# Patient Record
Sex: MALE | Race: ASIAN | NOT HISPANIC OR LATINO | Employment: OTHER | URBAN - METROPOLITAN AREA
[De-identification: names, ages, dates, MRNs, and addresses within clinical notes are randomized per-mention and may not be internally consistent; named-entity substitution may affect disease eponyms.]

---

## 2017-01-31 ENCOUNTER — OFFICE VISIT - HEALTHEAST (OUTPATIENT)
Dept: AUDIOLOGY | Facility: CLINIC | Age: 67
End: 2017-01-31

## 2017-01-31 DIAGNOSIS — H90.3 SENSORINEURAL HEARING LOSS, BILATERAL: ICD-10-CM

## 2017-01-31 DIAGNOSIS — H93.11 TINNITUS, RIGHT: ICD-10-CM

## 2017-02-07 ENCOUNTER — COMMUNICATION - HEALTHEAST (OUTPATIENT)
Dept: FAMILY MEDICINE | Facility: CLINIC | Age: 67
End: 2017-02-07

## 2017-02-07 ENCOUNTER — AMBULATORY - HEALTHEAST (OUTPATIENT)
Dept: CARE COORDINATION | Facility: CLINIC | Age: 67
End: 2017-02-07

## 2017-02-07 ENCOUNTER — COMMUNICATION - HEALTHEAST (OUTPATIENT)
Dept: CARE COORDINATION | Facility: CLINIC | Age: 67
End: 2017-02-07

## 2017-02-07 DIAGNOSIS — J98.01 BRONCHOSPASM: ICD-10-CM

## 2017-02-07 DIAGNOSIS — R51.9 HEADACHE: ICD-10-CM

## 2017-02-23 ENCOUNTER — COMMUNICATION - HEALTHEAST (OUTPATIENT)
Dept: CARE COORDINATION | Facility: CLINIC | Age: 67
End: 2017-02-23

## 2017-02-23 ENCOUNTER — COMMUNICATION - HEALTHEAST (OUTPATIENT)
Dept: FAMILY MEDICINE | Facility: CLINIC | Age: 67
End: 2017-02-23

## 2017-02-23 ENCOUNTER — AMBULATORY - HEALTHEAST (OUTPATIENT)
Dept: CARE COORDINATION | Facility: CLINIC | Age: 67
End: 2017-02-23

## 2017-02-23 DIAGNOSIS — R51.9 HEADACHE: ICD-10-CM

## 2017-03-06 ENCOUNTER — COMMUNICATION - HEALTHEAST (OUTPATIENT)
Dept: FAMILY MEDICINE | Facility: CLINIC | Age: 67
End: 2017-03-06

## 2017-03-09 ENCOUNTER — AMBULATORY - HEALTHEAST (OUTPATIENT)
Dept: CARE COORDINATION | Facility: CLINIC | Age: 67
End: 2017-03-09

## 2017-03-09 ENCOUNTER — COMMUNICATION - HEALTHEAST (OUTPATIENT)
Dept: CARE COORDINATION | Facility: CLINIC | Age: 67
End: 2017-03-09

## 2017-03-09 ENCOUNTER — RECORDS - HEALTHEAST (OUTPATIENT)
Dept: GENERAL RADIOLOGY | Facility: CLINIC | Age: 67
End: 2017-03-09

## 2017-03-09 ENCOUNTER — OFFICE VISIT - HEALTHEAST (OUTPATIENT)
Dept: FAMILY MEDICINE | Facility: CLINIC | Age: 67
End: 2017-03-09

## 2017-03-09 ENCOUNTER — COMMUNICATION - HEALTHEAST (OUTPATIENT)
Dept: FAMILY MEDICINE | Facility: CLINIC | Age: 67
End: 2017-03-09

## 2017-03-09 ENCOUNTER — COMMUNICATION - HEALTHEAST (OUTPATIENT)
Dept: NURSING | Facility: CLINIC | Age: 67
End: 2017-03-09

## 2017-03-09 DIAGNOSIS — M79.644 PAIN IN RIGHT FINGER(S): ICD-10-CM

## 2017-03-09 DIAGNOSIS — M79.644 FINGER PAIN, RIGHT: ICD-10-CM

## 2017-03-09 DIAGNOSIS — R93.6 ABNORMAL X-RAY OF EXTREMITY: ICD-10-CM

## 2017-03-09 DIAGNOSIS — Z71.89 COUNSELING AND COORDINATION OF CARE: ICD-10-CM

## 2017-03-09 DIAGNOSIS — R51.9 HEADACHE: ICD-10-CM

## 2017-03-15 ENCOUNTER — COMMUNICATION - HEALTHEAST (OUTPATIENT)
Dept: FAMILY MEDICINE | Facility: CLINIC | Age: 67
End: 2017-03-15

## 2017-03-23 ENCOUNTER — AMBULATORY - HEALTHEAST (OUTPATIENT)
Dept: CARE COORDINATION | Facility: CLINIC | Age: 67
End: 2017-03-23

## 2017-03-23 ENCOUNTER — COMMUNICATION - HEALTHEAST (OUTPATIENT)
Dept: CARE COORDINATION | Facility: CLINIC | Age: 67
End: 2017-03-23

## 2017-04-26 ENCOUNTER — COMMUNICATION - HEALTHEAST (OUTPATIENT)
Dept: FAMILY MEDICINE | Facility: CLINIC | Age: 67
End: 2017-04-26

## 2017-04-27 ENCOUNTER — AMBULATORY - HEALTHEAST (OUTPATIENT)
Dept: CARE COORDINATION | Facility: CLINIC | Age: 67
End: 2017-04-27

## 2017-04-27 ENCOUNTER — COMMUNICATION - HEALTHEAST (OUTPATIENT)
Dept: FAMILY MEDICINE | Facility: CLINIC | Age: 67
End: 2017-04-27

## 2017-04-27 DIAGNOSIS — R51.9 HEADACHE: ICD-10-CM

## 2017-05-05 ENCOUNTER — COMMUNICATION - HEALTHEAST (OUTPATIENT)
Dept: FAMILY MEDICINE | Facility: CLINIC | Age: 67
End: 2017-05-05

## 2017-05-09 ENCOUNTER — AMBULATORY - HEALTHEAST (OUTPATIENT)
Dept: CARE COORDINATION | Facility: CLINIC | Age: 67
End: 2017-05-09

## 2017-05-09 ENCOUNTER — COMMUNICATION - HEALTHEAST (OUTPATIENT)
Dept: HOME HEALTH SERVICES | Facility: HOME HEALTH | Age: 67
End: 2017-05-09

## 2017-05-09 ENCOUNTER — HOME CARE/HOSPICE - HEALTHEAST (OUTPATIENT)
Dept: HOME HEALTH SERVICES | Facility: HOME HEALTH | Age: 67
End: 2017-05-09

## 2017-05-09 DIAGNOSIS — Z91.89 COMPLIANCE WITH MEDICATION REGIMEN: ICD-10-CM

## 2017-05-11 ENCOUNTER — OFFICE VISIT - HEALTHEAST (OUTPATIENT)
Dept: FAMILY MEDICINE | Facility: CLINIC | Age: 67
End: 2017-05-11

## 2017-05-11 ENCOUNTER — COMMUNICATION - HEALTHEAST (OUTPATIENT)
Dept: FAMILY MEDICINE | Facility: CLINIC | Age: 67
End: 2017-05-11

## 2017-05-11 DIAGNOSIS — S46.912D SHOULDER STRAIN, LEFT, SUBSEQUENT ENCOUNTER: ICD-10-CM

## 2017-05-19 ENCOUNTER — COMMUNICATION - HEALTHEAST (OUTPATIENT)
Dept: FAMILY MEDICINE | Facility: CLINIC | Age: 67
End: 2017-05-19

## 2017-05-19 DIAGNOSIS — R51.9 HEADACHE: ICD-10-CM

## 2017-05-19 DIAGNOSIS — I10 HYPERTENSION: ICD-10-CM

## 2017-05-24 ENCOUNTER — RECORDS - HEALTHEAST (OUTPATIENT)
Dept: ADMINISTRATIVE | Facility: OTHER | Age: 67
End: 2017-05-24

## 2017-06-22 ENCOUNTER — COMMUNICATION - HEALTHEAST (OUTPATIENT)
Dept: FAMILY MEDICINE | Facility: CLINIC | Age: 67
End: 2017-06-22

## 2017-06-22 DIAGNOSIS — R51.9 HEADACHE: ICD-10-CM

## 2017-06-28 ENCOUNTER — COMMUNICATION - HEALTHEAST (OUTPATIENT)
Dept: FAMILY MEDICINE | Facility: CLINIC | Age: 67
End: 2017-06-28

## 2017-07-03 ENCOUNTER — RECORDS - HEALTHEAST (OUTPATIENT)
Dept: ADMINISTRATIVE | Facility: OTHER | Age: 67
End: 2017-07-03

## 2017-07-18 ENCOUNTER — COMMUNICATION - HEALTHEAST (OUTPATIENT)
Dept: FAMILY MEDICINE | Facility: CLINIC | Age: 67
End: 2017-07-18

## 2017-07-18 DIAGNOSIS — I10 HYPERTENSION: ICD-10-CM

## 2017-07-20 ENCOUNTER — RECORDS - HEALTHEAST (OUTPATIENT)
Dept: GENERAL RADIOLOGY | Facility: CLINIC | Age: 67
End: 2017-07-20

## 2017-07-20 ENCOUNTER — OFFICE VISIT - HEALTHEAST (OUTPATIENT)
Dept: FAMILY MEDICINE | Facility: CLINIC | Age: 67
End: 2017-07-20

## 2017-07-20 DIAGNOSIS — R10.9 LEFT FLANK PAIN: ICD-10-CM

## 2017-07-20 DIAGNOSIS — I10 HYPERTENSION: ICD-10-CM

## 2017-07-20 DIAGNOSIS — R10.9 UNSPECIFIED ABDOMINAL PAIN: ICD-10-CM

## 2017-07-20 DIAGNOSIS — H91.90 HEARING LOSS: ICD-10-CM

## 2017-07-20 DIAGNOSIS — M25.512 SHOULDER PAIN, LEFT: ICD-10-CM

## 2017-07-20 DIAGNOSIS — E78.5 HYPERLIPIDEMIA: ICD-10-CM

## 2017-07-20 DIAGNOSIS — E03.9 HYPOTHYROIDISM: ICD-10-CM

## 2017-07-20 LAB
CHOLEST SERPL-MCNC: 185 MG/DL
FASTING STATUS PATIENT QL REPORTED: YES
HDLC SERPL-MCNC: 34 MG/DL
LDLC SERPL CALC-MCNC: 134 MG/DL
TRIGL SERPL-MCNC: 83 MG/DL

## 2017-07-20 ASSESSMENT — MIFFLIN-ST. JEOR: SCORE: 1065.63

## 2017-07-21 ENCOUNTER — COMMUNICATION - HEALTHEAST (OUTPATIENT)
Dept: FAMILY MEDICINE | Facility: CLINIC | Age: 67
End: 2017-07-21

## 2017-08-01 ENCOUNTER — COMMUNICATION - HEALTHEAST (OUTPATIENT)
Dept: ADMINISTRATIVE | Facility: CLINIC | Age: 67
End: 2017-08-01

## 2017-08-01 ENCOUNTER — OFFICE VISIT - HEALTHEAST (OUTPATIENT)
Dept: AUDIOLOGY | Facility: CLINIC | Age: 67
End: 2017-08-01

## 2017-08-01 DIAGNOSIS — H90.3 SENSORINEURAL HEARING LOSS, BILATERAL: ICD-10-CM

## 2017-08-01 DIAGNOSIS — R42 DIZZINESS: ICD-10-CM

## 2017-08-01 DIAGNOSIS — H93.12 TINNITUS, LEFT: ICD-10-CM

## 2017-08-15 ENCOUNTER — COMMUNICATION - HEALTHEAST (OUTPATIENT)
Dept: FAMILY MEDICINE | Facility: CLINIC | Age: 67
End: 2017-08-15

## 2017-08-15 DIAGNOSIS — R51.9 HEADACHE: ICD-10-CM

## 2017-08-15 DIAGNOSIS — I09.9 RHEUMATIC HEART DISEASE, UNSPECIFIED: ICD-10-CM

## 2017-08-21 ENCOUNTER — COMMUNICATION - HEALTHEAST (OUTPATIENT)
Dept: NURSING | Facility: CLINIC | Age: 67
End: 2017-08-21

## 2017-08-29 ENCOUNTER — OFFICE VISIT - HEALTHEAST (OUTPATIENT)
Dept: CARDIOLOGY | Facility: CLINIC | Age: 67
End: 2017-08-29

## 2017-08-29 ENCOUNTER — COMMUNICATION - HEALTHEAST (OUTPATIENT)
Dept: CARDIOLOGY | Facility: CLINIC | Age: 67
End: 2017-08-29

## 2017-08-29 DIAGNOSIS — R00.0 TACHYCARDIA: ICD-10-CM

## 2017-08-29 DIAGNOSIS — E78.01 FAMILIAL HYPERCHOLESTEROLEMIA: ICD-10-CM

## 2017-08-29 DIAGNOSIS — I09.9 RHEUMATIC HEART DISEASE, UNSPECIFIED: ICD-10-CM

## 2017-08-29 DIAGNOSIS — I25.10 ATHEROSCLEROSIS OF NATIVE CORONARY ARTERY OF NATIVE HEART WITHOUT ANGINA PECTORIS: ICD-10-CM

## 2017-08-29 DIAGNOSIS — E78.5 HYPERLIPIDEMIA: ICD-10-CM

## 2017-08-29 DIAGNOSIS — I10 ESSENTIAL HYPERTENSION: ICD-10-CM

## 2017-08-29 DIAGNOSIS — I66.9 CEREBRAL ARTERY OCCLUSION: ICD-10-CM

## 2017-08-29 LAB
ATRIAL RATE - MUSE: 300 BPM
DIASTOLIC BLOOD PRESSURE - MUSE: NORMAL MMHG
INTERPRETATION ECG - MUSE: NORMAL
P AXIS - MUSE: -62 DEGREES
PR INTERVAL - MUSE: NORMAL MS
QRS DURATION - MUSE: 96 MS
QT - MUSE: 264 MS
QTC - MUSE: 397 MS
R AXIS - MUSE: 46 DEGREES
SYSTOLIC BLOOD PRESSURE - MUSE: NORMAL MMHG
T AXIS - MUSE: 64 DEGREES
VENTRICULAR RATE- MUSE: 136 BPM

## 2017-08-29 ASSESSMENT — MIFFLIN-ST. JEOR: SCORE: 1074.71

## 2017-08-31 ENCOUNTER — AMBULATORY - HEALTHEAST (OUTPATIENT)
Dept: CARDIOLOGY | Facility: CLINIC | Age: 67
End: 2017-08-31

## 2017-08-31 DIAGNOSIS — I48.91 ATRIAL FIBRILLATION (H): ICD-10-CM

## 2017-09-01 ENCOUNTER — COMMUNICATION - HEALTHEAST (OUTPATIENT)
Dept: CARDIOLOGY | Facility: CLINIC | Age: 67
End: 2017-09-01

## 2017-09-01 ENCOUNTER — COMMUNICATION - HEALTHEAST (OUTPATIENT)
Dept: FAMILY MEDICINE | Facility: CLINIC | Age: 67
End: 2017-09-01

## 2017-09-01 DIAGNOSIS — E03.9 HYPOTHYROIDISM: ICD-10-CM

## 2017-09-06 ENCOUNTER — COMMUNICATION - HEALTHEAST (OUTPATIENT)
Dept: FAMILY MEDICINE | Facility: CLINIC | Age: 67
End: 2017-09-06

## 2017-09-06 ENCOUNTER — OFFICE VISIT - HEALTHEAST (OUTPATIENT)
Dept: FAMILY MEDICINE | Facility: CLINIC | Age: 67
End: 2017-09-06

## 2017-09-06 DIAGNOSIS — I95.9 HYPOTENSION: ICD-10-CM

## 2017-09-06 DIAGNOSIS — I48.91 A-FIB (H): ICD-10-CM

## 2017-09-06 DIAGNOSIS — Z09 HOSPITAL DISCHARGE FOLLOW-UP: ICD-10-CM

## 2017-09-06 DIAGNOSIS — I50.9 CHF (CONGESTIVE HEART FAILURE) (H): ICD-10-CM

## 2017-09-06 DIAGNOSIS — I05.0 MITRAL VALVE STENOSIS, UNSPECIFIED ETIOLOGY: ICD-10-CM

## 2017-09-06 ASSESSMENT — MIFFLIN-ST. JEOR: SCORE: 1079.24

## 2017-09-08 ENCOUNTER — RECORDS - HEALTHEAST (OUTPATIENT)
Dept: ADMINISTRATIVE | Facility: OTHER | Age: 67
End: 2017-09-08

## 2017-09-11 ENCOUNTER — COMMUNICATION - HEALTHEAST (OUTPATIENT)
Dept: FAMILY MEDICINE | Facility: CLINIC | Age: 67
End: 2017-09-11

## 2017-09-11 ENCOUNTER — OFFICE VISIT - HEALTHEAST (OUTPATIENT)
Dept: FAMILY MEDICINE | Facility: CLINIC | Age: 67
End: 2017-09-11

## 2017-09-11 DIAGNOSIS — Z00.00 HEALTH CARE MAINTENANCE: ICD-10-CM

## 2017-09-11 DIAGNOSIS — I50.9 CHF (CONGESTIVE HEART FAILURE) (H): ICD-10-CM

## 2017-09-11 DIAGNOSIS — I05.0 MITRAL VALVE STENOSIS, UNSPECIFIED ETIOLOGY: ICD-10-CM

## 2017-09-11 DIAGNOSIS — Z23 NEED FOR PNEUMOCOCCAL VACCINE: ICD-10-CM

## 2017-09-11 DIAGNOSIS — I48.91 ATRIAL FIBRILLATION WITH RVR (H): ICD-10-CM

## 2017-09-12 ENCOUNTER — COMMUNICATION - HEALTHEAST (OUTPATIENT)
Dept: FAMILY MEDICINE | Facility: CLINIC | Age: 67
End: 2017-09-12

## 2017-09-12 DIAGNOSIS — R51.9 HEADACHE: ICD-10-CM

## 2017-09-13 ENCOUNTER — COMMUNICATION - HEALTHEAST (OUTPATIENT)
Dept: FAMILY MEDICINE | Facility: CLINIC | Age: 67
End: 2017-09-13

## 2017-09-14 ENCOUNTER — RECORDS - HEALTHEAST (OUTPATIENT)
Dept: ADMINISTRATIVE | Facility: OTHER | Age: 67
End: 2017-09-14

## 2017-09-14 ENCOUNTER — OFFICE VISIT - HEALTHEAST (OUTPATIENT)
Dept: CARDIOLOGY | Facility: CLINIC | Age: 67
End: 2017-09-14

## 2017-09-14 ENCOUNTER — OFFICE VISIT - HEALTHEAST (OUTPATIENT)
Dept: FAMILY MEDICINE | Facility: CLINIC | Age: 67
End: 2017-09-14

## 2017-09-14 ENCOUNTER — AMBULATORY - HEALTHEAST (OUTPATIENT)
Dept: CARDIOLOGY | Facility: CLINIC | Age: 67
End: 2017-09-14

## 2017-09-14 ENCOUNTER — COMMUNICATION - HEALTHEAST (OUTPATIENT)
Dept: FAMILY MEDICINE | Facility: CLINIC | Age: 67
End: 2017-09-14

## 2017-09-14 DIAGNOSIS — I48.91 A-FIB (H): ICD-10-CM

## 2017-09-14 DIAGNOSIS — I50.31 ACUTE DIASTOLIC CHF (CONGESTIVE HEART FAILURE) (H): ICD-10-CM

## 2017-09-14 DIAGNOSIS — I50.9 CHF (CONGESTIVE HEART FAILURE) (H): ICD-10-CM

## 2017-09-14 DIAGNOSIS — I50.30 HEART FAILURE WITH PRESERVED EJECTION FRACTION (H): ICD-10-CM

## 2017-09-14 DIAGNOSIS — I09.9 RHEUMATIC HEART DISEASE, UNSPECIFIED: ICD-10-CM

## 2017-09-14 LAB
ATRIAL RATE - MUSE: 242 BPM
DIASTOLIC BLOOD PRESSURE - MUSE: NORMAL MMHG
INTERPRETATION ECG - MUSE: NORMAL
P AXIS - MUSE: -20 DEGREES
PR INTERVAL - MUSE: NORMAL MS
QRS DURATION - MUSE: 102 MS
QT - MUSE: 386 MS
QTC - MUSE: 474 MS
R AXIS - MUSE: 38 DEGREES
SYSTOLIC BLOOD PRESSURE - MUSE: NORMAL MMHG
T AXIS - MUSE: 23 DEGREES
VENTRICULAR RATE- MUSE: 91 BPM

## 2017-09-14 ASSESSMENT — MIFFLIN-ST. JEOR
SCORE: 1101.01
SCORE: 1099.65

## 2017-09-15 ENCOUNTER — AMBULATORY - HEALTHEAST (OUTPATIENT)
Dept: LAB | Facility: CLINIC | Age: 67
End: 2017-09-15

## 2017-09-15 ENCOUNTER — COMMUNICATION - HEALTHEAST (OUTPATIENT)
Dept: FAMILY MEDICINE | Facility: CLINIC | Age: 67
End: 2017-09-15

## 2017-09-15 DIAGNOSIS — I48.91 A-FIB (H): ICD-10-CM

## 2017-09-15 DIAGNOSIS — I50.9 CHF (CONGESTIVE HEART FAILURE) (H): ICD-10-CM

## 2017-09-18 ENCOUNTER — AMBULATORY - HEALTHEAST (OUTPATIENT)
Dept: LAB | Facility: CLINIC | Age: 67
End: 2017-09-18

## 2017-09-18 ENCOUNTER — COMMUNICATION - HEALTHEAST (OUTPATIENT)
Dept: NURSING | Facility: CLINIC | Age: 67
End: 2017-09-18

## 2017-09-18 DIAGNOSIS — I50.9 CHF (CONGESTIVE HEART FAILURE) (H): ICD-10-CM

## 2017-09-18 DIAGNOSIS — I48.91 A-FIB (H): ICD-10-CM

## 2017-09-19 ENCOUNTER — COMMUNICATION - HEALTHEAST (OUTPATIENT)
Dept: FAMILY MEDICINE | Facility: CLINIC | Age: 67
End: 2017-09-19

## 2017-09-19 DIAGNOSIS — I10 HYPERTENSION: ICD-10-CM

## 2017-09-21 ENCOUNTER — AMBULATORY - HEALTHEAST (OUTPATIENT)
Dept: LAB | Facility: CLINIC | Age: 67
End: 2017-09-21

## 2017-09-21 ENCOUNTER — COMMUNICATION - HEALTHEAST (OUTPATIENT)
Dept: FAMILY MEDICINE | Facility: CLINIC | Age: 67
End: 2017-09-21

## 2017-09-21 ENCOUNTER — OFFICE VISIT - HEALTHEAST (OUTPATIENT)
Dept: CARDIOLOGY | Facility: CLINIC | Age: 67
End: 2017-09-21

## 2017-09-21 ENCOUNTER — AMBULATORY - HEALTHEAST (OUTPATIENT)
Dept: CARDIOLOGY | Facility: CLINIC | Age: 67
End: 2017-09-21

## 2017-09-21 DIAGNOSIS — I50.9 CHF (CONGESTIVE HEART FAILURE) (H): ICD-10-CM

## 2017-09-21 DIAGNOSIS — I09.9 RHEUMATIC HEART DISEASE: ICD-10-CM

## 2017-09-21 DIAGNOSIS — I48.91 A-FIB (H): ICD-10-CM

## 2017-09-21 DIAGNOSIS — I05.1 RHEUMATIC MITRAL REGURGITATION: ICD-10-CM

## 2017-09-21 ASSESSMENT — MIFFLIN-ST. JEOR
SCORE: 1092.85
SCORE: 1096.48

## 2017-09-25 ENCOUNTER — AMBULATORY - HEALTHEAST (OUTPATIENT)
Dept: LAB | Facility: CLINIC | Age: 67
End: 2017-09-25

## 2017-09-25 ENCOUNTER — COMMUNICATION - HEALTHEAST (OUTPATIENT)
Dept: NURSING | Facility: CLINIC | Age: 67
End: 2017-09-25

## 2017-09-25 DIAGNOSIS — I48.91 A-FIB (H): ICD-10-CM

## 2017-09-25 DIAGNOSIS — I50.9 CHF (CONGESTIVE HEART FAILURE) (H): ICD-10-CM

## 2017-09-26 ENCOUNTER — COMMUNICATION - HEALTHEAST (OUTPATIENT)
Dept: CARDIOLOGY | Facility: CLINIC | Age: 67
End: 2017-09-26

## 2017-09-28 ENCOUNTER — RECORDS - HEALTHEAST (OUTPATIENT)
Dept: ADMINISTRATIVE | Facility: OTHER | Age: 67
End: 2017-09-28

## 2017-09-28 ENCOUNTER — COMMUNICATION - HEALTHEAST (OUTPATIENT)
Dept: FAMILY MEDICINE | Facility: CLINIC | Age: 67
End: 2017-09-28

## 2017-09-28 ENCOUNTER — AMBULATORY - HEALTHEAST (OUTPATIENT)
Dept: LAB | Facility: CLINIC | Age: 67
End: 2017-09-28

## 2017-09-28 DIAGNOSIS — I50.9 CHF (CONGESTIVE HEART FAILURE) (H): ICD-10-CM

## 2017-09-28 DIAGNOSIS — I48.91 A-FIB (H): ICD-10-CM

## 2017-10-02 ENCOUNTER — COMMUNICATION - HEALTHEAST (OUTPATIENT)
Dept: NURSING | Facility: CLINIC | Age: 67
End: 2017-10-02

## 2017-10-02 ENCOUNTER — AMBULATORY - HEALTHEAST (OUTPATIENT)
Dept: LAB | Facility: CLINIC | Age: 67
End: 2017-10-02

## 2017-10-02 DIAGNOSIS — I48.91 A-FIB (H): ICD-10-CM

## 2017-10-02 DIAGNOSIS — I50.9 CHF (CONGESTIVE HEART FAILURE) (H): ICD-10-CM

## 2017-10-03 ENCOUNTER — COMMUNICATION - HEALTHEAST (OUTPATIENT)
Dept: FAMILY MEDICINE | Facility: CLINIC | Age: 67
End: 2017-10-03

## 2017-10-03 DIAGNOSIS — I09.9 RHEUMATIC HEART DISEASE: ICD-10-CM

## 2017-10-03 DIAGNOSIS — I48.91 A-FIB (H): ICD-10-CM

## 2017-10-05 ENCOUNTER — COMMUNICATION - HEALTHEAST (OUTPATIENT)
Dept: FAMILY MEDICINE | Facility: CLINIC | Age: 67
End: 2017-10-05

## 2017-10-05 ENCOUNTER — AMBULATORY - HEALTHEAST (OUTPATIENT)
Dept: LAB | Facility: CLINIC | Age: 67
End: 2017-10-05

## 2017-10-05 DIAGNOSIS — I50.9 CHF (CONGESTIVE HEART FAILURE) (H): ICD-10-CM

## 2017-10-05 DIAGNOSIS — I48.91 A-FIB (H): ICD-10-CM

## 2017-10-10 ENCOUNTER — COMMUNICATION - HEALTHEAST (OUTPATIENT)
Dept: FAMILY MEDICINE | Facility: CLINIC | Age: 67
End: 2017-10-10

## 2017-10-10 DIAGNOSIS — I48.91 A-FIB (H): ICD-10-CM

## 2017-10-13 ENCOUNTER — COMMUNICATION - HEALTHEAST (OUTPATIENT)
Dept: FAMILY MEDICINE | Facility: CLINIC | Age: 67
End: 2017-10-13

## 2017-10-13 DIAGNOSIS — I48.91 A-FIB (H): ICD-10-CM

## 2017-10-16 ENCOUNTER — RECORDS - HEALTHEAST (OUTPATIENT)
Dept: ADMINISTRATIVE | Facility: OTHER | Age: 67
End: 2017-10-16

## 2017-10-18 ENCOUNTER — COMMUNICATION - HEALTHEAST (OUTPATIENT)
Dept: FAMILY MEDICINE | Facility: CLINIC | Age: 67
End: 2017-10-18

## 2017-10-19 ENCOUNTER — COMMUNICATION - HEALTHEAST (OUTPATIENT)
Dept: FAMILY MEDICINE | Facility: CLINIC | Age: 67
End: 2017-10-19

## 2017-10-19 ENCOUNTER — RECORDS - HEALTHEAST (OUTPATIENT)
Dept: ADMINISTRATIVE | Facility: OTHER | Age: 67
End: 2017-10-19

## 2017-10-19 ENCOUNTER — COMMUNICATION - HEALTHEAST (OUTPATIENT)
Dept: CARDIOLOGY | Facility: CLINIC | Age: 67
End: 2017-10-19

## 2017-10-19 DIAGNOSIS — I48.91 A-FIB (H): ICD-10-CM

## 2017-10-20 ENCOUNTER — COMMUNICATION - HEALTHEAST (OUTPATIENT)
Dept: CARDIOLOGY | Facility: CLINIC | Age: 67
End: 2017-10-20

## 2017-10-25 ENCOUNTER — AMBULATORY - HEALTHEAST (OUTPATIENT)
Dept: CARDIOLOGY | Facility: CLINIC | Age: 67
End: 2017-10-25

## 2017-10-25 ENCOUNTER — COMMUNICATION - HEALTHEAST (OUTPATIENT)
Dept: FAMILY MEDICINE | Facility: CLINIC | Age: 67
End: 2017-10-25

## 2017-10-25 DIAGNOSIS — I48.91 ATRIAL FIBRILLATION (H): ICD-10-CM

## 2017-10-25 DIAGNOSIS — I10 HYPERTENSION: ICD-10-CM

## 2017-10-27 ENCOUNTER — RECORDS - HEALTHEAST (OUTPATIENT)
Dept: ADMINISTRATIVE | Facility: OTHER | Age: 67
End: 2017-10-27

## 2017-10-31 ENCOUNTER — AMBULATORY - HEALTHEAST (OUTPATIENT)
Dept: CARDIOLOGY | Facility: CLINIC | Age: 67
End: 2017-10-31

## 2017-10-31 DIAGNOSIS — I48.91 ATRIAL FIBRILLATION (H): ICD-10-CM

## 2017-11-08 ENCOUNTER — AMBULATORY - HEALTHEAST (OUTPATIENT)
Dept: CARDIOLOGY | Facility: CLINIC | Age: 67
End: 2017-11-08

## 2017-11-08 DIAGNOSIS — I48.91 ATRIAL FIBRILLATION (H): ICD-10-CM

## 2017-11-09 ENCOUNTER — RECORDS - HEALTHEAST (OUTPATIENT)
Dept: ADMINISTRATIVE | Facility: OTHER | Age: 67
End: 2017-11-09

## 2017-11-15 ENCOUNTER — AMBULATORY - HEALTHEAST (OUTPATIENT)
Dept: CARDIOLOGY | Facility: CLINIC | Age: 67
End: 2017-11-15

## 2017-11-15 ENCOUNTER — COMMUNICATION - HEALTHEAST (OUTPATIENT)
Dept: FAMILY MEDICINE | Facility: CLINIC | Age: 67
End: 2017-11-15

## 2017-11-15 DIAGNOSIS — I48.91 ATRIAL FIBRILLATION (H): ICD-10-CM

## 2017-11-16 ENCOUNTER — OFFICE VISIT - HEALTHEAST (OUTPATIENT)
Dept: CARDIOLOGY | Facility: CLINIC | Age: 67
End: 2017-11-16

## 2017-11-16 ENCOUNTER — AMBULATORY - HEALTHEAST (OUTPATIENT)
Dept: CARDIOLOGY | Facility: CLINIC | Age: 67
End: 2017-11-16

## 2017-11-16 DIAGNOSIS — I25.10 CORONARY ARTERY DISEASE INVOLVING NATIVE CORONARY ARTERY OF NATIVE HEART WITHOUT ANGINA PECTORIS: ICD-10-CM

## 2017-11-16 DIAGNOSIS — I05.9 MITRAL VALVE DISEASE: ICD-10-CM

## 2017-11-16 DIAGNOSIS — I48.92 ATRIAL FLUTTER, UNSPECIFIED TYPE (H): ICD-10-CM

## 2017-11-16 ASSESSMENT — MIFFLIN-ST. JEOR: SCORE: 1082.65

## 2017-11-17 LAB
ATRIAL RATE - MUSE: 56 BPM
DIASTOLIC BLOOD PRESSURE - MUSE: NORMAL MMHG
INTERPRETATION ECG - MUSE: NORMAL
P AXIS - MUSE: 26 DEGREES
PR INTERVAL - MUSE: 168 MS
QRS DURATION - MUSE: 98 MS
QT - MUSE: 448 MS
QTC - MUSE: 432 MS
R AXIS - MUSE: 30 DEGREES
SYSTOLIC BLOOD PRESSURE - MUSE: NORMAL MMHG
T AXIS - MUSE: 68 DEGREES
VENTRICULAR RATE- MUSE: 56 BPM

## 2017-11-21 ENCOUNTER — AMBULATORY - HEALTHEAST (OUTPATIENT)
Dept: CARDIOLOGY | Facility: CLINIC | Age: 67
End: 2017-11-21

## 2017-11-21 DIAGNOSIS — I48.91 A-FIB (H): ICD-10-CM

## 2017-11-28 ENCOUNTER — COMMUNICATION - HEALTHEAST (OUTPATIENT)
Dept: CARDIOLOGY | Facility: CLINIC | Age: 67
End: 2017-11-28

## 2017-11-28 ENCOUNTER — AMBULATORY - HEALTHEAST (OUTPATIENT)
Dept: CARDIOLOGY | Facility: CLINIC | Age: 67
End: 2017-11-28

## 2017-11-28 DIAGNOSIS — I48.91 ATRIAL FIBRILLATION (H): ICD-10-CM

## 2017-11-28 DIAGNOSIS — E78.5 HYPERLIPIDEMIA: ICD-10-CM

## 2017-12-01 ENCOUNTER — OFFICE VISIT - HEALTHEAST (OUTPATIENT)
Dept: CARDIOLOGY | Facility: CLINIC | Age: 67
End: 2017-12-01

## 2017-12-01 DIAGNOSIS — I09.9 RHEUMATIC HEART DISEASE: ICD-10-CM

## 2017-12-05 ENCOUNTER — COMMUNICATION - HEALTHEAST (OUTPATIENT)
Dept: FAMILY MEDICINE | Facility: CLINIC | Age: 67
End: 2017-12-05

## 2017-12-05 DIAGNOSIS — I48.91 A-FIB (H): ICD-10-CM

## 2017-12-07 ENCOUNTER — COMMUNICATION - HEALTHEAST (OUTPATIENT)
Dept: NURSING | Facility: CLINIC | Age: 67
End: 2017-12-07

## 2017-12-13 ENCOUNTER — COMMUNICATION - HEALTHEAST (OUTPATIENT)
Dept: FAMILY MEDICINE | Facility: CLINIC | Age: 67
End: 2017-12-13

## 2017-12-13 DIAGNOSIS — R13.10 DYSPHAGIA: ICD-10-CM

## 2017-12-13 DIAGNOSIS — I09.9 RHEUMATIC HEART DISEASE: ICD-10-CM

## 2017-12-18 ENCOUNTER — RECORDS - HEALTHEAST (OUTPATIENT)
Dept: ADMINISTRATIVE | Facility: OTHER | Age: 67
End: 2017-12-18

## 2017-12-21 ENCOUNTER — COMMUNICATION - HEALTHEAST (OUTPATIENT)
Dept: FAMILY MEDICINE | Facility: CLINIC | Age: 67
End: 2017-12-21

## 2017-12-21 DIAGNOSIS — I48.91 A-FIB (H): ICD-10-CM

## 2018-01-02 ENCOUNTER — COMMUNICATION - HEALTHEAST (OUTPATIENT)
Dept: FAMILY MEDICINE | Facility: CLINIC | Age: 68
End: 2018-01-02

## 2018-01-02 DIAGNOSIS — I06.9 AORTIC VALVE DISEASE, RHEUMATIC: ICD-10-CM

## 2018-01-02 DIAGNOSIS — I66.9 CEREBRAL ARTERY OCCLUSION: ICD-10-CM

## 2018-01-02 DIAGNOSIS — I48.91 A-FIB (H): ICD-10-CM

## 2018-01-02 DIAGNOSIS — I05.0 MITRAL VALVE STENOSIS, UNSPECIFIED ETIOLOGY: ICD-10-CM

## 2018-01-02 LAB — INR PPP: 2.2 (ref 0.9–1.1)

## 2018-01-05 ENCOUNTER — RECORDS - HEALTHEAST (OUTPATIENT)
Dept: ADMINISTRATIVE | Facility: OTHER | Age: 68
End: 2018-01-05

## 2018-01-11 ENCOUNTER — RECORDS - HEALTHEAST (OUTPATIENT)
Dept: ADMINISTRATIVE | Facility: OTHER | Age: 68
End: 2018-01-11

## 2018-01-16 ENCOUNTER — COMMUNICATION - HEALTHEAST (OUTPATIENT)
Dept: FAMILY MEDICINE | Facility: CLINIC | Age: 68
End: 2018-01-16

## 2018-01-16 DIAGNOSIS — I06.9 AORTIC VALVE DISEASE, RHEUMATIC: ICD-10-CM

## 2018-01-16 DIAGNOSIS — I10 HYPERTENSION: ICD-10-CM

## 2018-01-16 DIAGNOSIS — I48.91 A-FIB (H): ICD-10-CM

## 2018-01-16 DIAGNOSIS — I05.0 MITRAL VALVE STENOSIS, UNSPECIFIED ETIOLOGY: ICD-10-CM

## 2018-01-16 DIAGNOSIS — I66.9 CEREBRAL ARTERY OCCLUSION: ICD-10-CM

## 2018-01-16 LAB — INR PPP: 2.3 (ref 0.9–1.1)

## 2018-01-25 ENCOUNTER — RECORDS - HEALTHEAST (OUTPATIENT)
Dept: ADMINISTRATIVE | Facility: OTHER | Age: 68
End: 2018-01-25

## 2018-01-30 ENCOUNTER — COMMUNICATION - HEALTHEAST (OUTPATIENT)
Dept: NURSING | Facility: CLINIC | Age: 68
End: 2018-01-30

## 2018-01-30 ENCOUNTER — COMMUNICATION - HEALTHEAST (OUTPATIENT)
Dept: FAMILY MEDICINE | Facility: CLINIC | Age: 68
End: 2018-01-30

## 2018-01-30 DIAGNOSIS — I05.0 MITRAL VALVE STENOSIS, UNSPECIFIED ETIOLOGY: ICD-10-CM

## 2018-01-30 DIAGNOSIS — I48.91 ATRIAL FIBRILLATION (H): ICD-10-CM

## 2018-01-30 DIAGNOSIS — I66.9 CEREBRAL ARTERY OCCLUSION: ICD-10-CM

## 2018-01-30 DIAGNOSIS — I06.9 AORTIC VALVE DISEASE, RHEUMATIC: ICD-10-CM

## 2018-01-30 DIAGNOSIS — I48.91 A-FIB (H): ICD-10-CM

## 2018-01-30 LAB — INR PPP: 1.8 (ref 0.9–1.1)

## 2018-02-12 ENCOUNTER — COMMUNICATION - HEALTHEAST (OUTPATIENT)
Dept: FAMILY MEDICINE | Facility: CLINIC | Age: 68
End: 2018-02-12

## 2018-02-12 DIAGNOSIS — I05.0 MITRAL VALVE STENOSIS, UNSPECIFIED ETIOLOGY: ICD-10-CM

## 2018-02-12 DIAGNOSIS — I48.91 A-FIB (H): ICD-10-CM

## 2018-02-12 DIAGNOSIS — I06.9 AORTIC VALVE DISEASE, RHEUMATIC: ICD-10-CM

## 2018-02-12 DIAGNOSIS — I66.9 CEREBRAL ARTERY OCCLUSION: ICD-10-CM

## 2018-02-12 LAB — INR PPP: 1.9 (ref 0.9–1.1)

## 2018-02-13 ENCOUNTER — OFFICE VISIT - HEALTHEAST (OUTPATIENT)
Dept: CARDIOLOGY | Facility: CLINIC | Age: 68
End: 2018-02-13

## 2018-02-13 DIAGNOSIS — I06.9 AORTIC VALVE DISEASE, RHEUMATIC: ICD-10-CM

## 2018-02-13 DIAGNOSIS — I05.0 MITRAL VALVE STENOSIS, UNSPECIFIED ETIOLOGY: ICD-10-CM

## 2018-02-13 DIAGNOSIS — I48.0 PAROXYSMAL ATRIAL FIBRILLATION (H): ICD-10-CM

## 2018-02-13 DIAGNOSIS — I10 ESSENTIAL HYPERTENSION WITH GOAL BLOOD PRESSURE LESS THAN 130/85: ICD-10-CM

## 2018-02-13 DIAGNOSIS — I25.10 ATHEROSCLEROSIS OF NATIVE CORONARY ARTERY OF NATIVE HEART WITHOUT ANGINA PECTORIS: ICD-10-CM

## 2018-02-13 DIAGNOSIS — E78.00 PURE HYPERCHOLESTEROLEMIA: ICD-10-CM

## 2018-02-13 DIAGNOSIS — I50.30 HEART FAILURE WITH PRESERVED EJECTION FRACTION (H): ICD-10-CM

## 2018-02-13 ASSESSMENT — MIFFLIN-ST. JEOR: SCORE: 1097.62

## 2018-02-26 ENCOUNTER — COMMUNICATION - HEALTHEAST (OUTPATIENT)
Dept: FAMILY MEDICINE | Facility: CLINIC | Age: 68
End: 2018-02-26

## 2018-02-26 DIAGNOSIS — I06.9 AORTIC VALVE DISEASE, RHEUMATIC: ICD-10-CM

## 2018-02-26 DIAGNOSIS — I05.0 MITRAL VALVE STENOSIS, UNSPECIFIED ETIOLOGY: ICD-10-CM

## 2018-02-26 DIAGNOSIS — I66.9 CEREBRAL ARTERY OCCLUSION: ICD-10-CM

## 2018-02-26 LAB — INR PPP: 1.7 (ref 0.9–1.1)

## 2018-02-28 ENCOUNTER — HOSPITAL ENCOUNTER (OUTPATIENT)
Dept: CARDIOLOGY | Facility: HOSPITAL | Age: 68
Discharge: HOME | End: 2018-02-28
Attending: INTERNAL MEDICINE

## 2018-02-28 DIAGNOSIS — I05.0 MITRAL VALVE STENOSIS, UNSPECIFIED ETIOLOGY: ICD-10-CM

## 2018-02-28 DIAGNOSIS — I06.9 AORTIC VALVE DISEASE, RHEUMATIC: ICD-10-CM

## 2018-02-28 DIAGNOSIS — I50.30 HEART FAILURE WITH PRESERVED EJECTION FRACTION (H): ICD-10-CM

## 2018-02-28 ASSESSMENT — MIFFLIN-ST. JEOR: SCORE: 1096.25

## 2018-03-01 LAB
AORTIC VALVE MEAN VELOCITY: 169 CM/S
AR DECEL SLOPE: 1770 MM/S2
AR PEAK VELOCITY: 478 CM/S
AV DIMENSIONLESS INDEX VTI: 0.4
AV MEAN GRADIENT: 13 MMHG
AV PEAK GRADIENT: 22.7 MMHG
AV REGURGITANT PEAK GRADIENT: 91.4 MMHG
AV REGURGITATION PRESSURE HALF TIME: 894 MS
AV VALVE AREA: 1.4 CM2
BSA FOR ECHO PROCEDURE: 1.45 M2
CV BLOOD PRESSURE: NORMAL MMHG
CV ECHO HEIGHT: 58.5 IN
CV ECHO WEIGHT: 113 LBS
DOP CALC AO PEAK VEL: 238 CM/S
DOP CALC AO VTI: 51.5 CM
DOP CALC LVOT AREA: 3.8 CM2
DOP CALC LVOT DIAMETER: 2.2 CM
DOP CALC LVOT STROKE VOLUME: 70.7 CM3
DOP CALC MV VTI: 41.1 CM
DOP CALCLVOT PEAK VEL VTI: 18.6 CM
EJECTION FRACTION: 47 % (ref 55–75)
FRACTIONAL SHORTENING: 25.5 % (ref 28–44)
INTERVENTRICULAR SEPTUM IN END DIASTOLE: 1.5 CM (ref 0.6–1)
IVS/PW RATIO: 2.1
LA AREA 1: 34.1 CM2
LA AREA 2: 33.3 CM2
LEFT ATRIUM LENGTH: 7.15 CM
LEFT ATRIUM SIZE: 6.1 CM
LEFT ATRIUM VOLUME INDEX: 93.1 ML/M2
LEFT ATRIUM VOLUME: 135 ML
LEFT VENTRICLE DIASTOLIC VOLUME INDEX: 41.4 CM3/M2 (ref 34–74)
LEFT VENTRICLE DIASTOLIC VOLUME: 60 CM3 (ref 62–150)
LEFT VENTRICLE MASS INDEX: 129.3 G/M2
LEFT VENTRICLE SYSTOLIC VOLUME INDEX: 22.1 CM3/M2 (ref 11–31)
LEFT VENTRICLE SYSTOLIC VOLUME: 32 CM3 (ref 21–61)
LEFT VENTRICULAR INTERNAL DIMENSION IN DIASTOLE: 4.7 CM (ref 4.2–5.8)
LEFT VENTRICULAR INTERNAL DIMENSION IN SYSTOLE: 3.5 CM (ref 2.5–4)
LEFT VENTRICULAR MASS: 187.5 G
LEFT VENTRICULAR OUTFLOW TRACT MEAN GRADIENT: 2 MMHG
LEFT VENTRICULAR OUTFLOW TRACT MEAN VELOCITY: 61.4 CM/S
LEFT VENTRICULAR POSTERIOR WALL IN END DIASTOLE: 0.7 CM (ref 0.6–1)
LV STROKE VOLUME INDEX: 48.7 ML/M2
MITRAL VALVE E/A RATIO: 2
MITRAL VALVE MEAN INFLOW VELOCITY: 71.9 CM/S
MITRAL VALVE PEAK VELOCITY: 129 CM/S
MV AREA VTI: 1.72 CM2
MV AVERAGE E/E' RATIO: 33.8 CM/S
MV DECELERATION TIME: 405 MS
MV E'TISSUE VEL-LAT: 3.77 CM/S
MV E'TISSUE VEL-MED: 3.4 CM/S
MV LATERAL E/E' RATIO: 32.1
MV MEAN GRADIENT: 2 MMHG
MV MEDIAL E/E' RATIO: 35.6
MV PEAK A VELOCITY: 60.7 CM/S
MV PEAK E VELOCITY: 121 CM/S
MV PEAK GRADIENT: 6.7 MMHG
MV VALVE AREA BY CONTINUITY EQUATION: 1.7 CM2
NUC REST DIASTOLIC VOLUME INDEX: 1808 LBS
NUC REST SYSTOLIC VOLUME INDEX: 58.5 IN
TRICUSPID REGURGITATION PEAK PRESSURE GRADIENT: 21.7 MMHG
TRICUSPID VALVE ANULAR PLANE SYSTOLIC EXCURSION: 2 CM
TRICUSPID VALVE PEAK REGURGITANT VELOCITY: 233 CM/S

## 2018-03-05 ENCOUNTER — COMMUNICATION - HEALTHEAST (OUTPATIENT)
Dept: FAMILY MEDICINE | Facility: CLINIC | Age: 68
End: 2018-03-05

## 2018-03-05 DIAGNOSIS — I48.91 A-FIB (H): ICD-10-CM

## 2018-03-05 DIAGNOSIS — I66.9 CEREBRAL ARTERY OCCLUSION: ICD-10-CM

## 2018-03-05 DIAGNOSIS — I05.0 MITRAL VALVE STENOSIS, UNSPECIFIED ETIOLOGY: ICD-10-CM

## 2018-03-05 DIAGNOSIS — I06.9 AORTIC VALVE DISEASE, RHEUMATIC: ICD-10-CM

## 2018-03-05 LAB — INR PPP: 1.8 (ref 0.9–1.1)

## 2018-03-08 ENCOUNTER — RECORDS - HEALTHEAST (OUTPATIENT)
Dept: ADMINISTRATIVE | Facility: OTHER | Age: 68
End: 2018-03-08

## 2018-03-13 ENCOUNTER — COMMUNICATION - HEALTHEAST (OUTPATIENT)
Dept: FAMILY MEDICINE | Facility: CLINIC | Age: 68
End: 2018-03-13

## 2018-03-13 DIAGNOSIS — I66.9 CEREBRAL ARTERY OCCLUSION: ICD-10-CM

## 2018-03-13 DIAGNOSIS — I06.9 AORTIC VALVE DISEASE, RHEUMATIC: ICD-10-CM

## 2018-03-13 DIAGNOSIS — I05.0 MITRAL VALVE STENOSIS, UNSPECIFIED ETIOLOGY: ICD-10-CM

## 2018-03-13 DIAGNOSIS — I48.91 A-FIB (H): ICD-10-CM

## 2018-03-13 LAB — INR PPP: 2.3 (ref 0.9–1.1)

## 2018-03-20 ENCOUNTER — COMMUNICATION - HEALTHEAST (OUTPATIENT)
Dept: NURSING | Facility: CLINIC | Age: 68
End: 2018-03-20

## 2018-03-21 ENCOUNTER — COMMUNICATION - HEALTHEAST (OUTPATIENT)
Dept: FAMILY MEDICINE | Facility: CLINIC | Age: 68
End: 2018-03-21

## 2018-03-21 DIAGNOSIS — I05.0 MITRAL VALVE STENOSIS, UNSPECIFIED ETIOLOGY: ICD-10-CM

## 2018-03-21 DIAGNOSIS — I48.91 A-FIB (H): ICD-10-CM

## 2018-03-21 DIAGNOSIS — I06.9 AORTIC VALVE DISEASE, RHEUMATIC: ICD-10-CM

## 2018-03-21 DIAGNOSIS — I66.9 CEREBRAL ARTERY OCCLUSION: ICD-10-CM

## 2018-03-21 LAB — INR PPP: 2.8 (ref 0.9–1.1)

## 2018-03-22 ENCOUNTER — RECORDS - HEALTHEAST (OUTPATIENT)
Dept: ADMINISTRATIVE | Facility: OTHER | Age: 68
End: 2018-03-22

## 2018-03-28 ENCOUNTER — COMMUNICATION - HEALTHEAST (OUTPATIENT)
Dept: NURSING | Facility: CLINIC | Age: 68
End: 2018-03-28

## 2018-03-30 ENCOUNTER — AMBULATORY - HEALTHEAST (OUTPATIENT)
Dept: CARDIOLOGY | Facility: CLINIC | Age: 68
End: 2018-03-30

## 2018-03-30 ENCOUNTER — OFFICE VISIT - HEALTHEAST (OUTPATIENT)
Dept: CARDIOLOGY | Facility: CLINIC | Age: 68
End: 2018-03-30

## 2018-03-30 DIAGNOSIS — I34.0 MITRAL REGURGITATION: ICD-10-CM

## 2018-03-30 DIAGNOSIS — Z01.818 PRE-OP EVALUATION: ICD-10-CM

## 2018-03-30 DIAGNOSIS — I35.1 NONRHEUMATIC AORTIC VALVE INSUFFICIENCY: ICD-10-CM

## 2018-03-30 ASSESSMENT — MIFFLIN-ST. JEOR: SCORE: 1091.72

## 2018-04-03 ENCOUNTER — COMMUNICATION - HEALTHEAST (OUTPATIENT)
Dept: FAMILY MEDICINE | Facility: CLINIC | Age: 68
End: 2018-04-03

## 2018-04-03 ENCOUNTER — COMMUNICATION - HEALTHEAST (OUTPATIENT)
Dept: CARDIOLOGY | Facility: CLINIC | Age: 68
End: 2018-04-03

## 2018-04-03 DIAGNOSIS — I66.9 CEREBRAL ARTERY OCCLUSION: ICD-10-CM

## 2018-04-03 DIAGNOSIS — I05.0 MITRAL VALVE STENOSIS, UNSPECIFIED ETIOLOGY: ICD-10-CM

## 2018-04-03 DIAGNOSIS — I48.91 ATRIAL FIBRILLATION WITH RVR (H): ICD-10-CM

## 2018-04-03 DIAGNOSIS — I06.9 AORTIC VALVE DISEASE, RHEUMATIC: ICD-10-CM

## 2018-04-03 DIAGNOSIS — I48.91 A-FIB (H): ICD-10-CM

## 2018-04-03 LAB — INR PPP: 3.3 (ref 0.9–1.1)

## 2018-04-05 ENCOUNTER — RECORDS - HEALTHEAST (OUTPATIENT)
Dept: ADMINISTRATIVE | Facility: OTHER | Age: 68
End: 2018-04-05

## 2018-04-10 ENCOUNTER — COMMUNICATION - HEALTHEAST (OUTPATIENT)
Dept: FAMILY MEDICINE | Facility: CLINIC | Age: 68
End: 2018-04-10

## 2018-04-10 DIAGNOSIS — I66.9 CEREBRAL ARTERY OCCLUSION: ICD-10-CM

## 2018-04-10 DIAGNOSIS — I06.9 AORTIC VALVE DISEASE, RHEUMATIC: ICD-10-CM

## 2018-04-10 DIAGNOSIS — I05.0 MITRAL VALVE STENOSIS, UNSPECIFIED ETIOLOGY: ICD-10-CM

## 2018-04-10 DIAGNOSIS — I48.91 A-FIB (H): ICD-10-CM

## 2018-04-10 LAB — INR PPP: 3 (ref 0.9–1.1)

## 2018-04-19 ENCOUNTER — OFFICE VISIT - HEALTHEAST (OUTPATIENT)
Dept: FAMILY MEDICINE | Facility: CLINIC | Age: 68
End: 2018-04-19

## 2018-04-19 DIAGNOSIS — I05.0 MITRAL VALVE STENOSIS, UNSPECIFIED ETIOLOGY: ICD-10-CM

## 2018-04-19 DIAGNOSIS — E78.00 PURE HYPERCHOLESTEROLEMIA: ICD-10-CM

## 2018-04-19 DIAGNOSIS — I06.9 AORTIC VALVE DISEASE, RHEUMATIC: ICD-10-CM

## 2018-04-19 DIAGNOSIS — E03.9 HYPOTHYROIDISM, UNSPECIFIED TYPE: ICD-10-CM

## 2018-04-19 DIAGNOSIS — I48.91 A-FIB (H): ICD-10-CM

## 2018-04-19 ASSESSMENT — MIFFLIN-ST. JEOR: SCORE: 1082.65

## 2018-04-20 ENCOUNTER — COMMUNICATION - HEALTHEAST (OUTPATIENT)
Dept: NURSING | Facility: CLINIC | Age: 68
End: 2018-04-20

## 2018-04-24 ENCOUNTER — COMMUNICATION - HEALTHEAST (OUTPATIENT)
Dept: FAMILY MEDICINE | Facility: CLINIC | Age: 68
End: 2018-04-24

## 2018-04-24 DIAGNOSIS — I05.0 MITRAL VALVE STENOSIS, UNSPECIFIED ETIOLOGY: ICD-10-CM

## 2018-04-24 DIAGNOSIS — I06.9 AORTIC VALVE DISEASE, RHEUMATIC: ICD-10-CM

## 2018-04-24 DIAGNOSIS — I48.91 A-FIB (H): ICD-10-CM

## 2018-04-24 DIAGNOSIS — I66.9 CEREBRAL ARTERY OCCLUSION: ICD-10-CM

## 2018-04-24 LAB — INR PPP: 3.3 (ref 0.9–1.1)

## 2018-04-26 ENCOUNTER — RECORDS - HEALTHEAST (OUTPATIENT)
Dept: ADMINISTRATIVE | Facility: OTHER | Age: 68
End: 2018-04-26

## 2018-05-03 ENCOUNTER — RECORDS - HEALTHEAST (OUTPATIENT)
Dept: ADMINISTRATIVE | Facility: OTHER | Age: 68
End: 2018-05-03

## 2018-05-07 ENCOUNTER — RECORDS - HEALTHEAST (OUTPATIENT)
Dept: ADMINISTRATIVE | Facility: OTHER | Age: 68
End: 2018-05-07

## 2018-05-07 ENCOUNTER — COMMUNICATION - HEALTHEAST (OUTPATIENT)
Dept: FAMILY MEDICINE | Facility: CLINIC | Age: 68
End: 2018-05-07

## 2018-05-07 DIAGNOSIS — I05.0 MITRAL VALVE STENOSIS, UNSPECIFIED ETIOLOGY: ICD-10-CM

## 2018-05-07 DIAGNOSIS — I06.9 AORTIC VALVE DISEASE, RHEUMATIC: ICD-10-CM

## 2018-05-07 DIAGNOSIS — I66.9 CEREBRAL ARTERY OCCLUSION: ICD-10-CM

## 2018-05-07 DIAGNOSIS — I48.91 A-FIB (H): ICD-10-CM

## 2018-05-07 LAB — INR PPP: 2.3 (ref 0.9–1.1)

## 2018-05-09 ENCOUNTER — COMMUNICATION - HEALTHEAST (OUTPATIENT)
Dept: FAMILY MEDICINE | Facility: CLINIC | Age: 68
End: 2018-05-09

## 2018-05-09 ENCOUNTER — OFFICE VISIT - HEALTHEAST (OUTPATIENT)
Dept: FAMILY MEDICINE | Facility: CLINIC | Age: 68
End: 2018-05-09

## 2018-05-09 ENCOUNTER — COMMUNICATION - HEALTHEAST (OUTPATIENT)
Dept: ANTICOAGULATION | Facility: CLINIC | Age: 68
End: 2018-05-09

## 2018-05-09 DIAGNOSIS — I25.10 ATHEROSCLEROSIS OF NATIVE CORONARY ARTERY OF NATIVE HEART WITHOUT ANGINA PECTORIS: ICD-10-CM

## 2018-05-09 DIAGNOSIS — I66.9 CEREBRAL ARTERY OCCLUSION: ICD-10-CM

## 2018-05-09 DIAGNOSIS — I05.0 MITRAL VALVE STENOSIS, UNSPECIFIED ETIOLOGY: ICD-10-CM

## 2018-05-09 DIAGNOSIS — I10 HYPERTENSION: ICD-10-CM

## 2018-05-09 DIAGNOSIS — E03.9 HYPOTHYROIDISM: ICD-10-CM

## 2018-05-09 DIAGNOSIS — I06.9 AORTIC VALVE DISEASE, RHEUMATIC: ICD-10-CM

## 2018-05-09 DIAGNOSIS — I48.91 A-FIB (H): ICD-10-CM

## 2018-05-09 LAB — INR PPP: 2 (ref 0.9–1.1)

## 2018-05-09 ASSESSMENT — MIFFLIN-ST. JEOR: SCORE: 1052.59

## 2018-05-10 ENCOUNTER — RECORDS - HEALTHEAST (OUTPATIENT)
Dept: ADMINISTRATIVE | Facility: OTHER | Age: 68
End: 2018-05-10

## 2018-05-22 ENCOUNTER — COMMUNICATION - HEALTHEAST (OUTPATIENT)
Dept: FAMILY MEDICINE | Facility: CLINIC | Age: 68
End: 2018-05-22

## 2018-05-22 DIAGNOSIS — I66.9 CEREBRAL ARTERY OCCLUSION: ICD-10-CM

## 2018-05-22 DIAGNOSIS — I05.0 MITRAL VALVE STENOSIS, UNSPECIFIED ETIOLOGY: ICD-10-CM

## 2018-05-22 DIAGNOSIS — I48.91 A-FIB (H): ICD-10-CM

## 2018-05-22 DIAGNOSIS — I06.9 AORTIC VALVE DISEASE, RHEUMATIC: ICD-10-CM

## 2018-05-22 DIAGNOSIS — R13.10 DYSPHAGIA: ICD-10-CM

## 2018-05-22 LAB — INR PPP: 1.2 (ref 0.9–1.1)

## 2018-05-23 ENCOUNTER — COMMUNICATION - HEALTHEAST (OUTPATIENT)
Dept: NURSING | Facility: CLINIC | Age: 68
End: 2018-05-23

## 2018-05-23 ENCOUNTER — COMMUNICATION - HEALTHEAST (OUTPATIENT)
Dept: FAMILY MEDICINE | Facility: CLINIC | Age: 68
End: 2018-05-23

## 2018-05-23 DIAGNOSIS — I09.9 RHEUMATIC HEART DISEASE: ICD-10-CM

## 2018-05-24 ENCOUNTER — COMMUNICATION - HEALTHEAST (OUTPATIENT)
Dept: NURSING | Facility: CLINIC | Age: 68
End: 2018-05-24

## 2018-05-29 ENCOUNTER — OFFICE VISIT - HEALTHEAST (OUTPATIENT)
Dept: FAMILY MEDICINE | Facility: CLINIC | Age: 68
End: 2018-05-29

## 2018-05-29 ENCOUNTER — COMMUNICATION - HEALTHEAST (OUTPATIENT)
Dept: FAMILY MEDICINE | Facility: CLINIC | Age: 68
End: 2018-05-29

## 2018-05-29 ENCOUNTER — COMMUNICATION - HEALTHEAST (OUTPATIENT)
Dept: ANTICOAGULATION | Facility: CLINIC | Age: 68
End: 2018-05-29

## 2018-05-29 DIAGNOSIS — I48.91 A-FIB (H): ICD-10-CM

## 2018-05-29 DIAGNOSIS — I66.9 CEREBRAL ARTERY OCCLUSION: ICD-10-CM

## 2018-05-29 DIAGNOSIS — I06.9 AORTIC VALVE DISEASE, RHEUMATIC: ICD-10-CM

## 2018-05-29 DIAGNOSIS — I05.0 MITRAL VALVE STENOSIS, UNSPECIFIED ETIOLOGY: ICD-10-CM

## 2018-05-29 LAB — INR PPP: 1.3 (ref 0.9–1.1)

## 2018-05-30 ENCOUNTER — COMMUNICATION - HEALTHEAST (OUTPATIENT)
Dept: FAMILY MEDICINE | Facility: CLINIC | Age: 68
End: 2018-05-30

## 2018-05-30 ENCOUNTER — OFFICE VISIT - HEALTHEAST (OUTPATIENT)
Dept: CARDIOLOGY | Facility: CLINIC | Age: 68
End: 2018-05-30

## 2018-05-30 DIAGNOSIS — I66.9 CEREBRAL ARTERY OCCLUSION: ICD-10-CM

## 2018-05-30 DIAGNOSIS — I05.0 MITRAL VALVE STENOSIS, UNSPECIFIED ETIOLOGY: ICD-10-CM

## 2018-05-30 DIAGNOSIS — I50.30 HEART FAILURE WITH PRESERVED EJECTION FRACTION (H): ICD-10-CM

## 2018-05-30 DIAGNOSIS — I10 ESSENTIAL HYPERTENSION WITH GOAL BLOOD PRESSURE LESS THAN 130/85: ICD-10-CM

## 2018-05-30 DIAGNOSIS — I25.10 ATHEROSCLEROSIS OF NATIVE CORONARY ARTERY OF NATIVE HEART WITHOUT ANGINA PECTORIS: ICD-10-CM

## 2018-05-30 DIAGNOSIS — I06.9 AORTIC VALVE DISEASE, RHEUMATIC: ICD-10-CM

## 2018-05-30 DIAGNOSIS — E78.00 PURE HYPERCHOLESTEROLEMIA: ICD-10-CM

## 2018-05-30 ASSESSMENT — MIFFLIN-ST. JEOR: SCORE: 1057.13

## 2018-05-31 ENCOUNTER — COMMUNICATION - HEALTHEAST (OUTPATIENT)
Dept: FAMILY MEDICINE | Facility: CLINIC | Age: 68
End: 2018-05-31

## 2018-05-31 DIAGNOSIS — I06.9 AORTIC VALVE DISEASE, RHEUMATIC: ICD-10-CM

## 2018-05-31 DIAGNOSIS — I48.91 A-FIB (H): ICD-10-CM

## 2018-05-31 DIAGNOSIS — I05.0 MITRAL VALVE STENOSIS, UNSPECIFIED ETIOLOGY: ICD-10-CM

## 2018-05-31 DIAGNOSIS — I66.9 CEREBRAL ARTERY OCCLUSION: ICD-10-CM

## 2018-05-31 LAB — INR PPP: 1.5 (ref 0.9–1.1)

## 2018-06-01 ENCOUNTER — COMMUNICATION - HEALTHEAST (OUTPATIENT)
Dept: ANTICOAGULATION | Facility: CLINIC | Age: 68
End: 2018-06-01

## 2018-06-01 ENCOUNTER — COMMUNICATION - HEALTHEAST (OUTPATIENT)
Dept: FAMILY MEDICINE | Facility: CLINIC | Age: 68
End: 2018-06-01

## 2018-06-01 ENCOUNTER — AMBULATORY - HEALTHEAST (OUTPATIENT)
Dept: LAB | Facility: CLINIC | Age: 68
End: 2018-06-01

## 2018-06-01 DIAGNOSIS — I48.91 ATRIAL FIBRILLATION (H): ICD-10-CM

## 2018-06-01 DIAGNOSIS — I05.0 MITRAL VALVE STENOSIS, UNSPECIFIED ETIOLOGY: ICD-10-CM

## 2018-06-01 DIAGNOSIS — I66.9 CEREBRAL ARTERY OCCLUSION: ICD-10-CM

## 2018-06-01 DIAGNOSIS — I06.9 AORTIC VALVE DISEASE, RHEUMATIC: ICD-10-CM

## 2018-06-01 DIAGNOSIS — I48.91 A-FIB (H): ICD-10-CM

## 2018-06-01 LAB — INR PPP: 1.2 (ref 0.9–1.1)

## 2018-06-04 ENCOUNTER — COMMUNICATION - HEALTHEAST (OUTPATIENT)
Dept: FAMILY MEDICINE | Facility: CLINIC | Age: 68
End: 2018-06-04

## 2018-06-04 DIAGNOSIS — I48.91 A-FIB (H): ICD-10-CM

## 2018-06-04 DIAGNOSIS — I66.9 CEREBRAL ARTERY OCCLUSION: ICD-10-CM

## 2018-06-04 DIAGNOSIS — I06.9 AORTIC VALVE DISEASE, RHEUMATIC: ICD-10-CM

## 2018-06-04 DIAGNOSIS — I05.0 MITRAL VALVE STENOSIS, UNSPECIFIED ETIOLOGY: ICD-10-CM

## 2018-06-05 ENCOUNTER — COMMUNICATION - HEALTHEAST (OUTPATIENT)
Dept: FAMILY MEDICINE | Facility: CLINIC | Age: 68
End: 2018-06-05

## 2018-06-05 ENCOUNTER — COMMUNICATION - HEALTHEAST (OUTPATIENT)
Dept: CARDIOLOGY | Facility: CLINIC | Age: 68
End: 2018-06-05

## 2018-06-07 ENCOUNTER — COMMUNICATION - HEALTHEAST (OUTPATIENT)
Dept: FAMILY MEDICINE | Facility: CLINIC | Age: 68
End: 2018-06-07

## 2018-06-07 DIAGNOSIS — I66.9 CEREBRAL ARTERY OCCLUSION: ICD-10-CM

## 2018-06-07 DIAGNOSIS — I05.0 MITRAL VALVE STENOSIS, UNSPECIFIED ETIOLOGY: ICD-10-CM

## 2018-06-07 DIAGNOSIS — I48.91 A-FIB (H): ICD-10-CM

## 2018-06-07 DIAGNOSIS — I06.9 AORTIC VALVE DISEASE, RHEUMATIC: ICD-10-CM

## 2018-06-07 LAB — INR PPP: 1.7 (ref 0.9–1.1)

## 2018-06-11 ENCOUNTER — COMMUNICATION - HEALTHEAST (OUTPATIENT)
Dept: FAMILY MEDICINE | Facility: CLINIC | Age: 68
End: 2018-06-11

## 2018-06-11 DIAGNOSIS — I48.91 A-FIB (H): ICD-10-CM

## 2018-06-11 DIAGNOSIS — I66.9 CEREBRAL ARTERY OCCLUSION: ICD-10-CM

## 2018-06-11 DIAGNOSIS — I05.0 MITRAL VALVE STENOSIS, UNSPECIFIED ETIOLOGY: ICD-10-CM

## 2018-06-11 DIAGNOSIS — I06.9 AORTIC VALVE DISEASE, RHEUMATIC: ICD-10-CM

## 2018-06-11 LAB — INR PPP: 1.8 (ref 0.9–1.1)

## 2018-06-14 ENCOUNTER — COMMUNICATION - HEALTHEAST (OUTPATIENT)
Dept: FAMILY MEDICINE | Facility: CLINIC | Age: 68
End: 2018-06-14

## 2018-06-14 ENCOUNTER — RECORDS - HEALTHEAST (OUTPATIENT)
Dept: ADMINISTRATIVE | Facility: OTHER | Age: 68
End: 2018-06-14

## 2018-06-14 DIAGNOSIS — I48.91 A-FIB (H): ICD-10-CM

## 2018-06-14 DIAGNOSIS — I66.9 CEREBRAL ARTERY OCCLUSION: ICD-10-CM

## 2018-06-14 DIAGNOSIS — I06.9 AORTIC VALVE DISEASE, RHEUMATIC: ICD-10-CM

## 2018-06-14 DIAGNOSIS — I05.0 MITRAL VALVE STENOSIS, UNSPECIFIED ETIOLOGY: ICD-10-CM

## 2018-06-14 LAB — INR PPP: 2.2 (ref 0.9–1.1)

## 2018-06-19 ENCOUNTER — COMMUNICATION - HEALTHEAST (OUTPATIENT)
Dept: ANTICOAGULATION | Facility: CLINIC | Age: 68
End: 2018-06-19

## 2018-06-19 ENCOUNTER — COMMUNICATION - HEALTHEAST (OUTPATIENT)
Dept: FAMILY MEDICINE | Facility: CLINIC | Age: 68
End: 2018-06-19

## 2018-06-19 DIAGNOSIS — I05.0 MITRAL VALVE STENOSIS, UNSPECIFIED ETIOLOGY: ICD-10-CM

## 2018-06-19 DIAGNOSIS — I66.9 CEREBRAL ARTERY OCCLUSION: ICD-10-CM

## 2018-06-19 DIAGNOSIS — I06.9 AORTIC VALVE DISEASE, RHEUMATIC: ICD-10-CM

## 2018-06-19 DIAGNOSIS — I48.91 A-FIB (H): ICD-10-CM

## 2018-06-19 LAB — INR PPP: 2.8 (ref 0.9–1.1)

## 2018-06-22 ENCOUNTER — COMMUNICATION - HEALTHEAST (OUTPATIENT)
Dept: ANTICOAGULATION | Facility: CLINIC | Age: 68
End: 2018-06-22

## 2018-06-22 ENCOUNTER — AMBULATORY - HEALTHEAST (OUTPATIENT)
Dept: LAB | Facility: CLINIC | Age: 68
End: 2018-06-22

## 2018-06-22 DIAGNOSIS — I06.9 AORTIC VALVE DISEASE, RHEUMATIC: ICD-10-CM

## 2018-06-22 DIAGNOSIS — I48.91 ATRIAL FIBRILLATION (H): ICD-10-CM

## 2018-06-22 DIAGNOSIS — I66.9 CEREBRAL ARTERY OCCLUSION: ICD-10-CM

## 2018-06-22 DIAGNOSIS — I05.0 MITRAL VALVE STENOSIS, UNSPECIFIED ETIOLOGY: ICD-10-CM

## 2018-06-22 DIAGNOSIS — I48.91 A-FIB (H): ICD-10-CM

## 2018-06-22 LAB
CREAT SERPL-MCNC: 0.91 MG/DL (ref 0.7–1.3)
GFR SERPL CREATININE-BSD FRML MDRD: >60 ML/MIN/1.73M2
INR PPP: 2.5 (ref 0.9–1.1)

## 2018-06-25 ENCOUNTER — RECORDS - HEALTHEAST (OUTPATIENT)
Dept: ADMINISTRATIVE | Facility: OTHER | Age: 68
End: 2018-06-25

## 2018-06-25 ENCOUNTER — COMMUNICATION - HEALTHEAST (OUTPATIENT)
Dept: FAMILY MEDICINE | Facility: CLINIC | Age: 68
End: 2018-06-25

## 2018-06-28 ENCOUNTER — COMMUNICATION - HEALTHEAST (OUTPATIENT)
Dept: FAMILY MEDICINE | Facility: CLINIC | Age: 68
End: 2018-06-28

## 2018-07-02 ENCOUNTER — COMMUNICATION - HEALTHEAST (OUTPATIENT)
Dept: FAMILY MEDICINE | Facility: CLINIC | Age: 68
End: 2018-07-02

## 2018-07-02 DIAGNOSIS — I06.9 AORTIC VALVE DISEASE, RHEUMATIC: ICD-10-CM

## 2018-07-02 DIAGNOSIS — I48.91 A-FIB (H): ICD-10-CM

## 2018-07-02 DIAGNOSIS — I66.9 CEREBRAL ARTERY OCCLUSION: ICD-10-CM

## 2018-07-02 DIAGNOSIS — I05.0 MITRAL VALVE STENOSIS, UNSPECIFIED ETIOLOGY: ICD-10-CM

## 2018-07-02 LAB — INR PPP: 1.6 (ref 0.9–1.1)

## 2018-07-04 ENCOUNTER — COMMUNICATION - HEALTHEAST (OUTPATIENT)
Dept: FAMILY MEDICINE | Facility: CLINIC | Age: 68
End: 2018-07-04

## 2018-07-04 DIAGNOSIS — E03.9 HYPOTHYROIDISM: ICD-10-CM

## 2018-07-04 DIAGNOSIS — I10 HYPERTENSION: ICD-10-CM

## 2018-07-05 ENCOUNTER — COMMUNICATION - HEALTHEAST (OUTPATIENT)
Dept: FAMILY MEDICINE | Facility: CLINIC | Age: 68
End: 2018-07-05

## 2018-07-05 DIAGNOSIS — I66.9 CEREBRAL ARTERY OCCLUSION: ICD-10-CM

## 2018-07-05 DIAGNOSIS — I05.0 MITRAL VALVE STENOSIS, UNSPECIFIED ETIOLOGY: ICD-10-CM

## 2018-07-05 DIAGNOSIS — I06.9 AORTIC VALVE DISEASE, RHEUMATIC: ICD-10-CM

## 2018-07-05 LAB — INR PPP: 1.5 (ref 0.9–1.1)

## 2018-07-09 ENCOUNTER — RECORDS - HEALTHEAST (OUTPATIENT)
Dept: ADMINISTRATIVE | Facility: OTHER | Age: 68
End: 2018-07-09

## 2018-07-09 ENCOUNTER — COMMUNICATION - HEALTHEAST (OUTPATIENT)
Dept: FAMILY MEDICINE | Facility: CLINIC | Age: 68
End: 2018-07-09

## 2018-07-09 DIAGNOSIS — I05.0 MITRAL VALVE STENOSIS, UNSPECIFIED ETIOLOGY: ICD-10-CM

## 2018-07-09 DIAGNOSIS — I66.9 CEREBRAL ARTERY OCCLUSION: ICD-10-CM

## 2018-07-09 DIAGNOSIS — I06.9 AORTIC VALVE DISEASE, RHEUMATIC: ICD-10-CM

## 2018-07-09 DIAGNOSIS — I48.91 A-FIB (H): ICD-10-CM

## 2018-07-09 LAB — INR PPP: 2.4 (ref 0.9–1.1)

## 2018-07-12 ENCOUNTER — COMMUNICATION - HEALTHEAST (OUTPATIENT)
Dept: FAMILY MEDICINE | Facility: CLINIC | Age: 68
End: 2018-07-12

## 2018-07-12 DIAGNOSIS — I66.9 CEREBRAL ARTERY OCCLUSION: ICD-10-CM

## 2018-07-12 DIAGNOSIS — I05.0 MITRAL VALVE STENOSIS, UNSPECIFIED ETIOLOGY: ICD-10-CM

## 2018-07-12 DIAGNOSIS — I06.9 AORTIC VALVE DISEASE, RHEUMATIC: ICD-10-CM

## 2018-07-12 DIAGNOSIS — I48.91 A-FIB (H): ICD-10-CM

## 2018-07-12 LAB — INR PPP: 2.8 (ref 0.9–1.1)

## 2018-07-14 ENCOUNTER — RECORDS - HEALTHEAST (OUTPATIENT)
Dept: ADMINISTRATIVE | Facility: OTHER | Age: 68
End: 2018-07-14

## 2018-07-16 ENCOUNTER — RECORDS - HEALTHEAST (OUTPATIENT)
Dept: ADMINISTRATIVE | Facility: OTHER | Age: 68
End: 2018-07-16

## 2018-07-17 ENCOUNTER — COMMUNICATION - HEALTHEAST (OUTPATIENT)
Dept: FAMILY MEDICINE | Facility: CLINIC | Age: 68
End: 2018-07-17

## 2018-07-17 ENCOUNTER — COMMUNICATION - HEALTHEAST (OUTPATIENT)
Dept: ANTICOAGULATION | Facility: CLINIC | Age: 68
End: 2018-07-17

## 2018-07-17 DIAGNOSIS — I48.91 A-FIB (H): ICD-10-CM

## 2018-07-17 DIAGNOSIS — I06.9 AORTIC VALVE DISEASE, RHEUMATIC: ICD-10-CM

## 2018-07-17 DIAGNOSIS — I66.9 CEREBRAL ARTERY OCCLUSION: ICD-10-CM

## 2018-07-17 DIAGNOSIS — I05.0 MITRAL VALVE STENOSIS, UNSPECIFIED ETIOLOGY: ICD-10-CM

## 2018-07-17 LAB — INR PPP: 2.9 (ref 0.9–1.1)

## 2018-07-18 ENCOUNTER — COMMUNICATION - HEALTHEAST (OUTPATIENT)
Dept: FAMILY MEDICINE | Facility: CLINIC | Age: 68
End: 2018-07-18

## 2018-07-20 ENCOUNTER — COMMUNICATION - HEALTHEAST (OUTPATIENT)
Dept: FAMILY MEDICINE | Facility: CLINIC | Age: 68
End: 2018-07-20

## 2018-07-26 ENCOUNTER — RECORDS - HEALTHEAST (OUTPATIENT)
Dept: ADMINISTRATIVE | Facility: OTHER | Age: 68
End: 2018-07-26

## 2018-07-26 ENCOUNTER — OFFICE VISIT - HEALTHEAST (OUTPATIENT)
Dept: FAMILY MEDICINE | Facility: CLINIC | Age: 68
End: 2018-07-26

## 2018-07-26 ENCOUNTER — COMMUNICATION - HEALTHEAST (OUTPATIENT)
Dept: FAMILY MEDICINE | Facility: CLINIC | Age: 68
End: 2018-07-26

## 2018-07-26 ENCOUNTER — COMMUNICATION - HEALTHEAST (OUTPATIENT)
Dept: NURSING | Facility: CLINIC | Age: 68
End: 2018-07-26

## 2018-07-26 DIAGNOSIS — Z01.818 PREOPERATIVE EXAMINATION: ICD-10-CM

## 2018-07-26 DIAGNOSIS — I48.91 A-FIB (H): ICD-10-CM

## 2018-07-26 DIAGNOSIS — K08.9 POOR DENTITION: ICD-10-CM

## 2018-07-26 DIAGNOSIS — I48.91 ATRIAL FIBRILLATION WITH RVR (H): ICD-10-CM

## 2018-07-26 DIAGNOSIS — I05.0 MITRAL VALVE STENOSIS, UNSPECIFIED ETIOLOGY: ICD-10-CM

## 2018-07-26 DIAGNOSIS — I66.9 CEREBRAL ARTERY OCCLUSION: ICD-10-CM

## 2018-07-26 DIAGNOSIS — I06.9 AORTIC VALVE DISEASE, RHEUMATIC: ICD-10-CM

## 2018-07-26 DIAGNOSIS — I10 HYPERTENSION: ICD-10-CM

## 2018-07-26 LAB — INR PPP: 1.4 (ref 0.9–1.1)

## 2018-07-31 ENCOUNTER — RECORDS - HEALTHEAST (OUTPATIENT)
Dept: ADMINISTRATIVE | Facility: OTHER | Age: 68
End: 2018-07-31

## 2018-07-31 ENCOUNTER — COMMUNICATION - HEALTHEAST (OUTPATIENT)
Dept: FAMILY MEDICINE | Facility: CLINIC | Age: 68
End: 2018-07-31

## 2018-07-31 DIAGNOSIS — I05.0 MITRAL VALVE STENOSIS, UNSPECIFIED ETIOLOGY: ICD-10-CM

## 2018-07-31 DIAGNOSIS — I06.9 AORTIC VALVE DISEASE, RHEUMATIC: ICD-10-CM

## 2018-07-31 DIAGNOSIS — I66.9 CEREBRAL ARTERY OCCLUSION: ICD-10-CM

## 2018-07-31 LAB — INR PPP: 1.8 (ref 0.9–1.1)

## 2018-08-02 ENCOUNTER — COMMUNICATION - HEALTHEAST (OUTPATIENT)
Dept: FAMILY MEDICINE | Facility: CLINIC | Age: 68
End: 2018-08-02

## 2018-08-02 DIAGNOSIS — I06.9 AORTIC VALVE DISEASE, RHEUMATIC: ICD-10-CM

## 2018-08-02 DIAGNOSIS — I48.91 A-FIB (H): ICD-10-CM

## 2018-08-02 DIAGNOSIS — I66.9 CEREBRAL ARTERY OCCLUSION: ICD-10-CM

## 2018-08-02 DIAGNOSIS — I05.0 MITRAL VALVE STENOSIS, UNSPECIFIED ETIOLOGY: ICD-10-CM

## 2018-08-02 LAB — INR PPP: 2.8 (ref 0.9–1.1)

## 2018-08-07 ENCOUNTER — COMMUNICATION - HEALTHEAST (OUTPATIENT)
Dept: FAMILY MEDICINE | Facility: CLINIC | Age: 68
End: 2018-08-07

## 2018-08-07 DIAGNOSIS — I06.9 AORTIC VALVE DISEASE, RHEUMATIC: ICD-10-CM

## 2018-08-07 DIAGNOSIS — I66.9 CEREBRAL ARTERY OCCLUSION: ICD-10-CM

## 2018-08-07 DIAGNOSIS — I05.0 MITRAL VALVE STENOSIS, UNSPECIFIED ETIOLOGY: ICD-10-CM

## 2018-08-07 DIAGNOSIS — I48.91 A-FIB (H): ICD-10-CM

## 2018-08-07 LAB — INR PPP: 4.9 (ref 0.9–1.1)

## 2018-08-10 ENCOUNTER — COMMUNICATION - HEALTHEAST (OUTPATIENT)
Dept: FAMILY MEDICINE | Facility: CLINIC | Age: 68
End: 2018-08-10

## 2018-08-10 DIAGNOSIS — I48.91 A-FIB (H): ICD-10-CM

## 2018-08-10 DIAGNOSIS — I66.9 CEREBRAL ARTERY OCCLUSION: ICD-10-CM

## 2018-08-10 DIAGNOSIS — I06.9 AORTIC VALVE DISEASE, RHEUMATIC: ICD-10-CM

## 2018-08-10 DIAGNOSIS — I05.0 MITRAL VALVE STENOSIS, UNSPECIFIED ETIOLOGY: ICD-10-CM

## 2018-08-10 LAB — INR PPP: 2.1 (ref 0.9–1.1)

## 2018-08-14 ENCOUNTER — COMMUNICATION - HEALTHEAST (OUTPATIENT)
Dept: FAMILY MEDICINE | Facility: CLINIC | Age: 68
End: 2018-08-14

## 2018-08-14 DIAGNOSIS — I66.9 CEREBRAL ARTERY OCCLUSION: ICD-10-CM

## 2018-08-14 DIAGNOSIS — I06.9 AORTIC VALVE DISEASE, RHEUMATIC: ICD-10-CM

## 2018-08-14 DIAGNOSIS — I05.0 MITRAL VALVE STENOSIS, UNSPECIFIED ETIOLOGY: ICD-10-CM

## 2018-08-14 DIAGNOSIS — I48.91 A-FIB (H): ICD-10-CM

## 2018-08-14 LAB — INR PPP: 3.4 (ref 0.9–1.1)

## 2018-08-20 ENCOUNTER — COMMUNICATION - HEALTHEAST (OUTPATIENT)
Dept: FAMILY MEDICINE | Facility: CLINIC | Age: 68
End: 2018-08-20

## 2018-08-20 DIAGNOSIS — I05.0 MITRAL VALVE STENOSIS, UNSPECIFIED ETIOLOGY: ICD-10-CM

## 2018-08-20 DIAGNOSIS — I66.9 CEREBRAL ARTERY OCCLUSION: ICD-10-CM

## 2018-08-20 DIAGNOSIS — I06.9 AORTIC VALVE DISEASE, RHEUMATIC: ICD-10-CM

## 2018-08-20 DIAGNOSIS — I48.91 A-FIB (H): ICD-10-CM

## 2018-08-20 LAB — INR PPP: 2 (ref 0.9–1.1)

## 2018-08-23 ENCOUNTER — RECORDS - HEALTHEAST (OUTPATIENT)
Dept: ADMINISTRATIVE | Facility: OTHER | Age: 68
End: 2018-08-23

## 2018-08-27 ENCOUNTER — RECORDS - HEALTHEAST (OUTPATIENT)
Dept: ADMINISTRATIVE | Facility: OTHER | Age: 68
End: 2018-08-27

## 2018-08-28 ENCOUNTER — COMMUNICATION - HEALTHEAST (OUTPATIENT)
Dept: FAMILY MEDICINE | Facility: CLINIC | Age: 68
End: 2018-08-28

## 2018-08-28 DIAGNOSIS — I06.9 AORTIC VALVE DISEASE, RHEUMATIC: ICD-10-CM

## 2018-08-28 DIAGNOSIS — I48.91 A-FIB (H): ICD-10-CM

## 2018-08-28 DIAGNOSIS — I05.0 MITRAL VALVE STENOSIS, UNSPECIFIED ETIOLOGY: ICD-10-CM

## 2018-08-28 DIAGNOSIS — I66.9 CEREBRAL ARTERY OCCLUSION: ICD-10-CM

## 2018-08-28 LAB — INR PPP: 1.5 (ref 0.9–1.1)

## 2018-09-04 ENCOUNTER — COMMUNICATION - HEALTHEAST (OUTPATIENT)
Dept: FAMILY MEDICINE | Facility: CLINIC | Age: 68
End: 2018-09-04

## 2018-09-04 DIAGNOSIS — I48.91 A-FIB (H): ICD-10-CM

## 2018-09-04 DIAGNOSIS — I66.9 CEREBRAL ARTERY OCCLUSION: ICD-10-CM

## 2018-09-04 DIAGNOSIS — I05.0 MITRAL VALVE STENOSIS, UNSPECIFIED ETIOLOGY: ICD-10-CM

## 2018-09-04 DIAGNOSIS — I06.9 AORTIC VALVE DISEASE, RHEUMATIC: ICD-10-CM

## 2018-09-04 LAB — INR PPP: 1.7 (ref 0.9–1.1)

## 2018-09-06 ENCOUNTER — RECORDS - HEALTHEAST (OUTPATIENT)
Dept: ADMINISTRATIVE | Facility: OTHER | Age: 68
End: 2018-09-06

## 2018-09-12 ENCOUNTER — COMMUNICATION - HEALTHEAST (OUTPATIENT)
Dept: FAMILY MEDICINE | Facility: CLINIC | Age: 68
End: 2018-09-12

## 2018-09-12 DIAGNOSIS — I66.9 CEREBRAL ARTERY OCCLUSION: ICD-10-CM

## 2018-09-12 DIAGNOSIS — I06.9 AORTIC VALVE DISEASE, RHEUMATIC: ICD-10-CM

## 2018-09-12 DIAGNOSIS — I48.91 A-FIB (H): ICD-10-CM

## 2018-09-12 DIAGNOSIS — I05.0 MITRAL VALVE STENOSIS, UNSPECIFIED ETIOLOGY: ICD-10-CM

## 2018-09-12 LAB — INR PPP: 2.2 (ref 0.9–1.1)

## 2018-09-19 ENCOUNTER — COMMUNICATION - HEALTHEAST (OUTPATIENT)
Dept: FAMILY MEDICINE | Facility: CLINIC | Age: 68
End: 2018-09-19

## 2018-09-19 DIAGNOSIS — I66.9 CEREBRAL ARTERY OCCLUSION: ICD-10-CM

## 2018-09-19 DIAGNOSIS — I06.9 AORTIC VALVE DISEASE, RHEUMATIC: ICD-10-CM

## 2018-09-19 DIAGNOSIS — I48.91 A-FIB (H): ICD-10-CM

## 2018-09-19 DIAGNOSIS — I05.0 MITRAL VALVE STENOSIS, UNSPECIFIED ETIOLOGY: ICD-10-CM

## 2018-09-19 LAB — INR PPP: 2.3 (ref 0.9–1.1)

## 2018-09-24 ENCOUNTER — RECORDS - HEALTHEAST (OUTPATIENT)
Dept: ADMINISTRATIVE | Facility: OTHER | Age: 68
End: 2018-09-24

## 2018-09-25 ENCOUNTER — COMMUNICATION - HEALTHEAST (OUTPATIENT)
Dept: FAMILY MEDICINE | Facility: CLINIC | Age: 68
End: 2018-09-25

## 2018-09-25 DIAGNOSIS — R13.10 DYSPHAGIA: ICD-10-CM

## 2018-09-29 ENCOUNTER — RECORDS - HEALTHEAST (OUTPATIENT)
Dept: ADMINISTRATIVE | Facility: OTHER | Age: 68
End: 2018-09-29

## 2018-10-03 ENCOUNTER — COMMUNICATION - HEALTHEAST (OUTPATIENT)
Dept: FAMILY MEDICINE | Facility: CLINIC | Age: 68
End: 2018-10-03

## 2018-10-03 DIAGNOSIS — I06.9 AORTIC VALVE DISEASE, RHEUMATIC: ICD-10-CM

## 2018-10-03 DIAGNOSIS — I66.9 CEREBRAL ARTERY OCCLUSION: ICD-10-CM

## 2018-10-03 DIAGNOSIS — I05.0 MITRAL VALVE STENOSIS, UNSPECIFIED ETIOLOGY: ICD-10-CM

## 2018-10-03 LAB — INR PPP: 1.4 (ref 0.9–1.1)

## 2018-10-09 ENCOUNTER — COMMUNICATION - HEALTHEAST (OUTPATIENT)
Dept: FAMILY MEDICINE | Facility: CLINIC | Age: 68
End: 2018-10-09

## 2018-10-09 ENCOUNTER — RECORDS - HEALTHEAST (OUTPATIENT)
Dept: ADMINISTRATIVE | Facility: OTHER | Age: 68
End: 2018-10-09

## 2018-10-09 DIAGNOSIS — I05.0 MITRAL VALVE STENOSIS, UNSPECIFIED ETIOLOGY: ICD-10-CM

## 2018-10-09 DIAGNOSIS — I66.9 CEREBRAL ARTERY OCCLUSION: ICD-10-CM

## 2018-10-09 DIAGNOSIS — I06.9 AORTIC VALVE DISEASE, RHEUMATIC: ICD-10-CM

## 2018-10-09 DIAGNOSIS — I48.91 A-FIB (H): ICD-10-CM

## 2018-10-09 LAB — INR PPP: 2.7 (ref 0.9–1.1)

## 2018-10-11 ENCOUNTER — RECORDS - HEALTHEAST (OUTPATIENT)
Dept: ADMINISTRATIVE | Facility: OTHER | Age: 68
End: 2018-10-11

## 2018-10-15 ENCOUNTER — COMMUNICATION - HEALTHEAST (OUTPATIENT)
Dept: FAMILY MEDICINE | Facility: CLINIC | Age: 68
End: 2018-10-15

## 2018-10-15 DIAGNOSIS — I06.9 AORTIC VALVE DISEASE, RHEUMATIC: ICD-10-CM

## 2018-10-15 DIAGNOSIS — I48.91 A-FIB (H): ICD-10-CM

## 2018-10-15 DIAGNOSIS — I05.0 MITRAL VALVE STENOSIS, UNSPECIFIED ETIOLOGY: ICD-10-CM

## 2018-10-15 DIAGNOSIS — I66.9 CEREBRAL ARTERY OCCLUSION: ICD-10-CM

## 2018-10-15 LAB — INR PPP: 2.4 (ref 0.9–1.1)

## 2018-10-23 ENCOUNTER — COMMUNICATION - HEALTHEAST (OUTPATIENT)
Dept: FAMILY MEDICINE | Facility: CLINIC | Age: 68
End: 2018-10-23

## 2018-10-23 DIAGNOSIS — I48.91 ATRIAL FIBRILLATION WITH RVR (H): ICD-10-CM

## 2018-10-27 ENCOUNTER — RECORDS - HEALTHEAST (OUTPATIENT)
Dept: ADMINISTRATIVE | Facility: OTHER | Age: 68
End: 2018-10-27

## 2018-10-30 ENCOUNTER — COMMUNICATION - HEALTHEAST (OUTPATIENT)
Dept: FAMILY MEDICINE | Facility: CLINIC | Age: 68
End: 2018-10-30

## 2018-10-30 DIAGNOSIS — I48.91 A-FIB (H): ICD-10-CM

## 2018-10-30 DIAGNOSIS — I06.9 AORTIC VALVE DISEASE, RHEUMATIC: ICD-10-CM

## 2018-10-30 DIAGNOSIS — I66.9 CEREBRAL ARTERY OCCLUSION: ICD-10-CM

## 2018-10-30 DIAGNOSIS — I05.0 MITRAL VALVE STENOSIS, UNSPECIFIED ETIOLOGY: ICD-10-CM

## 2018-10-30 LAB — INR PPP: 2.6 (ref 0.9–1.1)

## 2018-10-31 ENCOUNTER — RECORDS - HEALTHEAST (OUTPATIENT)
Dept: ADMINISTRATIVE | Facility: OTHER | Age: 68
End: 2018-10-31

## 2018-11-02 ENCOUNTER — COMMUNICATION - HEALTHEAST (OUTPATIENT)
Dept: NURSING | Facility: CLINIC | Age: 68
End: 2018-11-02

## 2018-11-05 ENCOUNTER — OFFICE VISIT - HEALTHEAST (OUTPATIENT)
Dept: FAMILY MEDICINE | Facility: CLINIC | Age: 68
End: 2018-11-05

## 2018-11-05 ENCOUNTER — COMMUNICATION - HEALTHEAST (OUTPATIENT)
Dept: NURSING | Facility: CLINIC | Age: 68
End: 2018-11-05

## 2018-11-05 DIAGNOSIS — I48.91 A-FIB (H): ICD-10-CM

## 2018-11-05 DIAGNOSIS — E78.00 PURE HYPERCHOLESTEROLEMIA: ICD-10-CM

## 2018-11-05 DIAGNOSIS — E03.9 HYPOTHYROIDISM, UNSPECIFIED TYPE: ICD-10-CM

## 2018-11-05 DIAGNOSIS — I06.9 AORTIC VALVE DISEASE, RHEUMATIC: ICD-10-CM

## 2018-11-05 DIAGNOSIS — Z71.89 COUNSELING AND COORDINATION OF CARE: ICD-10-CM

## 2018-11-05 DIAGNOSIS — I25.10 ATHEROSCLEROSIS OF NATIVE CORONARY ARTERY OF NATIVE HEART WITHOUT ANGINA PECTORIS: ICD-10-CM

## 2018-11-05 DIAGNOSIS — I50.30 HEART FAILURE WITH PRESERVED EJECTION FRACTION (H): ICD-10-CM

## 2018-11-05 DIAGNOSIS — Z23 FLU VACCINE NEED: ICD-10-CM

## 2018-11-05 LAB
ALBUMIN SERPL-MCNC: 3.6 G/DL (ref 3.5–5)
ALP SERPL-CCNC: 156 U/L (ref 45–120)
ALT SERPL W P-5'-P-CCNC: 51 U/L (ref 0–45)
ANION GAP SERPL CALCULATED.3IONS-SCNC: 13 MMOL/L (ref 5–18)
AST SERPL W P-5'-P-CCNC: 51 U/L (ref 0–40)
BILIRUB SERPL-MCNC: 1.4 MG/DL (ref 0–1)
BUN SERPL-MCNC: 14 MG/DL (ref 8–22)
CALCIUM SERPL-MCNC: 9 MG/DL (ref 8.5–10.5)
CHLORIDE BLD-SCNC: 108 MMOL/L (ref 98–107)
CHOLEST SERPL-MCNC: 115 MG/DL
CO2 SERPL-SCNC: 21 MMOL/L (ref 22–31)
CREAT SERPL-MCNC: 0.81 MG/DL (ref 0.7–1.3)
FASTING STATUS PATIENT QL REPORTED: NO
GFR SERPL CREATININE-BSD FRML MDRD: >60 ML/MIN/1.73M2
GLUCOSE BLD-MCNC: 75 MG/DL (ref 70–125)
HDLC SERPL-MCNC: 34 MG/DL
LDLC SERPL CALC-MCNC: 69 MG/DL
POTASSIUM BLD-SCNC: 3.4 MMOL/L (ref 3.5–5)
PROT SERPL-MCNC: 7.8 G/DL (ref 6–8)
SODIUM SERPL-SCNC: 142 MMOL/L (ref 136–145)
T4 TOTAL - HISTORICAL: 9 UG/DL (ref 4.5–13)
TRIGL SERPL-MCNC: 62 MG/DL
TSH SERPL DL<=0.005 MIU/L-ACNC: 0.73 UIU/ML (ref 0.3–5)

## 2018-11-13 ENCOUNTER — COMMUNICATION - HEALTHEAST (OUTPATIENT)
Dept: FAMILY MEDICINE | Facility: CLINIC | Age: 68
End: 2018-11-13

## 2018-11-13 DIAGNOSIS — I06.9 AORTIC VALVE DISEASE, RHEUMATIC: ICD-10-CM

## 2018-11-13 DIAGNOSIS — I66.9 CEREBRAL ARTERY OCCLUSION: ICD-10-CM

## 2018-11-13 DIAGNOSIS — I05.0 MITRAL VALVE STENOSIS, UNSPECIFIED ETIOLOGY: ICD-10-CM

## 2018-11-13 DIAGNOSIS — I48.91 A-FIB (H): ICD-10-CM

## 2018-11-13 LAB — INR PPP: 3.1 (ref 0.9–1.1)

## 2018-11-15 ENCOUNTER — OFFICE VISIT - HEALTHEAST (OUTPATIENT)
Dept: CARDIOLOGY | Facility: CLINIC | Age: 68
End: 2018-11-15

## 2018-11-15 DIAGNOSIS — I05.0 MITRAL VALVE STENOSIS, UNSPECIFIED ETIOLOGY: ICD-10-CM

## 2018-11-15 DIAGNOSIS — I48.19 PERSISTENT ATRIAL FIBRILLATION (H): ICD-10-CM

## 2018-11-15 DIAGNOSIS — I66.9 CEREBRAL ARTERY OCCLUSION: ICD-10-CM

## 2018-11-15 DIAGNOSIS — I25.10 ATHEROSCLEROSIS OF NATIVE CORONARY ARTERY OF NATIVE HEART WITHOUT ANGINA PECTORIS: ICD-10-CM

## 2018-11-15 DIAGNOSIS — I06.9 AORTIC VALVE DISEASE, RHEUMATIC: ICD-10-CM

## 2018-11-15 DIAGNOSIS — I10 ESSENTIAL HYPERTENSION WITH GOAL BLOOD PRESSURE LESS THAN 130/85: ICD-10-CM

## 2018-11-15 DIAGNOSIS — E78.2 MIXED HYPERLIPIDEMIA: ICD-10-CM

## 2018-11-15 DIAGNOSIS — I50.30 HEART FAILURE WITH PRESERVED EJECTION FRACTION (H): ICD-10-CM

## 2018-11-15 ASSESSMENT — MIFFLIN-ST. JEOR: SCORE: 1058.14

## 2018-11-16 ENCOUNTER — AMBULATORY - HEALTHEAST (OUTPATIENT)
Dept: CARDIOLOGY | Facility: CLINIC | Age: 68
End: 2018-11-16

## 2018-11-16 DIAGNOSIS — I09.9 RHEUMATIC HEART DISEASE: ICD-10-CM

## 2018-11-21 ENCOUNTER — COMMUNICATION - HEALTHEAST (OUTPATIENT)
Dept: FAMILY MEDICINE | Facility: CLINIC | Age: 68
End: 2018-11-21

## 2018-11-21 ENCOUNTER — COMMUNICATION - HEALTHEAST (OUTPATIENT)
Dept: CARDIOLOGY | Facility: CLINIC | Age: 68
End: 2018-11-21

## 2018-11-25 ENCOUNTER — RECORDS - HEALTHEAST (OUTPATIENT)
Dept: ADMINISTRATIVE | Facility: OTHER | Age: 68
End: 2018-11-25

## 2018-11-27 ENCOUNTER — COMMUNICATION - HEALTHEAST (OUTPATIENT)
Dept: FAMILY MEDICINE | Facility: CLINIC | Age: 68
End: 2018-11-27

## 2018-11-27 DIAGNOSIS — I06.9 AORTIC VALVE DISEASE, RHEUMATIC: ICD-10-CM

## 2018-11-27 DIAGNOSIS — I66.9 CEREBRAL ARTERY OCCLUSION: ICD-10-CM

## 2018-11-27 DIAGNOSIS — I05.0 MITRAL VALVE STENOSIS, UNSPECIFIED ETIOLOGY: ICD-10-CM

## 2018-11-27 LAB — INR PPP: 1.9 (ref 0.9–1.1)

## 2018-11-28 ENCOUNTER — AMBULATORY - HEALTHEAST (OUTPATIENT)
Dept: CARDIOLOGY | Facility: CLINIC | Age: 68
End: 2018-11-28

## 2018-11-28 ENCOUNTER — SURGERY - HEALTHEAST (OUTPATIENT)
Dept: CARDIOLOGY | Facility: CLINIC | Age: 68
End: 2018-11-28

## 2018-11-30 ENCOUNTER — SURGERY - HEALTHEAST (OUTPATIENT)
Dept: CARDIOLOGY | Facility: CLINIC | Age: 68
End: 2018-11-30

## 2018-11-30 ASSESSMENT — MIFFLIN-ST. JEOR: SCORE: 1072.44

## 2018-12-03 ENCOUNTER — COMMUNICATION - HEALTHEAST (OUTPATIENT)
Dept: ANTICOAGULATION | Facility: CLINIC | Age: 68
End: 2018-12-03

## 2018-12-04 ENCOUNTER — COMMUNICATION - HEALTHEAST (OUTPATIENT)
Dept: FAMILY MEDICINE | Facility: CLINIC | Age: 68
End: 2018-12-04

## 2018-12-04 DIAGNOSIS — I05.0 MITRAL VALVE STENOSIS, UNSPECIFIED ETIOLOGY: ICD-10-CM

## 2018-12-04 DIAGNOSIS — I66.9 CEREBRAL ARTERY OCCLUSION: ICD-10-CM

## 2018-12-04 DIAGNOSIS — I06.9 AORTIC VALVE DISEASE, RHEUMATIC: ICD-10-CM

## 2018-12-04 DIAGNOSIS — I48.91 A-FIB (H): ICD-10-CM

## 2018-12-04 LAB — INR PPP: 1.6 (ref 0.9–1.1)

## 2018-12-07 ENCOUNTER — COMMUNICATION - HEALTHEAST (OUTPATIENT)
Dept: FAMILY MEDICINE | Facility: CLINIC | Age: 68
End: 2018-12-07

## 2018-12-07 DIAGNOSIS — I48.91 A-FIB (H): ICD-10-CM

## 2018-12-07 DIAGNOSIS — I05.0 MITRAL VALVE STENOSIS, UNSPECIFIED ETIOLOGY: ICD-10-CM

## 2018-12-07 DIAGNOSIS — I66.9 CEREBRAL ARTERY OCCLUSION: ICD-10-CM

## 2018-12-07 DIAGNOSIS — I06.9 AORTIC VALVE DISEASE, RHEUMATIC: ICD-10-CM

## 2018-12-07 LAB — INR PPP: 2 (ref 0.9–1.1)

## 2018-12-11 ENCOUNTER — COMMUNICATION - HEALTHEAST (OUTPATIENT)
Dept: FAMILY MEDICINE | Facility: CLINIC | Age: 68
End: 2018-12-11

## 2018-12-11 DIAGNOSIS — I05.0 MITRAL VALVE STENOSIS, UNSPECIFIED ETIOLOGY: ICD-10-CM

## 2018-12-11 DIAGNOSIS — I06.9 AORTIC VALVE DISEASE, RHEUMATIC: ICD-10-CM

## 2018-12-11 DIAGNOSIS — I48.91 A-FIB (H): ICD-10-CM

## 2018-12-11 DIAGNOSIS — I66.9 CEREBRAL ARTERY OCCLUSION: ICD-10-CM

## 2018-12-11 LAB — INR PPP: 2.8 (ref 0.9–1.1)

## 2018-12-17 ENCOUNTER — COMMUNICATION - HEALTHEAST (OUTPATIENT)
Dept: FAMILY MEDICINE | Facility: CLINIC | Age: 68
End: 2018-12-17

## 2018-12-17 DIAGNOSIS — I10 HYPERTENSION: ICD-10-CM

## 2018-12-18 ENCOUNTER — COMMUNICATION - HEALTHEAST (OUTPATIENT)
Dept: CARDIOLOGY | Facility: CLINIC | Age: 68
End: 2018-12-18

## 2018-12-18 ENCOUNTER — COMMUNICATION - HEALTHEAST (OUTPATIENT)
Dept: FAMILY MEDICINE | Facility: CLINIC | Age: 68
End: 2018-12-18

## 2018-12-18 DIAGNOSIS — I66.9 CEREBRAL ARTERY OCCLUSION: ICD-10-CM

## 2018-12-18 DIAGNOSIS — I05.0 MITRAL VALVE STENOSIS, UNSPECIFIED ETIOLOGY: ICD-10-CM

## 2018-12-18 DIAGNOSIS — I06.9 AORTIC VALVE DISEASE, RHEUMATIC: ICD-10-CM

## 2018-12-18 LAB — INR PPP: 2.8 (ref 0.9–1.1)

## 2018-12-20 ENCOUNTER — OFFICE VISIT - HEALTHEAST (OUTPATIENT)
Dept: FAMILY MEDICINE | Facility: CLINIC | Age: 68
End: 2018-12-20

## 2018-12-20 DIAGNOSIS — I48.91 ATRIAL FIBRILLATION, UNSPECIFIED TYPE (H): ICD-10-CM

## 2018-12-20 DIAGNOSIS — I06.9 AORTIC VALVE DISEASE, RHEUMATIC: ICD-10-CM

## 2018-12-20 DIAGNOSIS — I05.0 MITRAL VALVE STENOSIS, UNSPECIFIED ETIOLOGY: ICD-10-CM

## 2018-12-26 ENCOUNTER — RECORDS - HEALTHEAST (OUTPATIENT)
Dept: ADMINISTRATIVE | Facility: OTHER | Age: 68
End: 2018-12-26

## 2019-01-02 ENCOUNTER — COMMUNICATION - HEALTHEAST (OUTPATIENT)
Dept: FAMILY MEDICINE | Facility: CLINIC | Age: 69
End: 2019-01-02

## 2019-01-02 DIAGNOSIS — I06.9 AORTIC VALVE DISEASE, RHEUMATIC: ICD-10-CM

## 2019-01-02 DIAGNOSIS — I05.0 MITRAL VALVE STENOSIS, UNSPECIFIED ETIOLOGY: ICD-10-CM

## 2019-01-02 DIAGNOSIS — I66.9 CEREBRAL ARTERY OCCLUSION: ICD-10-CM

## 2019-01-02 DIAGNOSIS — I48.91 A-FIB (H): ICD-10-CM

## 2019-01-02 LAB — INR PPP: 3.5 (ref 0.9–1.1)

## 2019-01-07 ENCOUNTER — COMMUNICATION - HEALTHEAST (OUTPATIENT)
Dept: ANTICOAGULATION | Facility: CLINIC | Age: 69
End: 2019-01-07

## 2019-01-07 DIAGNOSIS — I48.91 ATRIAL FIBRILLATION (H): ICD-10-CM

## 2019-01-14 ENCOUNTER — COMMUNICATION - HEALTHEAST (OUTPATIENT)
Dept: FAMILY MEDICINE | Facility: CLINIC | Age: 69
End: 2019-01-14

## 2019-01-14 DIAGNOSIS — I48.91 ATRIAL FIBRILLATION WITH RVR (H): ICD-10-CM

## 2019-01-15 ENCOUNTER — COMMUNICATION - HEALTHEAST (OUTPATIENT)
Dept: FAMILY MEDICINE | Facility: CLINIC | Age: 69
End: 2019-01-15

## 2019-01-15 ENCOUNTER — COMMUNICATION - HEALTHEAST (OUTPATIENT)
Dept: NURSING | Facility: CLINIC | Age: 69
End: 2019-01-15

## 2019-01-15 DIAGNOSIS — I06.9 AORTIC VALVE DISEASE, RHEUMATIC: ICD-10-CM

## 2019-01-15 DIAGNOSIS — I05.0 MITRAL VALVE STENOSIS, UNSPECIFIED ETIOLOGY: ICD-10-CM

## 2019-01-15 DIAGNOSIS — I66.9 CEREBRAL ARTERY OCCLUSION: ICD-10-CM

## 2019-01-15 DIAGNOSIS — I48.91 A-FIB (H): ICD-10-CM

## 2019-01-15 LAB — INR PPP: 2.8 (ref 0.9–1.1)

## 2019-01-23 ENCOUNTER — RECORDS - HEALTHEAST (OUTPATIENT)
Dept: ADMINISTRATIVE | Facility: OTHER | Age: 69
End: 2019-01-23

## 2019-01-23 ENCOUNTER — COMMUNICATION - HEALTHEAST (OUTPATIENT)
Dept: FAMILY MEDICINE | Facility: CLINIC | Age: 69
End: 2019-01-23

## 2019-01-23 DIAGNOSIS — I48.91 A-FIB (H): ICD-10-CM

## 2019-01-29 ENCOUNTER — COMMUNICATION - HEALTHEAST (OUTPATIENT)
Dept: FAMILY MEDICINE | Facility: CLINIC | Age: 69
End: 2019-01-29

## 2019-01-29 DIAGNOSIS — I06.9 AORTIC VALVE DISEASE, RHEUMATIC: ICD-10-CM

## 2019-01-29 DIAGNOSIS — I05.0 MITRAL VALVE STENOSIS, UNSPECIFIED ETIOLOGY: ICD-10-CM

## 2019-01-29 DIAGNOSIS — I48.91 A-FIB (H): ICD-10-CM

## 2019-01-29 DIAGNOSIS — I66.9 CEREBRAL ARTERY OCCLUSION: ICD-10-CM

## 2019-01-29 LAB — INR PPP: 2.6 (ref 0.9–1.1)

## 2019-02-12 ENCOUNTER — COMMUNICATION - HEALTHEAST (OUTPATIENT)
Dept: CARDIOLOGY | Facility: CLINIC | Age: 69
End: 2019-02-12

## 2019-02-12 ENCOUNTER — COMMUNICATION - HEALTHEAST (OUTPATIENT)
Dept: FAMILY MEDICINE | Facility: CLINIC | Age: 69
End: 2019-02-12

## 2019-02-12 DIAGNOSIS — I25.10 ATHEROSCLEROSIS OF NATIVE CORONARY ARTERY OF NATIVE HEART WITHOUT ANGINA PECTORIS: ICD-10-CM

## 2019-02-12 DIAGNOSIS — I06.9 AORTIC VALVE DISEASE, RHEUMATIC: ICD-10-CM

## 2019-02-12 DIAGNOSIS — I66.9 CEREBRAL ARTERY OCCLUSION: ICD-10-CM

## 2019-02-12 DIAGNOSIS — I48.91 A-FIB (H): ICD-10-CM

## 2019-02-12 DIAGNOSIS — I05.0 MITRAL VALVE STENOSIS, UNSPECIFIED ETIOLOGY: ICD-10-CM

## 2019-02-12 LAB — INR PPP: 2.3 (ref 0.9–1.1)

## 2019-02-14 ENCOUNTER — RECORDS - HEALTHEAST (OUTPATIENT)
Dept: ADMINISTRATIVE | Facility: OTHER | Age: 69
End: 2019-02-14

## 2019-02-26 ENCOUNTER — COMMUNICATION - HEALTHEAST (OUTPATIENT)
Dept: FAMILY MEDICINE | Facility: CLINIC | Age: 69
End: 2019-02-26

## 2019-02-26 DIAGNOSIS — I05.0 MITRAL VALVE STENOSIS, UNSPECIFIED ETIOLOGY: ICD-10-CM

## 2019-02-26 DIAGNOSIS — I06.9 AORTIC VALVE DISEASE, RHEUMATIC: ICD-10-CM

## 2019-02-26 DIAGNOSIS — I66.9 CEREBRAL ARTERY OCCLUSION: ICD-10-CM

## 2019-02-26 LAB — INR PPP: 2.6 (ref 0.9–1.1)

## 2019-03-06 ENCOUNTER — RECORDS - HEALTHEAST (OUTPATIENT)
Dept: ADMINISTRATIVE | Facility: OTHER | Age: 69
End: 2019-03-06

## 2019-03-14 ENCOUNTER — COMMUNICATION - HEALTHEAST (OUTPATIENT)
Dept: NURSING | Facility: CLINIC | Age: 69
End: 2019-03-14

## 2019-03-19 ENCOUNTER — COMMUNICATION - HEALTHEAST (OUTPATIENT)
Dept: FAMILY MEDICINE | Facility: CLINIC | Age: 69
End: 2019-03-19

## 2019-03-20 ENCOUNTER — OFFICE VISIT - HEALTHEAST (OUTPATIENT)
Dept: FAMILY MEDICINE | Facility: CLINIC | Age: 69
End: 2019-03-20

## 2019-03-20 DIAGNOSIS — G89.29 CHRONIC SHOULDER PAIN, UNSPECIFIED LATERALITY: ICD-10-CM

## 2019-03-20 DIAGNOSIS — I05.0 MITRAL VALVE STENOSIS, UNSPECIFIED ETIOLOGY: ICD-10-CM

## 2019-03-20 DIAGNOSIS — I48.91 ATRIAL FIBRILLATION, UNSPECIFIED TYPE (H): ICD-10-CM

## 2019-03-20 DIAGNOSIS — M25.519 CHRONIC SHOULDER PAIN, UNSPECIFIED LATERALITY: ICD-10-CM

## 2019-03-20 DIAGNOSIS — I06.9 AORTIC VALVE DISEASE, RHEUMATIC: ICD-10-CM

## 2019-03-20 DIAGNOSIS — I50.30 HEART FAILURE WITH PRESERVED EJECTION FRACTION (H): ICD-10-CM

## 2019-03-26 ENCOUNTER — COMMUNICATION - HEALTHEAST (OUTPATIENT)
Dept: FAMILY MEDICINE | Facility: CLINIC | Age: 69
End: 2019-03-26

## 2019-03-26 DIAGNOSIS — I06.9 AORTIC VALVE DISEASE, RHEUMATIC: ICD-10-CM

## 2019-03-26 DIAGNOSIS — I66.9 CEREBRAL ARTERY OCCLUSION: ICD-10-CM

## 2019-03-26 DIAGNOSIS — I48.91 A-FIB (H): ICD-10-CM

## 2019-03-26 DIAGNOSIS — I05.0 MITRAL VALVE STENOSIS, UNSPECIFIED ETIOLOGY: ICD-10-CM

## 2019-03-26 LAB — INR PPP: 2.6 (ref 0.9–1.1)

## 2019-04-05 ENCOUNTER — AMBULATORY - HEALTHEAST (OUTPATIENT)
Dept: CARDIOLOGY | Facility: CLINIC | Age: 69
End: 2019-04-05

## 2019-04-08 ENCOUNTER — OFFICE VISIT - HEALTHEAST (OUTPATIENT)
Dept: CARDIOLOGY | Facility: CLINIC | Age: 69
End: 2019-04-08

## 2019-04-08 DIAGNOSIS — I48.20 CHRONIC ATRIAL FIBRILLATION (H): ICD-10-CM

## 2019-04-08 DIAGNOSIS — I05.0 MITRAL VALVE STENOSIS, UNSPECIFIED ETIOLOGY: ICD-10-CM

## 2019-04-08 DIAGNOSIS — I50.30 HEART FAILURE WITH PRESERVED EJECTION FRACTION (H): ICD-10-CM

## 2019-04-08 DIAGNOSIS — I10 ESSENTIAL HYPERTENSION WITH GOAL BLOOD PRESSURE LESS THAN 130/85: ICD-10-CM

## 2019-04-08 DIAGNOSIS — I25.10 ATHEROSCLEROSIS OF NATIVE CORONARY ARTERY OF NATIVE HEART WITHOUT ANGINA PECTORIS: ICD-10-CM

## 2019-04-08 DIAGNOSIS — I06.9 AORTIC VALVE DISEASE, RHEUMATIC: ICD-10-CM

## 2019-04-08 ASSESSMENT — MIFFLIN-ST. JEOR: SCORE: 1090.05

## 2019-04-12 ENCOUNTER — COMMUNICATION - HEALTHEAST (OUTPATIENT)
Dept: GERIATRIC MEDICINE | Facility: CLINIC | Age: 69
End: 2019-04-12

## 2019-04-12 ENCOUNTER — SURGERY - HEALTHEAST (OUTPATIENT)
Dept: CARDIOLOGY | Facility: CLINIC | Age: 69
End: 2019-04-12

## 2019-04-12 DIAGNOSIS — Z01.818 PRE-OP EVALUATION: ICD-10-CM

## 2019-04-15 ENCOUNTER — COMMUNICATION - HEALTHEAST (OUTPATIENT)
Dept: ANTICOAGULATION | Facility: CLINIC | Age: 69
End: 2019-04-15

## 2019-04-15 ENCOUNTER — COMMUNICATION - HEALTHEAST (OUTPATIENT)
Dept: FAMILY MEDICINE | Facility: CLINIC | Age: 69
End: 2019-04-15

## 2019-04-15 ENCOUNTER — OFFICE VISIT - HEALTHEAST (OUTPATIENT)
Dept: FAMILY MEDICINE | Facility: CLINIC | Age: 69
End: 2019-04-15

## 2019-04-15 DIAGNOSIS — I48.91 ATRIAL FIBRILLATION, UNSPECIFIED TYPE (H): ICD-10-CM

## 2019-04-15 DIAGNOSIS — I06.9 AORTIC VALVE DISEASE, RHEUMATIC: ICD-10-CM

## 2019-04-15 DIAGNOSIS — Z01.818 PREOP GENERAL PHYSICAL EXAM: ICD-10-CM

## 2019-04-15 DIAGNOSIS — I66.9 CEREBRAL ARTERY OCCLUSION: ICD-10-CM

## 2019-04-15 DIAGNOSIS — I05.0 MITRAL VALVE STENOSIS, UNSPECIFIED ETIOLOGY: ICD-10-CM

## 2019-04-15 DIAGNOSIS — E78.00 PURE HYPERCHOLESTEROLEMIA: ICD-10-CM

## 2019-04-15 DIAGNOSIS — Z01.818 PRE-OP EVALUATION: ICD-10-CM

## 2019-04-15 DIAGNOSIS — I48.20 CHRONIC ATRIAL FIBRILLATION (H): ICD-10-CM

## 2019-04-15 DIAGNOSIS — I10 ESSENTIAL HYPERTENSION WITH GOAL BLOOD PRESSURE LESS THAN 130/85: ICD-10-CM

## 2019-04-15 LAB
ALBUMIN UR-MCNC: NEGATIVE MG/DL
ANION GAP SERPL CALCULATED.3IONS-SCNC: 10 MMOL/L (ref 5–18)
APPEARANCE UR: CLEAR
BACTERIA #/AREA URNS HPF: ABNORMAL HPF
BILIRUB UR QL STRIP: NEGATIVE
BUN SERPL-MCNC: 14 MG/DL (ref 8–22)
CALCIUM SERPL-MCNC: 9 MG/DL (ref 8.5–10.5)
CHLORIDE BLD-SCNC: 109 MMOL/L (ref 98–107)
CO2 SERPL-SCNC: 23 MMOL/L (ref 22–31)
COLOR UR AUTO: YELLOW
CREAT SERPL-MCNC: 1 MG/DL (ref 0.7–1.3)
ERYTHROCYTE [DISTWIDTH] IN BLOOD BY AUTOMATED COUNT: 13.4 % (ref 11–14.5)
GFR SERPL CREATININE-BSD FRML MDRD: >60 ML/MIN/1.73M2
GLUCOSE BLD-MCNC: 117 MG/DL (ref 70–125)
GLUCOSE UR STRIP-MCNC: NEGATIVE MG/DL
HCT VFR BLD AUTO: 44.3 % (ref 40–54)
HGB BLD-MCNC: 15.3 G/DL (ref 14–18)
HGB UR QL STRIP: ABNORMAL
INR PPP: 2.9 (ref 0.9–1.1)
KETONES UR STRIP-MCNC: NEGATIVE MG/DL
LEUKOCYTE ESTERASE UR QL STRIP: NEGATIVE
MAGNESIUM SERPL-MCNC: 2 MG/DL (ref 1.8–2.6)
MCH RBC QN AUTO: 31 PG (ref 27–34)
MCHC RBC AUTO-ENTMCNC: 34.5 G/DL (ref 32–36)
MCV RBC AUTO: 90 FL (ref 80–100)
NITRATE UR QL: NEGATIVE
PH UR STRIP: 5.5 [PH] (ref 5–8)
PLATELET # BLD AUTO: 136 THOU/UL (ref 140–440)
PMV BLD AUTO: 8.3 FL (ref 7–10)
POTASSIUM BLD-SCNC: 4.1 MMOL/L (ref 3.5–5)
PREALB SERPL-MCNC: 22.9 MG/DL (ref 19–38)
RBC # BLD AUTO: 4.93 MILL/UL (ref 4.4–6.2)
RBC #/AREA URNS AUTO: ABNORMAL HPF
SODIUM SERPL-SCNC: 142 MMOL/L (ref 136–145)
SP GR UR STRIP: 1.01 (ref 1–1.03)
SQUAMOUS #/AREA URNS AUTO: ABNORMAL LPF
UROBILINOGEN UR STRIP-ACNC: ABNORMAL
WBC #/AREA URNS AUTO: ABNORMAL HPF
WBC: 4.4 THOU/UL (ref 4–11)

## 2019-04-15 ASSESSMENT — MIFFLIN-ST. JEOR: SCORE: 1065.63

## 2019-04-16 ENCOUNTER — COMMUNICATION - HEALTHEAST (OUTPATIENT)
Dept: FAMILY MEDICINE | Facility: CLINIC | Age: 69
End: 2019-04-16

## 2019-04-16 DIAGNOSIS — E03.9 HYPOTHYROIDISM: ICD-10-CM

## 2019-04-16 LAB
ABO/RH(D): NORMAL
ABORH REPEAT: NORMAL
ANTIBODY SCREEN: NEGATIVE
ATRIAL RATE - MUSE: 83 BPM
DIASTOLIC BLOOD PRESSURE - MUSE: NORMAL MMHG
INTERPRETATION ECG - MUSE: NORMAL
P AXIS - MUSE: NORMAL DEGREES
PR INTERVAL - MUSE: NORMAL MS
QRS DURATION - MUSE: 98 MS
QT - MUSE: 404 MS
QTC - MUSE: 457 MS
R AXIS - MUSE: 41 DEGREES
SYSTOLIC BLOOD PRESSURE - MUSE: NORMAL MMHG
T AXIS - MUSE: 0 DEGREES
VENTRICULAR RATE- MUSE: 77 BPM

## 2019-04-17 ENCOUNTER — HOSPITAL ENCOUNTER (OUTPATIENT)
Dept: ULTRASOUND IMAGING | Facility: HOSPITAL | Age: 69
Discharge: HOME | End: 2019-04-17
Attending: FAMILY MEDICINE

## 2019-04-17 ENCOUNTER — HOSPITAL ENCOUNTER (OUTPATIENT)
Dept: RADIOLOGY | Facility: HOSPITAL | Age: 69
Discharge: HOME | End: 2019-04-17
Attending: FAMILY MEDICINE

## 2019-04-17 DIAGNOSIS — Z01.818 PRE-OP EVALUATION: ICD-10-CM

## 2019-04-17 LAB — BACTERIA SPEC CULT: NORMAL

## 2019-04-18 ENCOUNTER — ANESTHESIA - HEALTHEAST (OUTPATIENT)
Dept: SURGERY | Facility: CLINIC | Age: 69
End: 2019-04-18

## 2019-04-18 ENCOUNTER — COMMUNICATION - HEALTHEAST (OUTPATIENT)
Dept: GERIATRIC MEDICINE | Facility: CLINIC | Age: 69
End: 2019-04-18

## 2019-04-19 ENCOUNTER — COMMUNICATION - HEALTHEAST (OUTPATIENT)
Dept: NURSING | Facility: CLINIC | Age: 69
End: 2019-04-19

## 2019-04-23 ENCOUNTER — HOSPITAL ENCOUNTER (OUTPATIENT)
Dept: SURGERY | Facility: CLINIC | Age: 69
Discharge: HOME | End: 2019-04-23
Admitting: REGISTERED NURSE

## 2019-04-23 ENCOUNTER — COMMUNICATION - HEALTHEAST (OUTPATIENT)
Dept: ANTICOAGULATION | Facility: CLINIC | Age: 69
End: 2019-04-23

## 2019-04-23 DIAGNOSIS — Z01.818 PRE-OP EVALUATION: ICD-10-CM

## 2019-04-23 DIAGNOSIS — I06.9 AORTIC VALVE DISEASE, RHEUMATIC: ICD-10-CM

## 2019-04-23 LAB
ABO/RH(D): NORMAL
ALBUMIN UR-MCNC: NEGATIVE MG/DL
ANION GAP SERPL CALCULATED.3IONS-SCNC: 6 MMOL/L (ref 5–18)
ANTIBODY SCREEN: NEGATIVE
APPEARANCE UR: CLEAR
ATRIAL RATE - MUSE: 375 BPM
BACTERIA #/AREA URNS HPF: ABNORMAL HPF
BILIRUB UR QL STRIP: NEGATIVE
BUN SERPL-MCNC: 16 MG/DL (ref 8–22)
CALCIUM SERPL-MCNC: 9.3 MG/DL (ref 8.5–10.5)
CHLORIDE BLD-SCNC: 108 MMOL/L (ref 98–107)
CO2 SERPL-SCNC: 24 MMOL/L (ref 22–31)
COLOR UR AUTO: YELLOW
CREAT SERPL-MCNC: 0.96 MG/DL (ref 0.7–1.3)
DIASTOLIC BLOOD PRESSURE - MUSE: NORMAL MMHG
ERYTHROCYTE [DISTWIDTH] IN BLOOD BY AUTOMATED COUNT: 14.2 % (ref 11–14.5)
GFR SERPL CREATININE-BSD FRML MDRD: >60 ML/MIN/1.73M2
GLUCOSE BLD-MCNC: 110 MG/DL (ref 70–125)
GLUCOSE UR STRIP-MCNC: NEGATIVE MG/DL
HBA1C MFR BLD: 6.7 % (ref 4.2–6.1)
HCT VFR BLD AUTO: 44.5 % (ref 40–54)
HGB BLD-MCNC: 14.8 G/DL (ref 14–18)
HGB UR QL STRIP: ABNORMAL
INR PPP: 1.31 (ref 0.9–1.1)
INTERPRETATION ECG - MUSE: NORMAL
KETONES UR STRIP-MCNC: NEGATIVE MG/DL
LEUKOCYTE ESTERASE UR QL STRIP: NEGATIVE
MAGNESIUM SERPL-MCNC: 1.9 MG/DL (ref 1.8–2.6)
MCH RBC QN AUTO: 30.4 PG (ref 27–34)
MCHC RBC AUTO-ENTMCNC: 33.3 G/DL (ref 32–36)
MCV RBC AUTO: 91 FL (ref 80–100)
MUCOUS THREADS #/AREA URNS LPF: ABNORMAL LPF
NITRATE UR QL: NEGATIVE
P AXIS - MUSE: NORMAL DEGREES
PH UR STRIP: 6 [PH] (ref 4.5–8)
PLATELET # BLD AUTO: 130 THOU/UL (ref 140–440)
PMV BLD AUTO: 10.8 FL (ref 8.5–12.5)
POTASSIUM BLD-SCNC: 4 MMOL/L (ref 3.5–5)
PR INTERVAL - MUSE: NORMAL MS
QRS DURATION - MUSE: 84 MS
QT - MUSE: 378 MS
QTC - MUSE: 457 MS
R AXIS - MUSE: 41 DEGREES
RBC # BLD AUTO: 4.87 MILL/UL (ref 4.4–6.2)
RBC #/AREA URNS AUTO: ABNORMAL HPF
SODIUM SERPL-SCNC: 138 MMOL/L (ref 136–145)
SP GR UR STRIP: 1.02 (ref 1–1.03)
SQUAMOUS #/AREA URNS AUTO: ABNORMAL LPF
SYSTOLIC BLOOD PRESSURE - MUSE: NORMAL MMHG
T AXIS - MUSE: 26 DEGREES
UROBILINOGEN UR STRIP-ACNC: ABNORMAL
VENTRICULAR RATE- MUSE: 88 BPM
WBC #/AREA URNS AUTO: ABNORMAL HPF
WBC: 5.2 THOU/UL (ref 4–11)

## 2019-04-23 ASSESSMENT — MIFFLIN-ST. JEOR: SCORE: 1059.74

## 2019-04-24 ENCOUNTER — HOSPITAL ENCOUNTER (INPATIENT)
Dept: INTENSIVE CARE | Facility: CLINIC | Age: 69
Discharge: NURSING, REHAB OR LONG-TERM CARE | End: 2019-05-10
Attending: THORACIC SURGERY (CARDIOTHORACIC VASCULAR SURGERY) | Admitting: THORACIC SURGERY (CARDIOTHORACIC VASCULAR SURGERY)

## 2019-04-24 ENCOUNTER — SURGERY - HEALTHEAST (OUTPATIENT)
Dept: SURGERY | Facility: CLINIC | Age: 69
End: 2019-04-24

## 2019-04-24 DIAGNOSIS — Z95.2 S/P MVR (MITRAL VALVE REPLACEMENT): ICD-10-CM

## 2019-04-24 DIAGNOSIS — J96.90 RESPIRATORY FAILURE (H): ICD-10-CM

## 2019-04-24 LAB
ANION GAP SERPL CALCULATED.3IONS-SCNC: 5 MMOL/L (ref 5–18)
APTT PPP: 147 SECONDS (ref 24–37)
APTT PPP: 81 SECONDS (ref 24–37)
ATRIAL RATE - MUSE: 79 BPM
BASE EXCESS BLDA CALC-SCNC: -1.2 MMOL/L
BASE EXCESS BLDA CALC-SCNC: -2.3 MMOL/L
BASE EXCESS BLDA CALC-SCNC: -4.9 MMOL/L
BASE EXCESS BLDV CALC-SCNC: -1 MMOL/L
BASE EXCESS BLDV CALC-SCNC: -2 MMOL/L
BASE EXCESS BLDV CALC-SCNC: -2 MMOL/L
BASE EXCESS BLDV CALC-SCNC: -6 MMOL/L
BASE EXCESS BLDV CALC-SCNC: 5 MMOL/L
BASOPHILS # BLD AUTO: 0 THOU/UL (ref 0–0.2)
BASOPHILS NFR BLD AUTO: 0 % (ref 0–2)
BLD PROD TYP BPU: NORMAL
BLOOD TYPE: 6200
BUN SERPL-MCNC: 15 MG/DL (ref 8–22)
CA-I BLD-MCNC: 0.96 MMOL/L (ref 1.11–1.3)
CA-I BLD-MCNC: 0.96 MMOL/L (ref 1.11–1.3)
CA-I BLD-MCNC: 0.97 MMOL/L (ref 1.11–1.3)
CA-I BLD-MCNC: 1 MMOL/L (ref 1.11–1.3)
CA-I BLD-MCNC: 1.17 MMOL/L (ref 1.11–1.3)
CA-I BLD-MCNC: 1.19 MMOL/L (ref 1.11–1.3)
CA-I BLD-MCNC: 1.21 MMOL/L (ref 1.11–1.3)
CA-I BLD-MCNC: 1.52 MMOL/L (ref 1.11–1.3)
CALCIUM SERPL-MCNC: 8 MG/DL (ref 8.5–10.5)
CALCIUM, IONIZED MEASURED: 1.05 MMOL/L (ref 1.11–1.3)
CHLORIDE BLD-SCNC: 118 MMOL/L (ref 98–107)
CO2 BLD-SCNC: 23 MMOL/L
CO2 BLD-SCNC: 25 MMOL/L
CO2 BLD-SCNC: 25 MMOL/L
CO2 BLD-SCNC: 26 MMOL/L
CO2 BLD-SCNC: 26 MMOL/L
CO2 BLD-SCNC: 27 MMOL/L
CO2 BLD-SCNC: 27 MMOL/L
CO2 BLD-SCNC: 31 MMOL/L
CO2 SERPL-SCNC: 24 MMOL/L (ref 22–31)
CODING SYSTEM: NORMAL
COHGB MFR BLD: 100 % (ref 95–96)
COHGB MFR BLD: 100 % (ref 95–96)
COHGB MFR BLD: 99.6 % (ref 95–96)
COMPONENT (HISTORICAL CONVERSION): NORMAL
COMPONENT (HISTORICAL CONVERSION): NORMAL
CREAT SERPL-MCNC: 0.8 MG/DL (ref 0.7–1.3)
DIASTOLIC BLOOD PRESSURE - MUSE: NORMAL MMHG
EOSINOPHIL # BLD AUTO: 0 THOU/UL (ref 0–0.4)
EOSINOPHIL NFR BLD AUTO: 0 % (ref 0–6)
ERYTHROCYTE [DISTWIDTH] IN BLOOD BY AUTOMATED COUNT: 14.3 % (ref 11–14.5)
ERYTHROCYTE [DISTWIDTH] IN BLOOD BY AUTOMATED COUNT: 14.3 % (ref 11–14.5)
FIBRINOGEN PPP-MCNC: 157 MG/DL (ref 170–410)
GFR SERPL CREATININE-BSD FRML MDRD: >60 ML/MIN/1.73M2
GLUCOSE BLD-MCNC: 105 MG/DL (ref 70–125)
GLUCOSE BLD-MCNC: 119 MG/DL (ref 70–125)
GLUCOSE BLD-MCNC: 135 MG/DL (ref 70–125)
GLUCOSE BLD-MCNC: 136 MG/DL (ref 70–125)
GLUCOSE BLD-MCNC: 137 MG/DL (ref 70–125)
GLUCOSE BLD-MCNC: 142 MG/DL (ref 70–125)
GLUCOSE BLD-MCNC: 147 MG/DL (ref 70–125)
GLUCOSE BLD-MCNC: 154 MG/DL (ref 70–125)
GLUCOSE BLD-MCNC: 157 MG/DL (ref 70–125)
GLUCOSE BLDC GLUCOMTR-MCNC: 100 MG/DL (ref 70–139)
GLUCOSE BLDC GLUCOMTR-MCNC: 103 MG/DL (ref 70–139)
GLUCOSE BLDC GLUCOMTR-MCNC: 114 MG/DL (ref 70–139)
GLUCOSE BLDC GLUCOMTR-MCNC: 115 MG/DL (ref 70–139)
GLUCOSE BLDC GLUCOMTR-MCNC: 126 MG/DL (ref 70–139)
GLUCOSE BLDC GLUCOMTR-MCNC: 135 MG/DL (ref 70–139)
GLUCOSE BLDC GLUCOMTR-MCNC: 139 MG/DL (ref 70–139)
GLUCOSE BLDC GLUCOMTR-MCNC: 140 MG/DL (ref 70–139)
GLUCOSE BLDC GLUCOMTR-MCNC: 145 MG/DL (ref 70–139)
GLUCOSE BLDC GLUCOMTR-MCNC: 160 MG/DL (ref 70–139)
GLUCOSE BLDC GLUCOMTR-MCNC: 89 MG/DL (ref 70–139)
HCO3 BLDA-SCNC: 21.5 MMOL/L (ref 23–29)
HCO3 BLDA-SCNC: 23.8 MMOL/L (ref 23–29)
HCO3 BLDA-SCNC: 24.1 MMOL/L (ref 23–29)
HCO3 BLDA-SCNC: 24.4 MMOL/L (ref 23–29)
HCO3 BLDA-SCNC: 25.3 MMOL/L (ref 23–29)
HCO3 BLDA-SCNC: 29.7 MMOL/L (ref 23–29)
HCO3 BLDV-SCNC: 24.8 MMOL/L (ref 24–30)
HCO3 BLDV-SCNC: 25.4 MMOL/L (ref 24–30)
HCO3, ARTERIAL CALC - HISTORICAL: 21.1 MMOL/L (ref 23–29)
HCO3, ARTERIAL CALC - HISTORICAL: 23.1 MMOL/L (ref 23–29)
HCO3, ARTERIAL CALC - HISTORICAL: 24 MMOL/L (ref 23–29)
HCT VFR BLD AUTO: 28.2 % (ref 40–54)
HCT VFR BLD AUTO: 33.7 % (ref 40–54)
HCT VFR BLD CALC: 25 % (ref 40–54)
HCT VFR BLD CALC: 26 % (ref 40–54)
HCT VFR BLD CALC: 27 % (ref 40–54)
HCT VFR BLD CALC: 29 % (ref 40–54)
HCT VFR BLD CALC: 32 % (ref 40–54)
HCT VFR BLD CALC: 32 % (ref 40–54)
HCT VFR BLD CALC: 33 % (ref 40–54)
HCT VFR BLD CALC: 40 % (ref 40–54)
HGB BLD-MCNC: 10.7 G/DL (ref 14–18)
HGB BLD-MCNC: 10.9 G/DL (ref 14–18)
HGB BLD-MCNC: 10.9 G/DL (ref 14–18)
HGB BLD-MCNC: 11.2 G/DL (ref 14–18)
HGB BLD-MCNC: 11.3 G/DL (ref 14–18)
HGB BLD-MCNC: 13.6 G/DL (ref 14–18)
HGB BLD-MCNC: 8.5 G/DL (ref 14–18)
HGB BLD-MCNC: 8.8 G/DL (ref 14–18)
HGB BLD-MCNC: 9.2 G/DL (ref 14–18)
HGB BLD-MCNC: 9.3 G/DL (ref 14–18)
HGB BLD-MCNC: 9.9 G/DL (ref 14–18)
INR PPP: 1.88 (ref 0.9–1.1)
INR PPP: 1.99 (ref 0.9–1.1)
INTERPRETATION ECG - MUSE: NORMAL
ION CA PH 7.4: 1.02 MMOL/L (ref 1.11–1.3)
LYMPHOCYTES # BLD AUTO: 0.8 THOU/UL (ref 0.8–4.4)
LYMPHOCYTES NFR BLD AUTO: 7 % (ref 20–40)
MAGNESIUM SERPL-MCNC: 2.5 MG/DL (ref 1.8–2.6)
MCH RBC QN AUTO: 30.5 PG (ref 27–34)
MCH RBC QN AUTO: 30.9 PG (ref 27–34)
MCHC RBC AUTO-ENTMCNC: 33 G/DL (ref 32–36)
MCHC RBC AUTO-ENTMCNC: 33.5 G/DL (ref 32–36)
MCV RBC AUTO: 91 FL (ref 80–100)
MCV RBC AUTO: 94 FL (ref 80–100)
MONOCYTES # BLD AUTO: 0.6 THOU/UL (ref 0–0.9)
MONOCYTES NFR BLD AUTO: 5 % (ref 2–10)
NEUTROPHILS # BLD AUTO: 10.4 THOU/UL (ref 2–7.7)
NEUTROPHILS NFR BLD AUTO: 88 % (ref 50–70)
O2/TOTAL GAS SETTING VFR VENT: 0.4 %
O2/TOTAL GAS SETTING VFR VENT: 100 %
O2/TOTAL GAS SETTING VFR VENT: 60 %
OXYHEMOGLOBIN - HISTORICAL: 96.2 % (ref 95–96)
OXYHEMOGLOBIN - HISTORICAL: 96.7 % (ref 95–96)
OXYHEMOGLOBIN - HISTORICAL: 97.1 % (ref 95–96)
P AXIS - MUSE: NORMAL DEGREES
PCO2 BLD: 33 MM HG (ref 35–45)
PCO2 BLD: 39 MM HG (ref 35–45)
PCO2 BLD: 45 MM HG (ref 35–45)
PCO2 BLDA: 38.3 MMHG (ref 35–45)
PCO2 BLDA: 42.4 MMHG (ref 35–45)
PCO2 BLDA: 46 MMHG (ref 35–45)
PCO2 BLDA: 48.6 MMHG (ref 35–45)
PCO2 BLDA: 48.8 MMHG (ref 35–45)
PCO2 BLDA: 54.8 MMHG (ref 35–45)
PCO2 BLDV: 49 MMHG (ref 35–50)
PCO2 BLDV: 55.3 MMHG (ref 35–50)
PEEP: 5 CM H2O
PH BLD: 7.33 [PH] (ref 7.37–7.44)
PH BLD: 7.38 [PH] (ref 7.37–7.44)
PH BLD: 7.39 [PH] (ref 7.37–7.44)
PH BLDA: 7.2 [PH] (ref 7.37–7.44)
PH BLDA: 7.32 [PH] (ref 7.37–7.44)
PH BLDA: 7.33 [PH] (ref 7.37–7.44)
PH BLDA: 7.36 [PH] (ref 7.37–7.44)
PH BLDA: 7.39 [PH] (ref 7.37–7.44)
PH BLDA: 7.4 [PH] (ref 7.37–7.44)
PH BLDV: 7.27 [PH] (ref 7.35–7.45)
PH BLDV: 7.31 [PH] (ref 7.35–7.45)
PH: 7.33 (ref 7.35–7.45)
PLATELET # BLD AUTO: 52 THOU/UL (ref 140–440)
PLATELET # BLD AUTO: 82 THOU/UL (ref 140–440)
PLATELET # BLD AUTO: 88 THOU/UL (ref 140–440)
PMV BLD AUTO: 10.6 FL (ref 8.5–12.5)
PMV BLD AUTO: 11.1 FL (ref 8.5–12.5)
PO2 BLD: 130 MM HG (ref 75–85)
PO2 BLD: 163 MM HG (ref 75–85)
PO2 BLD: 89 MM HG (ref 75–85)
PO2 BLDA: 226 MMHG (ref 75–85)
PO2 BLDA: 302 MMHG (ref 75–85)
PO2 BLDA: 396 MMHG (ref 75–85)
PO2 BLDA: 429 MMHG (ref 75–85)
PO2 BLDA: 429 MMHG (ref 75–85)
PO2 BLDA: 493 MMHG (ref 75–85)
PO2 BLDV: 47 MMHG (ref 25–47)
PO2 BLDV: 49 MMHG (ref 25–47)
POTASSIUM BLD-SCNC: 3.6 MMOL/L (ref 3.5–5)
POTASSIUM BLD-SCNC: 3.6 MMOL/L (ref 3.5–5)
POTASSIUM BLD-SCNC: 3.8 MMOL/L (ref 3.5–5)
POTASSIUM BLD-SCNC: 3.8 MMOL/L (ref 3.5–5)
POTASSIUM BLD-SCNC: 4.4 MMOL/L (ref 3.5–5)
POTASSIUM BLD-SCNC: 4.7 MMOL/L (ref 3.5–5)
POTASSIUM BLD-SCNC: 5.3 MMOL/L (ref 3.5–5)
POTASSIUM BLD-SCNC: 5.9 MMOL/L (ref 3.5–5)
POTASSIUM BLD-SCNC: 5.9 MMOL/L (ref 3.5–5)
PR INTERVAL - MUSE: 208 MS
QRS DURATION - MUSE: 108 MS
QT - MUSE: 456 MS
QTC - MUSE: 522 MS
R AXIS - MUSE: 16 DEGREES
RATE: 20 RR/MIN
RATE: 24 RR/MIN
RATE: 24 RR/MIN
RBC # BLD AUTO: 3.01 MILL/UL (ref 4.4–6.2)
RBC # BLD AUTO: 3.7 MILL/UL (ref 4.4–6.2)
SAO2 % BLDV: 76 % (ref 70–75)
SAO2 % BLDV: 80 % (ref 70–75)
SAT POCT - HISTORICAL: 100 % (ref 95–96)
SODIUM BLD-SCNC: 139 MMOL/L (ref 136–145)
SODIUM BLD-SCNC: 140 MMOL/L (ref 136–145)
SODIUM BLD-SCNC: 141 MMOL/L (ref 136–145)
SODIUM BLD-SCNC: 144 MMOL/L (ref 136–145)
SODIUM BLD-SCNC: 148 MMOL/L (ref 136–145)
SODIUM BLD-SCNC: 149 MMOL/L (ref 136–145)
SODIUM SERPL-SCNC: 147 MMOL/L (ref 136–145)
STATUS (HISTORICAL CONVERSION): NORMAL
STATUS (HISTORICAL CONVERSION): NORMAL
SYSTOLIC BLOOD PRESSURE - MUSE: NORMAL MMHG
T AXIS - MUSE: -37 DEGREES
TEMPERATURE: 37 DEGREES C
UNIT ABO/RH (HISTORICAL CONVERSION): NORMAL
UNIT NUMBER: NORMAL
VENTILATION MODE: ABNORMAL
VENTILATOR TIDAL VOLUME: 350 ML
VENTRICULAR RATE- MUSE: 79 BPM
WBC: 11.8 THOU/UL (ref 4–11)
WBC: 20.1 THOU/UL (ref 4–11)

## 2019-04-24 ASSESSMENT — MIFFLIN-ST. JEOR
SCORE: 1055.66
SCORE: 1055.75

## 2019-04-25 ENCOUNTER — COMMUNICATION - HEALTHEAST (OUTPATIENT)
Dept: GERIATRIC MEDICINE | Facility: CLINIC | Age: 69
End: 2019-04-25

## 2019-04-25 LAB
ANION GAP SERPL CALCULATED.3IONS-SCNC: 7 MMOL/L (ref 5–18)
ATRIAL RATE - MUSE: 89 BPM
BASE EXCESS BLDA CALC-SCNC: -1.9 MMOL/L
BASE EXCESS BLDA CALC-SCNC: -2.3 MMOL/L
BASE EXCESS BLDA CALC-SCNC: -3.6 MMOL/L
BASE EXCESS BLDV CALC-SCNC: -1.1 MMOL/L
BASOPHILS # BLD AUTO: 0 THOU/UL (ref 0–0.2)
BASOPHILS NFR BLD AUTO: 0 % (ref 0–2)
BLD PROD TYP BPU: NORMAL
BLOOD EXPIRATION DATE: NORMAL
BLOOD TYPE: 2800
BLOOD TYPE: 6200
BLOOD TYPE: 6200
BUN SERPL-MCNC: 17 MG/DL (ref 8–22)
CALCIUM SERPL-MCNC: 8.5 MG/DL (ref 8.5–10.5)
CALCIUM, IONIZED MEASURED: 1.05 MMOL/L (ref 1.11–1.3)
CHLORIDE BLD-SCNC: 118 MMOL/L (ref 98–107)
CO2 SERPL-SCNC: 20 MMOL/L (ref 22–31)
CODING SYSTEM: NORMAL
COHGB MFR BLD: 98.1 % (ref 95–96)
COHGB MFR BLD: 99.6 % (ref 95–96)
COHGB MFR BLD: 99.7 % (ref 95–96)
COMPONENT (HISTORICAL CONVERSION): NORMAL
CREAT SERPL-MCNC: 0.92 MG/DL (ref 0.7–1.3)
DIASTOLIC BLOOD PRESSURE - MUSE: NORMAL MMHG
EOSINOPHIL # BLD AUTO: 0 THOU/UL (ref 0–0.4)
EOSINOPHIL NFR BLD AUTO: 0 % (ref 0–6)
ERYTHROCYTE [DISTWIDTH] IN BLOOD BY AUTOMATED COUNT: 14.9 % (ref 11–14.5)
GFR SERPL CREATININE-BSD FRML MDRD: >60 ML/MIN/1.73M2
GLUCOSE BLD-MCNC: 151 MG/DL (ref 70–125)
GLUCOSE BLDC GLUCOMTR-MCNC: 107 MG/DL (ref 70–139)
GLUCOSE BLDC GLUCOMTR-MCNC: 115 MG/DL (ref 70–139)
GLUCOSE BLDC GLUCOMTR-MCNC: 116 MG/DL (ref 70–139)
GLUCOSE BLDC GLUCOMTR-MCNC: 119 MG/DL (ref 70–139)
GLUCOSE BLDC GLUCOMTR-MCNC: 126 MG/DL (ref 70–139)
GLUCOSE BLDC GLUCOMTR-MCNC: 126 MG/DL (ref 70–139)
GLUCOSE BLDC GLUCOMTR-MCNC: 127 MG/DL (ref 70–139)
GLUCOSE BLDC GLUCOMTR-MCNC: 128 MG/DL (ref 70–139)
GLUCOSE BLDC GLUCOMTR-MCNC: 131 MG/DL (ref 70–139)
GLUCOSE BLDC GLUCOMTR-MCNC: 132 MG/DL (ref 70–139)
GLUCOSE BLDC GLUCOMTR-MCNC: 137 MG/DL (ref 70–139)
GLUCOSE BLDC GLUCOMTR-MCNC: 138 MG/DL (ref 70–139)
GLUCOSE BLDC GLUCOMTR-MCNC: 142 MG/DL (ref 70–139)
GLUCOSE BLDC GLUCOMTR-MCNC: 143 MG/DL (ref 70–139)
GLUCOSE BLDC GLUCOMTR-MCNC: 144 MG/DL (ref 70–139)
GLUCOSE BLDC GLUCOMTR-MCNC: 146 MG/DL (ref 70–139)
GLUCOSE BLDC GLUCOMTR-MCNC: 147 MG/DL (ref 70–139)
GLUCOSE BLDC GLUCOMTR-MCNC: 149 MG/DL (ref 70–139)
GLUCOSE BLDC GLUCOMTR-MCNC: 149 MG/DL (ref 70–139)
GLUCOSE BLDC GLUCOMTR-MCNC: 157 MG/DL (ref 70–139)
GLUCOSE BLDC GLUCOMTR-MCNC: 74 MG/DL (ref 70–139)
GLUCOSE BLDC GLUCOMTR-MCNC: 91 MG/DL (ref 70–139)
GLUCOSE BLDC GLUCOMTR-MCNC: 92 MG/DL (ref 70–139)
GLUCOSE BLDC GLUCOMTR-MCNC: 97 MG/DL (ref 70–139)
HCO3, ARTERIAL CALC - HISTORICAL: 22.1 MMOL/L (ref 23–29)
HCO3, ARTERIAL CALC - HISTORICAL: 23.1 MMOL/L (ref 23–29)
HCO3, ARTERIAL CALC - HISTORICAL: 23.4 MMOL/L (ref 23–29)
HCO3, VENOUS, CALC - HISTORICAL: 23.4 MMOL/L (ref 24–30)
HCT VFR BLD AUTO: 32.3 % (ref 40–54)
HGB BLD-MCNC: 10.8 G/DL (ref 14–18)
INR PPP: 1.57 (ref 0.9–1.1)
INTERPRETATION ECG - MUSE: NORMAL
ION CA PH 7.4: 1.07 MMOL/L (ref 1.11–1.3)
ISSUE DATE AND TIME: NORMAL
LAB AP CHARGES (HE HISTORICAL CONVERSION): NORMAL
LYMPHOCYTES # BLD AUTO: 0.7 THOU/UL (ref 0.8–4.4)
LYMPHOCYTES NFR BLD AUTO: 5 % (ref 20–40)
MAGNESIUM SERPL-MCNC: 2.1 MG/DL (ref 1.8–2.6)
MCH RBC QN AUTO: 30.7 PG (ref 27–34)
MCHC RBC AUTO-ENTMCNC: 33.4 G/DL (ref 32–36)
MCV RBC AUTO: 92 FL (ref 80–100)
MONOCYTES # BLD AUTO: 1.6 THOU/UL (ref 0–0.9)
MONOCYTES NFR BLD AUTO: 11 % (ref 2–10)
NEUTROPHILS # BLD AUTO: 12.2 THOU/UL (ref 2–7.7)
NEUTROPHILS NFR BLD AUTO: 84 % (ref 50–70)
O2/TOTAL GAS SETTING VFR VENT: 0.4 %
O2/TOTAL GAS SETTING VFR VENT: 40 %
O2/TOTAL GAS SETTING VFR VENT: 40 %
OXYHEMOGLOBIN (VBG) - HISTORICAL: 68.4 % (ref 70–75)
OXYHEMOGLOBIN - HISTORICAL: 94.9 % (ref 95–96)
OXYHEMOGLOBIN - HISTORICAL: 96.1 % (ref 95–96)
OXYHEMOGLOBIN - HISTORICAL: 96.5 % (ref 95–96)
OXYHGB MFR BLDV: 70.7 % (ref 70–75)
P AXIS - MUSE: 218 DEGREES
PATH REPORT.COMMENTS IMP SPEC: NORMAL
PATH REPORT.FINAL DX SPEC: NORMAL
PATH REPORT.GROSS SPEC: NORMAL
PATH REPORT.MICROSCOPIC SPEC OTHER STN: NORMAL
PATH REPORT.RELEVANT HX SPEC: NORMAL
PCO2 BLD: 31 MM HG (ref 35–45)
PCO2 BLD: 31 MM HG (ref 35–45)
PCO2 BLD: 33 MM HG (ref 35–45)
PCO2 BLDV: 38 MM HG (ref 35–50)
PEEP: 5 CM H2O
PH BLD: 7.42 [PH] (ref 7.37–7.44)
PH BLD: 7.43 [PH] (ref 7.37–7.44)
PH BLD: 7.44 [PH] (ref 7.37–7.44)
PH BLDV: 7.4 [PH] (ref 7.35–7.45)
PH: 7.44 (ref 7.35–7.45)
PLATELET # BLD AUTO: 72 THOU/UL (ref 140–440)
PMV BLD AUTO: 10.7 FL (ref 8.5–12.5)
PO2 BLD: 69 MM HG (ref 75–85)
PO2 BLD: 87 MM HG (ref 75–85)
PO2 BLD: 89 MM HG (ref 75–85)
PO2 BLDV: 34 MM HG (ref 25–47)
POTASSIUM BLD-SCNC: 4.2 MMOL/L (ref 3.5–5)
PR INTERVAL - MUSE: 144 MS
QRS DURATION - MUSE: 82 MS
QT - MUSE: 392 MS
QTC - MUSE: 476 MS
R AXIS - MUSE: 72 DEGREES
RATE: 20 RR/MIN
RATE: 20 RR/MIN
RATE: 24 RR/MIN
RBC # BLD AUTO: 3.52 MILL/UL (ref 4.4–6.2)
RESULT FLAG (HE HISTORICAL CONVERSION): NORMAL
SODIUM SERPL-SCNC: 145 MMOL/L (ref 136–145)
STATUS (HISTORICAL CONVERSION): NORMAL
SYSTOLIC BLOOD PRESSURE - MUSE: NORMAL MMHG
T AXIS - MUSE: 33 DEGREES
TEMPERATURE: 37 DEGREES C
UNIT ABO/RH (HISTORICAL CONVERSION): NORMAL
UNIT NUMBER: NORMAL
VENTILATION MODE: ABNORMAL
VENTILATOR TIDAL VOLUME: 350 ML
VENTRICULAR RATE- MUSE: 89 BPM
WBC: 14.6 THOU/UL (ref 4–11)

## 2019-04-25 ASSESSMENT — MIFFLIN-ST. JEOR
SCORE: 1090.75

## 2019-04-26 ENCOUNTER — COMMUNICATION - HEALTHEAST (OUTPATIENT)
Dept: GERIATRIC MEDICINE | Facility: CLINIC | Age: 69
End: 2019-04-26

## 2019-04-26 LAB
BASE EXCESS BLDA CALC-SCNC: -1.2 MMOL/L
BASE EXCESS BLDA CALC-SCNC: -1.3 MMOL/L
BASE EXCESS BLDA CALC-SCNC: -1.3 MMOL/L
BASE EXCESS BLDA CALC-SCNC: -1.9 MMOL/L
BASE EXCESS BLDV CALC-SCNC: -1.1 MMOL/L
CALCIUM, IONIZED MEASURED: 1.06 MMOL/L (ref 1.11–1.3)
COHGB MFR BLD: 94.1 % (ref 95–96)
COHGB MFR BLD: 96.1 % (ref 95–96)
COHGB MFR BLD: 99.4 % (ref 95–96)
COHGB MFR BLD: 99.6 % (ref 95–96)
GLUCOSE BLDC GLUCOMTR-MCNC: 133 MG/DL (ref 70–139)
GLUCOSE BLDC GLUCOMTR-MCNC: 138 MG/DL (ref 70–139)
GLUCOSE BLDC GLUCOMTR-MCNC: 150 MG/DL (ref 70–139)
GLUCOSE BLDC GLUCOMTR-MCNC: 60 MG/DL (ref 70–139)
GLUCOSE BLDC GLUCOMTR-MCNC: 63 MG/DL (ref 70–139)
GLUCOSE BLDC GLUCOMTR-MCNC: 63 MG/DL (ref 70–139)
GLUCOSE BLDC GLUCOMTR-MCNC: 64 MG/DL (ref 70–139)
GLUCOSE BLDC GLUCOMTR-MCNC: 65 MG/DL (ref 70–139)
GLUCOSE BLDC GLUCOMTR-MCNC: 66 MG/DL (ref 70–139)
GLUCOSE BLDC GLUCOMTR-MCNC: 67 MG/DL (ref 70–139)
GLUCOSE BLDC GLUCOMTR-MCNC: 69 MG/DL (ref 70–139)
GLUCOSE BLDC GLUCOMTR-MCNC: 74 MG/DL (ref 70–139)
GLUCOSE BLDC GLUCOMTR-MCNC: 76 MG/DL (ref 70–139)
GLUCOSE BLDC GLUCOMTR-MCNC: 77 MG/DL (ref 70–139)
GLUCOSE BLDC GLUCOMTR-MCNC: 79 MG/DL (ref 70–139)
GLUCOSE BLDC GLUCOMTR-MCNC: 79 MG/DL (ref 70–139)
GLUCOSE BLDC GLUCOMTR-MCNC: 80 MG/DL (ref 70–139)
GLUCOSE BLDC GLUCOMTR-MCNC: 81 MG/DL (ref 70–139)
GLUCOSE BLDC GLUCOMTR-MCNC: 82 MG/DL (ref 70–139)
GLUCOSE BLDC GLUCOMTR-MCNC: 83 MG/DL (ref 70–139)
GLUCOSE BLDC GLUCOMTR-MCNC: 85 MG/DL (ref 70–139)
GLUCOSE BLDC GLUCOMTR-MCNC: 86 MG/DL (ref 70–139)
GLUCOSE BLDC GLUCOMTR-MCNC: 86 MG/DL (ref 70–139)
GLUCOSE BLDC GLUCOMTR-MCNC: 87 MG/DL (ref 70–139)
GLUCOSE BLDC GLUCOMTR-MCNC: 98 MG/DL (ref 70–139)
GLUCOSE BLDC GLUCOMTR-MCNC: 98 MG/DL (ref 70–139)
HCO3, ARTERIAL CALC - HISTORICAL: 23.5 MMOL/L (ref 23–29)
HCO3, ARTERIAL CALC - HISTORICAL: 23.8 MMOL/L (ref 23–29)
HCO3, ARTERIAL CALC - HISTORICAL: 23.9 MMOL/L (ref 23–29)
HCO3, ARTERIAL CALC - HISTORICAL: 23.9 MMOL/L (ref 23–29)
HCO3, VENOUS, CALC - HISTORICAL: 23.4 MMOL/L (ref 24–30)
HCT VFR BLD AUTO: 31 % (ref 40–54)
HGB BLD-MCNC: 9.9 G/DL (ref 14–18)
ION CA PH 7.4: 1.07 MMOL/L (ref 1.11–1.3)
MAGNESIUM SERPL-MCNC: 1.9 MG/DL (ref 1.8–2.6)
O2/TOTAL GAS SETTING VFR VENT: 0.5 %
O2/TOTAL GAS SETTING VFR VENT: 0.6 %
O2/TOTAL GAS SETTING VFR VENT: 40 %
O2/TOTAL GAS SETTING VFR VENT: 50 %
OXYHEMOGLOBIN (VBG) - HISTORICAL: 62.4 % (ref 70–75)
OXYHEMOGLOBIN - HISTORICAL: 91.3 % (ref 95–96)
OXYHEMOGLOBIN - HISTORICAL: 93.3 % (ref 95–96)
OXYHEMOGLOBIN - HISTORICAL: 96.6 % (ref 95–96)
OXYHEMOGLOBIN - HISTORICAL: 96.7 % (ref 95–96)
OXYHGB MFR BLDV: 63.6 % (ref 70–75)
PCO2 BLD: 29 MM HG (ref 35–45)
PCO2 BLD: 29 MM HG (ref 35–45)
PCO2 BLD: 31 MM HG (ref 35–45)
PCO2 BLD: 31 MM HG (ref 35–45)
PCO2 BLDV: 38 MM HG (ref 35–50)
PEEP: 5 CM H2O
PH BLD: 7.46 [PH] (ref 7.37–7.44)
PH BLD: 7.46 [PH] (ref 7.37–7.44)
PH BLD: 7.47 [PH] (ref 7.37–7.44)
PH BLD: 7.48 [PH] (ref 7.37–7.44)
PH BLDV: 7.4 [PH] (ref 7.35–7.45)
PH: 7.43 (ref 7.35–7.45)
PLATELET # BLD AUTO: 39 THOU/UL (ref 140–440)
PO2 BLD: 54 MM HG (ref 75–85)
PO2 BLD: 59 MM HG (ref 75–85)
PO2 BLD: 77 MM HG (ref 75–85)
PO2 BLD: 80 MM HG (ref 75–85)
PO2 BLDV: 29 MM HG (ref 25–47)
POTASSIUM BLD-SCNC: 3.4 MMOL/L (ref 3.5–5)
POTASSIUM BLD-SCNC: 4.1 MMOL/L (ref 3.5–5)
RATE: 16 RR/MIN
RATE: 16 RR/MIN
RATE: 20 RR/MIN
RATE: 20 RR/MIN
TEMPERATURE: 37 DEGREES C
VENTILATION MODE: ABNORMAL
VENTILATOR TIDAL VOLUME: 350 ML

## 2019-04-26 ASSESSMENT — MIFFLIN-ST. JEOR
SCORE: 1087.75

## 2019-04-27 LAB
ALBUMIN UR-MCNC: ABNORMAL MG/DL
ANION GAP SERPL CALCULATED.3IONS-SCNC: 7 MMOL/L (ref 5–18)
APPEARANCE UR: ABNORMAL
BACTERIA #/AREA URNS HPF: ABNORMAL HPF
BASE EXCESS BLDA CALC-SCNC: -1.8 MMOL/L
BASE EXCESS BLDA CALC-SCNC: -2.4 MMOL/L
BASE EXCESS BLDV CALC-SCNC: -0.3 MMOL/L
BILIRUB UR QL STRIP: NEGATIVE
BLD PROD TYP BPU: NORMAL
BLOOD EXPIRATION DATE: NORMAL
BLOOD TYPE: 6200
BUN SERPL-MCNC: 43 MG/DL (ref 8–22)
CALCIUM SERPL-MCNC: 8.3 MG/DL (ref 8.5–10.5)
CHLORIDE BLD-SCNC: 122 MMOL/L (ref 98–107)
CO2 SERPL-SCNC: 19 MMOL/L (ref 22–31)
CODING SYSTEM: NORMAL
COHGB MFR BLD: 94.4 % (ref 95–96)
COHGB MFR BLD: 99 % (ref 95–96)
COLOR UR AUTO: YELLOW
COMPONENT (HISTORICAL CONVERSION): NORMAL
CREAT SERPL-MCNC: 1.06 MG/DL (ref 0.7–1.3)
CROSSMATCH: NORMAL
CROSSMATCH: NORMAL
ERYTHROCYTE [DISTWIDTH] IN BLOOD BY AUTOMATED COUNT: 15.8 % (ref 11–14.5)
GFR SERPL CREATININE-BSD FRML MDRD: >60 ML/MIN/1.73M2
GLUCOSE BLD-MCNC: 104 MG/DL (ref 70–125)
GLUCOSE BLDC GLUCOMTR-MCNC: 103 MG/DL (ref 70–139)
GLUCOSE BLDC GLUCOMTR-MCNC: 105 MG/DL (ref 70–139)
GLUCOSE BLDC GLUCOMTR-MCNC: 111 MG/DL (ref 70–139)
GLUCOSE BLDC GLUCOMTR-MCNC: 111 MG/DL (ref 70–139)
GLUCOSE BLDC GLUCOMTR-MCNC: 121 MG/DL (ref 70–139)
GLUCOSE BLDC GLUCOMTR-MCNC: 122 MG/DL (ref 70–139)
GLUCOSE BLDC GLUCOMTR-MCNC: 132 MG/DL (ref 70–139)
GLUCOSE BLDC GLUCOMTR-MCNC: 138 MG/DL (ref 70–139)
GLUCOSE BLDC GLUCOMTR-MCNC: 92 MG/DL (ref 70–139)
GLUCOSE BLDC GLUCOMTR-MCNC: 97 MG/DL (ref 70–139)
GLUCOSE BLDC GLUCOMTR-MCNC: 97 MG/DL (ref 70–139)
GLUCOSE UR STRIP-MCNC: NEGATIVE MG/DL
HCO3, ARTERIAL CALC - HISTORICAL: 23.1 MMOL/L (ref 23–29)
HCO3, ARTERIAL CALC - HISTORICAL: 23.4 MMOL/L (ref 23–29)
HCO3, VENOUS, CALC - HISTORICAL: 23.9 MMOL/L (ref 24–30)
HCT VFR BLD AUTO: 35.3 % (ref 40–54)
HGB BLD-MCNC: 11.8 G/DL (ref 14–18)
HGB UR QL STRIP: ABNORMAL
ISSUE DATE AND TIME: NORMAL
ISSUE DATE AND TIME: NORMAL
KETONES UR STRIP-MCNC: NEGATIVE MG/DL
LEUKOCYTE ESTERASE UR QL STRIP: NEGATIVE
MAGNESIUM SERPL-MCNC: 2.7 MG/DL (ref 1.8–2.6)
MCH RBC QN AUTO: 30.8 PG (ref 27–34)
MCHC RBC AUTO-ENTMCNC: 33.4 G/DL (ref 32–36)
MCV RBC AUTO: 92 FL (ref 80–100)
MUCOUS THREADS #/AREA URNS LPF: ABNORMAL LPF
NITRATE UR QL: NEGATIVE
O2/TOTAL GAS SETTING VFR VENT: 50 %
O2/TOTAL GAS SETTING VFR VENT: 60 %
OXYHEMOGLOBIN (VBG) - HISTORICAL: 55.9 % (ref 70–75)
OXYHEMOGLOBIN - HISTORICAL: 91.5 % (ref 95–96)
OXYHEMOGLOBIN - HISTORICAL: 96.4 % (ref 95–96)
OXYHGB MFR BLDV: 57.8 % (ref 70–75)
PCO2 BLD: 30 MM HG (ref 35–45)
PCO2 BLD: 31 MM HG (ref 35–45)
PCO2 BLDV: 37 MM HG (ref 35–50)
PEEP: 5 CM H2O
PEEP: 5 CM H2O
PH BLD: 7.45 [PH] (ref 7.37–7.44)
PH BLD: 7.45 [PH] (ref 7.37–7.44)
PH BLDV: 7.42 [PH] (ref 7.35–7.45)
PH UR STRIP: 5.5 [PH] (ref 4.5–8)
PLATELET # BLD AUTO: 36 THOU/UL (ref 140–440)
PMV BLD AUTO: 12.3 FL (ref 8.5–12.5)
PO2 BLD: 57 MM HG (ref 75–85)
PO2 BLD: 91 MM HG (ref 75–85)
PO2 BLDV: 28 MM HG (ref 25–47)
POTASSIUM BLD-SCNC: 4 MMOL/L (ref 3.5–5)
POTASSIUM BLD-SCNC: 4.2 MMOL/L (ref 3.5–5)
PROCALCITONIN SERPL-MCNC: 30.08 NG/ML (ref 0–0.49)
RATE: 16 RR/MIN
RATE: 16 RR/MIN
RBC # BLD AUTO: 3.83 MILL/UL (ref 4.4–6.2)
RBC #/AREA URNS AUTO: ABNORMAL HPF
SODIUM SERPL-SCNC: 148 MMOL/L (ref 136–145)
SP GR UR STRIP: 1.02 (ref 1–1.03)
SQUAMOUS #/AREA URNS AUTO: ABNORMAL LPF
STATUS (HISTORICAL CONVERSION): NORMAL
TEMPERATURE: 37 DEGREES C
TEMPERATURE: 37 DEGREES C
UNIT ABO/RH (HISTORICAL CONVERSION): NORMAL
UNIT NUMBER: NORMAL
UROBILINOGEN UR STRIP-ACNC: ABNORMAL
VENTILATION MODE: ABNORMAL
VENTILATION MODE: ABNORMAL
VENTILATOR TIDAL VOLUME: 350 ML
VENTILATOR TIDAL VOLUME: 350 ML
WBC #/AREA URNS AUTO: ABNORMAL HPF
WBC: 4 THOU/UL (ref 4–11)

## 2019-04-27 ASSESSMENT — MIFFLIN-ST. JEOR
SCORE: 1124.63

## 2019-04-28 LAB
ANION GAP SERPL CALCULATED.3IONS-SCNC: 9 MMOL/L (ref 5–18)
BASE EXCESS BLDA CALC-SCNC: 2.1 MMOL/L
BASE EXCESS BLDV CALC-SCNC: 3.8 MMOL/L
BLD PROD TYP BPU: NORMAL
BLD PROD TYP BPU: NORMAL
BLOOD EXPIRATION DATE: NORMAL
BLOOD EXPIRATION DATE: NORMAL
BLOOD TYPE: 6200
BLOOD TYPE: 6200
BUN SERPL-MCNC: 48 MG/DL (ref 8–22)
CALCIUM SERPL-MCNC: 8.1 MG/DL (ref 8.5–10.5)
CHLORIDE BLD-SCNC: 122 MMOL/L (ref 98–107)
CO2 SERPL-SCNC: 23 MMOL/L (ref 22–31)
CODING SYSTEM: NORMAL
CODING SYSTEM: NORMAL
COHGB MFR BLD: 96.7 % (ref 95–96)
COMPONENT (HISTORICAL CONVERSION): NORMAL
COMPONENT (HISTORICAL CONVERSION): NORMAL
CREAT SERPL-MCNC: 1.31 MG/DL (ref 0.7–1.3)
ERYTHROCYTE [DISTWIDTH] IN BLOOD BY AUTOMATED COUNT: 15.9 % (ref 11–14.5)
GFR SERPL CREATININE-BSD FRML MDRD: 54 ML/MIN/1.73M2
GLUCOSE BLD-MCNC: 152 MG/DL (ref 70–125)
GLUCOSE BLDC GLUCOMTR-MCNC: 116 MG/DL (ref 70–139)
GLUCOSE BLDC GLUCOMTR-MCNC: 125 MG/DL (ref 70–139)
GLUCOSE BLDC GLUCOMTR-MCNC: 131 MG/DL (ref 70–139)
GLUCOSE BLDC GLUCOMTR-MCNC: 147 MG/DL (ref 70–139)
GLUCOSE BLDC GLUCOMTR-MCNC: 152 MG/DL (ref 70–139)
GLUCOSE BLDC GLUCOMTR-MCNC: 175 MG/DL (ref 70–139)
GLUCOSE BLDC GLUCOMTR-MCNC: 198 MG/DL (ref 70–139)
HCO3, ARTERIAL CALC - HISTORICAL: 26.5 MMOL/L (ref 23–29)
HCO3, VENOUS, CALC - HISTORICAL: 27.2 MMOL/L (ref 24–30)
HCT VFR BLD AUTO: 28.7 % (ref 40–54)
HGB BLD-MCNC: 9 G/DL (ref 14–18)
HGB BLD-MCNC: 9.1 G/DL (ref 14–18)
ISSUE DATE AND TIME: NORMAL
ISSUE DATE AND TIME: NORMAL
MAGNESIUM SERPL-MCNC: 2.4 MG/DL (ref 1.8–2.6)
MCH RBC QN AUTO: 30.1 PG (ref 27–34)
MCHC RBC AUTO-ENTMCNC: 31.4 G/DL (ref 32–36)
MCV RBC AUTO: 96 FL (ref 80–100)
O2/TOTAL GAS SETTING VFR VENT: 50 %
OXYHEMOGLOBIN (VBG) - HISTORICAL: 59.4 % (ref 70–75)
OXYHEMOGLOBIN - HISTORICAL: 94 % (ref 95–96)
OXYHGB MFR BLDV: 61.2 % (ref 70–75)
PCO2 BLD: 31 MM HG (ref 35–45)
PCO2 BLDV: 38 MM HG (ref 35–50)
PEEP: 8 CM H2O
PF4 HEPARIN CMPLX AB SER QL: NEGATIVE
PH BLD: 7.51 [PH] (ref 7.37–7.44)
PH BLDV: 7.47 [PH] (ref 7.35–7.45)
PLATELET # BLD AUTO: 62 THOU/UL (ref 140–440)
PLATELET # BLD AUTO: 63 THOU/UL (ref 140–440)
PMV BLD AUTO: 12.5 FL (ref 8.5–12.5)
PO2 BLD: 64 MM HG (ref 75–85)
PO2 BLDV: 29 MM HG (ref 25–47)
POTASSIUM BLD-SCNC: 3.7 MMOL/L (ref 3.5–5)
POTASSIUM BLD-SCNC: 3.9 MMOL/L (ref 3.5–5)
POTASSIUM BLD-SCNC: 4 MMOL/L (ref 3.5–5)
RATE: 16 RR/MIN
RBC # BLD AUTO: 2.99 MILL/UL (ref 4.4–6.2)
SODIUM SERPL-SCNC: 151 MMOL/L (ref 136–145)
SODIUM SERPL-SCNC: 152 MMOL/L (ref 136–145)
SODIUM SERPL-SCNC: 154 MMOL/L (ref 136–145)
STATUS (HISTORICAL CONVERSION): NORMAL
STATUS (HISTORICAL CONVERSION): NORMAL
TEMPERATURE: 37 DEGREES C
UNIT ABO/RH (HISTORICAL CONVERSION): NORMAL
UNIT ABO/RH (HISTORICAL CONVERSION): NORMAL
UNIT NUMBER: NORMAL
UNIT NUMBER: NORMAL
VENTILATION MODE: ABNORMAL
VENTILATOR TIDAL VOLUME: 300 ML
WBC: 4.8 THOU/UL (ref 4–11)

## 2019-04-28 ASSESSMENT — MIFFLIN-ST. JEOR
SCORE: 1119.63

## 2019-04-29 LAB
ANION GAP SERPL CALCULATED.3IONS-SCNC: 7 MMOL/L (ref 5–18)
APTT PPP: 50 SECONDS (ref 24–37)
BASE EXCESS BLDA CALC-SCNC: 3.5 MMOL/L
BASE EXCESS BLDA CALC-SCNC: 3.9 MMOL/L
BASE EXCESS BLDA CALC-SCNC: 4 MMOL/L
BASOPHILS # BLD AUTO: 0 THOU/UL (ref 0–0.2)
BASOPHILS NFR BLD AUTO: 0 % (ref 0–2)
BUN SERPL-MCNC: 44 MG/DL (ref 8–22)
CALCIUM SERPL-MCNC: 7.4 MG/DL (ref 8.5–10.5)
CHLORIDE BLD-SCNC: 117 MMOL/L (ref 98–107)
CO2 SERPL-SCNC: 26 MMOL/L (ref 22–31)
COHGB MFR BLD: 97.8 % (ref 95–96)
COHGB MFR BLD: 98.1 % (ref 95–96)
COHGB MFR BLD: 99 % (ref 95–96)
CREAT SERPL-MCNC: 1.32 MG/DL (ref 0.7–1.3)
DOHLE BODIES: ABNORMAL
EOSINOPHIL COUNT (ABSOLUTE): 0 THOU/UL (ref 0–0.4)
EOSINOPHIL NFR BLD AUTO: 0 % (ref 0–6)
ERYTHROCYTE [DISTWIDTH] IN BLOOD BY AUTOMATED COUNT: 15.8 % (ref 11–14.5)
ERYTHROCYTE [DISTWIDTH] IN BLOOD BY AUTOMATED COUNT: 15.8 % (ref 11–14.5)
FIBRINOGEN PPP-MCNC: 571 MG/DL (ref 170–410)
GFR SERPL CREATININE-BSD FRML MDRD: 54 ML/MIN/1.73M2
GLUCOSE BLD-MCNC: 195 MG/DL (ref 70–125)
GLUCOSE BLDC GLUCOMTR-MCNC: 159 MG/DL (ref 70–139)
GLUCOSE BLDC GLUCOMTR-MCNC: 176 MG/DL (ref 70–139)
GLUCOSE BLDC GLUCOMTR-MCNC: 191 MG/DL (ref 70–139)
GLUCOSE BLDC GLUCOMTR-MCNC: 194 MG/DL (ref 70–139)
GLUCOSE BLDC GLUCOMTR-MCNC: 204 MG/DL (ref 70–139)
GLUCOSE BLDC GLUCOMTR-MCNC: 232 MG/DL (ref 70–139)
HCO3, ARTERIAL CALC - HISTORICAL: 27.6 MMOL/L (ref 23–29)
HCO3, ARTERIAL CALC - HISTORICAL: 28 MMOL/L (ref 23–29)
HCO3, ARTERIAL CALC - HISTORICAL: 28 MMOL/L (ref 23–29)
HCT VFR BLD AUTO: 26.3 % (ref 40–54)
HCT VFR BLD AUTO: 26.9 % (ref 40–54)
HGB BLD-MCNC: 8.4 G/DL (ref 14–18)
HGB BLD-MCNC: 8.5 G/DL (ref 14–18)
INR PPP: 1.35 (ref 0.9–1.1)
LYMPHOCYTES # BLD AUTO: 0.9 THOU/UL (ref 0.8–4.4)
LYMPHOCYTES NFR BLD AUTO: 22 % (ref 20–40)
MAGNESIUM SERPL-MCNC: 2.2 MG/DL (ref 1.8–2.6)
MANUAL NRBC PER 100 CELLS: 1
MCH RBC QN AUTO: 30.1 PG (ref 27–34)
MCH RBC QN AUTO: 30.5 PG (ref 27–34)
MCHC RBC AUTO-ENTMCNC: 31.6 G/DL (ref 32–36)
MCHC RBC AUTO-ENTMCNC: 31.9 G/DL (ref 32–36)
MCV RBC AUTO: 95 FL (ref 80–100)
MCV RBC AUTO: 96 FL (ref 80–100)
MONOCYTES # BLD AUTO: 0.3 THOU/UL (ref 0–0.9)
MONOCYTES NFR BLD AUTO: 8 % (ref 2–10)
O2/TOTAL GAS SETTING VFR VENT: 0.5 %
O2/TOTAL GAS SETTING VFR VENT: 50 %
O2/TOTAL GAS SETTING VFR VENT: 50 %
OVALOCYTES: ABNORMAL
OXYHEMOGLOBIN - HISTORICAL: 94.7 % (ref 95–96)
OXYHEMOGLOBIN - HISTORICAL: 95.3 % (ref 95–96)
OXYHEMOGLOBIN - HISTORICAL: 95.6 % (ref 95–96)
PATH REPORT.MICROSCOPIC SPEC OTHER STN: ABNORMAL
PCO2 BLD: 30 MM HG (ref 35–45)
PCO2 BLD: 31 MM HG (ref 35–45)
PCO2 BLD: 34 MM HG (ref 35–45)
PEEP: 5 CM H2O
PEEP: 7 CM H2O
PEEP: 8 CM H2O
PH BLD: 7.51 [PH] (ref 7.37–7.44)
PH BLD: 7.53 [PH] (ref 7.37–7.44)
PH BLD: 7.55 [PH] (ref 7.37–7.44)
PLAT MORPH BLD: ABNORMAL
PLATELET # BLD AUTO: 27 THOU/UL (ref 140–440)
PLATELET # BLD AUTO: 28 THOU/UL (ref 140–440)
PLATELET # BLD AUTO: 47 THOU/UL (ref 140–440)
PMV BLD AUTO: 13.3 FL (ref 8.5–12.5)
PMV BLD AUTO: 13.4 FL (ref 8.5–12.5)
PO2 BLD: 60 MM HG (ref 75–85)
PO2 BLD: 67 MM HG (ref 75–85)
PO2 BLD: 73 MM HG (ref 75–85)
POTASSIUM BLD-SCNC: 3.3 MMOL/L (ref 3.5–5)
POTASSIUM BLD-SCNC: 3.4 MMOL/L (ref 3.5–5)
POTASSIUM BLD-SCNC: 3.4 MMOL/L (ref 3.5–5)
RATE: 16 RR/MIN
RATE: 20 RR/MIN
RATE: 20 RR/MIN
RBC # BLD AUTO: 2.75 MILL/UL (ref 4.4–6.2)
RBC # BLD AUTO: 2.82 MILL/UL (ref 4.4–6.2)
SODIUM SERPL-SCNC: 150 MMOL/L (ref 136–145)
SODIUM SERPL-SCNC: 151 MMOL/L (ref 136–145)
TEMPERATURE: 37 DEGREES C
TOTAL NEUTROPHILS-ABS(DIFF): 2.9 THOU/UL (ref 2–7.7)
TOTAL NEUTROPHILS-REL(DIFF): 70 % (ref 50–70)
VENTILATION MODE: ABNORMAL
VENTILATOR TIDAL VOLUME: 300 ML
VENTILATOR TIDAL VOLUME: 350 ML
VENTILATOR TIDAL VOLUME: 350 ML
WBC: 4.1 THOU/UL (ref 4–11)
WBC: 4.9 THOU/UL (ref 4–11)

## 2019-04-29 ASSESSMENT — MIFFLIN-ST. JEOR
SCORE: 1118.63

## 2019-04-30 LAB
ABO/RH(D): NORMAL
ALBUMIN SERPL-MCNC: 2.3 G/DL (ref 3.5–5)
ALP SERPL-CCNC: 68 U/L (ref 45–120)
ALT SERPL W P-5'-P-CCNC: 52 U/L (ref 0–45)
ANION GAP SERPL CALCULATED.3IONS-SCNC: 6 MMOL/L (ref 5–18)
ANTIBODY SCREEN: NEGATIVE
AST SERPL W P-5'-P-CCNC: 121 U/L (ref 0–40)
BACTERIA SPEC CULT: ABNORMAL
BASE EXCESS BLDA CALC-SCNC: 3.2 MMOL/L
BILIRUB DIRECT SERPL-MCNC: 1.1 MG/DL
BILIRUB SERPL-MCNC: 1.6 MG/DL (ref 0–1)
BLD PROD TYP BPU: NORMAL
BLOOD EXPIRATION DATE: NORMAL
BLOOD TYPE: 600
BUN SERPL-MCNC: 50 MG/DL (ref 8–22)
CALCIUM SERPL-MCNC: 7.4 MG/DL (ref 8.5–10.5)
CHLORIDE BLD-SCNC: 115 MMOL/L (ref 98–107)
CO2 SERPL-SCNC: 26 MMOL/L (ref 22–31)
CODING SYSTEM: NORMAL
COHGB MFR BLD: 100 % (ref 95–96)
COMPONENT (HISTORICAL CONVERSION): NORMAL
CREAT SERPL-MCNC: 1.28 MG/DL (ref 0.7–1.3)
ERYTHROCYTE [DISTWIDTH] IN BLOOD BY AUTOMATED COUNT: 16 % (ref 11–14.5)
GFR SERPL CREATININE-BSD FRML MDRD: 56 ML/MIN/1.73M2
GLUCOSE BLD-MCNC: 229 MG/DL (ref 70–125)
GLUCOSE BLDC GLUCOMTR-MCNC: 138 MG/DL (ref 70–139)
GLUCOSE BLDC GLUCOMTR-MCNC: 141 MG/DL (ref 70–139)
GLUCOSE BLDC GLUCOMTR-MCNC: 158 MG/DL (ref 70–139)
GLUCOSE BLDC GLUCOMTR-MCNC: 168 MG/DL (ref 70–139)
GLUCOSE BLDC GLUCOMTR-MCNC: 189 MG/DL (ref 70–139)
GLUCOSE BLDC GLUCOMTR-MCNC: 205 MG/DL (ref 70–139)
GRAM STAIN RESULT: ABNORMAL
HCO3, ARTERIAL CALC - HISTORICAL: 27.4 MMOL/L (ref 23–29)
HCT VFR BLD AUTO: 25.5 % (ref 40–54)
HGB BLD-MCNC: 8 G/DL (ref 14–18)
ISSUE DATE AND TIME: NORMAL
LAB AP CHARGES (HE HISTORICAL CONVERSION): NORMAL
MAGNESIUM SERPL-MCNC: 2.9 MG/DL (ref 1.8–2.6)
MCH RBC QN AUTO: 29.7 PG (ref 27–34)
MCHC RBC AUTO-ENTMCNC: 31.4 G/DL (ref 32–36)
MCV RBC AUTO: 95 FL (ref 80–100)
O2/TOTAL GAS SETTING VFR VENT: 50 %
OXYHEMOGLOBIN - HISTORICAL: 96.7 % (ref 95–96)
PATH REPORT.COMMENTS IMP SPEC: NORMAL
PATH REPORT.COMMENTS IMP SPEC: NORMAL
PATH REPORT.FINAL DX SPEC: NORMAL
PATH REPORT.MICROSCOPIC SPEC OTHER STN: NORMAL
PATH REPORT.RELEVANT HX SPEC: NORMAL
PCO2 BLD: 36 MM HG (ref 35–45)
PEEP: 7 CM H2O
PH BLD: 7.48 [PH] (ref 7.37–7.44)
PLATELET # BLD AUTO: 46 THOU/UL (ref 140–440)
PMV BLD AUTO: 12.2 FL (ref 8.5–12.5)
PO2 BLD: 86 MM HG (ref 75–85)
POTASSIUM BLD-SCNC: 3.4 MMOL/L (ref 3.5–5)
POTASSIUM BLD-SCNC: 3.9 MMOL/L (ref 3.5–5)
PROT SERPL-MCNC: 5.4 G/DL (ref 6–8)
RATE: 20 RR/MIN
RBC # BLD AUTO: 2.69 MILL/UL (ref 4.4–6.2)
SODIUM SERPL-SCNC: 147 MMOL/L (ref 136–145)
SODIUM SERPL-SCNC: 148 MMOL/L (ref 136–145)
STATUS (HISTORICAL CONVERSION): NORMAL
TEMPERATURE: 37 DEGREES C
UNIT ABO/RH (HISTORICAL CONVERSION): NORMAL
UNIT NUMBER: NORMAL
VENTILATION MODE: ABNORMAL
VENTILATOR TIDAL VOLUME: 300 ML
WBC: 6.3 THOU/UL (ref 4–11)

## 2019-04-30 ASSESSMENT — MIFFLIN-ST. JEOR
SCORE: 1147.29

## 2019-05-01 LAB
ALBUMIN UR-MCNC: ABNORMAL MG/DL
ANION GAP SERPL CALCULATED.3IONS-SCNC: 9 MMOL/L (ref 5–18)
APPEARANCE UR: CLEAR
BACTERIA #/AREA URNS HPF: ABNORMAL HPF
BASE EXCESS BLDA CALC-SCNC: 3.4 MMOL/L
BILIRUB UR QL STRIP: NEGATIVE
BLD PROD TYP BPU: NORMAL
BLOOD EXPIRATION DATE: NORMAL
BLOOD TYPE: 6200
BUN SERPL-MCNC: 48 MG/DL (ref 8–22)
CALCIUM SERPL-MCNC: 7.7 MG/DL (ref 8.5–10.5)
CHLORIDE BLD-SCNC: 114 MMOL/L (ref 98–107)
CHOLEST SERPL-MCNC: 45 MG/DL
CO2 SERPL-SCNC: 25 MMOL/L (ref 22–31)
CODING SYSTEM: NORMAL
COHGB MFR BLD: 99.3 % (ref 95–96)
COLOR UR AUTO: YELLOW
COMPONENT (HISTORICAL CONVERSION): NORMAL
CREAT SERPL-MCNC: 1.22 MG/DL (ref 0.7–1.3)
CROSSMATCH: NORMAL
ERYTHROCYTE [DISTWIDTH] IN BLOOD BY AUTOMATED COUNT: 16.2 % (ref 11–14.5)
FASTING STATUS PATIENT QL REPORTED: ABNORMAL
GFR SERPL CREATININE-BSD FRML MDRD: 59 ML/MIN/1.73M2
GLUCOSE BLD-MCNC: 212 MG/DL (ref 70–125)
GLUCOSE BLDC GLUCOMTR-MCNC: 138 MG/DL (ref 70–139)
GLUCOSE BLDC GLUCOMTR-MCNC: 142 MG/DL (ref 70–139)
GLUCOSE BLDC GLUCOMTR-MCNC: 146 MG/DL (ref 70–139)
GLUCOSE BLDC GLUCOMTR-MCNC: 149 MG/DL (ref 70–139)
GLUCOSE BLDC GLUCOMTR-MCNC: 164 MG/DL (ref 70–139)
GLUCOSE BLDC GLUCOMTR-MCNC: 166 MG/DL (ref 70–139)
GLUCOSE UR STRIP-MCNC: NEGATIVE MG/DL
HCO3, ARTERIAL CALC - HISTORICAL: 27.6 MMOL/L (ref 23–29)
HCT VFR BLD AUTO: 29.6 % (ref 40–54)
HDLC SERPL-MCNC: 7 MG/DL
HGB BLD-MCNC: 9.6 G/DL (ref 14–18)
HGB UR QL STRIP: ABNORMAL
ISSUE DATE AND TIME: NORMAL
KETONES UR STRIP-MCNC: NEGATIVE MG/DL
LDLC SERPL CALC-MCNC: ABNORMAL MG/DL
LEUKOCYTE ESTERASE UR QL STRIP: NEGATIVE
MAGNESIUM SERPL-MCNC: 2.6 MG/DL (ref 1.8–2.6)
MCH RBC QN AUTO: 30 PG (ref 27–34)
MCHC RBC AUTO-ENTMCNC: 32.4 G/DL (ref 32–36)
MCV RBC AUTO: 93 FL (ref 80–100)
NITRATE UR QL: NEGATIVE
O2/TOTAL GAS SETTING VFR VENT: 0.3 %
OXYHEMOGLOBIN - HISTORICAL: 96 % (ref 95–96)
PCO2 BLD: 29 MM HG (ref 35–45)
PEEP: 7 CM H2O
PH BLD: 7.55 [PH] (ref 7.37–7.44)
PH UR STRIP: 8.5 [PH] (ref 4.5–8)
PLATELET # BLD AUTO: 42 THOU/UL (ref 140–440)
PMV BLD AUTO: 13.5 FL (ref 8.5–12.5)
PO2 BLD: 81 MM HG (ref 75–85)
POTASSIUM BLD-SCNC: 3.6 MMOL/L (ref 3.5–5)
RATE: 20 RR/MIN
RBC # BLD AUTO: 3.2 MILL/UL (ref 4.4–6.2)
RBC #/AREA URNS AUTO: ABNORMAL HPF
SODIUM SERPL-SCNC: 146 MMOL/L (ref 136–145)
SODIUM SERPL-SCNC: 147 MMOL/L (ref 136–145)
SODIUM SERPL-SCNC: 148 MMOL/L (ref 136–145)
SP GR UR STRIP: 1.01 (ref 1–1.03)
SQUAMOUS #/AREA URNS AUTO: ABNORMAL LPF
SRA 0.1IU/ML UFH SER-ACNC: 0 %
SRA 100IU/ML UFH SER-ACNC: 0 %
SRA PORCINE INTERP SER-IMP: NEGATIVE
SRA UFH SER-IMP: NORMAL
STATUS (HISTORICAL CONVERSION): NORMAL
TEMPERATURE: 37 DEGREES C
TRIGL SERPL-MCNC: 143 MG/DL
UNIT ABO/RH (HISTORICAL CONVERSION): NORMAL
UNIT NUMBER: NORMAL
UROBILINOGEN UR STRIP-ACNC: ABNORMAL
VENTILATION MODE: ABNORMAL
VENTILATOR TIDAL VOLUME: 300 ML
WBC #/AREA URNS AUTO: ABNORMAL HPF
WBC: 7.3 THOU/UL (ref 4–11)

## 2019-05-01 ASSESSMENT — MIFFLIN-ST. JEOR
SCORE: 1139.58

## 2019-05-02 LAB
AEROBIC BLOOD CULTURE BOTTLE: NO GROWTH
AEROBIC BLOOD CULTURE BOTTLE: NO GROWTH
ANAEROBIC BLOOD CULTURE BOTTLE: NO GROWTH
ANAEROBIC BLOOD CULTURE BOTTLE: NO GROWTH
ANION GAP SERPL CALCULATED.3IONS-SCNC: 8 MMOL/L (ref 5–18)
ATRIAL RATE - MUSE: 147 BPM
BUN SERPL-MCNC: 51 MG/DL (ref 8–22)
CALCIUM SERPL-MCNC: 8 MG/DL (ref 8.5–10.5)
CHLORIDE BLD-SCNC: 115 MMOL/L (ref 98–107)
CO2 SERPL-SCNC: 25 MMOL/L (ref 22–31)
CREAT SERPL-MCNC: 1.33 MG/DL (ref 0.7–1.3)
DIASTOLIC BLOOD PRESSURE - MUSE: NORMAL MMHG
ERYTHROCYTE [DISTWIDTH] IN BLOOD BY AUTOMATED COUNT: 15.9 % (ref 11–14.5)
GFR SERPL CREATININE-BSD FRML MDRD: 53 ML/MIN/1.73M2
GLUCOSE BLD-MCNC: 179 MG/DL (ref 70–125)
GLUCOSE BLDC GLUCOMTR-MCNC: 127 MG/DL (ref 70–139)
GLUCOSE BLDC GLUCOMTR-MCNC: 131 MG/DL (ref 70–139)
GLUCOSE BLDC GLUCOMTR-MCNC: 150 MG/DL (ref 70–139)
GLUCOSE BLDC GLUCOMTR-MCNC: 187 MG/DL (ref 70–139)
GLUCOSE BLDC GLUCOMTR-MCNC: 76 MG/DL (ref 70–139)
GLUCOSE BLDC GLUCOMTR-MCNC: 90 MG/DL (ref 70–139)
GLUCOSE BLDC GLUCOMTR-MCNC: 94 MG/DL (ref 70–139)
HCT VFR BLD AUTO: 31.8 % (ref 40–54)
HGB BLD-MCNC: 10.4 G/DL (ref 14–18)
INTERPRETATION ECG - MUSE: NORMAL
LACTATE SERPL-SCNC: 1.9 MMOL/L (ref 0.5–2.2)
MAGNESIUM SERPL-MCNC: 2.6 MG/DL (ref 1.8–2.6)
MCH RBC QN AUTO: 30.3 PG (ref 27–34)
MCHC RBC AUTO-ENTMCNC: 32.7 G/DL (ref 32–36)
MCV RBC AUTO: 93 FL (ref 80–100)
P AXIS - MUSE: NORMAL DEGREES
PLATELET # BLD AUTO: 53 THOU/UL (ref 140–440)
PMV BLD AUTO: 14.2 FL (ref 8.5–12.5)
POTASSIUM BLD-SCNC: 4.5 MMOL/L (ref 3.5–5)
PR INTERVAL - MUSE: NORMAL MS
PROCALCITONIN SERPL-MCNC: 9.21 NG/ML (ref 0–0.49)
QRS DURATION - MUSE: 82 MS
QT - MUSE: 422 MS
QTC - MUSE: 581 MS
R AXIS - MUSE: 25 DEGREES
RBC # BLD AUTO: 3.43 MILL/UL (ref 4.4–6.2)
SODIUM SERPL-SCNC: 147 MMOL/L (ref 136–145)
SODIUM SERPL-SCNC: 148 MMOL/L (ref 136–145)
SODIUM SERPL-SCNC: 149 MMOL/L (ref 136–145)
SYSTOLIC BLOOD PRESSURE - MUSE: NORMAL MMHG
T AXIS - MUSE: 137 DEGREES
VENTRICULAR RATE- MUSE: 114 BPM
WBC: 11.6 THOU/UL (ref 4–11)

## 2019-05-02 ASSESSMENT — MIFFLIN-ST. JEOR
SCORE: 1141.84

## 2019-05-03 ENCOUNTER — ANESTHESIA - HEALTHEAST (OUTPATIENT)
Dept: CARDIOLOGY | Facility: CLINIC | Age: 69
End: 2019-05-03

## 2019-05-03 LAB
AEROBIC BLOOD CULTURE BOTTLE: NO GROWTH
AEROBIC BLOOD CULTURE BOTTLE: NO GROWTH
ALBUMIN UR-MCNC: ABNORMAL MG/DL
AMYLASE SERPL-CCNC: 156 U/L (ref 5–120)
ANAEROBIC BLOOD CULTURE BOTTLE: NO GROWTH
ANAEROBIC BLOOD CULTURE BOTTLE: NO GROWTH
ANION GAP SERPL CALCULATED.3IONS-SCNC: 11 MMOL/L (ref 5–18)
APPEARANCE UR: ABNORMAL
BILIRUB UR QL STRIP: NEGATIVE
BUN SERPL-MCNC: 56 MG/DL (ref 8–22)
CALCIUM SERPL-MCNC: 7.7 MG/DL (ref 8.5–10.5)
CHLORIDE BLD-SCNC: 116 MMOL/L (ref 98–107)
CO2 SERPL-SCNC: 22 MMOL/L (ref 22–31)
COLOR UR AUTO: YELLOW
CREAT SERPL-MCNC: 1.31 MG/DL (ref 0.7–1.3)
ERYTHROCYTE [DISTWIDTH] IN BLOOD BY AUTOMATED COUNT: 15.8 % (ref 11–14.5)
GFR SERPL CREATININE-BSD FRML MDRD: 54 ML/MIN/1.73M2
GLUCOSE BLD-MCNC: 117 MG/DL (ref 70–125)
GLUCOSE BLDC GLUCOMTR-MCNC: 106 MG/DL (ref 70–139)
GLUCOSE BLDC GLUCOMTR-MCNC: 113 MG/DL (ref 70–139)
GLUCOSE BLDC GLUCOMTR-MCNC: 119 MG/DL (ref 70–139)
GLUCOSE BLDC GLUCOMTR-MCNC: 123 MG/DL (ref 70–139)
GLUCOSE BLDC GLUCOMTR-MCNC: 144 MG/DL (ref 70–139)
GLUCOSE BLDC GLUCOMTR-MCNC: 146 MG/DL (ref 70–139)
GLUCOSE BLDC GLUCOMTR-MCNC: 149 MG/DL (ref 70–139)
GLUCOSE UR STRIP-MCNC: NEGATIVE MG/DL
HCT VFR BLD AUTO: 29.7 % (ref 40–54)
HGB BLD-MCNC: 9.4 G/DL (ref 14–18)
HGB UR QL STRIP: ABNORMAL
INR PPP: 1.19 (ref 0.9–1.1)
KETONES UR STRIP-MCNC: NEGATIVE MG/DL
LEUKOCYTE ESTERASE UR QL STRIP: NEGATIVE
MAGNESIUM SERPL-MCNC: 2.6 MG/DL (ref 1.8–2.6)
MCH RBC QN AUTO: 30 PG (ref 27–34)
MCHC RBC AUTO-ENTMCNC: 31.6 G/DL (ref 32–36)
MCV RBC AUTO: 95 FL (ref 80–100)
NITRATE UR QL: NEGATIVE
PH UR STRIP: 5.5 [PH] (ref 4.5–8)
PLATELET # BLD AUTO: 59 THOU/UL (ref 140–440)
PLATELET # BLD AUTO: 59 THOU/UL (ref 140–440)
PMV BLD AUTO: 15.2 FL (ref 8.5–12.5)
POTASSIUM BLD-SCNC: 4.3 MMOL/L (ref 3.5–5)
RBC # BLD AUTO: 3.13 MILL/UL (ref 4.4–6.2)
SODIUM SERPL-SCNC: 148 MMOL/L (ref 136–145)
SODIUM SERPL-SCNC: 149 MMOL/L (ref 136–145)
SODIUM SERPL-SCNC: 149 MMOL/L (ref 136–145)
SODIUM SERPL-SCNC: 151 MMOL/L (ref 136–145)
SP GR UR STRIP: 1.01 (ref 1–1.03)
UROBILINOGEN UR STRIP-ACNC: ABNORMAL
WBC: 11.6 THOU/UL (ref 4–11)

## 2019-05-04 LAB
BACTERIA SPEC CULT: ABNORMAL
BASE EXCESS BLDA CALC-SCNC: 2.1 MMOL/L
BUN SERPL-MCNC: 56 MG/DL (ref 8–22)
CK SERPL-CCNC: 1475 U/L (ref 30–190)
COHGB MFR BLD: 99.5 % (ref 95–96)
CREAT SERPL-MCNC: 1.37 MG/DL (ref 0.7–1.3)
ERYTHROCYTE [DISTWIDTH] IN BLOOD BY AUTOMATED COUNT: 15.4 % (ref 11–14.5)
GFR SERPL CREATININE-BSD FRML MDRD: 52 ML/MIN/1.73M2
GLUCOSE BLDC GLUCOMTR-MCNC: 138 MG/DL (ref 70–139)
GLUCOSE BLDC GLUCOMTR-MCNC: 145 MG/DL (ref 70–139)
GLUCOSE BLDC GLUCOMTR-MCNC: 147 MG/DL (ref 70–139)
GLUCOSE BLDC GLUCOMTR-MCNC: 155 MG/DL (ref 70–139)
GLUCOSE BLDC GLUCOMTR-MCNC: 181 MG/DL (ref 70–139)
GRAM STAIN RESULT: ABNORMAL
GRAM STAIN RESULT: ABNORMAL
HCO3, ARTERIAL CALC - HISTORICAL: 26.6 MMOL/L (ref 23–29)
HCT VFR BLD AUTO: 34.7 % (ref 40–54)
HGB BLD-MCNC: 11.1 G/DL (ref 14–18)
MAGNESIUM SERPL-MCNC: 2.6 MG/DL (ref 1.8–2.6)
MCH RBC QN AUTO: 30 PG (ref 27–34)
MCHC RBC AUTO-ENTMCNC: 32 G/DL (ref 32–36)
MCV RBC AUTO: 94 FL (ref 80–100)
O2/TOTAL GAS SETTING VFR VENT: 0.4 %
OXYHEMOGLOBIN - HISTORICAL: 96.1 % (ref 95–96)
PCO2 BLD: 33 MM HG (ref 35–45)
PEEP: 5 CM H2O
PH BLD: 7.49 [PH] (ref 7.37–7.44)
PLATELET # BLD AUTO: 93 THOU/UL (ref 140–440)
PMV BLD AUTO: 14.2 FL (ref 8.5–12.5)
PO2 BLD: 92 MM HG (ref 75–85)
POTASSIUM BLD-SCNC: 3.6 MMOL/L (ref 3.5–5)
POTASSIUM BLD-SCNC: 3.8 MMOL/L (ref 3.5–5)
RATE: 20 RR/MIN
RBC # BLD AUTO: 3.7 MILL/UL (ref 4.4–6.2)
SODIUM SERPL-SCNC: 148 MMOL/L (ref 136–145)
SODIUM SERPL-SCNC: 150 MMOL/L (ref 136–145)
SODIUM SERPL-SCNC: 150 MMOL/L (ref 136–145)
SODIUM SERPL-SCNC: 152 MMOL/L (ref 136–145)
TEMPERATURE: 37 DEGREES C
VENTILATION MODE: ABNORMAL
VENTILATOR TIDAL VOLUME: 325 ML
WBC: 20.4 THOU/UL (ref 4–11)

## 2019-05-04 ASSESSMENT — MIFFLIN-ST. JEOR
SCORE: 1145.64

## 2019-05-05 LAB
ALBUMIN SERPL-MCNC: 2 G/DL (ref 3.5–5)
ALP SERPL-CCNC: 114 U/L (ref 45–120)
ALT SERPL W P-5'-P-CCNC: 129 U/L (ref 0–45)
AMYLASE SERPL-CCNC: 92 U/L (ref 5–120)
ANION GAP SERPL CALCULATED.3IONS-SCNC: 9 MMOL/L (ref 5–18)
APTT PPP: 33 SECONDS (ref 24–37)
AST SERPL W P-5'-P-CCNC: 136 U/L (ref 0–40)
BASE EXCESS BLDA CALC-SCNC: 4.1 MMOL/L
BILIRUB DIRECT SERPL-MCNC: 1.7 MG/DL
BILIRUB SERPL-MCNC: 2.7 MG/DL (ref 0–1)
BUN SERPL-MCNC: 46 MG/DL (ref 8–22)
CALCIUM SERPL-MCNC: 7.9 MG/DL (ref 8.5–10.5)
CALCIUM, IONIZED MEASURED: 1.02 MMOL/L (ref 1.11–1.3)
CALCIUM, IONIZED MEASURED: 1.08 MMOL/L (ref 1.11–1.3)
CHLORIDE BLD-SCNC: 115 MMOL/L (ref 98–107)
CO2 SERPL-SCNC: 25 MMOL/L (ref 22–31)
COHGB MFR BLD: 100 % (ref 95–96)
CREAT SERPL-MCNC: 1.07 MG/DL (ref 0.7–1.3)
ERYTHROCYTE [DISTWIDTH] IN BLOOD BY AUTOMATED COUNT: 14.9 % (ref 11–14.5)
ERYTHROCYTE [DISTWIDTH] IN BLOOD BY AUTOMATED COUNT: 15.2 % (ref 11–14.5)
GFR SERPL CREATININE-BSD FRML MDRD: >60 ML/MIN/1.73M2
GLUCOSE BLD-MCNC: 148 MG/DL (ref 70–125)
GLUCOSE BLDC GLUCOMTR-MCNC: 139 MG/DL (ref 70–139)
GLUCOSE BLDC GLUCOMTR-MCNC: 143 MG/DL (ref 70–139)
GLUCOSE BLDC GLUCOMTR-MCNC: 144 MG/DL (ref 70–139)
GLUCOSE BLDC GLUCOMTR-MCNC: 145 MG/DL (ref 70–139)
GLUCOSE BLDC GLUCOMTR-MCNC: 149 MG/DL (ref 70–139)
GLUCOSE BLDC GLUCOMTR-MCNC: 167 MG/DL (ref 70–139)
HCO3, ARTERIAL CALC - HISTORICAL: 28.1 MMOL/L (ref 23–29)
HCT VFR BLD AUTO: 32.9 % (ref 40–54)
HCT VFR BLD AUTO: 33.1 % (ref 40–54)
HGB BLD-MCNC: 10.6 G/DL (ref 14–18)
HGB BLD-MCNC: 10.8 G/DL (ref 14–18)
INR PPP: 1.38 (ref 0.9–1.1)
ION CA PH 7.4: 1.05 MMOL/L (ref 1.11–1.3)
ION CA PH 7.4: 1.11 MMOL/L (ref 1.11–1.3)
LACTATE SERPL-SCNC: 1.5 MMOL/L (ref 0.5–2.2)
MAGNESIUM SERPL-MCNC: 2.5 MG/DL (ref 1.8–2.6)
MCH RBC QN AUTO: 30.1 PG (ref 27–34)
MCH RBC QN AUTO: 30.4 PG (ref 27–34)
MCHC RBC AUTO-ENTMCNC: 32.2 G/DL (ref 32–36)
MCHC RBC AUTO-ENTMCNC: 32.6 G/DL (ref 32–36)
MCV RBC AUTO: 92 FL (ref 80–100)
MCV RBC AUTO: 94 FL (ref 80–100)
O2/TOTAL GAS SETTING VFR VENT: 0.35 %
OXYHEMOGLOBIN - HISTORICAL: 96.8 % (ref 95–96)
PCO2 BLD: 30 MM HG (ref 35–45)
PEEP: 5 CM H2O
PH BLD: 7.55 [PH] (ref 7.37–7.44)
PH: 7.47 (ref 7.35–7.45)
PH: 7.47 (ref 7.35–7.45)
PLATELET # BLD AUTO: 96 THOU/UL (ref 140–440)
PLATELET # BLD AUTO: 99 THOU/UL (ref 140–440)
PMV BLD AUTO: 12.8 FL (ref 8.5–12.5)
PMV BLD AUTO: 13.2 FL (ref 8.5–12.5)
PO2 BLD: 104 MM HG (ref 75–85)
POTASSIUM BLD-SCNC: 3.7 MMOL/L (ref 3.5–5)
PROT SERPL-MCNC: 5.8 G/DL (ref 6–8)
RATE: 20 RR/MIN
RBC # BLD AUTO: 3.49 MILL/UL (ref 4.4–6.2)
RBC # BLD AUTO: 3.59 MILL/UL (ref 4.4–6.2)
SODIUM SERPL-SCNC: 147 MMOL/L (ref 136–145)
SODIUM SERPL-SCNC: 148 MMOL/L (ref 136–145)
SODIUM SERPL-SCNC: 149 MMOL/L (ref 136–145)
TEMPERATURE: 37 DEGREES C
UFH PPP CHRO-ACNC: <0.1 IU/ML (ref 0.3–0.7)
VENTILATION MODE: ABNORMAL
WBC: 14.8 THOU/UL (ref 4–11)
WBC: 17 THOU/UL (ref 4–11)

## 2019-05-05 ASSESSMENT — MIFFLIN-ST. JEOR
SCORE: 1086.5

## 2019-05-06 LAB
AEROBIC BLOOD CULTURE BOTTLE: ABNORMAL
AEROBIC BLOOD CULTURE BOTTLE: NO GROWTH
AEROBIC BLOOD CULTURE BOTTLE: NO GROWTH
ALBUMIN SERPL-MCNC: 2 G/DL (ref 3.5–5)
ALP SERPL-CCNC: 162 U/L (ref 45–120)
ALT SERPL W P-5'-P-CCNC: 160 U/L (ref 0–45)
AMYLASE SERPL-CCNC: 103 U/L (ref 5–120)
ANAEROBIC BLOOD CULTURE BOTTLE: ABNORMAL
ANAEROBIC BLOOD CULTURE BOTTLE: NO GROWTH
ANAEROBIC BLOOD CULTURE BOTTLE: NORMAL
ANION GAP SERPL CALCULATED.3IONS-SCNC: 9 MMOL/L (ref 5–18)
AST SERPL W P-5'-P-CCNC: 189 U/L (ref 0–40)
BILIRUB DIRECT SERPL-MCNC: 1.6 MG/DL
BILIRUB SERPL-MCNC: 2.5 MG/DL (ref 0–1)
BUN SERPL-MCNC: 40 MG/DL (ref 8–22)
CALCIUM SERPL-MCNC: 8 MG/DL (ref 8.5–10.5)
CALCIUM, IONIZED MEASURED: 1.06 MMOL/L (ref 1.11–1.3)
CALCIUM, IONIZED MEASURED: 1.07 MMOL/L (ref 1.11–1.3)
CHLORIDE BLD-SCNC: 116 MMOL/L (ref 98–107)
CK SERPL-CCNC: 1202 U/L (ref 30–190)
CO2 SERPL-SCNC: 22 MMOL/L (ref 22–31)
CREAT SERPL-MCNC: 0.97 MG/DL (ref 0.7–1.3)
ERYTHROCYTE [DISTWIDTH] IN BLOOD BY AUTOMATED COUNT: 14.9 % (ref 11–14.5)
GFR SERPL CREATININE-BSD FRML MDRD: >60 ML/MIN/1.73M2
GLUCOSE BLD-MCNC: 129 MG/DL (ref 70–125)
GLUCOSE BLDC GLUCOMTR-MCNC: 126 MG/DL (ref 70–139)
GLUCOSE BLDC GLUCOMTR-MCNC: 143 MG/DL (ref 70–139)
GLUCOSE BLDC GLUCOMTR-MCNC: 147 MG/DL (ref 70–139)
GLUCOSE BLDC GLUCOMTR-MCNC: 156 MG/DL (ref 70–139)
GLUCOSE BLDC GLUCOMTR-MCNC: 160 MG/DL (ref 70–139)
HCT VFR BLD AUTO: 29.3 % (ref 40–54)
HGB BLD-MCNC: 9.6 G/DL (ref 14–18)
ION CA PH 7.4: 1.08 MMOL/L (ref 1.11–1.3)
ION CA PH 7.4: 1.09 MMOL/L (ref 1.11–1.3)
MCH RBC QN AUTO: 30.5 PG (ref 27–34)
MCHC RBC AUTO-ENTMCNC: 32.8 G/DL (ref 32–36)
MCV RBC AUTO: 93 FL (ref 80–100)
PH: 7.44 (ref 7.35–7.45)
PH: 7.45 (ref 7.35–7.45)
PLATELET # BLD AUTO: 97 THOU/UL (ref 140–440)
PMV BLD AUTO: 12.9 FL (ref 8.5–12.5)
POTASSIUM BLD-SCNC: 3.7 MMOL/L (ref 3.5–5)
PROT SERPL-MCNC: 5.7 G/DL (ref 6–8)
RBC # BLD AUTO: 3.15 MILL/UL (ref 4.4–6.2)
SODIUM SERPL-SCNC: 147 MMOL/L (ref 136–145)
SODIUM SERPL-SCNC: 147 MMOL/L (ref 136–145)
UFH PPP CHRO-ACNC: 0.12 IU/ML (ref 0.3–0.7)
UFH PPP CHRO-ACNC: 0.16 IU/ML (ref 0.3–0.7)
UFH PPP CHRO-ACNC: 0.19 IU/ML (ref 0.3–0.7)
UFH PPP CHRO-ACNC: 0.19 IU/ML (ref 0.3–0.7)
WBC: 11.4 THOU/UL (ref 4–11)

## 2019-05-06 ASSESSMENT — MIFFLIN-ST. JEOR
SCORE: 1090.13

## 2019-05-07 ENCOUNTER — COMMUNICATION - HEALTHEAST (OUTPATIENT)
Dept: NURSING | Facility: CLINIC | Age: 69
End: 2019-05-07

## 2019-05-07 ENCOUNTER — ANESTHESIA - HEALTHEAST (OUTPATIENT)
Dept: SURGERY | Facility: CLINIC | Age: 69
End: 2019-05-07

## 2019-05-07 LAB
ALBUMIN SERPL-MCNC: 2.1 G/DL (ref 3.5–5)
ALP SERPL-CCNC: 164 U/L (ref 45–120)
ALT SERPL W P-5'-P-CCNC: 132 U/L (ref 0–45)
ANION GAP SERPL CALCULATED.3IONS-SCNC: 8 MMOL/L (ref 5–18)
APTT PPP: 66 SECONDS (ref 24–37)
APTT PPP: 81 SECONDS (ref 24–37)
AST SERPL W P-5'-P-CCNC: 125 U/L (ref 0–40)
BACTERIA SPEC CULT: ABNORMAL
BILIRUB SERPL-MCNC: 2.1 MG/DL (ref 0–1)
BUN SERPL-MCNC: 31 MG/DL (ref 8–22)
CALCIUM SERPL-MCNC: 7.8 MG/DL (ref 8.5–10.5)
CHLORIDE BLD-SCNC: 114 MMOL/L (ref 98–107)
CK SERPL-CCNC: 1228 U/L (ref 30–190)
CO2 SERPL-SCNC: 23 MMOL/L (ref 22–31)
CREAT SERPL-MCNC: 0.84 MG/DL (ref 0.7–1.3)
GFR SERPL CREATININE-BSD FRML MDRD: >60 ML/MIN/1.73M2
GLUCOSE BLD-MCNC: 177 MG/DL (ref 70–125)
GLUCOSE BLDC GLUCOMTR-MCNC: 125 MG/DL (ref 70–139)
GLUCOSE BLDC GLUCOMTR-MCNC: 133 MG/DL (ref 70–139)
GLUCOSE BLDC GLUCOMTR-MCNC: 162 MG/DL (ref 70–139)
GLUCOSE BLDC GLUCOMTR-MCNC: 165 MG/DL (ref 70–139)
GLUCOSE BLDC GLUCOMTR-MCNC: 181 MG/DL (ref 70–139)
GLUCOSE BLDC GLUCOMTR-MCNC: 184 MG/DL (ref 70–139)
GLUCOSE BLDC GLUCOMTR-MCNC: 185 MG/DL (ref 70–139)
GRAM STAIN RESULT: ABNORMAL
HGB BLD-MCNC: 8.9 G/DL (ref 14–18)
PLATELET # BLD AUTO: 103 THOU/UL (ref 140–440)
POTASSIUM BLD-SCNC: 3.3 MMOL/L (ref 3.5–5)
POTASSIUM BLD-SCNC: 3.3 MMOL/L (ref 3.5–5)
POTASSIUM BLD-SCNC: 3.6 MMOL/L (ref 3.5–5)
PROT SERPL-MCNC: 5.6 G/DL (ref 6–8)
SODIUM SERPL-SCNC: 145 MMOL/L (ref 136–145)
UFH PPP CHRO-ACNC: 0.16 IU/ML (ref 0.3–0.7)
UFH PPP CHRO-ACNC: 0.18 IU/ML (ref 0.3–0.7)

## 2019-05-07 ASSESSMENT — MIFFLIN-ST. JEOR
SCORE: 1084.69

## 2019-05-08 ENCOUNTER — SURGERY - HEALTHEAST (OUTPATIENT)
Dept: SURGERY | Facility: CLINIC | Age: 69
End: 2019-05-08

## 2019-05-08 LAB
ALT SERPL W P-5'-P-CCNC: 115 U/L (ref 0–45)
ANION GAP SERPL CALCULATED.3IONS-SCNC: 5 MMOL/L (ref 5–18)
APTT PPP: 60 SECONDS (ref 24–37)
APTT PPP: 68 SECONDS (ref 24–37)
AST SERPL W P-5'-P-CCNC: 107 U/L (ref 0–40)
BUN SERPL-MCNC: 22 MG/DL (ref 8–22)
CALCIUM SERPL-MCNC: 7.8 MG/DL (ref 8.5–10.5)
CHLORIDE BLD-SCNC: 116 MMOL/L (ref 98–107)
CK SERPL-CCNC: 1524 U/L (ref 30–190)
CO2 SERPL-SCNC: 24 MMOL/L (ref 22–31)
CREAT SERPL-MCNC: 0.74 MG/DL (ref 0.7–1.3)
ERYTHROCYTE [DISTWIDTH] IN BLOOD BY AUTOMATED COUNT: 15.3 % (ref 11–14.5)
GFR SERPL CREATININE-BSD FRML MDRD: >60 ML/MIN/1.73M2
GLUCOSE BLD-MCNC: 127 MG/DL (ref 70–125)
GLUCOSE BLDC GLUCOMTR-MCNC: 117 MG/DL (ref 70–139)
GLUCOSE BLDC GLUCOMTR-MCNC: 118 MG/DL (ref 70–139)
GLUCOSE BLDC GLUCOMTR-MCNC: 122 MG/DL (ref 70–139)
GLUCOSE BLDC GLUCOMTR-MCNC: 126 MG/DL (ref 70–139)
GLUCOSE BLDC GLUCOMTR-MCNC: 191 MG/DL (ref 70–139)
HCT VFR BLD AUTO: 26.2 % (ref 40–54)
HGB BLD-MCNC: 8.6 G/DL (ref 14–18)
MCH RBC QN AUTO: 30.8 PG (ref 27–34)
MCHC RBC AUTO-ENTMCNC: 32.8 G/DL (ref 32–36)
MCV RBC AUTO: 94 FL (ref 80–100)
PLATELET # BLD AUTO: 122 THOU/UL (ref 140–440)
PLATELET # BLD AUTO: 158 THOU/UL (ref 140–440)
PMV BLD AUTO: 11.9 FL (ref 8.5–12.5)
POTASSIUM BLD-SCNC: 3.9 MMOL/L (ref 3.5–5)
RBC # BLD AUTO: 2.79 MILL/UL (ref 4.4–6.2)
SODIUM SERPL-SCNC: 145 MMOL/L (ref 136–145)
VANCOMYCIN SERPL-MCNC: 4.3 UG/ML
WBC: 9.8 THOU/UL (ref 4–11)

## 2019-05-08 ASSESSMENT — MIFFLIN-ST. JEOR
SCORE: 1084.24

## 2019-05-09 LAB
ALBUMIN SERPL-MCNC: 2 G/DL (ref 3.5–5)
ALBUMIN UR-MCNC: NEGATIVE MG/DL
ALP SERPL-CCNC: 137 U/L (ref 45–120)
ALT SERPL W P-5'-P-CCNC: 103 U/L (ref 0–45)
ANION GAP SERPL CALCULATED.3IONS-SCNC: 6 MMOL/L (ref 5–18)
APPEARANCE UR: CLEAR
APTT PPP: 64 SECONDS (ref 24–37)
AST SERPL W P-5'-P-CCNC: 85 U/L (ref 0–40)
ATRIAL RATE - MUSE: 127 BPM
BACTERIA #/AREA URNS HPF: ABNORMAL HPF
BASOPHILS # BLD AUTO: 0 THOU/UL (ref 0–0.2)
BASOPHILS NFR BLD AUTO: 0 % (ref 0–2)
BILIRUB SERPL-MCNC: 1.4 MG/DL (ref 0–1)
BILIRUB UR QL STRIP: NEGATIVE
BUN SERPL-MCNC: 24 MG/DL (ref 8–22)
CALCIUM SERPL-MCNC: 7.4 MG/DL (ref 8.5–10.5)
CHLORIDE BLD-SCNC: 113 MMOL/L (ref 98–107)
CO2 SERPL-SCNC: 23 MMOL/L (ref 22–31)
COLOR UR AUTO: ABNORMAL
CREAT SERPL-MCNC: 0.74 MG/DL (ref 0.7–1.3)
DIASTOLIC BLOOD PRESSURE - MUSE: NORMAL MMHG
EOSINOPHIL # BLD AUTO: 0.2 THOU/UL (ref 0–0.4)
EOSINOPHIL NFR BLD AUTO: 1 % (ref 0–6)
ERYTHROCYTE [DISTWIDTH] IN BLOOD BY AUTOMATED COUNT: 16.5 % (ref 11–14.5)
GFR SERPL CREATININE-BSD FRML MDRD: >60 ML/MIN/1.73M2
GLUCOSE BLD-MCNC: 179 MG/DL (ref 70–125)
GLUCOSE BLDC GLUCOMTR-MCNC: 117 MG/DL (ref 70–139)
GLUCOSE BLDC GLUCOMTR-MCNC: 134 MG/DL (ref 70–139)
GLUCOSE BLDC GLUCOMTR-MCNC: 152 MG/DL (ref 70–139)
GLUCOSE BLDC GLUCOMTR-MCNC: 172 MG/DL (ref 70–139)
GLUCOSE BLDC GLUCOMTR-MCNC: 176 MG/DL (ref 70–139)
GLUCOSE BLDC GLUCOMTR-MCNC: 178 MG/DL (ref 70–139)
GLUCOSE BLDC GLUCOMTR-MCNC: 183 MG/DL (ref 70–139)
GLUCOSE BLDC GLUCOMTR-MCNC: 190 MG/DL (ref 70–139)
GLUCOSE UR STRIP-MCNC: NEGATIVE MG/DL
HCT VFR BLD AUTO: 25.9 % (ref 40–54)
HGB BLD-MCNC: 8.5 G/DL (ref 14–18)
HGB UR QL STRIP: ABNORMAL
INR PPP: 1.25 (ref 0.9–1.1)
INR PPP: 1.26 (ref 0.9–1.1)
INTERPRETATION ECG - MUSE: NORMAL
KETONES UR STRIP-MCNC: NEGATIVE MG/DL
LEUKOCYTE ESTERASE UR QL STRIP: NEGATIVE
LYMPHOCYTES # BLD AUTO: 0.9 THOU/UL (ref 0.8–4.4)
LYMPHOCYTES NFR BLD AUTO: 8 % (ref 20–40)
MAGNESIUM SERPL-MCNC: 2 MG/DL (ref 1.8–2.6)
MCH RBC QN AUTO: 31.3 PG (ref 27–34)
MCHC RBC AUTO-ENTMCNC: 32.8 G/DL (ref 32–36)
MCV RBC AUTO: 95 FL (ref 80–100)
MONOCYTES # BLD AUTO: 0.9 THOU/UL (ref 0–0.9)
MONOCYTES NFR BLD AUTO: 8 % (ref 2–10)
NEUTROPHILS # BLD AUTO: 9.4 THOU/UL (ref 2–7.7)
NEUTROPHILS NFR BLD AUTO: 83 % (ref 50–70)
NITRATE UR QL: NEGATIVE
P AXIS - MUSE: NORMAL DEGREES
PH UR STRIP: 7 [PH] (ref 4.5–8)
PLATELET # BLD AUTO: 136 THOU/UL (ref 140–440)
PMV BLD AUTO: 11.3 FL (ref 8.5–12.5)
POTASSIUM BLD-SCNC: 3.4 MMOL/L (ref 3.5–5)
POTASSIUM BLD-SCNC: 3.8 MMOL/L (ref 3.5–5)
PR INTERVAL - MUSE: NORMAL MS
PROCALCITONIN SERPL-MCNC: 0.69 NG/ML (ref 0–0.49)
PROT SERPL-MCNC: 5.6 G/DL (ref 6–8)
QRS DURATION - MUSE: 90 MS
QT - MUSE: 284 MS
QTC - MUSE: 380 MS
R AXIS - MUSE: 41 DEGREES
RBC # BLD AUTO: 2.72 MILL/UL (ref 4.4–6.2)
RBC #/AREA URNS AUTO: ABNORMAL HPF
SODIUM SERPL-SCNC: 142 MMOL/L (ref 136–145)
SP GR UR STRIP: 1 (ref 1–1.03)
SQUAMOUS #/AREA URNS AUTO: ABNORMAL LPF
SYSTOLIC BLOOD PRESSURE - MUSE: NORMAL MMHG
T AXIS - MUSE: 169 DEGREES
UROBILINOGEN UR STRIP-ACNC: ABNORMAL
VENTRICULAR RATE- MUSE: 108 BPM
WBC #/AREA URNS AUTO: ABNORMAL HPF
WBC: 11.6 THOU/UL (ref 4–11)

## 2019-05-09 ASSESSMENT — MIFFLIN-ST. JEOR
SCORE: 1071.54

## 2019-05-10 ENCOUNTER — HOSPITAL ENCOUNTER (OUTPATIENT)
Dept: CARDIOLOGY | Facility: CLINIC | Age: 69
Discharge: HOME | End: 2019-05-10

## 2019-05-10 LAB
ALBUMIN SERPL-MCNC: 2.1 G/DL (ref 3.5–5)
ALP SERPL-CCNC: 147 U/L (ref 45–120)
ALT SERPL W P-5'-P-CCNC: 104 U/L (ref 0–45)
ANION GAP SERPL CALCULATED.3IONS-SCNC: 6 MMOL/L (ref 5–18)
APTT PPP: 47 SECONDS (ref 24–37)
APTT PPP: >300 SECONDS (ref 24–37)
AST SERPL W P-5'-P-CCNC: 77 U/L (ref 0–40)
BILIRUB SERPL-MCNC: 1.4 MG/DL (ref 0–1)
BUN SERPL-MCNC: 19 MG/DL (ref 8–22)
CALCIUM SERPL-MCNC: 7.7 MG/DL (ref 8.5–10.5)
CHLORIDE BLD-SCNC: 110 MMOL/L (ref 98–107)
CK SERPL-CCNC: 769 U/L (ref 30–190)
CO2 SERPL-SCNC: 24 MMOL/L (ref 22–31)
CREAT SERPL-MCNC: 0.69 MG/DL (ref 0.7–1.3)
GFR SERPL CREATININE-BSD FRML MDRD: >60 ML/MIN/1.73M2
GLUCOSE BLD-MCNC: 217 MG/DL (ref 70–125)
GLUCOSE BLDC GLUCOMTR-MCNC: 148 MG/DL (ref 70–139)
GLUCOSE BLDC GLUCOMTR-MCNC: 156 MG/DL (ref 70–139)
GLUCOSE BLDC GLUCOMTR-MCNC: 186 MG/DL (ref 70–139)
GLUCOSE BLDC GLUCOMTR-MCNC: 194 MG/DL (ref 70–139)
INR PPP: 1.39 (ref 0.9–1.1)
PLATELET # BLD AUTO: 167 THOU/UL (ref 140–440)
POTASSIUM BLD-SCNC: 3.4 MMOL/L (ref 3.5–5)
PROT SERPL-MCNC: 6 G/DL (ref 6–8)
SODIUM SERPL-SCNC: 140 MMOL/L (ref 136–145)

## 2019-05-11 LAB
AEROBIC BLOOD CULTURE BOTTLE: NO GROWTH
AEROBIC BLOOD CULTURE BOTTLE: NO GROWTH
ANAEROBIC BLOOD CULTURE BOTTLE: NO GROWTH
ANAEROBIC BLOOD CULTURE BOTTLE: NO GROWTH

## 2019-05-13 ENCOUNTER — COMMUNICATION - HEALTHEAST (OUTPATIENT)
Dept: ANTICOAGULATION | Facility: CLINIC | Age: 69
End: 2019-05-13

## 2019-05-13 DIAGNOSIS — I66.9 CEREBRAL ARTERY OCCLUSION: ICD-10-CM

## 2019-05-13 DIAGNOSIS — I06.9 AORTIC VALVE DISEASE, RHEUMATIC: ICD-10-CM

## 2019-05-13 DIAGNOSIS — I48.91 A-FIB (H): ICD-10-CM

## 2019-05-13 DIAGNOSIS — I05.0 MITRAL VALVE STENOSIS, UNSPECIFIED ETIOLOGY: ICD-10-CM

## 2019-05-17 ENCOUNTER — COMMUNICATION - HEALTHEAST (OUTPATIENT)
Dept: ANTICOAGULATION | Facility: CLINIC | Age: 69
End: 2019-05-17

## 2019-05-17 DIAGNOSIS — I48.19 PERSISTENT ATRIAL FIBRILLATION (H): ICD-10-CM

## 2019-05-17 DIAGNOSIS — I05.0 MITRAL VALVE STENOSIS, UNSPECIFIED ETIOLOGY: ICD-10-CM

## 2019-05-17 DIAGNOSIS — I66.9 CEREBRAL ARTERY OCCLUSION: ICD-10-CM

## 2019-05-17 DIAGNOSIS — I06.9 AORTIC VALVE DISEASE, RHEUMATIC: ICD-10-CM

## 2019-05-21 ENCOUNTER — COMMUNICATION - HEALTHEAST (OUTPATIENT)
Dept: GERIATRIC MEDICINE | Facility: CLINIC | Age: 69
End: 2019-05-21

## 2019-06-07 ENCOUNTER — RECORDS - HEALTHEAST (OUTPATIENT)
Dept: ADMINISTRATIVE | Facility: OTHER | Age: 69
End: 2019-06-07

## 2019-06-07 ENCOUNTER — COMMUNICATION - HEALTHEAST (OUTPATIENT)
Dept: ANTICOAGULATION | Facility: CLINIC | Age: 69
End: 2019-06-07

## 2019-06-10 ENCOUNTER — COMMUNICATION - HEALTHEAST (OUTPATIENT)
Dept: NURSING | Facility: CLINIC | Age: 69
End: 2019-06-10

## 2019-06-15 ENCOUNTER — COMMUNICATION - HEALTHEAST (OUTPATIENT)
Dept: FAMILY MEDICINE | Facility: CLINIC | Age: 69
End: 2019-06-15

## 2019-06-15 DIAGNOSIS — R13.10 DYSPHAGIA: ICD-10-CM

## 2019-06-25 ENCOUNTER — RECORDS - HEALTHEAST (OUTPATIENT)
Dept: ADMINISTRATIVE | Facility: OTHER | Age: 69
End: 2019-06-25

## 2019-06-27 ENCOUNTER — COMMUNICATION - HEALTHEAST (OUTPATIENT)
Dept: NURSING | Facility: CLINIC | Age: 69
End: 2019-06-27

## 2019-07-01 ENCOUNTER — COMMUNICATION - HEALTHEAST (OUTPATIENT)
Dept: TELEHEALTH | Facility: CLINIC | Age: 69
End: 2019-07-01

## 2019-07-05 ENCOUNTER — COMMUNICATION - HEALTHEAST (OUTPATIENT)
Dept: ANTICOAGULATION | Facility: CLINIC | Age: 69
End: 2019-07-05

## 2019-07-09 ENCOUNTER — COMMUNICATION - HEALTHEAST (OUTPATIENT)
Dept: ANTICOAGULATION | Facility: CLINIC | Age: 69
End: 2019-07-09

## 2019-07-09 ENCOUNTER — OFFICE VISIT - HEALTHEAST (OUTPATIENT)
Dept: FAMILY MEDICINE | Facility: CLINIC | Age: 69
End: 2019-07-09

## 2019-07-09 DIAGNOSIS — E03.9 HYPOTHYROIDISM, UNSPECIFIED TYPE: ICD-10-CM

## 2019-07-09 DIAGNOSIS — Z95.2 S/P AVR: ICD-10-CM

## 2019-07-09 DIAGNOSIS — K59.00 CONSTIPATION, UNSPECIFIED CONSTIPATION TYPE: ICD-10-CM

## 2019-07-09 DIAGNOSIS — I06.9 AORTIC VALVE DISEASE, RHEUMATIC: ICD-10-CM

## 2019-07-09 DIAGNOSIS — I66.9 CEREBRAL ARTERY OCCLUSION: ICD-10-CM

## 2019-07-09 DIAGNOSIS — I48.91 A-FIB (H): ICD-10-CM

## 2019-07-09 DIAGNOSIS — I61.9 HEMORRHAGIC STROKE (H): ICD-10-CM

## 2019-07-09 DIAGNOSIS — I48.91 ATRIAL FIBRILLATION, UNSPECIFIED TYPE (H): ICD-10-CM

## 2019-07-09 DIAGNOSIS — E87.1 HYPONATREMIA: ICD-10-CM

## 2019-07-09 DIAGNOSIS — N39.0 URINARY TRACT INFECTION WITHOUT HEMATURIA, SITE UNSPECIFIED: ICD-10-CM

## 2019-07-09 DIAGNOSIS — Z98.890 S/P MVR (MITRAL VALVE REPAIR): ICD-10-CM

## 2019-07-09 DIAGNOSIS — I05.0 MITRAL VALVE STENOSIS, UNSPECIFIED ETIOLOGY: ICD-10-CM

## 2019-07-09 DIAGNOSIS — E44.0 MODERATE PROTEIN-CALORIE MALNUTRITION (H): ICD-10-CM

## 2019-07-09 DIAGNOSIS — Z09 HOSPITAL DISCHARGE FOLLOW-UP: ICD-10-CM

## 2019-07-09 LAB
INR PPP: 2.6 (ref 0.9–1.1)
T4 TOTAL - HISTORICAL: 6.3 UG/DL (ref 4.5–13)
TSH SERPL DL<=0.005 MIU/L-ACNC: 5.69 UIU/ML (ref 0.3–5)

## 2019-07-10 ENCOUNTER — COMMUNICATION - HEALTHEAST (OUTPATIENT)
Dept: GERIATRIC MEDICINE | Facility: CLINIC | Age: 69
End: 2019-07-10

## 2019-07-12 ENCOUNTER — AMBULATORY - HEALTHEAST (OUTPATIENT)
Dept: LAB | Facility: CLINIC | Age: 69
End: 2019-07-12

## 2019-07-12 ENCOUNTER — COMMUNICATION - HEALTHEAST (OUTPATIENT)
Dept: ANTICOAGULATION | Facility: CLINIC | Age: 69
End: 2019-07-12

## 2019-07-12 DIAGNOSIS — I05.0 MITRAL VALVE STENOSIS, UNSPECIFIED ETIOLOGY: ICD-10-CM

## 2019-07-12 DIAGNOSIS — I06.9 AORTIC VALVE DISEASE, RHEUMATIC: ICD-10-CM

## 2019-07-12 DIAGNOSIS — I66.9 CEREBRAL ARTERY OCCLUSION: ICD-10-CM

## 2019-07-12 DIAGNOSIS — I48.91 ATRIAL FIBRILLATION (H): ICD-10-CM

## 2019-07-12 DIAGNOSIS — I48.91 A-FIB (H): ICD-10-CM

## 2019-07-12 LAB — INR PPP: 2.2 (ref 0.9–1.1)

## 2019-07-15 ENCOUNTER — COMMUNICATION - HEALTHEAST (OUTPATIENT)
Dept: FAMILY MEDICINE | Facility: CLINIC | Age: 69
End: 2019-07-15

## 2019-07-15 DIAGNOSIS — I61.9 HEMORRHAGIC STROKE (H): ICD-10-CM

## 2019-07-16 ENCOUNTER — AMBULATORY - HEALTHEAST (OUTPATIENT)
Dept: FAMILY MEDICINE | Facility: CLINIC | Age: 69
End: 2019-07-16

## 2019-07-17 ENCOUNTER — COMMUNICATION - HEALTHEAST (OUTPATIENT)
Dept: NURSING | Facility: CLINIC | Age: 69
End: 2019-07-17

## 2019-07-19 ENCOUNTER — COMMUNICATION - HEALTHEAST (OUTPATIENT)
Dept: GERIATRIC MEDICINE | Facility: CLINIC | Age: 69
End: 2019-07-19

## 2019-07-22 ENCOUNTER — HOME CARE/HOSPICE - HEALTHEAST (OUTPATIENT)
Dept: HOME HEALTH SERVICES | Facility: HOME HEALTH | Age: 69
End: 2019-07-22

## 2019-07-23 ENCOUNTER — COMMUNICATION - HEALTHEAST (OUTPATIENT)
Dept: NURSING | Facility: CLINIC | Age: 69
End: 2019-07-23

## 2019-07-23 ENCOUNTER — OFFICE VISIT - HEALTHEAST (OUTPATIENT)
Dept: FAMILY MEDICINE | Facility: CLINIC | Age: 69
End: 2019-07-23

## 2019-07-23 ENCOUNTER — COMMUNICATION - HEALTHEAST (OUTPATIENT)
Dept: ANTICOAGULATION | Facility: CLINIC | Age: 69
End: 2019-07-23

## 2019-07-23 DIAGNOSIS — E87.1 HYPONATREMIA: ICD-10-CM

## 2019-07-23 DIAGNOSIS — R79.0 LOW MAGNESIUM LEVEL: ICD-10-CM

## 2019-07-23 DIAGNOSIS — I05.0 MITRAL VALVE STENOSIS, UNSPECIFIED ETIOLOGY: ICD-10-CM

## 2019-07-23 DIAGNOSIS — E44.0 MODERATE PROTEIN-CALORIE MALNUTRITION (H): ICD-10-CM

## 2019-07-23 DIAGNOSIS — E03.9 HYPOTHYROIDISM, UNSPECIFIED TYPE: ICD-10-CM

## 2019-07-23 DIAGNOSIS — I06.9 AORTIC VALVE DISEASE, RHEUMATIC: ICD-10-CM

## 2019-07-23 DIAGNOSIS — Z95.2 S/P AVR: ICD-10-CM

## 2019-07-23 DIAGNOSIS — N40.1 BENIGN PROSTATIC HYPERPLASIA WITH URINARY OBSTRUCTION: ICD-10-CM

## 2019-07-23 DIAGNOSIS — I61.9 HEMORRHAGIC STROKE (H): ICD-10-CM

## 2019-07-23 DIAGNOSIS — Z98.890 S/P MVR (MITRAL VALVE REPAIR): ICD-10-CM

## 2019-07-23 DIAGNOSIS — D50.9 IRON DEFICIENCY ANEMIA, UNSPECIFIED IRON DEFICIENCY ANEMIA TYPE: ICD-10-CM

## 2019-07-23 DIAGNOSIS — I25.10 ATHEROSCLEROSIS OF NATIVE CORONARY ARTERY OF NATIVE HEART WITHOUT ANGINA PECTORIS: ICD-10-CM

## 2019-07-23 DIAGNOSIS — I66.9 CEREBRAL ARTERY OCCLUSION: ICD-10-CM

## 2019-07-23 DIAGNOSIS — E78.00 PURE HYPERCHOLESTEROLEMIA: ICD-10-CM

## 2019-07-23 DIAGNOSIS — I48.91 ATRIAL FIBRILLATION, UNSPECIFIED TYPE (H): ICD-10-CM

## 2019-07-23 DIAGNOSIS — N13.8 BENIGN PROSTATIC HYPERPLASIA WITH URINARY OBSTRUCTION: ICD-10-CM

## 2019-07-23 DIAGNOSIS — I10 ESSENTIAL HYPERTENSION WITH GOAL BLOOD PRESSURE LESS THAN 130/85: ICD-10-CM

## 2019-07-23 LAB
ANION GAP SERPL CALCULATED.3IONS-SCNC: 7 MMOL/L (ref 5–18)
BUN SERPL-MCNC: 19 MG/DL (ref 8–22)
CALCIUM SERPL-MCNC: 8.4 MG/DL (ref 8.5–10.5)
CHLORIDE BLD-SCNC: 109 MMOL/L (ref 98–107)
CO2 SERPL-SCNC: 24 MMOL/L (ref 22–31)
CREAT SERPL-MCNC: 1.05 MG/DL (ref 0.7–1.3)
ERYTHROCYTE [DISTWIDTH] IN BLOOD BY AUTOMATED COUNT: 13.6 % (ref 11–14.5)
FERRITIN SERPL-MCNC: 58 NG/ML (ref 27–300)
GFR SERPL CREATININE-BSD FRML MDRD: >60 ML/MIN/1.73M2
GLUCOSE BLD-MCNC: 149 MG/DL (ref 70–125)
HCT VFR BLD AUTO: 40.2 % (ref 40–54)
HGB BLD-MCNC: 13.4 G/DL (ref 14–18)
INR PPP: 2.9 (ref 0.9–1.1)
IRON SERPL-MCNC: 26 UG/DL (ref 42–175)
MAGNESIUM SERPL-MCNC: 1.8 MG/DL (ref 1.8–2.6)
MCH RBC QN AUTO: 31.9 PG (ref 27–34)
MCHC RBC AUTO-ENTMCNC: 33.3 G/DL (ref 32–36)
MCV RBC AUTO: 96 FL (ref 80–100)
PLATELET # BLD AUTO: 94 THOU/UL (ref 140–440)
PMV BLD AUTO: 8.2 FL (ref 7–10)
POTASSIUM BLD-SCNC: 3.7 MMOL/L (ref 3.5–5)
RBC # BLD AUTO: 4.2 MILL/UL (ref 4.4–6.2)
SODIUM SERPL-SCNC: 140 MMOL/L (ref 136–145)
WBC: 3.7 THOU/UL (ref 4–11)

## 2019-07-24 ENCOUNTER — COMMUNICATION - HEALTHEAST (OUTPATIENT)
Dept: FAMILY MEDICINE | Facility: CLINIC | Age: 69
End: 2019-07-24

## 2019-07-24 ENCOUNTER — COMMUNICATION - HEALTHEAST (OUTPATIENT)
Dept: HOME HEALTH SERVICES | Facility: HOME HEALTH | Age: 69
End: 2019-07-24

## 2019-07-29 ENCOUNTER — RECORDS - HEALTHEAST (OUTPATIENT)
Dept: ADMINISTRATIVE | Facility: OTHER | Age: 69
End: 2019-07-29

## 2019-07-29 ENCOUNTER — AMBULATORY - HEALTHEAST (OUTPATIENT)
Dept: NEUROLOGY | Facility: CLINIC | Age: 69
End: 2019-07-29

## 2019-07-29 DIAGNOSIS — I63.431 CEREBRAL INFARCTION DUE TO EMBOLISM OF RIGHT POSTERIOR CEREBRAL ARTERY (H): ICD-10-CM

## 2019-08-01 ENCOUNTER — AMBULATORY - HEALTHEAST (OUTPATIENT)
Dept: CARDIOLOGY | Facility: CLINIC | Age: 69
End: 2019-08-01

## 2019-08-06 ENCOUNTER — COMMUNICATION - HEALTHEAST (OUTPATIENT)
Dept: FAMILY MEDICINE | Facility: CLINIC | Age: 69
End: 2019-08-06

## 2019-08-06 DIAGNOSIS — I66.9 CEREBRAL ARTERY OCCLUSION: ICD-10-CM

## 2019-08-06 DIAGNOSIS — I48.91 A-FIB (H): ICD-10-CM

## 2019-08-06 DIAGNOSIS — I06.9 AORTIC VALVE DISEASE, RHEUMATIC: ICD-10-CM

## 2019-08-06 DIAGNOSIS — I05.0 MITRAL VALVE STENOSIS, UNSPECIFIED ETIOLOGY: ICD-10-CM

## 2019-08-06 LAB — INR PPP: 1.9 (ref 0.9–1.1)

## 2019-08-07 ENCOUNTER — OFFICE VISIT - HEALTHEAST (OUTPATIENT)
Dept: FAMILY MEDICINE | Facility: CLINIC | Age: 69
End: 2019-08-07

## 2019-08-07 DIAGNOSIS — I48.91 ATRIAL FIBRILLATION, UNSPECIFIED TYPE (H): ICD-10-CM

## 2019-08-07 DIAGNOSIS — R42 VERTIGO: ICD-10-CM

## 2019-08-07 DIAGNOSIS — Z95.2 S/P AVR: ICD-10-CM

## 2019-08-07 DIAGNOSIS — Z98.890 S/P MVR (MITRAL VALVE REPAIR): ICD-10-CM

## 2019-08-07 DIAGNOSIS — R14.1 ABDOMINAL GAS PAIN: ICD-10-CM

## 2019-08-07 DIAGNOSIS — E44.0 MODERATE PROTEIN-CALORIE MALNUTRITION (H): ICD-10-CM

## 2019-08-07 DIAGNOSIS — I61.9 HEMORRHAGIC STROKE (H): ICD-10-CM

## 2019-08-07 DIAGNOSIS — D50.9 IRON DEFICIENCY ANEMIA, UNSPECIFIED IRON DEFICIENCY ANEMIA TYPE: ICD-10-CM

## 2019-08-10 ENCOUNTER — HOSPITAL ENCOUNTER (OUTPATIENT)
Dept: CT IMAGING | Facility: HOSPITAL | Age: 69
Discharge: HOME | End: 2019-08-10
Attending: PSYCHIATRY & NEUROLOGY

## 2019-08-10 DIAGNOSIS — I63.439: ICD-10-CM

## 2019-08-12 ENCOUNTER — COMMUNICATION - HEALTHEAST (OUTPATIENT)
Dept: CARE COORDINATION | Facility: CLINIC | Age: 69
End: 2019-08-12

## 2019-08-13 ENCOUNTER — AMBULATORY - HEALTHEAST (OUTPATIENT)
Dept: FAMILY MEDICINE | Facility: CLINIC | Age: 69
End: 2019-08-13

## 2019-08-13 DIAGNOSIS — L08.9 LOCAL INFECTION OF SKIN AND SUBCUTANEOUS TISSUE: ICD-10-CM

## 2019-08-20 ENCOUNTER — COMMUNICATION - HEALTHEAST (OUTPATIENT)
Dept: FAMILY MEDICINE | Facility: CLINIC | Age: 69
End: 2019-08-20

## 2019-08-20 DIAGNOSIS — I06.9 AORTIC VALVE DISEASE, RHEUMATIC: ICD-10-CM

## 2019-08-20 DIAGNOSIS — I48.91 A-FIB (H): ICD-10-CM

## 2019-08-20 DIAGNOSIS — I05.0 MITRAL VALVE STENOSIS, UNSPECIFIED ETIOLOGY: ICD-10-CM

## 2019-08-20 DIAGNOSIS — I66.9 CEREBRAL ARTERY OCCLUSION: ICD-10-CM

## 2019-08-20 LAB — INR PPP: 2 (ref 0.9–1.1)

## 2019-08-22 ENCOUNTER — COMMUNICATION - HEALTHEAST (OUTPATIENT)
Dept: SCHEDULING | Facility: CLINIC | Age: 69
End: 2019-08-22

## 2019-08-23 ENCOUNTER — COMMUNICATION - HEALTHEAST (OUTPATIENT)
Dept: ANTICOAGULATION | Facility: CLINIC | Age: 69
End: 2019-08-23

## 2019-08-23 ENCOUNTER — COMMUNICATION - HEALTHEAST (OUTPATIENT)
Dept: NURSING | Facility: CLINIC | Age: 69
End: 2019-08-23

## 2019-08-27 ENCOUNTER — COMMUNICATION - HEALTHEAST (OUTPATIENT)
Dept: FAMILY MEDICINE | Facility: CLINIC | Age: 69
End: 2019-08-27

## 2019-08-27 DIAGNOSIS — I66.9 CEREBRAL ARTERY OCCLUSION: ICD-10-CM

## 2019-08-27 DIAGNOSIS — I06.9 AORTIC VALVE DISEASE, RHEUMATIC: ICD-10-CM

## 2019-08-27 DIAGNOSIS — I48.91 A-FIB (H): ICD-10-CM

## 2019-08-27 DIAGNOSIS — I05.0 MITRAL VALVE STENOSIS, UNSPECIFIED ETIOLOGY: ICD-10-CM

## 2019-08-28 ENCOUNTER — COMMUNICATION - HEALTHEAST (OUTPATIENT)
Dept: GERIATRIC MEDICINE | Facility: CLINIC | Age: 69
End: 2019-08-28

## 2019-08-28 ENCOUNTER — OFFICE VISIT - HEALTHEAST (OUTPATIENT)
Dept: CARDIOLOGY | Facility: CLINIC | Age: 69
End: 2019-08-28

## 2019-08-28 DIAGNOSIS — I48.91 ATRIAL FIBRILLATION WITH RVR (H): ICD-10-CM

## 2019-08-28 DIAGNOSIS — I08.0 RHEUMATIC DISEASE OF MITRAL AND AORTIC VALVES: ICD-10-CM

## 2019-08-28 DIAGNOSIS — I50.32 CHRONIC HEART FAILURE WITH PRESERVED EJECTION FRACTION (H): ICD-10-CM

## 2019-08-28 DIAGNOSIS — I48.92 ATRIAL FLUTTER WITH RAPID VENTRICULAR RESPONSE (H): ICD-10-CM

## 2019-08-28 DIAGNOSIS — I10 ESSENTIAL HYPERTENSION WITH GOAL BLOOD PRESSURE LESS THAN 130/85: ICD-10-CM

## 2019-08-28 ASSESSMENT — MIFFLIN-ST. JEOR: SCORE: 1072.44

## 2019-09-03 ENCOUNTER — COMMUNICATION - HEALTHEAST (OUTPATIENT)
Dept: FAMILY MEDICINE | Facility: CLINIC | Age: 69
End: 2019-09-03

## 2019-09-03 DIAGNOSIS — I48.91 A-FIB (H): ICD-10-CM

## 2019-09-03 DIAGNOSIS — I06.9 AORTIC VALVE DISEASE, RHEUMATIC: ICD-10-CM

## 2019-09-03 DIAGNOSIS — I05.0 MITRAL VALVE STENOSIS, UNSPECIFIED ETIOLOGY: ICD-10-CM

## 2019-09-03 DIAGNOSIS — I66.9 CEREBRAL ARTERY OCCLUSION: ICD-10-CM

## 2019-09-03 LAB — INR PPP: 2.1 (ref 0.9–1.1)

## 2019-09-05 ENCOUNTER — RECORDS - HEALTHEAST (OUTPATIENT)
Dept: ADMINISTRATIVE | Facility: OTHER | Age: 69
End: 2019-09-05

## 2019-09-17 ENCOUNTER — OFFICE VISIT - HEALTHEAST (OUTPATIENT)
Dept: CARDIOLOGY | Facility: CLINIC | Age: 69
End: 2019-09-17

## 2019-09-17 ENCOUNTER — COMMUNICATION - HEALTHEAST (OUTPATIENT)
Dept: FAMILY MEDICINE | Facility: CLINIC | Age: 69
End: 2019-09-17

## 2019-09-17 DIAGNOSIS — I48.91 A-FIB (H): ICD-10-CM

## 2019-09-17 DIAGNOSIS — Z95.2 S/P MVR (MITRAL VALVE REPLACEMENT): ICD-10-CM

## 2019-09-17 DIAGNOSIS — I06.9 AORTIC VALVE DISEASE, RHEUMATIC: ICD-10-CM

## 2019-09-17 DIAGNOSIS — F32.89 POSTOPERATIVE DEPRESSION, INITIAL ENCOUNTER: ICD-10-CM

## 2019-09-17 DIAGNOSIS — Z95.2 S/P AVR (AORTIC VALVE REPLACEMENT): ICD-10-CM

## 2019-09-17 DIAGNOSIS — I05.0 MITRAL VALVE STENOSIS, UNSPECIFIED ETIOLOGY: ICD-10-CM

## 2019-09-17 DIAGNOSIS — I66.9 CEREBRAL ARTERY OCCLUSION: ICD-10-CM

## 2019-09-17 DIAGNOSIS — T81.89XA POSTOPERATIVE DEPRESSION, INITIAL ENCOUNTER: ICD-10-CM

## 2019-09-17 LAB — INR PPP: 2.9 (ref 0.9–1.1)

## 2019-09-17 ASSESSMENT — MIFFLIN-ST. JEOR: SCORE: 1073.35

## 2019-09-18 ENCOUNTER — RECORDS - HEALTHEAST (OUTPATIENT)
Dept: ADMINISTRATIVE | Facility: OTHER | Age: 69
End: 2019-09-18

## 2019-09-24 ENCOUNTER — COMMUNICATION - HEALTHEAST (OUTPATIENT)
Dept: FAMILY MEDICINE | Facility: CLINIC | Age: 69
End: 2019-09-24

## 2019-09-24 DIAGNOSIS — D50.9 IRON DEFICIENCY ANEMIA, UNSPECIFIED IRON DEFICIENCY ANEMIA TYPE: ICD-10-CM

## 2019-09-25 ENCOUNTER — RECORDS - HEALTHEAST (OUTPATIENT)
Dept: ADMINISTRATIVE | Facility: OTHER | Age: 69
End: 2019-09-25

## 2019-09-26 ENCOUNTER — COMMUNICATION - HEALTHEAST (OUTPATIENT)
Dept: NURSING | Facility: CLINIC | Age: 69
End: 2019-09-26

## 2019-10-01 ENCOUNTER — COMMUNICATION - HEALTHEAST (OUTPATIENT)
Dept: FAMILY MEDICINE | Facility: CLINIC | Age: 69
End: 2019-10-01

## 2019-10-01 DIAGNOSIS — I05.0 MITRAL VALVE STENOSIS, UNSPECIFIED ETIOLOGY: ICD-10-CM

## 2019-10-01 DIAGNOSIS — I66.9 CEREBRAL ARTERY OCCLUSION: ICD-10-CM

## 2019-10-01 DIAGNOSIS — I06.9 AORTIC VALVE DISEASE, RHEUMATIC: ICD-10-CM

## 2019-10-01 DIAGNOSIS — I48.91 A-FIB (H): ICD-10-CM

## 2019-10-01 LAB — INR PPP: 3.9 (ref 0.9–1.1)

## 2019-10-04 ENCOUNTER — OFFICE VISIT - HEALTHEAST (OUTPATIENT)
Dept: FAMILY MEDICINE | Facility: CLINIC | Age: 69
End: 2019-10-04

## 2019-10-04 DIAGNOSIS — I48.91 ATRIAL FIBRILLATION, UNSPECIFIED TYPE (H): ICD-10-CM

## 2019-10-04 DIAGNOSIS — F32.9 MAJOR DEPRESSIVE DISORDER, REMISSION STATUS UNSPECIFIED, UNSPECIFIED WHETHER RECURRENT: ICD-10-CM

## 2019-10-04 DIAGNOSIS — Z95.2 H/O MITRAL VALVE REPLACEMENT: ICD-10-CM

## 2019-10-04 DIAGNOSIS — Z95.2 S/P AVR: ICD-10-CM

## 2019-10-08 ENCOUNTER — COMMUNICATION - HEALTHEAST (OUTPATIENT)
Dept: FAMILY MEDICINE | Facility: CLINIC | Age: 69
End: 2019-10-08

## 2019-10-08 DIAGNOSIS — I48.91 ATRIAL FIBRILLATION, UNSPECIFIED TYPE (H): ICD-10-CM

## 2019-10-08 DIAGNOSIS — I05.0 MITRAL VALVE STENOSIS, UNSPECIFIED ETIOLOGY: ICD-10-CM

## 2019-10-08 DIAGNOSIS — I06.9 AORTIC VALVE DISEASE, RHEUMATIC: ICD-10-CM

## 2019-10-08 DIAGNOSIS — I66.9 CEREBRAL ARTERY OCCLUSION: ICD-10-CM

## 2019-10-08 LAB — INR PPP: 3.9 (ref 0.9–1.1)

## 2019-10-10 ENCOUNTER — RECORDS - HEALTHEAST (OUTPATIENT)
Dept: ADMINISTRATIVE | Facility: OTHER | Age: 69
End: 2019-10-10

## 2019-10-14 ENCOUNTER — AMBULATORY - HEALTHEAST (OUTPATIENT)
Dept: NURSING | Facility: CLINIC | Age: 69
End: 2019-10-14

## 2019-10-14 ENCOUNTER — RECORDS - HEALTHEAST (OUTPATIENT)
Dept: ADMINISTRATIVE | Facility: OTHER | Age: 69
End: 2019-10-14

## 2019-10-14 ASSESSMENT — ACTIVITIES OF DAILY LIVING (ADL)
DEPENDENT_IADLS:: CLEANING;COOKING;LAUNDRY;SHOPPING;MEAL PREPARATION;MEDICATION MANAGEMENT;MONEY MANAGEMENT;TRANSPORTATION;INCONTINENCE

## 2019-10-15 ENCOUNTER — COMMUNICATION - HEALTHEAST (OUTPATIENT)
Dept: FAMILY MEDICINE | Facility: CLINIC | Age: 69
End: 2019-10-15

## 2019-10-15 DIAGNOSIS — I05.0 MITRAL VALVE STENOSIS, UNSPECIFIED ETIOLOGY: ICD-10-CM

## 2019-10-15 DIAGNOSIS — I66.9 CEREBRAL ARTERY OCCLUSION: ICD-10-CM

## 2019-10-15 DIAGNOSIS — I06.9 AORTIC VALVE DISEASE, RHEUMATIC: ICD-10-CM

## 2019-10-15 LAB — INR PPP: 3.2 (ref 0.9–1.1)

## 2019-10-17 ENCOUNTER — RECORDS - HEALTHEAST (OUTPATIENT)
Dept: ADMINISTRATIVE | Facility: OTHER | Age: 69
End: 2019-10-17

## 2019-10-22 ENCOUNTER — COMMUNICATION - HEALTHEAST (OUTPATIENT)
Dept: FAMILY MEDICINE | Facility: CLINIC | Age: 69
End: 2019-10-22

## 2019-10-22 DIAGNOSIS — I66.9 CEREBRAL ARTERY OCCLUSION: ICD-10-CM

## 2019-10-22 DIAGNOSIS — I48.91 A-FIB (H): ICD-10-CM

## 2019-10-22 DIAGNOSIS — I06.9 AORTIC VALVE DISEASE, RHEUMATIC: ICD-10-CM

## 2019-10-22 DIAGNOSIS — I05.0 MITRAL VALVE STENOSIS, UNSPECIFIED ETIOLOGY: ICD-10-CM

## 2019-10-22 LAB — INR PPP: 3.3 (ref 0.9–1.1)

## 2019-10-23 ENCOUNTER — OFFICE VISIT - HEALTHEAST (OUTPATIENT)
Dept: FAMILY MEDICINE | Facility: CLINIC | Age: 69
End: 2019-10-23

## 2019-10-23 DIAGNOSIS — M62.81 GENERALIZED MUSCLE WEAKNESS: ICD-10-CM

## 2019-10-23 DIAGNOSIS — I06.9 AORTIC VALVE DISEASE, RHEUMATIC: ICD-10-CM

## 2019-10-23 DIAGNOSIS — F32.9 MAJOR DEPRESSIVE DISORDER, REMISSION STATUS UNSPECIFIED, UNSPECIFIED WHETHER RECURRENT: ICD-10-CM

## 2019-10-23 DIAGNOSIS — E44.0 MODERATE PROTEIN-CALORIE MALNUTRITION (H): ICD-10-CM

## 2019-10-23 DIAGNOSIS — I48.91 ATRIAL FIBRILLATION, UNSPECIFIED TYPE (H): ICD-10-CM

## 2019-10-23 DIAGNOSIS — I61.9 HEMORRHAGIC STROKE (H): ICD-10-CM

## 2019-10-23 DIAGNOSIS — I05.0 MITRAL VALVE STENOSIS, UNSPECIFIED ETIOLOGY: ICD-10-CM

## 2019-10-28 ENCOUNTER — COMMUNICATION - HEALTHEAST (OUTPATIENT)
Dept: GERIATRIC MEDICINE | Facility: CLINIC | Age: 69
End: 2019-10-28

## 2019-10-29 ENCOUNTER — COMMUNICATION - HEALTHEAST (OUTPATIENT)
Dept: FAMILY MEDICINE | Facility: CLINIC | Age: 69
End: 2019-10-29

## 2019-10-30 ENCOUNTER — COMMUNICATION - HEALTHEAST (OUTPATIENT)
Dept: FAMILY MEDICINE | Facility: CLINIC | Age: 69
End: 2019-10-30

## 2019-10-30 ENCOUNTER — COMMUNICATION - HEALTHEAST (OUTPATIENT)
Dept: NURSING | Facility: CLINIC | Age: 69
End: 2019-10-30

## 2019-10-30 DIAGNOSIS — I48.91 ATRIAL FIBRILLATION, UNSPECIFIED TYPE (H): ICD-10-CM

## 2019-10-30 DIAGNOSIS — I66.9 CEREBRAL ARTERY OCCLUSION: ICD-10-CM

## 2019-10-30 DIAGNOSIS — I06.9 AORTIC VALVE DISEASE, RHEUMATIC: ICD-10-CM

## 2019-10-30 DIAGNOSIS — I05.0 MITRAL VALVE STENOSIS, UNSPECIFIED ETIOLOGY: ICD-10-CM

## 2019-10-30 LAB — INR PPP: 4.4 (ref 0.9–1.1)

## 2019-11-04 ENCOUNTER — OFFICE VISIT - HEALTHEAST (OUTPATIENT)
Dept: CARDIOLOGY | Facility: CLINIC | Age: 69
End: 2019-11-04

## 2019-11-04 DIAGNOSIS — I48.20 CHRONIC ATRIAL FIBRILLATION (H): ICD-10-CM

## 2019-11-04 DIAGNOSIS — I25.10 MILD CAD: ICD-10-CM

## 2019-11-04 LAB
ALBUMIN SERPL-MCNC: 3 G/DL (ref 3.5–5)
ALP SERPL-CCNC: 96 U/L (ref 45–120)
ALT SERPL W P-5'-P-CCNC: 236 U/L (ref 0–45)
AST SERPL W P-5'-P-CCNC: 149 U/L (ref 0–40)
BILIRUB DIRECT SERPL-MCNC: 0.3 MG/DL
BILIRUB SERPL-MCNC: 0.8 MG/DL (ref 0–1)
BNP SERPL-MCNC: 501 PG/ML (ref 0–64)
CHOLEST SERPL-MCNC: 129 MG/DL
FASTING STATUS PATIENT QL REPORTED: NO
HDLC SERPL-MCNC: 40 MG/DL
LDLC SERPL CALC-MCNC: 70 MG/DL
PROT SERPL-MCNC: 7.2 G/DL (ref 6–8)
TRIGL SERPL-MCNC: 94 MG/DL
TSH SERPL DL<=0.005 MIU/L-ACNC: 13.86 UIU/ML (ref 0.3–5)

## 2019-11-04 ASSESSMENT — MIFFLIN-ST. JEOR: SCORE: 1076.98

## 2019-11-05 ENCOUNTER — COMMUNICATION - HEALTHEAST (OUTPATIENT)
Dept: FAMILY MEDICINE | Facility: CLINIC | Age: 69
End: 2019-11-05

## 2019-11-05 DIAGNOSIS — I05.0 MITRAL VALVE STENOSIS, UNSPECIFIED ETIOLOGY: ICD-10-CM

## 2019-11-05 DIAGNOSIS — I48.91 A-FIB (H): ICD-10-CM

## 2019-11-05 DIAGNOSIS — I66.9 CEREBRAL ARTERY OCCLUSION: ICD-10-CM

## 2019-11-05 DIAGNOSIS — I06.9 AORTIC VALVE DISEASE, RHEUMATIC: ICD-10-CM

## 2019-11-05 LAB
ATRIAL RATE - MUSE: 94 BPM
DIASTOLIC BLOOD PRESSURE - MUSE: NORMAL
INR PPP: 2.7 (ref 0.9–1.1)
INTERPRETATION ECG - MUSE: NORMAL
P AXIS - MUSE: NORMAL
PR INTERVAL - MUSE: NORMAL
QRS DURATION - MUSE: 106 MS
QT - MUSE: 454 MS
QTC - MUSE: 567 MS
R AXIS - MUSE: 19 DEGREES
SYSTOLIC BLOOD PRESSURE - MUSE: NORMAL
T AXIS - MUSE: 67 DEGREES
VENTRICULAR RATE- MUSE: 94 BPM

## 2019-11-08 ENCOUNTER — COMMUNICATION - HEALTHEAST (OUTPATIENT)
Dept: CARDIOLOGY | Facility: CLINIC | Age: 69
End: 2019-11-08

## 2019-11-08 DIAGNOSIS — Z51.81 ENCOUNTER FOR THERAPEUTIC DRUG MONITORING: ICD-10-CM

## 2019-11-08 DIAGNOSIS — R94.31 PROLONGED QT INTERVAL: ICD-10-CM

## 2019-11-10 ENCOUNTER — COMMUNICATION - HEALTHEAST (OUTPATIENT)
Dept: FAMILY MEDICINE | Facility: CLINIC | Age: 69
End: 2019-11-10

## 2019-11-10 DIAGNOSIS — I10 ESSENTIAL HYPERTENSION WITH GOAL BLOOD PRESSURE LESS THAN 130/85: ICD-10-CM

## 2019-11-10 DIAGNOSIS — E03.9 HYPOTHYROIDISM: ICD-10-CM

## 2019-11-10 DIAGNOSIS — R79.0 LOW MAGNESIUM LEVEL: ICD-10-CM

## 2019-11-12 ENCOUNTER — HOSPITAL ENCOUNTER (OUTPATIENT)
Dept: CARDIOLOGY | Facility: HOSPITAL | Age: 69
Discharge: HOME | End: 2019-11-12
Attending: INTERNAL MEDICINE

## 2019-11-12 ENCOUNTER — COMMUNICATION - HEALTHEAST (OUTPATIENT)
Dept: FAMILY MEDICINE | Facility: CLINIC | Age: 69
End: 2019-11-12

## 2019-11-12 ENCOUNTER — COMMUNICATION - HEALTHEAST (OUTPATIENT)
Dept: NURSING | Facility: CLINIC | Age: 69
End: 2019-11-12

## 2019-11-12 DIAGNOSIS — I48.91 A-FIB (H): ICD-10-CM

## 2019-11-12 DIAGNOSIS — I48.20 CHRONIC ATRIAL FIBRILLATION (H): ICD-10-CM

## 2019-11-12 DIAGNOSIS — I05.0 MITRAL VALVE STENOSIS, UNSPECIFIED ETIOLOGY: ICD-10-CM

## 2019-11-12 DIAGNOSIS — I06.9 AORTIC VALVE DISEASE, RHEUMATIC: ICD-10-CM

## 2019-11-12 DIAGNOSIS — I66.9 CEREBRAL ARTERY OCCLUSION: ICD-10-CM

## 2019-11-12 LAB — INR PPP: 2.6 (ref 0.9–1.1)

## 2019-11-13 ENCOUNTER — RECORDS - HEALTHEAST (OUTPATIENT)
Dept: ADMINISTRATIVE | Facility: OTHER | Age: 69
End: 2019-11-13

## 2019-11-14 ENCOUNTER — AMBULATORY - HEALTHEAST (OUTPATIENT)
Dept: FAMILY MEDICINE | Facility: CLINIC | Age: 69
End: 2019-11-14

## 2019-11-14 ENCOUNTER — HOME CARE/HOSPICE - HEALTHEAST (OUTPATIENT)
Dept: HOME HEALTH SERVICES | Facility: HOME HEALTH | Age: 69
End: 2019-11-14

## 2019-11-14 DIAGNOSIS — I61.9 HEMORRHAGIC STROKE (H): ICD-10-CM

## 2019-11-15 ENCOUNTER — COMMUNICATION - HEALTHEAST (OUTPATIENT)
Dept: HOME HEALTH SERVICES | Facility: HOME HEALTH | Age: 69
End: 2019-11-15

## 2019-11-15 ENCOUNTER — RECORDS - HEALTHEAST (OUTPATIENT)
Dept: ADMINISTRATIVE | Facility: OTHER | Age: 69
End: 2019-11-15

## 2019-11-17 ENCOUNTER — HOME CARE/HOSPICE - HEALTHEAST (OUTPATIENT)
Dept: HOME HEALTH SERVICES | Facility: HOME HEALTH | Age: 69
End: 2019-11-17

## 2019-11-17 ENCOUNTER — COMMUNICATION - HEALTHEAST (OUTPATIENT)
Dept: PHYSICAL THERAPY | Age: 69
End: 2019-11-17

## 2019-11-18 ENCOUNTER — HOME CARE/HOSPICE - HEALTHEAST (OUTPATIENT)
Dept: HOME HEALTH SERVICES | Facility: HOME HEALTH | Age: 69
End: 2019-11-18

## 2019-11-18 ENCOUNTER — COMMUNICATION - HEALTHEAST (OUTPATIENT)
Dept: CARDIOLOGY | Facility: CLINIC | Age: 69
End: 2019-11-18

## 2019-11-19 ENCOUNTER — COMMUNICATION - HEALTHEAST (OUTPATIENT)
Dept: FAMILY MEDICINE | Facility: CLINIC | Age: 69
End: 2019-11-19

## 2019-11-19 DIAGNOSIS — I66.9 CEREBRAL ARTERY OCCLUSION: ICD-10-CM

## 2019-11-19 DIAGNOSIS — I06.9 AORTIC VALVE DISEASE, RHEUMATIC: ICD-10-CM

## 2019-11-19 DIAGNOSIS — I05.0 MITRAL VALVE STENOSIS, UNSPECIFIED ETIOLOGY: ICD-10-CM

## 2019-11-19 DIAGNOSIS — I48.91 A-FIB (H): ICD-10-CM

## 2019-11-19 LAB — INR PPP: 2.2 (ref 0.9–1.1)

## 2019-11-20 ENCOUNTER — COMMUNICATION - HEALTHEAST (OUTPATIENT)
Dept: NURSING | Facility: CLINIC | Age: 69
End: 2019-11-20

## 2019-11-21 ENCOUNTER — HOME CARE/HOSPICE - HEALTHEAST (OUTPATIENT)
Dept: HOME HEALTH SERVICES | Facility: HOME HEALTH | Age: 69
End: 2019-11-21

## 2019-11-21 ENCOUNTER — COMMUNICATION - HEALTHEAST (OUTPATIENT)
Dept: FAMILY MEDICINE | Facility: CLINIC | Age: 69
End: 2019-11-21

## 2019-11-21 DIAGNOSIS — R52 PAIN: ICD-10-CM

## 2019-11-21 DIAGNOSIS — K59.00 CONSTIPATION: ICD-10-CM

## 2019-11-25 ENCOUNTER — COMMUNICATION - HEALTHEAST (OUTPATIENT)
Dept: FAMILY MEDICINE | Facility: CLINIC | Age: 69
End: 2019-11-25

## 2019-11-25 ENCOUNTER — HOME CARE/HOSPICE - HEALTHEAST (OUTPATIENT)
Dept: HOME HEALTH SERVICES | Facility: HOME HEALTH | Age: 69
End: 2019-11-25

## 2019-11-25 DIAGNOSIS — I06.9 AORTIC VALVE DISEASE, RHEUMATIC: ICD-10-CM

## 2019-11-25 DIAGNOSIS — I48.91 A-FIB (H): ICD-10-CM

## 2019-11-25 DIAGNOSIS — I66.9 CEREBRAL ARTERY OCCLUSION: ICD-10-CM

## 2019-11-25 DIAGNOSIS — I05.0 MITRAL VALVE STENOSIS, UNSPECIFIED ETIOLOGY: ICD-10-CM

## 2019-11-25 LAB — INR PPP: 2.1 (ref 0.9–1.1)

## 2019-11-26 ENCOUNTER — COMMUNICATION - HEALTHEAST (OUTPATIENT)
Dept: HOME HEALTH SERVICES | Facility: HOME HEALTH | Age: 69
End: 2019-11-26

## 2019-11-29 ENCOUNTER — HOME CARE/HOSPICE - HEALTHEAST (OUTPATIENT)
Dept: HOME HEALTH SERVICES | Facility: HOME HEALTH | Age: 69
End: 2019-11-29

## 2019-12-02 ENCOUNTER — HOME CARE/HOSPICE - HEALTHEAST (OUTPATIENT)
Dept: HOME HEALTH SERVICES | Facility: HOME HEALTH | Age: 69
End: 2019-12-02

## 2019-12-03 ENCOUNTER — COMMUNICATION - HEALTHEAST (OUTPATIENT)
Dept: FAMILY MEDICINE | Facility: CLINIC | Age: 69
End: 2019-12-03

## 2019-12-03 ENCOUNTER — COMMUNICATION - HEALTHEAST (OUTPATIENT)
Dept: PHYSICAL THERAPY | Age: 69
End: 2019-12-03

## 2019-12-03 ENCOUNTER — HOME CARE/HOSPICE - HEALTHEAST (OUTPATIENT)
Dept: HOME HEALTH SERVICES | Facility: HOME HEALTH | Age: 69
End: 2019-12-03

## 2019-12-03 DIAGNOSIS — I06.9 AORTIC VALVE DISEASE, RHEUMATIC: ICD-10-CM

## 2019-12-03 DIAGNOSIS — I48.91 A-FIB (H): ICD-10-CM

## 2019-12-03 DIAGNOSIS — I05.0 MITRAL VALVE STENOSIS, UNSPECIFIED ETIOLOGY: ICD-10-CM

## 2019-12-03 DIAGNOSIS — I66.9 CEREBRAL ARTERY OCCLUSION: ICD-10-CM

## 2019-12-03 LAB — INR PPP: 2.2 (ref 0.9–1.1)

## 2019-12-05 ENCOUNTER — RECORDS - HEALTHEAST (OUTPATIENT)
Dept: ADMINISTRATIVE | Facility: OTHER | Age: 69
End: 2019-12-05

## 2019-12-05 ENCOUNTER — COMMUNICATION - HEALTHEAST (OUTPATIENT)
Dept: CARDIOLOGY | Facility: CLINIC | Age: 69
End: 2019-12-05

## 2019-12-05 DIAGNOSIS — I48.0 PAROXYSMAL ATRIAL FIBRILLATION (H): ICD-10-CM

## 2019-12-05 DIAGNOSIS — I48.20 CHRONIC ATRIAL FIBRILLATION (H): ICD-10-CM

## 2019-12-05 DIAGNOSIS — Z92.29 HISTORY OF AMIODARONE THERAPY: ICD-10-CM

## 2019-12-06 ENCOUNTER — HOME CARE/HOSPICE - HEALTHEAST (OUTPATIENT)
Dept: HOME HEALTH SERVICES | Facility: HOME HEALTH | Age: 69
End: 2019-12-06

## 2019-12-09 ENCOUNTER — HOME CARE/HOSPICE - HEALTHEAST (OUTPATIENT)
Dept: HOME HEALTH SERVICES | Facility: HOME HEALTH | Age: 69
End: 2019-12-09

## 2019-12-11 ENCOUNTER — COMMUNICATION - HEALTHEAST (OUTPATIENT)
Dept: FAMILY MEDICINE | Facility: CLINIC | Age: 69
End: 2019-12-11

## 2019-12-11 ENCOUNTER — HOME CARE/HOSPICE - HEALTHEAST (OUTPATIENT)
Dept: HOME HEALTH SERVICES | Facility: HOME HEALTH | Age: 69
End: 2019-12-11

## 2019-12-11 DIAGNOSIS — I10 HYPERTENSION: ICD-10-CM

## 2019-12-12 ENCOUNTER — COMMUNICATION - HEALTHEAST (OUTPATIENT)
Dept: ANTICOAGULATION | Facility: CLINIC | Age: 69
End: 2019-12-12

## 2019-12-12 ENCOUNTER — HOME CARE/HOSPICE - HEALTHEAST (OUTPATIENT)
Dept: HOME HEALTH SERVICES | Facility: HOME HEALTH | Age: 69
End: 2019-12-12

## 2019-12-12 ENCOUNTER — COMMUNICATION - HEALTHEAST (OUTPATIENT)
Dept: HOME HEALTH SERVICES | Facility: HOME HEALTH | Age: 69
End: 2019-12-12

## 2019-12-12 DIAGNOSIS — I06.9 AORTIC VALVE DISEASE, RHEUMATIC: ICD-10-CM

## 2019-12-12 DIAGNOSIS — Z98.890 S/P MVR (MITRAL VALVE REPAIR): ICD-10-CM

## 2019-12-12 DIAGNOSIS — I05.0 MITRAL VALVE STENOSIS, UNSPECIFIED ETIOLOGY: ICD-10-CM

## 2019-12-12 DIAGNOSIS — I48.91 ATRIAL FIBRILLATION WITH RVR (H): ICD-10-CM

## 2019-12-12 DIAGNOSIS — Z95.2 S/P AVR: ICD-10-CM

## 2019-12-12 DIAGNOSIS — D68.9 COAGULOPATHY (H): ICD-10-CM

## 2019-12-12 DIAGNOSIS — I48.91 A-FIB (H): ICD-10-CM

## 2019-12-12 DIAGNOSIS — I48.92 ATRIAL FLUTTER WITH RAPID VENTRICULAR RESPONSE (H): ICD-10-CM

## 2019-12-13 ENCOUNTER — AMBULATORY - HEALTHEAST (OUTPATIENT)
Dept: CARDIOLOGY | Facility: CLINIC | Age: 69
End: 2019-12-13

## 2019-12-13 DIAGNOSIS — I48.0 PAROXYSMAL ATRIAL FIBRILLATION (H): ICD-10-CM

## 2019-12-13 DIAGNOSIS — I48.91 A-FIB (H): ICD-10-CM

## 2019-12-13 DIAGNOSIS — R42 LIGHTHEADEDNESS: ICD-10-CM

## 2019-12-13 DIAGNOSIS — Z92.29 HISTORY OF AMIODARONE THERAPY: ICD-10-CM

## 2019-12-13 LAB
ATRIAL RATE - MUSE: 46 BPM
DIASTOLIC BLOOD PRESSURE - MUSE: NORMAL
INTERPRETATION ECG - MUSE: NORMAL
P AXIS - MUSE: 52 DEGREES
PR INTERVAL - MUSE: 194 MS
QRS DURATION - MUSE: 92 MS
QT - MUSE: 564 MS
QTC - MUSE: 493 MS
R AXIS - MUSE: 40 DEGREES
SYSTOLIC BLOOD PRESSURE - MUSE: NORMAL
T AXIS - MUSE: 58 DEGREES
VENTRICULAR RATE- MUSE: 46 BPM

## 2019-12-13 ASSESSMENT — MIFFLIN-ST. JEOR: SCORE: 1076.98

## 2019-12-16 ENCOUNTER — HOME CARE/HOSPICE - HEALTHEAST (OUTPATIENT)
Dept: HOME HEALTH SERVICES | Facility: HOME HEALTH | Age: 69
End: 2019-12-16

## 2019-12-17 ENCOUNTER — COMMUNICATION - HEALTHEAST (OUTPATIENT)
Dept: FAMILY MEDICINE | Facility: CLINIC | Age: 69
End: 2019-12-17

## 2019-12-17 DIAGNOSIS — I48.91 A-FIB (H): ICD-10-CM

## 2019-12-17 DIAGNOSIS — I05.0 MITRAL VALVE STENOSIS, UNSPECIFIED ETIOLOGY: ICD-10-CM

## 2019-12-17 DIAGNOSIS — I66.9 CEREBRAL ARTERY OCCLUSION: ICD-10-CM

## 2019-12-17 DIAGNOSIS — I06.9 AORTIC VALVE DISEASE, RHEUMATIC: ICD-10-CM

## 2019-12-17 LAB — INR PPP: 1.7 (ref 0.9–1.1)

## 2019-12-18 ENCOUNTER — HOME CARE/HOSPICE - HEALTHEAST (OUTPATIENT)
Dept: HOME HEALTH SERVICES | Facility: HOME HEALTH | Age: 69
End: 2019-12-18

## 2019-12-19 ENCOUNTER — HOME CARE/HOSPICE - HEALTHEAST (OUTPATIENT)
Dept: HOME HEALTH SERVICES | Facility: HOME HEALTH | Age: 69
End: 2019-12-19

## 2019-12-20 ENCOUNTER — COMMUNICATION - HEALTHEAST (OUTPATIENT)
Dept: CARDIOLOGY | Facility: CLINIC | Age: 69
End: 2019-12-20

## 2019-12-20 ENCOUNTER — HOSPITAL ENCOUNTER (OUTPATIENT)
Dept: CARDIOLOGY | Facility: HOSPITAL | Age: 69
Discharge: HOME | End: 2019-12-20
Attending: INTERNAL MEDICINE

## 2019-12-20 DIAGNOSIS — I48.0 PAROXYSMAL ATRIAL FIBRILLATION (H): ICD-10-CM

## 2019-12-20 DIAGNOSIS — Z92.29 HISTORY OF AMIODARONE THERAPY: ICD-10-CM

## 2019-12-23 ENCOUNTER — AMBULATORY - HEALTHEAST (OUTPATIENT)
Dept: LAB | Facility: HOSPITAL | Age: 69
End: 2019-12-23

## 2019-12-23 ENCOUNTER — COMMUNICATION - HEALTHEAST (OUTPATIENT)
Dept: FAMILY MEDICINE | Facility: CLINIC | Age: 69
End: 2019-12-23

## 2019-12-23 ENCOUNTER — COMMUNICATION - HEALTHEAST (OUTPATIENT)
Dept: NURSING | Facility: CLINIC | Age: 69
End: 2019-12-23

## 2019-12-23 DIAGNOSIS — I48.91 A-FIB (H): ICD-10-CM

## 2019-12-23 DIAGNOSIS — I06.9 AORTIC VALVE DISEASE, RHEUMATIC: ICD-10-CM

## 2019-12-23 DIAGNOSIS — I05.0 MITRAL VALVE STENOSIS, UNSPECIFIED ETIOLOGY: ICD-10-CM

## 2019-12-23 DIAGNOSIS — I66.9 CEREBRAL ARTERY OCCLUSION: ICD-10-CM

## 2019-12-23 DIAGNOSIS — Z51.81 ENCOUNTER FOR THERAPEUTIC DRUG MONITORING: ICD-10-CM

## 2019-12-23 LAB
ALBUMIN SERPL-MCNC: 3.1 G/DL (ref 3.5–5)
ALP SERPL-CCNC: 87 U/L (ref 45–120)
ALT SERPL W P-5'-P-CCNC: 123 U/L (ref 0–45)
AST SERPL W P-5'-P-CCNC: 76 U/L (ref 0–40)
BILIRUB DIRECT SERPL-MCNC: 0.2 MG/DL
BILIRUB SERPL-MCNC: 0.5 MG/DL (ref 0–1)
INR PPP: 1.7 (ref 0.9–1.1)
PROT SERPL-MCNC: 7.2 G/DL (ref 6–8)
TSH SERPL DL<=0.005 MIU/L-ACNC: 5.5 UIU/ML (ref 0.3–5)

## 2019-12-27 ENCOUNTER — AMBULATORY - HEALTHEAST (OUTPATIENT)
Dept: CARDIOLOGY | Facility: CLINIC | Age: 69
End: 2019-12-27

## 2019-12-27 ENCOUNTER — COMMUNICATION - HEALTHEAST (OUTPATIENT)
Dept: CARDIOLOGY | Facility: CLINIC | Age: 69
End: 2019-12-27

## 2019-12-27 DIAGNOSIS — I48.92 ATRIAL FLUTTER WITH RAPID VENTRICULAR RESPONSE (H): ICD-10-CM

## 2019-12-30 ENCOUNTER — COMMUNICATION - HEALTHEAST (OUTPATIENT)
Dept: FAMILY MEDICINE | Facility: CLINIC | Age: 69
End: 2019-12-30

## 2019-12-30 DIAGNOSIS — I06.9 AORTIC VALVE DISEASE, RHEUMATIC: ICD-10-CM

## 2019-12-30 DIAGNOSIS — I10 HYPERTENSION: ICD-10-CM

## 2019-12-30 DIAGNOSIS — I05.0 MITRAL VALVE STENOSIS, UNSPECIFIED ETIOLOGY: ICD-10-CM

## 2019-12-30 DIAGNOSIS — I48.91 A-FIB (H): ICD-10-CM

## 2019-12-30 DIAGNOSIS — I66.9 CEREBRAL ARTERY OCCLUSION: ICD-10-CM

## 2019-12-30 DIAGNOSIS — F32.9 MAJOR DEPRESSIVE DISORDER, REMISSION STATUS UNSPECIFIED, UNSPECIFIED WHETHER RECURRENT: ICD-10-CM

## 2019-12-30 LAB — INR PPP: 2 (ref 0.9–1.1)

## 2020-01-01 ENCOUNTER — RECORDS - HEALTHEAST (OUTPATIENT)
Dept: ADMINISTRATIVE | Facility: OTHER | Age: 70
End: 2020-01-01

## 2020-01-02 ENCOUNTER — RECORDS - HEALTHEAST (OUTPATIENT)
Dept: ADMINISTRATIVE | Facility: OTHER | Age: 70
End: 2020-01-02

## 2020-01-03 ENCOUNTER — COMMUNICATION - HEALTHEAST (OUTPATIENT)
Dept: NURSING | Facility: CLINIC | Age: 70
End: 2020-01-03

## 2020-01-07 ENCOUNTER — COMMUNICATION - HEALTHEAST (OUTPATIENT)
Dept: FAMILY MEDICINE | Facility: CLINIC | Age: 70
End: 2020-01-07

## 2020-01-07 DIAGNOSIS — I05.0 MITRAL VALVE STENOSIS, UNSPECIFIED ETIOLOGY: ICD-10-CM

## 2020-01-07 DIAGNOSIS — N13.8 BENIGN PROSTATIC HYPERPLASIA WITH URINARY OBSTRUCTION: ICD-10-CM

## 2020-01-07 DIAGNOSIS — N40.1 BENIGN PROSTATIC HYPERPLASIA WITH URINARY OBSTRUCTION: ICD-10-CM

## 2020-01-07 DIAGNOSIS — I48.91 A-FIB (H): ICD-10-CM

## 2020-01-07 DIAGNOSIS — I06.9 AORTIC VALVE DISEASE, RHEUMATIC: ICD-10-CM

## 2020-01-07 DIAGNOSIS — I66.9 CEREBRAL ARTERY OCCLUSION: ICD-10-CM

## 2020-01-07 LAB — INR PPP: 2.6 (ref 0.9–1.1)

## 2020-01-09 ENCOUNTER — COMMUNICATION - HEALTHEAST (OUTPATIENT)
Dept: FAMILY MEDICINE | Facility: CLINIC | Age: 70
End: 2020-01-09

## 2020-01-09 DIAGNOSIS — D50.9 IRON DEFICIENCY ANEMIA, UNSPECIFIED IRON DEFICIENCY ANEMIA TYPE: ICD-10-CM

## 2020-01-16 ENCOUNTER — RECORDS - HEALTHEAST (OUTPATIENT)
Dept: ADMINISTRATIVE | Facility: OTHER | Age: 70
End: 2020-01-16

## 2020-01-21 ENCOUNTER — COMMUNICATION - HEALTHEAST (OUTPATIENT)
Dept: FAMILY MEDICINE | Facility: CLINIC | Age: 70
End: 2020-01-21

## 2020-01-21 DIAGNOSIS — I06.9 AORTIC VALVE DISEASE, RHEUMATIC: ICD-10-CM

## 2020-01-21 DIAGNOSIS — I48.91 A-FIB (H): ICD-10-CM

## 2020-01-21 DIAGNOSIS — I05.0 MITRAL VALVE STENOSIS, UNSPECIFIED ETIOLOGY: ICD-10-CM

## 2020-01-21 DIAGNOSIS — I66.9 CEREBRAL ARTERY OCCLUSION: ICD-10-CM

## 2020-01-21 LAB — INR PPP: 1.7 (ref 0.9–1.1)

## 2020-01-28 ENCOUNTER — COMMUNICATION - HEALTHEAST (OUTPATIENT)
Dept: FAMILY MEDICINE | Facility: CLINIC | Age: 70
End: 2020-01-28

## 2020-01-28 DIAGNOSIS — I05.0 MITRAL VALVE STENOSIS, UNSPECIFIED ETIOLOGY: ICD-10-CM

## 2020-01-28 DIAGNOSIS — I48.91 A-FIB (H): ICD-10-CM

## 2020-01-28 DIAGNOSIS — I06.9 AORTIC VALVE DISEASE, RHEUMATIC: ICD-10-CM

## 2020-01-28 DIAGNOSIS — I66.9 CEREBRAL ARTERY OCCLUSION: ICD-10-CM

## 2020-01-28 LAB — INR PPP: 1.7 (ref 0.9–1.1)

## 2020-01-29 ENCOUNTER — RECORDS - HEALTHEAST (OUTPATIENT)
Dept: ADMINISTRATIVE | Facility: OTHER | Age: 70
End: 2020-01-29

## 2020-01-29 ENCOUNTER — OFFICE VISIT - HEALTHEAST (OUTPATIENT)
Dept: CARDIOLOGY | Facility: CLINIC | Age: 70
End: 2020-01-29

## 2020-01-29 DIAGNOSIS — Z51.81 ENCOUNTER FOR THERAPEUTIC DRUG MONITORING: ICD-10-CM

## 2020-01-29 LAB — TSH SERPL DL<=0.005 MIU/L-ACNC: 5.82 UIU/ML (ref 0.3–5)

## 2020-01-29 ASSESSMENT — MIFFLIN-ST. JEOR: SCORE: 1189.02

## 2020-02-04 ENCOUNTER — COMMUNICATION - HEALTHEAST (OUTPATIENT)
Dept: FAMILY MEDICINE | Facility: CLINIC | Age: 70
End: 2020-02-04

## 2020-02-04 ENCOUNTER — AMBULATORY - HEALTHEAST (OUTPATIENT)
Dept: CARDIOLOGY | Facility: CLINIC | Age: 70
End: 2020-02-04

## 2020-02-04 DIAGNOSIS — I66.9 CEREBRAL ARTERY OCCLUSION: ICD-10-CM

## 2020-02-04 DIAGNOSIS — I06.9 AORTIC VALVE DISEASE, RHEUMATIC: ICD-10-CM

## 2020-02-04 DIAGNOSIS — I05.0 MITRAL VALVE STENOSIS, UNSPECIFIED ETIOLOGY: ICD-10-CM

## 2020-02-04 DIAGNOSIS — R79.89 ELEVATED LFTS: ICD-10-CM

## 2020-02-04 DIAGNOSIS — I48.91 A-FIB (H): ICD-10-CM

## 2020-02-04 LAB — INR PPP: 1.7 (ref 0.9–1.1)

## 2020-02-06 ENCOUNTER — HOME CARE/HOSPICE - HEALTHEAST (OUTPATIENT)
Dept: HOME HEALTH SERVICES | Facility: HOME HEALTH | Age: 70
End: 2020-02-06

## 2020-02-06 ENCOUNTER — OFFICE VISIT - HEALTHEAST (OUTPATIENT)
Dept: FAMILY MEDICINE | Facility: CLINIC | Age: 70
End: 2020-02-06

## 2020-02-06 DIAGNOSIS — R53.81 PHYSICAL DECONDITIONING: ICD-10-CM

## 2020-02-06 DIAGNOSIS — E83.42 HYPOMAGNESEMIA: ICD-10-CM

## 2020-02-06 DIAGNOSIS — I48.0 PAROXYSMAL ATRIAL FIBRILLATION (H): ICD-10-CM

## 2020-02-06 DIAGNOSIS — Z95.2 S/P AVR: ICD-10-CM

## 2020-02-06 DIAGNOSIS — R79.89 ELEVATED LFTS: ICD-10-CM

## 2020-02-06 DIAGNOSIS — E44.0 MODERATE PROTEIN-CALORIE MALNUTRITION (H): ICD-10-CM

## 2020-02-06 DIAGNOSIS — Z98.890 S/P MVR (MITRAL VALVE REPAIR): ICD-10-CM

## 2020-02-06 DIAGNOSIS — I50.32 CHRONIC HEART FAILURE WITH PRESERVED EJECTION FRACTION (H): ICD-10-CM

## 2020-02-06 DIAGNOSIS — I63.9 CEREBROVASCULAR ACCIDENT (CVA), UNSPECIFIED MECHANISM (H): ICD-10-CM

## 2020-02-06 DIAGNOSIS — Z43.1 ENCOUNTER FOR ATTENTION TO GASTROSTOMY (H): ICD-10-CM

## 2020-02-06 DIAGNOSIS — G81.94 LEFT HEMIPARESIS (H): ICD-10-CM

## 2020-02-06 DIAGNOSIS — E03.9 HYPOTHYROIDISM, UNSPECIFIED TYPE: ICD-10-CM

## 2020-02-06 LAB
ALBUMIN SERPL-MCNC: 3.2 G/DL (ref 3.5–5)
ALP SERPL-CCNC: 84 U/L (ref 45–120)
ALT SERPL W P-5'-P-CCNC: 58 U/L (ref 0–45)
AST SERPL W P-5'-P-CCNC: 42 U/L (ref 0–40)
BILIRUB DIRECT SERPL-MCNC: 0.2 MG/DL
BILIRUB SERPL-MCNC: 0.5 MG/DL (ref 0–1)
MAGNESIUM SERPL-MCNC: 1.8 MG/DL (ref 1.8–2.6)
PROT SERPL-MCNC: 7.2 G/DL (ref 6–8)

## 2020-02-06 ASSESSMENT — PATIENT HEALTH QUESTIONNAIRE - PHQ9: SUM OF ALL RESPONSES TO PHQ QUESTIONS 1-9: 11

## 2020-02-09 ENCOUNTER — COMMUNICATION - HEALTHEAST (OUTPATIENT)
Dept: FAMILY MEDICINE | Facility: CLINIC | Age: 70
End: 2020-02-09

## 2020-02-09 DIAGNOSIS — E78.00 PURE HYPERCHOLESTEROLEMIA: ICD-10-CM

## 2020-02-09 DIAGNOSIS — I25.10 ATHEROSCLEROSIS OF NATIVE CORONARY ARTERY OF NATIVE HEART WITHOUT ANGINA PECTORIS: ICD-10-CM

## 2020-02-10 ENCOUNTER — COMMUNICATION - HEALTHEAST (OUTPATIENT)
Dept: FAMILY MEDICINE | Facility: CLINIC | Age: 70
End: 2020-02-10

## 2020-02-10 ENCOUNTER — OFFICE VISIT - HEALTHEAST (OUTPATIENT)
Dept: SURGERY | Facility: CLINIC | Age: 70
End: 2020-02-10

## 2020-02-10 ENCOUNTER — COMMUNICATION - HEALTHEAST (OUTPATIENT)
Dept: HOME HEALTH SERVICES | Facility: HOME HEALTH | Age: 70
End: 2020-02-10

## 2020-02-10 DIAGNOSIS — I63.9 CEREBROVASCULAR ACCIDENT (CVA), UNSPECIFIED MECHANISM (H): ICD-10-CM

## 2020-02-10 DIAGNOSIS — Z43.1 ENCOUNTER FOR ATTENTION TO GASTROSTOMY (H): ICD-10-CM

## 2020-02-10 DIAGNOSIS — R53.81 PHYSICAL DECONDITIONING: ICD-10-CM

## 2020-02-11 ENCOUNTER — COMMUNICATION - HEALTHEAST (OUTPATIENT)
Dept: FAMILY MEDICINE | Facility: CLINIC | Age: 70
End: 2020-02-11

## 2020-02-11 DIAGNOSIS — I06.9 AORTIC VALVE DISEASE, RHEUMATIC: ICD-10-CM

## 2020-02-11 DIAGNOSIS — I48.91 A-FIB (H): ICD-10-CM

## 2020-02-11 DIAGNOSIS — I66.9 CEREBRAL ARTERY OCCLUSION: ICD-10-CM

## 2020-02-11 DIAGNOSIS — I05.0 MITRAL VALVE STENOSIS, UNSPECIFIED ETIOLOGY: ICD-10-CM

## 2020-02-11 LAB — INR PPP: 1.9 (ref 0.9–1.1)

## 2020-02-12 ENCOUNTER — HOME CARE/HOSPICE - HEALTHEAST (OUTPATIENT)
Dept: HOME HEALTH SERVICES | Facility: HOME HEALTH | Age: 70
End: 2020-02-12

## 2020-02-12 ENCOUNTER — COMMUNICATION - HEALTHEAST (OUTPATIENT)
Dept: PHYSICAL THERAPY | Age: 70
End: 2020-02-12

## 2020-02-13 ENCOUNTER — COMMUNICATION - HEALTHEAST (OUTPATIENT)
Dept: NURSING | Facility: CLINIC | Age: 70
End: 2020-02-13

## 2020-02-18 ENCOUNTER — COMMUNICATION - HEALTHEAST (OUTPATIENT)
Dept: FAMILY MEDICINE | Facility: CLINIC | Age: 70
End: 2020-02-18

## 2020-02-18 DIAGNOSIS — I06.9 AORTIC VALVE DISEASE, RHEUMATIC: ICD-10-CM

## 2020-02-18 DIAGNOSIS — I05.0 MITRAL VALVE STENOSIS, UNSPECIFIED ETIOLOGY: ICD-10-CM

## 2020-02-18 DIAGNOSIS — I66.9 CEREBRAL ARTERY OCCLUSION: ICD-10-CM

## 2020-02-18 DIAGNOSIS — I48.91 A-FIB (H): ICD-10-CM

## 2020-02-18 LAB — INR PPP: 2 (ref 0.9–1.1)

## 2020-02-19 ENCOUNTER — HOME CARE/HOSPICE - HEALTHEAST (OUTPATIENT)
Dept: HOME HEALTH SERVICES | Facility: HOME HEALTH | Age: 70
End: 2020-02-19

## 2020-02-21 ENCOUNTER — HOME CARE/HOSPICE - HEALTHEAST (OUTPATIENT)
Dept: HOME HEALTH SERVICES | Facility: HOME HEALTH | Age: 70
End: 2020-02-21

## 2020-02-24 ENCOUNTER — HOME CARE/HOSPICE - HEALTHEAST (OUTPATIENT)
Dept: HOME HEALTH SERVICES | Facility: HOME HEALTH | Age: 70
End: 2020-02-24

## 2020-02-25 ENCOUNTER — COMMUNICATION - HEALTHEAST (OUTPATIENT)
Dept: FAMILY MEDICINE | Facility: CLINIC | Age: 70
End: 2020-02-25

## 2020-02-25 DIAGNOSIS — I05.0 MITRAL VALVE STENOSIS, UNSPECIFIED ETIOLOGY: ICD-10-CM

## 2020-02-25 DIAGNOSIS — I06.9 AORTIC VALVE DISEASE, RHEUMATIC: ICD-10-CM

## 2020-02-25 DIAGNOSIS — I48.91 A-FIB (H): ICD-10-CM

## 2020-02-25 DIAGNOSIS — I66.9 CEREBRAL ARTERY OCCLUSION: ICD-10-CM

## 2020-02-25 LAB — INR PPP: 1.8 (ref 0.9–1.1)

## 2020-02-26 ENCOUNTER — HOME CARE/HOSPICE - HEALTHEAST (OUTPATIENT)
Dept: HOME HEALTH SERVICES | Facility: HOME HEALTH | Age: 70
End: 2020-02-26

## 2020-02-27 ENCOUNTER — COMMUNICATION - HEALTHEAST (OUTPATIENT)
Dept: NURSING | Facility: CLINIC | Age: 70
End: 2020-02-27

## 2020-02-28 ENCOUNTER — RECORDS - HEALTHEAST (OUTPATIENT)
Dept: ADMINISTRATIVE | Facility: OTHER | Age: 70
End: 2020-02-28

## 2020-03-02 ENCOUNTER — HOME CARE/HOSPICE - HEALTHEAST (OUTPATIENT)
Dept: HOME HEALTH SERVICES | Facility: HOME HEALTH | Age: 70
End: 2020-03-02

## 2020-03-03 ENCOUNTER — HOME CARE/HOSPICE - HEALTHEAST (OUTPATIENT)
Dept: HOME HEALTH SERVICES | Facility: HOME HEALTH | Age: 70
End: 2020-03-03

## 2020-03-03 ENCOUNTER — COMMUNICATION - HEALTHEAST (OUTPATIENT)
Dept: FAMILY MEDICINE | Facility: CLINIC | Age: 70
End: 2020-03-03

## 2020-03-03 DIAGNOSIS — I48.91 A-FIB (H): ICD-10-CM

## 2020-03-03 DIAGNOSIS — I05.0 MITRAL VALVE STENOSIS, UNSPECIFIED ETIOLOGY: ICD-10-CM

## 2020-03-03 DIAGNOSIS — I06.9 AORTIC VALVE DISEASE, RHEUMATIC: ICD-10-CM

## 2020-03-03 DIAGNOSIS — I66.9 CEREBRAL ARTERY OCCLUSION: ICD-10-CM

## 2020-03-03 LAB — INR PPP: 2.5 (ref 0.9–1.1)

## 2020-03-08 ENCOUNTER — COMMUNICATION - HEALTHEAST (OUTPATIENT)
Dept: FAMILY MEDICINE | Facility: CLINIC | Age: 70
End: 2020-03-08

## 2020-03-08 DIAGNOSIS — I48.91 ATRIAL FIBRILLATION, UNSPECIFIED TYPE (H): ICD-10-CM

## 2020-03-08 DIAGNOSIS — R79.0 LOW MAGNESIUM LEVEL: ICD-10-CM

## 2020-03-09 ENCOUNTER — AMBULATORY - HEALTHEAST (OUTPATIENT)
Dept: SURGERY | Facility: CLINIC | Age: 70
End: 2020-03-09

## 2020-03-10 ENCOUNTER — COMMUNICATION - HEALTHEAST (OUTPATIENT)
Dept: FAMILY MEDICINE | Facility: CLINIC | Age: 70
End: 2020-03-10

## 2020-03-10 DIAGNOSIS — I06.9 AORTIC VALVE DISEASE, RHEUMATIC: ICD-10-CM

## 2020-03-10 DIAGNOSIS — I61.9 HEMORRHAGIC STROKE (H): ICD-10-CM

## 2020-03-10 DIAGNOSIS — I48.91 A-FIB (H): ICD-10-CM

## 2020-03-10 DIAGNOSIS — I05.0 MITRAL VALVE STENOSIS, UNSPECIFIED ETIOLOGY: ICD-10-CM

## 2020-03-10 DIAGNOSIS — I66.9 CEREBRAL ARTERY OCCLUSION: ICD-10-CM

## 2020-03-10 LAB — INR PPP: 2.5 (ref 0.9–1.1)

## 2020-03-11 ENCOUNTER — RECORDS - HEALTHEAST (OUTPATIENT)
Dept: ADMINISTRATIVE | Facility: OTHER | Age: 70
End: 2020-03-11

## 2020-03-18 ENCOUNTER — RECORDS - HEALTHEAST (OUTPATIENT)
Dept: ADMINISTRATIVE | Facility: OTHER | Age: 70
End: 2020-03-18

## 2020-03-24 ENCOUNTER — COMMUNICATION - HEALTHEAST (OUTPATIENT)
Dept: FAMILY MEDICINE | Facility: CLINIC | Age: 70
End: 2020-03-24

## 2020-03-24 DIAGNOSIS — I05.0 MITRAL VALVE STENOSIS, UNSPECIFIED ETIOLOGY: ICD-10-CM

## 2020-03-24 DIAGNOSIS — I48.91 A-FIB (H): ICD-10-CM

## 2020-03-24 DIAGNOSIS — I66.9 CEREBRAL ARTERY OCCLUSION: ICD-10-CM

## 2020-03-24 DIAGNOSIS — I06.9 AORTIC VALVE DISEASE, RHEUMATIC: ICD-10-CM

## 2020-03-24 LAB — INR PPP: 2.2 (ref 0.9–1.1)

## 2020-03-26 ENCOUNTER — COMMUNICATION - HEALTHEAST (OUTPATIENT)
Dept: GERIATRIC MEDICINE | Facility: CLINIC | Age: 70
End: 2020-03-26

## 2020-04-07 ENCOUNTER — COMMUNICATION - HEALTHEAST (OUTPATIENT)
Dept: FAMILY MEDICINE | Facility: CLINIC | Age: 70
End: 2020-04-07

## 2020-04-07 DIAGNOSIS — I05.0 MITRAL VALVE STENOSIS, UNSPECIFIED ETIOLOGY: ICD-10-CM

## 2020-04-07 DIAGNOSIS — I06.9 AORTIC VALVE DISEASE, RHEUMATIC: ICD-10-CM

## 2020-04-07 DIAGNOSIS — I66.9 CEREBRAL ARTERY OCCLUSION: ICD-10-CM

## 2020-04-07 DIAGNOSIS — I48.91 A-FIB (H): ICD-10-CM

## 2020-04-07 LAB — INR PPP: 2.9 (ref 0.9–1.1)

## 2020-04-21 ENCOUNTER — COMMUNICATION - HEALTHEAST (OUTPATIENT)
Dept: FAMILY MEDICINE | Facility: CLINIC | Age: 70
End: 2020-04-21

## 2020-04-21 DIAGNOSIS — I05.0 MITRAL VALVE STENOSIS, UNSPECIFIED ETIOLOGY: ICD-10-CM

## 2020-04-21 DIAGNOSIS — I48.91 A-FIB (H): ICD-10-CM

## 2020-04-21 DIAGNOSIS — F32.9 MAJOR DEPRESSIVE DISORDER, REMISSION STATUS UNSPECIFIED, UNSPECIFIED WHETHER RECURRENT: ICD-10-CM

## 2020-04-21 DIAGNOSIS — R13.10 DYSPHAGIA: ICD-10-CM

## 2020-04-21 DIAGNOSIS — I66.9 CEREBRAL ARTERY OCCLUSION: ICD-10-CM

## 2020-04-21 DIAGNOSIS — I06.9 AORTIC VALVE DISEASE, RHEUMATIC: ICD-10-CM

## 2020-04-21 LAB — INR PPP: 2.5 (ref 0.9–1.1)

## 2020-05-03 ENCOUNTER — COMMUNICATION - HEALTHEAST (OUTPATIENT)
Dept: FAMILY MEDICINE | Facility: CLINIC | Age: 70
End: 2020-05-03

## 2020-05-03 DIAGNOSIS — I61.9 HEMORRHAGIC STROKE (H): ICD-10-CM

## 2020-05-05 ENCOUNTER — COMMUNICATION - HEALTHEAST (OUTPATIENT)
Dept: FAMILY MEDICINE | Facility: CLINIC | Age: 70
End: 2020-05-05

## 2020-05-05 DIAGNOSIS — I05.0 MITRAL VALVE STENOSIS, UNSPECIFIED ETIOLOGY: ICD-10-CM

## 2020-05-05 DIAGNOSIS — I48.91 A-FIB (H): ICD-10-CM

## 2020-05-05 DIAGNOSIS — I06.9 AORTIC VALVE DISEASE, RHEUMATIC: ICD-10-CM

## 2020-05-05 DIAGNOSIS — I66.9 CEREBRAL ARTERY OCCLUSION: ICD-10-CM

## 2020-05-05 LAB — INR PPP: 2.8 (ref 0.9–1.1)

## 2020-05-11 ENCOUNTER — OFFICE VISIT - HEALTHEAST (OUTPATIENT)
Dept: FAMILY MEDICINE | Facility: CLINIC | Age: 70
End: 2020-05-11

## 2020-05-11 DIAGNOSIS — I48.91 ATRIAL FIBRILLATION WITH RVR (H): ICD-10-CM

## 2020-05-11 DIAGNOSIS — E03.9 HYPOTHYROIDISM, UNSPECIFIED TYPE: ICD-10-CM

## 2020-05-11 DIAGNOSIS — E83.42 HYPOMAGNESEMIA: ICD-10-CM

## 2020-05-11 DIAGNOSIS — G81.94 LEFT HEMIPARESIS (H): ICD-10-CM

## 2020-05-11 DIAGNOSIS — R79.89 ELEVATED LFTS: ICD-10-CM

## 2020-05-11 DIAGNOSIS — Z95.2 S/P AVR: ICD-10-CM

## 2020-05-11 DIAGNOSIS — Z98.890 S/P MVR (MITRAL VALVE REPAIR): ICD-10-CM

## 2020-05-11 DIAGNOSIS — I50.32 CHRONIC HEART FAILURE WITH PRESERVED EJECTION FRACTION (H): ICD-10-CM

## 2020-05-19 ENCOUNTER — COMMUNICATION - HEALTHEAST (OUTPATIENT)
Dept: FAMILY MEDICINE | Facility: CLINIC | Age: 70
End: 2020-05-19

## 2020-05-19 DIAGNOSIS — I66.9 CEREBRAL ARTERY OCCLUSION: ICD-10-CM

## 2020-05-19 DIAGNOSIS — I05.0 MITRAL VALVE STENOSIS, UNSPECIFIED ETIOLOGY: ICD-10-CM

## 2020-05-19 DIAGNOSIS — F32.9 MAJOR DEPRESSIVE DISORDER, REMISSION STATUS UNSPECIFIED, UNSPECIFIED WHETHER RECURRENT: ICD-10-CM

## 2020-05-19 DIAGNOSIS — I06.9 AORTIC VALVE DISEASE, RHEUMATIC: ICD-10-CM

## 2020-05-19 LAB — INR PPP: 2.5 (ref 0.9–1.1)

## 2020-05-27 ENCOUNTER — RECORDS - HEALTHEAST (OUTPATIENT)
Dept: ADMINISTRATIVE | Facility: OTHER | Age: 70
End: 2020-05-27

## 2020-06-02 ENCOUNTER — COMMUNICATION - HEALTHEAST (OUTPATIENT)
Dept: FAMILY MEDICINE | Facility: CLINIC | Age: 70
End: 2020-06-02

## 2020-06-02 DIAGNOSIS — I06.9 AORTIC VALVE DISEASE, RHEUMATIC: ICD-10-CM

## 2020-06-02 DIAGNOSIS — I48.91 A-FIB (H): ICD-10-CM

## 2020-06-02 DIAGNOSIS — I66.9 CEREBRAL ARTERY OCCLUSION: ICD-10-CM

## 2020-06-02 DIAGNOSIS — I05.0 MITRAL VALVE STENOSIS, UNSPECIFIED ETIOLOGY: ICD-10-CM

## 2020-06-02 LAB — INR PPP: 2.5 (ref 0.9–1.1)

## 2020-06-10 ENCOUNTER — RECORDS - HEALTHEAST (OUTPATIENT)
Dept: ADMINISTRATIVE | Facility: OTHER | Age: 70
End: 2020-06-10

## 2020-06-10 ENCOUNTER — COMMUNICATION - HEALTHEAST (OUTPATIENT)
Dept: GERIATRIC MEDICINE | Facility: CLINIC | Age: 70
End: 2020-06-10

## 2020-06-12 ENCOUNTER — COMMUNICATION - HEALTHEAST (OUTPATIENT)
Dept: GERIATRIC MEDICINE | Facility: CLINIC | Age: 70
End: 2020-06-12

## 2020-06-12 DIAGNOSIS — Z86.73 HISTORY OF CVA (CEREBROVASCULAR ACCIDENT): ICD-10-CM

## 2020-06-16 ENCOUNTER — COMMUNICATION - HEALTHEAST (OUTPATIENT)
Dept: FAMILY MEDICINE | Facility: CLINIC | Age: 70
End: 2020-06-16

## 2020-06-16 DIAGNOSIS — I05.0 MITRAL VALVE STENOSIS, UNSPECIFIED ETIOLOGY: ICD-10-CM

## 2020-06-16 DIAGNOSIS — I66.9 CEREBRAL ARTERY OCCLUSION: ICD-10-CM

## 2020-06-16 DIAGNOSIS — I06.9 AORTIC VALVE DISEASE, RHEUMATIC: ICD-10-CM

## 2020-06-16 DIAGNOSIS — I48.91 A-FIB (H): ICD-10-CM

## 2020-06-16 LAB — INR PPP: 2.1 (ref 0.9–1.1)

## 2020-06-17 ENCOUNTER — COMMUNICATION - HEALTHEAST (OUTPATIENT)
Dept: GERIATRIC MEDICINE | Facility: CLINIC | Age: 70
End: 2020-06-17

## 2020-06-25 ENCOUNTER — RECORDS - HEALTHEAST (OUTPATIENT)
Dept: ADMINISTRATIVE | Facility: OTHER | Age: 70
End: 2020-06-25

## 2020-06-30 ENCOUNTER — COMMUNICATION - HEALTHEAST (OUTPATIENT)
Dept: FAMILY MEDICINE | Facility: CLINIC | Age: 70
End: 2020-06-30

## 2020-06-30 DIAGNOSIS — D50.9 IRON DEFICIENCY ANEMIA, UNSPECIFIED IRON DEFICIENCY ANEMIA TYPE: ICD-10-CM

## 2020-06-30 DIAGNOSIS — I06.9 AORTIC VALVE DISEASE, RHEUMATIC: ICD-10-CM

## 2020-06-30 DIAGNOSIS — I48.91 A-FIB (H): ICD-10-CM

## 2020-06-30 DIAGNOSIS — R79.0 LOW MAGNESIUM LEVEL: ICD-10-CM

## 2020-06-30 DIAGNOSIS — I66.9 CEREBRAL ARTERY OCCLUSION: ICD-10-CM

## 2020-06-30 DIAGNOSIS — I05.0 MITRAL VALVE STENOSIS, UNSPECIFIED ETIOLOGY: ICD-10-CM

## 2020-06-30 LAB — INR PPP: 2.5 (ref 0.9–1.1)

## 2020-07-14 ENCOUNTER — COMMUNICATION - HEALTHEAST (OUTPATIENT)
Dept: FAMILY MEDICINE | Facility: CLINIC | Age: 70
End: 2020-07-14

## 2020-07-14 DIAGNOSIS — I48.91 A-FIB (H): ICD-10-CM

## 2020-07-14 DIAGNOSIS — F32.9 MAJOR DEPRESSIVE DISORDER, REMISSION STATUS UNSPECIFIED, UNSPECIFIED WHETHER RECURRENT: ICD-10-CM

## 2020-07-14 DIAGNOSIS — I66.9 CEREBRAL ARTERY OCCLUSION: ICD-10-CM

## 2020-07-14 DIAGNOSIS — I06.9 AORTIC VALVE DISEASE, RHEUMATIC: ICD-10-CM

## 2020-07-14 DIAGNOSIS — R13.10 DYSPHAGIA: ICD-10-CM

## 2020-07-14 DIAGNOSIS — I05.0 MITRAL VALVE STENOSIS, UNSPECIFIED ETIOLOGY: ICD-10-CM

## 2020-07-14 LAB — INR PPP: 2.1 (ref 0.9–1.1)

## 2020-07-22 ENCOUNTER — RECORDS - HEALTHEAST (OUTPATIENT)
Dept: ADMINISTRATIVE | Facility: OTHER | Age: 70
End: 2020-07-22

## 2020-07-28 ENCOUNTER — COMMUNICATION - HEALTHEAST (OUTPATIENT)
Dept: FAMILY MEDICINE | Facility: CLINIC | Age: 70
End: 2020-07-28

## 2020-07-28 DIAGNOSIS — I05.0 MITRAL VALVE STENOSIS, UNSPECIFIED ETIOLOGY: ICD-10-CM

## 2020-07-28 DIAGNOSIS — I66.9 CEREBRAL ARTERY OCCLUSION: ICD-10-CM

## 2020-07-28 DIAGNOSIS — D50.9 IRON DEFICIENCY ANEMIA, UNSPECIFIED IRON DEFICIENCY ANEMIA TYPE: ICD-10-CM

## 2020-07-28 DIAGNOSIS — I48.91 A-FIB (H): ICD-10-CM

## 2020-07-28 DIAGNOSIS — I06.9 AORTIC VALVE DISEASE, RHEUMATIC: ICD-10-CM

## 2020-07-28 LAB — INR PPP: 2.2 (ref 0.9–1.1)

## 2020-08-04 ENCOUNTER — OFFICE VISIT - HEALTHEAST (OUTPATIENT)
Dept: FAMILY MEDICINE | Facility: CLINIC | Age: 70
End: 2020-08-04

## 2020-08-04 DIAGNOSIS — Z98.890 S/P MVR (MITRAL VALVE REPAIR): ICD-10-CM

## 2020-08-04 DIAGNOSIS — R73.9 HYPERGLYCEMIA: ICD-10-CM

## 2020-08-04 DIAGNOSIS — G81.94 LEFT HEMIPARESIS (H): ICD-10-CM

## 2020-08-04 DIAGNOSIS — R79.89 ELEVATED LFTS: ICD-10-CM

## 2020-08-04 DIAGNOSIS — Z95.2 S/P AVR: ICD-10-CM

## 2020-08-04 DIAGNOSIS — I50.32 CHRONIC HEART FAILURE WITH PRESERVED EJECTION FRACTION (H): ICD-10-CM

## 2020-08-04 DIAGNOSIS — E03.9 HYPOTHYROIDISM, UNSPECIFIED TYPE: ICD-10-CM

## 2020-08-04 DIAGNOSIS — I48.91 ATRIAL FIBRILLATION WITH RVR (H): ICD-10-CM

## 2020-08-04 DIAGNOSIS — E78.2 MIXED HYPERLIPIDEMIA: ICD-10-CM

## 2020-08-04 ASSESSMENT — PATIENT HEALTH QUESTIONNAIRE - PHQ9: SUM OF ALL RESPONSES TO PHQ QUESTIONS 1-9: 0

## 2020-08-10 ENCOUNTER — RECORDS - HEALTHEAST (OUTPATIENT)
Dept: ADMINISTRATIVE | Facility: OTHER | Age: 70
End: 2020-08-10

## 2020-08-11 ENCOUNTER — COMMUNICATION - HEALTHEAST (OUTPATIENT)
Dept: FAMILY MEDICINE | Facility: CLINIC | Age: 70
End: 2020-08-11

## 2020-08-11 DIAGNOSIS — I66.9 CEREBRAL ARTERY OCCLUSION: ICD-10-CM

## 2020-08-11 DIAGNOSIS — I06.9 AORTIC VALVE DISEASE, RHEUMATIC: ICD-10-CM

## 2020-08-11 DIAGNOSIS — I05.0 MITRAL VALVE STENOSIS, UNSPECIFIED ETIOLOGY: ICD-10-CM

## 2020-08-11 DIAGNOSIS — I48.91 ATRIAL FIBRILLATION, UNSPECIFIED TYPE (H): ICD-10-CM

## 2020-08-11 LAB — INR PPP: 2.3 (ref 0.9–1.1)

## 2020-08-12 ENCOUNTER — RECORDS - HEALTHEAST (OUTPATIENT)
Dept: ADMINISTRATIVE | Facility: OTHER | Age: 70
End: 2020-08-12

## 2020-08-25 ENCOUNTER — AMBULATORY - HEALTHEAST (OUTPATIENT)
Dept: LAB | Facility: CLINIC | Age: 70
End: 2020-08-25

## 2020-08-25 ENCOUNTER — COMMUNICATION - HEALTHEAST (OUTPATIENT)
Dept: FAMILY MEDICINE | Facility: CLINIC | Age: 70
End: 2020-08-25

## 2020-08-25 DIAGNOSIS — I05.0 MITRAL VALVE STENOSIS, UNSPECIFIED ETIOLOGY: ICD-10-CM

## 2020-08-25 DIAGNOSIS — I48.91 ATRIAL FIBRILLATION, UNSPECIFIED TYPE (H): ICD-10-CM

## 2020-08-25 DIAGNOSIS — R79.89 ELEVATED LFTS: ICD-10-CM

## 2020-08-25 DIAGNOSIS — I48.91 ATRIAL FIBRILLATION WITH RVR (H): ICD-10-CM

## 2020-08-25 DIAGNOSIS — I66.9 CEREBRAL ARTERY OCCLUSION: ICD-10-CM

## 2020-08-25 DIAGNOSIS — R73.9 HYPERGLYCEMIA: ICD-10-CM

## 2020-08-25 DIAGNOSIS — E03.9 HYPOTHYROIDISM, UNSPECIFIED TYPE: ICD-10-CM

## 2020-08-25 DIAGNOSIS — I06.9 AORTIC VALVE DISEASE, RHEUMATIC: ICD-10-CM

## 2020-08-25 DIAGNOSIS — R13.10 DYSPHAGIA: ICD-10-CM

## 2020-08-25 DIAGNOSIS — F32.9 MAJOR DEPRESSIVE DISORDER, REMISSION STATUS UNSPECIFIED, UNSPECIFIED WHETHER RECURRENT: ICD-10-CM

## 2020-08-25 DIAGNOSIS — D50.9 IRON DEFICIENCY ANEMIA, UNSPECIFIED IRON DEFICIENCY ANEMIA TYPE: ICD-10-CM

## 2020-08-25 DIAGNOSIS — E78.2 MIXED HYPERLIPIDEMIA: ICD-10-CM

## 2020-08-25 LAB
ALBUMIN SERPL-MCNC: 3.4 G/DL (ref 3.5–5)
ALP SERPL-CCNC: 78 U/L (ref 45–120)
ALT SERPL W P-5'-P-CCNC: 18 U/L (ref 0–45)
AST SERPL W P-5'-P-CCNC: 20 U/L (ref 0–40)
BILIRUB DIRECT SERPL-MCNC: 0.3 MG/DL
BILIRUB SERPL-MCNC: 0.8 MG/DL (ref 0–1)
CHOLEST SERPL-MCNC: 138 MG/DL
FASTING STATUS PATIENT QL REPORTED: NO
HBA1C MFR BLD: 5.6 %
HDLC SERPL-MCNC: 38 MG/DL
INR PPP: 3.7 (ref 0.9–1.1)
LDLC SERPL CALC-MCNC: 81 MG/DL
PROT SERPL-MCNC: 7.4 G/DL (ref 6–8)
TRIGL SERPL-MCNC: 95 MG/DL
TSH SERPL DL<=0.005 MIU/L-ACNC: 7.88 UIU/ML (ref 0.3–5)

## 2020-08-26 ENCOUNTER — RECORDS - HEALTHEAST (OUTPATIENT)
Dept: ADMINISTRATIVE | Facility: OTHER | Age: 70
End: 2020-08-26

## 2020-08-26 ENCOUNTER — COMMUNICATION - HEALTHEAST (OUTPATIENT)
Dept: FAMILY MEDICINE | Facility: CLINIC | Age: 70
End: 2020-08-26

## 2020-08-26 DIAGNOSIS — I63.431: Primary | ICD-10-CM

## 2020-08-26 DIAGNOSIS — I61.9 HEMORRHAGIC STROKE (H): ICD-10-CM

## 2020-08-26 PROBLEM — E78.5 HYPERLIPIDEMIA: Status: ACTIVE | Noted: 2020-08-26

## 2020-08-26 PROBLEM — E03.9 HYPOTHYROIDISM: Status: ACTIVE | Noted: 2020-08-26

## 2020-08-26 PROBLEM — I25.10 ATHEROSCLEROSIS OF CORONARY ARTERY: Status: ACTIVE | Noted: 2020-08-26

## 2020-08-26 PROBLEM — I48.91 ATRIAL FIBRILLATION (H): Status: ACTIVE | Noted: 2020-08-26

## 2020-08-26 RX ORDER — GABAPENTIN 100 MG/1
1 CAPSULE ORAL 2 TIMES DAILY
COMMUNITY
Start: 2020-07-03 | End: 2020-08-26

## 2020-08-26 RX ORDER — GABAPENTIN 100 MG/1
100 CAPSULE ORAL 2 TIMES DAILY
Qty: 60 CAPSULE | Refills: 0 | Status: SHIPPED | OUTPATIENT
Start: 2020-08-26

## 2020-08-26 RX ORDER — ATORVASTATIN CALCIUM 10 MG/1
10 TABLET, FILM COATED ORAL DAILY
COMMUNITY
Start: 2019-07-29

## 2020-08-26 RX ORDER — FERROUS SULFATE 325(65) MG
325 TABLET ORAL 2 TIMES DAILY
COMMUNITY
Start: 2019-07-29

## 2020-08-28 ENCOUNTER — AMBULATORY - HEALTHEAST (OUTPATIENT)
Dept: FAMILY MEDICINE | Facility: CLINIC | Age: 70
End: 2020-08-28

## 2020-08-28 ENCOUNTER — COMMUNICATION - HEALTHEAST (OUTPATIENT)
Dept: FAMILY MEDICINE | Facility: CLINIC | Age: 70
End: 2020-08-28

## 2020-08-28 DIAGNOSIS — E03.9 HYPOTHYROIDISM, UNSPECIFIED TYPE: ICD-10-CM

## 2020-09-01 ENCOUNTER — COMMUNICATION - HEALTHEAST (OUTPATIENT)
Dept: FAMILY MEDICINE | Facility: CLINIC | Age: 70
End: 2020-09-01

## 2020-09-01 DIAGNOSIS — I06.9 AORTIC VALVE DISEASE, RHEUMATIC: ICD-10-CM

## 2020-09-01 DIAGNOSIS — I48.91 ATRIAL FIBRILLATION, UNSPECIFIED TYPE (H): ICD-10-CM

## 2020-09-01 DIAGNOSIS — I05.0 MITRAL VALVE STENOSIS, UNSPECIFIED ETIOLOGY: ICD-10-CM

## 2020-09-01 DIAGNOSIS — I66.9 CEREBRAL ARTERY OCCLUSION: ICD-10-CM

## 2020-09-01 LAB — INR PPP: 2.5 (ref 0.9–1.1)

## 2020-09-04 ENCOUNTER — RECORDS - HEALTHEAST (OUTPATIENT)
Dept: ADMINISTRATIVE | Facility: OTHER | Age: 70
End: 2020-09-04

## 2020-09-09 ENCOUNTER — COMMUNICATION - HEALTHEAST (OUTPATIENT)
Dept: FAMILY MEDICINE | Facility: CLINIC | Age: 70
End: 2020-09-09

## 2020-09-09 DIAGNOSIS — I61.9 HEMORRHAGIC STROKE (H): ICD-10-CM

## 2020-09-09 DIAGNOSIS — F32.9 MAJOR DEPRESSIVE DISORDER, REMISSION STATUS UNSPECIFIED, UNSPECIFIED WHETHER RECURRENT: ICD-10-CM

## 2020-09-09 DIAGNOSIS — R13.10 DYSPHAGIA: ICD-10-CM

## 2020-09-09 DIAGNOSIS — D50.9 IRON DEFICIENCY ANEMIA, UNSPECIFIED IRON DEFICIENCY ANEMIA TYPE: ICD-10-CM

## 2020-09-15 ENCOUNTER — COMMUNICATION - HEALTHEAST (OUTPATIENT)
Dept: FAMILY MEDICINE | Facility: CLINIC | Age: 70
End: 2020-09-15

## 2020-09-15 DIAGNOSIS — I66.9 CEREBRAL ARTERY OCCLUSION: ICD-10-CM

## 2020-09-15 DIAGNOSIS — I05.0 MITRAL VALVE STENOSIS, UNSPECIFIED ETIOLOGY: ICD-10-CM

## 2020-09-15 DIAGNOSIS — I06.9 AORTIC VALVE DISEASE, RHEUMATIC: ICD-10-CM

## 2020-09-15 DIAGNOSIS — I48.91 ATRIAL FIBRILLATION, UNSPECIFIED TYPE (H): ICD-10-CM

## 2020-09-15 LAB — INR PPP: 3.2 (ref 0.9–1.1)

## 2020-09-18 ENCOUNTER — RECORDS - HEALTHEAST (OUTPATIENT)
Dept: ADMINISTRATIVE | Facility: OTHER | Age: 70
End: 2020-09-18

## 2020-09-22 ENCOUNTER — COMMUNICATION - HEALTHEAST (OUTPATIENT)
Dept: FAMILY MEDICINE | Facility: CLINIC | Age: 70
End: 2020-09-22

## 2020-09-22 DIAGNOSIS — I48.91 ATRIAL FIBRILLATION, UNSPECIFIED TYPE (H): ICD-10-CM

## 2020-09-23 ENCOUNTER — RECORDS - HEALTHEAST (OUTPATIENT)
Dept: ADMINISTRATIVE | Facility: OTHER | Age: 70
End: 2020-09-23

## 2020-09-28 ENCOUNTER — COMMUNICATION - HEALTHEAST (OUTPATIENT)
Dept: FAMILY MEDICINE | Facility: CLINIC | Age: 70
End: 2020-09-28

## 2020-09-28 DIAGNOSIS — I06.9 AORTIC VALVE DISEASE, RHEUMATIC: ICD-10-CM

## 2020-09-28 DIAGNOSIS — I66.9 CEREBRAL ARTERY OCCLUSION: ICD-10-CM

## 2020-09-28 DIAGNOSIS — I05.0 MITRAL VALVE STENOSIS, UNSPECIFIED ETIOLOGY: ICD-10-CM

## 2020-09-28 DIAGNOSIS — I48.91 ATRIAL FIBRILLATION, UNSPECIFIED TYPE (H): ICD-10-CM

## 2020-09-28 LAB — INR PPP: 3.6 (ref 0.9–1.1)

## 2020-10-13 ENCOUNTER — COMMUNICATION - HEALTHEAST (OUTPATIENT)
Dept: FAMILY MEDICINE | Facility: CLINIC | Age: 70
End: 2020-10-13

## 2020-10-13 DIAGNOSIS — I48.91 ATRIAL FIBRILLATION, UNSPECIFIED TYPE (H): ICD-10-CM

## 2020-10-13 DIAGNOSIS — I05.0 MITRAL VALVE STENOSIS, UNSPECIFIED ETIOLOGY: ICD-10-CM

## 2020-10-13 DIAGNOSIS — I66.9 CEREBRAL ARTERY OCCLUSION: ICD-10-CM

## 2020-10-13 DIAGNOSIS — I06.9 AORTIC VALVE DISEASE, RHEUMATIC: ICD-10-CM

## 2020-10-13 LAB — INR PPP: 2.2 (ref 0.9–1.1)

## 2020-10-18 ENCOUNTER — RECORDS - HEALTHEAST (OUTPATIENT)
Dept: ADMINISTRATIVE | Facility: OTHER | Age: 70
End: 2020-10-18

## 2020-10-21 ENCOUNTER — COMMUNICATION - HEALTHEAST (OUTPATIENT)
Dept: FAMILY MEDICINE | Facility: CLINIC | Age: 70
End: 2020-10-21

## 2020-10-21 DIAGNOSIS — R79.0 LOW MAGNESIUM LEVEL: ICD-10-CM

## 2020-10-22 ENCOUNTER — RECORDS - HEALTHEAST (OUTPATIENT)
Dept: ADMINISTRATIVE | Facility: OTHER | Age: 70
End: 2020-10-22

## 2020-10-27 ENCOUNTER — COMMUNICATION - HEALTHEAST (OUTPATIENT)
Dept: FAMILY MEDICINE | Facility: CLINIC | Age: 70
End: 2020-10-27

## 2020-10-27 DIAGNOSIS — I66.9 CEREBRAL ARTERY OCCLUSION: ICD-10-CM

## 2020-10-27 DIAGNOSIS — I05.0 MITRAL VALVE STENOSIS, UNSPECIFIED ETIOLOGY: ICD-10-CM

## 2020-10-27 DIAGNOSIS — I06.9 AORTIC VALVE DISEASE, RHEUMATIC: ICD-10-CM

## 2020-10-27 DIAGNOSIS — I48.91 ATRIAL FIBRILLATION, UNSPECIFIED TYPE (H): ICD-10-CM

## 2020-10-27 LAB — INR PPP: 2.5 (ref 0.9–1.1)

## 2020-11-05 ENCOUNTER — RECORDS - HEALTHEAST (OUTPATIENT)
Dept: ADMINISTRATIVE | Facility: OTHER | Age: 70
End: 2020-11-05

## 2020-11-17 ENCOUNTER — COMMUNICATION - HEALTHEAST (OUTPATIENT)
Dept: FAMILY MEDICINE | Facility: CLINIC | Age: 70
End: 2020-11-17

## 2020-11-17 DIAGNOSIS — I61.9 HEMORRHAGIC STROKE (H): ICD-10-CM

## 2020-11-17 DIAGNOSIS — F32.9 MAJOR DEPRESSIVE DISORDER, REMISSION STATUS UNSPECIFIED, UNSPECIFIED WHETHER RECURRENT: ICD-10-CM

## 2020-11-17 DIAGNOSIS — R13.10 DYSPHAGIA: ICD-10-CM

## 2020-11-18 ENCOUNTER — OFFICE VISIT - HEALTHEAST (OUTPATIENT)
Dept: FAMILY MEDICINE | Facility: CLINIC | Age: 70
End: 2020-11-18

## 2020-11-18 ENCOUNTER — COMMUNICATION - HEALTHEAST (OUTPATIENT)
Dept: FAMILY MEDICINE | Facility: CLINIC | Age: 70
End: 2020-11-18

## 2020-11-18 DIAGNOSIS — Z95.2 S/P AVR: ICD-10-CM

## 2020-11-18 DIAGNOSIS — I48.91 ATRIAL FIBRILLATION WITH RVR (H): ICD-10-CM

## 2020-11-18 DIAGNOSIS — R73.9 HYPERGLYCEMIA: ICD-10-CM

## 2020-11-18 DIAGNOSIS — I48.91 ATRIAL FIBRILLATION, UNSPECIFIED TYPE (H): ICD-10-CM

## 2020-11-18 DIAGNOSIS — D50.9 IRON DEFICIENCY ANEMIA, UNSPECIFIED IRON DEFICIENCY ANEMIA TYPE: ICD-10-CM

## 2020-11-18 DIAGNOSIS — G81.94 LEFT HEMIPARESIS (H): ICD-10-CM

## 2020-11-18 DIAGNOSIS — Z98.890 S/P MVR (MITRAL VALVE REPAIR): ICD-10-CM

## 2020-11-18 DIAGNOSIS — F32.9 MAJOR DEPRESSIVE DISORDER, REMISSION STATUS UNSPECIFIED, UNSPECIFIED WHETHER RECURRENT: ICD-10-CM

## 2020-11-18 DIAGNOSIS — E03.9 HYPOTHYROIDISM, UNSPECIFIED TYPE: ICD-10-CM

## 2020-11-18 DIAGNOSIS — I61.9 HEMORRHAGIC STROKE (H): ICD-10-CM

## 2020-11-24 ENCOUNTER — COMMUNICATION - HEALTHEAST (OUTPATIENT)
Dept: FAMILY MEDICINE | Facility: CLINIC | Age: 70
End: 2020-11-24

## 2020-11-24 DIAGNOSIS — I66.9 CEREBRAL ARTERY OCCLUSION: ICD-10-CM

## 2020-11-24 DIAGNOSIS — I06.9 AORTIC VALVE DISEASE, RHEUMATIC: ICD-10-CM

## 2020-11-24 DIAGNOSIS — I05.0 MITRAL VALVE STENOSIS, UNSPECIFIED ETIOLOGY: ICD-10-CM

## 2020-11-24 LAB — INR PPP: 1.8 (ref 0.9–1.1)

## 2020-11-25 ENCOUNTER — AMBULATORY - HEALTHEAST (OUTPATIENT)
Dept: ANTICOAGULATION | Facility: CLINIC | Age: 70
End: 2020-11-25

## 2020-11-25 DIAGNOSIS — I48.91 A-FIB (H): ICD-10-CM

## 2020-11-25 DIAGNOSIS — I06.9 AORTIC VALVE DISEASE, RHEUMATIC: ICD-10-CM

## 2020-11-25 DIAGNOSIS — I05.0 MITRAL VALVE STENOSIS, UNSPECIFIED ETIOLOGY: ICD-10-CM

## 2020-12-01 ENCOUNTER — COMMUNICATION - HEALTHEAST (OUTPATIENT)
Dept: FAMILY MEDICINE | Facility: CLINIC | Age: 70
End: 2020-12-01

## 2020-12-04 ENCOUNTER — RECORDS - HEALTHEAST (OUTPATIENT)
Dept: ADMINISTRATIVE | Facility: OTHER | Age: 70
End: 2020-12-04

## 2020-12-08 ENCOUNTER — COMMUNICATION - HEALTHEAST (OUTPATIENT)
Dept: FAMILY MEDICINE | Facility: CLINIC | Age: 70
End: 2020-12-08

## 2020-12-08 ENCOUNTER — OFFICE VISIT - HEALTHEAST (OUTPATIENT)
Dept: FAMILY MEDICINE | Facility: CLINIC | Age: 70
End: 2020-12-08

## 2020-12-08 DIAGNOSIS — I48.91 A-FIB (H): ICD-10-CM

## 2020-12-08 DIAGNOSIS — D50.9 IRON DEFICIENCY ANEMIA, UNSPECIFIED IRON DEFICIENCY ANEMIA TYPE: ICD-10-CM

## 2020-12-08 DIAGNOSIS — I66.9 CEREBRAL ARTERY OCCLUSION: ICD-10-CM

## 2020-12-08 DIAGNOSIS — R73.9 HYPERGLYCEMIA: ICD-10-CM

## 2020-12-08 DIAGNOSIS — I05.0 MITRAL VALVE STENOSIS, UNSPECIFIED ETIOLOGY: ICD-10-CM

## 2020-12-08 DIAGNOSIS — I50.32 CHRONIC HEART FAILURE WITH PRESERVED EJECTION FRACTION (H): ICD-10-CM

## 2020-12-08 DIAGNOSIS — I48.91 ATRIAL FIBRILLATION, UNSPECIFIED TYPE (H): ICD-10-CM

## 2020-12-08 DIAGNOSIS — I25.10 ATHEROSCLEROSIS OF NATIVE CORONARY ARTERY OF NATIVE HEART WITHOUT ANGINA PECTORIS: ICD-10-CM

## 2020-12-08 DIAGNOSIS — D64.9 ANEMIA, UNSPECIFIED TYPE: ICD-10-CM

## 2020-12-08 DIAGNOSIS — Z51.89 ENCOUNTER FOR MEDICATION ADJUSTMENT: ICD-10-CM

## 2020-12-08 DIAGNOSIS — I06.9 AORTIC VALVE DISEASE, RHEUMATIC: ICD-10-CM

## 2020-12-08 DIAGNOSIS — I61.9 HEMORRHAGIC STROKE (H): ICD-10-CM

## 2020-12-08 DIAGNOSIS — E03.9 HYPOTHYROIDISM, UNSPECIFIED TYPE: ICD-10-CM

## 2020-12-08 DIAGNOSIS — E78.00 PURE HYPERCHOLESTEROLEMIA: ICD-10-CM

## 2020-12-08 LAB
ALBUMIN SERPL-MCNC: 3.3 G/DL (ref 3.5–5)
ALP SERPL-CCNC: 102 U/L (ref 45–120)
ALT SERPL W P-5'-P-CCNC: 22 U/L (ref 0–45)
ANION GAP SERPL CALCULATED.3IONS-SCNC: 10 MMOL/L (ref 5–18)
AST SERPL W P-5'-P-CCNC: 24 U/L (ref 0–40)
BILIRUB SERPL-MCNC: 1.2 MG/DL (ref 0–1)
BUN SERPL-MCNC: 21 MG/DL (ref 8–22)
CALCIUM SERPL-MCNC: 8.4 MG/DL (ref 8.5–10.5)
CHLORIDE BLD-SCNC: 109 MMOL/L (ref 98–107)
CO2 SERPL-SCNC: 23 MMOL/L (ref 22–31)
CREAT SERPL-MCNC: 0.92 MG/DL (ref 0.7–1.3)
ERYTHROCYTE [DISTWIDTH] IN BLOOD BY AUTOMATED COUNT: 11.8 % (ref 11–14.5)
FERRITIN SERPL-MCNC: 172 NG/ML (ref 27–300)
GFR SERPL CREATININE-BSD FRML MDRD: >60 ML/MIN/1.73M2
GLUCOSE BLD-MCNC: 117 MG/DL (ref 70–125)
HBA1C MFR BLD: 5.8 %
HCT VFR BLD AUTO: 42.9 % (ref 40–54)
HGB BLD-MCNC: 14.3 G/DL (ref 14–18)
INR PPP: 1.8 (ref 0.9–1.1)
IRON SATN MFR SERPL: 32 % (ref 20–50)
IRON SERPL-MCNC: 81 UG/DL (ref 42–175)
MCH RBC QN AUTO: 31.8 PG (ref 27–34)
MCHC RBC AUTO-ENTMCNC: 33.4 G/DL (ref 32–36)
MCV RBC AUTO: 95 FL (ref 80–100)
PLATELET # BLD AUTO: 61 THOU/UL (ref 140–440)
PMV BLD AUTO: 8.6 FL (ref 7–10)
POTASSIUM BLD-SCNC: 3.7 MMOL/L (ref 3.5–5)
PROT SERPL-MCNC: 7.3 G/DL (ref 6–8)
RBC # BLD AUTO: 4.51 MILL/UL (ref 4.4–6.2)
SODIUM SERPL-SCNC: 142 MMOL/L (ref 136–145)
T4 TOTAL - HISTORICAL: 7.7 UG/DL (ref 4.5–13)
TIBC SERPL-MCNC: 256 UG/DL (ref 313–563)
TRANSFERRIN SERPL-MCNC: 205 MG/DL (ref 212–360)
TSH SERPL DL<=0.005 MIU/L-ACNC: 2.11 UIU/ML (ref 0.3–5)
WBC: 4.5 THOU/UL (ref 4–11)

## 2020-12-08 ASSESSMENT — MIFFLIN-ST. JEOR: SCORE: 1038.42

## 2020-12-09 LAB — NT-PROBNP SERPL-MCNC: 7348 PG/ML (ref 0–125)

## 2020-12-10 ENCOUNTER — RECORDS - HEALTHEAST (OUTPATIENT)
Dept: ADMINISTRATIVE | Facility: OTHER | Age: 70
End: 2020-12-10

## 2020-12-11 ENCOUNTER — COMMUNICATION - HEALTHEAST (OUTPATIENT)
Dept: FAMILY MEDICINE | Facility: CLINIC | Age: 70
End: 2020-12-11

## 2020-12-16 ENCOUNTER — COMMUNICATION - HEALTHEAST (OUTPATIENT)
Dept: FAMILY MEDICINE | Facility: CLINIC | Age: 70
End: 2020-12-16

## 2020-12-16 DIAGNOSIS — N40.1 BENIGN PROSTATIC HYPERPLASIA WITH URINARY OBSTRUCTION: ICD-10-CM

## 2020-12-16 DIAGNOSIS — N13.8 BENIGN PROSTATIC HYPERPLASIA WITH URINARY OBSTRUCTION: ICD-10-CM

## 2020-12-16 DIAGNOSIS — I48.91 ATRIAL FIBRILLATION, UNSPECIFIED TYPE (H): ICD-10-CM

## 2020-12-17 ENCOUNTER — RECORDS - HEALTHEAST (OUTPATIENT)
Dept: ADMINISTRATIVE | Facility: OTHER | Age: 70
End: 2020-12-17

## 2020-12-22 ENCOUNTER — COMMUNICATION - HEALTHEAST (OUTPATIENT)
Dept: FAMILY MEDICINE | Facility: CLINIC | Age: 70
End: 2020-12-22

## 2020-12-22 DIAGNOSIS — I66.9 CEREBRAL ARTERY OCCLUSION: ICD-10-CM

## 2020-12-22 DIAGNOSIS — I06.9 AORTIC VALVE DISEASE, RHEUMATIC: ICD-10-CM

## 2020-12-22 DIAGNOSIS — I05.0 MITRAL VALVE STENOSIS, UNSPECIFIED ETIOLOGY: ICD-10-CM

## 2020-12-22 LAB — INR PPP: 2.1 (ref 0.9–1.1)

## 2021-01-01 ENCOUNTER — COMMUNICATION - HEALTHEAST (OUTPATIENT)
Dept: FAMILY MEDICINE | Facility: CLINIC | Age: 71
End: 2021-01-01

## 2021-01-01 ENCOUNTER — COMMUNICATION - HEALTHEAST (OUTPATIENT)
Dept: CARDIOLOGY | Facility: CLINIC | Age: 71
End: 2021-01-01

## 2021-01-01 ENCOUNTER — ANTICOAGULATION THERAPY VISIT (OUTPATIENT)
Dept: FAMILY MEDICINE | Facility: CLINIC | Age: 71
End: 2021-01-01
Payer: COMMERCIAL

## 2021-01-01 ENCOUNTER — OFFICE VISIT - HEALTHEAST (OUTPATIENT)
Dept: FAMILY MEDICINE | Facility: CLINIC | Age: 71
End: 2021-01-01

## 2021-01-01 ENCOUNTER — MEDICAL CORRESPONDENCE (OUTPATIENT)
Dept: HEALTH INFORMATION MANAGEMENT | Facility: CLINIC | Age: 71
End: 2021-01-01
Payer: COMMERCIAL

## 2021-01-01 ENCOUNTER — COMMUNICATION - HEALTHEAST (OUTPATIENT)
Dept: SCHEDULING | Facility: CLINIC | Age: 71
End: 2021-01-01

## 2021-01-01 ENCOUNTER — ANTICOAGULATION THERAPY VISIT (OUTPATIENT)
Dept: FAMILY MEDICINE | Facility: CLINIC | Age: 71
End: 2021-01-01

## 2021-01-01 ENCOUNTER — RECORDS - HEALTHEAST (OUTPATIENT)
Dept: ADMINISTRATIVE | Facility: OTHER | Age: 71
End: 2021-01-01

## 2021-01-01 ENCOUNTER — TELEPHONE (OUTPATIENT)
Dept: FAMILY MEDICINE | Facility: CLINIC | Age: 71
End: 2021-01-01

## 2021-01-01 ENCOUNTER — AMBULATORY - HEALTHEAST (OUTPATIENT)
Dept: NURSING | Facility: CLINIC | Age: 71
End: 2021-01-01

## 2021-01-01 ENCOUNTER — OFFICE VISIT - HEALTHEAST (OUTPATIENT)
Dept: CARDIOLOGY | Facility: CLINIC | Age: 71
End: 2021-01-01

## 2021-01-01 ENCOUNTER — OFFICE VISIT (OUTPATIENT)
Dept: FAMILY MEDICINE | Facility: CLINIC | Age: 71
End: 2021-01-01
Payer: COMMERCIAL

## 2021-01-01 ENCOUNTER — AMBULATORY - HEALTHEAST (OUTPATIENT)
Dept: LAB | Facility: CLINIC | Age: 71
End: 2021-01-01

## 2021-01-01 ENCOUNTER — AMBULATORY - HEALTHEAST (OUTPATIENT)
Dept: FAMILY MEDICINE | Facility: CLINIC | Age: 71
End: 2021-01-01

## 2021-01-01 ENCOUNTER — TRANSFERRED RECORDS (OUTPATIENT)
Dept: HEALTH INFORMATION MANAGEMENT | Facility: CLINIC | Age: 71
End: 2021-01-01
Payer: COMMERCIAL

## 2021-01-01 ENCOUNTER — MEDICAL CORRESPONDENCE (OUTPATIENT)
Dept: HEALTH INFORMATION MANAGEMENT | Facility: CLINIC | Age: 71
End: 2021-01-01

## 2021-01-01 ENCOUNTER — HOSPITAL ENCOUNTER (OUTPATIENT)
Dept: PHARMACY | Facility: OTHER | Age: 71
Discharge: HOME | End: 2021-02-11
Attending: FAMILY MEDICINE

## 2021-01-01 ENCOUNTER — TRANSFERRED RECORDS (OUTPATIENT)
Dept: HEALTH INFORMATION MANAGEMENT | Facility: CLINIC | Age: 71
End: 2021-01-01

## 2021-01-01 ENCOUNTER — APPOINTMENT (OUTPATIENT)
Dept: CARDIOLOGY | Facility: HOSPITAL | Age: 71
End: 2021-01-01
Attending: INTERNAL MEDICINE
Payer: COMMERCIAL

## 2021-01-01 ENCOUNTER — COMMUNICATION - HEALTHEAST (OUTPATIENT)
Dept: GERIATRIC MEDICINE | Facility: CLINIC | Age: 71
End: 2021-01-01

## 2021-01-01 ENCOUNTER — ANTICOAGULATION THERAPY VISIT (OUTPATIENT)
Dept: ANTICOAGULATION | Facility: CLINIC | Age: 71
End: 2021-01-01

## 2021-01-01 ENCOUNTER — ANTICOAGULATION THERAPY VISIT (OUTPATIENT)
Dept: ANTICOAGULATION | Facility: CLINIC | Age: 71
End: 2021-01-01
Payer: COMMERCIAL

## 2021-01-01 ENCOUNTER — APPOINTMENT (OUTPATIENT)
Dept: RADIOLOGY | Facility: HOSPITAL | Age: 71
End: 2021-01-01
Attending: INTERNAL MEDICINE
Payer: COMMERCIAL

## 2021-01-01 ENCOUNTER — PATIENT OUTREACH (OUTPATIENT)
Dept: GERIATRIC MEDICINE | Facility: CLINIC | Age: 71
End: 2021-01-01
Payer: COMMERCIAL

## 2021-01-01 ENCOUNTER — APPOINTMENT (OUTPATIENT)
Dept: ULTRASOUND IMAGING | Facility: HOSPITAL | Age: 71
End: 2021-01-01
Attending: INTERNAL MEDICINE
Payer: COMMERCIAL

## 2021-01-01 ENCOUNTER — NURSE TRIAGE (OUTPATIENT)
Dept: NURSING | Facility: CLINIC | Age: 71
End: 2021-01-01

## 2021-01-01 ENCOUNTER — DOCUMENTATION ONLY (OUTPATIENT)
Dept: FAMILY MEDICINE | Facility: CLINIC | Age: 71
End: 2021-01-01
Payer: COMMERCIAL

## 2021-01-01 ENCOUNTER — APPOINTMENT (OUTPATIENT)
Dept: RADIOLOGY | Facility: HOSPITAL | Age: 71
End: 2021-01-01
Attending: NURSE PRACTITIONER
Payer: COMMERCIAL

## 2021-01-01 ENCOUNTER — HEALTH MAINTENANCE LETTER (OUTPATIENT)
Age: 71
End: 2021-01-01

## 2021-01-01 ENCOUNTER — APPOINTMENT (OUTPATIENT)
Dept: RADIOLOGY | Facility: HOSPITAL | Age: 71
End: 2021-01-01
Attending: EMERGENCY MEDICINE
Payer: COMMERCIAL

## 2021-01-01 ENCOUNTER — HOSPITAL ENCOUNTER (INPATIENT)
Facility: HOSPITAL | Age: 71
LOS: 12 days | End: 2021-12-27
Attending: EMERGENCY MEDICINE | Admitting: INTERNAL MEDICINE
Payer: COMMERCIAL

## 2021-01-01 ENCOUNTER — RECORDS - HEALTHEAST (OUTPATIENT)
Dept: FAMILY MEDICINE | Facility: CLINIC | Age: 71
End: 2021-01-01

## 2021-01-01 ENCOUNTER — PATIENT OUTREACH (OUTPATIENT)
Dept: GERIATRIC MEDICINE | Facility: CLINIC | Age: 71
End: 2021-01-01

## 2021-01-01 ENCOUNTER — AMBULATORY - HEALTHEAST (OUTPATIENT)
Dept: ANTICOAGULATION | Facility: CLINIC | Age: 71
End: 2021-01-01

## 2021-01-01 ENCOUNTER — TELEPHONE (OUTPATIENT)
Dept: FAMILY MEDICINE | Facility: CLINIC | Age: 71
End: 2021-01-01
Payer: COMMERCIAL

## 2021-01-01 VITALS — BODY MASS INDEX: 21.2 KG/M2 | WEIGHT: 108 LBS | HEIGHT: 60 IN

## 2021-01-01 VITALS — BODY MASS INDEX: 21.05 KG/M2 | HEIGHT: 60 IN | WEIGHT: 107.2 LBS

## 2021-01-01 VITALS — WEIGHT: 106.7 LBS | HEIGHT: 60 IN | BODY MASS INDEX: 20.95 KG/M2

## 2021-01-01 VITALS — HEIGHT: 60 IN | BODY MASS INDEX: 21.99 KG/M2 | WEIGHT: 112 LBS

## 2021-01-01 VITALS
WEIGHT: 100 LBS | SYSTOLIC BLOOD PRESSURE: 131 MMHG | DIASTOLIC BLOOD PRESSURE: 88 MMHG | HEART RATE: 68 BPM | OXYGEN SATURATION: 99 % | RESPIRATION RATE: 12 BRPM | TEMPERATURE: 99.3 F | BODY MASS INDEX: 19.53 KG/M2

## 2021-01-01 VITALS
SYSTOLIC BLOOD PRESSURE: 71 MMHG | WEIGHT: 115.08 LBS | RESPIRATION RATE: 24 BRPM | TEMPERATURE: 100.5 F | OXYGEN SATURATION: 65 % | BODY MASS INDEX: 22.59 KG/M2 | HEART RATE: 106 BPM | DIASTOLIC BLOOD PRESSURE: 47 MMHG | HEIGHT: 60 IN

## 2021-01-01 VITALS
HEART RATE: 56 BPM | DIASTOLIC BLOOD PRESSURE: 80 MMHG | RESPIRATION RATE: 16 BRPM | WEIGHT: 102 LBS | SYSTOLIC BLOOD PRESSURE: 142 MMHG | HEIGHT: 60 IN | BODY MASS INDEX: 20.03 KG/M2

## 2021-01-01 VITALS — HEIGHT: 60 IN | BODY MASS INDEX: 21.6 KG/M2 | WEIGHT: 110 LBS

## 2021-01-01 VITALS
BODY MASS INDEX: 20.77 KG/M2 | BODY MASS INDEX: 20.77 KG/M2 | WEIGHT: 105.8 LBS | WEIGHT: 105.8 LBS | HEIGHT: 60 IN | BODY MASS INDEX: 20.77 KG/M2 | HEIGHT: 60 IN | WEIGHT: 105.8 LBS | HEIGHT: 60 IN

## 2021-01-01 VITALS
DIASTOLIC BLOOD PRESSURE: 98 MMHG | WEIGHT: 104 LBS | RESPIRATION RATE: 12 BRPM | TEMPERATURE: 98 F | SYSTOLIC BLOOD PRESSURE: 162 MMHG | BODY MASS INDEX: 21.74 KG/M2 | HEART RATE: 116 BPM

## 2021-01-01 VITALS — WEIGHT: 106 LBS | BODY MASS INDEX: 21.41 KG/M2

## 2021-01-01 VITALS
DIASTOLIC BLOOD PRESSURE: 76 MMHG | BODY MASS INDEX: 18.88 KG/M2 | HEIGHT: 61 IN | RESPIRATION RATE: 16 BRPM | WEIGHT: 100 LBS | SYSTOLIC BLOOD PRESSURE: 112 MMHG | HEART RATE: 64 BPM

## 2021-01-01 VITALS
WEIGHT: 104.3 LBS | SYSTOLIC BLOOD PRESSURE: 118 MMHG | OXYGEN SATURATION: 97 % | DIASTOLIC BLOOD PRESSURE: 70 MMHG | BODY MASS INDEX: 19.71 KG/M2 | HEART RATE: 65 BPM

## 2021-01-01 VITALS — BODY MASS INDEX: 21.3 KG/M2 | HEIGHT: 60 IN | WEIGHT: 108.5 LBS

## 2021-01-01 VITALS — WEIGHT: 110 LBS | BODY MASS INDEX: 22.99 KG/M2

## 2021-01-01 VITALS — HEART RATE: 55 BPM | SYSTOLIC BLOOD PRESSURE: 136 MMHG | DIASTOLIC BLOOD PRESSURE: 76 MMHG

## 2021-01-01 VITALS — HEIGHT: 60 IN | WEIGHT: 106 LBS | BODY MASS INDEX: 20.81 KG/M2

## 2021-01-01 VITALS
RESPIRATION RATE: 14 BRPM | WEIGHT: 100 LBS | SYSTOLIC BLOOD PRESSURE: 112 MMHG | DIASTOLIC BLOOD PRESSURE: 68 MMHG | HEART RATE: 53 BPM | HEIGHT: 61 IN | BODY MASS INDEX: 18.88 KG/M2

## 2021-01-01 VITALS
HEART RATE: 90 BPM | DIASTOLIC BLOOD PRESSURE: 78 MMHG | WEIGHT: 98 LBS | BODY MASS INDEX: 18.52 KG/M2 | SYSTOLIC BLOOD PRESSURE: 122 MMHG | RESPIRATION RATE: 16 BRPM | TEMPERATURE: 97.3 F

## 2021-01-01 VITALS
BODY MASS INDEX: 21.79 KG/M2 | WEIGHT: 111 LBS | HEART RATE: 87 BPM | HEIGHT: 60 IN | TEMPERATURE: 98.9 F | DIASTOLIC BLOOD PRESSURE: 77 MMHG | RESPIRATION RATE: 14 BRPM | SYSTOLIC BLOOD PRESSURE: 110 MMHG | OXYGEN SATURATION: 97 %

## 2021-01-01 VITALS
SYSTOLIC BLOOD PRESSURE: 112 MMHG | DIASTOLIC BLOOD PRESSURE: 72 MMHG | WEIGHT: 96 LBS | OXYGEN SATURATION: 96 % | HEART RATE: 70 BPM | RESPIRATION RATE: 16 BRPM | BODY MASS INDEX: 18.14 KG/M2

## 2021-01-01 VITALS — HEIGHT: 60 IN | WEIGHT: 113.3 LBS | BODY MASS INDEX: 22.24 KG/M2

## 2021-01-01 VITALS — BODY MASS INDEX: 23.2 KG/M2 | WEIGHT: 111 LBS

## 2021-01-01 VITALS
WEIGHT: 99.2 LBS | TEMPERATURE: 97.7 F | RESPIRATION RATE: 16 BRPM | HEART RATE: 60 BPM | DIASTOLIC BLOOD PRESSURE: 68 MMHG | BODY MASS INDEX: 18.73 KG/M2 | SYSTOLIC BLOOD PRESSURE: 120 MMHG | HEIGHT: 61 IN

## 2021-01-01 VITALS — WEIGHT: 107.8 LBS | HEIGHT: 60 IN | BODY MASS INDEX: 21.17 KG/M2

## 2021-01-01 VITALS — WEIGHT: 107 LBS | BODY MASS INDEX: 22.56 KG/M2

## 2021-01-01 VITALS
DIASTOLIC BLOOD PRESSURE: 80 MMHG | WEIGHT: 105 LBS | SYSTOLIC BLOOD PRESSURE: 128 MMHG | BODY MASS INDEX: 19.84 KG/M2 | HEART RATE: 54 BPM | TEMPERATURE: 97.8 F | OXYGEN SATURATION: 97 %

## 2021-01-01 VITALS
SYSTOLIC BLOOD PRESSURE: 137 MMHG | WEIGHT: 102 LBS | TEMPERATURE: 98.3 F | HEART RATE: 112 BPM | BODY MASS INDEX: 20.03 KG/M2 | RESPIRATION RATE: 18 BRPM | HEIGHT: 60 IN | DIASTOLIC BLOOD PRESSURE: 98 MMHG

## 2021-01-01 VITALS — WEIGHT: 107 LBS | HEIGHT: 60 IN | BODY MASS INDEX: 21.01 KG/M2

## 2021-01-01 VITALS — HEART RATE: 60 BPM | DIASTOLIC BLOOD PRESSURE: 68 MMHG | SYSTOLIC BLOOD PRESSURE: 120 MMHG

## 2021-01-01 VITALS
BODY MASS INDEX: 23.54 KG/M2 | RESPIRATION RATE: 16 BRPM | HEIGHT: 61 IN | SYSTOLIC BLOOD PRESSURE: 104 MMHG | WEIGHT: 124.7 LBS | DIASTOLIC BLOOD PRESSURE: 60 MMHG | HEART RATE: 68 BPM

## 2021-01-01 VITALS — BODY MASS INDEX: 22.22 KG/M2 | WEIGHT: 110 LBS

## 2021-01-01 VITALS — HEIGHT: 60 IN | WEIGHT: 107 LBS | BODY MASS INDEX: 21.01 KG/M2

## 2021-01-01 VITALS — BODY MASS INDEX: 21.6 KG/M2 | HEIGHT: 60 IN | WEIGHT: 110 LBS

## 2021-01-01 VITALS — HEIGHT: 60 IN | WEIGHT: 108.8 LBS | BODY MASS INDEX: 21.36 KG/M2

## 2021-01-01 VITALS — BODY MASS INDEX: 18.69 KG/M2 | HEIGHT: 61 IN | WEIGHT: 99 LBS

## 2021-01-01 VITALS — BODY MASS INDEX: 21.4 KG/M2 | HEIGHT: 60 IN | WEIGHT: 109 LBS

## 2021-01-01 VITALS — WEIGHT: 106 LBS | BODY MASS INDEX: 22.35 KG/M2

## 2021-01-01 VITALS — HEIGHT: 60 IN | WEIGHT: 113 LBS | BODY MASS INDEX: 22.19 KG/M2

## 2021-01-01 VITALS
DIASTOLIC BLOOD PRESSURE: 70 MMHG | OXYGEN SATURATION: 98 % | SYSTOLIC BLOOD PRESSURE: 138 MMHG | HEART RATE: 58 BPM | TEMPERATURE: 98.4 F

## 2021-01-01 VITALS — WEIGHT: 104 LBS | BODY MASS INDEX: 20.42 KG/M2 | HEIGHT: 60 IN

## 2021-01-01 VITALS — WEIGHT: 108 LBS | BODY MASS INDEX: 21.09 KG/M2

## 2021-01-01 VITALS — WEIGHT: 108 LBS | BODY MASS INDEX: 21.2 KG/M2 | HEIGHT: 60 IN

## 2021-01-01 VITALS — WEIGHT: 96 LBS | BODY MASS INDEX: 18.14 KG/M2

## 2021-01-01 VITALS — BODY MASS INDEX: 17.95 KG/M2 | WEIGHT: 95 LBS

## 2021-01-01 VITALS — WEIGHT: 109 LBS | BODY MASS INDEX: 22.98 KG/M2

## 2021-01-01 VITALS — OXYGEN SATURATION: 98 %

## 2021-01-01 VITALS — WEIGHT: 93 LBS | BODY MASS INDEX: 17.57 KG/M2

## 2021-01-01 DIAGNOSIS — G81.94 LEFT HEMIPARESIS (H): Primary | ICD-10-CM

## 2021-01-01 DIAGNOSIS — J96.01 ACUTE RESPIRATORY FAILURE WITH HYPOXIA (H): ICD-10-CM

## 2021-01-01 DIAGNOSIS — F32.1 MODERATE MAJOR DEPRESSION (H): ICD-10-CM

## 2021-01-01 DIAGNOSIS — I06.9 AORTIC VALVE DISEASE, RHEUMATIC: ICD-10-CM

## 2021-01-01 DIAGNOSIS — I05.0 MITRAL VALVE STENOSIS, UNSPECIFIED ETIOLOGY: ICD-10-CM

## 2021-01-01 DIAGNOSIS — E87.20 LACTIC ACIDOSIS: ICD-10-CM

## 2021-01-01 DIAGNOSIS — U07.1 PNEUMONIA DUE TO 2019 NOVEL CORONAVIRUS: ICD-10-CM

## 2021-01-01 DIAGNOSIS — R79.0 LOW MAGNESIUM LEVEL: ICD-10-CM

## 2021-01-01 DIAGNOSIS — I50.22 CHRONIC SYSTOLIC CONGESTIVE HEART FAILURE (H): ICD-10-CM

## 2021-01-01 DIAGNOSIS — I48.91 ATRIAL FIBRILLATION, UNSPECIFIED TYPE (H): Primary | ICD-10-CM

## 2021-01-01 DIAGNOSIS — E03.9 HYPOTHYROIDISM, UNSPECIFIED TYPE: Primary | ICD-10-CM

## 2021-01-01 DIAGNOSIS — Z95.2 S/P AVR (AORTIC VALVE REPLACEMENT): ICD-10-CM

## 2021-01-01 DIAGNOSIS — R00.0 TACHYCARDIA: ICD-10-CM

## 2021-01-01 DIAGNOSIS — I66.9 CEREBRAL ARTERY OCCLUSION: ICD-10-CM

## 2021-01-01 DIAGNOSIS — D69.6 THROMBOCYTOPENIA (H): ICD-10-CM

## 2021-01-01 DIAGNOSIS — G81.94 LEFT HEMIPARESIS (H): ICD-10-CM

## 2021-01-01 DIAGNOSIS — I48.3 TYPICAL ATRIAL FLUTTER (H): ICD-10-CM

## 2021-01-01 DIAGNOSIS — I48.92 ATRIAL FLUTTER WITH RAPID VENTRICULAR RESPONSE (H): ICD-10-CM

## 2021-01-01 DIAGNOSIS — D50.9 IRON DEFICIENCY ANEMIA, UNSPECIFIED IRON DEFICIENCY ANEMIA TYPE: ICD-10-CM

## 2021-01-01 DIAGNOSIS — I25.10 ATHEROSCLEROSIS OF NATIVE CORONARY ARTERY OF NATIVE HEART WITHOUT ANGINA PECTORIS: ICD-10-CM

## 2021-01-01 DIAGNOSIS — I48.91 A-FIB (H): ICD-10-CM

## 2021-01-01 DIAGNOSIS — F32.9 MAJOR DEPRESSIVE DISORDER, REMISSION STATUS UNSPECIFIED, UNSPECIFIED WHETHER RECURRENT: ICD-10-CM

## 2021-01-01 DIAGNOSIS — Z98.890 S/P MVR (MITRAL VALVE REPAIR): ICD-10-CM

## 2021-01-01 DIAGNOSIS — R13.10 DYSPHAGIA: ICD-10-CM

## 2021-01-01 DIAGNOSIS — I48.91 ATRIAL FIBRILLATION WITH RVR (H): ICD-10-CM

## 2021-01-01 DIAGNOSIS — N40.1 BENIGN PROSTATIC HYPERPLASIA WITH URINARY OBSTRUCTION: ICD-10-CM

## 2021-01-01 DIAGNOSIS — E03.9 HYPOTHYROIDISM, UNSPECIFIED TYPE: ICD-10-CM

## 2021-01-01 DIAGNOSIS — I06.9 AORTIC VALVE DISEASE, RHEUMATIC: Primary | ICD-10-CM

## 2021-01-01 DIAGNOSIS — I48.91 ATRIAL FIBRILLATION, UNSPECIFIED TYPE (H): ICD-10-CM

## 2021-01-01 DIAGNOSIS — Z95.2 S/P MVR (MITRAL VALVE REPLACEMENT): ICD-10-CM

## 2021-01-01 DIAGNOSIS — I10 BENIGN ESSENTIAL HYPERTENSION: ICD-10-CM

## 2021-01-01 DIAGNOSIS — I50.32 CHRONIC HEART FAILURE WITH PRESERVED EJECTION FRACTION (H): ICD-10-CM

## 2021-01-01 DIAGNOSIS — R57.9 SHOCK (H): ICD-10-CM

## 2021-01-01 DIAGNOSIS — N13.8 BENIGN PROSTATIC HYPERPLASIA WITH URINARY OBSTRUCTION: ICD-10-CM

## 2021-01-01 DIAGNOSIS — Z53.9 DIAGNOSIS NOT YET DEFINED: Primary | ICD-10-CM

## 2021-01-01 DIAGNOSIS — H54.7 DECREASED VISUAL ACUITY: ICD-10-CM

## 2021-01-01 DIAGNOSIS — I61.9 HEMORRHAGIC STROKE (H): ICD-10-CM

## 2021-01-01 DIAGNOSIS — R60.0 LOWER EXTREMITY EDEMA: Primary | ICD-10-CM

## 2021-01-01 DIAGNOSIS — I50.9 ACUTE ON CHRONIC CONGESTIVE HEART FAILURE, UNSPECIFIED HEART FAILURE TYPE (H): ICD-10-CM

## 2021-01-01 DIAGNOSIS — Z09 HOSPITAL DISCHARGE FOLLOW-UP: ICD-10-CM

## 2021-01-01 DIAGNOSIS — I48.11 LONGSTANDING PERSISTENT ATRIAL FIBRILLATION (H): Primary | ICD-10-CM

## 2021-01-01 DIAGNOSIS — I48.91 ATRIAL FIBRILLATION (H): Primary | ICD-10-CM

## 2021-01-01 DIAGNOSIS — I50.33 ACUTE ON CHRONIC HEART FAILURE WITH PRESERVED EJECTION FRACTION (HFPEF) (H): ICD-10-CM

## 2021-01-01 DIAGNOSIS — J12.82 PNEUMONIA DUE TO 2019 NOVEL CORONAVIRUS: ICD-10-CM

## 2021-01-01 DIAGNOSIS — R60.0 LOWER EXTREMITY EDEMA: ICD-10-CM

## 2021-01-01 DIAGNOSIS — R06.02 SHORTNESS OF BREATH: ICD-10-CM

## 2021-01-01 DIAGNOSIS — I48.11 LONGSTANDING PERSISTENT ATRIAL FIBRILLATION (H): ICD-10-CM

## 2021-01-01 DIAGNOSIS — R79.0 LOW MAGNESIUM LEVEL: Primary | ICD-10-CM

## 2021-01-01 DIAGNOSIS — I09.9 RHEUMATIC HEART DISEASE: ICD-10-CM

## 2021-01-01 DIAGNOSIS — R94.31 PROLONGED QT INTERVAL: ICD-10-CM

## 2021-01-01 LAB
ABO/RH(D): NORMAL
ALBUMIN SERPL-MCNC: 1.7 G/DL (ref 3.5–5)
ALBUMIN SERPL-MCNC: 3.1 G/DL (ref 3.5–5)
ALBUMIN UR-MCNC: 10 MG/DL
ALBUMIN UR-MCNC: 30 MG/DL
ALP SERPL-CCNC: 83 U/L (ref 45–120)
ALP SERPL-CCNC: 93 U/L (ref 45–120)
ALT SERPL W P-5'-P-CCNC: 15 U/L (ref 0–45)
ALT SERPL W P-5'-P-CCNC: 27 U/L (ref 0–45)
AMIODARONE SERPL-MCNC: <0.3 UG/ML (ref 0.5–2)
ANION GAP SERPL CALCULATED.3IONS-SCNC: 10 MMOL/L (ref 5–18)
ANION GAP SERPL CALCULATED.3IONS-SCNC: 11 MMOL/L (ref 5–18)
ANION GAP SERPL CALCULATED.3IONS-SCNC: 12 MMOL/L (ref 5–18)
ANION GAP SERPL CALCULATED.3IONS-SCNC: 13 MMOL/L (ref 5–18)
ANION GAP SERPL CALCULATED.3IONS-SCNC: 5 MMOL/L (ref 5–18)
ANION GAP SERPL CALCULATED.3IONS-SCNC: 6 MMOL/L (ref 5–18)
ANION GAP SERPL CALCULATED.3IONS-SCNC: 6 MMOL/L (ref 5–18)
ANION GAP SERPL CALCULATED.3IONS-SCNC: 7 MMOL/L (ref 5–18)
ANION GAP SERPL CALCULATED.3IONS-SCNC: 8 MMOL/L (ref 5–18)
ANION GAP SERPL CALCULATED.3IONS-SCNC: 9 MMOL/L (ref 5–18)
APPEARANCE UR: ABNORMAL
APPEARANCE UR: CLEAR
AST SERPL W P-5'-P-CCNC: 23 U/L (ref 0–40)
AST SERPL W P-5'-P-CCNC: 74 U/L (ref 0–40)
ATRIAL RATE - MUSE: 197 BPM
ATRIAL RATE - MUSE: 220 BPM
ATRIAL RATE - MUSE: 242 BPM
BACTERIA #/AREA URNS HPF: ABNORMAL /HPF
BACTERIA BLD CULT: NO GROWTH
BACTERIA SPT CULT: ABNORMAL
BACTERIA UR CULT: NO GROWTH
BASE EXCESS BLDA CALC-SCNC: -0.5 MMOL/L
BASE EXCESS BLDA CALC-SCNC: -1.2 MMOL/L
BASE EXCESS BLDA CALC-SCNC: -1.6 MMOL/L
BASE EXCESS BLDA CALC-SCNC: -2 MMOL/L
BASE EXCESS BLDA CALC-SCNC: -2.7 MMOL/L
BASE EXCESS BLDA CALC-SCNC: -2.8 MMOL/L
BASE EXCESS BLDA CALC-SCNC: -2.9 MMOL/L
BASE EXCESS BLDA CALC-SCNC: -3 MMOL/L
BASE EXCESS BLDA CALC-SCNC: -3.5 MMOL/L
BASE EXCESS BLDA CALC-SCNC: -5.1 MMOL/L
BASE EXCESS BLDA CALC-SCNC: -5.1 MMOL/L
BASE EXCESS BLDA CALC-SCNC: -7.4 MMOL/L
BASE EXCESS BLDA CALC-SCNC: 0.7 MMOL/L
BASE EXCESS BLDA CALC-SCNC: 0.7 MMOL/L
BASE EXCESS BLDA CALC-SCNC: 1.7 MMOL/L
BASE EXCESS BLDA CALC-SCNC: 4 MMOL/L
BASE EXCESS BLDA CALC-SCNC: 4.7 MMOL/L
BASE EXCESS BLDA CALC-SCNC: 5.1 MMOL/L
BASE EXCESS BLDA CALC-SCNC: 5.3 MMOL/L
BASE EXCESS BLDA CALC-SCNC: 6.4 MMOL/L
BASE EXCESS BLDA CALC-SCNC: 6.7 MMOL/L
BASE EXCESS BLDA CALC-SCNC: 6.9 MMOL/L
BASE EXCESS BLDA CALC-SCNC: 6.9 MMOL/L
BASE EXCESS BLDA CALC-SCNC: 7 MMOL/L
BASE EXCESS BLDA CALC-SCNC: 7.3 MMOL/L
BASE EXCESS BLDA CALC-SCNC: 8 MMOL/L
BASE EXCESS BLDA CALC-SCNC: 8.4 MMOL/L
BASE EXCESS BLDA CALC-SCNC: 9.1 MMOL/L
BASE EXCESS BLDA CALC-SCNC: 9.1 MMOL/L
BASE EXCESS BLDA CALC-SCNC: 9.9 MMOL/L
BASE EXCESS BLDV CALC-SCNC: 2 MMOL/L
BASOPHILS # BLD AUTO: 0 THOU/UL (ref 0–0.2)
BASOPHILS # BLD AUTO: 0.2 10E3/UL (ref 0–0.2)
BASOPHILS # BLD AUTO: 0.2 10E3/UL (ref 0–0.2)
BASOPHILS # BLD MANUAL: 0 10E3/UL (ref 0–0.2)
BASOPHILS NFR BLD AUTO: 1 %
BASOPHILS NFR BLD AUTO: 1 %
BASOPHILS NFR BLD AUTO: 1 % (ref 0–2)
BASOPHILS NFR BLD MANUAL: 0 %
BILIRUB DIRECT SERPL-MCNC: 0.5 MG/DL
BILIRUB SERPL-MCNC: 1.2 MG/DL (ref 0–1)
BILIRUB SERPL-MCNC: 1.3 MG/DL (ref 0–1)
BILIRUB UR QL STRIP: NEGATIVE
BILIRUB UR QL STRIP: NEGATIVE
BNP SERPL-MCNC: 327 PG/ML (ref 0–67)
BNP SERPL-MCNC: 459 PG/ML (ref 0–67)
BNP SERPL-MCNC: 577 PG/ML (ref 0–67)
BNP SERPL-MCNC: 645 PG/ML (ref 0–67)
BNP SERPL-MCNC: 753 PG/ML (ref 0–67)
BUN SERPL-MCNC: 16 MG/DL (ref 8–28)
BUN SERPL-MCNC: 18 MG/DL (ref 8–28)
BUN SERPL-MCNC: 18 MG/DL (ref 8–28)
BUN SERPL-MCNC: 19 MG/DL (ref 8–28)
BUN SERPL-MCNC: 22 MG/DL (ref 8–28)
BUN SERPL-MCNC: 26 MG/DL (ref 8–28)
BUN SERPL-MCNC: 26 MG/DL (ref 8–28)
BUN SERPL-MCNC: 27 MG/DL (ref 8–28)
BUN SERPL-MCNC: 29 MG/DL (ref 8–28)
BUN SERPL-MCNC: 30 MG/DL (ref 8–28)
BUN SERPL-MCNC: 32 MG/DL (ref 8–28)
BUN SERPL-MCNC: 42 MG/DL (ref 8–28)
BUN SERPL-MCNC: 43 MG/DL (ref 8–28)
BUN SERPL-MCNC: 45 MG/DL (ref 8–28)
BUN SERPL-MCNC: 45 MG/DL (ref 8–28)
C REACTIVE PROTEIN LHE: 10 MG/DL (ref 0–0.8)
C REACTIVE PROTEIN LHE: 13.9 MG/DL (ref 0–0.8)
C REACTIVE PROTEIN LHE: 4.5 MG/DL (ref 0–0.8)
C REACTIVE PROTEIN LHE: 8.4 MG/DL (ref 0–0.8)
C REACTIVE PROTEIN LHE: 9.8 MG/DL (ref 0–0.8)
CALCIUM SERPL-MCNC: 6.8 MG/DL (ref 8.5–10.5)
CALCIUM SERPL-MCNC: 7.2 MG/DL (ref 8.5–10.5)
CALCIUM SERPL-MCNC: 7.3 MG/DL (ref 8.5–10.5)
CALCIUM SERPL-MCNC: 7.4 MG/DL (ref 8.5–10.5)
CALCIUM SERPL-MCNC: 7.5 MG/DL (ref 8.5–10.5)
CALCIUM SERPL-MCNC: 7.6 MG/DL (ref 8.5–10.5)
CALCIUM SERPL-MCNC: 7.7 MG/DL (ref 8.5–10.5)
CALCIUM SERPL-MCNC: 7.8 MG/DL (ref 8.5–10.5)
CALCIUM SERPL-MCNC: 7.8 MG/DL (ref 8.5–10.5)
CALCIUM SERPL-MCNC: 8 MG/DL (ref 8.5–10.5)
CALCIUM SERPL-MCNC: 8.1 MG/DL (ref 8.5–10.5)
CALCIUM SERPL-MCNC: 8.3 MG/DL (ref 8.5–10.5)
CALCIUM SERPL-MCNC: 8.3 MG/DL (ref 8.5–10.5)
CALCIUM, IONIZED MEASURED: 1.06 MMOL/L (ref 1.11–1.3)
CHLORIDE BLD-SCNC: 101 MMOL/L (ref 98–107)
CHLORIDE BLD-SCNC: 104 MMOL/L (ref 98–107)
CHLORIDE BLD-SCNC: 104 MMOL/L (ref 98–107)
CHLORIDE BLD-SCNC: 105 MMOL/L (ref 98–107)
CHLORIDE BLD-SCNC: 105 MMOL/L (ref 98–107)
CHLORIDE BLD-SCNC: 107 MMOL/L (ref 98–107)
CHLORIDE BLD-SCNC: 107 MMOL/L (ref 98–107)
CHLORIDE BLD-SCNC: 108 MMOL/L (ref 98–107)
CHLORIDE BLD-SCNC: 108 MMOL/L (ref 98–107)
CHLORIDE BLD-SCNC: 110 MMOL/L (ref 98–107)
CHLORIDE BLD-SCNC: 111 MMOL/L (ref 98–107)
CHLORIDE BLD-SCNC: 112 MMOL/L (ref 98–107)
CHLORIDE BLD-SCNC: 112 MMOL/L (ref 98–107)
CHLORIDE BLD-SCNC: 114 MMOL/L (ref 98–107)
CK SERPL-CCNC: 12 U/L (ref 30–190)
CO2 SERPL-SCNC: 19 MMOL/L (ref 22–31)
CO2 SERPL-SCNC: 19 MMOL/L (ref 22–31)
CO2 SERPL-SCNC: 20 MMOL/L (ref 22–31)
CO2 SERPL-SCNC: 21 MMOL/L (ref 22–31)
CO2 SERPL-SCNC: 22 MMOL/L (ref 22–31)
CO2 SERPL-SCNC: 23 MMOL/L (ref 22–31)
CO2 SERPL-SCNC: 23 MMOL/L (ref 22–31)
CO2 SERPL-SCNC: 24 MMOL/L (ref 22–31)
CO2 SERPL-SCNC: 25 MMOL/L (ref 22–31)
CO2 SERPL-SCNC: 27 MMOL/L (ref 22–31)
CO2 SERPL-SCNC: 28 MMOL/L (ref 22–31)
CO2 SERPL-SCNC: 29 MMOL/L (ref 22–31)
CO2 SERPL-SCNC: 29 MMOL/L (ref 22–31)
CO2 SERPL-SCNC: 31 MMOL/L (ref 22–31)
CO2 SERPL-SCNC: 31 MMOL/L (ref 22–31)
COHGB MFR BLD: 79.7 % (ref 95–96)
COHGB MFR BLD: 83.1 % (ref 95–96)
COHGB MFR BLD: 88.9 % (ref 95–96)
COHGB MFR BLD: 89.2 % (ref 95–96)
COHGB MFR BLD: 89.6 % (ref 95–96)
COHGB MFR BLD: 90 % (ref 95–96)
COHGB MFR BLD: 90.5 % (ref 95–96)
COHGB MFR BLD: 90.7 % (ref 95–96)
COHGB MFR BLD: 90.7 % (ref 95–96)
COHGB MFR BLD: 91.2 % (ref 95–96)
COHGB MFR BLD: 91.8 % (ref 95–96)
COHGB MFR BLD: 91.9 % (ref 95–96)
COHGB MFR BLD: 92.4 % (ref 95–96)
COHGB MFR BLD: 92.7 % (ref 95–96)
COHGB MFR BLD: 92.7 % (ref 95–96)
COHGB MFR BLD: 92.9 % (ref 95–96)
COHGB MFR BLD: 93.6 % (ref 95–96)
COHGB MFR BLD: 93.9 % (ref 95–96)
COHGB MFR BLD: 94 % (ref 95–96)
COHGB MFR BLD: 94 % (ref 95–96)
COHGB MFR BLD: 94.1 % (ref 95–96)
COHGB MFR BLD: 94.4 % (ref 95–96)
COHGB MFR BLD: 94.5 % (ref 95–96)
COHGB MFR BLD: 95 % (ref 95–96)
COHGB MFR BLD: 96.7 % (ref 95–96)
COHGB MFR BLD: 96.8 % (ref 95–96)
COHGB MFR BLD: 97 % (ref 95–96)
COHGB MFR BLD: 97.9 % (ref 95–96)
COHGB MFR BLD: 99.1 % (ref 95–96)
COHGB MFR BLD: 99.9 % (ref 95–96)
COLOR UR AUTO: YELLOW
COLOR UR AUTO: YELLOW
CREAT SERPL-MCNC: 0.72 MG/DL (ref 0.7–1.3)
CREAT SERPL-MCNC: 0.76 MG/DL (ref 0.7–1.3)
CREAT SERPL-MCNC: 0.77 MG/DL (ref 0.7–1.3)
CREAT SERPL-MCNC: 0.79 MG/DL (ref 0.7–1.3)
CREAT SERPL-MCNC: 0.8 MG/DL (ref 0.7–1.3)
CREAT SERPL-MCNC: 0.83 MG/DL (ref 0.7–1.3)
CREAT SERPL-MCNC: 0.85 MG/DL (ref 0.7–1.3)
CREAT SERPL-MCNC: 0.85 MG/DL (ref 0.7–1.3)
CREAT SERPL-MCNC: 0.9 MG/DL (ref 0.7–1.3)
CREAT SERPL-MCNC: 0.92 MG/DL (ref 0.7–1.3)
CREAT SERPL-MCNC: 0.92 MG/DL (ref 0.7–1.3)
CREAT SERPL-MCNC: 1.04 MG/DL (ref 0.7–1.3)
CREAT SERPL-MCNC: 1.36 MG/DL (ref 0.7–1.3)
D DIMER PPP FEU-MCNC: 0.39 UG/ML FEU (ref 0–0.5)
D DIMER PPP FEU-MCNC: 0.39 UG/ML FEU (ref 0–0.5)
D DIMER PPP FEU-MCNC: 0.4 UG/ML FEU (ref 0–0.5)
D DIMER PPP FEU-MCNC: 0.55 UG/ML FEU (ref 0–0.5)
D DIMER PPP FEU-MCNC: 1.03 UG/ML FEU (ref 0–0.5)
DESETHYLAMIODARONE SERPL-MCNC: <0.3 UG/ML
DIASTOLIC BLOOD PRESSURE - MUSE: 61 MMHG
DIASTOLIC BLOOD PRESSURE - MUSE: 61 MMHG
DIASTOLIC BLOOD PRESSURE - MUSE: NORMAL
EOSINOPHIL # BLD AUTO: 0.2 10E3/UL (ref 0–0.7)
EOSINOPHIL # BLD AUTO: 0.3 10E3/UL (ref 0–0.7)
EOSINOPHIL # BLD MANUAL: 0 10E3/UL (ref 0–0.7)
EOSINOPHIL COUNT (ABSOLUTE): 0.1 THOU/UL (ref 0–0.4)
EOSINOPHIL NFR BLD AUTO: 1 %
EOSINOPHIL NFR BLD AUTO: 1 %
EOSINOPHIL NFR BLD AUTO: 2 % (ref 0–6)
EOSINOPHIL NFR BLD MANUAL: 0 %
ERYTHROCYTE [DISTWIDTH] IN BLOOD BY AUTOMATED COUNT: 13 % (ref 11–14.5)
ERYTHROCYTE [DISTWIDTH] IN BLOOD BY AUTOMATED COUNT: 13.5 % (ref 10–15)
ERYTHROCYTE [DISTWIDTH] IN BLOOD BY AUTOMATED COUNT: 13.6 % (ref 10–15)
ERYTHROCYTE [DISTWIDTH] IN BLOOD BY AUTOMATED COUNT: 13.8 % (ref 10–15)
ERYTHROCYTE [DISTWIDTH] IN BLOOD BY AUTOMATED COUNT: 13.8 % (ref 10–15)
ERYTHROCYTE [DISTWIDTH] IN BLOOD BY AUTOMATED COUNT: 14.2 % (ref 10–15)
ERYTHROCYTE [DISTWIDTH] IN BLOOD BY AUTOMATED COUNT: 14.5 % (ref 10–15)
ERYTHROCYTE [DISTWIDTH] IN BLOOD BY AUTOMATED COUNT: 15 % (ref 10–15)
ERYTHROCYTE [DISTWIDTH] IN BLOOD BY AUTOMATED COUNT: 15.5 % (ref 11–14.5)
ERYTHROCYTE [DISTWIDTH] IN BLOOD BY AUTOMATED COUNT: 16.1 % (ref 10–15)
ERYTHROCYTE [DISTWIDTH] IN BLOOD BY AUTOMATED COUNT: 16.1 % (ref 11–14.5)
ERYTHROCYTE [DISTWIDTH] IN BLOOD BY AUTOMATED COUNT: 16.1 % (ref 11–14.5)
ERYTHROCYTE [DISTWIDTH] IN BLOOD BY AUTOMATED COUNT: 16.3 % (ref 10–15)
FIBRINOGEN PPP-MCNC: 374 MG/DL (ref 170–490)
FIBRINOGEN PPP-MCNC: 431 MG/DL (ref 170–490)
FIBRINOGEN PPP-MCNC: 472 MG/DL (ref 170–490)
FIBRINOGEN PPP-MCNC: 480 MG/DL (ref 170–490)
FLUAV RNA SPEC QL NAA+PROBE: NEGATIVE
FLUBV RNA RESP QL NAA+PROBE: NEGATIVE
GFR SERPL CREATININE-BSD FRML MDRD: 52 ML/MIN/1.73M2
GFR SERPL CREATININE-BSD FRML MDRD: 77 ML/MIN/1.73M2
GFR SERPL CREATININE-BSD FRML MDRD: 86 ML/MIN/1.73M2
GFR SERPL CREATININE-BSD FRML MDRD: 88 ML/MIN/1.73M2
GFR SERPL CREATININE-BSD FRML MDRD: 89 ML/MIN/1.73M2
GFR SERPL CREATININE-BSD FRML MDRD: >60 ML/MIN/1.73M2
GFR SERPL CREATININE-BSD FRML MDRD: >90 ML/MIN/1.73M2
GLUCOSE BLD-MCNC: 101 MG/DL (ref 70–125)
GLUCOSE BLD-MCNC: 113 MG/DL (ref 70–125)
GLUCOSE BLD-MCNC: 125 MG/DL (ref 70–125)
GLUCOSE BLD-MCNC: 133 MG/DL (ref 70–125)
GLUCOSE BLD-MCNC: 136 MG/DL (ref 70–125)
GLUCOSE BLD-MCNC: 141 MG/DL (ref 70–125)
GLUCOSE BLD-MCNC: 153 MG/DL (ref 70–125)
GLUCOSE BLD-MCNC: 167 MG/DL (ref 70–125)
GLUCOSE BLD-MCNC: 170 MG/DL (ref 70–125)
GLUCOSE BLD-MCNC: 173 MG/DL (ref 70–125)
GLUCOSE BLD-MCNC: 173 MG/DL (ref 70–125)
GLUCOSE BLD-MCNC: 202 MG/DL (ref 70–125)
GLUCOSE BLD-MCNC: 205 MG/DL (ref 70–125)
GLUCOSE BLD-MCNC: 259 MG/DL (ref 70–125)
GLUCOSE BLD-MCNC: 307 MG/DL (ref 70–125)
GLUCOSE BLD-MCNC: 77 MG/DL (ref 70–125)
GLUCOSE BLD-MCNC: 93 MG/DL (ref 70–125)
GLUCOSE BLDC GLUCOMTR-MCNC: 100 MG/DL (ref 70–99)
GLUCOSE BLDC GLUCOMTR-MCNC: 101 MG/DL (ref 70–99)
GLUCOSE BLDC GLUCOMTR-MCNC: 106 MG/DL (ref 70–99)
GLUCOSE BLDC GLUCOMTR-MCNC: 107 MG/DL (ref 70–99)
GLUCOSE BLDC GLUCOMTR-MCNC: 110 MG/DL (ref 70–99)
GLUCOSE BLDC GLUCOMTR-MCNC: 112 MG/DL (ref 70–99)
GLUCOSE BLDC GLUCOMTR-MCNC: 112 MG/DL (ref 70–99)
GLUCOSE BLDC GLUCOMTR-MCNC: 122 MG/DL (ref 70–99)
GLUCOSE BLDC GLUCOMTR-MCNC: 124 MG/DL (ref 70–99)
GLUCOSE BLDC GLUCOMTR-MCNC: 127 MG/DL (ref 70–99)
GLUCOSE BLDC GLUCOMTR-MCNC: 136 MG/DL (ref 70–99)
GLUCOSE BLDC GLUCOMTR-MCNC: 137 MG/DL (ref 70–99)
GLUCOSE BLDC GLUCOMTR-MCNC: 137 MG/DL (ref 70–99)
GLUCOSE BLDC GLUCOMTR-MCNC: 143 MG/DL (ref 70–99)
GLUCOSE BLDC GLUCOMTR-MCNC: 147 MG/DL (ref 70–99)
GLUCOSE BLDC GLUCOMTR-MCNC: 154 MG/DL (ref 70–99)
GLUCOSE BLDC GLUCOMTR-MCNC: 156 MG/DL (ref 70–99)
GLUCOSE BLDC GLUCOMTR-MCNC: 156 MG/DL (ref 70–99)
GLUCOSE BLDC GLUCOMTR-MCNC: 157 MG/DL (ref 70–99)
GLUCOSE BLDC GLUCOMTR-MCNC: 168 MG/DL (ref 70–99)
GLUCOSE BLDC GLUCOMTR-MCNC: 176 MG/DL (ref 70–99)
GLUCOSE BLDC GLUCOMTR-MCNC: 177 MG/DL (ref 70–99)
GLUCOSE BLDC GLUCOMTR-MCNC: 178 MG/DL (ref 70–99)
GLUCOSE BLDC GLUCOMTR-MCNC: 182 MG/DL (ref 70–99)
GLUCOSE BLDC GLUCOMTR-MCNC: 183 MG/DL (ref 70–99)
GLUCOSE BLDC GLUCOMTR-MCNC: 185 MG/DL (ref 70–99)
GLUCOSE BLDC GLUCOMTR-MCNC: 188 MG/DL (ref 70–99)
GLUCOSE BLDC GLUCOMTR-MCNC: 189 MG/DL (ref 70–99)
GLUCOSE BLDC GLUCOMTR-MCNC: 204 MG/DL (ref 70–99)
GLUCOSE BLDC GLUCOMTR-MCNC: 204 MG/DL (ref 70–99)
GLUCOSE BLDC GLUCOMTR-MCNC: 206 MG/DL (ref 70–99)
GLUCOSE BLDC GLUCOMTR-MCNC: 207 MG/DL (ref 70–99)
GLUCOSE BLDC GLUCOMTR-MCNC: 210 MG/DL (ref 70–99)
GLUCOSE BLDC GLUCOMTR-MCNC: 213 MG/DL (ref 70–99)
GLUCOSE BLDC GLUCOMTR-MCNC: 215 MG/DL (ref 70–99)
GLUCOSE BLDC GLUCOMTR-MCNC: 216 MG/DL (ref 70–99)
GLUCOSE BLDC GLUCOMTR-MCNC: 225 MG/DL (ref 70–99)
GLUCOSE BLDC GLUCOMTR-MCNC: 226 MG/DL (ref 70–99)
GLUCOSE BLDC GLUCOMTR-MCNC: 231 MG/DL (ref 70–99)
GLUCOSE BLDC GLUCOMTR-MCNC: 232 MG/DL (ref 70–99)
GLUCOSE BLDC GLUCOMTR-MCNC: 233 MG/DL (ref 70–99)
GLUCOSE BLDC GLUCOMTR-MCNC: 235 MG/DL (ref 70–99)
GLUCOSE BLDC GLUCOMTR-MCNC: 239 MG/DL (ref 70–99)
GLUCOSE BLDC GLUCOMTR-MCNC: 242 MG/DL (ref 70–99)
GLUCOSE BLDC GLUCOMTR-MCNC: 242 MG/DL (ref 70–99)
GLUCOSE BLDC GLUCOMTR-MCNC: 244 MG/DL (ref 70–99)
GLUCOSE BLDC GLUCOMTR-MCNC: 244 MG/DL (ref 70–99)
GLUCOSE BLDC GLUCOMTR-MCNC: 249 MG/DL (ref 70–99)
GLUCOSE BLDC GLUCOMTR-MCNC: 250 MG/DL (ref 70–99)
GLUCOSE BLDC GLUCOMTR-MCNC: 250 MG/DL (ref 70–99)
GLUCOSE BLDC GLUCOMTR-MCNC: 253 MG/DL (ref 70–99)
GLUCOSE BLDC GLUCOMTR-MCNC: 262 MG/DL (ref 70–99)
GLUCOSE BLDC GLUCOMTR-MCNC: 264 MG/DL (ref 70–99)
GLUCOSE BLDC GLUCOMTR-MCNC: 275 MG/DL (ref 70–99)
GLUCOSE BLDC GLUCOMTR-MCNC: 289 MG/DL (ref 70–99)
GLUCOSE BLDC GLUCOMTR-MCNC: 300 MG/DL (ref 70–99)
GLUCOSE BLDC GLUCOMTR-MCNC: 342 MG/DL (ref 70–99)
GLUCOSE BLDC GLUCOMTR-MCNC: 54 MG/DL (ref 70–99)
GLUCOSE BLDC GLUCOMTR-MCNC: 56 MG/DL (ref 70–99)
GLUCOSE BLDC GLUCOMTR-MCNC: 65 MG/DL (ref 70–99)
GLUCOSE BLDC GLUCOMTR-MCNC: 74 MG/DL (ref 70–99)
GLUCOSE BLDC GLUCOMTR-MCNC: 80 MG/DL (ref 70–99)
GLUCOSE BLDC GLUCOMTR-MCNC: 80 MG/DL (ref 70–99)
GLUCOSE BLDC GLUCOMTR-MCNC: 83 MG/DL (ref 70–99)
GLUCOSE BLDC GLUCOMTR-MCNC: 86 MG/DL (ref 70–99)
GLUCOSE BLDC GLUCOMTR-MCNC: 89 MG/DL (ref 70–99)
GLUCOSE BLDC GLUCOMTR-MCNC: 91 MG/DL (ref 70–99)
GLUCOSE BLDC GLUCOMTR-MCNC: 92 MG/DL (ref 70–99)
GLUCOSE UR STRIP-MCNC: NEGATIVE MG/DL
GLUCOSE UR STRIP-MCNC: NEGATIVE MG/DL
GRAM STAIN RESULT: ABNORMAL
HBA1C MFR BLD: 6 %
HCO3 BLD-SCNC: 19 MMOL/L (ref 23–29)
HCO3 BLD-SCNC: 20 MMOL/L (ref 23–29)
HCO3 BLD-SCNC: 20 MMOL/L (ref 23–29)
HCO3 BLD-SCNC: 21 MMOL/L (ref 23–29)
HCO3 BLD-SCNC: 22 MMOL/L (ref 23–29)
HCO3 BLD-SCNC: 22 MMOL/L (ref 23–29)
HCO3 BLD-SCNC: 23 MMOL/L (ref 23–29)
HCO3 BLD-SCNC: 24 MMOL/L (ref 23–29)
HCO3 BLD-SCNC: 24 MMOL/L (ref 23–29)
HCO3 BLD-SCNC: 26 MMOL/L (ref 23–29)
HCO3 BLD-SCNC: 26 MMOL/L (ref 23–29)
HCO3 BLD-SCNC: 28 MMOL/L (ref 23–29)
HCO3 BLD-SCNC: 29 MMOL/L (ref 23–29)
HCO3 BLD-SCNC: 29 MMOL/L (ref 23–29)
HCO3 BLD-SCNC: 30 MMOL/L (ref 23–29)
HCO3 BLD-SCNC: 31 MMOL/L (ref 23–29)
HCO3 BLD-SCNC: 32 MMOL/L (ref 23–29)
HCO3 BLDV-SCNC: 24 MMOL/L (ref 24–30)
HCT VFR BLD AUTO: 39.2 % (ref 40–53)
HCT VFR BLD AUTO: 40.4 % (ref 40–53)
HCT VFR BLD AUTO: 41.3 % (ref 40–53)
HCT VFR BLD AUTO: 41.4 % (ref 40–53)
HCT VFR BLD AUTO: 41.4 % (ref 40–54)
HCT VFR BLD AUTO: 41.6 % (ref 40–53)
HCT VFR BLD AUTO: 42.3 % (ref 40–54)
HCT VFR BLD AUTO: 42.3 % (ref 40–54)
HCT VFR BLD AUTO: 42.5 % (ref 40–53)
HCT VFR BLD AUTO: 43.5 % (ref 40–54)
HCT VFR BLD AUTO: 45 % (ref 40–53)
HCT VFR BLD AUTO: 46.8 % (ref 40–53)
HCT VFR BLD AUTO: 47.1 % (ref 40–53)
HCT VFR BLD AUTO: 47.8 % (ref 40–53)
HCT VFR BLD AUTO: 49.5 % (ref 40–53)
HGB BLD-MCNC: 12.5 G/DL (ref 13.3–17.7)
HGB BLD-MCNC: 13.6 G/DL (ref 13.3–17.7)
HGB BLD-MCNC: 13.9 G/DL (ref 13.3–17.7)
HGB BLD-MCNC: 13.9 G/DL (ref 14–18)
HGB BLD-MCNC: 13.9 G/DL (ref 14–18)
HGB BLD-MCNC: 14 G/DL (ref 13.3–17.7)
HGB BLD-MCNC: 14 G/DL (ref 14–18)
HGB BLD-MCNC: 14.2 G/DL (ref 13.3–17.7)
HGB BLD-MCNC: 14.2 G/DL (ref 13.3–17.7)
HGB BLD-MCNC: 14.3 G/DL (ref 14–18)
HGB BLD-MCNC: 14.9 G/DL (ref 13.3–17.7)
HGB BLD-MCNC: 15.1 G/DL (ref 13.3–17.7)
HGB BLD-MCNC: 15.3 G/DL (ref 13.3–17.7)
HGB BLD-MCNC: 15.7 G/DL (ref 13.3–17.7)
HGB BLD-MCNC: 15.8 G/DL (ref 13.3–17.7)
HGB BLD-MCNC: 16 G/DL (ref 13.3–17.7)
HGB UR QL STRIP: ABNORMAL
HGB UR QL STRIP: ABNORMAL
HOLD SPECIMEN: NORMAL
HYALINE CASTS: 6 /LPF
IMM GRANULOCYTES # BLD: 0.4 10E3/UL
IMM GRANULOCYTES # BLD: 0.8 10E3/UL
IMM GRANULOCYTES NFR BLD: 1 %
IMM GRANULOCYTES NFR BLD: 2 %
IMMATURE GRANULOCYTE % - MAN (DIFF): 0 %
IMMATURE GRANULOCYTE ABSOLUTE - MAN (DIFF): 0 THOU/UL
INR (EXTERNAL): 1.5 (ref 0.9–1.1)
INR (EXTERNAL): 1.7 (ref 0.9–1.1)
INR (EXTERNAL): 1.7 (ref 2–3)
INR (EXTERNAL): 1.9 (ref 0.8–1.1)
INR (EXTERNAL): 1.9 (ref 0.9–1.1)
INR (EXTERNAL): 1.9 (ref 2–3)
INR (EXTERNAL): 1.9 (ref 2–3)
INR (EXTERNAL): 2 (ref 0.9–1.1)
INR (EXTERNAL): 2.3 (ref 0.9–1.1)
INR (EXTERNAL): 2.5 (ref 2–3)
INR (EXTERNAL): 2.6 (ref 0.9–1.1)
INR (EXTERNAL): 2.7 (ref 0.9–1.1)
INR (EXTERNAL): 2.7 (ref 0.9–1.1)
INR (EXTERNAL): 2.9 (ref 0.9–1.1)
INR (EXTERNAL): 3.2 (ref 0.9–1.1)
INR (EXTERNAL): 3.2 (ref 0.9–1.1)
INR (EXTERNAL): 3.3 (ref 0.9–1.1)
INR (EXTERNAL): 3.3 (ref 2–3)
INR (EXTERNAL): 3.5 (ref 0.9–1.1)
INR (EXTERNAL): 3.7 (ref 0.9–1.1)
INR (EXTERNAL): 3.8 (ref 2–3)
INR PPP: 1.1 (ref 0.9–1.1)
INR PPP: 1.2 (ref 0.9–1.1)
INR PPP: 1.4 (ref 0.9–1.1)
INR PPP: 1.4 (ref 0.9–1.1)
INR PPP: 1.5
INR PPP: 1.6 (ref 0.9–1.1)
INR PPP: 1.6 (ref 0.9–1.1)
INR PPP: 1.7 (ref 0.9–1.1)
INR PPP: 1.8 (ref 0.9–1.1)
INR PPP: 1.9 (ref 0.9–1.1)
INR PPP: 1.9 (ref 0.9–1.1)
INR PPP: 2 (ref 0.9–1.1)
INR PPP: 2.1 (ref 0.9–1.1)
INR PPP: 2.1 (ref 0.9–1.1)
INR PPP: 2.2 (ref 0.9–1.1)
INR PPP: 2.3 (ref 0.9–1.1)
INR PPP: 2.5 (ref 0.9–1.1)
INR PPP: 2.58 (ref 0.9–1.15)
INR PPP: 2.6 (ref 0.85–1.15)
INR PPP: 2.6 (ref 0.9–1.1)
INR PPP: 2.64 (ref 0.85–1.15)
INR PPP: 2.74 (ref 0.85–1.15)
INR PPP: 3 (ref 0.9–1.15)
INR PPP: 3.08 (ref 0.85–1.15)
INR PPP: 3.22 (ref 0.85–1.15)
INR PPP: 3.38 (ref 0.85–1.15)
INR PPP: 3.6 (ref 0.9–1.1)
INR PPP: 3.9
INR PPP: 4 (ref 0.9–1.1)
INR PPP: 4.18 (ref 0.85–1.15)
INR PPP: 4.29 (ref 0.85–1.15)
INR PPP: 5.07 (ref 0.9–1.15)
INR PPP: >13.5 (ref 0.85–1.15)
INR PPP: >13.5 (ref 0.85–1.15)
INTERPRETATION ECG - MUSE: NORMAL
ION CA PH 7.4: 1.03 MMOL/L (ref 1.11–1.3)
KETONES UR STRIP-MCNC: 10 MG/DL
KETONES UR STRIP-MCNC: NEGATIVE MG/DL
LAB AP CHARGES (HE HISTORICAL CONVERSION): NORMAL
LACTATE SERPL-SCNC: 1.3 MMOL/L (ref 0.7–2)
LACTATE SERPL-SCNC: 1.9 MMOL/L (ref 0.7–2)
LACTATE SERPL-SCNC: 2.1 MMOL/L (ref 0.7–2)
LACTATE SERPL-SCNC: 2.2 MMOL/L (ref 0.7–2)
LACTATE SERPL-SCNC: 2.2 MMOL/L (ref 0.7–2)
LACTATE SERPL-SCNC: 2.3 MMOL/L (ref 0.7–2)
LACTATE SERPL-SCNC: 3.7 MMOL/L (ref 0.7–2)
LDH SERPL L TO P-CCNC: 790 U/L (ref 125–220)
LEUKOCYTE ESTERASE UR QL STRIP: ABNORMAL
LEUKOCYTE ESTERASE UR QL STRIP: NEGATIVE
LVEF ECHO: NORMAL
LYMPHOCYTES # BLD AUTO: 0.4 10E3/UL (ref 0.8–5.3)
LYMPHOCYTES # BLD AUTO: 0.6 10E3/UL (ref 0.8–5.3)
LYMPHOCYTES # BLD AUTO: 0.7 THOU/UL (ref 0.8–4.4)
LYMPHOCYTES # BLD MANUAL: 0.4 10E3/UL (ref 0.8–5.3)
LYMPHOCYTES NFR BLD AUTO: 1 %
LYMPHOCYTES NFR BLD AUTO: 16 % (ref 20–40)
LYMPHOCYTES NFR BLD AUTO: 2 %
LYMPHOCYTES NFR BLD MANUAL: 9 %
MAGNESIUM SERPL-MCNC: 1.9 MG/DL (ref 1.8–2.6)
MAGNESIUM SERPL-MCNC: 2 MG/DL (ref 1.8–2.6)
MAGNESIUM SERPL-MCNC: 2.1 MG/DL (ref 1.8–2.6)
MAGNESIUM SERPL-MCNC: 2.2 MG/DL (ref 1.8–2.6)
MAGNESIUM SERPL-MCNC: 2.3 MG/DL (ref 1.8–2.6)
MCH RBC QN AUTO: 31.2 PG (ref 26.5–33)
MCH RBC QN AUTO: 31.2 PG (ref 27–34)
MCH RBC QN AUTO: 31.2 PG (ref 27–34)
MCH RBC QN AUTO: 31.3 PG (ref 26.5–33)
MCH RBC QN AUTO: 31.3 PG (ref 26.5–33)
MCH RBC QN AUTO: 31.4 PG (ref 27–34)
MCH RBC QN AUTO: 31.5 PG (ref 26.5–33)
MCH RBC QN AUTO: 31.5 PG (ref 26.5–33)
MCH RBC QN AUTO: 31.6 PG (ref 27–34)
MCH RBC QN AUTO: 31.7 PG (ref 26.5–33)
MCH RBC QN AUTO: 31.7 PG (ref 26.5–33)
MCH RBC QN AUTO: 31.8 PG (ref 26.5–33)
MCH RBC QN AUTO: 31.8 PG (ref 26.5–33)
MCH RBC QN AUTO: 31.9 PG (ref 26.5–33)
MCH RBC QN AUTO: 32.4 PG (ref 26.5–33)
MCHC RBC AUTO-ENTMCNC: 31.9 G/DL (ref 31.5–36.5)
MCHC RBC AUTO-ENTMCNC: 31.9 G/DL (ref 31.5–36.5)
MCHC RBC AUTO-ENTMCNC: 32 G/DL (ref 31.5–36.5)
MCHC RBC AUTO-ENTMCNC: 32.3 G/DL (ref 31.5–36.5)
MCHC RBC AUTO-ENTMCNC: 32.9 G/DL (ref 31.5–36.5)
MCHC RBC AUTO-ENTMCNC: 32.9 G/DL (ref 31.5–36.5)
MCHC RBC AUTO-ENTMCNC: 32.9 G/DL (ref 32–36)
MCHC RBC AUTO-ENTMCNC: 33.1 G/DL (ref 31.5–36.5)
MCHC RBC AUTO-ENTMCNC: 33.3 G/DL (ref 31.5–36.5)
MCHC RBC AUTO-ENTMCNC: 33.8 G/DL (ref 32–36)
MCHC RBC AUTO-ENTMCNC: 34.1 G/DL (ref 31.5–36.5)
MCHC RBC AUTO-ENTMCNC: 34.3 G/DL (ref 31.5–36.5)
MCHC RBC AUTO-ENTMCNC: 34.4 G/DL (ref 31.5–36.5)
MCV RBC AUTO: 100 FL (ref 78–100)
MCV RBC AUTO: 91 FL (ref 78–100)
MCV RBC AUTO: 92 FL (ref 78–100)
MCV RBC AUTO: 92 FL (ref 78–100)
MCV RBC AUTO: 93 FL (ref 80–100)
MCV RBC AUTO: 95 FL (ref 78–100)
MCV RBC AUTO: 95 FL (ref 80–100)
MCV RBC AUTO: 95 FL (ref 80–100)
MCV RBC AUTO: 96 FL (ref 80–100)
MCV RBC AUTO: 98 FL (ref 78–100)
MCV RBC AUTO: 98 FL (ref 78–100)
MONOCYTES # BLD AUTO: 0.5 10E3/UL (ref 0–1.3)
MONOCYTES # BLD AUTO: 0.5 THOU/UL (ref 0–0.9)
MONOCYTES # BLD AUTO: 0.7 10E3/UL (ref 0–1.3)
MONOCYTES # BLD MANUAL: 0.1 10E3/UL (ref 0–1.3)
MONOCYTES NFR BLD AUTO: 1 %
MONOCYTES NFR BLD AUTO: 11 % (ref 2–10)
MONOCYTES NFR BLD AUTO: 2 %
MONOCYTES NFR BLD MANUAL: 3 %
MUCOUS THREADS #/AREA URNS LPF: PRESENT /LPF
MUCOUS THREADS #/AREA URNS LPF: PRESENT /LPF
NEUTROPHILS # BLD AUTO: 34.5 10E3/UL (ref 1.6–8.3)
NEUTROPHILS # BLD AUTO: 36.7 10E3/UL (ref 1.6–8.3)
NEUTROPHILS # BLD MANUAL: 3.4 10E3/UL (ref 1.6–8.3)
NEUTROPHILS NFR BLD AUTO: 93 %
NEUTROPHILS NFR BLD AUTO: 94 %
NEUTROPHILS NFR BLD MANUAL: 88 %
NITRATE UR QL: NEGATIVE
NITRATE UR QL: NEGATIVE
NRBC # BLD AUTO: 0.1 10E3/UL
NRBC BLD AUTO-RTO: 0 /100
O2/TOTAL GAS SETTING VFR VENT: 0.4 %
O2/TOTAL GAS SETTING VFR VENT: 0.5 %
O2/TOTAL GAS SETTING VFR VENT: 0.6 %
O2/TOTAL GAS SETTING VFR VENT: 0.7 %
O2/TOTAL GAS SETTING VFR VENT: 1 %
O2/TOTAL GAS SETTING VFR VENT: 100 %
O2/TOTAL GAS SETTING VFR VENT: 40 %
O2/TOTAL GAS SETTING VFR VENT: 50 %
O2/TOTAL GAS SETTING VFR VENT: 50 %
O2/TOTAL GAS SETTING VFR VENT: 60 %
O2/TOTAL GAS SETTING VFR VENT: 65 %
O2/TOTAL GAS SETTING VFR VENT: 70 %
O2/TOTAL GAS SETTING VFR VENT: 80 %
OXYHGB MFR BLD: 78.3 % (ref 95–96)
OXYHGB MFR BLD: 81.2 % (ref 95–96)
OXYHGB MFR BLD: 87.9 % (ref 95–96)
OXYHGB MFR BLD: 88.1 % (ref 95–96)
OXYHGB MFR BLD: 88.8 % (ref 95–96)
OXYHGB MFR BLD: 89.3 % (ref 95–96)
OXYHGB MFR BLD: 89.3 % (ref 95–96)
OXYHGB MFR BLD: 89.7 % (ref 95–96)
OXYHGB MFR BLD: 89.7 % (ref 95–96)
OXYHGB MFR BLD: 89.9 % (ref 95–96)
OXYHGB MFR BLD: 90.5 % (ref 95–96)
OXYHGB MFR BLD: 91 % (ref 95–96)
OXYHGB MFR BLD: 91.1 % (ref 95–96)
OXYHGB MFR BLD: 91.4 % (ref 95–96)
OXYHGB MFR BLD: 91.5 % (ref 95–96)
OXYHGB MFR BLD: 92.2 % (ref 95–96)
OXYHGB MFR BLD: 92.5 % (ref 95–96)
OXYHGB MFR BLD: 92.6 % (ref 95–96)
OXYHGB MFR BLD: 92.8 % (ref 95–96)
OXYHGB MFR BLD: 92.9 % (ref 95–96)
OXYHGB MFR BLD: 93.2 % (ref 95–96)
OXYHGB MFR BLD: 93.3 % (ref 95–96)
OXYHGB MFR BLD: 93.6 % (ref 95–96)
OXYHGB MFR BLD: 93.8 % (ref 95–96)
OXYHGB MFR BLD: 95.3 % (ref 95–96)
OXYHGB MFR BLD: 95.9 % (ref 95–96)
OXYHGB MFR BLD: 96.2 % (ref 95–96)
OXYHGB MFR BLD: 96.5 % (ref 95–96)
OXYHGB MFR BLD: >98.5 % (ref 95–96)
OXYHGB MFR BLD: >98.5 % (ref 95–96)
OXYHGB MFR BLDV: 18.1 % (ref 70–75)
P AXIS - MUSE: -86 DEGREES
P AXIS - MUSE: 245 DEGREES
P AXIS - MUSE: NORMAL
PATH REPORT.COMMENTS IMP SPEC: NORMAL
PATH REPORT.FINAL DX SPEC: NORMAL
PATH REPORT.FINAL DX SPEC: NORMAL
PATH REPORT.MICROSCOPIC SPEC OTHER STN: ABNORMAL
PATH REPORT.MICROSCOPIC SPEC OTHER STN: NORMAL
PATH REPORT.MICROSCOPIC SPEC OTHER STN: NORMAL
PATH REPORT.RELEVANT HX SPEC: NORMAL
PCO2 BLD: 31 MM HG (ref 35–45)
PCO2 BLD: 32 MM HG (ref 35–45)
PCO2 BLD: 34 MM HG (ref 35–45)
PCO2 BLD: 35 MM HG (ref 35–45)
PCO2 BLD: 35 MM HG (ref 35–45)
PCO2 BLD: 36 MM HG (ref 35–45)
PCO2 BLD: 37 MM HG (ref 35–45)
PCO2 BLD: 38 MM HG (ref 35–45)
PCO2 BLD: 42 MM HG (ref 35–45)
PCO2 BLD: 42 MM HG (ref 35–45)
PCO2 BLD: 43 MM HG (ref 35–45)
PCO2 BLD: 44 MM HG (ref 35–45)
PCO2 BLD: 45 MM HG (ref 35–45)
PCO2 BLD: 48 MM HG (ref 35–45)
PCO2 BLD: 49 MM HG (ref 35–45)
PCO2 BLD: 55 MM HG (ref 35–45)
PCO2 BLD: 62 MM HG (ref 35–45)
PCO2 BLDV: 45 MM HG (ref 35–50)
PEEP: 10 CM H2O
PEEP: 14 CM H2O
PEEP: 16 CM H2O
PEEP: 5 CM H2O
PEEP: 8 CM H2O
PH BLD: 7.26 [PH] (ref 7.37–7.44)
PH BLD: 7.27 [PH] (ref 7.37–7.44)
PH BLD: 7.28 [PH] (ref 7.37–7.44)
PH BLD: 7.29 [PH] (ref 7.37–7.44)
PH BLD: 7.29 [PH] (ref 7.37–7.44)
PH BLD: 7.33 [PH] (ref 7.37–7.44)
PH BLD: 7.34 [PH] (ref 7.37–7.44)
PH BLD: 7.39 [PH] (ref 7.37–7.44)
PH BLD: 7.41 [PH] (ref 7.37–7.44)
PH BLD: 7.41 [PH] (ref 7.37–7.44)
PH BLD: 7.43 [PH] (ref 7.37–7.44)
PH BLD: 7.44 [PH] (ref 7.37–7.44)
PH BLD: 7.44 [PH] (ref 7.37–7.44)
PH BLD: 7.45 [PH] (ref 7.37–7.44)
PH BLD: 7.46 [PH] (ref 7.37–7.44)
PH BLD: 7.46 [PH] (ref 7.37–7.44)
PH BLD: 7.47 [PH] (ref 7.37–7.44)
PH BLD: 7.47 [PH] (ref 7.37–7.44)
PH BLD: 7.48 [PH] (ref 7.37–7.44)
PH BLD: 7.49 [PH] (ref 7.37–7.44)
PH BLD: 7.5 [PH] (ref 7.37–7.44)
PH BLD: 7.51 [PH] (ref 7.37–7.44)
PH BLD: 7.51 [PH] (ref 7.37–7.44)
PH BLDV: 7.39 [PH] (ref 7.35–7.45)
PH UR STRIP: 5 [PH] (ref 5–7)
PH UR STRIP: 5 [PH] (ref 5–7)
PH: 7.35 (ref 7.35–7.45)
PLAT MORPH BLD: ABNORMAL
PLAT MORPH BLD: ABNORMAL
PLATELET # BLD AUTO: 103 10E3/UL (ref 150–450)
PLATELET # BLD AUTO: 104 10E3/UL (ref 150–450)
PLATELET # BLD AUTO: 105 10E3/UL (ref 150–450)
PLATELET # BLD AUTO: 40 10E3/UL (ref 150–450)
PLATELET # BLD AUTO: 45 THOU/UL (ref 140–440)
PLATELET # BLD AUTO: 45 THOU/UL (ref 140–440)
PLATELET # BLD AUTO: 52 10E3/UL (ref 150–450)
PLATELET # BLD AUTO: 52 10E3/UL (ref 150–450)
PLATELET # BLD AUTO: 56 10E3/UL (ref 150–450)
PLATELET # BLD AUTO: 56 10E3/UL (ref 150–450)
PLATELET # BLD AUTO: 63 10E3/UL (ref 150–450)
PLATELET # BLD AUTO: 73 10E3/UL (ref 150–450)
PLATELET # BLD AUTO: 79 THOU/UL (ref 140–440)
PLATELET # BLD AUTO: 80 THOU/UL (ref 140–440)
PLATELET # BLD AUTO: 92 10E3/UL (ref 150–450)
PLATELET # BLD AUTO: 95 10E3/UL (ref 150–450)
PMV BLD AUTO: 11.1 FL (ref 8.5–12.5)
PMV BLD AUTO: 12 FL (ref 8.5–12.5)
PMV BLD AUTO: 12 FL (ref 8.5–12.5)
PMV BLD AUTO: 9.4 FL (ref 7–10)
PO2 BLD: 132 MM HG (ref 75–85)
PO2 BLD: 214 MM HG (ref 75–85)
PO2 BLD: 47 MM HG (ref 75–85)
PO2 BLD: 47 MM HG (ref 75–85)
PO2 BLD: 52 MM HG (ref 75–85)
PO2 BLD: 52 MM HG (ref 75–85)
PO2 BLD: 55 MM HG (ref 75–85)
PO2 BLD: 58 MM HG (ref 75–85)
PO2 BLD: 58 MM HG (ref 75–85)
PO2 BLD: 59 MM HG (ref 75–85)
PO2 BLD: 59 MM HG (ref 75–85)
PO2 BLD: 60 MM HG (ref 75–85)
PO2 BLD: 63 MM HG (ref 75–85)
PO2 BLD: 64 MM HG (ref 75–85)
PO2 BLD: 66 MM HG (ref 75–85)
PO2 BLD: 67 MM HG (ref 75–85)
PO2 BLD: 68 MM HG (ref 75–85)
PO2 BLD: 69 MM HG (ref 75–85)
PO2 BLD: 74 MM HG (ref 75–85)
PO2 BLD: 76 MM HG (ref 75–85)
PO2 BLD: 82 MM HG (ref 75–85)
PO2 BLD: 82 MM HG (ref 75–85)
PO2 BLD: 93 MM HG (ref 75–85)
PO2 BLD: 98 MM HG (ref 75–85)
PO2 BLDV: 15 MM HG (ref 25–47)
POTASSIUM BLD-SCNC: 2.9 MMOL/L (ref 3.5–5)
POTASSIUM BLD-SCNC: 3.3 MMOL/L (ref 3.5–5)
POTASSIUM BLD-SCNC: 3.4 MMOL/L (ref 3.5–5)
POTASSIUM BLD-SCNC: 3.5 MMOL/L (ref 3.5–5)
POTASSIUM BLD-SCNC: 3.5 MMOL/L (ref 3.5–5)
POTASSIUM BLD-SCNC: 3.6 MMOL/L (ref 3.5–5)
POTASSIUM BLD-SCNC: 3.7 MMOL/L (ref 3.5–5)
POTASSIUM BLD-SCNC: 3.8 MMOL/L (ref 3.5–5)
POTASSIUM BLD-SCNC: 3.9 MMOL/L (ref 3.5–5)
POTASSIUM BLD-SCNC: 4 MMOL/L (ref 3.5–5)
POTASSIUM BLD-SCNC: 4 MMOL/L (ref 3.5–5)
POTASSIUM BLD-SCNC: 4.2 MMOL/L (ref 3.5–5)
POTASSIUM BLD-SCNC: 4.4 MMOL/L (ref 3.5–5)
POTASSIUM BLD-SCNC: 4.8 MMOL/L (ref 3.5–5)
POTASSIUM BLD-SCNC: 4.9 MMOL/L (ref 3.5–5)
POTASSIUM BLD-SCNC: 5.3 MMOL/L (ref 3.5–5)
PR INTERVAL - MUSE: NORMAL
PR INTERVAL - MUSE: NORMAL MS
PR INTERVAL - MUSE: NORMAL MS
PROCALCITONIN SERPL-MCNC: 0.08 NG/ML (ref 0–0.49)
PROCALCITONIN SERPL-MCNC: 0.23 NG/ML (ref 0–0.49)
PROCALCITONIN SERPL-MCNC: 0.6 NG/ML (ref 0–0.49)
PROCALCITONIN SERPL-MCNC: 23.89 NG/ML (ref 0–0.49)
PROT SERPL-MCNC: 4.7 G/DL (ref 6–8)
PROT SERPL-MCNC: 7.7 G/DL (ref 6–8)
QRS DURATION - MUSE: 90 MS
QRS DURATION - MUSE: 96 MS
QRS DURATION - MUSE: 96 MS
QT - MUSE: 392 MS
QT - MUSE: 396 MS
QT - MUSE: 430 MS
QTC - MUSE: 500 MS
QTC - MUSE: 505 MS
QTC - MUSE: 526 MS
R AXIS - MUSE: 35 DEGREES
R AXIS - MUSE: 40 DEGREES
R AXIS - MUSE: 49 DEGREES
RATE: 14 RR/MIN
RATE: 16 RR/MIN
RATE: 18 RR/MIN
RATE: 20 RR/MIN
RATE: 24 RR/MIN
RATE: 28 RR/MIN
RBC # BLD AUTO: 3.94 10E6/UL (ref 4.4–5.9)
RBC # BLD AUTO: 4.2 10E6/UL (ref 4.4–5.9)
RBC # BLD AUTO: 4.37 10E6/UL (ref 4.4–5.9)
RBC # BLD AUTO: 4.45 MILL/UL (ref 4.4–6.2)
RBC # BLD AUTO: 4.45 MILL/UL (ref 4.4–6.2)
RBC # BLD AUTO: 4.46 MILL/UL (ref 4.4–6.2)
RBC # BLD AUTO: 4.48 10E6/UL (ref 4.4–5.9)
RBC # BLD AUTO: 4.51 10E6/UL (ref 4.4–5.9)
RBC # BLD AUTO: 4.53 MILL/UL (ref 4.4–6.2)
RBC # BLD AUTO: 4.55 10E6/UL (ref 4.4–5.9)
RBC # BLD AUTO: 4.76 10E6/UL (ref 4.4–5.9)
RBC # BLD AUTO: 4.79 10E6/UL (ref 4.4–5.9)
RBC # BLD AUTO: 4.8 10E6/UL (ref 4.4–5.9)
RBC # BLD AUTO: 4.96 10E6/UL (ref 4.4–5.9)
RBC # BLD AUTO: 4.97 10E6/UL (ref 4.4–5.9)
RBC MORPH BLD: ABNORMAL
RBC URINE: 3 /HPF
RBC URINE: >182 /HPF
RETICS # AUTO: 0.2 10E6/UL (ref 0.01–0.11)
RETICS/RBC NFR AUTO: 4.1 % (ref 0.8–2.7)
SAO2 % BLDV: 18.5 % (ref 70–75)
SARS-COV-2 RNA RESP QL NAA+PROBE: POSITIVE
SODIUM SERPL-SCNC: 137 MMOL/L (ref 136–145)
SODIUM SERPL-SCNC: 138 MMOL/L (ref 136–145)
SODIUM SERPL-SCNC: 139 MMOL/L (ref 136–145)
SODIUM SERPL-SCNC: 140 MMOL/L (ref 136–145)
SODIUM SERPL-SCNC: 141 MMOL/L (ref 136–145)
SODIUM SERPL-SCNC: 143 MMOL/L (ref 136–145)
SODIUM SERPL-SCNC: 143 MMOL/L (ref 136–145)
SODIUM SERPL-SCNC: 144 MMOL/L (ref 136–145)
SODIUM SERPL-SCNC: 145 MMOL/L (ref 136–145)
SODIUM SERPL-SCNC: 148 MMOL/L (ref 136–145)
SODIUM SERPL-SCNC: 149 MMOL/L (ref 136–145)
SODIUM SERPL-SCNC: 152 MMOL/L (ref 136–145)
SP GR UR STRIP: 1.02 (ref 1–1.03)
SP GR UR STRIP: 1.02 (ref 1–1.03)
SPECIMEN EXPIRATION DATE: NORMAL
SYSTOLIC BLOOD PRESSURE - MUSE: 80 MMHG
SYSTOLIC BLOOD PRESSURE - MUSE: 94 MMHG
SYSTOLIC BLOOD PRESSURE - MUSE: NORMAL
T AXIS - MUSE: 146 DEGREES
T AXIS - MUSE: 156 DEGREES
T AXIS - MUSE: 236 DEGREES
T4 TOTAL - HISTORICAL: 6.5 UG/DL (ref 4.5–13)
TEMPERATURE: 37 DEGREES C
TOTAL NEUTROPHILS-ABS(DIFF): 2.9 THOU/UL (ref 2–7.7)
TOTAL NEUTROPHILS-REL(DIFF): 70 % (ref 50–70)
TRIGL SERPL-MCNC: 179 MG/DL
TRIGL SERPL-MCNC: 230 MG/DL
TROPONIN I SERPL-MCNC: 0.05 NG/ML (ref 0–0.29)
TROPONIN I SERPL-MCNC: 0.05 NG/ML (ref 0–0.29)
TROPONIN I SERPL-MCNC: 0.07 NG/ML (ref 0–0.29)
TSH SERPL DL<=0.005 MIU/L-ACNC: 1.02 UIU/ML (ref 0.3–5)
TSH SERPL DL<=0.005 MIU/L-ACNC: 1.3 UIU/ML (ref 0.3–5)
UROBILINOGEN UR STRIP-MCNC: <2 MG/DL
UROBILINOGEN UR STRIP-MCNC: <2 MG/DL
VANCOMYCIN SERPL-MCNC: 14.2 MG/L
VENTILATION MODE: ABNORMAL
VENTILATION MODE: AC
VENTILATOR TIDAL VOLUME: 400 ML
VENTRICULAR RATE- MUSE: 90 BPM
VENTRICULAR RATE- MUSE: 98 BPM
VENTRICULAR RATE- MUSE: 98 BPM
WBC # BLD AUTO: 11.6 10E3/UL (ref 4–11)
WBC # BLD AUTO: 18.5 10E3/UL (ref 4–11)
WBC # BLD AUTO: 22.3 10E3/UL (ref 4–11)
WBC # BLD AUTO: 22.8 10E3/UL (ref 4–11)
WBC # BLD AUTO: 24.3 10E3/UL (ref 4–11)
WBC # BLD AUTO: 3.9 10E3/UL (ref 4–11)
WBC # BLD AUTO: 34.4 10E3/UL (ref 4–11)
WBC # BLD AUTO: 38.2 10E3/UL (ref 4–11)
WBC # BLD AUTO: 5 10E3/UL (ref 4–11)
WBC # BLD AUTO: 6.7 10E3/UL (ref 4–11)
WBC # BLD AUTO: 9.1 10E3/UL (ref 4–11)
WBC # BLD AUTO: ABNORMAL 10*3/UL
WBC URINE: 2 /HPF
WBC URINE: 51 /HPF
WBC: 4.1 THOU/UL (ref 4–11)
WBC: 4.1 THOU/UL (ref 4–11)
WBC: 5.3 THOU/UL (ref 4–11)
WBC: 5.4 THOU/UL (ref 4–11)

## 2021-01-01 PROCEDURE — 80048 BASIC METABOLIC PNL TOTAL CA: CPT | Performed by: INTERNAL MEDICINE

## 2021-01-01 PROCEDURE — 99207 PR CONSULT E&M CHANGED TO INITIAL LEVEL: CPT | Performed by: NURSE PRACTITIONER

## 2021-01-01 PROCEDURE — 250N000013 HC RX MED GY IP 250 OP 250 PS 637: Performed by: INTERNAL MEDICINE

## 2021-01-01 PROCEDURE — 74018 RADEX ABDOMEN 1 VIEW: CPT

## 2021-01-01 PROCEDURE — 250N000011 HC RX IP 250 OP 636: Performed by: NURSE PRACTITIONER

## 2021-01-01 PROCEDURE — 87305 ASPERGILLUS AG IA: CPT | Performed by: INTERNAL MEDICINE

## 2021-01-01 PROCEDURE — 85014 HEMATOCRIT: CPT | Performed by: INTERNAL MEDICINE

## 2021-01-01 PROCEDURE — 82805 BLOOD GASES W/O2 SATURATION: CPT | Performed by: INTERNAL MEDICINE

## 2021-01-01 PROCEDURE — 85610 PROTHROMBIN TIME: CPT | Performed by: INTERNAL MEDICINE

## 2021-01-01 PROCEDURE — 99291 CRITICAL CARE FIRST HOUR: CPT | Performed by: INTERNAL MEDICINE

## 2021-01-01 PROCEDURE — 250N000009 HC RX 250: Performed by: INTERNAL MEDICINE

## 2021-01-01 PROCEDURE — 94644 CONT INHLJ TX 1ST HOUR: CPT

## 2021-01-01 PROCEDURE — 250N000011 HC RX IP 250 OP 636: Performed by: INTERNAL MEDICINE

## 2021-01-01 PROCEDURE — 83615 LACTATE (LD) (LDH) ENZYME: CPT | Performed by: INTERNAL MEDICINE

## 2021-01-01 PROCEDURE — 83735 ASSAY OF MAGNESIUM: CPT | Performed by: INTERNAL MEDICINE

## 2021-01-01 PROCEDURE — C9113 INJ PANTOPRAZOLE SODIUM, VIA: HCPCS | Performed by: INTERNAL MEDICINE

## 2021-01-01 PROCEDURE — 82565 ASSAY OF CREATININE: CPT | Performed by: INTERNAL MEDICINE

## 2021-01-01 PROCEDURE — 94003 VENT MGMT INPAT SUBQ DAY: CPT

## 2021-01-01 PROCEDURE — 85027 COMPLETE CBC AUTOMATED: CPT | Performed by: INTERNAL MEDICINE

## 2021-01-01 PROCEDURE — 84484 ASSAY OF TROPONIN QUANT: CPT | Performed by: EMERGENCY MEDICINE

## 2021-01-01 PROCEDURE — 258N000003 HC RX IP 258 OP 636: Performed by: INTERNAL MEDICINE

## 2021-01-01 PROCEDURE — 250N000011 HC RX IP 250 OP 636: Performed by: EMERGENCY MEDICINE

## 2021-01-01 PROCEDURE — 99233 SBSQ HOSP IP/OBS HIGH 50: CPT | Performed by: INTERNAL MEDICINE

## 2021-01-01 PROCEDURE — 93005 ELECTROCARDIOGRAM TRACING: CPT | Performed by: EMERGENCY MEDICINE

## 2021-01-01 PROCEDURE — 71045 X-RAY EXAM CHEST 1 VIEW: CPT | Mod: 77

## 2021-01-01 PROCEDURE — 99233 SBSQ HOSP IP/OBS HIGH 50: CPT | Performed by: NURSE PRACTITIONER

## 2021-01-01 PROCEDURE — 96375 TX/PRO/DX INJ NEW DRUG ADDON: CPT

## 2021-01-01 PROCEDURE — P9047 ALBUMIN (HUMAN), 25%, 50ML: HCPCS | Performed by: NURSE PRACTITIONER

## 2021-01-01 PROCEDURE — 82805 BLOOD GASES W/O2 SATURATION: CPT | Performed by: NURSE PRACTITIONER

## 2021-01-01 PROCEDURE — 85379 FIBRIN DEGRADATION QUANT: CPT | Performed by: INTERNAL MEDICINE

## 2021-01-01 PROCEDURE — 85384 FIBRINOGEN ACTIVITY: CPT | Performed by: INTERNAL MEDICINE

## 2021-01-01 PROCEDURE — 999N000157 HC STATISTIC RCP TIME EA 10 MIN

## 2021-01-01 PROCEDURE — 200N000001 HC R&B ICU

## 2021-01-01 PROCEDURE — 85049 AUTOMATED PLATELET COUNT: CPT | Performed by: INTERNAL MEDICINE

## 2021-01-01 PROCEDURE — 84145 PROCALCITONIN (PCT): CPT | Performed by: INTERNAL MEDICINE

## 2021-01-01 PROCEDURE — 93306 TTE W/DOPPLER COMPLETE: CPT | Mod: 26 | Performed by: INTERNAL MEDICINE

## 2021-01-01 PROCEDURE — 999N000065 XR CHEST PORT 1 VIEW: Mod: 77

## 2021-01-01 PROCEDURE — 272N000452 HC KIT SHRLOCK 5FR POWER PICC TRIPLE LUMEN

## 2021-01-01 PROCEDURE — 99291 CRITICAL CARE FIRST HOUR: CPT

## 2021-01-01 PROCEDURE — 82805 BLOOD GASES W/O2 SATURATION: CPT | Performed by: EMERGENCY MEDICINE

## 2021-01-01 PROCEDURE — 99291 CRITICAL CARE FIRST HOUR: CPT | Performed by: NURSE PRACTITIONER

## 2021-01-01 PROCEDURE — 82550 ASSAY OF CK (CPK): CPT | Performed by: INTERNAL MEDICINE

## 2021-01-01 PROCEDURE — 36592 COLLECT BLOOD FROM PICC: CPT | Performed by: INTERNAL MEDICINE

## 2021-01-01 PROCEDURE — 99207 PR NO CHARGE LOS: CPT | Performed by: INTERNAL MEDICINE

## 2021-01-01 PROCEDURE — 36415 COLL VENOUS BLD VENIPUNCTURE: CPT | Performed by: INTERNAL MEDICINE

## 2021-01-01 PROCEDURE — 255N000002 HC RX 255 OP 636: Performed by: INTERNAL MEDICINE

## 2021-01-01 PROCEDURE — 87077 CULTURE AEROBIC IDENTIFY: CPT | Performed by: INTERNAL MEDICINE

## 2021-01-01 PROCEDURE — 36415 COLL VENOUS BLD VENIPUNCTURE: CPT | Performed by: EMERGENCY MEDICINE

## 2021-01-01 PROCEDURE — 83880 ASSAY OF NATRIURETIC PEPTIDE: CPT | Performed by: EMERGENCY MEDICINE

## 2021-01-01 PROCEDURE — 84132 ASSAY OF SERUM POTASSIUM: CPT | Performed by: INTERNAL MEDICINE

## 2021-01-01 PROCEDURE — 999N000065 XR CHEST PORT 1 VIEW

## 2021-01-01 PROCEDURE — 87636 SARSCOV2 & INF A&B AMP PRB: CPT | Performed by: EMERGENCY MEDICINE

## 2021-01-01 PROCEDURE — 85027 COMPLETE CBC AUTOMATED: CPT | Performed by: EMERGENCY MEDICINE

## 2021-01-01 PROCEDURE — 83605 ASSAY OF LACTIC ACID: CPT | Performed by: INTERNAL MEDICINE

## 2021-01-01 PROCEDURE — 94002 VENT MGMT INPAT INIT DAY: CPT

## 2021-01-01 PROCEDURE — 84145 PROCALCITONIN (PCT): CPT | Performed by: NURSE PRACTITIONER

## 2021-01-01 PROCEDURE — 87040 BLOOD CULTURE FOR BACTERIA: CPT | Performed by: INTERNAL MEDICINE

## 2021-01-01 PROCEDURE — 250N000009 HC RX 250: Performed by: EMERGENCY MEDICINE

## 2021-01-01 PROCEDURE — P9047 ALBUMIN (HUMAN), 25%, 50ML: HCPCS | Performed by: INTERNAL MEDICINE

## 2021-01-01 PROCEDURE — 96366 THER/PROPH/DIAG IV INF ADDON: CPT

## 2021-01-01 PROCEDURE — 36584 COMPL RPLCMT PICC RS&I: CPT

## 2021-01-01 PROCEDURE — 258N000001 HC RX 258: Performed by: INTERNAL MEDICINE

## 2021-01-01 PROCEDURE — 250N000009 HC RX 250: Performed by: NURSE PRACTITIONER

## 2021-01-01 PROCEDURE — XW043H5 INTRODUCTION OF TOCILIZUMAB INTO CENTRAL VEIN, PERCUTANEOUS APPROACH, NEW TECHNOLOGY GROUP 5: ICD-10-PCS | Performed by: INTERNAL MEDICINE

## 2021-01-01 PROCEDURE — 80202 ASSAY OF VANCOMYCIN: CPT | Performed by: NURSE PRACTITIONER

## 2021-01-01 PROCEDURE — 82310 ASSAY OF CALCIUM: CPT | Performed by: INTERNAL MEDICINE

## 2021-01-01 PROCEDURE — C9113 INJ PANTOPRAZOLE SODIUM, VIA: HCPCS | Performed by: NURSE PRACTITIONER

## 2021-01-01 PROCEDURE — 31500 INSERT EMERGENCY AIRWAY: CPT

## 2021-01-01 PROCEDURE — 85060 BLOOD SMEAR INTERPRETATION: CPT | Performed by: PATHOLOGY

## 2021-01-01 PROCEDURE — 36569 INSJ PICC 5 YR+ W/O IMAGING: CPT

## 2021-01-01 PROCEDURE — 96376 TX/PRO/DX INJ SAME DRUG ADON: CPT

## 2021-01-01 PROCEDURE — 94645 CONT INHLJ TX EACH ADDL HOUR: CPT

## 2021-01-01 PROCEDURE — 86141 C-REACTIVE PROTEIN HS: CPT | Performed by: INTERNAL MEDICINE

## 2021-01-01 PROCEDURE — 81001 URINALYSIS AUTO W/SCOPE: CPT | Performed by: INTERNAL MEDICINE

## 2021-01-01 PROCEDURE — 36620 INSERTION CATHETER ARTERY: CPT

## 2021-01-01 PROCEDURE — 99292 CRITICAL CARE ADDL 30 MIN: CPT | Performed by: INTERNAL MEDICINE

## 2021-01-01 PROCEDURE — 87205 SMEAR GRAM STAIN: CPT | Performed by: NURSE PRACTITIONER

## 2021-01-01 PROCEDURE — 258N000003 HC RX IP 258 OP 636: Performed by: EMERGENCY MEDICINE

## 2021-01-01 PROCEDURE — 71045 X-RAY EXAM CHEST 1 VIEW: CPT

## 2021-01-01 PROCEDURE — 80051 ELECTROLYTE PANEL: CPT | Performed by: INTERNAL MEDICINE

## 2021-01-01 PROCEDURE — 250N000013 HC RX MED GY IP 250 OP 250 PS 637: Performed by: NURSE PRACTITIONER

## 2021-01-01 PROCEDURE — 99213 OFFICE O/P EST LOW 20 MIN: CPT | Mod: 25 | Performed by: FAMILY MEDICINE

## 2021-01-01 PROCEDURE — 84478 ASSAY OF TRIGLYCERIDES: CPT | Performed by: INTERNAL MEDICINE

## 2021-01-01 PROCEDURE — 258N000003 HC RX IP 258 OP 636: Performed by: NURSE PRACTITIONER

## 2021-01-01 PROCEDURE — 999N000065 XR ABDOMEN PORT 1 VIEWS

## 2021-01-01 PROCEDURE — 120N000001 HC R&B MED SURG/OB

## 2021-01-01 PROCEDURE — 36600 WITHDRAWAL OF ARTERIAL BLOOD: CPT

## 2021-01-01 PROCEDURE — 999N000065 XR CHEST PORT 1 VIEW: Mod: 76

## 2021-01-01 PROCEDURE — C9803 HOPD COVID-19 SPEC COLLECT: HCPCS

## 2021-01-01 PROCEDURE — 86901 BLOOD TYPING SEROLOGIC RH(D): CPT | Performed by: INTERNAL MEDICINE

## 2021-01-01 PROCEDURE — 82330 ASSAY OF CALCIUM: CPT | Performed by: INTERNAL MEDICINE

## 2021-01-01 PROCEDURE — 96365 THER/PROPH/DIAG IV INF INIT: CPT

## 2021-01-01 PROCEDURE — 5A1955Z RESPIRATORY VENTILATION, GREATER THAN 96 CONSECUTIVE HOURS: ICD-10-PCS | Performed by: EMERGENCY MEDICINE

## 2021-01-01 PROCEDURE — 84484 ASSAY OF TROPONIN QUANT: CPT | Performed by: INTERNAL MEDICINE

## 2021-01-01 PROCEDURE — 83605 ASSAY OF LACTIC ACID: CPT | Performed by: EMERGENCY MEDICINE

## 2021-01-01 PROCEDURE — 83880 ASSAY OF NATRIURETIC PEPTIDE: CPT | Performed by: INTERNAL MEDICINE

## 2021-01-01 PROCEDURE — 99231 SBSQ HOSP IP/OBS SF/LOW 25: CPT | Performed by: NURSE PRACTITIONER

## 2021-01-01 PROCEDURE — 90662 IIV NO PRSV INCREASED AG IM: CPT | Performed by: FAMILY MEDICINE

## 2021-01-01 PROCEDURE — 272N000007 HC KIT ART LINE INSERTION

## 2021-01-01 PROCEDURE — 80053 COMPREHEN METABOLIC PANEL: CPT | Performed by: EMERGENCY MEDICINE

## 2021-01-01 PROCEDURE — 250N000012 HC RX MED GY IP 250 OP 636 PS 637: Performed by: INTERNAL MEDICINE

## 2021-01-01 PROCEDURE — 99255 IP/OBS CONSLTJ NEW/EST HI 80: CPT | Performed by: INTERNAL MEDICINE

## 2021-01-01 PROCEDURE — 272N000272 HC CONTINUOUS NEBULIZER MICRO PUMP

## 2021-01-01 PROCEDURE — 80053 COMPREHEN METABOLIC PANEL: CPT | Performed by: INTERNAL MEDICINE

## 2021-01-01 PROCEDURE — 85045 AUTOMATED RETICULOCYTE COUNT: CPT | Performed by: INTERNAL MEDICINE

## 2021-01-01 PROCEDURE — 83036 HEMOGLOBIN GLYCOSYLATED A1C: CPT | Performed by: INTERNAL MEDICINE

## 2021-01-01 PROCEDURE — 99356 PR PROLONGED SERV,INPATIENT,1ST HR: CPT | Performed by: NURSE PRACTITIONER

## 2021-01-01 PROCEDURE — 87040 BLOOD CULTURE FOR BACTERIA: CPT | Performed by: NURSE PRACTITIONER

## 2021-01-01 PROCEDURE — 87040 BLOOD CULTURE FOR BACTERIA: CPT | Performed by: EMERGENCY MEDICINE

## 2021-01-01 PROCEDURE — 99223 1ST HOSP IP/OBS HIGH 75: CPT | Performed by: NURSE PRACTITIONER

## 2021-01-01 PROCEDURE — 84443 ASSAY THYROID STIM HORMONE: CPT | Performed by: EMERGENCY MEDICINE

## 2021-01-01 PROCEDURE — 99291 CRITICAL CARE FIRST HOUR: CPT | Mod: 25 | Performed by: NURSE PRACTITIONER

## 2021-01-01 PROCEDURE — 90471 IMMUNIZATION ADMIN: CPT | Performed by: FAMILY MEDICINE

## 2021-01-01 PROCEDURE — 87205 SMEAR GRAM STAIN: CPT | Performed by: INTERNAL MEDICINE

## 2021-01-01 PROCEDURE — 99232 SBSQ HOSP IP/OBS MODERATE 35: CPT | Performed by: NURSE PRACTITIONER

## 2021-01-01 PROCEDURE — 85610 PROTHROMBIN TIME: CPT | Performed by: EMERGENCY MEDICINE

## 2021-01-01 PROCEDURE — 96368 THER/DIAG CONCURRENT INF: CPT

## 2021-01-01 PROCEDURE — G0179 MD RECERTIFICATION HHA PT: HCPCS | Performed by: FAMILY MEDICINE

## 2021-01-01 PROCEDURE — 84478 ASSAY OF TRIGLYCERIDES: CPT | Performed by: NURSE PRACTITIONER

## 2021-01-01 PROCEDURE — 87449 NOS EACH ORGANISM AG IA: CPT | Performed by: INTERNAL MEDICINE

## 2021-01-01 PROCEDURE — 999N000208 ECHOCARDIOGRAM COMPLETE

## 2021-01-01 PROCEDURE — 82248 BILIRUBIN DIRECT: CPT | Performed by: INTERNAL MEDICINE

## 2021-01-01 PROCEDURE — XW043E5 INTRODUCTION OF REMDESIVIR ANTI-INFECTIVE INTO CENTRAL VEIN, PERCUTANEOUS APPROACH, NEW TECHNOLOGY GROUP 5: ICD-10-PCS | Performed by: EMERGENCY MEDICINE

## 2021-01-01 PROCEDURE — 85018 HEMOGLOBIN: CPT | Performed by: NURSE PRACTITIONER

## 2021-01-01 PROCEDURE — 84295 ASSAY OF SERUM SODIUM: CPT | Performed by: INTERNAL MEDICINE

## 2021-01-01 PROCEDURE — 87086 URINE CULTURE/COLONY COUNT: CPT | Performed by: INTERNAL MEDICINE

## 2021-01-01 PROCEDURE — 3E033XZ INTRODUCTION OF VASOPRESSOR INTO PERIPHERAL VEIN, PERCUTANEOUS APPROACH: ICD-10-PCS | Performed by: EMERGENCY MEDICINE

## 2021-01-01 PROCEDURE — 99239 HOSP IP/OBS DSCHRG MGMT >30: CPT | Performed by: NURSE PRACTITIONER

## 2021-01-01 PROCEDURE — 76705 ECHO EXAM OF ABDOMEN: CPT

## 2021-01-01 PROCEDURE — 93005 ELECTROCARDIOGRAM TRACING: CPT | Performed by: INTERNAL MEDICINE

## 2021-01-01 PROCEDURE — 81001 URINALYSIS AUTO W/SCOPE: CPT | Performed by: NURSE PRACTITIONER

## 2021-01-01 PROCEDURE — 999N000185 HC STATISTIC TRANSPORT TIME EA 15 MIN

## 2021-01-01 PROCEDURE — 999N000009 HC STATISTIC AIRWAY CARE

## 2021-01-01 PROCEDURE — 999N000104 HC STATISTIC NO CHARGE

## 2021-01-01 PROCEDURE — 85379 FIBRIN DEGRADATION QUANT: CPT | Performed by: EMERGENCY MEDICINE

## 2021-01-01 RX ORDER — ONDANSETRON 2 MG/ML
4 INJECTION INTRAMUSCULAR; INTRAVENOUS EVERY 6 HOURS PRN
Status: DISCONTINUED | OUTPATIENT
Start: 2021-01-01 | End: 2021-12-28 | Stop reason: HOSPADM

## 2021-01-01 RX ORDER — METRONIDAZOLE 500 MG/1
500 TABLET ORAL 2 TIMES DAILY
Status: DISCONTINUED | OUTPATIENT
Start: 2021-01-01 | End: 2021-12-28 | Stop reason: HOSPADM

## 2021-01-01 RX ORDER — FUROSEMIDE 10 MG/ML
20 INJECTION INTRAMUSCULAR; INTRAVENOUS DAILY
Status: DISCONTINUED | OUTPATIENT
Start: 2021-01-01 | End: 2021-12-28 | Stop reason: HOSPADM

## 2021-01-01 RX ORDER — CALCIUM CHLORIDE 100 MG/ML
1 INJECTION INTRAVENOUS; INTRAVENTRICULAR ONCE
Status: COMPLETED | OUTPATIENT
Start: 2021-01-01 | End: 2021-01-01

## 2021-01-01 RX ORDER — NICOTINE POLACRILEX 4 MG
15-30 LOZENGE BUCCAL
Status: DISCONTINUED | OUTPATIENT
Start: 2021-01-01 | End: 2021-12-28 | Stop reason: HOSPADM

## 2021-01-01 RX ORDER — METOPROLOL TARTRATE 100 MG
100 TABLET ORAL 2 TIMES DAILY
Qty: 60 TABLET | Refills: 0 | Status: SHIPPED | OUTPATIENT
Start: 2021-01-01 | End: 2021-01-01

## 2021-01-01 RX ORDER — BACLOFEN 10 MG/1
TABLET ORAL
COMMUNITY
Start: 2019-07-29 | End: 2021-01-01

## 2021-01-01 RX ORDER — LEVOFLOXACIN 5 MG/ML
500 INJECTION, SOLUTION INTRAVENOUS EVERY 24 HOURS
Status: DISCONTINUED | OUTPATIENT
Start: 2021-01-01 | End: 2021-12-28 | Stop reason: HOSPADM

## 2021-01-01 RX ORDER — AMOXICILLIN 250 MG
1 CAPSULE ORAL DAILY
Status: DISCONTINUED | OUTPATIENT
Start: 2021-01-01 | End: 2021-01-01 | Stop reason: CLARIF

## 2021-01-01 RX ORDER — FUROSEMIDE 20 MG
20 TABLET ORAL DAILY
Qty: 30 TABLET | Refills: 1 | Status: SHIPPED | OUTPATIENT
Start: 2021-01-01 | End: 2021-01-01

## 2021-01-01 RX ORDER — NOREPINEPHRINE BITARTRATE 0.02 MG/ML
.01-.6 INJECTION, SOLUTION INTRAVENOUS CONTINUOUS
Status: DISCONTINUED | OUTPATIENT
Start: 2021-01-01 | End: 2021-01-01

## 2021-01-01 RX ORDER — BACLOFEN 10 MG/1
TABLET ORAL
Qty: 180 TABLET | Refills: 1 | Status: SHIPPED | OUTPATIENT
Start: 2021-01-01

## 2021-01-01 RX ORDER — PIPERACILLIN SODIUM, TAZOBACTAM SODIUM 3; .375 G/15ML; G/15ML
3.38 INJECTION, POWDER, LYOPHILIZED, FOR SOLUTION INTRAVENOUS EVERY 8 HOURS
Status: DISCONTINUED | OUTPATIENT
Start: 2021-01-01 | End: 2021-01-01

## 2021-01-01 RX ORDER — NALOXONE HYDROCHLORIDE 0.4 MG/ML
0.2 INJECTION, SOLUTION INTRAMUSCULAR; INTRAVENOUS; SUBCUTANEOUS
Status: DISCONTINUED | OUTPATIENT
Start: 2021-01-01 | End: 2021-12-28 | Stop reason: HOSPADM

## 2021-01-01 RX ORDER — PIPERACILLIN SODIUM, TAZOBACTAM SODIUM 3; .375 G/15ML; G/15ML
3.38 INJECTION, POWDER, LYOPHILIZED, FOR SOLUTION INTRAVENOUS EVERY 8 HOURS
Status: DISCONTINUED | OUTPATIENT
Start: 2021-01-01 | End: 2021-01-01 | Stop reason: ALTCHOICE

## 2021-01-01 RX ORDER — POTASSIUM CHLORIDE 20MEQ/15ML
10 LIQUID (ML) ORAL ONCE
Status: COMPLETED | OUTPATIENT
Start: 2021-01-01 | End: 2021-01-01

## 2021-01-01 RX ORDER — PIPERACILLIN SODIUM, TAZOBACTAM SODIUM 3; .375 G/15ML; G/15ML
3.38 INJECTION, POWDER, LYOPHILIZED, FOR SOLUTION INTRAVENOUS ONCE
Status: COMPLETED | OUTPATIENT
Start: 2021-01-01 | End: 2021-01-01

## 2021-01-01 RX ORDER — ONDANSETRON 4 MG/1
4 TABLET, ORALLY DISINTEGRATING ORAL EVERY 6 HOURS PRN
Status: DISCONTINUED | OUTPATIENT
Start: 2021-01-01 | End: 2021-12-28 | Stop reason: HOSPADM

## 2021-01-01 RX ORDER — BACLOFEN 10 MG/1
10 TABLET ORAL 2 TIMES DAILY
Status: DISCONTINUED | OUTPATIENT
Start: 2021-01-01 | End: 2021-12-28 | Stop reason: HOSPADM

## 2021-01-01 RX ORDER — LEVOTHYROXINE SODIUM 100 UG/1
TABLET ORAL
Qty: 30 TABLET | Refills: 5 | Status: SHIPPED | OUTPATIENT
Start: 2021-01-01

## 2021-01-01 RX ORDER — POLYETHYLENE GLYCOL 3350 17 G/17G
17 POWDER, FOR SOLUTION ORAL DAILY
Status: DISCONTINUED | OUTPATIENT
Start: 2021-01-01 | End: 2021-12-28 | Stop reason: HOSPADM

## 2021-01-01 RX ORDER — METOPROLOL TARTRATE 1 MG/ML
5 INJECTION, SOLUTION INTRAVENOUS ONCE
Status: COMPLETED | OUTPATIENT
Start: 2021-01-01 | End: 2021-01-01

## 2021-01-01 RX ORDER — PROPOFOL 10 MG/ML
5-75 INJECTION, EMULSION INTRAVENOUS CONTINUOUS
Status: DISCONTINUED | OUTPATIENT
Start: 2021-01-01 | End: 2021-01-01

## 2021-01-01 RX ORDER — PROPOFOL 10 MG/ML
100 INJECTION, EMULSION INTRAVENOUS ONCE
Status: COMPLETED | OUTPATIENT
Start: 2021-01-01 | End: 2021-01-01

## 2021-01-01 RX ORDER — LIDOCAINE 40 MG/G
CREAM TOPICAL
Status: ACTIVE | OUTPATIENT
Start: 2021-01-01 | End: 2021-01-01

## 2021-01-01 RX ORDER — VECURONIUM BROMIDE 1 MG/ML
5 INJECTION, POWDER, LYOPHILIZED, FOR SOLUTION INTRAVENOUS EVERY 6 HOURS PRN
Status: DISCONTINUED | OUTPATIENT
Start: 2021-01-01 | End: 2021-01-01

## 2021-01-01 RX ORDER — DEXTROSE MONOHYDRATE 100 MG/ML
INJECTION, SOLUTION INTRAVENOUS CONTINUOUS PRN
Status: DISCONTINUED | OUTPATIENT
Start: 2021-01-01 | End: 2021-12-28 | Stop reason: HOSPADM

## 2021-01-01 RX ORDER — POTASSIUM CHLORIDE 7.45 MG/ML
10 INJECTION INTRAVENOUS ONCE
Status: COMPLETED | OUTPATIENT
Start: 2021-01-01 | End: 2021-01-01

## 2021-01-01 RX ORDER — MEROPENEM 1 G/1
1 INJECTION, POWDER, FOR SOLUTION INTRAVENOUS EVERY 8 HOURS
Status: DISCONTINUED | OUTPATIENT
Start: 2021-01-01 | End: 2021-01-01

## 2021-01-01 RX ORDER — VECURONIUM BROMIDE 1 MG/ML
5 INJECTION, POWDER, LYOPHILIZED, FOR SOLUTION INTRAVENOUS ONCE
Status: COMPLETED | OUTPATIENT
Start: 2021-01-01 | End: 2021-01-01

## 2021-01-01 RX ORDER — CHLORHEXIDINE GLUCONATE ORAL RINSE 1.2 MG/ML
15 SOLUTION DENTAL 2 TIMES DAILY
Status: DISCONTINUED | OUTPATIENT
Start: 2021-01-01 | End: 2021-12-28 | Stop reason: HOSPADM

## 2021-01-01 RX ORDER — DEXTROSE MONOHYDRATE 25 G/50ML
25-50 INJECTION, SOLUTION INTRAVENOUS
Status: DISCONTINUED | OUTPATIENT
Start: 2021-01-01 | End: 2021-12-28 | Stop reason: HOSPADM

## 2021-01-01 RX ORDER — DEXMEDETOMIDINE HYDROCHLORIDE 4 UG/ML
.1-1.5 INJECTION, SOLUTION INTRAVENOUS CONTINUOUS
Status: DISCONTINUED | OUTPATIENT
Start: 2021-01-01 | End: 2021-12-28 | Stop reason: HOSPADM

## 2021-01-01 RX ORDER — NALOXONE HYDROCHLORIDE 0.4 MG/ML
0.4 INJECTION, SOLUTION INTRAMUSCULAR; INTRAVENOUS; SUBCUTANEOUS
Status: DISCONTINUED | OUTPATIENT
Start: 2021-01-01 | End: 2021-12-28 | Stop reason: HOSPADM

## 2021-01-01 RX ORDER — VANCOMYCIN HYDROCHLORIDE 1 G/200ML
1000 INJECTION, SOLUTION INTRAVENOUS ONCE
Status: COMPLETED | OUTPATIENT
Start: 2021-01-01 | End: 2021-01-01

## 2021-01-01 RX ORDER — ALBUMIN (HUMAN) 12.5 G/50ML
12.5 SOLUTION INTRAVENOUS ONCE
Status: COMPLETED | OUTPATIENT
Start: 2021-01-01 | End: 2021-01-01

## 2021-01-01 RX ORDER — FENTANYL CITRATE 50 UG/ML
50-100 INJECTION, SOLUTION INTRAMUSCULAR; INTRAVENOUS
Status: DISCONTINUED | OUTPATIENT
Start: 2021-01-01 | End: 2021-12-28 | Stop reason: HOSPADM

## 2021-01-01 RX ORDER — VANCOMYCIN HYDROCHLORIDE 1 G/200ML
1000 INJECTION, SOLUTION INTRAVENOUS
Status: DISCONTINUED | OUTPATIENT
Start: 2021-01-01 | End: 2021-01-01

## 2021-01-01 RX ORDER — ETOMIDATE 2 MG/ML
20 INJECTION INTRAVENOUS ONCE
Status: COMPLETED | OUTPATIENT
Start: 2021-01-01 | End: 2021-01-01

## 2021-01-01 RX ORDER — PROPOFOL 10 MG/ML
5-10 INJECTION, EMULSION INTRAVENOUS CONTINUOUS
Status: DISCONTINUED | OUTPATIENT
Start: 2021-01-01 | End: 2021-01-01

## 2021-01-01 RX ORDER — FUROSEMIDE 10 MG/ML
20 INJECTION INTRAMUSCULAR; INTRAVENOUS EVERY 12 HOURS
Status: DISCONTINUED | OUTPATIENT
Start: 2021-01-01 | End: 2021-01-01

## 2021-01-01 RX ORDER — WARFARIN SODIUM 1 MG/1
TABLET ORAL
Qty: 50 TABLET | Refills: 1 | Status: SHIPPED | OUTPATIENT
Start: 2021-01-01

## 2021-01-01 RX ORDER — CALCIUM CARBONATE/VITAMIN D3 500-10/5ML
LIQUID (ML) ORAL
Qty: 90 CAPSULE | Refills: 1 | Status: SHIPPED | OUTPATIENT
Start: 2021-01-01

## 2021-01-01 RX ORDER — FUROSEMIDE 10 MG/ML
20 INJECTION INTRAMUSCULAR; INTRAVENOUS ONCE
Status: COMPLETED | OUTPATIENT
Start: 2021-01-01 | End: 2021-01-01

## 2021-01-01 RX ORDER — MIDAZOLAM HCL IN 0.9 % NACL/PF 1 MG/ML
1-8 PLASTIC BAG, INJECTION (ML) INTRAVENOUS CONTINUOUS
Status: DISCONTINUED | OUTPATIENT
Start: 2021-01-01 | End: 2021-12-28 | Stop reason: HOSPADM

## 2021-01-01 RX ORDER — FUROSEMIDE 10 MG/ML
20 INJECTION INTRAMUSCULAR; INTRAVENOUS EVERY 8 HOURS
Status: DISCONTINUED | OUTPATIENT
Start: 2021-01-01 | End: 2021-01-01

## 2021-01-01 RX ORDER — DEXAMETHASONE SODIUM PHOSPHATE 4 MG/ML
6 INJECTION, SOLUTION INTRA-ARTICULAR; INTRALESIONAL; INTRAMUSCULAR; INTRAVENOUS; SOFT TISSUE DAILY
Status: COMPLETED | OUTPATIENT
Start: 2021-01-01 | End: 2021-01-01

## 2021-01-01 RX ORDER — GABAPENTIN 250 MG/5ML
100 SOLUTION ORAL EVERY 12 HOURS SCHEDULED
Status: DISCONTINUED | OUTPATIENT
Start: 2021-01-01 | End: 2021-12-28 | Stop reason: HOSPADM

## 2021-01-01 RX ORDER — METOPROLOL TARTRATE 100 MG
100 TABLET ORAL 2 TIMES DAILY
Qty: 60 TABLET | Refills: 0 | Status: SHIPPED | OUTPATIENT
Start: 2021-01-01

## 2021-01-01 RX ORDER — ACETAMINOPHEN 100MG/10ML
1000 SYRINGE (ML) INTRAVENOUS ONCE
Status: DISCONTINUED | OUTPATIENT
Start: 2021-01-01 | End: 2021-01-01

## 2021-01-01 RX ORDER — WARFARIN SODIUM 1 MG/1
1 TABLET ORAL
Status: COMPLETED | OUTPATIENT
Start: 2021-01-01 | End: 2021-01-01

## 2021-01-01 RX ORDER — POTASSIUM CHLORIDE 20MEQ/15ML
20 LIQUID (ML) ORAL ONCE
Status: COMPLETED | OUTPATIENT
Start: 2021-01-01 | End: 2021-01-01

## 2021-01-01 RX ORDER — BISACODYL 10 MG
10 SUPPOSITORY, RECTAL RECTAL DAILY PRN
Status: DISCONTINUED | OUTPATIENT
Start: 2021-01-01 | End: 2021-12-28 | Stop reason: HOSPADM

## 2021-01-01 RX ORDER — LEVOTHYROXINE SODIUM 100 UG/1
100 TABLET ORAL
Status: DISCONTINUED | OUTPATIENT
Start: 2021-01-01 | End: 2021-12-28 | Stop reason: HOSPADM

## 2021-01-01 RX ORDER — CALCIUM GLUCONATE 94 MG/ML
2 INJECTION, SOLUTION INTRAVENOUS ONCE
Status: COMPLETED | OUTPATIENT
Start: 2021-01-01 | End: 2021-01-01

## 2021-01-01 RX ORDER — POTASSIUM CHLORIDE 29.8 MG/ML
20 INJECTION INTRAVENOUS ONCE
Status: DISCONTINUED | OUTPATIENT
Start: 2021-01-01 | End: 2021-01-01

## 2021-01-01 RX ORDER — FUROSEMIDE 20 MG
20 TABLET ORAL DAILY
COMMUNITY
Start: 2021-01-01 | End: 2021-01-01

## 2021-01-01 RX ORDER — POTASSIUM CHLORIDE 29.8 MG/ML
20 INJECTION INTRAVENOUS ONCE
Status: COMPLETED | OUTPATIENT
Start: 2021-01-01 | End: 2021-01-01

## 2021-01-01 RX ORDER — BACLOFEN 10 MG/1
10 TABLET ORAL 2 TIMES DAILY
Status: DISCONTINUED | OUTPATIENT
Start: 2021-01-01 | End: 2021-01-01

## 2021-01-01 RX ORDER — ALBUMIN (HUMAN) 12.5 G/50ML
25 SOLUTION INTRAVENOUS ONCE
Status: COMPLETED | OUTPATIENT
Start: 2021-01-01 | End: 2021-01-01

## 2021-01-01 RX ORDER — FUROSEMIDE 20 MG
TABLET ORAL
Qty: 30 TABLET | Refills: 1 | Status: SHIPPED | OUTPATIENT
Start: 2021-01-01

## 2021-01-01 RX ORDER — PHENYLEPHRINE HCL IN 0.9% NACL 50MG/250ML
.1-6 PLASTIC BAG, INJECTION (ML) INTRAVENOUS CONTINUOUS
Status: DISCONTINUED | OUTPATIENT
Start: 2021-01-01 | End: 2021-01-01

## 2021-01-01 RX ORDER — FUROSEMIDE 10 MG/ML
40 INJECTION INTRAMUSCULAR; INTRAVENOUS ONCE
Status: COMPLETED | OUTPATIENT
Start: 2021-01-01 | End: 2021-01-01

## 2021-01-01 RX ORDER — WARFARIN SODIUM 2 MG/1
2 TABLET ORAL
Status: COMPLETED | OUTPATIENT
Start: 2021-01-01 | End: 2021-01-01

## 2021-01-01 RX ORDER — CEFTRIAXONE 1 G/1
1 INJECTION, POWDER, FOR SOLUTION INTRAMUSCULAR; INTRAVENOUS EVERY 24 HOURS
Status: DISCONTINUED | OUTPATIENT
Start: 2021-01-01 | End: 2021-01-01

## 2021-01-01 RX ORDER — ACETAMINOPHEN 650 MG/1
650 SUPPOSITORY RECTAL EVERY 4 HOURS PRN
Status: DISCONTINUED | OUTPATIENT
Start: 2021-01-01 | End: 2021-12-28 | Stop reason: HOSPADM

## 2021-01-01 RX ORDER — ACETAMINOPHEN 325 MG/1
650 TABLET ORAL EVERY 4 HOURS PRN
Status: DISCONTINUED | OUTPATIENT
Start: 2021-01-01 | End: 2021-01-01 | Stop reason: CLARIF

## 2021-01-01 RX ORDER — ACETAMINOPHEN 10 MG/ML
1000 INJECTION, SOLUTION INTRAVENOUS ONCE
Status: COMPLETED | OUTPATIENT
Start: 2021-01-01 | End: 2021-01-01

## 2021-01-01 RX ADMIN — BACLOFEN 10 MG: 10 TABLET ORAL at 19:34

## 2021-01-01 RX ADMIN — SENNOSIDES 5 ML: 8.8 LIQUID ORAL at 08:05

## 2021-01-01 RX ADMIN — POLYETHYLENE GLYCOL 3350 17 G: 17 POWDER, FOR SOLUTION ORAL at 08:32

## 2021-01-01 RX ADMIN — DEXAMETHASONE SODIUM PHOSPHATE 6 MG: 4 INJECTION, SOLUTION INTRA-ARTICULAR; INTRALESIONAL; INTRAMUSCULAR; INTRAVENOUS; SOFT TISSUE at 05:18

## 2021-01-01 RX ADMIN — PIPERACILLIN SODIUM AND TAZOBACTAM SODIUM 3.38 G: 3; .375 INJECTION, POWDER, LYOPHILIZED, FOR SOLUTION INTRAVENOUS at 08:43

## 2021-01-01 RX ADMIN — Medication 10 MEQ: at 13:07

## 2021-01-01 RX ADMIN — AMIODARONE HYDROCHLORIDE 0.5 MG/MIN: 50 INJECTION, SOLUTION INTRAVENOUS at 00:34

## 2021-01-01 RX ADMIN — Medication 3 MCG/KG/MIN: at 13:14

## 2021-01-01 RX ADMIN — SENNOSIDES 5 ML: 8.8 LIQUID ORAL at 12:40

## 2021-01-01 RX ADMIN — CHLORHEXIDINE GLUCONATE 0.12% ORAL RINSE 15 ML: 1.2 LIQUID ORAL at 21:05

## 2021-01-01 RX ADMIN — CHLORHEXIDINE GLUCONATE 0.12% ORAL RINSE 15 ML: 1.2 LIQUID ORAL at 07:59

## 2021-01-01 RX ADMIN — CEFTRIAXONE SODIUM 1 G: 1 INJECTION, POWDER, FOR SOLUTION INTRAMUSCULAR; INTRAVENOUS at 17:05

## 2021-01-01 RX ADMIN — VANCOMYCIN HYDROCHLORIDE 750 MG: 1 INJECTION, POWDER, LYOPHILIZED, FOR SOLUTION INTRAVENOUS at 11:17

## 2021-01-01 RX ADMIN — Medication 200 MCG/HR: at 11:42

## 2021-01-01 RX ADMIN — ACETAMINOPHEN 1000 MG: 10 INJECTION, SOLUTION INTRAVENOUS at 02:45

## 2021-01-01 RX ADMIN — PANTOPRAZOLE SODIUM 40 MG: 40 INJECTION, POWDER, FOR SOLUTION INTRAVENOUS at 08:04

## 2021-01-01 RX ADMIN — PHENYLEPHRINE HYDROCHLORIDE 3.5 MCG/KG/MIN: 10 INJECTION INTRAVENOUS at 16:51

## 2021-01-01 RX ADMIN — FENTANYL CITRATE 50 MCG: 50 INJECTION, SOLUTION INTRAMUSCULAR; INTRAVENOUS at 01:01

## 2021-01-01 RX ADMIN — Medication 100 MG: at 09:03

## 2021-01-01 RX ADMIN — VECURONIUM BROMIDE 5 MG: 1 INJECTION, POWDER, LYOPHILIZED, FOR SOLUTION INTRAVENOUS at 09:13

## 2021-01-01 RX ADMIN — BACLOFEN 10 MG: 10 TABLET ORAL at 08:42

## 2021-01-01 RX ADMIN — FENTANYL CITRATE 50 MCG: 50 INJECTION, SOLUTION INTRAMUSCULAR; INTRAVENOUS at 10:35

## 2021-01-01 RX ADMIN — SODIUM CHLORIDE 50 ML: 900 INJECTION INTRAVENOUS at 00:02

## 2021-01-01 RX ADMIN — DEXAMETHASONE SODIUM PHOSPHATE 6 MG: 4 INJECTION, SOLUTION INTRA-ARTICULAR; INTRALESIONAL; INTRAMUSCULAR; INTRAVENOUS; SOFT TISSUE at 05:33

## 2021-01-01 RX ADMIN — CHLORHEXIDINE GLUCONATE 0.12% ORAL RINSE 15 ML: 1.2 LIQUID ORAL at 20:00

## 2021-01-01 RX ADMIN — SERTRALINE HYDROCHLORIDE 50 MG: 50 TABLET ORAL at 11:08

## 2021-01-01 RX ADMIN — Medication 100 MG: at 20:54

## 2021-01-01 RX ADMIN — DEXTROSE MONOHYDRATE 25 ML: 25 INJECTION, SOLUTION INTRAVENOUS at 20:04

## 2021-01-01 RX ADMIN — Medication 100 MG: at 08:32

## 2021-01-01 RX ADMIN — BACLOFEN 10 MG: 10 TABLET ORAL at 08:28

## 2021-01-01 RX ADMIN — PANTOPRAZOLE SODIUM 40 MG: 40 INJECTION, POWDER, FOR SOLUTION INTRAVENOUS at 08:55

## 2021-01-01 RX ADMIN — CHLORHEXIDINE GLUCONATE 0.12% ORAL RINSE 15 ML: 1.2 LIQUID ORAL at 19:56

## 2021-01-01 RX ADMIN — WARFARIN SODIUM 1 MG: 1 TABLET ORAL at 17:06

## 2021-01-01 RX ADMIN — CHLORHEXIDINE GLUCONATE 0.12% ORAL RINSE 15 ML: 1.2 LIQUID ORAL at 20:26

## 2021-01-01 RX ADMIN — ALBUMIN HUMAN 12.5 G: 0.25 SOLUTION INTRAVENOUS at 18:50

## 2021-01-01 RX ADMIN — PROPOFOL 100 MG: 10 INJECTION, EMULSION INTRAVENOUS at 20:44

## 2021-01-01 RX ADMIN — WARFARIN SODIUM 1.5 MG: 3 TABLET ORAL at 17:53

## 2021-01-01 RX ADMIN — AZITHROMYCIN MONOHYDRATE 500 MG: 500 INJECTION, POWDER, LYOPHILIZED, FOR SOLUTION INTRAVENOUS at 17:31

## 2021-01-01 RX ADMIN — PANTOPRAZOLE SODIUM 40 MG: 40 INJECTION, POWDER, FOR SOLUTION INTRAVENOUS at 07:59

## 2021-01-01 RX ADMIN — FENTANYL CITRATE 100 MCG: 50 INJECTION, SOLUTION INTRAMUSCULAR; INTRAVENOUS at 09:00

## 2021-01-01 RX ADMIN — POTASSIUM CHLORIDE 20 MEQ: 1.5 SOLUTION ORAL at 16:22

## 2021-01-01 RX ADMIN — DEXMEDETOMIDINE HYDROCHLORIDE 1.5 MCG/KG/HR: 400 INJECTION INTRAVENOUS at 18:11

## 2021-01-01 RX ADMIN — CHLORHEXIDINE GLUCONATE 0.12% ORAL RINSE 15 ML: 1.2 LIQUID ORAL at 19:57

## 2021-01-01 RX ADMIN — MIDAZOLAM HYDROCHLORIDE 2 MG: 1 INJECTION, SOLUTION INTRAMUSCULAR; INTRAVENOUS at 02:57

## 2021-01-01 RX ADMIN — SERTRALINE HYDROCHLORIDE 50 MG: 50 TABLET ORAL at 08:04

## 2021-01-01 RX ADMIN — BACLOFEN 10 MG: 10 TABLET ORAL at 20:16

## 2021-01-01 RX ADMIN — CALCIUM CHLORIDE 1 G: 100 INJECTION INTRAVENOUS; INTRAVENTRICULAR at 08:31

## 2021-01-01 RX ADMIN — DEXTROSE MONOHYDRATE: 100 INJECTION, SOLUTION INTRAVENOUS at 22:50

## 2021-01-01 RX ADMIN — LEVOTHYROXINE SODIUM 100 MCG: 100 TABLET ORAL at 06:31

## 2021-01-01 RX ADMIN — FENTANYL CITRATE 50 MCG: 50 INJECTION, SOLUTION INTRAMUSCULAR; INTRAVENOUS at 08:49

## 2021-01-01 RX ADMIN — PANTOPRAZOLE SODIUM 40 MG: 40 INJECTION, POWDER, FOR SOLUTION INTRAVENOUS at 20:34

## 2021-01-01 RX ADMIN — BACLOFEN 10 MG: 10 TABLET ORAL at 20:46

## 2021-01-01 RX ADMIN — CHLORHEXIDINE GLUCONATE 0.12% ORAL RINSE 15 ML: 1.2 LIQUID ORAL at 20:46

## 2021-01-01 RX ADMIN — METRONIDAZOLE 500 MG: 500 TABLET ORAL at 08:43

## 2021-01-01 RX ADMIN — DEXMEDETOMIDINE HYDROCHLORIDE 1.5 MCG/KG/HR: 400 INJECTION INTRAVENOUS at 13:18

## 2021-01-01 RX ADMIN — Medication 20 NG/KG/MIN: at 19:46

## 2021-01-01 RX ADMIN — PIPERACILLIN SODIUM AND TAZOBACTAM SODIUM 3.38 G: 3; .375 INJECTION, POWDER, LYOPHILIZED, FOR SOLUTION INTRAVENOUS at 06:02

## 2021-01-01 RX ADMIN — FUROSEMIDE 20 MG: 10 INJECTION, SOLUTION INTRAMUSCULAR; INTRAVENOUS at 16:12

## 2021-01-01 RX ADMIN — POLYETHYLENE GLYCOL 3350 17 G: 17 POWDER, FOR SOLUTION ORAL at 10:52

## 2021-01-01 RX ADMIN — BACLOFEN 10 MG: 10 TABLET ORAL at 21:05

## 2021-01-01 RX ADMIN — SODIUM CHLORIDE 50 ML: 900 INJECTION INTRAVENOUS at 19:54

## 2021-01-01 RX ADMIN — PANTOPRAZOLE SODIUM 40 MG: 40 INJECTION, POWDER, FOR SOLUTION INTRAVENOUS at 08:48

## 2021-01-01 RX ADMIN — FUROSEMIDE 20 MG: 10 INJECTION, SOLUTION INTRAMUSCULAR; INTRAVENOUS at 17:31

## 2021-01-01 RX ADMIN — CEFTRIAXONE SODIUM 1 G: 1 INJECTION, POWDER, FOR SOLUTION INTRAMUSCULAR; INTRAVENOUS at 17:28

## 2021-01-01 RX ADMIN — DEXTROSE MONOHYDRATE 25 ML: 25 INJECTION, SOLUTION INTRAVENOUS at 15:52

## 2021-01-01 RX ADMIN — INSULIN GLARGINE 10 UNITS: 100 INJECTION, SOLUTION SUBCUTANEOUS at 08:57

## 2021-01-01 RX ADMIN — PIPERACILLIN SODIUM AND TAZOBACTAM SODIUM 3.38 G: 3; .375 INJECTION, POWDER, LYOPHILIZED, FOR SOLUTION INTRAVENOUS at 14:29

## 2021-01-01 RX ADMIN — Medication 100 MG: at 08:42

## 2021-01-01 RX ADMIN — CHLORHEXIDINE GLUCONATE 0.12% ORAL RINSE 15 ML: 1.2 LIQUID ORAL at 08:05

## 2021-01-01 RX ADMIN — SENNOSIDES 5 ML: 8.8 LIQUID ORAL at 08:26

## 2021-01-01 RX ADMIN — CEFTRIAXONE SODIUM 1 G: 1 INJECTION, POWDER, FOR SOLUTION INTRAMUSCULAR; INTRAVENOUS at 16:51

## 2021-01-01 RX ADMIN — FUROSEMIDE 20 MG: 10 INJECTION, SOLUTION INTRAMUSCULAR; INTRAVENOUS at 00:12

## 2021-01-01 RX ADMIN — Medication 100 MG: at 08:54

## 2021-01-01 RX ADMIN — BACLOFEN 10 MG: 10 TABLET ORAL at 08:41

## 2021-01-01 RX ADMIN — METRONIDAZOLE 500 MG: 500 TABLET ORAL at 20:17

## 2021-01-01 RX ADMIN — BACLOFEN 10 MG: 10 TABLET ORAL at 11:08

## 2021-01-01 RX ADMIN — PROPOFOL 30 MCG/KG/MIN: 10 INJECTION, EMULSION INTRAVENOUS at 09:31

## 2021-01-01 RX ADMIN — SERTRALINE HYDROCHLORIDE 50 MG: 50 TABLET ORAL at 08:42

## 2021-01-01 RX ADMIN — PROPOFOL 15 MCG/KG/MIN: 10 INJECTION, EMULSION INTRAVENOUS at 20:03

## 2021-01-01 RX ADMIN — LEVOTHYROXINE SODIUM 100 MCG: 100 TABLET ORAL at 08:31

## 2021-01-01 RX ADMIN — PROPOFOL 55 MCG/KG/MIN: 10 INJECTION, EMULSION INTRAVENOUS at 20:19

## 2021-01-01 RX ADMIN — BISACODYL 10 MG: 10 SUPPOSITORY RECTAL at 16:11

## 2021-01-01 RX ADMIN — DEXMEDETOMIDINE HYDROCHLORIDE 1.2 MCG/KG/HR: 400 INJECTION INTRAVENOUS at 15:28

## 2021-01-01 RX ADMIN — BACLOFEN 10 MG: 10 TABLET ORAL at 08:48

## 2021-01-01 RX ADMIN — Medication 50 MCG/HR: at 18:32

## 2021-01-01 RX ADMIN — CHLORHEXIDINE GLUCONATE 0.12% ORAL RINSE 15 ML: 1.2 LIQUID ORAL at 08:32

## 2021-01-01 RX ADMIN — POTASSIUM CHLORIDE 10 MEQ: 20 SOLUTION ORAL at 00:39

## 2021-01-01 RX ADMIN — VANCOMYCIN HYDROCHLORIDE 1000 MG: 1 INJECTION, SOLUTION INTRAVENOUS at 10:42

## 2021-01-01 RX ADMIN — POTASSIUM CHLORIDE 20 MEQ: 1.5 SOLUTION ORAL at 05:54

## 2021-01-01 RX ADMIN — PIPERACILLIN AND TAZOBACTAM 3.38 G: 3; .375 INJECTION, POWDER, LYOPHILIZED, FOR SOLUTION INTRAVENOUS at 08:54

## 2021-01-01 RX ADMIN — POLYETHYLENE GLYCOL 3350 17 G: 17 POWDER, FOR SOLUTION ORAL at 08:43

## 2021-01-01 RX ADMIN — CHLORHEXIDINE GLUCONATE 0.12% ORAL RINSE 15 ML: 1.2 LIQUID ORAL at 08:42

## 2021-01-01 RX ADMIN — Medication 100 MG: at 21:05

## 2021-01-01 RX ADMIN — SODIUM CHLORIDE 50 ML: 900 INJECTION INTRAVENOUS at 18:04

## 2021-01-01 RX ADMIN — TOCILIZUMAB 400 MG: 20 INJECTION, SOLUTION, CONCENTRATE INTRAVENOUS at 16:15

## 2021-01-01 RX ADMIN — CHLORHEXIDINE GLUCONATE 0.12% ORAL RINSE 15 ML: 1.2 LIQUID ORAL at 20:16

## 2021-01-01 RX ADMIN — DEXTROSE MONOHYDRATE 1000 ML: 100 INJECTION, SOLUTION INTRAVENOUS at 20:29

## 2021-01-01 RX ADMIN — DOCUSATE SODIUM 50 MG: 50 LIQUID ORAL at 08:48

## 2021-01-01 RX ADMIN — SODIUM BICARBONATE 50 MEQ: 84 INJECTION, SOLUTION INTRAVENOUS at 22:42

## 2021-01-01 RX ADMIN — Medication 8 MG/HR: at 17:54

## 2021-01-01 RX ADMIN — PIPERACILLIN SODIUM AND TAZOBACTAM SODIUM 3.38 G: 3; .375 INJECTION, POWDER, LYOPHILIZED, FOR SOLUTION INTRAVENOUS at 22:40

## 2021-01-01 RX ADMIN — Medication 100 MG: at 19:57

## 2021-01-01 RX ADMIN — FUROSEMIDE 20 MG: 10 INJECTION, SOLUTION INTRAMUSCULAR; INTRAVENOUS at 08:05

## 2021-01-01 RX ADMIN — AMIODARONE HYDROCHLORIDE 150 MG: 1.5 INJECTION, SOLUTION INTRAVENOUS at 09:45

## 2021-01-01 RX ADMIN — Medication 0.08 MCG/KG/MIN: at 16:11

## 2021-01-01 RX ADMIN — POTASSIUM CHLORIDE 10 MEQ: 7.46 INJECTION, SOLUTION INTRAVENOUS at 07:59

## 2021-01-01 RX ADMIN — DEXAMETHASONE SODIUM PHOSPHATE 6 MG: 4 INJECTION, SOLUTION INTRA-ARTICULAR; INTRALESIONAL; INTRAMUSCULAR; INTRAVENOUS; SOFT TISSUE at 08:13

## 2021-01-01 RX ADMIN — Medication 20 NG/KG/MIN: at 18:15

## 2021-01-01 RX ADMIN — SERTRALINE HYDROCHLORIDE 50 MG: 50 TABLET ORAL at 08:31

## 2021-01-01 RX ADMIN — LEVOFLOXACIN 500 MG: 5 INJECTION, SOLUTION INTRAVENOUS at 11:42

## 2021-01-01 RX ADMIN — INSULIN ASPART 1 UNITS: 100 INJECTION, SOLUTION INTRAVENOUS; SUBCUTANEOUS at 01:32

## 2021-01-01 RX ADMIN — CHLORHEXIDINE GLUCONATE 0.12% ORAL RINSE 15 ML: 1.2 LIQUID ORAL at 08:48

## 2021-01-01 RX ADMIN — Medication 20 NG/KG/MIN: at 11:15

## 2021-01-01 RX ADMIN — SENNOSIDES 5 ML: 8.8 LIQUID ORAL at 08:28

## 2021-01-01 RX ADMIN — PROPOFOL 10 MCG/KG/MIN: 10 INJECTION, EMULSION INTRAVENOUS at 08:03

## 2021-01-01 RX ADMIN — POTASSIUM CHLORIDE 20 MEQ: 29.8 INJECTION INTRAVENOUS at 10:15

## 2021-01-01 RX ADMIN — PROPOFOL 40 MCG/KG/MIN: 10 INJECTION, EMULSION INTRAVENOUS at 00:10

## 2021-01-01 RX ADMIN — POLYETHYLENE GLYCOL 3350 17 G: 17 POWDER, FOR SOLUTION ORAL at 08:26

## 2021-01-01 RX ADMIN — Medication 200 MCG/HR: at 17:54

## 2021-01-01 RX ADMIN — DOCUSATE SODIUM 50 MG: 50 LIQUID ORAL at 08:04

## 2021-01-01 RX ADMIN — DOCUSATE SODIUM 50 MG: 50 LIQUID ORAL at 08:31

## 2021-01-01 RX ADMIN — INSULIN ASPART 3 UNITS: 100 INJECTION, SOLUTION INTRAVENOUS; SUBCUTANEOUS at 22:10

## 2021-01-01 RX ADMIN — Medication 0.06 MCG/KG/MIN: at 20:41

## 2021-01-01 RX ADMIN — CHLORHEXIDINE GLUCONATE 0.12% ORAL RINSE 15 ML: 1.2 LIQUID ORAL at 08:33

## 2021-01-01 RX ADMIN — CHLORHEXIDINE GLUCONATE 0.12% ORAL RINSE 15 ML: 1.2 LIQUID ORAL at 08:29

## 2021-01-01 RX ADMIN — CHLORHEXIDINE GLUCONATE 0.12% ORAL RINSE 15 ML: 1.2 LIQUID ORAL at 21:20

## 2021-01-01 RX ADMIN — DOCUSATE SODIUM 50 MG: 50 LIQUID ORAL at 08:35

## 2021-01-01 RX ADMIN — LEVOTHYROXINE SODIUM 100 MCG: 100 TABLET ORAL at 09:51

## 2021-01-01 RX ADMIN — DEXAMETHASONE SODIUM PHOSPHATE 6 MG: 4 INJECTION, SOLUTION INTRA-ARTICULAR; INTRALESIONAL; INTRAMUSCULAR; INTRAVENOUS; SOFT TISSUE at 05:48

## 2021-01-01 RX ADMIN — PHENYLEPHRINE HYDROCHLORIDE 6 MCG/KG/MIN: 10 INJECTION INTRAVENOUS at 06:35

## 2021-01-01 RX ADMIN — FENTANYL CITRATE 100 MCG: 50 INJECTION, SOLUTION INTRAMUSCULAR; INTRAVENOUS at 03:18

## 2021-01-01 RX ADMIN — PROPOFOL 45 MCG/KG/MIN: 10 INJECTION, EMULSION INTRAVENOUS at 02:15

## 2021-01-01 RX ADMIN — CHLORHEXIDINE GLUCONATE 0.12% ORAL RINSE 15 ML: 1.2 LIQUID ORAL at 08:41

## 2021-01-01 RX ADMIN — FUROSEMIDE 20 MG: 10 INJECTION, SOLUTION INTRAMUSCULAR; INTRAVENOUS at 08:29

## 2021-01-01 RX ADMIN — INSULIN ASPART 1 UNITS: 100 INJECTION, SOLUTION INTRAVENOUS; SUBCUTANEOUS at 22:06

## 2021-01-01 RX ADMIN — VANCOMYCIN HYDROCHLORIDE 750 MG: 1 INJECTION, POWDER, LYOPHILIZED, FOR SOLUTION INTRAVENOUS at 18:11

## 2021-01-01 RX ADMIN — PANTOPRAZOLE SODIUM 40 MG: 40 INJECTION, POWDER, FOR SOLUTION INTRAVENOUS at 08:32

## 2021-01-01 RX ADMIN — ETOMIDATE 20 MG: 2 INJECTION INTRAVENOUS at 17:12

## 2021-01-01 RX ADMIN — SODIUM CHLORIDE 50 ML: 900 INJECTION INTRAVENOUS at 17:52

## 2021-01-01 RX ADMIN — AZITHROMYCIN MONOHYDRATE 500 MG: 500 INJECTION, POWDER, LYOPHILIZED, FOR SOLUTION INTRAVENOUS at 17:28

## 2021-01-01 RX ADMIN — Medication 100 MG: at 19:34

## 2021-01-01 RX ADMIN — Medication 75 MCG/HR: at 20:18

## 2021-01-01 RX ADMIN — DEXMEDETOMIDINE HYDROCHLORIDE 1.2 MCG/KG/HR: 400 INJECTION INTRAVENOUS at 09:14

## 2021-01-01 RX ADMIN — INSULIN ASPART 2 UNITS: 100 INJECTION, SOLUTION INTRAVENOUS; SUBCUTANEOUS at 10:33

## 2021-01-01 RX ADMIN — CHLORHEXIDINE GLUCONATE 0.12% ORAL RINSE 15 ML: 1.2 LIQUID ORAL at 19:34

## 2021-01-01 RX ADMIN — FENTANYL CITRATE 100 MCG: 50 INJECTION, SOLUTION INTRAMUSCULAR; INTRAVENOUS at 04:30

## 2021-01-01 RX ADMIN — ACETAMINOPHEN 650 MG: 650 SOLUTION ORAL at 04:25

## 2021-01-01 RX ADMIN — Medication 200 MCG/HR: at 11:09

## 2021-01-01 RX ADMIN — PANTOPRAZOLE SODIUM 40 MG: 40 INJECTION, POWDER, FOR SOLUTION INTRAVENOUS at 08:41

## 2021-01-01 RX ADMIN — LEVOTHYROXINE SODIUM 100 MCG: 100 TABLET ORAL at 05:58

## 2021-01-01 RX ADMIN — DEXMEDETOMIDINE HYDROCHLORIDE 1.5 MCG/KG/HR: 400 INJECTION INTRAVENOUS at 23:30

## 2021-01-01 RX ADMIN — BACLOFEN 10 MG: 10 TABLET ORAL at 08:04

## 2021-01-01 RX ADMIN — MIDAZOLAM HYDROCHLORIDE 1 MG: 1 INJECTION, SOLUTION INTRAMUSCULAR; INTRAVENOUS at 12:32

## 2021-01-01 RX ADMIN — BACLOFEN 10 MG: 10 TABLET ORAL at 19:07

## 2021-01-01 RX ADMIN — AMIODARONE HYDROCHLORIDE 0.5 MG/MIN: 50 INJECTION, SOLUTION INTRAVENOUS at 17:53

## 2021-01-01 RX ADMIN — ALBUMIN HUMAN 25 G: 0.25 SOLUTION INTRAVENOUS at 07:02

## 2021-01-01 RX ADMIN — CALCIUM GLUCONATE 2 G: 98 INJECTION, SOLUTION INTRAVENOUS at 10:21

## 2021-01-01 RX ADMIN — BACLOFEN 10 MG: 10 TABLET ORAL at 08:32

## 2021-01-01 RX ADMIN — AMIODARONE HYDROCHLORIDE 0.5 MG/MIN: 50 INJECTION, SOLUTION INTRAVENOUS at 11:24

## 2021-01-01 RX ADMIN — SODIUM CHLORIDE 500 ML: 9 INJECTION, SOLUTION INTRAVENOUS at 16:58

## 2021-01-01 RX ADMIN — SODIUM CHLORIDE 50 ML: 900 INJECTION INTRAVENOUS at 19:03

## 2021-01-01 RX ADMIN — INSULIN ASPART 1 UNITS: 100 INJECTION, SOLUTION INTRAVENOUS; SUBCUTANEOUS at 14:19

## 2021-01-01 RX ADMIN — Medication 100 MG: at 08:00

## 2021-01-01 RX ADMIN — CALCIUM CHLORIDE 1 G: 100 INJECTION INTRAVENOUS; INTRAVENTRICULAR at 02:58

## 2021-01-01 RX ADMIN — Medication 200 MCG/HR: at 01:04

## 2021-01-01 RX ADMIN — SENNOSIDES 5 ML: 8.8 LIQUID ORAL at 08:43

## 2021-01-01 RX ADMIN — PIPERACILLIN SODIUM AND TAZOBACTAM SODIUM 3.38 G: 3; .375 INJECTION, POWDER, LYOPHILIZED, FOR SOLUTION INTRAVENOUS at 06:04

## 2021-01-01 RX ADMIN — SENNOSIDES 5 ML: 8.8 LIQUID ORAL at 08:04

## 2021-01-01 RX ADMIN — DEXMEDETOMIDINE HYDROCHLORIDE 1.2 MCG/KG/HR: 400 INJECTION INTRAVENOUS at 03:22

## 2021-01-01 RX ADMIN — CHLORHEXIDINE GLUCONATE 0.12% ORAL RINSE 15 ML: 1.2 LIQUID ORAL at 20:18

## 2021-01-01 RX ADMIN — BACLOFEN 10 MG: 10 TABLET ORAL at 08:05

## 2021-01-01 RX ADMIN — DEXAMETHASONE SODIUM PHOSPHATE 6 MG: 4 INJECTION, SOLUTION INTRA-ARTICULAR; INTRALESIONAL; INTRAMUSCULAR; INTRAVENOUS; SOFT TISSUE at 06:45

## 2021-01-01 RX ADMIN — SERTRALINE HYDROCHLORIDE 50 MG: 50 TABLET ORAL at 08:54

## 2021-01-01 RX ADMIN — FUROSEMIDE 20 MG: 10 INJECTION, SOLUTION INTRAMUSCULAR; INTRAVENOUS at 23:56

## 2021-01-01 RX ADMIN — BACLOFEN 10 MG: 10 TABLET ORAL at 20:17

## 2021-01-01 RX ADMIN — Medication 100 MG: at 10:10

## 2021-01-01 RX ADMIN — Medication 100 MG: at 08:51

## 2021-01-01 RX ADMIN — METOPROLOL TARTRATE 5 MG: 5 INJECTION INTRAVENOUS at 02:58

## 2021-01-01 RX ADMIN — BACLOFEN 10 MG: 10 TABLET ORAL at 08:26

## 2021-01-01 RX ADMIN — LEVOTHYROXINE SODIUM 100 MCG: 100 TABLET ORAL at 06:37

## 2021-01-01 RX ADMIN — LEVOTHYROXINE SODIUM 100 MCG: 100 TABLET ORAL at 08:55

## 2021-01-01 RX ADMIN — PHENYLEPHRINE HYDROCHLORIDE 2.5 MCG/KG/MIN: 10 INJECTION INTRAVENOUS at 13:28

## 2021-01-01 RX ADMIN — Medication 4 MG/HR: at 10:01

## 2021-01-01 RX ADMIN — AZITHROMYCIN MONOHYDRATE 500 MG: 500 INJECTION, POWDER, LYOPHILIZED, FOR SOLUTION INTRAVENOUS at 16:32

## 2021-01-01 RX ADMIN — PROPOFOL 55 MCG/KG/MIN: 10 INJECTION, EMULSION INTRAVENOUS at 14:31

## 2021-01-01 RX ADMIN — INSULIN ASPART 3 UNITS: 100 INJECTION, SOLUTION INTRAVENOUS; SUBCUTANEOUS at 05:53

## 2021-01-01 RX ADMIN — FUROSEMIDE 20 MG: 10 INJECTION, SOLUTION INTRAMUSCULAR; INTRAVENOUS at 08:07

## 2021-01-01 RX ADMIN — CHLORHEXIDINE GLUCONATE 0.12% ORAL RINSE 15 ML: 1.2 LIQUID ORAL at 08:28

## 2021-01-01 RX ADMIN — Medication 100 MCG/HR: at 08:34

## 2021-01-01 RX ADMIN — Medication 0.09 MCG/KG/MIN: at 17:26

## 2021-01-01 RX ADMIN — LIDOCAINE HYDROCHLORIDE 3 ML: 10 INJECTION, SOLUTION EPIDURAL; INFILTRATION; INTRACAUDAL; PERINEURAL at 12:20

## 2021-01-01 RX ADMIN — CHLORHEXIDINE GLUCONATE 0.12% ORAL RINSE 15 ML: 1.2 LIQUID ORAL at 08:04

## 2021-01-01 RX ADMIN — Medication 50 MCG/HR: at 08:34

## 2021-01-01 RX ADMIN — CEFTRIAXONE SODIUM 1 G: 1 INJECTION, POWDER, FOR SOLUTION INTRAMUSCULAR; INTRAVENOUS at 16:32

## 2021-01-01 RX ADMIN — POTASSIUM CHLORIDE 10 MEQ: 20 SOLUTION ORAL at 08:56

## 2021-01-01 RX ADMIN — REMDESIVIR 100 MG: 100 INJECTION, POWDER, LYOPHILIZED, FOR SOLUTION INTRAVENOUS at 17:43

## 2021-01-01 RX ADMIN — BACLOFEN 10 MG: 10 TABLET ORAL at 08:45

## 2021-01-01 RX ADMIN — WARFARIN SODIUM 2 MG: 2 TABLET ORAL at 17:42

## 2021-01-01 RX ADMIN — PIPERACILLIN SODIUM AND TAZOBACTAM SODIUM 3.38 G: 3; .375 INJECTION, POWDER, LYOPHILIZED, FOR SOLUTION INTRAVENOUS at 16:05

## 2021-01-01 RX ADMIN — DOCUSATE SODIUM 50 MG: 50 LIQUID ORAL at 08:26

## 2021-01-01 RX ADMIN — FUROSEMIDE 20 MG: 10 INJECTION, SOLUTION INTRAMUSCULAR; INTRAVENOUS at 07:59

## 2021-01-01 RX ADMIN — Medication 1 MG/HR: at 03:33

## 2021-01-01 RX ADMIN — DEXTROSE MONOHYDRATE 500 ML: 50 INJECTION, SOLUTION INTRAVENOUS at 15:40

## 2021-01-01 RX ADMIN — DEXMEDETOMIDINE HYDROCHLORIDE 1.3 MCG/KG/HR: 400 INJECTION INTRAVENOUS at 04:38

## 2021-01-01 RX ADMIN — Medication 20 NG/KG/MIN: at 20:51

## 2021-01-01 RX ADMIN — DEXTROSE MONOHYDRATE 50 ML: 25 INJECTION, SOLUTION INTRAVENOUS at 08:11

## 2021-01-01 RX ADMIN — LEVOTHYROXINE SODIUM 100 MCG: 100 TABLET ORAL at 08:05

## 2021-01-01 RX ADMIN — Medication 6 MG/HR: at 01:50

## 2021-01-01 RX ADMIN — MIDAZOLAM HYDROCHLORIDE 1 MG: 1 INJECTION, SOLUTION INTRAMUSCULAR; INTRAVENOUS at 00:48

## 2021-01-01 RX ADMIN — FUROSEMIDE 20 MG: 10 INJECTION, SOLUTION INTRAMUSCULAR; INTRAVENOUS at 19:56

## 2021-01-01 RX ADMIN — ACETAMINOPHEN 650 MG: 650 SUPPOSITORY RECTAL at 13:39

## 2021-01-01 RX ADMIN — REMDESIVIR 100 MG: 100 INJECTION, POWDER, LYOPHILIZED, FOR SOLUTION INTRAVENOUS at 18:03

## 2021-01-01 RX ADMIN — DEXMEDETOMIDINE HYDROCHLORIDE 1.2 MCG/KG/HR: 400 INJECTION INTRAVENOUS at 17:52

## 2021-01-01 RX ADMIN — PANTOPRAZOLE SODIUM 40 MG: 40 INJECTION, POWDER, FOR SOLUTION INTRAVENOUS at 08:29

## 2021-01-01 RX ADMIN — POLYETHYLENE GLYCOL 3350 17 G: 17 POWDER, FOR SOLUTION ORAL at 08:33

## 2021-01-01 RX ADMIN — DOCUSATE SODIUM 50 MG: 50 LIQUID ORAL at 08:05

## 2021-01-01 RX ADMIN — Medication 0.03 MCG/KG/MIN: at 17:01

## 2021-01-01 RX ADMIN — Medication 20 NG/KG/MIN: at 02:31

## 2021-01-01 RX ADMIN — DEXTROSE MONOHYDRATE: 100 INJECTION, SOLUTION INTRAVENOUS at 21:40

## 2021-01-01 RX ADMIN — Medication 150 MCG/HR: at 22:41

## 2021-01-01 RX ADMIN — DEXAMETHASONE SODIUM PHOSPHATE 6 MG: 4 INJECTION, SOLUTION INTRA-ARTICULAR; INTRALESIONAL; INTRAMUSCULAR; INTRAVENOUS; SOFT TISSUE at 22:57

## 2021-01-01 RX ADMIN — REMDESIVIR 100 MG: 100 INJECTION, POWDER, LYOPHILIZED, FOR SOLUTION INTRAVENOUS at 18:57

## 2021-01-01 RX ADMIN — FENTANYL CITRATE 100 MCG: 50 INJECTION, SOLUTION INTRAMUSCULAR; INTRAVENOUS at 02:19

## 2021-01-01 RX ADMIN — CHLORHEXIDINE GLUCONATE 0.12% ORAL RINSE 15 ML: 1.2 LIQUID ORAL at 08:54

## 2021-01-01 RX ADMIN — BACLOFEN 10 MG: 10 TABLET ORAL at 20:00

## 2021-01-01 RX ADMIN — AMIODARONE HYDROCHLORIDE 150 MG: 1.5 INJECTION, SOLUTION INTRAVENOUS at 12:13

## 2021-01-01 RX ADMIN — SODIUM BICARBONATE 50 MEQ: 84 INJECTION, SOLUTION INTRAVENOUS at 04:27

## 2021-01-01 RX ADMIN — PANTOPRAZOLE SODIUM 40 MG: 40 INJECTION, POWDER, FOR SOLUTION INTRAVENOUS at 20:16

## 2021-01-01 RX ADMIN — PANTOPRAZOLE SODIUM 40 MG: 40 INJECTION, POWDER, FOR SOLUTION INTRAVENOUS at 20:17

## 2021-01-01 RX ADMIN — POLYETHYLENE GLYCOL 3350 17 G: 17 POWDER, FOR SOLUTION ORAL at 08:29

## 2021-01-01 RX ADMIN — REMDESIVIR 200 MG: 100 INJECTION, POWDER, LYOPHILIZED, FOR SOLUTION INTRAVENOUS at 23:17

## 2021-01-01 RX ADMIN — DEXMEDETOMIDINE HYDROCHLORIDE 1.5 MCG/KG/HR: 400 INJECTION INTRAVENOUS at 08:06

## 2021-01-01 RX ADMIN — DEXMEDETOMIDINE HYDROCHLORIDE 0.2 MCG/KG/HR: 400 INJECTION INTRAVENOUS at 15:13

## 2021-01-01 RX ADMIN — INSULIN GLARGINE 10 UNITS: 100 INJECTION, SOLUTION SUBCUTANEOUS at 08:32

## 2021-01-01 RX ADMIN — SERTRALINE HYDROCHLORIDE 50 MG: 50 TABLET ORAL at 08:49

## 2021-01-01 RX ADMIN — FUROSEMIDE 20 MG: 10 INJECTION, SOLUTION INTRAMUSCULAR; INTRAVENOUS at 08:54

## 2021-01-01 RX ADMIN — BACLOFEN 10 MG: 10 TABLET ORAL at 21:21

## 2021-01-01 RX ADMIN — PANTOPRAZOLE SODIUM 40 MG: 40 INJECTION, POWDER, FOR SOLUTION INTRAVENOUS at 08:26

## 2021-01-01 RX ADMIN — DEXMEDETOMIDINE HYDROCHLORIDE 1.2 MCG/KG/HR: 400 INJECTION INTRAVENOUS at 21:22

## 2021-01-01 RX ADMIN — Medication 20 NG/KG/MIN: at 12:01

## 2021-01-01 RX ADMIN — Medication 100 MG: at 08:04

## 2021-01-01 RX ADMIN — SERTRALINE HYDROCHLORIDE 50 MG: 50 TABLET ORAL at 08:41

## 2021-01-01 RX ADMIN — FUROSEMIDE 40 MG: 10 INJECTION, SOLUTION INTRAVENOUS at 12:26

## 2021-01-01 RX ADMIN — DEXMEDETOMIDINE HYDROCHLORIDE 1.2 MCG/KG/HR: 400 INJECTION INTRAVENOUS at 11:17

## 2021-01-01 RX ADMIN — BACLOFEN 10 MG: 10 TABLET ORAL at 20:26

## 2021-01-01 RX ADMIN — DEXMEDETOMIDINE HYDROCHLORIDE 1.5 MCG/KG/HR: 400 INJECTION INTRAVENOUS at 04:41

## 2021-01-01 RX ADMIN — FUROSEMIDE 20 MG: 10 INJECTION, SOLUTION INTRAMUSCULAR; INTRAVENOUS at 17:54

## 2021-01-01 RX ADMIN — INSULIN GLARGINE 10 UNITS: 100 INJECTION, SOLUTION SUBCUTANEOUS at 08:55

## 2021-01-01 RX ADMIN — Medication 100 MG: at 19:07

## 2021-01-01 RX ADMIN — PROPOFOL 10 MCG/KG/MIN: 10 INJECTION, EMULSION INTRAVENOUS at 14:04

## 2021-01-01 RX ADMIN — METRONIDAZOLE 500 MG: 500 TABLET ORAL at 13:30

## 2021-01-01 RX ADMIN — SENNOSIDES 5 ML: 8.8 LIQUID ORAL at 08:32

## 2021-01-01 RX ADMIN — DOCUSATE SODIUM 50 MG: 50 LIQUID ORAL at 08:43

## 2021-01-01 RX ADMIN — Medication 0.5 MCG/KG/MIN: at 03:26

## 2021-01-01 RX ADMIN — DOCUSATE SODIUM 50 MG: 50 LIQUID ORAL at 16:50

## 2021-01-01 RX ADMIN — WARFARIN SODIUM 1.5 MG: 3 TABLET ORAL at 18:11

## 2021-01-01 RX ADMIN — SODIUM CHLORIDE: 900 IRRIGANT IRRIGATION at 04:25

## 2021-01-01 RX ADMIN — AZITHROMYCIN MONOHYDRATE 500 MG: 500 INJECTION, POWDER, LYOPHILIZED, FOR SOLUTION INTRAVENOUS at 17:06

## 2021-01-01 RX ADMIN — PANTOPRAZOLE SODIUM 40 MG: 40 INJECTION, POWDER, FOR SOLUTION INTRAVENOUS at 08:07

## 2021-01-01 RX ADMIN — POLYETHYLENE GLYCOL 3350 17 G: 17 POWDER, FOR SOLUTION ORAL at 08:48

## 2021-01-01 RX ADMIN — Medication 6 MG/HR: at 09:02

## 2021-01-01 RX ADMIN — BACLOFEN 10 MG: 10 TABLET ORAL at 08:55

## 2021-01-01 RX ADMIN — FUROSEMIDE 20 MG: 10 INJECTION, SOLUTION INTRAMUSCULAR; INTRAVENOUS at 15:56

## 2021-01-01 RX ADMIN — BACLOFEN 10 MG: 10 TABLET ORAL at 19:57

## 2021-01-01 RX ADMIN — PANTOPRAZOLE SODIUM 40 MG: 40 INJECTION, POWDER, FOR SOLUTION INTRAVENOUS at 08:05

## 2021-01-01 RX ADMIN — INSULIN ASPART 1 UNITS: 100 INJECTION, SOLUTION INTRAVENOUS; SUBCUTANEOUS at 18:45

## 2021-01-01 RX ADMIN — LEVOTHYROXINE SODIUM 100 MCG: 100 TABLET ORAL at 06:45

## 2021-01-01 RX ADMIN — Medication 100 MG: at 20:16

## 2021-01-01 RX ADMIN — CEFTRIAXONE SODIUM 1 G: 1 INJECTION, POWDER, FOR SOLUTION INTRAMUSCULAR; INTRAVENOUS at 17:53

## 2021-01-01 RX ADMIN — Medication 100 MG: at 20:00

## 2021-01-01 RX ADMIN — SENNOSIDES 5 ML: 8.8 LIQUID ORAL at 08:33

## 2021-01-01 RX ADMIN — Medication 175 MCG/HR: at 00:20

## 2021-01-01 RX ADMIN — SERTRALINE HYDROCHLORIDE 50 MG: 50 TABLET ORAL at 08:29

## 2021-01-01 RX ADMIN — DEXMEDETOMIDINE HYDROCHLORIDE 1.2 MCG/KG/HR: 400 INJECTION INTRAVENOUS at 00:31

## 2021-01-01 RX ADMIN — ACETAMINOPHEN 650 MG: 650 SUPPOSITORY RECTAL at 00:46

## 2021-01-01 RX ADMIN — DEXMEDETOMIDINE HYDROCHLORIDE 1.2 MCG/KG/HR: 400 INJECTION INTRAVENOUS at 20:56

## 2021-01-01 RX ADMIN — PROPOFOL 35 MCG/KG/MIN: 10 INJECTION, EMULSION INTRAVENOUS at 09:44

## 2021-01-01 RX ADMIN — Medication 100 MCG/HR: at 09:23

## 2021-01-01 RX ADMIN — PIPERACILLIN SODIUM AND TAZOBACTAM SODIUM 3.38 G: 3; .375 INJECTION, POWDER, LYOPHILIZED, FOR SOLUTION INTRAVENOUS at 21:52

## 2021-01-01 RX ADMIN — Medication 150 MCG/HR: at 06:44

## 2021-01-01 RX ADMIN — INSULIN GLARGINE 10 UNITS: 100 INJECTION, SOLUTION SUBCUTANEOUS at 09:51

## 2021-01-01 RX ADMIN — REMDESIVIR 100 MG: 100 INJECTION, POWDER, LYOPHILIZED, FOR SOLUTION INTRAVENOUS at 19:02

## 2021-01-01 RX ADMIN — SODIUM BICARBONATE 50 MEQ: 84 INJECTION, SOLUTION INTRAVENOUS at 08:55

## 2021-01-01 RX ADMIN — Medication 1.5 MCG/KG/MIN: at 02:46

## 2021-01-01 RX ADMIN — INSULIN ASPART 2 UNITS: 100 INJECTION, SOLUTION INTRAVENOUS; SUBCUTANEOUS at 18:10

## 2021-01-01 RX ADMIN — PANTOPRAZOLE SODIUM 40 MG: 40 INJECTION, POWDER, FOR SOLUTION INTRAVENOUS at 15:53

## 2021-01-01 RX ADMIN — VANCOMYCIN HYDROCHLORIDE 1000 MG: 1 INJECTION, SOLUTION INTRAVENOUS at 10:51

## 2021-01-01 RX ADMIN — PANTOPRAZOLE SODIUM 40 MG: 40 INJECTION, POWDER, FOR SOLUTION INTRAVENOUS at 08:33

## 2021-01-01 RX ADMIN — Medication 100 MG: at 08:41

## 2021-01-01 RX ADMIN — INSULIN GLARGINE 10 UNITS: 100 INJECTION, SOLUTION SUBCUTANEOUS at 10:43

## 2021-01-01 RX ADMIN — DEXTROSE AND SODIUM CHLORIDE: 5; 900 INJECTION, SOLUTION INTRAVENOUS at 23:00

## 2021-01-01 RX ADMIN — DEXMEDETOMIDINE HYDROCHLORIDE 1.5 MCG/KG/HR: 400 INJECTION INTRAVENOUS at 02:55

## 2021-01-01 RX ADMIN — PROPOFOL 10 MCG/KG/MIN: 10 INJECTION, EMULSION INTRAVENOUS at 21:06

## 2021-01-01 RX ADMIN — DEXAMETHASONE SODIUM PHOSPHATE 6 MG: 4 INJECTION, SOLUTION INTRA-ARTICULAR; INTRALESIONAL; INTRAMUSCULAR; INTRAVENOUS; SOFT TISSUE at 05:39

## 2021-01-01 RX ADMIN — Medication 100 MG: at 10:42

## 2021-01-01 RX ADMIN — FENTANYL CITRATE 50 MCG: 50 INJECTION, SOLUTION INTRAMUSCULAR; INTRAVENOUS at 01:05

## 2021-01-01 RX ADMIN — WARFARIN SODIUM 1.5 MG: 3 TABLET ORAL at 18:16

## 2021-01-01 RX ADMIN — SERTRALINE HYDROCHLORIDE 50 MG: 50 TABLET ORAL at 08:26

## 2021-01-01 RX ADMIN — AMIODARONE HYDROCHLORIDE 0.5 MG/MIN: 50 INJECTION, SOLUTION INTRAVENOUS at 18:13

## 2021-01-01 RX ADMIN — INSULIN ASPART 3 UNITS: 100 INJECTION, SOLUTION INTRAVENOUS; SUBCUTANEOUS at 02:11

## 2021-01-01 RX ADMIN — SENNOSIDES 5 ML: 8.8 LIQUID ORAL at 08:48

## 2021-01-01 RX ADMIN — AMIODARONE HYDROCHLORIDE 1 MG/MIN: 50 INJECTION, SOLUTION INTRAVENOUS at 11:29

## 2021-01-01 RX ADMIN — DEXAMETHASONE SODIUM PHOSPHATE 6 MG: 4 INJECTION, SOLUTION INTRA-ARTICULAR; INTRALESIONAL; INTRAMUSCULAR; INTRAVENOUS; SOFT TISSUE at 06:25

## 2021-01-01 RX ADMIN — MEROPENEM 2 G: 1 INJECTION, POWDER, FOR SOLUTION INTRAVENOUS at 10:09

## 2021-01-01 RX ADMIN — MEROPENEM 1 G: 1 INJECTION, POWDER, FOR SOLUTION INTRAVENOUS at 16:01

## 2021-01-01 RX ADMIN — SERTRALINE HYDROCHLORIDE 50 MG: 50 TABLET ORAL at 19:58

## 2021-01-01 RX ADMIN — AZITHROMYCIN MONOHYDRATE 500 MG: 500 INJECTION, POWDER, LYOPHILIZED, FOR SOLUTION INTRAVENOUS at 16:52

## 2021-01-01 RX ADMIN — ROCURONIUM BROMIDE 75 MG: 50 INJECTION, SOLUTION INTRAVENOUS at 17:13

## 2021-01-01 RX ADMIN — PROPOFOL 20 MCG/KG/MIN: 10 INJECTION, EMULSION INTRAVENOUS at 04:18

## 2021-01-01 RX ADMIN — Medication 100 MG: at 20:29

## 2021-01-01 RX ADMIN — POLYETHYLENE GLYCOL 3350 17 G: 17 POWDER, FOR SOLUTION ORAL at 08:04

## 2021-01-01 RX ADMIN — INSULIN ASPART 2 UNITS: 100 INJECTION, SOLUTION INTRAVENOUS; SUBCUTANEOUS at 05:33

## 2021-01-01 RX ADMIN — SERTRALINE HYDROCHLORIDE 50 MG: 50 TABLET ORAL at 08:05

## 2021-01-01 RX ADMIN — VANCOMYCIN HYDROCHLORIDE 750 MG: 1 INJECTION, POWDER, LYOPHILIZED, FOR SOLUTION INTRAVENOUS at 21:32

## 2021-01-01 RX ADMIN — DEXMEDETOMIDINE HYDROCHLORIDE 1.2 MCG/KG/HR: 400 INJECTION INTRAVENOUS at 10:53

## 2021-01-01 RX ADMIN — Medication 0.06 MCG/KG/MIN: at 04:41

## 2021-01-01 RX ADMIN — PANTOPRAZOLE SODIUM 40 MG: 40 INJECTION, POWDER, FOR SOLUTION INTRAVENOUS at 08:20

## 2021-01-01 RX ADMIN — Medication 100 MCG/HR: at 13:58

## 2021-01-01 RX ADMIN — Medication 0.03 MCG/KG/MIN: at 22:50

## 2021-01-01 RX ADMIN — Medication 0.02 MCG/KG/MIN: at 05:16

## 2021-01-01 RX ADMIN — Medication 8 MG/HR: at 05:07

## 2021-01-01 RX ADMIN — SERTRALINE HYDROCHLORIDE 50 MG: 50 TABLET ORAL at 08:45

## 2021-01-01 RX ADMIN — DOCUSATE SODIUM 50 MG: 50 LIQUID ORAL at 08:28

## 2021-01-01 RX ADMIN — Medication 100 MG: at 09:18

## 2021-01-01 RX ADMIN — PROPOFOL 50 MCG/KG/MIN: 10 INJECTION, EMULSION INTRAVENOUS at 02:08

## 2021-01-01 RX ADMIN — INSULIN GLARGINE 10 UNITS: 100 INJECTION, SOLUTION SUBCUTANEOUS at 08:42

## 2021-01-01 RX ADMIN — Medication 100 MG: at 20:17

## 2021-01-01 RX ADMIN — DEXAMETHASONE SODIUM PHOSPHATE 6 MG: 4 INJECTION, SOLUTION INTRA-ARTICULAR; INTRALESIONAL; INTRAMUSCULAR; INTRAVENOUS; SOFT TISSUE at 05:16

## 2021-01-01 RX ADMIN — LEVOTHYROXINE SODIUM 100 MCG: 100 TABLET ORAL at 06:04

## 2021-01-01 RX ADMIN — PROPOFOL 20 MCG/KG/MIN: 10 INJECTION, EMULSION INTRAVENOUS at 17:24

## 2021-01-01 RX ADMIN — FUROSEMIDE 20 MG: 10 INJECTION, SOLUTION INTRAMUSCULAR; INTRAVENOUS at 08:45

## 2021-01-01 RX ADMIN — CEFTRIAXONE SODIUM 1 G: 1 INJECTION, POWDER, FOR SOLUTION INTRAMUSCULAR; INTRAVENOUS at 17:32

## 2021-01-01 RX ADMIN — CHLORHEXIDINE GLUCONATE 0.12% ORAL RINSE 15 ML: 1.2 LIQUID ORAL at 19:07

## 2021-01-01 RX ADMIN — Medication 20 NG/KG/MIN: at 04:32

## 2021-01-01 RX ADMIN — PROPOFOL 10 MCG/KG/MIN: 10 INJECTION, EMULSION INTRAVENOUS at 21:46

## 2021-01-01 RX ADMIN — FUROSEMIDE 20 MG: 10 INJECTION, SOLUTION INTRAMUSCULAR; INTRAVENOUS at 08:41

## 2021-01-01 RX ADMIN — CEFTRIAXONE SODIUM 1 G: 1 INJECTION, POWDER, FOR SOLUTION INTRAMUSCULAR; INTRAVENOUS at 17:06

## 2021-01-01 RX ADMIN — PERFLUTREN 2 ML: 6.52 INJECTION, SUSPENSION INTRAVENOUS at 09:20

## 2021-01-01 RX ADMIN — VECURONIUM BROMIDE 5 MG: 1 INJECTION, POWDER, LYOPHILIZED, FOR SOLUTION INTRAVENOUS at 06:03

## 2021-01-01 RX ADMIN — SODIUM BICARBONATE 50 MEQ: 84 INJECTION, SOLUTION INTRAVENOUS at 07:54

## 2021-01-01 RX ADMIN — DEXMEDETOMIDINE HYDROCHLORIDE 1.2 MCG/KG/HR: 400 INJECTION INTRAVENOUS at 14:24

## 2021-01-01 RX ADMIN — Medication 20 NG/KG/MIN: at 11:09

## 2021-01-01 RX ADMIN — LEVOTHYROXINE SODIUM 100 MCG: 100 TABLET ORAL at 08:50

## 2021-01-01 RX ADMIN — Medication 100 MG: at 21:21

## 2021-01-01 RX ADMIN — ACETAMINOPHEN 650 MG: 650 SOLUTION ORAL at 18:41

## 2021-01-01 RX ADMIN — Medication 1.5 MCG/KG/MIN: at 15:35

## 2021-01-01 RX ADMIN — DEXMEDETOMIDINE HYDROCHLORIDE 1.2 MCG/KG/HR: 400 INJECTION INTRAVENOUS at 06:06

## 2021-01-01 RX ADMIN — CHLORHEXIDINE GLUCONATE 0.12% ORAL RINSE 15 ML: 1.2 LIQUID ORAL at 16:39

## 2021-01-01 ASSESSMENT — ACTIVITIES OF DAILY LIVING (ADL)
ADLS_ACUITY_SCORE: 19
ADLS_ACUITY_SCORE: 21
ADLS_ACUITY_SCORE: 19
ADLS_ACUITY_SCORE: 21
ADLS_ACUITY_SCORE: 19
ADLS_ACUITY_SCORE: 21
ADLS_ACUITY_SCORE: 13
ADLS_ACUITY_SCORE: 21
ADLS_ACUITY_SCORE: 17
ADLS_ACUITY_SCORE: 19
ADLS_ACUITY_SCORE: 21
ADLS_ACUITY_SCORE: 19
ADLS_ACUITY_SCORE: 21
ADLS_ACUITY_SCORE: 23
ADLS_ACUITY_SCORE: 13
ADLS_ACUITY_SCORE: 21
ADLS_ACUITY_SCORE: 17
ADLS_ACUITY_SCORE: 19
ADLS_ACUITY_SCORE: 19
ADLS_ACUITY_SCORE: 21
ADLS_ACUITY_SCORE: 21
ADLS_ACUITY_SCORE: 19
ADLS_ACUITY_SCORE: 19
ADLS_ACUITY_SCORE: 23
ADLS_ACUITY_SCORE: 21
ADLS_ACUITY_SCORE: 19
ADLS_ACUITY_SCORE: 21
ADLS_ACUITY_SCORE: 19
ADLS_ACUITY_SCORE: 9
ADLS_ACUITY_SCORE: 21
ADLS_ACUITY_SCORE: 19
ADLS_ACUITY_SCORE: 21
ADLS_ACUITY_SCORE: 19
ADLS_ACUITY_SCORE: 21
ADLS_ACUITY_SCORE: 23
ADLS_ACUITY_SCORE: 21
ADLS_ACUITY_SCORE: 21
ADLS_ACUITY_SCORE: 19
ADLS_ACUITY_SCORE: 21
ADLS_ACUITY_SCORE: 19
ADLS_ACUITY_SCORE: 23
ADLS_ACUITY_SCORE: 21
ADLS_ACUITY_SCORE: 17
ADLS_ACUITY_SCORE: 19
ADLS_ACUITY_SCORE: 13
ADLS_ACUITY_SCORE: 21
ADLS_ACUITY_SCORE: 19
DOING_ERRANDS_INDEPENDENTLY_DIFFICULTY: OTHER (SEE COMMENTS)
ADLS_ACUITY_SCORE: 21
ADLS_ACUITY_SCORE: 19
ADLS_ACUITY_SCORE: 21
ADLS_ACUITY_SCORE: 23
ADLS_ACUITY_SCORE: 21
ADLS_ACUITY_SCORE: 21
ADLS_ACUITY_SCORE: 19
ADLS_ACUITY_SCORE: 19
ADLS_ACUITY_SCORE: 21
ADLS_ACUITY_SCORE: 19
ADLS_ACUITY_SCORE: 9
ADLS_ACUITY_SCORE: 21
ADLS_ACUITY_SCORE: 19
ADLS_ACUITY_SCORE: 21
ADLS_ACUITY_SCORE: 19
ADLS_ACUITY_SCORE: 19
ADLS_ACUITY_SCORE: 23
ADLS_ACUITY_SCORE: 13
ADLS_ACUITY_SCORE: 19
ADLS_ACUITY_SCORE: 23
ADLS_ACUITY_SCORE: 19
ADLS_ACUITY_SCORE: 19
ADLS_ACUITY_SCORE: 21
ADLS_ACUITY_SCORE: 19
ADLS_ACUITY_SCORE: 19
ADLS_ACUITY_SCORE: 21
ADLS_ACUITY_SCORE: 19
ADLS_ACUITY_SCORE: 21
ADLS_ACUITY_SCORE: 19
ADLS_ACUITY_SCORE: 23
ADLS_ACUITY_SCORE: 21
ADLS_ACUITY_SCORE: 13
ADLS_ACUITY_SCORE: 19
ADLS_ACUITY_SCORE: 13
ADLS_ACUITY_SCORE: 19
ADLS_ACUITY_SCORE: 19
ADLS_ACUITY_SCORE: 21
ADLS_ACUITY_SCORE: 19
ADLS_ACUITY_SCORE: 21
ADLS_ACUITY_SCORE: 13
ADLS_ACUITY_SCORE: 9
ADLS_ACUITY_SCORE: 21
ADLS_ACUITY_SCORE: 13
ADLS_ACUITY_SCORE: 19
ADLS_ACUITY_SCORE: 19
ADLS_ACUITY_SCORE: 21
ADLS_ACUITY_SCORE: 19
ADLS_ACUITY_SCORE: 21
ADLS_ACUITY_SCORE: 13
ADLS_ACUITY_SCORE: 19
ADLS_ACUITY_SCORE: 21
ADLS_ACUITY_SCORE: 19
ADLS_ACUITY_SCORE: 19
ADLS_ACUITY_SCORE: 17
ADLS_ACUITY_SCORE: 19
ADLS_ACUITY_SCORE: 21
ADLS_ACUITY_SCORE: 19
ADLS_ACUITY_SCORE: 19
ADLS_ACUITY_SCORE: 21
DIFFICULTY_COMMUNICATING: OTHER (SEE COMMENTS)
ADLS_ACUITY_SCORE: 13
ADLS_ACUITY_SCORE: 13
ADLS_ACUITY_SCORE: 19
ADLS_ACUITY_SCORE: 21
ADLS_ACUITY_SCORE: 17
ADLS_ACUITY_SCORE: 19
ADLS_ACUITY_SCORE: 21
ADLS_ACUITY_SCORE: 19
ADLS_ACUITY_SCORE: 21
ADLS_ACUITY_SCORE: 21
DEPENDENT_IADLS:: CLEANING;COOKING;LAUNDRY;SHOPPING;MEAL PREPARATION;MEDICATION MANAGEMENT;MONEY MANAGEMENT;TRANSPORTATION;INCONTINENCE
ADLS_ACUITY_SCORE: 19
ADLS_ACUITY_SCORE: 21
ADLS_ACUITY_SCORE: 13
ADLS_ACUITY_SCORE: 23
ADLS_ACUITY_SCORE: 23
ADLS_ACUITY_SCORE: 21
ADLS_ACUITY_SCORE: 19
ADLS_ACUITY_SCORE: 19
ADLS_ACUITY_SCORE: 23
ADLS_ACUITY_SCORE: 21
ADLS_ACUITY_SCORE: 19
DRESSING/BATHING_DIFFICULTY: OTHER (SEE COMMENTS)
ADLS_ACUITY_SCORE: 19
WALKING_OR_CLIMBING_STAIRS_DIFFICULTY: OTHER (SEE COMMENTS)
DEPENDENT_IADLS:: CLEANING;COOKING;LAUNDRY;SHOPPING;MEAL PREPARATION;MEDICATION MANAGEMENT;MONEY MANAGEMENT;TRANSPORTATION;INCONTINENCE
ADLS_ACUITY_SCORE: 21
ADLS_ACUITY_SCORE: 19
ADLS_ACUITY_SCORE: 19
ADLS_ACUITY_SCORE: 21
ADLS_ACUITY_SCORE: 13
TOILETING_ISSUES: OTHER (SEE COMMENTS)
ADLS_ACUITY_SCORE: 21
ADLS_ACUITY_SCORE: 21
ADLS_ACUITY_SCORE: 19
ADLS_ACUITY_SCORE: 21
ADLS_ACUITY_SCORE: 19
ADLS_ACUITY_SCORE: 21
ADLS_ACUITY_SCORE: 19
ADLS_ACUITY_SCORE: 13
ADLS_ACUITY_SCORE: 23
ADLS_ACUITY_SCORE: 21
ADLS_ACUITY_SCORE: 19
ADLS_ACUITY_SCORE: 21
ADLS_ACUITY_SCORE: 19
ADLS_ACUITY_SCORE: 21
ADLS_ACUITY_SCORE: 19
ADLS_ACUITY_SCORE: 21
ADLS_ACUITY_SCORE: 9
ADLS_ACUITY_SCORE: 21
ADLS_ACUITY_SCORE: 19
CONCENTRATING,_REMEMBERING_OR_MAKING_DECISIONS_DIFFICULTY: OTHER (SEE COMMENTS)
ADLS_ACUITY_SCORE: 19
ADLS_ACUITY_SCORE: 19
ADLS_ACUITY_SCORE: 21
ADLS_ACUITY_SCORE: 19
ADLS_ACUITY_SCORE: 19
DIFFICULTY_EATING/SWALLOWING: OTHER (SEE COMMENTS)
ADLS_ACUITY_SCORE: 19
ADLS_ACUITY_SCORE: 21
ADLS_ACUITY_SCORE: 19
FALL_HISTORY_WITHIN_LAST_SIX_MONTHS: NO
ADLS_ACUITY_SCORE: 21
ADLS_ACUITY_SCORE: 19
ADLS_ACUITY_SCORE: 21
ADLS_ACUITY_SCORE: 19
ADLS_ACUITY_SCORE: 23
ADLS_ACUITY_SCORE: 13
ADLS_ACUITY_SCORE: 19
ADLS_ACUITY_SCORE: 21
ADLS_ACUITY_SCORE: 13
ADLS_ACUITY_SCORE: 19
ADLS_ACUITY_SCORE: 21
ADLS_ACUITY_SCORE: 23
ADLS_ACUITY_SCORE: 21
ADLS_ACUITY_SCORE: 21
ADLS_ACUITY_SCORE: 9
ADLS_ACUITY_SCORE: 19
ADLS_ACUITY_SCORE: 13
ADLS_ACUITY_SCORE: 21
ADLS_ACUITY_SCORE: 13
ADLS_ACUITY_SCORE: 21
ADLS_ACUITY_SCORE: 19
ADLS_ACUITY_SCORE: 21
WEAR_GLASSES_OR_BLIND: OTHER (SEE COMMENTS)
ADLS_ACUITY_SCORE: 21
ADLS_ACUITY_SCORE: 19
ADLS_ACUITY_SCORE: 19
ADLS_ACUITY_SCORE: 21
ADLS_ACUITY_SCORE: 21
ADLS_ACUITY_SCORE: 19
ADLS_ACUITY_SCORE: 21
ADLS_ACUITY_SCORE: 21
ADLS_ACUITY_SCORE: 19
ADLS_ACUITY_SCORE: 21
ADLS_ACUITY_SCORE: 19
ADLS_ACUITY_SCORE: 13
ADLS_ACUITY_SCORE: 21
ADLS_ACUITY_SCORE: 21
ADLS_ACUITY_SCORE: 19
ADLS_ACUITY_SCORE: 19
ADLS_ACUITY_SCORE: 21
ADLS_ACUITY_SCORE: 9
ADLS_ACUITY_SCORE: 13
ADLS_ACUITY_SCORE: 21
ADLS_ACUITY_SCORE: 21
ADLS_ACUITY_SCORE: 17
ADLS_ACUITY_SCORE: 19
ADLS_ACUITY_SCORE: 19
ADLS_ACUITY_SCORE: 21
ADLS_ACUITY_SCORE: 23
ADLS_ACUITY_SCORE: 19
ADLS_ACUITY_SCORE: 19

## 2021-01-01 ASSESSMENT — ENCOUNTER SYMPTOMS
SHORTNESS OF BREATH: 1
CHILLS: 1
FEVER: 1
WEAKNESS: 1
COUGH: 1
CHEST TIGHTNESS: 1

## 2021-01-01 ASSESSMENT — PATIENT HEALTH QUESTIONNAIRE - PHQ9
SUM OF ALL RESPONSES TO PHQ QUESTIONS 1-9: 11
SUM OF ALL RESPONSES TO PHQ QUESTIONS 1-9: 0
SUM OF ALL RESPONSES TO PHQ QUESTIONS 1-9: 20
SUM OF ALL RESPONSES TO PHQ QUESTIONS 1-9: 6
SUM OF ALL RESPONSES TO PHQ QUESTIONS 1-9: 20

## 2021-01-01 ASSESSMENT — MIFFLIN-ST. JEOR
SCORE: 1083.49
SCORE: 1097.75
SCORE: 1046.75
SCORE: 1063.75
SCORE: 1070.17

## 2021-05-27 NOTE — TELEPHONE ENCOUNTER
Who is calling:  Calvin Alegria  Reason for Call:  Dr. Alegria is requesting bridging with Lovenox for patient surgery on 4/24 to start today 4/15. Please advise the patient and confirm with Calvin at Dr. Alegria's office at the number provided.  Okay to leave a detailed message: Yes

## 2021-05-27 NOTE — PROGRESS NOTES
[facility] Care Coordination Contact  [facility] Home Visit Assessment     Home visit for Health Risk Assessment with Kody Johns completed on 4/18/2019    Type of residence:: Private home - no stairs  Current living arrangement:: I live in a private home with family(Lives with wife, son and daughter)     Assessment completed with:: Patient, Children    Current Care Plan  Member currently receiving the following home care services: Skilled Nursing   Member currently receiving the following community resources: PCA      Medication Review  Medication reconciliation completed in Epic: YES  Medication set-up & administration: RN set up weekly  Family caregiver administers medications  Medication Risk Assessment Medication (1 or more, place referral to MTM):  N/A: No risk factors identified  MTM Referral Placed: No: No risk factors idenified    Mental/Behavioral Health   Depression Screening: See PHQ assessment flowsheet.   Mental health DX:: No      Mental Health Diagnosis: No  Mental Health Services: None: No further intervention needed at this time.    Falls Assessment:   Fallen 2 or more times in the past year?: No   Any fall with injury in the past year?: No    ADL/IADL Dependencies:   Dependent ADLs:: Ambulation-walker, Bathing, Dressing, Eating, Toileting, Wheelchair-with assist, Transfers, Positioning, Incontinence, Grooming  Dependent IADLs:: Cleaning, Money Management, Transportation, Cooking, Laundry, Incontinence, Shopping, Meal Preparation, Medication Management    [facility] sponsored benefits: Shared information re: Silver Sneakers/gym memberships, ASA, Calcium +D.    PCA Assessment completed at visit: Yes    Elderly Waiver Eligibility: Yes, but member declines EW service; will not open to EW    Care Plan & Recommendations: Care Coordinator met face to face with Agtes for his annual health risk assessment and annual PCA reassessment.  Kody's PCA assessment was completed and his hours will  increase to 7.25 hours per day as of 6/1/19.  Kody reports being much more confused and vulnerable in the community.  He cannot be left alone and is monitored by PCA or family 24/7.  He also reports becoming irritated and verbally aggressive towards PCA more than four times per week.  Kody is having heart surgery in a few weeks and hopes after this surgery he will have more energy and feel better.  He currently is in bed most of the time and needs assistance from PCA for all ADL's and IADL's.  Kody has stated he may want a wheelchair but will wait to see how he is doing after his surgery.  CC stated if he does want his wheelchair he will need to discuss this need with his PCP before CC can order.  Kody has a strong family support.  No further questions, concerns or needs from Kody at this time.    Leilani Ignacio, EFE  [facility]  [phone]    See [facility] for detailed assessment information.    Follow-Up Plan: Member informed of future contact, plan to f/u with member with a 6 month telephone assessment.  Contact information shared with member and family, encouraged member to call with any questions or concerns at any time.    [place] care continuum providers: Please refer to Health Care Home on the Epic Problem List to view this patient's [facility] Care Plan Summary.

## 2021-05-27 NOTE — TELEPHONE ENCOUNTER
AVA-PROCEDURAL ANTICOAGULATION  MANAGEMENT    Assessment     Warfarin interruption plan for aortic and mitral valve replacement on 4/24/19.    Atrial Fibrillation/ Rheumatic Heart Disease      Risk stratification for thromboembolism: high. (2012 Chest guidelines)    Atrial fibrillation, rheumatic heart disease, hx of silent stroke (2016)    Recommendation:      Agree with Dr. Alegria's assessment of holding warfarin for 5 days prior to procedure and bridging with Lovenox.  Plan       Pre-Procedure:    Hold warfarin until after procedure starting: Friday 4/19/19     Lovenox 60 mg subq qam; Lovenox 40 mg subq qpm (1 mg/kg Q 12 hr for CrCl > 30 ml/min)       Dosing as previously done July 2018 to optimize patient compliance. Safety concerns instructing patient to push out part of dose for 50 mg q12 per home RN.    Start Lovenox: Sunday 4/21/19 am    Last dose of Lovenox prior to procedure: Tuesday 4/23/19 am       Post-Procedure:    Inpatient Management     Recheck INR 2-3 days after discharge.    Spoke with home RN, Esme, and relayed hold and bridge plan. Esme repeated back instructions correctly. Esme noted patient had old supply of Lovenox from previous procedure, however patient kept in refrigerator. Called Lovenox manufacture, [employer], to verify medication safety and was advised to discard old medication, no data available on refrigerated drug concentrations. Relayed this to Esme as well. New Rx to be sent to [facility].  ?   Danita Salazar, Pharmacy Student    Patient reviewed with pharmacy student and I agree with assessment and plan. Present for call with home RN Esme.    Mindy Cheney, PharmD, preceptor  [facility]        Subjective/Objective:      kyleigh Metzger 69 y.o. male    Upcoming Procedure: aortic and mitral valve replacement     Procedure Date: 4/24/19    Reason for Anticoagulation: Atrial Fibrillation/ Rheumatic Heart Disease    Goal INR Range: 2-3    Patient  bridged in past: Yes    Pertinent History: atrial fibrillation, rheumatic heart disease, silent stroke (4/5/16)    Wt Readings from Last 3 Encounters:   04/15/19 49 kg (108 lb)   04/08/19 49.4 kg (109 lb)   03/20/19 49.9 kg (110 lb)      BMI: 21.83 kg/m2    Lab Results   Component Value Date    INR 2.90 (H) 04/15/2019    INR 2.60 (!) 03/26/2019    INR 2.60 (!) 02/26/2019     Lab Results   Component Value Date    HGB 15.3 04/15/2019    HCT 44.3 04/15/2019     (L) 04/15/2019     Lab Results   Component Value Date    CREATININE 1.00 04/15/2019    CREATININE 0.97 11/30/2018    CREATININE 0.81 11/05/2018     Estimated CrCl: 59.76 (using actual body weight 49 kg and Scr 0.82)

## 2021-05-27 NOTE — PATIENT INSTRUCTIONS - HE
- Take the furosemide 20 mg twice a day for three days to help with your breathing, then decrease it back to once daily    - Call if any further questions

## 2021-05-27 NOTE — TELEPHONE ENCOUNTER
Home care RN Esme updated with INR results today and that [facility] pharmacist or ACN will contact her to discuss hold/bridging orders.     Esme will plan next home care visit on Wed 4/17. Note that she is leaving town on Thur 4/18, so if orders can be set up by Tue or Wed am that would be ideal.

## 2021-05-27 NOTE — TELEPHONE ENCOUNTER
Refill Approved    Rx renewed per Medication Renewal Policy. Medication was last renewed on 5/9/18.    Vicky Benton, Care Connection Triage/Med Refill 4/18/2019     Requested Prescriptions   Pending Prescriptions Disp Refills     levothyroxine (SYNTHROID, LEVOTHROID) 88 MCG tablet [Pharmacy Med Name: LEVOTHYROXINE 88 MCG TABLET 88 TAB] 30 tablet 8     Sig: TAKE 1 PILL BY MOUTH EVERYDAY FOR THYROID       Thyroid Hormones Protocol Passed - 4/16/2019  2:39 PM        Passed - Provider visit in past 12 months or next 3 months     Last office visit with prescriber/PCP: 3/20/2019 Aristides Alegria MD OR same dept: 3/20/2019 Aristides Alegria MD OR same specialty: 3/20/2019 Aristides Alegria MD  Last physical: 4/15/2019 Last MTM visit: Visit date not found   Next visit within 3 mo: Visit date not found  Next physical within 3 mo: Visit date not found  Prescriber OR PCP: Aristides Alegria MD  Last diagnosis associated with med order: 1. Hypothyroidism  - levothyroxine (SYNTHROID, LEVOTHROID) 88 MCG tablet [Pharmacy Med Name: LEVOTHYROXINE 88 MCG TABLET 88 TAB]; TAKE 1 PILL BY MOUTH EVERYDAY FOR THYROID  Dispense: 30 tablet; Refill: 8    If protocol passes may refill for 12 months if within 3 months of last provider visit (or a total of 15 months).             Passed - TSH on file in past 12 months for patient age 12 & older     TSH   Date Value Ref Range Status   11/05/2018 0.73 0.30 - 5.00 uIU/mL Final

## 2021-05-27 NOTE — TELEPHONE ENCOUNTER
ANTICOAGULATION  MANAGEMENT- Home Care/Care Facility Result    Assessment     Today's INR result of 2.6 is Therapeutic (goal INR of 2.0-3.0)        Warfarin taken as previously instructed    No new diet changes affecting INR    No new medication/supplements affecting INR    Continues to tolerate warfarin with no reported s/s of bleeding or thromboembolism (symptoms listed below are not new and recently addressed by cardiologist)    Surgery on 4/24, pre op on 4/15    Previous INR was Therapeutic    Plan:     Spoke with Esme discussed INR result and instructed:     Warfarin Dosing Instructions: Continue current warfarin dose 1 mg daily on Mon/Wed/Fri; and 2 mg daily rest of week  (0 % change)    Next INR to be drawn: 4/15 after pre-op    Education provided: importance of therapeutic range and target INR goal and significance of current INR result    Esme verbalizes understanding and agrees to warfarin dosing plan.   ?   Swapna Phan RN    Subjective/Objective:      Kody Johns, a 69 y.o. male is established on warfarin.     Home care/care facility RN's report of [place] INR, recent warfarin dosing, diet changes, medication changes, and symptoms is documented below.    Additional findings: none    Anticoagulation Episode Summary     Current INR goal:   2.0-3.0   TTR:   53.3 % (1.5 y)   Next INR check:   4/15/2019   INR from last check:   2.60 (3/26/2019)   Weekly max warfarin dose:      Target end date:      INR check location:      Preferred lab:      Send INR reminders to:   ANTICOAGULATION POOL A (WBY,WBE,MID,RSC)    Indications    A-fib (H) [I48.91]  Aortic valve disease  rheumatic [I06.9]  Mitral valve stenosis  unspecified etiology [I05.0]  Silent Stroke [I66.9]           Comments:            Anticoagulation Care Providers     Provider Role Specialty Phone number    Aristides Alegria MD Referring Family Medicine [phone]    Haja Ascencio MD  Cardiology [phone]

## 2021-05-27 NOTE — TELEPHONE ENCOUNTER
Spoke briefly with  while patient was at the lab.   Patient is being called back in to the exam room with PCP.    Advised that this encounter will be sent to [facility] pharmacist Mindy Cheney if assistance is requesting with hold/bridge orders for procedure on 4/24.     Per the  call patient's brother Mark Roth to review instructions.

## 2021-05-27 NOTE — PROGRESS NOTES
Preoperative Exam    Scheduled Procedure: Mitral valve replacement and aortic valve replacement.  Atrial appendage ligation transesophageal echocardiogram.  Surgery Date:  4/24/19  Surgery Location: [facility], fax [phone]    Surgeon:  Dr. Haynes    Assessment/Plan:     1. Preop general physical exam  XR Chest 2 Views   2. Aortic valve disease, rheumatic  XR Chest 2 Views   3. Mitral valve stenosis, unspecified etiology  XR Chest 2 Views   4. Essential hypertension with goal blood pressure less than 130/85     5. Pure hypercholesterolemia     6. Atrial fibrillation, unspecified type (H)  INR    Electrocardiogram Perform - Clinic   7. Pre-op evaluation  HM2(CBC W/O DIFF)    Basic Metabolic Panel    Prealbumin    Magnesium    Type and Screen (PAT visit)    Urinalysis-UC if Indicated         Surgical Procedure Risk: Vascular/High (reported cardiac risk often > 5%)  Have you had prior anesthesia?: No  Have you or any family members had a previous anesthesia reaction:  No  Do you or any family members have a history of a clotting or bleeding disorder?: No      Patient approved for surgery with general or local anesthesia.    He has seen the cardiovascular surgeon, he has seen his primary cardiologist on 4/8/19.    He will be bridged with Lovenox, regarding his warfarin.    Functional Status: Partially Dependent: children  Patient plans to recover at home with family.     Subjective:      Kody Johns is a 69 y.o. male who presents for a preoperative consultation.  This patient has severe mitral and aortic valve disease.  He is been followed by cardiology, Dr. Lang.  He has had recent right and left heart catheterization showing severe mitral regurgitation moderate aortic regurgitation mild aortic stenosis.    Patient has dyspnea on exertion and orthopnea.    His echocardiogram from November 2018 showed a 45% ejection fraction.  This is down from previous 55% from year prior to that.    He is not a  candidate for percutaneous valve replacement    He has been seen by Dr. Vaughn.    Patient has had all his teeth pulled and has dentures now.    He has a 4-8% risk of perioperative mortality per previous note from Dr. Vaughn.    Patient saw his cardiologist on April 8 please refer to details regarding this    Preoperative labs were drawn today including the MRSA swab.    Patient has atrial fibrillation is on warfarin and will be bridged, anticoagulation nurses will coordinate that.  INR today was 2.9    10 point review of systems reviewed and are negative, other than those listed in the HPI.    Pertinent History  Do you have difficulty breathing or chest pain after walking up a flight of stairs: Yes: yes chest pain and Shortness of breather  History of obstructive sleep apnea: No  Steroid use in the last 6 months: No  Frequent Aspirin/NSAID use: No  Prior Blood Transfusion: No  Prior Blood Transfusion Reaction: No  If for some reason prior to, during or after the procedure, if it is medically indicated, would you be willing to have a blood transfusion?:  There is no transfusion refusal.    Current Outpatient Medications   Medication Sig Dispense Refill     acetaminophen (TYLENOL) 500 MG tablet Take 500-1,000 mg by mouth every 6 (six) hours as needed for pain. Every 6-8 hours as needed. No more than 4,000MG of acetaminophen daily.       albuterol (PROAIR HFA;PROVENTIL HFA;VENTOLIN HFA) 90 mcg/actuation inhaler Inhale 1-2 puffs every 6 (six) hours as needed. 1 Inhaler 5     atorvastatin (LIPITOR) 40 MG tablet TAKE 1 PILL BY MOUTH AT BEDTIME. FOR CHOLESTEROL 90 tablet 2     baclofen (LIORESAL) 10 MG tablet TAKE 1 TABLET BY MOUTH TWICE DAILY 60 tablet 5     bromfenac (PROLENSA) 0.07 % Drop 1 drop daily.       fluticasone (FLONASE) 50 mcg/actuation nasal spray 2 sprays into each nostril daily. 10 g 3     furosemide (LASIX) 20 MG tablet TAKE 1 TABLET (20 MG TOTAL) BY MOUTH DAILY FOR EDEMA 90 tablet 3     ibuprofen  (ADVIL,MOTRIN) 600 MG tablet Take 600 mg by mouth every 6 (six) hours as needed for pain.       levothyroxine (SYNTHROID, LEVOTHROID) 88 MCG tablet Take 1 tablet (88 mcg total) by mouth daily. 30 tablet 9     metoprolol succinate (TOPROL-XL) 25 MG TAKE 1 PILL BY MOUTH EVERYDAY FOR BLOOD PRESSURE 90 tablet 3     omeprazole (PRILOSEC) 20 MG capsule TAKE 1 PILL BY MOUTH EVERYDAY FOR STOMACH 30 capsule 8     spironolactone (ALDACTONE) 25 MG tablet TAKE 1 PILL BY MOUTH EVERYDAY 30 tablet 8     traMADol (ULTRAM) 50 mg tablet Take 50 mg by mouth 2 (two) times a day as needed for pain.       warfarin (COUMADIN/JANTOVEN) 1 MG tablet TAKE 1 TO 2 TABLETS (1 TO 2 MG) BY MOUTH DAILY. ADJUST DOSE BASED ON INR RESULTS AS DIRECTED. 130 tablet 1     amoxicillin (AMOXIL) 500 MG capsule TAKE 4 TABLETS (2,000 mg) BY MOUTH 1 HOUR PRIOR TO PROCEDURE. 4 capsule 3     nasal dilator (BREATHE RIGHT) Strp strip Apply qhs 30 strip 3     polyethylene glycol (MIRALAX) 17 gram packet Take 17 g by mouth daily as needed.        No current facility-administered medications for this visit.         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     Dysphagia     Hyperlipidemia     Silent Stroke     Shoulder strain     Elevated LFTs     Dysuria     Hypothyroidism, unspecified type     Mitral valve stenosis, unspecified etiology     Aortic valve disease, rheumatic     Atherosclerosis of native coronary artery of native heart without angina pectoris     Essential hypertension with goal blood pressure less than 130/85     Pure hypercholesterolemia     Lightheadedness     Moderate malnutrition (H)     ALEENA (acute kidney injury) (H)     A-fib (H)     Heart failure with preserved ejection fraction (H)       Past Medical History:   Diagnosis Date     Atrial fibrillation (H)      CHF (congestive heart  failure) (H)      Coronary artery disease      Heart murmur      Hyperlipidemia      Palpitations      Stroke (H)      Valvular disease        Past Surgical History:   Procedure Laterality Date     CARDIAC CATHETERIZATION       CATARACT EXTRACTION Left 2016     CV CORONARY ANGIOGRAM N/A 2018    Procedure: Coronary Angiogram;  Surgeon: Jeff Deleon MD;  Location: [facility];  Service: Cardiology       No anesthesia or bleeding problems for patient or family.    Social History     Socioeconomic History     Marital status:      Spouse name: Jesus Johns     Number of children: 4     Years of education: Not on file     Highest education level: Not on file   Occupational History     Not on file   Social Needs     Financial resource strain: Not on file     Food insecurity:     Worry: Not on file     Inability: Not on file     Transportation needs:     Medical: Not on file     Non-medical: Not on file   Tobacco Use     Smoking status: Former Smoker     Packs/day: 1.00     Years: 50.00     Pack years: 50.00     Types: Cigarettes     Last attempt to quit: 2014     Years since quittin.6     Smokeless tobacco: Former User     Tobacco comment: No Betel nut   Substance and Sexual Activity     Alcohol use: No     Comment: Quit     Drug use: No     Sexual activity: Not on file   Lifestyle     Physical activity:     Days per week: Not on file     Minutes per session: Not on file     Stress: Not on file   Relationships     Social connections:     Talks on phone: Not on file     Gets together: Not on file     Attends Yazidi service: Not on file     Active member of club or organization: Not on file     Attends meetings of clubs or organizations: Not on file     Relationship status: Not on file     Intimate partner violence:     Fear of current or ex partner: Not on file     Emotionally abused: Not on file     Physically abused: Not on file     Forced sexual activity: Not on file  "  Other Topics Concern     Not on file   Social History Narrative    Refugee, born in [facility].             Objective:     Vitals:    04/15/19 1518   BP: 112/76   Pulse: 74   Resp: 18   Temp: 97  F (36.1  C)   TempSrc: Oral   SpO2: 97%   Weight: 108 lb (49 kg)   Height: 4' 10\" (1.473 m)         Physical Exam:  General appearance no acute distress at rest    Weight is stable at 108 vital signs are stable O2 sat 97%.    HEENT neck nontender no adenopathy oropharynx is clear he does have bilateral intraocular lenses    Oropharynx with upper and lower dentures    Lungs sounded clear slightly diminished breath sounds O2 sat 97%    Heart was irregularly irregular with soft diastolic and systolic murmur as before.    Abdomen nontender no guarding rebound    No axillary inguinal adenopathy    Extremities without edema.    No joint redness warmth or swelling.        Labs pending include magnesium type and screen prealbumin BMP, MRSA culture.    Labs today included the CBC and urine.    Chest x-ray ordered, we are unable to do chest x-rays today this will be done in the next day or 2 at the [facility].      EKG:   EKG reviewed personally agree with reading below.    INR 2.9    CBC within normal limits    Urine as outlined no sign of infection.    Labs:  Recent Results (from the past 24 hour(s))   INR    Collection Time: 04/15/19  3:49 PM   Result Value Ref Range    INR 2.90 (H) 0.90 - 1.10   HM2(CBC W/O DIFF)    Collection Time: 04/15/19  3:49 PM   Result Value Ref Range    WBC 4.4 4.0 - 11.0 thou/uL    RBC 4.93 4.40 - 6.20 mill/uL    Hemoglobin 15.3 14.0 - 18.0 g/dL    Hematocrit 44.3 40.0 - 54.0 %    MCV 90 80 - 100 fL    MCH 31.0 27.0 - 34.0 pg    MCHC 34.5 32.0 - 36.0 g/dL    RDW 13.4 11.0 - 14.5 %    Platelets 136 (L) 140 - 440 thou/uL    MPV 8.3 7.0 - 10.0 fL   Urinalysis-UC if Indicated    Collection Time: 04/15/19  4:01 PM   Result Value Ref Range    Color, UA Yellow Colorless, Yellow, Straw, Light Yellow    " Clarity, UA Clear Clear    Glucose, UA Negative Negative    Bilirubin, UA Negative Negative    Ketones, UA Negative Negative    Specific Gravity, UA 1.010 1.005 - 1.030    Blood, UA Small (!) Negative    pH, UA 5.5 5.0 - 8.0    Protein, UA Negative Negative mg/dL    Urobilinogen, UA 0.2 E.U./dL 0.2 E.U./dL, 1.0 E.U./dL    Nitrite, UA Negative Negative    Leukocytes, UA Negative Negative    Bacteria, UA Few (!) None Seen hpf    RBC, UA 3-5 (!) None Seen, 0-2 hpf    WBC, UA 0-5 None Seen, 0-5 hpf    Squam Epithel, UA 0-5 None Seen, 0-5 lpf   Electrocardiogram Perform - Clinic    Collection Time: 04/15/19  4:10 PM   Result Value Ref Range    SYSTOLIC BLOOD PRESSURE  mmHg    DIASTOLIC BLOOD PRESSURE  mmHg    VENTRICULAR RATE 77 BPM    ATRIAL RATE 83 BPM    P-R INTERVAL  ms    QRS DURATION 98 ms    Q-T INTERVAL 404 ms    QTC CALCULATION (BEZET) 457 ms    P Axis  degrees    R AXIS 41 degrees    T AXIS 0 degrees    MUSE DIAGNOSIS       Atrial fibrillation  Voltage criteria for left ventricular hypertrophy  Nonspecific ST abnormality  Abnormal ECG  When compared with ECG of 30-NOV-2018 07:31,  No significant change was found         Immunization History   Administered Date(s) Administered     Hep B, Adult 01/20/2014     Hep B, Peds or Adolescent 04/14/2015     Hep B, historic 10/21/2013, 12/31/2013     Influenza high dose, seasonal 08/31/2016, 09/11/2017, 11/05/2018     Influenza, inj, historic,unspecified 12/31/2013, 01/20/2014, 09/22/2014     Influenza, seasonal,quad inj 36+ mos 11/04/2015     Influenza,seasonal quad, PF 12/31/2013, 09/22/2014     Pneumo Conj 13-V (2010&after) 09/11/2017     Td, Adult, Absorbed 09/12/2012     Td, adult adsorbed, PF 04/14/2015     Tdap 01/20/2014     Varicella 04/14/2015           Electronically signed by Aristides Alegria MD 04/15/19 3:21 PM

## 2021-05-27 NOTE — PROGRESS NOTES
[facility] Care Coordination Contact    Called family Mark - relative to schedule annual HRA home visit. HRA has been scheduled for Thursday, April 18 at 1:00pm.     MISSAEL Lord  [facility]  [phone]

## 2021-05-27 NOTE — PROGRESS NOTES
Thank you for asking the [facility] team to see Kody Johns .      Assessment/Plan:   The son will discuss with the family tonight and decide for/against surgery in the next few days.     In the mean time I am having Kody increase his furosemide to 20mg two times a day x 3 days given his complaints of pnd/orthopnea.    50 minutes spent discussing his disease, treatment options and potential outcomes.      Current History:   Kody Johns is a 69 y.o. [place] and has had heart failure and valve disease as well as mild coronary disease by angiogram in 2015.   Right and left heart catheterization showed a mitral valve gradient of 7-8 mmHg with simultaneous pulmonary capillary wedge pressure and LVEDP. He has also had atrial fibrillation with RVR and acute heart failure secondary to mitral regurgitation. Echo on August 29, 2017 showed ejection fraction of 55%, moderate to severe mitral regurgitation (rheumatic heart disease), moderate aortic regurgitation, and mild aortic stenosis. He was seen by Dr. Manriquez who thought he was not a candidate for percutaneous valve replacement. He had been in normal sinus rhythm since November 2017. Echo 11/30/18 showed EF decline to 45% and the persistent valve disease.      Mr. Johns returns for f/u today with his brother and son to discuss his upcoming surgery April 24th. Kody noted pnd at night but that he is doing well otherwise. In their culture the family decides for/against surgery and the son comes to meet me today for the first time. We reviewed the patient's valve disease, depressed ejection fraction, arrhythmia. I quoted the 4-8% risk of perioperative mortality that Dr. Vaughn put in her note from April 2018. We reviewed the heart failure hospitalizations and that Kody has had his teeth removed for this surgery. The son reports being very scared of the surgery and that they are all worried that he might have a bad outcome.     Past  Medical History:     Past Medical History:   Diagnosis Date     Atrial fibrillation (H)      CHF (congestive heart failure) (H)      Coronary artery disease      Heart murmur      Hyperlipidemia      Palpitations      Stroke (H)      Valvular disease        Past Surgical History:     Past Surgical History:   Procedure Laterality Date     CARDIAC CATHETERIZATION       CATARACT EXTRACTION Left 06/13/2016     CV CORONARY ANGIOGRAM N/A 11/30/2018    Procedure: Coronary Angiogram;  Surgeon: Jeff Deleon MD;  Location: [facility];  Service: Cardiology       Family History:     Family History   Problem Relation Age of Onset     No Medical Problems Mother      No Medical Problems Father      Heart disease Neg Hx        Social History:    reports that he quit smoking about 4 years ago. His smoking use included cigarettes. He has a 50.00 pack-year smoking history. He has quit using smokeless tobacco. He reports that he does not drink alcohol or use drugs.    Meds:     Current Outpatient Medications   Medication Sig Note     acetaminophen (TYLENOL) 500 MG tablet Take 500-1,000 mg by mouth every 6 (six) hours as needed for pain. Every 6-8 hours as needed. No more than 4,000MG of acetaminophen daily.      albuterol (PROAIR HFA;PROVENTIL HFA;VENTOLIN HFA) 90 mcg/actuation inhaler Inhale 1-2 puffs every 6 (six) hours as needed.      amoxicillin (AMOXIL) 500 MG capsule TAKE 4 TABLETS (2,000 mg) BY MOUTH 1 HOUR PRIOR TO PROCEDURE.      atorvastatin (LIPITOR) 40 MG tablet TAKE 1 PILL BY MOUTH AT BEDTIME. FOR CHOLESTEROL      baclofen (LIORESAL) 10 MG tablet TAKE 1 TABLET BY MOUTH TWICE DAILY      bromfenac (PROLENSA) 0.07 % Drop 1 drop daily.      fluticasone (FLONASE) 50 mcg/actuation nasal spray 2 sprays into each nostril daily. 8/29/2017: 8/29/17: pt's brother states that he ran out of this medication a few days ago but is still supposed to be taking it.      furosemide (LASIX) 20 MG tablet TAKE 1 TABLET (20 MG  "TOTAL) BY MOUTH DAILY FOR EDEMA      ibuprofen (ADVIL,MOTRIN) 600 MG tablet Take 600 mg by mouth every 6 (six) hours as needed for pain.      levothyroxine (SYNTHROID, LEVOTHROID) 88 MCG tablet Take 1 tablet (88 mcg total) by mouth daily.      metoprolol succinate (TOPROL-XL) 25 MG TAKE 1 PILL BY MOUTH EVERYDAY FOR BLOOD PRESSURE      nasal dilator (BREATHE RIGHT) Strp strip Apply qhs      omeprazole (PRILOSEC) 20 MG capsule TAKE 1 PILL BY MOUTH EVERYDAY FOR STOMACH      polyethylene glycol (MIRALAX) 17 gram packet Take 17 g by mouth daily as needed.       spironolactone (ALDACTONE) 25 MG tablet TAKE 1 PILL BY MOUTH EVERYDAY      traMADol (ULTRAM) 50 mg tablet Take 50 mg by mouth 2 (two) times a day as needed for pain.      warfarin (COUMADIN/JANTOVEN) 1 MG tablet TAKE 1 TO 2 TABLETS (1 TO 2 MG) BY MOUTH DAILY. ADJUST DOSE BASED ON INR RESULTS AS DIRECTED.        Allergies:   Patient has no known allergies.    Review of Systems:   Review of Systems:   General: WNL  Eyes: WNL  Ears/Nose/Throat: WNL  Lungs: WNL  Heart: Chest Pain, Arm Pain, Shortness of Breath with activity, Irregular Heartbeat(Pt states that he has chest pain that is 2/10 at this time. )  Stomach: Heartburn  Bladder: Frequent Urination at Night  Muscle/Joints: Joint Pain, Muscle Weakness  Skin: WNL  Nervous System: Dizziness, Loss of Balance  Mental Health: Confusion     Blood: WNL       Objective:      Physical Exam  @LASTENCWT:3@  4' 11.25\" (1.505 m)  @BMI:3@  BP 94/52 (Patient Site: Left Arm, Patient Position: Sitting, Cuff Size: Adult Small)   Pulse 72   Resp 16   Ht 4' 11.25\" (1.505 m)   Wt 109 lb (49.4 kg)   BMI 21.83 kg/m      General Appearance:   Alert, cooperative and in no acute distress.   HEENT:     Neck:    Chest:    Lungs:      Cardiovascular:      Abdomen:     Extremities:    Skin:    Neurologic:          Lab Review   Lab Results   Component Value Date     11/30/2018     11/05/2018     09/14/2017    K 4.4 " 11/30/2018    K 3.4 (L) 11/05/2018    K 4.5 09/14/2017     11/30/2018     (H) 11/05/2018     (H) 09/14/2017    CO2 27 11/30/2018    CO2 21 (L) 11/05/2018    CO2 23 09/14/2017    BUN 21 11/30/2018    BUN 14 11/05/2018    BUN 18 09/14/2017    CREATININE 0.97 11/30/2018    CREATININE 0.81 11/05/2018    CREATININE 0.91 06/22/2018    CALCIUM 9.2 11/30/2018    CALCIUM 9.0 11/05/2018    CALCIUM 9.3 09/14/2017     Lab Results   Component Value Date    WBC 8.3 11/30/2018    WBC 7.3 09/06/2017    WBC 10.6 08/29/2017    HGB 14.9 11/30/2018    HGB 17.0 09/06/2017    HGB 16.8 08/29/2017    HCT 46.1 11/30/2018    HCT 52.8 09/06/2017    HCT 47.9 08/29/2017    MCV 92 11/30/2018    MCV 94 09/06/2017    MCV 90 08/29/2017     11/30/2018     09/06/2017     08/29/2017     Lab Results   Component Value Date    CHOL 97 11/30/2018    CHOL 115 11/05/2018    CHOL 185 07/20/2017    TRIG 86 11/30/2018    TRIG 62 11/05/2018    TRIG 83 07/20/2017    HDL 27 (L) 11/30/2018    HDL 34 (L) 11/05/2018    HDL 34 (L) 07/20/2017    LDLDIRECT 111 10/13/2016    LDLDIRECT 94 11/24/2014    LDLDIRECT 101 05/08/2014     Lab Results   Component Value Date     (H) 08/29/2017     (H) 04/02/2014         Ashleigh Lang M.D.

## 2021-05-27 NOTE — TELEPHONE ENCOUNTER
I would like the [facility] pharmacist to coordinate this.  I have told the patient that we should bridge starting 5 days prior to the aortic and mitral valve replacement surgery.  (Not to start today)    Please direct this to whoever needs to coordinate that.  Thanks

## 2021-05-27 NOTE — TELEPHONE ENCOUNTER
INR result is  2.6  INR   Date Value Ref Range Status   02/26/2019 2.60 (!) 0.9 - 1.1 Final       Will the patient be seen, or did they already see, MD or CNP today? No    Most Recent Warfarin dose day/week  Sunday Monday Tuesday Wednesday Thursday Friday Saturday     2 1 2 1 2     Sunday Monday Tuesday Wednesday Thursday Friday Saturday   2 1            Has the patient missed any doses of Coumadin, Warfarin, Jantoven in the past 7 days? No    Has the patients medications changed since the last visit? No    Has the patient experienced any bleeding recently? No    Has the patient experienced any injuries or illness recently? No    Has the patient experienced any 'new' shortness of breath, severe headaches, or changes in vision recently? Yes, mild headache, shortness of breath- not new, using inhaler.    Has the patient had any changes in their diet, or alcohol consumption? No    Is the patient here today to prepare for any type of upcoming surgery, procedure, or for a cardioversion procedure?  Yes, for surgery 04/24/19-    What phone number can we reach the patient at today?  Esme, Home care nurse [phone]

## 2021-05-27 NOTE — TELEPHONE ENCOUNTER
Noted. Mindy, please review and advise. Surgery is 4/24/19    Note that patient does have home care ([facility], contact info on tracking calendar notes) and per patient also okay to call his brother Mark Ira if needed.

## 2021-05-27 NOTE — TELEPHONE ENCOUNTER
ANTICOAGULATION  MANAGEMENT    Assessment     Today's INR result of 2.9 is Therapeutic (goal INR of 2.0-3.0)        Warfarin taken as previously instructed    No new diet changes affecting INR    No new medication/supplements affecting INR    Continues to tolerate warfarin with no reported s/s of bleeding or thromboembolism     Previous INR was Therapeutic     AVR and MVR, left atrial appendage ligation and BATSHEVA scheduled on 4/24/19. Preop today. See other encounter regarding hold/bridging orders.    Plan:     Spoke with  regarding INR result. Also contacted home care RN Esme to update (she does med set up for patient)    Warfarin Dosing Instructions:  Continue current warfarin dose    1 mg every Mon, Wed, Fri; 2 mg all other days         Instructed patient to follow up no later than: TBD based on upcoming warfarin hold for surgery    Education provided: target INR goal and significance of current INR result     verbalizes understanding and agrees to warfarin dosing plan.    Instructed to call the [facility] for any changes, questions or concerns. (#[phone])   ?   Jessica Garza RN    Subjective/Objective:      Kody Johns, a 69 y.o. male is on warfarin.     Kody reports:     Home warfarin dose: as updated on anticoagulation calendar per template     Missed doses: No     Medication changes:  No     S/S of bleeding or thromboembolism:  No     New Injury or illness:  No     Changes in diet or alcohol consumption:  No     Upcoming surgery, procedure or cardioversion:  Yes: valve replacement and left atrial appendage ligation on 4/24. See other encounter for hold/bridge orders    Anticoagulation Episode Summary     Current INR goal:   2.0-3.0   TTR:   55.0 % (1.6 y)   Next INR check:   4/29/2019   INR from last check:   2.90 (4/15/2019)   Weekly max warfarin dose:      Target end date:      INR check location:      Preferred lab:      Send INR reminders to:   ANTICOAGULATION POOL A  ([facility])    Indications    A-fib (H) [I48.91]  Aortic valve disease  rheumatic [I06.9]  Mitral valve stenosis  unspecified etiology [I05.0]  Silent Stroke [I66.9]           Comments:            Anticoagulation Care Providers     Provider Role Specialty Phone number    Aristides Alegria MD [facility] [phone]    Haja Ascencio MD  Cardiology [phone]

## 2021-05-28 NOTE — PROGRESS NOTES
NEUROLOGY PROGRESS NOTE     Kody Johns,  1950, MRN [id number] Date: 2019     [facility]   Code status:  Full Code   PCP: Aristides Alegria MD, [phone]      ASSESSMENT & PLAN   Hospital Day#: 10  Diagnosis code: Ischemic stroke    Ischemic stroke  Aortic/Mitral valve repair      Head CT shows right P2 stroke which fits with the CTA scan.    MRI shows PCA stroke.    Patient has bilateral arm and leg weakness. MRI does not explain this. MRI cervical spine negative.     Check CK for ? Critical care myopathy or neuropathy (though no steroid use).    Can slow bring the BP down.    Most likely cause of stroke is cardioembolic secondary to surgery.     Hold ASA (stopped today) due to low platelets. Needs anticoagulation long term.    Family meeting (TBD) to decide on Trach and PEG.    Will continue to follow.    Thank you again for this referral, please feel free to contact me if you have any questions.     CHIEF COMPLAINT S/P MVR (mitral valve repair)     HISTORY OF PRESENT ILLNESS     We have been requested by Dr. Omer to evaluate Kody Johns who is a 69 y.o.  male for acute stroke.    This is a 69-year-old male on whom neurology is been consulted to evaluate for stroke.  Patient had a mitral valve surgery yesterday and was under sedation after that.  Sedation was lightened today when it was noticed that he was having some ophthalmoplegia.  Was mainly involving the left eye.  Stroke code was called.  He was not a TPA candidate because of the recent surgery and unclear time of onset.  Left arm and leg weakness was also noted.  Head CT was done which showed a right P2 occlusion.  The patient's exam is been stable since the stroke code.  He was not a candidate for thrombectomy because the blood vessel was too small for mechanical thrombectomy.      Patient is unable to provide any history because of intubation.  History is obtained through chart review and through talking to the  son.    4/26  Patient remains intubated. Unable to extubate. He has spiked a fever.  Per family he has been opening his eyes. Nodding to questions. Moving right arm but not the right leg.  He did move the left arm once for the family. Though the healthcare staff have not observed this.  No other new symptoms per the family.   MRI brain could not be done due to Pacemaker wires.    4/29  Patient remains in the ICU. Due to low platelets the pacemaker wires cannot be removed and patient cannot get the MRI. Family still wanting aggressive cares. Patients fever has been thought to be secondary to neurological disease.    4/30  Patient has been agitated when off the Precedex. Plan for MRI today and possibly family conference tomorrow. Family is hopeful that he will make good recovery. Discussed with family that there is no cure for stroke.     5/1  Family meeting later today. Patient still has not improved. MRI brain done last night.     5/2  Patients MRI cervical spine does not explain why he cannot move left side. Updated family on results.   Plan to proceed with PEG and Trach most likely. Family meeting tomorrow afternoon to decide.    5/3  Family meeting was cancelled for today. Family most likely planning on Trach and PEG today. Discussed with youngest son.     Cardiology to do BATSHEVA with cardioversion.  Abd CT shows ileus.    5/4  No new events. Family meeting has not been set for so far. No significant arm or leg movements seen.      PAST MEDICAL & SURGICAL HISTORY     Medical History  Patient Active Problem List    Diagnosis Date Noted     Paralytic ileus (H) 05/02/2019     Small bowel obstruction (H)      Atrial fibrillation with RVR (H)      Hypernatremia      Thrombocytopenia (H)      Sepsis, due to unspecified organism (H)      S/P MVR (mitral valve repair) 04/24/2019     ARF (acute respiratory failure) (H) 04/24/2019     Anemia due to blood loss, acute 04/24/2019     Coagulopathy (H) 04/24/2019     Non-English  speaking patient 04/24/2019     A-fib (H) 09/14/2017     Heart failure with preserved ejection fraction (H) 09/14/2017     Moderate malnutrition (H) 08/31/2017     ALEENA (acute kidney injury) (H) 08/31/2017     Aortic valve disease, rheumatic 08/30/2017     Lightheadedness 08/30/2017     Atherosclerosis of native coronary artery of native heart without angina pectoris      Essential hypertension with goal blood pressure less than 130/85      Pure hypercholesterolemia      Dysuria 08/29/2017     Hypothyroidism, unspecified type      Mitral valve stenosis, unspecified etiology      Elevated LFTs 08/26/2016     Silent Stroke      Shoulder strain      Dysphagia      Hyperlipidemia      Rheumatic heart disease      Blurry vision      Hearing loss      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  Surgical History  He  has a past surgical history that includes Cataract extraction (Left, 06/13/2016); Cardiac catheterization; Coronary Angiogram (N/A, 11/30/2018); Eye surgery; pr replacement of mitral valve (N/A, 4/24/2019); Aortic valve replacement (N/A, 4/24/2019); and PICC Team Line Insertion (4/29/2019).     SOCIAL HISTORY     Reviewed, and he  reports that he quit smoking about 4 years ago. His smoking use included cigarettes. He has a 50.00 pack-year smoking history. He has quit using smokeless tobacco. He reports that he does not drink alcohol or use drugs.     FAMILY HISTORY      Reviewed, and family history includes No Medical Problems in his father and mother.     ALLERGIES     No Known Allergies     REVIEW OF SYSTEMS     Unable to do ROS due to intubation and AMS.     HOME & HOSPITAL MEDICATIONS     Prior to Admission Medications  Medications Prior to Admission   Medication Sig Dispense Refill Last Dose     albuterol (PROAIR HFA;PROVENTIL HFA;VENTOLIN HFA) 90 mcg/actuation inhaler Inhale 1-2 puffs every 6 (six) hours as needed. 1 Inhaler 5 4/22/2019     amoxicillin (AMOXIL) 500 MG capsule TAKE 4 TABLETS (2,000 mg) BY MOUTH 1 HOUR PRIOR TO PROCEDURE. 4 capsule 3 Unknown     atorvastatin (LIPITOR) 40 MG tablet TAKE 1 PILL BY MOUTH AT BEDTIME. FOR CHOLESTEROL 90 tablet 2 4/22/2019     baclofen (LIORESAL) 10 MG tablet TAKE 1 TABLET BY MOUTH TWICE DAILY 60 tablet 5 4/23/2019     enoxaparin (LOVENOX) 40 mg/0.4 mL syringe Inject 0.4 mL (40 mg total) under the skin every evening. 2 Syringe 0 4/22/2019     enoxaparin (LOVENOX) 60 mg/0.6 mL syringe Inject 0.6 mL (60 mg total) under the skin every morning. 3 Syringe 0 4/23/2019 at am     fluticasone (FLONASE) 50 mcg/actuation nasal spray 2 sprays into each nostril daily. (Patient taking differently: 2 sprays into each nostril daily as needed.       ) 10 g 3 4/22/2019     furosemide (LASIX) 20 MG tablet TAKE 1 TABLET (20 MG TOTAL) BY MOUTH DAILY FOR EDEMA 90 tablet 3 4/23/2019     levothyroxine (SYNTHROID, LEVOTHROID) 88 MCG tablet TAKE 1 PILL BY MOUTH EVERYDAY FOR THYROID 30 tablet 6 4/23/2019     metoprolol succinate (TOPROL-XL) 25 MG TAKE 1 PILL BY MOUTH EVERYDAY FOR BLOOD PRESSURE 90 tablet 3 4/23/2019     omeprazole (PRILOSEC) 20 MG capsule TAKE 1 PILL BY MOUTH EVERYDAY FOR STOMACH 30 capsule 8 4/23/2019     spironolactone (ALDACTONE) 25 MG tablet TAKE 1 PILL BY MOUTH EVERYDAY 30 tablet 8 4/23/2019     warfarin (COUMADIN/JANTOVEN) 1 MG tablet Take 1-2 mg by mouth See Admin Instructions. 1 mg daily on Mon/Wed/Fri; and 2 mg daily rest of week    4/19/2019     acetaminophen (TYLENOL) 500 MG tablet Take 500-1,000 mg by mouth every 6 (six) hours as needed for pain. Every 6-8 hours as needed. No more than 4,000MG of acetaminophen daily.   Unknown     ibuprofen (ADVIL,MOTRIN) 600 MG tablet Take 600 mg by mouth every 6 (six) hours as needed for pain.   Unknown       Hospital Medications    acetaminophen  650 mg Enteral Tube Q6H    Or     acetaminophen  650 mg Oral Q6H    Or     acetaminophen  650 mg Rectal Q6H     atorvastatin  40 mg Enteral Tube DAILY     baclofen  10 mg Enteral Tube BID     cefTRIAXone (ROCEPHIN)  IV  1 g Intravenous DAILY     chlorhexidine  15 mL Topical Q12H     furosemide  40 mg Intravenous Q8H     insulin aspart (NovoLOG) injection   Subcutaneous Q4H FIXED TIMES     levothyroxine  75 mcg Intravenous Daily     metoprolol tartrate  2.5-5 mg Intravenous Q6H     pantoprazole  40 mg Intravenous Q24H    Or     omeprazole  20 mg Oral Q24H    Or     omeprazole  20 mg Enteral Tube Q24H     potassium chloride  10 mEq Enteral Tube Daily with supper     potassium chloride  20 mEq Intravenous Once     sodium chloride  10-30 mL Intravenous Q8H FIXED TIMES     sodium chloride  3 mL Intravenous Line Care        PHYSICAL EXAM     Vital signs  Temp:  [97  F (36.1  C)-99.9  F (37.7  C)] 98.4  F (36.9  C)  Heart Rate:  [] 78  Resp:  [17-48] 23  BP: ()/() 126/80  FiO2 (%):  [40 %-60 %] 40 %    Weight:   122 lb 2.2 oz (55.4 kg)    GENERAL: Healthy appearing, alert, no acute distress, normal habitus.  CARDIOVASCULAR: Ext warm and well perfused.  NEUROLOGICAL:  Patient is lying in bed intubated. Opens eyes to voice Minimaly. Right pupil larger than left. EOM absent. VFI on right. Corneals present. NLF symmetric. Does not move any ext to pain or spontaneously except did move the right arm for me today. Tone symmetric.      DIAGNOSTIC STUDIES     Pertinent Radiology   Head CT images reviewed. No acute stroke seen  IMPRESSION:   CONCLUSION:   HEAD  CT:  1.  No intracranial hemorrhage, mass lesions, hydrocephalus or CT evidence for an acute infarct. MRI is more sensitive for the evaluation of acute infarct.  2.  Chronic lacunae as detailed above.  3.  Mild diffuse cerebral parenchymal volume loss. Presumed chronic hypertensive/microvascular ischemic white matter changes.        HEAD CTA:   1.  There is cut off on the P2 segment of the right posterior cerebral artery.  2.  No high-grade stenosis or occlusion of the rest of the major intracranial arteries.  3.  There is 1.5 to 2 mm focal outpouching off the expected location of the origin of the right posterior communicating artery. This is likely an infundibulum versus small aneurysm.        NECK CTA:  1.  No significant stenosis in the neck vessels based on NASCET criteria.  2.  No evidence for dissection.    Repeat Head CT 4/26 Images reviewed. Show right PCA stroke with question of pontine stroke.    Repeat Head CT images reviewed. 4/29  IMPRESSION:   CONCLUSION:  1.  Motion degraded examination demonstrates expected temporal evolution of right posterior cerebral artery territory infarct including interval development of small volume petechial hemorrhage as above. No hemorrhagic transformation. Mild local mass   effect with partial effacement of the posterior right lateral ventricle.  2.  Some loss of gray-white matter differentiation involving the parasagittal left parieto-occipital junction. It is uncertain if this is artifactual or reflect a new area of subacute ischemia/infarct. Consider MRI or follow-up head CT if the patient is   not an MRI candidate.  3.  Background of mild age-related changes elsewhere similar to the prior exam.    MRI brain images reviewed. Right PCA stroke seen.    MRI cervical spine  IMPRESSION:   CONCLUSION:  1.  Markedly limited by patient motion. No definite canal narrowing or gross cord signal abnormality     2.  Evaluation of the foramina is limited though there is likely  significant foraminal narrowing at a few levels.     3.  No marrow edema or acute injury.    Recent Results (from the past 24 hour(s))   Sodium    Collection Time: 05/03/19 10:59 AM   Result Value Ref Range    Sodium 149 (H) 136 - 145 mmol/L   Protime-INR    Collection Time: 05/03/19 10:59 AM   Result Value Ref Range    INR 1.19 (H) 0.90 - 1.10   Amylase    Collection Time: 05/03/19 10:59 AM   Result Value Ref Range    Amylase 156 (H) 5 - 120 U/L   POCT Glucose - every 4 hours    Collection Time: 05/03/19 11:36 AM   Result Value Ref Range    Glucose 144 (H) 70 - 139 mg/dL   POCT Glucose for diabetic patients who arrive NPO - implement hypoglycemic or hyperglycemic protocol as necessary    Collection Time: 05/03/19 12:31 PM   Result Value Ref Range    Glucose 146 (H) 70 - 139 mg/dL   Urinalysis-UC if Indicated    Collection Time: 05/03/19  4:23 PM   Result Value Ref Range    Color, UA Yellow Colorless, Yellow, Straw, Light Yellow    Clarity, UA Cloudy (!) Clear    Glucose, UA Negative Negative    Bilirubin, UA Negative Negative    Ketones, UA Negative Negative    Specific Gravity, UA 1.015 1.001 - 1.030    Blood, UA Moderate (!) Negative    pH, UA 5.5 4.5 - 8.0    Protein, UA Trace (!) Negative mg/dL    Urobilinogen, UA <2.0 E.U./dL <2.0 E.U./dL, 2.0 E.U./dL    Nitrite, UA Negative Negative    Leukocytes, UA Negative Negative   POCT Glucose - every 4 hours    Collection Time: 05/03/19  4:38 PM   Result Value Ref Range    Glucose 123 70 - 139 mg/dL   Sodium    Collection Time: 05/03/19  6:40 PM   Result Value Ref Range    Sodium 151 (HH) 136 - 145 mmol/L   POCT Glucose - every 4 hours    Collection Time: 05/03/19  8:25 PM   Result Value Ref Range    Glucose 149 (H) 70 - 139 mg/dL   POCT Glucose - every 4 hours    Collection Time: 05/03/19 11:46 PM   Result Value Ref Range    Glucose 113 70 - 139 mg/dL   Sodium    Collection Time: 05/04/19 12:01 AM   Result Value Ref Range    Sodium 152 (HH) 136 - 145 mmol/L   POCT  Glucose - every 4 hours    Collection Time: 05/04/19  4:14 AM   Result Value Ref Range    Glucose 155 (H) 70 - 139 mg/dL   Sodium    Collection Time: 05/04/19  5:44 AM   Result Value Ref Range    Sodium 150 (H) 136 - 145 mmol/L   Magnesium    Collection Time: 05/04/19  5:44 AM   Result Value Ref Range    Magnesium 2.6 1.8 - 2.6 mg/dL   Potassium - Next AM    Collection Time: 05/04/19  5:44 AM   Result Value Ref Range    Potassium 3.6 3.5 - 5.0 mmol/L   Creatinine    Collection Time: 05/04/19  5:44 AM   Result Value Ref Range    Creatinine 1.37 (H) 0.70 - 1.30 mg/dL    GFR MDRD Af Amer >60 >60 mL/min/1.73m2    GFR MDRD Non Af Amer 52 (L) >60 mL/min/1.73m2   POCT Glucose - every 4 hours    Collection Time: 05/04/19  7:20 AM   Result Value Ref Range    Glucose 145 (H) 70 - 139 mg/dL       Total time spent for face to face visit, reviewing labs/imaging studies, counseling and coordination of care was: 25 min More than 50% of this time was spent on counseling and coordination of care. Discussed case with ICU team.       Dustin Moya MD  Direct Secure Messaging: [email or web address]  Tel: [phone]  This note was dictated using voice recognition software.  Any grammatical or context distortions are unintentional and inherent to the software.

## 2021-05-28 NOTE — PLAN OF CARE
Problem: Mechanical Ventilation  Goal: Patient will maintain patent airway  Outcome: Progressing  Goal: Oral health is maintained or improved  Outcome: Progressing  Goal: Respiratory status - ventilation  Description  Movement of air in and out of the lungs.    Liberate from ventilator  Outcome: Progressing  Goal: ET tube will be managed safely  Outcome: Progressing      Issa Man, ZEHRAT

## 2021-05-28 NOTE — PLAN OF CARE
Problem: Potential for Compromised Skin Integrity  Goal: Skin integrity is maintained or improved  Outcome: Progressing     Problem: Potential for Compromised Skin Integrity  Goal: Nutritional status is improving  Outcome: Progressing   Receiving tube feeds per orders. Residuals of 70 ml at midnight, no residuals at 0400. Tube feeding increased at 0630 to 45ml/hr which is goal rate. 150ml water flushes q 4 hour.

## 2021-05-28 NOTE — PLAN OF CARE
Problem: Mechanical Ventilation  Goal: Patient will maintain patent airway  Outcome: Progressing  Goal: Respiratory status - ventilation  Description  Movement of air in and out of the lungs.    Liberate from ventilator  Outcome: Progressing    HARINDER Reza

## 2021-05-28 NOTE — PROGRESS NOTES
Infectious Disease Follow up Note:    Service: Infectious Disease   Note: Follow Up Note  Date: 5/10/2019    Chief Complaint: Follow up for fever.    ASSESSMENT:  Kody Johns is a 69 y.o. old male with fever.  1.  History of rheumatic heart disease status post AVR and MVR on 4/25/2019 by Dr. Vaughn.  2.  Postop complication of large evolving subacute right PCA distribution infarcts diagnosed on 4/29/2019.  3.  Diagnosed with Klebsiella pneumonia pneumonia.  Received 10 days of IV ceftriaxone on 5/9/2019.  IV ceftriaxone stopped on 5/9/2019  4.  Recent positive blood culture with coagulase-negative staph in the setting of new leukocytosis.  WBC went from 20 at the time of initiation of therapy with vancomycin to normal 5/8/2019.  I had recommended 7 days of IV vancomycin for possible line sepsis.  Unfortunately only one set of blood cultures were collected at the time.  Follow-up blood cultures on 5/6/2019 no growth.  5.  New fever at 101.6.  No new focus of infection.  On minimal oxygen.  Procalcitonin now down to 0.6 from 30 on 4/27/2019.  UA on 5/9/2019 not consistent with UTI.  No diarrhea to suggest C. difficile.  Noninfectious causes of fever in the ICU such as DVT and lines are on the differential diagnosis list.   The most likely cause for fever in this patient however is the CVA.  Fever seems better today.  6.  Status post tracheostomy.     Recommendations:   Complete 7 days of IV vancomycin from 5/5/2019, last dose 5/11/2019.  Monitor fever.  Okay to transfer to [facility].  If fever recurs at [facility], consider removing right upper extremity line and repeat to set of blood cultures.    Discussed with patient and family with the help of professional , and nursing staff.    ID will sign off.    JOSE Lambert MD  [facility]   Office Telephone [phone].  Fax [phone]  Direct messaging: [employer]  Paging  ______________________________________________________________________  Notes / labs / vitals reviewed.    SUBJECTIVE / INTERVAL HISTORY: Fever better today plan is to discharge to [facility] today.    ROS: A complete 12 point ROS is negative except as listed above.    PMHx/FHx/SHx: Reviewed. Interval changes noted.    OBJECTIVE:  Vitals:    05/10/19 0805   BP:    Pulse: (!) 103   Resp:    Temp:    SpO2: 100%       Temp (24hrs), Av.9  F (37.2  C), Min:97.6  F (36.4  C), Max:99.9  F (37.7  C)    GEN: In no acute distress.  EYES:  Anicteric, FABIOLA, EOM intact.  HEAD, EARS, NOSE, MOUTH, AND THROAT: Poor dentition.  NECK: Trach in place.  RESPIRATORY:  Normal breathing pattern. Clear to auscultation  Chest: Incision looks good with no evidence of infection.  CARDIOVASCULAR:  S1 S2 No murmur, click, gallop or rub. No dependent edema. No excess JVD.  ABDOMEN:  Soft, normal bowel sounds, non-tender, no masses, no organomegaly.  MUSCULOSKELETAL:  Joints without effusion or acute inflammation. No muscle atrophy.  LYMPHATIC:  No cervical or supraclavicular adenopathy  SKIN/HAIR/NAILS:  No rashes. No stigmata of infective endocarditis.  NEUROLOGIC: Awake.    Antibiotics:  IV vancomycin    Pertinent labs:    Results from last 7 days   Lab Units 05/10/19  0459 19  0601 19  2255 19  0549 19  0626 19  0456   LN-WHITE BLOOD CELL COUNT thou/uL  --  11.6*  --  9.8  --  11.4*   LN-HEMOGLOBIN g/dL  --  8.5*  --  8.6* 8.9* 9.6*   LN-HEMATOCRIT %  --  25.9*  --  26.2*  --  29.3*   LN-PLATELET COUNT thou/uL 167 136* 158 122* 103* 97*   LN-NEUTROPHILS RELATIVE PERCENT %  --  83*  --   --   --   --    LN-MONOCYTES RELATIVE PERCENT %  --  8  --   --   --   --        Results from last 7 days   Lab Units 05/10/19  0500 19  1816 19  0601 19  0549  19  0626   LN-SODIUM mmol/L 140  --  142 145  --  145   LN-POTASSIUM mmol/L 3.4* 3.8 3.4* 3.9   < > 3.3*  3.3*   LN-CHLORIDE  mmol/L 110*  --  113* 116*  --  114*   LN-CO2 mmol/L 24  --  23 24  --  23   LN-BLOOD UREA NITROGEN mg/dL 19  --  24* 22  --  31*   LN-CREATININE mg/dL 0.69*  --  0.74 0.74  --  0.84   LN-CALCIUM mg/dL 7.7*  --  7.4* 7.8*  --  7.8*   LN-ALBUMIN g/dL 2.1*  --  2.0*  --   --  2.1*   LN-PROTEIN TOTAL g/dL 6.0  --  5.6*  --   --  5.6*   LN-BILIRUBIN TOTAL mg/dL 1.4*  --  1.4*  --   --  2.1*   LN-ALKALINE PHOSPHATASE U/L 147*  --  137*  --   --  164*   LN-ALT (SGPT) U/L 104*  --  103* 115*  --  132*   LN-AST (SGOT) U/L 77*  --  85* 107*  --  125*    < > = values in this interval not displayed.       MICROBIOLOGY DATA:    Cultures reviewed      IMAGING/RADIOLOGY:  Reviewed

## 2021-05-28 NOTE — PROGRESS NOTES
"  Clinical Nutrition Therapy Nutrition Support Note      Reason:  RD managing TF.    Subjective:  Pt intubated.  TF infusing at goal rate.  Met one of pt's brothers.  Chart reviewed.    Nutrition History:  Information from family/caregiver and chart.  Diet prior to admission:  General.  Pt eats mae with meat and/or veggies and rice.  Recent food/fluid intake: good.   Cultural/Jainism food needs or preferences: [facility]  Food allergies or intolerances: nkfa  TF started 4/26.    Nutrition Prescription:   Diet: Fibersource HN 50 ml/hr x 21 hrs.  Water flush 250 ml q 4 hrs    IV dextrose or Fluids:    amiodarone 900 mg/500 ml standard 24 hour infusion Last Rate: 0.5 mg/min (04/30/19 1224)   dexmedetomidine 400 mcg/100 mL in NS (PRECEDEX) (4mcg/mL) Last Rate: 1.5 mcg/kg/hr (04/30/19 1222)   DOPamine    epinephrine Last Rate: Stopped (04/27/19 0655)   insulin infusion (1 unit/mL) Last Rate: Stopped (04/24/19 1906)   niCARdipine Last Rate: Stopped (04/26/19 0701)   norepinephrine IV infusion in D5W    phenylephrine IV infusion in NS Last Rate: 100 mcg/min (04/30/19 1223)   vasopressin        Current Nutrition Intake:  OG placed 4/26.  X-ray indicates gastric.  TF provides:  1260 calories, 57 g protein, 172 g carb, 16 g fiber, 2348 ml free water.  Additional fluids given with meds.  IV fluids    Above meets estimated nutrition needs    Anthropometrics:  Height: 4' 11\" (149.9 cm)  Admission weight: 105#  Weight: 122 lb 8 oz (55.6 kg)  I/Os indicate 4 L gain.    Physical Findings:  Edema noted.    GI Status/Output:   GI symptoms per nursing:   Last BM 4/29  Minimal gastric residuals.    Skin/Wound:   Gerardo Scale Score: 14    Medications:  Levothyroxine  Medications reviewed.    Labs:  Labs reviewed    Estimated Nutrition Needs:  Using ideal weight of 45.5 kg.    Energy Needs: 5197-7942 kcals daily per 25-30 kcal/kg   Protein Needs: 55-68 g daily, 1.2-1.5 g/kg.    Malnutrition: Not noted    Nutrition Risk Level: moderate " risk    Nutrition DX: Nutrition knowledge deficit r/t new DX evidenced by surgery  New-Swallow difficulty r/t intubation evidenced by NPO, need for nutrition support    Goals:  Tolerate TF at goal  Participate in diet ed-on hold    Education needs:  Cardiac, diabetic diet-on hold    Plan:  TF infusing for pt's nutrition needs.      Monitor:   TF tolerance, labs, wt, hydration    See Care Plan for Problems, Goals, and Interventions.

## 2021-05-28 NOTE — PROGRESS NOTES
"  Clinical Nutrition Therapy Assessment Note      Reason for Assessment:   Kody Johns is a 69 y.o. male assessed by the RD for consult and protocol/pathway order.    Subjective:  Pt s/p MVR, AVR.  Pt still intubated.  Met pt's son.  He reports pt eating well before admission.  Chart reviewed.    Nutrition History:  Information from family/caregiver and chart.  Diet prior to admission:  General.  Pt eats mae with meat and/or veggies and rice.  Recent food/fluid intake: good.   Cultural/Christian food needs or preferences: [facility]  Food allergies or intolerances: nkfa    Nutrition Prescription:   Diet: NPO.    IV dextrose or Fluids:  dexmedetomidine 400 mcg/100 mL in NS (PRECEDEX) (4mcg/mL) Last Rate: Stopped (04/25/19 1336)   DOPamine    epinephrine Last Rate: 0.07 mcg/kg/min (04/25/19 1230)   insulin infusion (1 unit/mL) Last Rate: Stopped (04/24/19 1906)   niCARdipine Last Rate: 1 mg/hr (04/25/19 1231)   norepinephrine IV infusion in D5W    sodium chloride 0.9% Last Rate: 50 mL/hr (04/24/19 1636)   sodium chloride 0.9% Last Rate: 25 mL/hr (04/24/19 1636)   vasopressin        Current Nutrition Intake:  NPO day 2    Anthropometrics:  Height: 4' 10\" (147.3 cm)  Admission weight: 105#  Weight: 113 lb 8.6 oz (51.5 kg)  BMI (Calculated): 22.1  BMI indication: 18.5-24.9 normal weight  Ideal body weight 100#+-10%  Weight History:  108# 4/15/19    Physical Findings:  Not found     GI Status/Output:   GI symptoms per nursing:   Last BM recorded: n/a    Skin/Wound:   Gerardo Scale Score: 14    Medications:  levothyroxine  Medications reviewed.    Labs:  Lab Results   Component Value Date    HGBA1C 6.7 (H) 04/23/2019   fsbg 126, 149  Labs reviewed    Estimated Nutrition Needs:  Using ideal weight of 45.5 kg.    Energy Needs: 0940-9004 kcals daily per 25-30 kcal/kg   Protein Needs: 55-68 g daily, 1.2-1.5 g/kg.    Malnutrition: Not noted    Nutrition Risk Level: low risk    Nutrition DX: Nutrition knowledge deficit r/t new DX " evidenced by surgery    Goals:  Participate in diet ed    Education needs:  Cardiac, diabetic diet    Plan:  Pt may be new to DM as no hx found.  Will need diabetic teaching.  Consider diabetes educator consult when appropriate.      Monitor:   Diet advance, education readiness    See Care Plan for Problems, Goals, and Interventions.

## 2021-05-28 NOTE — PROGRESS NOTES
CVTS Daily Progress Note   5/1/2019   POD # 7 s/p AVR/MVR  DOS 4/25/19- Dr. Vaughn    LOS: 7 days   EF 45%   A1C 6.7 %      Overnight events:  Agitation with increased HR and RR noted,   He is getting dilaudid and oxycodone for respiratory over breathing on vent and generalize agitation.      Maintaining oxygen saturations on Vent.   Normotensive with permissive HTN using thomas gtt   Pain controlled: dilaudid/oxycodone  + BM/+BS- .   Tolerating diet: tube feedings   .   Hgb 9.6 s/p- one unit PRBC 4/30       PLAN  Continue diuresis  Family conference attempting to be rescheduled - today at 3 pm   asked for 3-5 pm  Neurology aware     BMP in am    OBJECTIVE:    Temp:  [99.2  F (37.3  C)-102.1  F (38.9  C)] 101.1  F (38.4  C)  Heart Rate:  [] 91  Resp:  [12-30] 14  BP: ()/() 111/84  FiO2 (%):  [35 %-50 %] 35 %  Wt Readings from Last 2 Encounters:   05/01/19 0530 120 lb 12.8 oz (54.8 kg)   04/30/19 0600 122 lb 8 oz (55.6 kg)   04/29/19 0400 116 lb 2.9 oz (52.7 kg)   04/28/19 0200 116 lb 6.5 oz (52.8 kg)   04/27/19 0500 117 lb 8.1 oz (53.3 kg)   04/26/19 0400 112 lb 14 oz (51.2 kg)   04/25/19 0500 113 lb 8.6 oz (51.5 kg)   04/24/19 2000 105 lb 13.1 oz (48 kg)   04/24/19 0628 105 lb 12.8 oz (48 kg)   04/23/19 0937 106 lb 11.2 oz (48.4 kg)       Current Medications:    Scheduled Meds:    acetaminophen  650 mg Oral Q6H    Or     acetaminophen  650 mg Oral Q6H    Or     acetaminophen  650 mg Rectal Q6H     atorvastatin  40 mg Enteral Tube DAILY     baclofen  10 mg Enteral Tube BID     cefTRIAXone (ROCEPHIN)  IV  1 g Intravenous DAILY     chlorhexidine  15 mL Topical Q12H     docusate sodium  100 mg Oral BID    Or     docusate sodium (COLACE) 50 mg/5 mL oral liquid  100 mg Enteral Tube BID     furosemide  40 mg Intravenous Q8H     insulin aspart (NovoLOG) injection   Subcutaneous Q4H FIXED TIMES     levothyroxine  88 mcg Enteral Tube Daily 0600     metoprolol tartrate  12.5 mg Enteral Tube BID      metoprolol tartrate  12.5 mg Enteral Tube 0330, 1300, 1700 - CV Metoprolol     pantoprazole  40 mg Intravenous Q24H    Or     omeprazole  20 mg Oral Q24H    Or     omeprazole  20 mg Enteral Tube Q24H     polyethylene glycol  17 g Enteral Tube DAILY     potassium chloride  20 mEq Enteral Tube BID     sodium chloride  10-30 mL Intravenous Q8H FIXED TIMES     sodium chloride  3 mL Intravenous Line Care     Continuous Infusions:    amiodarone 900 mg/500 ml standard 24 hour infusion 0.5 mg/min (04/30/19 1224)     dexmedetomidine 400 mcg/100 mL in NS (PRECEDEX) (4mcg/mL) 1.5 mcg/kg/hr (05/01/19 1202)     DOPamine       epinephrine Stopped (04/27/19 0655)     insulin infusion (1 unit/mL) Stopped (04/24/19 1906)     niCARdipine Stopped (04/26/19 0701)     norepinephrine IV infusion in D5W       phenylephrine IV infusion in NS 80 mcg/min (05/01/19 1136)     vasopressin       PRN Meds:.acetaminophen, acetaminophen, albumin human, aluminum-magnesium hydroxide-simethicone, bisacodyl, bisacodyl, dexmedetomidine 400 mcg/100 mL in NS (PRECEDEX) (4mcg/mL), dextrose 50 % (D50W), DOPamine, epinephrine, glucagon (human recombinant), HYDROmorphone, lipase-protease-amylase **AND** sodium bicarbonate, magnesium hydroxide, naloxone **OR** naloxone, niCARdipine, norepinephrine IV infusion in D5W, ondansetron, oxyCODONE intensol (ROXICODONE) conc solution 20 mg/mL **OR** oxyCODONE, sodium chloride, sodium chloride bacteriostatic, sodium chloride, sodium chloride, sodium chloride, sodium phosphates 133 mL, traZODone, vasopressin    Cardiographics:    Telemetry monitoring demonstrates Atrial fib with rates in the   per my personal review.    Imaging:    Mr Brain Without Contrast    Result Date: 4/30/2019  EXAM: MR BRAIN WO CONTRAST LOCATION: [facility] DATE/TIME: 4/30/2019 9:33 PM INDICATION: Stroke. COMPARISON: CT head 04/29/2019, brain MRI from 08/30/2017. TECHNIQUE: Routine multiplanar multisequence head MRI without  intravenous contrast. FINDINGS: Mildly limited by motion. INTRACRANIAL CONTENTS: Large zone of evolving subacute ischemia in the right PCA distribution including the right medial occipital lobe lateral thalamus and majority of the right hippocampus. Minimal presumed petechial hemorrhage along the lateral portion of the right occipital infarct. Moderate associated vasogenic edema with sulcal effacement and localized mass effect. There is moderate effacement of the right atrium, temporal and occipital horns lateral ventricle without definite hydrocephalus.  No space-occupying hemorrhage. Minimal diffusion hyperintensity in the cortex of the left occipital lobe axial diffusion image 9 is favored to be artifactual. There are scattered patchy acute to early subacute infarcts in both frontal lobes more prominently in the left posterior frontal cortex. Small acute to early subacute infarct in the right caudate body. Mild age-related change. Normal position of the cerebellar tonsils. SELLA: No significant abnormality accounting for technique. OSSEOUS STRUCTURES/SOFT TISSUES: No aggressive osseous lesion involving the calvarium, skull base, or visualized upper cervical spine. The major intracranial vascular flow voids are maintained. ORBITS: No significant abnormality accounting for technique. SINUSES/MASTOIDS: Mild mucosal thickening ethmoid air cells and small left sphenoid sinus fluid level. No significant middle ear or mastoid effusion.     CONCLUSION: 1.  Limited by motion. Large evolving subacute right PCA distribution infarcts with moderate cytotoxic edema, sulcal effacement and mass effect on the right lateral ventricle. No hydrocephalus. 2.  Minimal, punctate petechial hemorrhage in the right occipital infarct bed. 3.  Patchy areas of acute to early subacute ischemia in the posterior left frontal cortex and minimally within the right anterior frontal cortex and right caudate body. NOTE: ABNORMAL REPORT THE DICTATION  ABOVE DESCRIBES AN ABNORMALITY FOR WHICH FOLLOW-UP IS NEEDED.       Lab Results (most recent, reviewed):    Hemoglobin (g/dL)   Date Value   05/01/2019 9.6 (L)     WBC (thou/uL)   Date Value   05/01/2019 7.3     Potassium (mmol/L)   Date Value   05/01/2019 3.6     Creatinine (mg/dL)   Date Value   05/01/2019 1.22     Hemoglobin A1c (%)   Date Value   04/23/2019 6.7 (H)     Magnesium (mg/dL)   Date Value   05/01/2019 2.6     Calcium (mg/dL)   Date Value   05/01/2019 7.7 (L)       120 lb 12.8 oz (54.8 kg)  Admit weight  47.9 kg    UOP: 3.9L/ 24 hours    Physical Exam:    General: Patient seen in bed.  Neuro: unresponsive, just medicated   -Precedex 1.5 mcg   Intubated, on vent 35%  CV: Atrial fib rates 80 - 90's   Pulm: Non-labored effort on vent. With periods of agitation.   sternal  Incision  C/D/I.  Abd: slightly less firm, BS+   : Guzman with donta urine   Ext: Mild pedal edema, SCDs in place, warm  Neuro: Unresponsive to firm touch and voice.       ASSESSMENT/PLAN:    Kody Johns is a 69y.o. [place]  male with rheumatic heart valve dz(AV regurgitation, , MV stenosis aand TV regurgitation) afib, and pericardial adhesions who was admitted 4/24/19 for AVR, MVR tissue valves, and takedown of pericardial adhesions by Dr Vaughn      Principal Problem:    S/P MVR (mitral valve repair)  Active Problems:    Rheumatic heart disease    Mitral valve stenosis, unspecified etiology    Aortic valve disease, rheumatic    A-fib (H)    ARF (acute respiratory failure) (H)    Anemia due to blood loss, acute    Coagulopathy (H)    Non-English speaking patient    Thrombocytopenia (H)    Sepsis, due to unspecified organism (H)    Atrial fibrillation with RVR (H)    Hypernatremia      NEURO:   - Scheduled Tylenol and PRN oxycodone/dilaudid for pain  - Melatonin QHS    CV:   -Amiiodarone gtt  - Will hold on transition to oral   - Currently on Chivo for B/P - permissive HTN SBP goal of 120   - ASA 81mg  - Atorvastatin 40mg daily  - Hold  BB while on Chivo for B/P    PULM:   - Vent weaning per pul critical care  - Maintaining oxygen saturations on Vent 35%    FEN/GI:  - +BM 4/29   - Tube feedings at goal 50 cc/hr  - free water 250   - Abd softer and less distended, BS+   - Continue electrolyte replacement protocol  - Bowel regimen    RENAL:  - Adequate UOP/hr. Continue to monitor closely via monzon.   - Cr 1.28 ( baseline 0.92)  - Monzon to remain in for close monitoring of I/O and during period of diuresis/relative immobility.  - Diuresis with 40mg IV Lasix three times a day.     - Klor 20 two times a day      HEME:  - Acute blood loss anemia post-op.   - hgb 9.6  - one pRBC 4/30 for hgb 8.0   - Thrombocytopenia- platelets remain very low   - Negative HIT  - NELLIE- negative     ID:  - Mee op ppx complete  - Rocephin - for 5 days, ID now signed off.  - Continues to have periods of fevers.    ENDO:   -  SSI q 4 hours     PPx:   - DVT: SCDs,   - heparin being held d/t low platelets   - GI: Omeprazole   - Ambulating with therapy : on hold.    DISPO:   - Continue critical care in ICU  Patient seen and discussed with Dr. Delaney/Johana

## 2021-05-28 NOTE — PROGRESS NOTES
"Pharmacy Consult: Vancomycin Dosing    Pharmacist consulted to dose vancomycin for Kody Johns, a 69 y.o. male.    Ordering provider: DEMETRIUS HARRIS, Cardio-thoracic surgery    Indication for vancomycin therapy: post-valvular replacement fever    Goal Trough Range:  15-20 mcg/mL based on indication    Other current antimicrobials             piperacillin-tazobactam 3.375 g in NaCl 0.9 % 50 mL (MINI-BAG Plus) (ZOSYN)  Every 8 hours          piperacillin-tazobactam 3.375 g in NaCl 0.9 % 50 mL (MINI-BAG Plus) (ZOSYN)  Once             Subjective/Objective:    Patient was admitted for S/P MVR (mitral valve repair) on 4/24/2019    Height: 4' 11\" (1.499 m)    Actual Body Weight (ABW): 53.3 kg (117 lb 8.1 oz)    Patient must be at least 60 in tall to calculate ideal body weight    BMI: Body mass index is 23.73 kg/m .    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     Dysphagia     Hyperlipidemia     Silent Stroke     Shoulder strain     Elevated LFTs     Dysuria     Hypothyroidism, unspecified type     Mitral valve stenosis, unspecified etiology     Aortic valve disease, rheumatic     Atherosclerosis of native coronary artery of native heart without angina pectoris     Essential hypertension with goal blood pressure less than 130/85     Pure hypercholesterolemia     Lightheadedness     Moderate malnutrition (H)     ALEENA (acute kidney injury) (H)     A-fib (H)     Heart failure with preserved ejection fraction (H)     S/P MVR (mitral valve repair)     ARF (acute respiratory failure) (H)     Anemia due to blood loss, acute     Coagulopathy (H)     Non-English speaking patient    Past Medical History:   Diagnosis Date     ALEENA (acute kidney injury) (H)      Anemia due to blood loss, acute 4/24/2019     Asthma      Atrial fibrillation (H)      Blurry vision      CHF " (congestive heart failure) (H)      Chronic kidney disease      Coronary artery disease      Dysphagia     resolved     Dysuria      Hearing loss      Heart murmur      Heart murmur      Hyperlipidemia      Hypertension      Hypothyroidism      Mitral valve stenosis      Moderate malnutrition (H)      Palpitations      Rheumatic heart disease      Shortness of breath     exertion     Stroke (H)     Silent     Valvular disease         Temp Readings from Current Encounter:     04/27/19 0500 04/27/19 0600 04/27/19 0730   Temp: (!) 101.5  F (38.6  C) (!) 101.7  F (38.7  C) (!) 101.5  F (38.6  C)     Net Intake/Output (last 24 hours):  I/O last 3 completed shifts:  In: 2225 [I.V.:2046; NG/GT:179]  Out: 1127 [Urine:677; Chest Tube:450]    Recent Labs     04/24/19  1157 04/24/19  1328 04/25/19  0458 04/27/19  0815   WBC 20.1* 11.8* 14.6* 4.0   NEUTROABS  --  10.4* 12.2*  --    PROCAL  --   --   --  30.08*   BUN  --  15 17 43*   CREATININE  --  0.80 0.92 1.06     Estimated Creatinine Clearance: 50.3 mL/min (by C-G formula based on SCr of 1.06 mg/dL).    No results for input(s): CULTURE in the last 72 hours.    No results found for any visits on 04/24/19.    No results for input(s): VANCOMYCIN in the last 168 hours.    Vancomycin administrations: (last 120 hours)     Date/Time Action Medication Dose Rate    04/24/19 1841 New Bag    vancomycin 1,000 mg in sodium chloride 0.9% 250 mL (VANCOCIN) 1,000 mg 135 mL/hr    04/24/19 0704 New Bag    vancomycin 1,000 mg in NaCl 0.9 % 100 mL (MINI-BAG Plus) (VANCOCIN) 1 g           Assessment/Plan:    Pharmacist consulted to dose vancomycin for post-valvular replacement fever, goal trough range 15-20 mcg/mL.  1. Initiate vancomycin 1000 mg IV q24h (20 mg/kg actual body weight).  2. No vancomycin level available for assessment.  3. Pharmacist will plan to check a vancomycin trough level when appropriate.  4. Pharmacist will continue to follow.    Thank you for the consult.  Penny ELLSWORTH  Heena Collier 4/27/2019 10:26 AM

## 2021-05-28 NOTE — PLAN OF CARE
Care Plan  Care Management    Care Management Goals of the Day: Follow progression of care    Care Progression Reviewed With: Charge RN, MD, HUC, RNCM, CMSW    Barriers to Discharge: intubated, sedated    Discharge Disposition: TBD    Expected Discharge Date: TBD    Transportation: TBD    Care Coordination Narrative: Pt from home with family, Pt speaks Citizen of [place], son speaks English. Here for planned heart procedure, stroke occurred and pt has been critically ill since. Family CC is set for tomorrow, 5/3/19 at 130pm, see CC note for specifics. C to follow and assist.

## 2021-05-28 NOTE — PROGRESS NOTES
Critical Care Update:    Blood is now growing Gram positive cocci in clusters. Will add back Vancomycin until speciation and repeat blood cultures tomorrow.    Zoey Sequeira MD  Electronically signed on 5/5/2019 9:12 PM

## 2021-05-28 NOTE — PLAN OF CARE
Problem: Pain  Goal: Patient's pain/discomfort is manageable  Outcome: Progressing   PRN oxycodone  Problem: Safety  Goal: Patient will be injury free during hospitalization  Outcome: Progressing     Problem: Daily Care  Goal: Daily care needs are met  Outcome: Progressing     Problem: Psychosocial Needs  Goal: Collaborate with patient/family/caregiver to identify patient specific goals for this hospitalization  Outcome: Progressing   Family care conference this after noon to discuss goals of care and update family on MRI results , patient status  Problem: Knowledge Deficit  Goal: Patient/family/caregiver demonstrates understanding of disease process, treatment plan, medications, and discharge instructions  Outcome: Progressing     Problem: Potential for Compromised Skin Integrity  Goal: Nutritional status is improving  Outcome: Progressing     Problem: Risk of Injury Due to Unsafe Behavior  Goal: Patient will remain safe while in restraint; physical/psychological needs will be met  Outcome: Progressing

## 2021-05-28 NOTE — PROGRESS NOTES
"  Clinical Nutrition Therapy Nutrition Support Note      Reason:  RD managing TF.    Subjective:  Pt intubated.  Discussed pt in team rounds TF currently at 45 ml/hr advancing to goal.  Temp today of 104.3 degrees.  Diuresing.  Chart reviewed.    Nutrition History:  Information from family/caregiver and chart.  Diet prior to admission:  General.  Pt eats mae with meat and/or veggies and rice.  Recent food/fluid intake: good.   Cultural/Adventist food needs or preferences: Spanish  Food allergies or intolerances: nkfa  TF started 4/26.    Nutrition Prescription:   Diet: Fibersource HN 45 ml/hr advancing 10 ml q 8 hrs to goal of 50 ml/hr x 21 hrs.  Water flush 140 ml q 6 hrs    IV dextrose or Fluids:    dexmedetomidine 400 mcg/100 mL in NS (PRECEDEX) (4mcg/mL) Last Rate: 1.2 mcg/kg/hr (04/28/19 0858)   DOPamine    epinephrine Last Rate: Stopped (04/27/19 0655)   insulin infusion (1 unit/mL) Last Rate: Stopped (04/24/19 1906)   niCARdipine Last Rate: Stopped (04/26/19 0701)   norepinephrine IV infusion in D5W    sodium chloride 0.9% Last Rate: 50 mL/hr (04/28/19 0400)   sodium chloride 0.9% Last Rate: 25 mL/hr (04/28/19 0400)   vasopressin        Current Nutrition Intake:  OG placed 4/26.  X-ray indicates gastric.  TF at goal to provide:  1260 calories, 57 g protein, 172 g carb, 16 g fiber, 1408 ml free water.  Additional fluids given with meds.  IV fluids    Above meets estimated nutrition needs    Anthropometrics:  Height: 4' 11\" (149.9 cm)  Admission weight: 105#  Weight: 116 lb 6.5 oz (52.8 kg)  I/Os indicate 5 L gain.    Physical Findings:  Edema noted.    GI Status/Output:   GI symptoms per nursing:   Last BM n/a  Urine output increased yesterday with diuresis    Skin/Wound:   Gerardo Scale Score: 14    Medications:  Levothyroxine  Medications reviewed.    Labs:  Labs reviewed    Estimated Nutrition Needs:  Using ideal weight of 45.5 kg.    Energy Needs: 5087-8941 kcals daily per 25-30 kcal/kg   Protein Needs: " 55-68 g daily, 1.2-1.5 g/kg.    Malnutrition: Not noted    Nutrition Risk Level: moderate risk    Nutrition DX: Nutrition knowledge deficit r/t new DX evidenced by surgery  New-Swallow difficulty r/t intubation evidenced by NPO, need for nutrition support    Goals:  Participate in diet ed-on hold  New-Tolerate TF at goal    Education needs:  Cardiac, diabetic diet-on hold    Plan:  TF advancing to goal.  Pt with hypernatremia, fever, diuresing.  Per rounds discussion will increase water flush to 250 ml q 6 hrs.  This increases total free water to 1848 mls/day from TF and water flushes.    Monitor:   TF tolerance, labs, wt, hydration    See Care Plan for Problems, Goals, and Interventions.

## 2021-05-28 NOTE — PLAN OF CARE
Care Plan  Care Management      Care Management Goals of the Day: Care Progression    Care Progression Reviewed With: Charge RN, MD, HUC, CMSW    Barriers to Discharge: Full Vent Support, Agitation, Cultures Pending, Fevers    Discharge Disposition: TBD    Expected Discharge Date: 5/3/19    Transportation: D      Care Coordination Narrative: Patient continues to have no change in his neurological status. CV Surgery and Neurology to meet with the family again today at 1545.

## 2021-05-28 NOTE — PLAN OF CARE
Problem: Mechanical Ventilation  Goal: Patient will maintain patent airway  Outcome: Progressing     Problem: Mechanical Ventilation  Goal: Respiratory status - ventilation  Description  Movement of air in and out of the lungs.    Liberate from ventilator  5/2/2019 0546 by Xavier Ortega LRT  Outcome: Progressing  5/2/2019 0544 by Xavier Ortega LRT  Outcome: Progressing     Problem: Mechanical Ventilation  Goal: ET tube will be managed safely  Outcome: Progressing   HARINDER Nair

## 2021-05-28 NOTE — PROGRESS NOTES
Vent Mode: PCV/VG  FiO2 (%):  [24 %] 24 %  S RR:  [16] 16  S VT:  [300 mL] 300 mL  PEEP/CPAP (cm H2O):  [5 cm H2O] 5 cm H2O  Minute Ventilation (L/min):  [8.5 L/min-11.3 L/min] 10.6 L/min  PIP:  [15 cm H2O-23 cm H2O] 18 cm H2O  DC SUP:  [5 cm H20-10 cm H20] 10 cm H20  MAP (cm H2O):  [7-10] 7       Pt was on full vent support with the above setting throughout the night. Pt's BS were coarse/diminished bilaterally, pt has a #8 Shiley trach and was not weaned. Trach site has a small amount of drainage and oozing.Sxn small thin white secretion and pt did have gag reflux upon suction. Pt tolerated well. RT will continue to monitor.      ZEHRA RezaT.

## 2021-05-28 NOTE — PROGRESS NOTES
Family meeting note    This is a 69-year-old male with mitral and aortic valve replacement with a ischemic stroke.  Family meeting was held today to discuss prognosis and to decide on tracheostomy and PEG tube placement.    Initially the meeting was led by CV surgery.  The events that led up to the state were summarized.  Family was given chance to ask all their questions.    Prognosis from the stroke was then discussed.  MRI images were reviewed with the family.  Family was given a chance to answer questions regarding the stroke and prognosis.  Overall at this point it is hard to say how much of the deficits are related to the stroke and how much of them are related to other medical issues going on.  The left arm and leg weakness and some weakness on the right side cannot be explained by the stroke.  Further measures need to be looked into.  Discussed about disability from the stroke.  The stroke would not cause patient to die.    Discussed about the needs for tracheostomy and PEG tube placement.    Family at this point wants everything done.  No final decision was made regarding the tracheostomy and PEG tube placement.  The family would like some time to think about it.  Another family meeting will be scheduled later in the week.    Critical care team was also present during the meeting to discuss critical care issues.    There was a lot of waiting for the  and all the family members to arrive. The meeting kept on getting delayed again and again requiring the extra time.     Over 1 hours 45 min of non face to face time was spent coordinating & attending the family meeting to make further care management decisions.

## 2021-05-28 NOTE — PROGRESS NOTES
"Per previous RN, sedatives were turned down on day shift at the request of cardiology. Pt was quite anxious and restless at beginning of my shift. Shaking head back and forth, moving RUE, fighting ETT and vent. Pt's brother states he was able to read the patient's lips and that he felt \"short of breath.\"  Put back on previous fentanyl dose and increased precedex. Administered scheduled lasix dose. Pt appears much more comfortable now. Will continue to monitor.   "

## 2021-05-28 NOTE — PROGRESS NOTES
Respiratory Care Note    Vent Mode: VCV  FiO2 (%):  [50 %] 50 %  S RR:  [16] 16  S VT:  [350 mL] 350 mL  PEEP/CPAP (cm H2O):  [8 cm H2O] 8 cm H2O  Minute Ventilation (L/min):  [9 L/min-13.6 L/min] 13.2 L/min  PIP:  [17 cm H2O-39 cm H2O] 17 cm H2O  MAP (cm H2O):  [9-12] 9    Pt remains on full vent support on the above settings and tolerating well. PLAT 19, sats 93%. No ABGs drawn this shift. Suctioned scant amount of tan/yellow, thick inline. No PS due to pts condition. No vent changes made this shift. Pt continues to breathe 30-35. Will continue with mechanical ventilation and wean PEEP and fiO2 as tolerated. Will follow up with ABG tomorrow morning. RT will continue to follow.     HARINDER Santos

## 2021-05-28 NOTE — PROGRESS NOTES
PROVIDER RESTRAINT FOR NON-VIOLENT BEHAVIOR FACE TO FACE EVALUATION    Patient's Immediate Situation:  Patient demonstrated the following behaviors: Pulling/tugging at invasive lines or tubes and does not respond to verbal/non-verbal redirection    Patient's Reaction to the intervention:  Does patient understand the reason for restraint/seclusion? unable    Medical Condition:  Is there any evidence of compromise of Skin integrity, Respiratory, Cardiovascular, Musculoskeletal, Hydration? No    Behavioral Condition:  In consultation with the RN, is there a need to continue this restraint or seclusion? Yes    See Restraint Flowsheet for complete restraint documentation and assessment.    Sandy Manzo, CNP

## 2021-05-28 NOTE — PROGRESS NOTES
RSBI 105-Changed ventilator settings back to PCV VG RR 16, Vt 300, PEEP 5, FiO2 24 per RN request. Patient sitting up in chair and appears to be comfortable. Patient on weaning trial 94 minutes.       ZEHRA OconnorT     RESPIRATORY CARE NOTE      Vent Mode: CPAP/PSV  FiO2 (%):  [24 %] 24 %  S RR:  [16] 16  S VT:  [300 mL] 300 mL  PEEP/CPAP (cm H2O):  [5 cm H2O] 5 cm H2O  Minute Ventilation (L/min):  [8.5 L/min-11.3 L/min] 10.3 L/min  PIP:  [15 cm H2O-23 cm H2O] 23 cm H2O  IA SUP:  [15 cm H20] 15 cm H20  MAP (cm H2O):  [7-10] 9    Weaned for 94 min, changed back to full support at 1000, due to RN request      Plan is to monitor vent, wean as tolerated. .     Haja Yuen, ZEHRAT

## 2021-05-28 NOTE — TELEPHONE ENCOUNTER
ANTICOAGULATION  MANAGEMENT: Discharge Continuity of Care Review    Hospital admission on  5/13, transferred from [facility] back to ICU for acute CVA with hemorrhagic conversion.    Discharge disposition: back to [facility]    INR Results:       Recent labs: (last 7 days)     05/11/19  0621 05/12/19  0644 05/13/19  0623 05/13/19  1200 05/14/19  0444 05/15/19  0433 05/16/19  0300 05/17/19  0320   INR 1.25* 1.38* 1.85* 2.05* 1.64* 1.63* 1.83* 1.99*       Warfarin inpatient management: Warfarin held and reversed with FFP and protamine    Warfarin discharge instructions: will be held per neuro for at least 3 weeks after initial hemorrhagic stroke if imaging is stable     Medication Changes Affecting Anticoagulation: N/A as patient is transferring to [facility]    Additional Factors Affecting Anticoagulation: No    Plan     ACM will resume monitoring upon discharge from [facility] if patient resumes warfarin anticoagulation    Anticoagulation calendar updated    Jessica Garza RN

## 2021-05-28 NOTE — PROGRESS NOTES
RESPIRATORY CARE NOTE   Gates WARD Beltranrandy  Pt continue to be on full ventlator support all this evening. BS coarse crackles bilaterally. RT will overview mechanical ventilation goals, care of the artificial airway, need of suctioning, plan for progressing towards liberation from ventilator, readiness for extubation, and the post ventilator supportive interventions to for adequate  oxygenation, ventilation and airway clearance.  Vent Mode: PCV/VG  FiO2 (%):  [40 %] 40 %  S RR:  [20] 20  S VT:  [300 mL-325 mL] 325 mL  PEEP/CPAP (cm H2O):  [5 cm H2O-7 cm H2O] 5 cm H2O  Minute Ventilation (L/min):  [8.4 L/min-11.3 L/min] 9.5 L/min  PIP:  [14 cm H2O-50 cm H2O] 14 cm H2O  NV SUP:  [5 cm H20] 5 cm H20  MAP (cm H2O):  [6-14] 6    Said I Duale

## 2021-05-28 NOTE — PROGRESS NOTES
CVTS Daily Progress Note   5/2/2019   POD # 8 s/p AVR/MVR  DOS 4/25/19- Dr. Vaughn    LOS: 8 days   EF 45%   A1C 6.7 %      Overnight events:  Agitation with increased HR and RR noted.  Increased HR pain related?  More sustained HR >100 with over breathing on vent.  Continues to have low grade fevers which are thought to be neurogenic s/p CVA.   Cooling blanket on intermittently.    NELLIE  Negative  HIT neg  Continues to have low platelets    Abd distention increased this am     Normotensive with permissive HTN using thomas gtt   Pain controlled: dilaudid/oxycodone  + BM/+BS- .   Tolerating diet: tube feedings   .   Hgb 9.6 s/p- one unit PRBC 4/30       PLAN  ABD xray  Stop tube feedings  Cardiology consult for rhythm management  Continue diuresis  Continue to hold heparin with low platelets  Titrate thomas for permissive HTN -130.      Family conference tentatively planned for Friday afternoon. Review plan for Trach and peg next Monday if no progress with wean.     BMP, Plts  in am    OBJECTIVE:    Temp:  [99  F (37.2  C)-101  F (38.3  C)] 99  F (37.2  C)  Heart Rate:  [] 144  Resp:  [0-49] 25  BP: ()/() 115/63  FiO2 (%):  [35 %-40 %] 40 %  Wt Readings from Last 2 Encounters:   05/02/19 0600 121 lb 4.8 oz (55 kg)   05/01/19 0530 120 lb 12.8 oz (54.8 kg)   04/30/19 0600 122 lb 8 oz (55.6 kg)   04/29/19 0400 116 lb 2.9 oz (52.7 kg)   04/28/19 0200 116 lb 6.5 oz (52.8 kg)   04/27/19 0500 117 lb 8.1 oz (53.3 kg)   04/26/19 0400 112 lb 14 oz (51.2 kg)   04/25/19 0500 113 lb 8.6 oz (51.5 kg)   04/24/19 2000 105 lb 13.1 oz (48 kg)   04/24/19 0628 105 lb 12.8 oz (48 kg)   04/23/19 0937 106 lb 11.2 oz (48.4 kg)       Current Medications:    Scheduled Meds:    acetaminophen  650 mg Oral Q6H    Or     acetaminophen  650 mg Oral Q6H    Or     acetaminophen  650 mg Rectal Q6H     atorvastatin  40 mg Enteral Tube DAILY     baclofen  10 mg Enteral Tube BID     cefTRIAXone (ROCEPHIN)  IV  1 g Intravenous DAILY      chlorhexidine  15 mL Topical Q12H     docusate sodium  100 mg Oral BID    Or     docusate sodium (COLACE) 50 mg/5 mL oral liquid  100 mg Enteral Tube BID     furosemide  40 mg Intravenous Q8H     insulin aspart (NovoLOG) injection   Subcutaneous Q4H FIXED TIMES     levothyroxine  88 mcg Enteral Tube Daily 0600     metoprolol tartrate  12.5 mg Enteral Tube BID     pantoprazole  40 mg Intravenous Q24H    Or     omeprazole  20 mg Oral Q24H    Or     omeprazole  20 mg Enteral Tube Q24H     polyethylene glycol  17 g Enteral Tube DAILY     potassium chloride  10 mEq Enteral Tube Daily with supper     sodium chloride  10-30 mL Intravenous Q8H FIXED TIMES     sodium chloride  3 mL Intravenous Line Care     Continuous Infusions:    amiodarone 900 mg/500 ml standard 24 hour infusion 0.5 mg/min (05/01/19 1818)     dexmedetomidine 400 mcg/100 mL in NS (PRECEDEX) (4mcg/mL) 1.5 mcg/kg/hr (05/02/19 0651)     DOPamine       epinephrine Stopped (04/27/19 0655)     fentaNYL 2500 mcg/250 mL in NS (10 mcg/mL)       insulin infusion (1 unit/mL) Stopped (04/24/19 1906)     niCARdipine Stopped (04/26/19 0701)     norepinephrine IV infusion in D5W       phenylephrine IV infusion in NS 20 mcg/min (05/02/19 0915)     vasopressin       PRN Meds:.acetaminophen, acetaminophen, albumin human, aluminum-magnesium hydroxide-simethicone, bisacodyl, bisacodyl, dexmedetomidine 400 mcg/100 mL in NS (PRECEDEX) (4mcg/mL), dextrose 50 % (D50W), DOPamine, epinephrine, glucagon (human recombinant), lipase-protease-amylase **AND** sodium bicarbonate, magnesium hydroxide, metoprolol tartrate, naloxone **OR** naloxone, niCARdipine, norepinephrine IV infusion in D5W, ondansetron, oxyCODONE intensol (ROXICODONE) conc solution 20 mg/mL **OR** oxyCODONE, sodium chloride, sodium chloride bacteriostatic, sodium chloride, sodium chloride, sodium chloride, sodium phosphates 133 mL, vasopressin    Cardiographics:    Telemetry monitoring demonstrates Atrial fib with  rates in the   per my personal review.    Imaging:    Xr Chest 1 View Portable    Result Date: 5/2/2019  EXAM: XR CHEST 1 VIEW PORTABLE LOCATION: [facility] DATE/TIME: 5/2/2019 9:33 AM INDICATION: on vent COMPARISON: 4/29/2019 FINDINGS: The Dubberly-Nick catheter and right chest drain removed. No change in remaining tubes and lines. Sternal wires. Heart valve prostheses. Enlarged cardiac silhouette which could be due to multichamber enlargement, or pericardial effusion. No pulmonary vascular congestion or significant pleural effusion. Increasing left retrocardiac opacity which could represent atelectasis or pneumonia. No significant right lung infiltrate seen.    Xr Abdomen Ap Portable    Result Date: 5/2/2019  EXAM: XR ABDOMEN AP PORTABLE LOCATION: [facility] DATE/TIME: 5/2/2019 9:33 AM INDICATION: Abdominal distention. COMPARISON: Abdominal radiograph, 04/26/2019. Chest radiograph, 05/02/2019 at 0923 hours. FINDINGS: NG tube tip and side-port are present within the region of the stomach. There is diffuse air-filled distention of the small bowel, increased from prior study. Findings reflect either obstruction or ileus. Upright view of the chest performed at same time does not demonstrate any evidence of free air under the diaphragm.     Mr Cervical Spine Without Contrast    Result Date: 5/1/2019  EXAM: MR CERVICAL SPINE WO CONTRAST LOCATION: [facility] DATE/TIME: 5/1/2019 8:42 PM INDICATION: Evaluate for arm weakness, quadriplegia. COMPARISON: None. TECHNIQUE: Without IV contrast. FINDINGS: Examination is markedly limited by patient motion. There is no definite significant canal narrowing. Evaluation of the foramina is severely limited though there is likely significant foraminal narrowing bilaterally at C3-C4, C4-C5, and C6-C7. Consider repeat evaluation or CT. No obvious cord signal abnormality. Mild chronic height loss. No marrow edema. Right PCA infarcts partially seen.      CONCLUSION: 1.  Markedly limited by patient motion. No definite canal narrowing or gross cord signal abnormality 2.  Evaluation of the foramina is limited though there is likely significant foraminal narrowing at a few levels. 3.  No marrow edema or acute injury.       Lab Results (most recent, reviewed):    Hemoglobin (g/dL)   Date Value   05/01/2019 9.6 (L)     WBC (thou/uL)   Date Value   05/01/2019 7.3     Potassium (mmol/L)   Date Value   05/02/2019 4.5     Creatinine (mg/dL)   Date Value   05/02/2019 1.33 (H)     Hemoglobin A1c (%)   Date Value   04/23/2019 6.7 (H)     Magnesium (mg/dL)   Date Value   05/02/2019 2.6     Calcium (mg/dL)   Date Value   05/02/2019 8.0 (L)       121 lb 4.8 oz (55 kg)  Admit weight  47.9 kg  UOP: 3.9L/ 24 hours    Physical Exam:    General: Patient seen in bed.  Neuro: unresponsive, but   Intubated, on vent 40%  CV: Atrial fib RVR rates 100- 130's  Pulm: non labored effort on vent.   With periods of agitation and increased RR   Sternal  Incision  C/D/I.  Abd:increased distention and firmness today  : Guzman with donta urine   Ext: Mild pedal edema, SCDs in place, warm  Neuro: Unresponsive to firm touch and voice.       ASSESSMENT/PLAN:    Kody Johns is a 69y.o. [place]  male with rheumatic heart valve dz(AV regurgitation, , MV stenosis aand TV regurgitation) afib, and pericardial adhesions who was admitted 4/24/19 for AVR, MVR tissue valves, and takedown of pericardial adhesions by Dr Vaughn      Principal Problem:    S/P MVR (mitral valve repair)  Active Problems:    Rheumatic heart disease    Mitral valve stenosis, unspecified etiology    Aortic valve disease, rheumatic    A-fib (H)    ARF (acute respiratory failure) (H)    Anemia due to blood loss, acute    Coagulopathy (H)    Non-English speaking patient    Thrombocytopenia (H)    Sepsis, due to unspecified organism (H)    Atrial fibrillation with RVR (H)    Hypernatremia    Paralytic ileus (H)    Small bowel  obstruction (H)      NEURO:   - Scheduled Tylenol and PRN oxycodone for pain  - Adding Fentanyl gtt for steady pain management   - possible discomfort increase with ileus and abd distention.    - Melatonin QHS    CV:   -Amiiodarone gtt  - Will hold on transition to oral with ileus   - Currently on Chivo for B/P - permissive HTN SBP goal of 120   - ASA 81mg  - Atorvastatin 40mg daily  - cardiology consulted for rhythm management.   - IV diltiazem for HR this am - minimal response,     - Metoprolol 12.5 mg two times a day     PULM:   - Vent weaning per pul critical care  - Maintaining oxygen saturations on Vent 35%    FEN/GI:  - +BM 4/30, smear BM yesterday   - Tube feedings now on hold  - free water 250   - Abd more firm and distended today  - ABD xray - ileus   -  + BS noted in all four quadrants  - Continue electrolyte replacement protocol  - Bowel regimen    RENAL:  - Adequate UOP/hr. Continue to monitor closely via monzon.   - Cr 1.33 ( baseline 0.92)  - Monzon to remain in for close monitoring of I/O and during period of diuresis/relative immobility.  - Diuresis with 40mg IV Lasix three times a day.     - Klor 10 mEQ daily       HEME:  - Acute blood loss anemia post-op.   - hgb 9.6  - one pRBC 4/30 for hgb 8.0   - Thrombocytopenia- platelets remain very low - 42   - holding sub q heparin   - Negative HIT  - NELLIE- negative     ID:  - Mee op ppx complete  - Rocephin - for 5 days, ID now signed off.  - Continues to have periods of fevers.    ENDO:   -  SSI q 4 hours     PPx:   - DVT: SCDs,   - heparin being held d/t low platelets   - GI: Omeprazole   - Ambulating with therapy : on hold.    DISPO:   - Continue critical care in ICU  Patient seen and discussed with Dr. Meléndez

## 2021-05-28 NOTE — INTERVAL H&P NOTE
I have performed an assessment and examined the patient, as necessary, to update the patient's current status that may have changed since the prior History and Physical.    There has been no interval change in history or physical examination.  The patient is ready to undergo aortic valve replacement, mitral valve replacement and placement of an  AtriClip on the left atrial appendage.  Informed consent has been obtained.

## 2021-05-28 NOTE — TELEPHONE ENCOUNTER
ANTICOAGULATION  MANAGEMENT: Discharge Continuity of Care Review    patient transferred from [place] back to ICU on  5/13 for acute CVA with hemorrhagic conversion.    Discharge disposition: from [place] back to ICU    INR Results:       Recent labs: (last 7 days)     05/09/19  0601 05/09/19  1500 05/10/19  0500 05/11/19  0621 05/12/19  0644 05/13/19  0623 05/13/19  1200   INR 1.26* 1.25* 1.39* 1.25* 1.38* 1.85* 2.05*       Warfarin inpatient management: Held and reversed with Vit K/FFP     Warfarin discharge instructions: N/A       Plan     No adjustment to anticoagulation plan needed    ACM will resume monitoring upon discharge from TCU/hospital          Jessica Garza RN

## 2021-05-28 NOTE — PROGRESS NOTES
Critical Care Progress Note     Admit Date: 4/24/2019  ICU Day: 12     Code Status: full code    CC: rheumatic valvular disease    HPI: 69 y.o. [place]  male with rheumatic heart valve dz(AV regurgitation, , MV stenosis aand TV regurgitation) afib, and pericardial adhesions who was admitted 4/24/19 for AVR, MVR tissue valves, and takedown of pericardial adhesions by Dr Vaughn     Case summary: Pt was an easy intubation, received 15 mg methadone preop, Echo showed boggy RV.  PAPs in the 30s preop, pt had hard time coming off extracorporeal circulation, needing many bumps of epi, flolan was started.  Also they took down pericardial adhesions.  To ICU on full vent support, flolan full strength, not paced,  On epi, insulin and precedex drips.  Protamine was given on arrival to ICU.    Interval events:  4/24/19: to OR for AVR/MVR    To ICU on Flolan  4/25/19: Flolan weaned off   When off sedation for SBT, pt noted to not move left side and    had left eye with upward deviation   Stroke code called    CTA shows right P2 occlusion   Neurology consulted   SBP parameters changed to     Failed SBT  4/26/19: fever    Failed SBT   repeat head CT  revealed evolving acute/subacute infarction  4/27/19: fever T max 105   ID consulted   HIT panel sent due to persistent thrombocytopenia   4/29/19: some labored breathing with vent, vent changes made at bed side    Does better with flow rate at 50   Seems neurogenic in origin     Heme consult for ? of HIT   Dr Vaughn met with son with    4/30/19: unable to do SBT   MRI completed-Large evolving subacute right PCA distribution infarcts with moderate cytotoxic edema, sulcal  effacement and mass effect on the right lateral ventricle  5/01/19: ID signed off-rec ceftriaxone for 5 days for klebsiella in sputum    Afib with RVR   Family conference-Family/Dr Vaughn/Neurology/myself  Up[dates given discussed possbile need for trach/peg   5/02/19: cards consulted, afib RVR  "continues   New ileus   OG to LIS   Dr Kang consulted for possible trach/peg-for next week  5/03/19: BATSHEVA and cardioversion defered per CV surgery, HR better on BB   abd more distended   OG to continuous sx for now  5/05/19: ileus improving, trophic feedings started       Major events over the last 24 hours:     Subjective: in bed, lightly sedated, not responding to me, over breathing the vent  ROS: unable to do    Drips: precedex, fentnayl      Ventilator: Mechanical Ventilation Day: # 13        PCV Settings: 16/insp pressure 12/5/30%    /79   Pulse (!) 123   Temp 98.8  F (37.1  C) (Axillary)   Resp 27   Ht 4' 11\" (1.499 m)   Wt 109 lb 14.4 oz (49.9 kg)   SpO2 100%   BMI 22.20 kg/m       I/O last 3 completed shifts:  In: 3040.3 [I.V.:2172.3; NG/GT:508; IV Piggyback:360]  Out: 3000 [Urine:2775; Emesis/NG output:125; Stool:100]  Weight change: 12.8 oz (0.363 kg)  Vent Mode: CPAP/PSV  FiO2 (%):  [30 %-35 %] 30 %  S RR:  [16-20] 16  S VT:  [300 mL] 300 mL  PEEP/CPAP (cm H2O):  [5 cm H2O] 5 cm H2O  Minute Ventilation (L/min):  [6.9 L/min-10.4 L/min] 6.9 L/min  PIP:  [20 cm H2O-24 cm H2O] 22 cm H2O  TN SUP:  [5 cm H20] 5 cm H20  MAP (cm H2O):  [7-12] 7      EXAM:   Mental status: in bed lightly sedated on vent    HEENT: NC + gag cough  Resp: cta   Cardiovascular: IRRR  Abdominal: less distended + bs   Extremeties: no e/c/c  Neurology: moving  right hand arm more    Labs Personally reviewed: yes  Results from last 7 days   Lab Units 05/06/19  0456 05/05/19  1415 05/05/19  0755   LN-WHITE BLOOD CELL COUNT thou/uL 11.4* 14.8* 17.0*   LN-HEMOGLOBIN g/dL 9.6* 10.6* 10.8*   LN-HEMATOCRIT % 29.3* 32.9* 33.1*   LN-PLATELET COUNT thou/uL 97* 99* 96*     Results from last 7 days   Lab Units 05/06/19  0456 05/05/19  1905 05/05/19  0755  05/04/19  1139 05/04/19  0544  05/03/19  0558  04/30/19  1338   LN-SODIUM mmol/L 147* 147* 149*   < > 150* 150*   < > 149*   < > 147*   LN-POTASSIUM mmol/L 3.7  --  3.7  --  3.8 3.6  " --  4.3   < >  --    LN-CHLORIDE mmol/L 116*  --  115*  --   --   --   --  116*   < >  --    LN-CO2 mmol/L 22  --  25  --   --   --   --  22   < >  --    LN-BLOOD UREA NITROGEN mg/dL 40*  --  46*  --  56*  --   --  56*   < >  --    LN-CREATININE mg/dL 0.97  --  1.07  --   --  1.37*  --  1.31*   < >  --    LN-CALCIUM mg/dL 8.0*  --  7.9*  --   --   --   --  7.7*   < >  --    LN-PROTEIN TOTAL g/dL 5.7*  --  5.8*  --   --   --   --   --   --  5.4*   LN-BILIRUBIN TOTAL mg/dL 2.5*  --  2.7*  --   --   --   --   --   --  1.6*   LN-ALKALINE PHOSPHATASE U/L 162*  --  114  --   --   --   --   --   --  68   LN-ALT (SGPT) U/L 160*  --  129*  --   --   --   --   --   --  52*   LN-AST (SGOT) U/L 189*  --  136*  --   --   --   --   --   --  121*    < > = values in this interval not displayed.     Results from last 7 days   Lab Units 05/05/19  1415 05/03/19  1059   LN-INR  1.38* 1.19*   LN-PARTIAL THROMBOPLASTIN TIME seconds 33  --        Last ABG 5/5/19:  PH 7.55  PCO2 30 PAO2 104 Bicarb 28     Microbiology: 1+ Klebsiella pneumoniae in sputum  Imaging (all imaging is personally reviewed):       Ct chest abd pelvis-1.  Small left pleural effusion. There is complete atelectasis of the left lower lobe with peripheral fluid-filled bronchi. Dependent atelectasis is also present involving the posterior right lower lobe to a lesser extent. Infection within these areas of   atelectasis is not excluded.  2.  Bilateral upper lobe nodular infiltrate. There is also a focal patchy area of ground-glass opacity within the right upper lobe. These findings may reflect additional areas of infectious process. Follow-up CT scan to ensure resolution of the upper   lobe ground-glass nodular opacity is recommended.  3.  No evidence of bowel obstruction. There is mild fluid and air-filled distention of the colon with some lesser degree of air and fluid filled distention of the small bowel. These findings suggest an ileus.  4.  Circumferential  wall thickening of the ascending colon, compatible with colitis.  abd xray 5/3/19-There are persistent small and large bowel loops not significantly changed from the prior study. Nasogastric tube with tip in the stomach. Guzman catheter. No free intraperitoneal air is seen. Findings are consistent with ileus or distal bowel obstruction.     abd xray 5/2/19-NG tube tip and side-port are present within the region of the stomach. There is diffuse air-filled distention of the small bowel, increased from prior study. Findings reflect either obstruction or ileus. Upright view of the chest performed at   same time does not demonstrate any evidence of free air under the diaphragm    MRI head 4/30/19-1.  Limited by motion. Large evolving subacute right PCA distribution infarcts with moderate cytotoxic edema, sulcal effacement and mass effect on the right lateral ventricle. No hydrocephalus..  Minimal, punctate petechial hemorrhage in the right occipital infarct bed.Patchy areas of acute to early subacute ischemia in the posterior left frontal cortex and minimally within the right anterior frontal cortex and right caudate body.    PCXR 4/27/19-ET tube 3 cm above the georgina. NG tube tip overlies the stomach. Right internal jugular [place] catheter tip overlies the pulmonary artery outflow tract. Mediastinal chest tube.left pleural effusion. Left lower lobe atelectasis. Mild vascular congestion and pulmonary edema. Postop median sternotomy. Heart size upper limits of normal    Head CT 4/26/19-Moderately sized hypodensity and loss of gray-white matter differentiation right temporal occipital region, consistent with evolving acute/subacute infarction.  2.  No significant global mass effect or hemorrhage.    CTA head neck 4/25/19-HEAD CT:  1.  No intracranial hemorrhage, mass lesions, hydrocephalus or CT evidence for an acute infarct. MRI is more sensitive for the evaluation of acute infarct.  2.  Chronic lacunae as detailed  "above.  3.  Mild diffuse cerebral parenchymal volume loss. Presumed chronic hypertensive/microvascular ischemic white matter changes.        HEAD CTA:   1.  There is cut off on the P2 segment of the right posterior cerebral artery.  2.  No high-grade stenosis or occlusion of the rest of the major intracranial arteries.  3.  There is 1.5 to 2 mm focal outpouching off the expected location of the origin of the right posterior communicating artery. This is likely an infundibulum versus small aneurysm.     PCXR 4/25/19- Postoperative changes from cardiac valvular surgery. Moderate cardiomegaly persists. There is mild central hilar congestion and slight interstitial prominence suggesting minimal edema. Left hemidiaphragm obscured which may relate to cardiomegaly or left lower lobe atelectasis. No pneumothorax or pleural effusion. Tubes and catheters appear in good position.    Impression:  1. Acute hypoxic respiratory failure  2. Aortic regurgitation.    S/p tissue AVR  4/24/19  3. Mitral stenosis.    S/p Tissue MVR 4/24/19  4. Tricuspid regurgitation   5. Heart failure  Dilated RV  Flolan started in OR        Now off  6.   acute stroke PCA    Left sided weakness  7.   afib with RVR  8.  Thrombocytopenia.   9.   fever   10.   Pericardial adhesions; s/p takedown of adhesions 4/24/19  11. Ileus   12. Non English speaking.  [place]     PLAN:   1. Continue full vent support  2. Finished CTX 5/4 for Klebsiella pneumoniae  3. Cards helping with rhythm isseus  4. Trickle TF  5. low dose fentnayl drip for pain  6. Limit sedation as able  7. Trach peg later this week?  8. Per heme \"These include platelet transfusion for counts less than 10 or less than 20 of bleeding.  Check peripheral smear.  Would expect platelet count recovery as all of his acute medical issues improve.\"  9. Other issues per CVS and neurology     ICU DAILY CHECKLIST                           Can patient transfer out of ICU? no    FAST HUG:    Feeding:  " Feeding: No.  Patient is receiving NPO  Tube feedings isael Guzman:Yes  Analgesia/Sedation:Yes precedex  Thromboembolic prophylaxis: yes; Mode:  SCD  HOB>30:  Yes  Stress Ulcer Protocol Active: yes; Mode: PPI  Glycemic Control: Any glucose > 180 no; Mode of Insulin Therapy: Sliding Scale Insulin    INTUBATED:  Can patient have daily waking:  yes  Can patient have spontaneous breathing trial:  no    Restraints? yes    PHYSICAL THERAPY AND MOBILITY:  Can patient have PT and mobility trial: no  Activity: Bed Rest    transfer/discharge plans: stays ICU    The patient is critically ill with impairment in organ system and high risk of life threatening deterioration.     Total CCT spent 50 minutes thus far today.       Sandy Manzo APRN, [phone]  [facility]

## 2021-05-28 NOTE — PROGRESS NOTES
[place] Note: Patient has been approved by [facility] for LTACH transfer. We will continue to follow and transfer when MD deems medically stable for transfer.     Adi Casillas RT  Referral Specialist  [phone]

## 2021-05-28 NOTE — CONSULTS
PULMONARY / CRITICAL CARE CONSULTATION NOTE      Consultation - Pulmonary/Critical Care Medicine  Kody Johns,  1950, MRN [id number]  Date / Time of Admission:  2019  5:39 AM    Admitting Dx: Aortic regurgitation [I35.1]  Mitral stenosis [I05.0]  Tricuspid regurgitation [I07.1]  Atrial fibrillation (H) [I48.91]    PCP: Aristides Alegria MD, [phone]  Consulting physician:  Jojo Vaughn MD   Code status:  Full Code       Extended Emergency Contact Information  Primary Emergency Contact: Mark Roth   [employer]  Home Phone: [phone]  Mobile Phone: [phone]  Relation: Sibling  Secondary Emergency Contact: Crow Johns   [place]  Mobile Phone: [phone]  Relation: Sibling  Preferred language: [facility]   needed? Yes       ID:  Kody Johns is a 69 y.o. [place]ian male with afib, Rheumatic heart disease( Aortic valve regurgitation, Mitral stenosis and  tricuspid regurgitation) who was admitted today for valvular surgery by Dr Vaughn     ASSESSMENT   1. Acute respiratory failure due to GA for today's case.  To ICU from OR on full vent support  2. Aortic regurgitation.  S/p tissue AVR today   3. Mitral stenosis.  S/p Tissue MVR today  4. Tricuspid regurgitation   5. Heart failure  Dilated RV  Flolan started in OR  6. Coagulopathy .    7. A fib.    8. Pericardial adhesions; s/p takedown of adhesions today  9. Acute blood loss anemia .  EBL 1025  10. hyperglycemia.  To ICU on insulin drip  11. Non English speaking.  [place]ian      PLAN   Systems to Assess:     Pulmonary: Wean supplemental O2 as tolerated; goal O2 sat > 92%.  HOB > 30 degrees to limit aspiration risk.      Check ABG and adjust support as indicated; avoid acidotic state.     Check CXR    Keep intubated for no wdue to need for Flolan drip    Can't use EtCO2 due to Flolan      Cardiovascular: Cardiac monitoring.     SBP goal > 90 mmHg, MAP goal > 65 mmHg.      Goal CI 2-3     Goal PAD 20    On  flolan for dilated RV    Vasoactive gtts per CV-surgery.     Temporary pacing wires present; will use in setting of symptomatic arrhythmia.     Neurological: Neuro checks per ICU protocol.     Sedate with precedex  until ready to wean and extubate.     Pain control: PRN    GI/:     NPO until extubated and fully awake.     Guzman catheter in place for accurate measurement of I/O.     GI prophylaxis:  Omeprazole    Renal: Monitor I/O's.  Electrolyte repletion PRN.  Avoid/limit nephrotoxic agents.     IVFs: per CV-surgery    Heme/Coag: Monitor H/H.     Transfuse per CV-surgery.    Getting Protamine now     Monitor chest tube output hourly; notify CV-surgery for excessive output or abrupt stop in output.     DVT prophylaxis:  SubQ heparin    Infectious disease: General precautions.     Remove lines and drains once no longer required.     Endocrine:     RHI gtt per ICU protocol.     Musculoskeletal:     Bedrest; initiate mobility protocol.           We will need Malaysian  at vent weaning time     Lines: A-line, Day# 1, Central line, Day# 1 or [place] Nick, Day# 1      Code Status:  full    Thank you for this interesting consultation.  Please call if any questions or concerns.      This patient is considered critically ill and requires ICU level of care due to immediate post CV-surgical procedure. High risk for acute hemodynamic collapse and death.     Total Critical Care time, not including separate billable procedure time:  50 minutes.    Sandy Manzo [facility] Pulmonary/Critical Care      Chief Complaint Rheumatic valvular disease, A fib       HPI    Kody Johns is a 69 y.o. Malaysian  male with rheumatic heart valve dz(AV regurgitation, , MV stenosis aand TV regurgitation) afib, and pericardial adhesions who was admitted today for AVR, MVR tissue valves, and takedown of pericardial adhesions by Dr Vaughn    History is provided by: chart review and bedside handoff by Dr Meléndez(CVTS fellow) and Dr Kimball  MDA.  The case was complicated.  Pt was an easy intubation, received 15 mg methadone preop, Echo showed boggy RV.  PAPs in the 30s preop, pt had hard time coming off extracorporeal circulation, needing many bumps of epi, flolan was started.  Also they took down pericardial adhesions.  To ICU on full vent support, flolan full strength, not paced,  On epi, insulin and precedex drips.  Protamine was given on arrival to ICU.     Review of Systems   Review of systems not obtained due to inability to communicate with the patient.      Active Problem List   Patient Active Problem List    Diagnosis Date Noted     S/P MVR (mitral valve repair) 04/24/2019     ARF (acute respiratory failure) (H) 04/24/2019     Anemia due to blood loss, acute 04/24/2019     Coagulopathy (H) 04/24/2019     A-fib (H) 09/14/2017     Heart failure with preserved ejection fraction (H) 09/14/2017     Moderate malnutrition (H) 08/31/2017     ALEENA (acute kidney injury) (H) 08/31/2017     Aortic valve disease, rheumatic 08/30/2017     Lightheadedness 08/30/2017     Atherosclerosis of native coronary artery of native heart without angina pectoris      Essential hypertension with goal blood pressure less than 130/85      Pure hypercholesterolemia      Dysuria 08/29/2017     Hypothyroidism, unspecified type      Mitral valve stenosis, unspecified etiology      Elevated LFTs 08/26/2016     Silent Stroke      Shoulder strain      Dysphagia      Hyperlipidemia      Rheumatic heart disease      Blurry vision      Hearing loss      Nonspecific reaction to tuberculin skin test without active tuberculosis          Medical/Surgical History   Past Medical History:   Diagnosis Date     ALEENA (acute kidney injury) (H)      Anemia due to blood loss, acute 4/24/2019     Asthma      Atrial fibrillation (H)      Blurry vision      CHF (congestive heart failure) (H)      Chronic kidney disease      Coronary artery disease      Dysphagia     resolved     Dysuria      Hearing  loss      Heart murmur      Heart murmur      Hyperlipidemia      Hypertension      Hypothyroidism      Mitral valve stenosis      Moderate malnutrition (H)      Palpitations      Rheumatic heart disease      Shortness of breath     exertion     Stroke (H)     Silent     Valvular disease      Past Surgical History:   Procedure Laterality Date     CARDIAC CATHETERIZATION       CATARACT EXTRACTION Left 06/13/2016     CV CORONARY ANGIOGRAM N/A 11/30/2018    Procedure: Coronary Angiogram;  Surgeon: Jeff Deleon MD;  Location: [facility];  Service: Cardiology     EYE SURGERY      cataract         Allergies   No Known Allergies      Medications:  OUTpatient medications   Prior to Admission medications    Medication Sig Start Date End Date Taking? Authorizing Provider   albuterol (PROAIR HFA;PROVENTIL HFA;VENTOLIN HFA) 90 mcg/actuation inhaler Inhale 1-2 puffs every 6 (six) hours as needed. 11/5/18  Yes Aristides Alegria MD   amoxicillin (AMOXIL) 500 MG capsule TAKE 4 TABLETS (2,000 mg) BY MOUTH 1 HOUR PRIOR TO PROCEDURE. 6/5/18  Yes Aristides Alegria MD   atorvastatin (LIPITOR) 40 MG tablet TAKE 1 PILL BY MOUTH AT BEDTIME. FOR CHOLESTEROL 2/12/19  Yes Ashleigh Lang MD   baclofen (LIORESAL) 10 MG tablet TAKE 1 TABLET BY MOUTH TWICE DAILY 7/19/18  Yes Aristides Alegria MD   enoxaparin (LOVENOX) 40 mg/0.4 mL syringe Inject 0.4 mL (40 mg total) under the skin every evening. 4/16/19  Yes Aristides Alegria MD   enoxaparin (LOVENOX) 60 mg/0.6 mL syringe Inject 0.6 mL (60 mg total) under the skin every morning. 4/16/19  Yes Aristides Alegria MD   fluticasone (FLONASE) 50 mcg/actuation nasal spray 2 sprays into each nostril daily.  Patient taking differently: 2 sprays into each nostril daily as needed.        12/21/16  Yes Aristides Alegria MD   furosemide (LASIX) 20 MG tablet TAKE 1 TABLET (20 MG TOTAL) BY MOUTH DAILY FOR EDEMA 1/15/19  Yes Aristides Alegria MD   levothyroxine (SYNTHROID, LEVOTHROID) 88 MCG  tablet TAKE 1 PILL BY MOUTH EVERYDAY FOR THYROID 4/18/19  Yes Aristides Alegria MD   metoprolol succinate (TOPROL-XL) 25 MG TAKE 1 PILL BY MOUTH EVERYDAY FOR BLOOD PRESSURE 12/18/18  Yes Aristides Alegria MD   omeprazole (PRILOSEC) 20 MG capsule TAKE 1 PILL BY MOUTH EVERYDAY FOR STOMACH 9/25/18  Yes Aristides Alegria MD   spironolactone (ALDACTONE) 25 MG tablet TAKE 1 PILL BY MOUTH EVERYDAY 9/25/18  Yes Aristides Alegria MD   warfarin (COUMADIN/JANTOVEN) 1 MG tablet Take 1-2 mg by mouth See Admin Instructions. 1 mg daily on Mon/Wed/Fri; and 2 mg daily rest of week   Yes Aristides Alegria MD   acetaminophen (TYLENOL) 500 MG tablet Take 500-1,000 mg by mouth every 6 (six) hours as needed for pain. Every 6-8 hours as needed. No more than 4,000MG of acetaminophen daily.    PROVIDER, HISTORICAL   ibuprofen (ADVIL,MOTRIN) 600 MG tablet Take 600 mg by mouth every 6 (six) hours as needed for pain.    PROVIDER, HISTORICAL           Medications:  INpatient medications     acetaminophen  650 mg Oral Q6H    Or     acetaminophen  650 mg Rectal Q6H     albumin human         [START ON 4/25/2019] aspirin  81 mg Oral DAILY     atorvastatin  40 mg Oral DAILY     baclofen  10 mg Oral BID     [START ON 4/25/2019] bisacodyl  10 mg Oral Once     [START ON 4/27/2019] bisacodyl  10 mg Rectal Once     chlorhexidine  15 mL Topical Q12H     [START ON 4/25/2019] docusate sodium  100 mg Oral BID     [START ON 4/25/2019] heparin (PF)  5,000 Units Subcutaneous Q8H FIXED TIMES     levothyroxine  88 mcg Oral Daily 0600     [START ON 4/26/2019] magnesium hydroxide  30 mL Oral DAILY     melatonin  3 mg Oral QHS     mupirocin  1 application Each Nare BID     [START ON 4/25/2019] omeprazole  20 mg Oral QAM AC     [START ON 4/25/2019] polyethylene glycol  17 g Oral DAILY     protamine  50 mg Intravenous Once     vancomycin  1,000 mg Intravenous Q12H           Family History  Social History     Reviewed  Family History   Problem Relation Age of Onset      "No Medical Problems Mother      No Medical Problems Father      Heart disease Neg Hx        Reviewed  Social History     Socioeconomic History     Marital status:      Spouse name: Jesus Johns     Number of children: 4     Years of education: Not on file     Highest education level: Not on file   Occupational History     Not on file   Social Needs     Financial resource strain: Not on file     Food insecurity:     Worry: Not on file     Inability: Not on file     Transportation needs:     Medical: Not on file     Non-medical: Not on file   Tobacco Use     Smoking status: Former Smoker     Packs/day: 1.00     Years: 50.00     Pack years: 50.00     Types: Cigarettes     Last attempt to quit: 2014     Years since quittin.6     Smokeless tobacco: Former User     Tobacco comment: No Betel nut   Substance and Sexual Activity     Alcohol use: No     Comment: Quit     Drug use: No     Sexual activity: Not on file   Lifestyle     Physical activity:     Days per week: Not on file     Minutes per session: Not on file     Stress: Not on file   Relationships     Social connections:     Talks on phone: Not on file     Gets together: Not on file     Attends Alevism service: Not on file     Active member of club or organization: Not on file     Attends meetings of clubs or organizations: Not on file     Relationship status: Not on file     Intimate partner violence:     Fear of current or ex partner: Not on file     Emotionally abused: Not on file     Physically abused: Not on file     Forced sexual activity: Not on file   Other Topics Concern     Not on file   Social History Narrative    Refugee, born in [facility].      Psychosocial Needs  Social History     Social History Narrative    Refugee, born in [facility].     Additional psychosocial needs reviewed per nursing assessment.      Physical Exam   VITALS:  /67 (Patient Position: Sitting)   Pulse 65   Temp 96.9  F (36.1  C) (Oral)   Resp 16   Ht 4' 10\" " (1.473 m)   Wt 105 lb 12.8 oz (48 kg)   BMI 22.11 kg/m    Temp:  [96.9  F (36.1  C)] 96.9  F (36.1  C)  Heart Rate:  [65] 65  Resp:  [16] 16  BP: (117)/(67-76) 117/67    PHYSICAL EXAM:   GEN: in bed sedated  HEENT:  NC PERRL OETT  NECK: Supple.  Trachea midline  PULM:  cta CTs w/o air leak  CVS:   RRR  ABDOMEN:   soft  EXTREMITIES/SKIN:    No e/c/c  NEURO:  .  Unknown, pt sedated and on full vent support    I&O:      Intake/Output Summary (Last 24 hours) at 4/24/2019 1316  Last data filed at 4/24/2019 1219  Gross per 24 hour   Intake 1650 ml   Output 1575 ml   Net 75 ml        Pertinent Labs   Lab Results: personally reviewed.     Serum Glucose range:No results for input(s): POCGLU in the last 72 hours.    No results for input(s): PHART, KVK8ROK, PO2ART, OXYHB, BEARTCALC, CARBOXYHGB, METHGB, POCPEEP, TEMP, 25095, POCRATE, POCFLOW, PSV in the last 72 hours.    Invalid input(s): [id number], 02SAT, VENTTIVOL  CBC:  Results from last 7 days   Lab Units 04/24/19  1157 04/23/19  0913   LN-WHITE BLOOD CELL COUNT thou/uL 20.1* 5.2   LN-HEMOGLOBIN g/dL 9.3* 14.8   LN-HEMATOCRIT % 28.2* 44.5   LN-PLATELET COUNT thou/uL 82* 130*     Chemistry:  Results from last 7 days   Lab Units 04/23/19  0913   LN-SODIUM mmol/L 138   LN-POTASSIUM mmol/L 4.0   LN-CHLORIDE mmol/L 108*   LN-CO2 mmol/L 24   LN-BLOOD UREA NITROGEN mg/dL 16   LN-CREATININE mg/dL 0.96   LN-CALCIUM mg/dL 9.3   LN-MAGNESIUM mg/dL 1.9     Coags:  Lab Results   Component Value Date    INR 1.88 (H) 04/24/2019    INR 1.31 (H) 04/23/2019    INR 2.90 (H) 04/15/2019     Cardiac Markers:        Microbiology:    NA     Cardiac/Radiology Studies   Cardiac:  EKG:   Postop pending  Radiology:  Chest X-Ray: postop pending    PROVIDER RESTRAINT FOR NON-VIOLENT BEHAVIOR FACE TO FACE EVALUATION    Patient's Immediate Situation:  Patient demonstrated the following behaviors: Pulling/tugging at invasive lines or tubes and does not respond to verbal/non-verbal  redirection    Patient's Reaction to the intervention:  Does patient understand the reason for restraint/seclusion? Unable to Express    Medical Condition:  Is there any evidence of compromise of Skin integrity, Respiratory, Cardiovascular, Musculoskeletal, Hydration? No    Behavioral Condition:  In consultation with the RN, is there a need to continue this restraint or seclusion? Yes    See Restraint Flowsheet for complete restraint documentation and assessment.    Sandy Manzo, CNP

## 2021-05-28 NOTE — PROGRESS NOTES
Patient remains on ventilator and is resting comfortably. ETT moved to Rt. Side of mouth. Will continue to monitor and wean as tolerated. EtCO2 29, calibrated, alarms verified. Patient was suctioned minimal secretions, tan colored. No cough or gag reflex noted however patient was sedated and unresponsive.       Haja Yuen, LRT

## 2021-05-28 NOTE — PLAN OF CARE
Problem: Safety  Goal: Patient will be injury free during hospitalization  Outcome: Progressing     Problem: Daily Care  Goal: Daily care needs are met  Outcome: Progressing     Problem: Potential for Compromised Skin Integrity  Goal: Skin integrity is maintained or improved  Outcome: Progressing  Goal: Nutritional status is improving  Outcome: Progressing     Problem: Urinary Incontinence  Goal: Perineal skin integrity is maintained or improved  Outcome: Progressing     Problem: Chronic Tracheostomy  Goal: Patient is able to maintain stable respiratory status with long term  tracheostomy  Outcome: Progressing     Staff anticipating Pt's need, performing ADL's and monitoring skin. Sacral heart mepilex on to protect skin. Guzman & dignicare remain in place to protect skin as well. Trach sight healing. Small old bloody drainage noted on left side of neck on ties. GT site also healing. No open area's noted. RD managing tube feeding & flushes. T will need [place] . Pt son also at bedside to assist w/interpretation. Pt is alert, following commands and on the vent. RT managing vent settings.

## 2021-05-28 NOTE — PROCEDURES
"PICC Line Insertion Procedure Note  Pt. Name: Kody Johns  MRN:        [id number]    Procedure: Insertion of a  dual Lumen  5 fr  Bard SOLO (valved) Power PICC, Lot number [id number]    Indications: Vascular access    Contraindications : none    Procedure Details   Patient identified with 2 identifiers and \"Time Out\" conducted.  .     Central line insertion bundle followed: hand hygeine performed prior to procedure, site cleansed with cholraprep, hat, mask, sterile gloves,sterile gown worn, patient draped with maximum barrier head to toe drape, sterile field maintained.    The vein was assessed and found to be compressible and of adequate size. 1 ml 1% Lidocaine administered sq to the insertion site. A 5 Fr PICC was inserted into the basilic vein of the right arm with ultrasound guidance. 1 attempt(s) required to access vein.   Catheter threaded without difficulty. Good blood return noted.    Modified Seldinger Technique used for insertion.    The 8 sharps that are included in the PICC insertion kit were accounted for and disposed of in the sharps container prior to breakdown of the sterile field.    Catheter secured with Statlock, biopatch and Tegaderm dressing applied.    Findings:  Total catheter length  38 cm, with 3 cm exposed. Mid upper arm circumference is 27 cm. Catheter was flushed with 20 cc NS. Patient  tolerated procedure well.    Tip placement verified by xray. Xray read by Dr. Magana. Tip placement in the SVC after adjustment.    CLABSI prevention brochure left at bedside.    Patient's primary RN notified PICC is ready for use.    Comments:  Pulled back 3 cm. After xray        Gloria Cowan RN,BSN,[facility] Vascular Access        "

## 2021-05-28 NOTE — PROGRESS NOTES
Respiratory Care Note    Vent Mode: PCV/VG  FiO2 (%):  [40 %] 40 %  S RR:  [20] 20  S VT:  [300 mL-325 mL] 325 mL  PEEP/CPAP (cm H2O):  [5 cm H2O] 5 cm H2O  Minute Ventilation (L/min):  [8.6 L/min-10.1 L/min] 10.1 L/min  PIP:  [14 cm H2O-26 cm H2O] 26 cm H2O  MAP (cm H2O):  [6-11] 10    Pt currently on full vent support on the above settings and tolerating fairly well. PLAT 18, sats 95%. Suctioned scant amount of clear thin inline. Pt did have a gag reflex. RR varied this shift 20-32. Pt is triggering breaths. No PS this shift. Will attempt SBT when able. RT will continue to follow.     ZEHRA SantosT

## 2021-05-28 NOTE — PROGRESS NOTES
Progress Note    Assessment/Plan  S/P AVR/MVR POD # 4  Persistent febrile, temp 103.7  F, just may be neurologic in nature  Cooling blanket on  Acute on chronic A. fib rate 109-127  Postop respiratory failure, sedated, intubated and on mechanical ventilation  Vent: Vt 350, R 20, PEEP 8, FiO2 50%, SPO2 96%  Postop CVA with left side weakness  Patient is sedated at this point with left-sided neglect and does not follow commands  MRI of the head today after removal of pacer wires   Postop cardiogenic shock resolving  Sedation: Precedex 1.2 mcg/kg/hr  Hemodynamics: CI  2.9, CO   3.9, CVP  16, PAP  29/18  Severe postop thrombocytopenia, Plt 28 down from 63 yesterday  Transfused  another 1 packs of platelets   and then removed temporal pacer  A. fib with RVR rate in the 130s  Continue metoprolol 12.5 mg twice a day  Diabetes mellitus, preop A1c 6.7, currently off insulin drip  Incisions are clean dry intact, lung sounds diminished bilaterally  Chest tube drainage    20/40 sereous  Will remove chest tube today, 1 to 2 hours after removal of pacer wire  Abdomen is soft and nontender, bowel sounds present all 4 quarters  Urine output adequate with Lasix overnight now marginal, adequate BM and positive bowel sounds as noted above  Weight  52.7 kg from  52.8 kg yesterday, preop weight is 48 kg  Continue Lasix 40 mg IV twice daily x24 hours  keep CI > 2 or MAP > 70  Given the febrile of 103.7  F post double valve, will continue broad-based antibiotics while awaiting for sputum culture.  Zosyn and Vanco therapy  Awaiting blood culture and sputum culture  ID consult to evaluate for persistent fever  Will start anticoagulation for A. fib once platelets have recovered above 90  Consult hematology to evaluate thrombocytopenia  PICC line placement for blood draws and meds  Once PICC line is in, will discontinue Cordis      Subjective  Sedated, intubated and on mechanical ventilation  Objective    Vital signs in last 24 hours  Temp:   "[102.3  F (39.1  C)-105.3  F (40.7  C)] 103.7  F (39.8  C)  Heart Rate:  [] 109  Resp:  [24-36] 25  BP: ()/(59-90) 100/60  Arterial Line BP: (106-152)/() 134/83  FiO2 (%):  [50 %] 50 %  Weight:   116 lb 2.9 oz (52.7 kg)  Preop weight 48 kg  Intake/Output last 3 shifts  I/O last 3 completed shifts:  In: 4334.2 [I.V.:2329.2; NG/GT:1950; IV Piggyback:55]  Out: 5005 [Urine:4965; Chest Tube:40]  Intake/Output this shift:  I/O this shift:  In: -   Out: 250 [Urine:250]    Review of Systems   Review of systems not obtained due to inability to communicate with the patient.     Physical Exam  /60   Pulse (!) 109   Temp (!) 103.7  F (39.8  C) (Oral)   Resp 25   Ht 4' 11\" (1.499 m)   Wt 116 lb 2.9 oz (52.7 kg)   SpO2 94%   BMI 23.47 kg/m    A. fib with RVR, incisions are clean dry intact, lungs sound diminished and congested bilaterally  Abdomen is soft and nontender  Bilateral lower extremity edema    Pertinent Labs   Lab Results: personally reviewed.   Lab Results   Component Value Date     (H) 04/29/2019     (H) 04/29/2019    K 3.4 (L) 04/29/2019     (H) 04/29/2019    CO2 26 04/29/2019    BUN 44 (H) 04/29/2019    CREATININE 1.32 (H) 04/29/2019    CALCIUM 7.4 (L) 04/29/2019     Lab Results   Component Value Date    WBC 4.9 04/29/2019    HGB 8.5 (L) 04/29/2019    HCT 26.9 (L) 04/29/2019    MCV 95 04/29/2019    PLT 28 (LL) 04/29/2019       Pertinent Radiology   Radiology Results: Not yet available for review and Awaiting post PICC line chest x-ray  EKG Results: personally reviewed.  and A. fib with RVR, rate 109 to 127    Juan Wray"

## 2021-05-28 NOTE — PLAN OF CARE
Problem: Pain  Goal: Patient's pain/discomfort is manageable  Outcome: Progressing     Problem: Safety  Goal: Patient will be injury free during hospitalization  Outcome: Progressing     Problem: Daily Care  Goal: Daily care needs are met  Outcome: Progressing     Problem: Urinary Incontinence  Goal: Perineal skin integrity is maintained or improved  Outcome: Progressing

## 2021-05-28 NOTE — PROGRESS NOTES
Spiritual Care Note    Spiritual Assessment:   participated in family care conference today in order to provide support for family. Patient seemed to listen attentively as care team provided updates. Family able to shares their concerns, thanked everyone for the care they have given to patient and ready to move on to the next phase of patient's recovery. No concerns noted by this .     Care Provided: Listening and a supportive presence provided.     Plan of Care: Spiritual care will continue to follow as part of patient's care team.     KAYLA Mccartney, BCC

## 2021-05-28 NOTE — PLAN OF CARE
Problem: Mechanical Ventilation  Goal: Patient will maintain patent airway  Outcome: Progressing  Goal: Respiratory status - ventilation  Description  Movement of air in and out of the lungs.    Liberate from ventilator  Outcome: Progressing     Problem: Chronic Tracheostomy  Goal: Patient is able to maintain stable respiratory status with long term  tracheostomy  Outcome: Progressing     Problem: Mechanical Ventilation  Goal: ET tube will be managed safely  Outcome: Completed     Jenn Marino, LRT

## 2021-05-28 NOTE — PROGRESS NOTES
CVTS Daily Progress Note   5/6/2019   POD#12 s/p AVR/MVR (bioprosthetic)  Attending: Johana   LOS: 12 days     SUBJECTIVE/INTERVAL EVENTS:    No acute events overnight. Patient remains intubated and sedated on minimal vent settings. NAD. Spontaneously moving head and right arm. Off thomas as of yesterday, normotensive. Remains in a-fib. On heparin gtt. Plt recovering (97). Sputum cultures with H. Flu and Klebsiella; blood cultures with GPCs. On Vanc and Rocephin. WBC 11.4. Tolerating tube feeds, +BM. Abdomen less non-distended today. UOP adequate. Hgb 9.6.LFTs slightly elevated today.     OBJECTIVE:    Temp:  [97.9  F (36.6  C)-98.9  F (37.2  C)] 98.8  F (37.1  C)  Heart Rate:  [] 126  Resp:  [16-33] 32  BP: ()/(53-87) 124/82  FiO2 (%):  [30 %-35 %] 30 %  Wt Readings from Last 2 Encounters:   05/06/19 0515 109 lb 14.4 oz (49.9 kg)   05/05/19 0430 109 lb 1.6 oz (49.5 kg)   05/04/19 0600 122 lb 2.2 oz (55.4 kg)   05/02/19 0600 121 lb 4.8 oz (55 kg)   05/01/19 0530 120 lb 12.8 oz (54.8 kg)   04/30/19 0600 122 lb 8 oz (55.6 kg)   04/29/19 0400 116 lb 2.9 oz (52.7 kg)   04/28/19 0200 116 lb 6.5 oz (52.8 kg)   04/27/19 0500 117 lb 8.1 oz (53.3 kg)   04/26/19 0400 112 lb 14 oz (51.2 kg)   04/25/19 0500 113 lb 8.6 oz (51.5 kg)   04/24/19 2000 105 lb 13.1 oz (48 kg)   04/24/19 0628 105 lb 12.8 oz (48 kg)   04/23/19 0937 106 lb 11.2 oz (48.4 kg)       Current Medications:    Scheduled Meds:    amiodarone  200 mg Per NG tube BID     aspirin  81 mg Enteral Tube DAILY     baclofen  10 mg Enteral Tube BID     cefTRIAXone (ROCEPHIN)  IV  1 g Intravenous Q24H     chlorhexidine  15 mL Topical Q12H     docusate sodium (COLACE) 50 mg/5 mL oral liquid  100 mg Oral BID     furosemide  20 mg Intravenous BID - diuretic     insulin aspart (NovoLOG) injection   Subcutaneous Q4H     levothyroxine  75 mcg Intravenous Daily     metoprolol tartrate  12.5 mg Oral BID     pantoprazole  40 mg Intravenous Q24H    Or     omeprazole  20 mg  Oral Q24H    Or     omeprazole  20 mg Enteral Tube Q24H     potassium chloride  10 mEq Enteral Tube Daily with supper     sodium chloride  10-30 mL Intravenous Q8H FIXED TIMES     sodium chloride  3 mL Intravenous Line Care     vancomycin  750 mg Intravenous Q24H     Continuous Infusions:    dexmedetomidine 400 mcg/100 mL in NS (PRECEDEX) (4mcg/mL) 1 mcg/kg/hr (19 0842)     dextrose 5% 25 mL/hr (19 2300)     fentaNYL 2500 mcg/250 mL in NS (10 mcg/mL) 50 mcg/hr (19 2300)     heparin infusion (100 units/ml) 15 Units/kg/hr (19 0526)     niCARdipine Stopped (19 0701)     norepinephrine IV infusion in D5W       phenylephrine IV infusion in NS Stopped (19 1150)     sodium chloride 0.9% Stopped (19 1300)     PRN Meds:.acetaminophen, acetaminophen, albumin human, aluminum-magnesium hydroxide-simethicone, bisacodyl, bisacodyl, dexmedetomidine 400 mcg/100 mL in NS (PRECEDEX) (4mcg/mL), dextrose 50 % (D50W), glucagon (human recombinant), ipratropium-albuterol **AND** [] Nebulizer treatment intermittent, lipase-protease-amylase **AND** sodium bicarbonate, magnesium hydroxide, midazolam, naloxone **OR** naloxone, niCARdipine, norepinephrine IV infusion in D5W, ondansetron, oxyCODONE intensol (ROXICODONE) conc solution 20 mg/mL **OR** oxyCODONE, polyethylene glycol, sodium chloride, sodium chloride bacteriostatic, sodium chloride, sodium chloride, sodium chloride    Cardiographics:    Telemetry monitoring demonstrates a-fib with rates in the 110s per my personal review.    Imaging:    No results found.    Lab Results (most recent, reviewed):    Hemoglobin (g/dL)   Date Value   2019 9.6 (L)     WBC (thou/uL)   Date Value   2019 11.4 (H)     Potassium (mmol/L)   Date Value   2019 3.7     Creatinine (mg/dL)   Date Value   2019 0.97     Hemoglobin A1c (%)   Date Value   2019 6.7 (H)     Magnesium (mg/dL)   Date Value   2019 2.5     Calcium (mg/dL)    Date Value   05/06/2019 8.0 (L)       I/O:    Intake/Output Summary (Last 24 hours) at 5/6/2019 0916  Last data filed at 5/6/2019 0600  Gross per 24 hour   Intake 3040.33 ml   Output 2625 ml   Net 415.33 ml        UOP: 2.4L    Physical Exam:    General: Patient seen in bed. Intubated/sedated, NAD. Moving head and right arm spontaneously.   HEENT: KIM, no sclera icterus, moist mucosa, ET tube secure, no tracheal deviation  CV: A-fib on monitor. 2+ peripheral pulses in all extremities. Mild edema.   Pulm: Non-labored effort on CMV. Incision and previous chest tube sites C/D/I.  Abd: Soft, NT, ND  : Guzman with donta urine  Ext: Mild pedal edema, SCDs in place, warm, distal pulses intact  Neuro: Sedated, unable to fully eval      ASSESSMENT/PLAN:    Kody Johns is a 69 y.o. male who is POD#12 s/p AVR/MVR.    Principal Problem:    S/P MVR (mitral valve repair)  Active Problems:    Rheumatic heart disease    Mitral valve stenosis, unspecified etiology    Aortic valve disease, rheumatic    A-fib (H)    Acute respiratory failure with hypoxemia (H)    Anemia due to blood loss, acute    Coagulopathy (H)    Non-English speaking patient    Thrombocytopenia (H)    Sepsis, due to unspecified organism (H)    Atrial fibrillation with RVR (H)    Hypernatremia    Paralytic ileus (H)    Small bowel obstruction (H)    Acute cardioembolic stroke (H)        NEURO:   - Fentanyl gtt; PRN Tylenol oxycodone  - Precedex gtt  - Melatonin at bedtime  - PTA Baclofen    CV:   - Normotensive  - Amiodarone 200mg two times a day   - Metoprolol 12.5mg two times a day increased to 25mg two times a day   - ASA 81mg  - Heparin gtt for a-fib; eventually will need Coumadin    PULM:   - Vent weaning as able  - Maintaining oxygen saturations on minimal settings  - Consider trach mid-late this week    FEN/GI:  - NPO while intubated  - Continue electrolyte replacement protocol  - Tube feeds  - Bowel regimen    RENAL:  - Adequate UOP/hr. Continue to  monitor closely via monzon.   - Cr 0.97  - Monzon to remain in for close monitoring of I/O and during period of diuresis/relative immobility.  - Diuresis with 20mg IV Lasix twice a day    HEME:  - Acute blood loss anemia post-op.   - Hgb stable (9.6), no bleeding concerns. Hep gtt, [id number]  - Heparin gtt for a-fib; eventually will need Coumadin    ID:  - Sputum cultures with H. Flu and Klebsiella; blood cultures with GPCs. On Vanc and Rocephin. WBC 11.4.    ENDO:   - SSI  - PTA Synthroid    PPx:   - DVT: SCDs, heparin gt  - GI: Omeprazole     DISPO:   - Continue critical care in ICU        _______  Theresa Moore PA-C  Cardiothoracic Surgery  [phone]

## 2021-05-28 NOTE — PLAN OF CARE
Problem: Mechanical Ventilation  Goal: Patient will maintain patent airway  Outcome: Progressing  Goal: Respiratory status - ventilation  Description  Movement of air in and out of the lungs.    Liberate from ventilator  Outcome: Progressing  Goal: ET tube will be managed safely  Outcome: Progressing

## 2021-05-28 NOTE — PROGRESS NOTES
Progress Note    Assessment/Plan  S/P AVR/MVR POD # 4  Persistent febrile, temp 104.3  F, just may be neurologic in nature  Continue to be as to reduce her temp with little effect  Postop respiratory failure, sedated, intubated and on mechanical ventilation  Vent: Vt 350, R 16, PEEP 8, FiO2 60%, SPO2 99%  Postop CVA with left side weakness  Patient is sedated at this point with left-sided neglect and does not follow commands  MRI of the head when pacer wires out however due to low platelets, would not remove pacer wires at this time  Postop cardiogenic shock resolving  Sedation: Precedex 1.2 mcg/kg/hr  Hemodynamics: CI  2.8, CO   3.9, CVP  17, PAP  34/21  Severe postop thrombocytopenia, Plt 63 up from 36 yesterday  Transfused  another 2 packs of platelets  yesterday  A. fib with RVR rate in the 130s  Metoprolol 5 mg IV x1 dose and then metoprolol 12.5 mg twice a day  Diabetes mellitus, preop A1c 6.7, currently off insulin drip  Incisions are clean dry intact, lung sounds diminished bilaterally  Chest tube drainage   25/295 sereous  Leave chest tube in today due to low back recovering platelets  Abdomen is soft and nontender, bowel sounds present all 4 quarters  Urine output adequate with Lasix overnight now marginal, no BM yet but positive bowel sounds as noted above  Weight  52.8 kg from  53.3 kg yesterday, preop weight is 48 kg  Continue Lasix 40 mg IV twice daily x24 hours  keep CI > 2 or MAP > 70  Given the febrile of 104.3  F post double valve, will continue broad-based antibiotics while awaiting for sputum culture.  Zosyn and Vanco therapy  We will also draw blood culture  ID consult to evaluate for persistent fever  Will start anticoagulation for A. fib once platelets have recovered above 90      Subjective  Sedated, intubated and on mechanical ventilation  Objective    Vital signs in last 24 hours  Temp:  [102.3  F (39.1  C)-104.5  F (40.3  C)] 104.3  F (40.2  C)  Heart Rate:  [] 109  Resp:  [23-33]  "33  BP: ()/(55-91) 124/76  Arterial Line BP: (119-157)/(61-93) 133/82  FiO2 (%):  [50 %-60 %] 50 %  Weight:   116 lb 6.5 oz (52.8 kg)  Preop weight 48 kg  Intake/Output last 3 shifts  I/O last 3 completed shifts:  In: 3432.9 [I.V.:2144.1; Blood:120.8; NG/GT:1118; IV Piggyback:50]  Out: 4052 [Urine:3757; Chest Tube:295]  Intake/Output this shift:  I/O this shift:  In: 100 [NG/GT:100]  Out: 87 [Urine:87]    Review of Systems   Review of systems not obtained due to inability to communicate with the patient.     Physical Exam  /76 (Patient Position: Lying)   Pulse (!) 109   Temp (!) 104.3  F (40.2  C) (Bladder) Comment: MD aware  Resp (!) 33 Comment: /vent  Ht 4' 11\" (1.499 m)   Wt 116 lb 6.5 oz (52.8 kg)   SpO2 96%   BMI 23.51 kg/m    A. fib with RVR, incisions are clean dry intact, lung sounds congested bilaterally  Abdomen is soft and nontender, bowel sounds present  Mild generalized edema    Pertinent Labs   Lab Results: personally reviewed.   Lab Results   Component Value Date     (H) 04/27/2019    K 3.7 04/28/2019     (H) 04/27/2019    CO2 19 (L) 04/27/2019    BUN 43 (H) 04/27/2019    CREATININE 1.06 04/27/2019    CALCIUM 8.3 (L) 04/27/2019     Lab Results   Component Value Date    WBC 4.0 04/27/2019    HGB 9.1 (L) 04/28/2019    HCT 35.3 (L) 04/27/2019    MCV 92 04/27/2019    PLT 63 (L) 04/28/2019       Pertinent Radiology   Radiology Results: Personally reviewed image/s, Personally reviewed impression/s and Bilateral pulmonary congestion on last chest x-ray  EKG Results: personally reviewed.  and Acute on chronic A. fib with RVR    Juan Wray            "

## 2021-05-28 NOTE — PROGRESS NOTES
Addendum    Called by Jessica Spain is not moving left side, has left upward gaze   I notified Dr Vaughn-she is now in room  Stroke code called    I updated pt's son at the bedside, he speaks English, and has been interpreting for him

## 2021-05-28 NOTE — PLAN OF CARE
Problem: Mechanical Ventilation  Goal: Patient will maintain patent airway  Outcome: Progressing     Problem: Mechanical Ventilation  Goal: ET tube will be managed safely  Outcome: Progressing

## 2021-05-28 NOTE — SIGNIFICANT EVENT
No movement to LLE noted at 0200, minimal movement to LUE. Dr. Meléndez notified at 0205. No new orders received. At 0530 MD notified of decreasing UO, increasing left gaze deviation to left eye, and continued hypotension. Additional bag of albumin ordered by MD. Neurology notified to update with neuro changes per CV surgery request at 0545 with no response. Platelets 39 from AM labs. STAT head CT ordered to rule out hemorrhagic stroke. 2 bags platelets ordered. CT resulted with moderate right temporal occipital infarct. Dr. Meléndez updated with results, neurology again notified per CV surgery. Per Dr. Mckeon, Dr. Moya will see pt today. Neurology ecommending -140. Dr. Meléndez aware of neurology recommendations and agreeable with plan, cardene stopped. No need to increase pressers to maintain SBP goal. Will continue to monitor.

## 2021-05-28 NOTE — PROGRESS NOTES
Addendum    Discussed pt with Dr Vaughn  family conference cx for today   No BATSHEVA or cardioversion for now   STAT ABD CTshows ileus persists-will make sx continuous     Would like to wait 10-14 days before Trach    Dr Vaughn discussed above with pt's brother at bedside

## 2021-05-28 NOTE — PROGRESS NOTES
"  Clinical Nutrition Therapy Nutrition Support Note      Reason:  RD managing TF.    Subjective:  RN spoke with CV surgery, ok to advance TF today.  Pt intubated.  TF running at trickle rate.   in room.  Pt with eyes open today.  Chart reviewed.    Nutrition History:  Information from family/caregiver and chart.  Diet prior to admission:  General.  Pt eats mae with meat and/or veggies and rice.  Recent food/fluid intake: good.   Cultural/Gnosticist food needs or preferences: Spanish  Food allergies or intolerances: nkfa  TF started 4/26.      Nutrition Prescription:   Diet: fibersource HN at 15 ml/hr   Water flush 30 ml q 4 hrs  IV dextrose or Fluids:    dexmedetomidine 400 mcg/100 mL in NS (PRECEDEX) (4mcg/mL) Last Rate: 1.2 mcg/kg/hr (05/06/19 1136)   dextrose 5% Last Rate: 25 mL/hr (05/06/19 0800)   fentaNYL 2500 mcg/250 mL in NS (10 mcg/mL) Last Rate: 25 mcg/hr (05/06/19 1051)   heparin infusion (100 units/ml) Last Rate: 16 Units/kg/hr (05/06/19 1207)   niCARdipine Last Rate: Stopped (04/26/19 0701)   norepinephrine IV infusion in D5W    phenylephrine IV infusion in NS Last Rate: Stopped (05/05/19 1150)   sodium chloride 0.9% Last Rate: Stopped (05/03/19 1300)       Current Nutrition Intake:  OG placed 4/26.  .  TF provides:  432 calories, 19 g protein, 471 ml free water.    Anthropometrics:  Height: 4' 11\" (149.9 cm)  Admission weight: 105#  Weight: 109 lb 14.4 oz (49.9 kg)    Physical Findings: 5/3  Moderate fat loss in orbital area.  Mild to moderate muscle loss in temporal area.  Mild muscle loss in clavicle and acromion area.  Edema to +2.    GI Status/Output:   GI symptoms per nursing:   Last  ml liquid per rectal tube    Skin/Wound:   Gerardo Scale Score: 12    Medications:  Levothyroxine-IV  Medications reviewed.    Labs:  Na 147, BUN 40, creat 0.97  ,   Labs reviewed    Estimated Nutrition Needs:  Using ideal weight of 45.5 kg.    Energy Needs: 6774-7161 kcals daily per " 25-30 kcal/kg   Protein Needs: 55-68 g daily, 1.2-1.5 g/kg.    Malnutrition: Not noted.  Not enough criteria met.    Nutrition Risk Level: moderate risk    Nutrition DX:  Swallow difficulty r/t intubation evidenced by NPO, need for nutrition support   Nutrition knowledge deficit r/t new DX evidenced by surgery-on hold    Goals:  Tolerate TF  Participate in diet ed-on hold    Plan: gradual TF advance to goal.  Fibersource HN increase 10 ml q 8 hrs to goal of 45 ml/hr continuous.  (If levothyroxine adjusted to be given per FT then goal TF rate to adjust to 50ml/hr  x 21 hrs.)  Water flush 150 ml q 6 hrs.  At goal to provide:  1296 calories, 58 g protein, 177 g carb, 16 g fiber, 1472 ml free water.     Monitor:   TF tolerance, labs, wt, hydration    See Care Plan for Problems, Goals, and Interventions.

## 2021-05-28 NOTE — ANESTHESIA CARE TRANSFER NOTE
Last vitals:   Vitals:    04/24/19 1318   BP: 103/68   Pulse: 94   Resp: 16   Temp: 35.9  C (96.6  F)   SpO2: 99%     Patient's level of consciousness is drowsy  Spontaneous respirations: no: sedated on  vent  Maintains airway independently: yes  Dentition unchanged: yes  Oropharynx: endotracheal tube in place    QCDR Measures:  ASA# 20 - Surgical Safety Checklist: WHO surgical safety checklist completed prior to induction    PQRS# 430 - Adult PONV Prevention: NA - Not adult patient, not GA or 3 or more risk factors NOT present  ASA# 8 - Peds PONV Prevention: NA - Not pediatric patient, not GA or 2 or more risk factors NOT present  PQRS# 424 - Mee-op Temp Management: 4559F - At least one body temp DOCUMENTED => 35.5C or 95.9F within required timeframe  PQRS# 426 - PACU Transfer Protocol:NA - Patient did not go to PACU  ASA# 14 - Acute Post-op Pain: ASA14B - Patient did NOT experience pain >= 7 out of 10

## 2021-05-28 NOTE — PATIENT CARE CONFERENCE
"Family care conference held this afternoon at 130pm. Present were providers Morales, this CM, Chaplain Parsons as well as son, brother, and two nephews. An  was used via [employer]. Pt current status as well as plan for trach and PEG were discussed, family asked questions to their satisfaction. Family aware that surgery is planned for tomorrow at 230pm, they acknowledge understanding but would also like to go over \"risks and benefits\" with Dr Kang. They are aware that risks and benefits will be discussed prior to surgery and signing the consent.   "

## 2021-05-28 NOTE — PLAN OF CARE
Problem: Mechanical Ventilation  Goal: Patient will maintain patent airway  Outcome: Progressing  Goal: Oral health is maintained or improved  Outcome: Progressing  Goal: ET tube will be managed safely  Outcome: Progressing     HARINDER Boogie, 5/5/2019

## 2021-05-28 NOTE — PROGRESS NOTES
NEUROLOGY PROGRESS NOTE     Kody Johns,  1950, MRN [id number] Date: 2019     [facility]   Code status:  Full Code   PCP: Aristides Alegria MD, [phone]      ASSESSMENT & PLAN   Hospital Day#: 6  Diagnosis code: Ischemic stroke    Ischemic stroke  Aortic/Mitral valve repair      Head CT shows right P2 stroke which fits with the CTA scan.    Exam suggests more deficits than just a PCA stroke.    Suspect brainstem (upper titus/medulla) stroke in addition to PCA stroke.    Repeat head CT did not show hemorrhage.    MRI today to help with prognostication.     Continue permissive HTN.    Most likely cause of stroke is cardioembolic secondary to surgery.     Hold ASA (stopped today) due to low platelets. Needs anticoagulation long term.    Discussed prognosis with the family. Prognosticate after MRI. Most likely will need Trach and PEG. Will consider palliative care consultation.     Thank you again for this referral, please feel free to contact me if you have any questions.     CHIEF COMPLAINT S/P MVR (mitral valve repair)     HISTORY OF PRESENT ILLNESS     We have been requested by Dr. Omer to evaluate Kody Johns who is a 69 y.o.  male for acute stroke.    This is a 69-year-old male on whom neurology is been consulted to evaluate for stroke.  Patient had a mitral valve surgery yesterday and was under sedation after that.  Sedation was lightened today when it was noticed that he was having some ophthalmoplegia.  Was mainly involving the left eye.  Stroke code was called.  He was not a TPA candidate because of the recent surgery and unclear time of onset.  Left arm and leg weakness was also noted.  Head CT was done which showed a right P2 occlusion.  The patient's exam is been stable since the stroke code.  He was not a candidate for thrombectomy because the blood vessel was too small for mechanical thrombectomy.      Patient is unable to provide any history because of intubation.  History is  obtained through chart review and through talking to the son.    4/26  Patient remains intubated. Unable to extubate. He has spiked a fever.  Per family he has been opening his eyes. Nodding to questions. Moving right arm but not the right leg.  He did move the left arm once for the family. Though the healthcare staff have not observed this.  No other new symptoms per the family.   MRI brain could not be done due to Pacemaker wires.    4/29  Patient remains in the ICU. Due to low platelets the pacemaker wires cannot be removed and patient cannot get the MRI. Family still wanting aggressive cares. Patients fever has been thought to be secondary to neurological disease.    4/30  Patient has been agitated when off the Precedex. Plan for MRI today and possibly family conference tomorrow. Family is hopeful that he will make good recovery. Discussed with family that there is no cure for stroke.      PAST MEDICAL & SURGICAL HISTORY     Medical History  Patient Active Problem List    Diagnosis Date Noted     Atrial fibrillation with RVR (H)      Hypernatremia      Thrombocytopenia (H)      Sepsis, due to unspecified organism (H)      S/P MVR (mitral valve repair) 04/24/2019     ARF (acute respiratory failure) (H) 04/24/2019     Anemia due to blood loss, acute 04/24/2019     Coagulopathy (H) 04/24/2019     Non-English speaking patient 04/24/2019     A-fib (H) 09/14/2017     Heart failure with preserved ejection fraction (H) 09/14/2017     Moderate malnutrition (H) 08/31/2017     ALEENA (acute kidney injury) (H) 08/31/2017     Aortic valve disease, rheumatic 08/30/2017     Lightheadedness 08/30/2017     Atherosclerosis of native coronary artery of native heart without angina pectoris      Essential hypertension with goal blood pressure less than 130/85      Pure hypercholesterolemia      Dysuria 08/29/2017     Hypothyroidism, unspecified type      Mitral valve stenosis, unspecified etiology      Elevated LFTs 08/26/2016      Silent Stroke      Shoulder strain      Dysphagia      Hyperlipidemia      Rheumatic heart disease      Blurry vision      Hearing loss      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  Surgical History  He  has a past surgical history that includes Cataract extraction (Left, 06/13/2016); Cardiac catheterization; Coronary Angiogram (N/A, 11/30/2018); Eye surgery; pr replacement of mitral valve (N/A, 4/24/2019); Aortic valve replacement (N/A, 4/24/2019); and PICC Team Line Insertion (4/29/2019).     SOCIAL HISTORY     Reviewed, and he  reports that he quit smoking about 4 years ago. His smoking use included cigarettes. He has a 50.00 pack-year smoking history. He has quit using smokeless tobacco. He reports that he does not drink alcohol or use drugs.     FAMILY HISTORY     Reviewed, and family history includes No Medical Problems in his father and mother.     ALLERGIES     No Known Allergies     REVIEW OF SYSTEMS     Unable to do ROS due to intubation and AMS.     HOME & HOSPITAL MEDICATIONS     Prior to Admission Medications  Medications Prior to Admission   Medication Sig Dispense Refill Last Dose     albuterol (PROAIR HFA;PROVENTIL HFA;VENTOLIN HFA) 90 mcg/actuation inhaler Inhale 1-2 puffs every 6 (six) hours as needed. 1 Inhaler 5 4/22/2019     amoxicillin (AMOXIL) 500 MG capsule TAKE 4 TABLETS (2,000 mg) BY MOUTH 1 HOUR PRIOR TO PROCEDURE. 4 capsule 3 Unknown      atorvastatin (LIPITOR) 40 MG tablet TAKE 1 PILL BY MOUTH AT BEDTIME. FOR CHOLESTEROL 90 tablet 2 4/22/2019     baclofen (LIORESAL) 10 MG tablet TAKE 1 TABLET BY MOUTH TWICE DAILY 60 tablet 5 4/23/2019     enoxaparin (LOVENOX) 40 mg/0.4 mL syringe Inject 0.4 mL (40 mg total) under the skin every evening. 2 Syringe 0 4/22/2019     enoxaparin (LOVENOX) 60 mg/0.6 mL syringe Inject 0.6 mL (60 mg total) under the skin every morning. 3 Syringe 0 4/23/2019 at am     fluticasone (FLONASE) 50 mcg/actuation nasal spray 2 sprays into each nostril daily. (Patient taking differently: 2 sprays into each nostril daily as needed.       ) 10 g 3 4/22/2019     furosemide (LASIX) 20 MG tablet TAKE 1 TABLET (20 MG TOTAL) BY MOUTH DAILY FOR EDEMA 90 tablet 3 4/23/2019     levothyroxine (SYNTHROID, LEVOTHROID) 88 MCG tablet TAKE 1 PILL BY MOUTH EVERYDAY FOR THYROID 30 tablet 6 4/23/2019     metoprolol succinate (TOPROL-XL) 25 MG TAKE 1 PILL BY MOUTH EVERYDAY FOR BLOOD PRESSURE 90 tablet 3 4/23/2019     omeprazole (PRILOSEC) 20 MG capsule TAKE 1 PILL BY MOUTH EVERYDAY FOR STOMACH 30 capsule 8 4/23/2019     spironolactone (ALDACTONE) 25 MG tablet TAKE 1 PILL BY MOUTH EVERYDAY 30 tablet 8 4/23/2019     warfarin (COUMADIN/JANTOVEN) 1 MG tablet Take 1-2 mg by mouth See Admin Instructions. 1 mg daily on Mon/Wed/Fri; and 2 mg daily rest of week   4/19/2019     acetaminophen (TYLENOL) 500 MG tablet Take 500-1,000 mg by mouth every 6 (six) hours as needed for pain. Every 6-8 hours as needed. No more than 4,000MG of acetaminophen daily.   Unknown     ibuprofen (ADVIL,MOTRIN) 600 MG tablet Take 600 mg by mouth every 6 (six) hours as needed for pain.   Unknown       Hospital Medications    acetaminophen  650 mg Oral Q6H    Or     acetaminophen  650 mg Oral Q6H    Or     acetaminophen  650 mg Rectal Q6H     atorvastatin  40 mg Enteral Tube DAILY     baclofen  10 mg Enteral Tube BID     cefTRIAXone (ROCEPHIN)  IV  1 g Intravenous DAILY      chlorhexidine  15 mL Topical Q12H     docusate sodium  100 mg Oral BID    Or     docusate sodium (COLACE) 50 mg/5 mL oral liquid  100 mg Enteral Tube BID     furosemide  40 mg Intravenous Q8H     insulin aspart (NovoLOG) injection   Subcutaneous Q4H FIXED TIMES     levothyroxine  88 mcg Enteral Tube Daily 0600     metoprolol tartrate  12.5 mg Enteral Tube BID     metoprolol tartrate  12.5 mg Enteral Tube 0330, 1300, 1700 - CV Metoprolol     pantoprazole  40 mg Intravenous Q24H    Or     omeprazole  20 mg Oral Q24H    Or     omeprazole  20 mg Enteral Tube Q24H     polyethylene glycol  17 g Enteral Tube DAILY     potassium chloride liquid/packet  30 mEq Oral Q4H     sodium chloride  10-30 mL Intravenous Q8H FIXED TIMES     sodium chloride  3 mL Intravenous Line Care        PHYSICAL EXAM     Vital signs  Temp:  [96.3  F (35.7  C)-102.9  F (39.4  C)] 98.8  F (37.1  C)  Heart Rate:  [] 104  Resp:  [13-60] 17  BP: ()/(40-99) 118/87  FiO2 (%):  [50 %-100 %] 50 %    Weight:   122 lb 8 oz (55.6 kg)    GENERAL: Healthy appearing, alert, no acute distress, normal habitus.  CARDIOVASCULAR: Ext warm and well perfused.  NEUROLOGICAL:  Patient is lying in bed intubated. Opens eyes to voice Minimaly. Right pupil larger than left. EOM absent. VFI on right. Corneals present. NLF symmetric. Does not move any ext to pain or spontaneously. Tone symmetric.      DIAGNOSTIC STUDIES     Pertinent Radiology   Head CT images reviewed. No acute stroke seen  IMPRESSION:   CONCLUSION:   HEAD CT:  1.  No intracranial hemorrhage, mass lesions, hydrocephalus or CT evidence for an acute infarct. MRI is more sensitive for the evaluation of acute infarct.  2.  Chronic lacunae as detailed above.  3.  Mild diffuse cerebral parenchymal volume loss. Presumed chronic hypertensive/microvascular ischemic white matter changes.        HEAD CTA:   1.  There is cut off on the P2 segment of the right posterior cerebral artery.  2.  No high-grade  stenosis or occlusion of the rest of the major intracranial arteries.  3.  There is 1.5 to 2 mm focal outpouching off the expected location of the origin of the right posterior communicating artery. This is likely an infundibulum versus small aneurysm.        NECK CTA:  1.  No significant stenosis in the neck vessels based on NASCET criteria.  2.  No evidence for dissection.    Repeat Head CT 4/26 Images reviewed. Show right PCA stroke with question of pontine stroke.    Repeat Head CT images reviewed. 4/29  IMPRESSION:   CONCLUSION:  1.  Motion degraded examination demonstrates expected temporal evolution of right posterior cerebral artery territory infarct including interval development of small volume petechial hemorrhage as above. No hemorrhagic transformation. Mild local mass   effect with partial effacement of the posterior right lateral ventricle.  2.  Some loss of gray-white matter differentiation involving the parasagittal left parieto-occipital junction. It is uncertain if this is artifactual or reflect a new area of subacute ischemia/infarct. Consider MRI or follow-up head CT if the patient is   not an MRI candidate.  3.  Background of mild age-related changes elsewhere similar to the prior exam.    Recent Results (from the past 24 hour(s))   POCT Glucose - every 4 hours    Collection Time: 04/29/19  4:21 PM   Result Value Ref Range    Glucose 204 (H) 70 - 139 mg/dL   Sodium    Collection Time: 04/29/19  5:21 PM   Result Value Ref Range    Sodium 150 (H) 136 - 145 mmol/L   Blood Gases, Arterial    Collection Time: 04/29/19  5:27 PM   Result Value Ref Range    pH, Arterial 7.53 (H) 7.37 - 7.44    pCO2, Arterial 31 (L) 35 - 45 mm Hg    pO2, Arterial 60 (L) 75 - 85 mm Hg    Bicarbonate, Arterial Calc 27.6 23.0 - 29.0 mmol/L    O2 Sat, Arterial 97.8 (H) 95.0 - 96.0 %    Oxyhemoglobin 95.3 95.0 - 96.0 %    Base Excess, Arterial Calc 3.5 mmol/L    Ventilation Mode VCV     Rate 20 rr/min    FIO2 0.50     Peep 7  cm H2O    Sample Stabilized Temperature 37.0 degrees C    Ventilator Tidal Volume 300 mL   Potassium    Collection Time: 04/29/19  7:30 PM   Result Value Ref Range    Potassium 3.4 (L) 3.5 - 5.0 mmol/L   Platelet Count    Collection Time: 04/29/19  7:30 PM   Result Value Ref Range    Platelets 47 (LL) 140 - 440 thou/uL   POCT Glucose - every 4 hours    Collection Time: 04/29/19  7:54 PM   Result Value Ref Range    Glucose 232 (H) 70 - 139 mg/dL   POCT Glucose - every 4 hours    Collection Time: 04/29/19 11:59 PM   Result Value Ref Range    Glucose 194 (H) 70 - 139 mg/dL   Platelet Product Information    Collection Time: 04/30/19 12:05 AM   Result Value Ref Range    Unit Type A Neg     Blood Expiration Date [id number]     Unit Number [id number]     Status Transfused     Component Platelets     PRODUCT CODE O1029H30     Issue Date and Time [id number]     Blood Type 0600     CODING SYSTEM [id number]    Sodium    Collection Time: 04/30/19 12:48 AM   Result Value Ref Range    Sodium 147 (H) 136 - 145 mmol/L   Potassium    Collection Time: 04/30/19 12:48 AM   Result Value Ref Range    Potassium 3.4 (L) 3.5 - 5.0 mmol/L   Magnesium    Collection Time: 04/30/19 12:48 AM   Result Value Ref Range    Magnesium 2.9 (H) 1.8 - 2.6 mg/dL   POCT Glucose - every 4 hours    Collection Time: 04/30/19  3:56 AM   Result Value Ref Range    Glucose 189 (H) 70 - 139 mg/dL   HM2(CBC W/O DIFF)    Collection Time: 04/30/19  5:59 AM   Result Value Ref Range    WBC 6.3 4.0 - 11.0 thou/uL    RBC 2.69 (L) 4.40 - 6.20 mill/uL    Hemoglobin 8.0 (L) 14.0 - 18.0 g/dL    Hematocrit 25.5 (L) 40.0 - 54.0 %    MCV 95 80 - 100 fL    MCH 29.7 27.0 - 34.0 pg    MCHC 31.4 (L) 32.0 - 36.0 g/dL    RDW 16.0 (H) 11.0 - 14.5 %    Platelets 46 (LL) 140 - 440 thou/uL    MPV 12.2 8.5 - 12.5 fL   Basic Metabolic Panel    Collection Time: 04/30/19  5:59 AM   Result Value Ref Range    Sodium 147 (H) 136 - 145 mmol/L    Potassium 3.9 3.5 - 5.0 mmol/L    Chloride  115 (H) 98 - 107 mmol/L    CO2 26 22 - 31 mmol/L    Anion Gap, Calculation 6 5 - 18 mmol/L    Glucose 229 (H) 70 - 125 mg/dL    Calcium 7.4 (L) 8.5 - 10.5 mg/dL    BUN 50 (H) 8 - 22 mg/dL    Creatinine 1.28 0.70 - 1.30 mg/dL    GFR MDRD Af Amer >60 >60 mL/min/1.73m2    GFR MDRD Non Af Amer 56 (L) >60 mL/min/1.73m2   Blood Gases, Arterial    Collection Time: 04/30/19  7:32 AM   Result Value Ref Range    pH, Arterial 7.48 (H) 7.37 - 7.44    pCO2, Arterial 36 35 - 45 mm Hg    pO2, Arterial 86 (H) 75 - 85 mm Hg    Bicarbonate, Arterial Calc 27.4 23.0 - 29.0 mmol/L    O2 Sat, Arterial 100.0 (H) 95.0 - 96.0 %    Oxyhemoglobin 96.7 (H) 95.0 - 96.0 %    Base Excess, Arterial Calc 3.2 mmol/L    Ventilation Mode VCV     Rate 20 rr/min    FIO2 50.00     Peep 7 cm H2O    Sample Stabilized Temperature 37.0 degrees C    Ventilator Tidal Volume 300 mL   POCT Glucose - every 4 hours    Collection Time: 04/30/19  7:55 AM   Result Value Ref Range    Glucose 205 (H) 70 - 139 mg/dL   Type and Screen    Collection Time: 04/30/19  7:59 AM   Result Value Ref Range    ABORh A POS     Antibody Screen Negative Negative   Crossmatch    Collection Time: 04/30/19  9:08 AM   Result Value Ref Range    Crossmatch Compatible     Blood Expiration Date [id number]     Unit Type A Pos     Unit Number [id number]     Status Issued     Component Red Blood Cells     PRODUCT CODE L6572J32     Issue Date and Time [id number]     Blood Type 6200     CODING SYSTEM [id number]        Total time spent for face to face visit, reviewing labs/imaging studies, counseling and coordination of care was: 25 min More than 50% of this time was spent on counseling and coordination of care.  Discussed prognosis with family. Discussed need for MRI today.    Dustin Moya MD  [employer] Secure Messaging: [email or web address]  Tel: [phone]  This note was dictated using voice recognition software.  Any grammatical or context distortions are  unintentional and inherent to the software.

## 2021-05-28 NOTE — PROGRESS NOTES
Vent Mode: PCV/VG  FiO2 (%):  [24 %] 24 %  S RR:  [16] 16  S VT:  [300 mL] 300 mL  PEEP/CPAP (cm H2O):  [5 cm H2O] 5 cm H2O  Minute Ventilation (L/min):  [8.3 L/min-11.5 L/min] 11.2 L/min  PIP:  [6 cm H2O-23 cm H2O] 18 cm H2O  MT SUP:  [15 cm H20] 15 cm H20  MAP (cm H2O):  [5-10] 6     Pt was on full vent support with the above setting throughout the night. Pt's BS were diminished bilaterally, pt has a #8 Shiley trach and was not weaned. Trach site has a small amount of drainage and oozing.Sxn small thin white secretion and pt did have gag reflux upon suction. Pt tolerated well. RT will continue to monitor    HARINDER Reza

## 2021-05-28 NOTE — PROGRESS NOTES
Progress Note    Assessment/Plan  S/P AVR/MVR POD # 1  Postop respiratory failure, sedated, intubated and on mechanical ventilation  Vent: Vt 350, R 20, PEEP 5, FiO2 40%, SPO2 97%  Currently on Flolan therapy adequate strength, will run for 2 hours and then discontinue  Awakens and moves extremities and follow commands  Postop cardiogenic shock requiring epinephrine and nicardipine drip for adequate hemodynamic support  Drips:Epi 0.03 mcg/kg/min, Nicardipine 1 mg/hr.  Sedation: Precedex 0.5 mcg/kg/hr  Hemodynamics: CI 2.7, CO 3.7, CVP 11, PAP 26/16  Hypocalcemia, ionized calcium 1.07, treated with calcium gluconate 1 g IVPB  A. fib now sinus per monitor  Diabetes mellitus, preop A1c 6.7, currently off insulin drip  Incisions are clean dry intact, lung sounds diminished bilaterally  Chest tube drainage 140/580 serosanguineous  Left chest tube in today due to excessive drainage  Abdomen is soft and nontender, bowel sounds present all 4 quarters  Urine output is marginal, no BM.  Positive bowel sounds as noted above  Weight 51.5 kg, preop weight is 48 kg  5% albumin 250 mL x 1 dose  Lasix 40 mg IV twice daily x24 hours  Run Veletri at quarter strength for 2 hours and then check her blood gas and if within the acceptable range, will wean and extubate  Wean epinephrine for CI > 2 or MAP > 65  Keep in the unit today      Subjective  Sedated, intubated and on mechanical ventilation  Objective  Awakens and move all extremities and follow commands  Vital signs in last 24 hours  Temp:  [96.6  F (35.9  C)-102  F (38.9  C)] 100.9  F (38.3  C)  Heart Rate:  [] 88  Resp:  [16-27] 24  BP: ()/(63-73) 108/73  Arterial Line BP: ()/(48-75) 107/58  FiO2 (%):  [40 %-100 %] 40 %  Weight:   113 lb 8.6 oz (51.5 kg)  Preop weight 48 kg  Intake/Output last 3 shifts  I/O last 3 completed shifts:  In: 7489.3 [I.V.:4386.3; Blood:1679; IV Piggyback:1424]  Out: 3188 [Urine:1583; Blood:1025; Chest Tube:580]  Intake/Output this  "shift:  No intake/output data recorded.    Review of Systems   Review of systems not obtained due to inability to communicate with the patient.     Physical Exam  BP (!) 78/54   Pulse 97   Temp (!) 100.9  F (38.3  C) (Core)   Resp 24   Ht 4' 10\" (1.473 m)   Wt 113 lb 8.6 oz (51.5 kg)   SpO2 97%   BMI 23.73 kg/m    HRR A. fib, now sinus per monitor, incisions are clean dry intact, lungs sound diminished bilaterally  Abdomen is soft and nontender, bowel sounds are in all 4 quadrants  Mild edema appreciated      Pertinent Labs   Lab Results: personally reviewed.   Lab Results   Component Value Date     04/25/2019    K 4.2 04/25/2019     (H) 04/25/2019    CO2 20 (L) 04/25/2019    BUN 17 04/25/2019    CREATININE 0.92 04/25/2019    CALCIUM 8.5 04/25/2019     Lab Results   Component Value Date    WBC 14.6 (H) 04/25/2019    HGB 10.8 (L) 04/25/2019    HCT 32.3 (L) 04/25/2019    MCV 92 04/25/2019    PLT 72 (L) 04/25/2019       Pertinent Radiology   Radiology Results: Personally reviewed image/s, Personally reviewed impression/s and mild pulmonary congestion and left atelectasis  EKG Results: personally reviewed.  and Sinus rhythm per monitor    Juan Wray              "

## 2021-05-28 NOTE — PROGRESS NOTES
"  Clinical Nutrition Therapy Nutrition Support Note      Reason:  RD managing TF.    Subjective:  Pt intubated.  TF on hold d/t ileus.  Chart reviewed.    Nutrition History:  Information from family/caregiver and chart.  Diet prior to admission:  General.  Pt eats mae with meat and/or veggies and rice.  Recent food/fluid intake: good.   Cultural/Latter-day food needs or preferences: Tajik  Food allergies or intolerances: nkfa  TF started 4/26.      Nutrition Prescription:   Diet: NPO  Water flush   IV dextrose or Fluids:    amiodarone 900 mg/500 ml standard 24 hour infusion Last Rate: 0.5 mg/min (05/03/19 0316)   dexmedetomidine 400 mcg/100 mL in NS (PRECEDEX) (4mcg/mL) Last Rate: 1.5 mcg/kg/hr (05/03/19 1018)   DOPamine    epinephrine Last Rate: Stopped (04/27/19 0655)   fentaNYL 2500 mcg/250 mL in NS (10 mcg/mL) Last Rate: 100 mcg/hr (05/03/19 1056)   insulin infusion (1 unit/mL) Last Rate: Stopped (04/24/19 1906)   niCARdipine Last Rate: Stopped (04/26/19 0701)   norepinephrine IV infusion in D5W    phenylephrine IV infusion in NS Last Rate: 80 mcg/min (05/03/19 0800)   sodium chloride 0.9%    vasopressin        Current Nutrition Intake:  OG placed 4/26.  X-ray indicates gastric.  NPO d/t ileus since 5/2.      Anthropometrics:  Height: 4' 11\" (149.9 cm)  Admission weight: 105#  Weight: 121 lb 4.8 oz (55 kg)  I/Os indicate 4 L gain.    Physical Findings: 5/3  Moderate fat loss in orbital area.  Mild to moderate muscle loss in temporal area.  Mild muscle loss in clavicle and acromion area.  Edema to +2.    GI Status/Output:   GI symptoms per nursing: abd distention, ileus.  X-ray of 5/2 indicates possible ileus vs obstruction  Last BM 5/2  OG to LIS.   500 ml out.    Skin/Wound:   Gerardo Scale Score: 9    Medications:  Levothyroxine  Medications reviewed.    Labs:  Na 149, BUN 56, creat 1.31  Labs reviewed    Estimated Nutrition Needs:  Using ideal weight of 45.5 kg.    Energy Needs: 0728-4298 kcals daily per " 25-30 kcal/kg   Protein Needs: 55-68 g daily, 1.2-1.5 g/kg.    Malnutrition: Not noted.  Not enough criteria met.    Nutrition Risk Level: moderate risk    Nutrition DX:  Swallow difficulty r/t intubation evidenced by NPO, need for nutrition support   Nutrition knowledge deficit r/t new DX evidenced by surgery-on hold    Goals:  Tolerate TF-not progressing  Participate in diet ed-on hold    Education needs:  Cardiac, diabetic diet-on hold    Plan:  OG to LIS for ileus.  Monitor improvement in bowel function and ability to restart TF.  Note family considering trache, PEG.     Monitor:   Bowel function, labs, wt, hydration    See Care Plan for Problems, Goals, and Interventions.

## 2021-05-28 NOTE — PLAN OF CARE
Care Plan  Care Management    Care Management Goals of the Day: Follow progression of care    Care Progression Reviewed With: Charge RN, MD, HUC, RNCM, CMSW    Barriers to Discharge: intubated, sedated    Discharge Disposition: TBD    Expected Discharge Date: TBD    Transportation: family if able    Care Coordination Narrative: Pt from home with family, Pt speaks Swazi, son speaks English. Here for planned heart procedure, yesterday displayed symptoms of a stroke and that was verified with CT. CM to follow and assist.

## 2021-05-28 NOTE — ANESTHESIA POSTPROCEDURE EVALUATION
Patient: Kody HOPPER Gurumitch  CREATION, GASTROSTOMY, PERCUTANEOUS, ENDOSCOPIC - and trach, TRACHEOSTOMY  Anesthesia type: general    Patient location: ICU  Last vitals:   Vitals Value Taken Time   /102 5/8/2019  4:15 PM   Temp 36.7  C (98.1  F) 5/8/2019  4:01 PM   Pulse 127 5/8/2019  4:18 PM   Resp 19 5/8/2019  4:18 PM   SpO2 96 % 5/8/2019  4:18 PM   Vitals shown include unvalidated device data.  Post vital signs: stable  Level of consciousness: lethargic  Post-anesthesia pain: pain controlled  Post-anesthesia nausea and vomiting: no  Pulmonary: ventilator, trach  Cardiovascular: stable  Hydration: adequate  Anesthetic events: no    QCDR Measures:  ASA# 11 - Mee-op Cardiac Arrest: ASA11B - Patient did NOT experience unanticipated cardiac arrest  ASA# 12 - Mee-op Mortality Rate: ASA12B - Patient did NOT die  ASA# 13 - PACU Re-Intubation Rate: ASA13B - Patient did NOT require a new airway mgmt  ASA# 10 - Composite Anes Safety: ASA10A - No serious adverse event    Additional Notes:

## 2021-05-28 NOTE — PROGRESS NOTES
CVTS Daily Progress Note   4/30/2019   POD #  s/p AVR/MVR  DOS 4/25/19- Dr. Vaughn    LOS: 6 days   EF 45%   A1C 6.7 %      Overnight events:  No improvement in neuro status  Has had dilaudid and oxycodone for respiratory over breathing on vent and generalize agitation.      Maintaining oxygen saturations on Vent.   Normotensive with permissive HTN using thomas gtt   Pain controlled: dilaudid/oxycodone  + BM/+BS- .   Tolerating diet NPO.   UOP adequate.   Hgb 8.0 - one unit PRBC today.   Note asa held by neurology      PLAN  Liver profile  Continue diuresis  pRBC  BMP in am    OBJECTIVE:    Temp:  [96.3  F (35.7  C)-102.1  F (38.9  C)] 102.1  F (38.9  C)  Heart Rate:  [] 93  Resp:  [13-60] 17  BP: ()/(40-99) 141/67  FiO2 (%):  [50 %-100 %] 50 %  Wt Readings from Last 2 Encounters:   04/30/19 0600 122 lb 8 oz (55.6 kg)   04/29/19 0400 116 lb 2.9 oz (52.7 kg)   04/28/19 0200 116 lb 6.5 oz (52.8 kg)   04/27/19 0500 117 lb 8.1 oz (53.3 kg)   04/26/19 0400 112 lb 14 oz (51.2 kg)   04/25/19 0500 113 lb 8.6 oz (51.5 kg)   04/24/19 2000 105 lb 13.1 oz (48 kg)   04/24/19 0628 105 lb 12.8 oz (48 kg)   04/23/19 0937 106 lb 11.2 oz (48.4 kg)       Current Medications:    Scheduled Meds:    acetaminophen  650 mg Oral Q6H    Or     acetaminophen  650 mg Oral Q6H    Or     acetaminophen  650 mg Rectal Q6H     atorvastatin  40 mg Enteral Tube DAILY     baclofen  10 mg Enteral Tube BID     cefTRIAXone (ROCEPHIN)  IV  1 g Intravenous DAILY     chlorhexidine  15 mL Topical Q12H     docusate sodium  100 mg Oral BID    Or     docusate sodium (COLACE) 50 mg/5 mL oral liquid  100 mg Enteral Tube BID     furosemide  40 mg Intravenous Q8H     insulin aspart (NovoLOG) injection   Subcutaneous Q4H FIXED TIMES     levothyroxine  88 mcg Enteral Tube Daily 0600     metoprolol tartrate  12.5 mg Enteral Tube BID     metoprolol tartrate  12.5 mg Enteral Tube 0330, 1300, 1700 - CV Metoprolol     pantoprazole  40 mg Intravenous Q24H    Or      omeprazole  20 mg Oral Q24H    Or     omeprazole  20 mg Enteral Tube Q24H     polyethylene glycol  17 g Enteral Tube DAILY     potassium chloride liquid/packet  30 mEq Oral Q4H     sodium chloride  10-30 mL Intravenous Q8H FIXED TIMES     sodium chloride  3 mL Intravenous Line Care     Continuous Infusions:    amiodarone 900 mg/500 ml standard 24 hour infusion 0.5 mg/min (04/30/19 1224)     dexmedetomidine 400 mcg/100 mL in NS (PRECEDEX) (4mcg/mL) 1.5 mcg/kg/hr (04/30/19 1222)     DOPamine       epinephrine Stopped (04/27/19 0655)     insulin infusion (1 unit/mL) Stopped (04/24/19 1906)     niCARdipine Stopped (04/26/19 0701)     norepinephrine IV infusion in D5W       phenylephrine IV infusion in  mcg/min (04/30/19 1223)     vasopressin       PRN Meds:.acetaminophen, acetaminophen, albumin human, aluminum-magnesium hydroxide-simethicone, bisacodyl, bisacodyl, dexmedetomidine 400 mcg/100 mL in NS (PRECEDEX) (4mcg/mL), dextrose 50 % (D50W), DOPamine, epinephrine, glucagon (human recombinant), HYDROmorphone, lipase-protease-amylase **AND** sodium bicarbonate, magnesium hydroxide, naloxone **OR** naloxone, niCARdipine, norepinephrine IV infusion in D5W, ondansetron, oxyCODONE intensol (ROXICODONE) conc solution 20 mg/mL **OR** oxyCODONE, sodium chloride, sodium chloride 0.9%, sodium chloride bacteriostatic, sodium chloride, sodium chloride, sodium chloride, sodium phosphates 133 mL, traZODone, vasopressin    Cardiographics:    Telemetry monitoring demonstrates Atrial fib with rates in the   per my personal review.    Imaging:    Ct Head Without Contrast    Result Date: 4/29/2019  EXAM: CT HEAD WO CONTRAST LOCATION: [facility] DATE/TIME: 4/29/2019 6:20 PM INDICATION: Follow up stroke COMPARISON: CT head 4/26/2019 TECHNIQUE: Routine without IV contrast. Multiplanar reformats. Dose reduction techniques were used. FINDINGS: INTRACRANIAL CONTENTS: Images mildly degraded by patient motion. Expected  temporal evolution of right posterior cerebral artery territory infarct involving the parasagittal right occipital lobe and posterior temporal lobe as well as the posterior lateral  right thalamus. Increased low-attenuation change, mild local mass effect, and new subtle gyriform hyperdensity within the parasagittal right occipital and posterior right temporal lobes suggesting small volume petechial hemorrhage. No space-occupying hemorrhagic transformation. Poor visualization of gray-white matter differentiation at the parasagittal left parieto-occipital junction (axial image 32 for example). Mild presumed chronic small vessel ischemic changes. Mild generalized volume loss. No hydrocephalus. VISUALIZED ORBITS/SINUSES/MASTOIDS: Prior bilateral cataract surgery. Visualized portions of the orbits are otherwise unremarkable. No significant paranasal sinus mucosal disease. No significant middle ear or mastoid effusion. OSSEOUS STRUCTURES/SOFT TISSUES: No significant abnormality.     CONCLUSION: 1.  Motion degraded examination demonstrates expected temporal evolution of right posterior cerebral artery territory infarct including interval development of small volume petechial hemorrhage as above. No hemorrhagic transformation. Mild local mass effect with partial effacement of the posterior right lateral ventricle. 2.  Some loss of gray-white matter differentiation involving the parasagittal left parieto-occipital junction. It is uncertain if this is artifactual or reflect a new area of subacute ischemia/infarct. Consider MRI or follow-up head CT if the patient is not an MRI candidate. 3.  Background of mild age-related changes elsewhere similar to the prior exam.    Xr Chest 1 View For Picc Placement Portable    Result Date: 4/29/2019  EXAM: XR CHEST 1 VIEW FOR PICC LINE PLACEMENT PORTABLE LOCATION: [facility] DATE/TIME: 4/29/2019 3:10 PM INDICATION: verify catheter placement COMPARISON: 04/27/2019. FINDINGS:  Endotracheal tube tip is 3.0 cm above the geogrina. NG tube with tip below the inferior border of the image. There is a right internal jugular [place]-Nick catheter with the tip at the expected location of the main pulmonary outflow tract. There are  mediastinal and right chest drains. New right PICC. The tip is in the expected location of the right atrium. Lung volumes are low. Bilateral atelectasis with trace effusions not excluded. No pneumothorax on portable imaging. Unchanged cardiac size. Median sternotomy.      Lab Results (most recent, reviewed):    Hemoglobin (g/dL)   Date Value   04/30/2019 8.0 (L)     WBC (thou/uL)   Date Value   04/30/2019 6.3     Potassium (mmol/L)   Date Value   04/30/2019 3.9     Creatinine (mg/dL)   Date Value   04/30/2019 1.28     Hemoglobin A1c (%)   Date Value   04/23/2019 6.7 (H)     Magnesium (mg/dL)   Date Value   04/30/2019 2.9 (H)     Calcium (mg/dL)   Date Value   04/30/2019 7.4 (L)       122 lb 8 oz (55.6 kg)  Admit weight  47.9 kg    UOP: 1.8/ 24 hours    Physical Exam:    General: Patient seen in bed.  Neuro: unresponsive, just medicated   -Precedex 1.5 mcg   Intubated, on vent 50%  CV: Atrial fib   Pulm: Non-labored effort on vent.     Incision  C/D/I.  Abd: firm and distended, BS+   : Guzman with donta urine   Ext: Mild pedal edema, SCDs in place, warm  Neuro: Unresponsive to firm touch and voice.       ASSESSMENT/PLAN:    Kody Johns is a 69 y.o. male with a history of mitral and aortic valve stenosis who is now s/p AVR/MVR.    Principal Problem:    S/P MVR (mitral valve repair)  Active Problems:    Rheumatic heart disease    Mitral valve stenosis, unspecified etiology    Aortic valve disease, rheumatic    A-fib (H)    ARF (acute respiratory failure) (H)    Anemia due to blood loss, acute    Coagulopathy (H)    Non-English speaking patient    Thrombocytopenia (H)    Sepsis, due to unspecified organism (H)    Atrial fibrillation with RVR (H)    Hypernatremia        NEURO:    - Scheduled Tylenol and PRN oxycodone/dilaudid for pain  - Melatonin QHS    CV:   -Amiiodarone gtt  - Will hold on transition to oral   - Currently on Chivo for B/P - permissive HTN SBP goal of 120   - ASA 81mg  - Atorvastatin 40mg daily    PULM:   - Vent weaning per pul critical care  - Maintaining oxygen saturations on Vent 50%    FEN/GI:  - +BM  - Tube feedings at goal 50 cc/hr  - free water 250   - Abd distended, BS+   - Continue electrolyte replacement protocol  - Bowel regimen    RENAL:  - Adequate UOP/hr. Continue to monitor closely via monzon.   - Cr 1.28 ( baseline 0.92)  - Monzon to remain in for close monitoring of I/O and during period of diuresis/relative immobility.  - Diuresis with 40mg IV Lasix three times a day x 24 hours    - Reassess in am     HEME:  - Acute blood loss anemia post-op.   - one pRBC today hgb 8.0   - Thrombocytopenia  - Negative HIT    ID:  - Mee op ppx complete  - Rocephin   - ID consulted for persistent fevers  - Febrile    ENDO:   -  SSI q 4 hours     PPx:   - DVT: SCDs,   - heparin being held d/t low platelets   - GI: Omeprazole   - Ambulating with therapy : on hold.    DISPO:   - Continue critical care in ICU  Patient seen and discussed with Dr. Delaney.

## 2021-05-28 NOTE — PROGRESS NOTES
Critical Care Progress Note     Admit Date: 4/24/2019  ICU Day: 7     Code Status: full code    CC: rheumatic valvular disease    HPI: 69 y.o. Irish  male with rheumatic heart valve dz(AV regurgitation, , MV stenosis aand TV regurgitation) afib, and pericardial adhesions who was admitted 4/24/19 for AVR, MVR tissue valves, and takedown of pericardial adhesions by Dr Vaughn     Case summary: Pt was an easy intubation, received 15 mg methadone preop, Echo showed boggy RV.  PAPs in the 30s preop, pt had hard time coming off extracorporeal circulation, needing many bumps of epi, flolan was started.  Also they took down pericardial adhesions.  To ICU on full vent support, flolan full strength, not paced,  On epi, insulin and precedex drips.  Protamine was given on arrival to ICU.    Interval events:  4/24/19: to OR for AVR/MVR    To ICU on Flolan  4/25/19: Flolan weaned off   When off sedation for SBT, pt noted to not move left side and    had left eye with upward deviation   Stroke code called    CTA shows right P2 occlusion   Neurology consulted   SBP parameters changed to     Failed SBT  4/26/19: fever    Failed SBT   repeat head CT  revealed evolving acute/subacute infarction  4/27/19: fever T max 105   ID consulted   HIT panel sent due to persistent thrombocytopenia   4/29/19: some labored breathing with vent, vent changes made at bed side    Does better with flow rate at 50   Seems neurogenic in origin     Heme consult for ? of HIT   Dr Vaughn met with son with    4/30/19: unable to do SBT   MRI completed-Large evolving subacute right PCA distribution infarcts with moderate cytotoxic edema, sulcal  effacement and mass effect on the right lateral ventricle  5/01/19: ID signed off-rec ceftriaxone for 5 days     Major events over the last 24 hours: t max 101.2 last noc    Subjective: in bed, lightly sedated, not responding to me, over breathing the vent  ROS: unable to do    Drips: precedex,  "thomas      Ventilator: Mechanical Ventilation Day: # 8        Settings: 20/300/5/50%    /78   Pulse (!) 102   Temp (!) 101.1  F (38.4  C) (Oral)   Resp 22   Ht 4' 11\" (1.499 m)   Wt 120 lb 12.8 oz (54.8 kg)   SpO2 100%   BMI 24.40 kg/m       I/O last 3 completed shifts:  In: 2817 [I.V.:1167; NG/GT:1650]  Out: 3900 [Urine:3900]  Weight change: -1 lb 11.2 oz (-0.771 kg)  Vent Mode: VCV  FiO2 (%):  [35 %-50 %] 35 %  S RR:  [20] 20  S VT:  [300 mL] 300 mL  PEEP/CPAP (cm H2O):  [7 cm H2O] 7 cm H2O  Minute Ventilation (L/min):  [9.2 L/min-11.3 L/min] 9.2 L/min  PIP:  [14 cm H2O-50 cm H2O] 14 cm H2O  IN SUP:  [5 cm H20] 5 cm H20  MAP (cm H2O):  [7-20] 7      EXAM:   Mental status: in bed lightly sedated on vent    HEENT: NC right pupil larger than left, minimal rx(not new), OETT, FT  + cough and gag  Resp: few anterior rhonchi  Does not appear labored on vent presently  Cardiovascular: IRRR  Abdominal: soft  Extremeties: no e/c/c  Neurology: does not move left side, minimal movement  on right    Labs Personally reviewed: yes  Results from last 7 days   Lab Units 05/01/19  0957 04/30/19  0559 04/29/19  1930 04/29/19  1032  04/25/19  0458  04/24/19  1328   LN-WHITE BLOOD CELL COUNT thou/uL 7.3 6.3  --  4.1   < > 14.6*  --  11.8*   LN-HEMOGLOBIN g/dL 9.6* 8.0*  --  8.4*   < > 10.8*   < > 11.3*   LN-HEMATOCRIT % 29.6* 25.5*  --  26.3*   < > 32.3*  --  33.7*   LN-PLATELET COUNT thou/uL 42* 46* 47* 27*   < > 72*   < > 52*   LN-NEUTROPHILS RELATIVE PERCENT %  --   --   --   --   --  84*  --  88*   LN-MONOCYTES RELATIVE PERCENT %  --   --   --   --   --  11*  --  5   LN-MONOCYTES - REL (DIFF) %  --   --   --  8  --   --   --   --     < > = values in this interval not displayed.     Results from last 7 days   Lab Units 05/01/19  0445 04/30/19  2353 04/30/19  1801 04/30/19  1338 04/30/19  0559 04/30/19  0048  04/29/19  0524   LN-SODIUM mmol/L 148* 147* 148* 147* 147* 147*   < > 150*  150*   LN-POTASSIUM mmol/L 3.6  --   " --   --  3.9 3.4*   < > 3.4*   LN-CHLORIDE mmol/L 114*  --   --   --  115*  --   --  117*   LN-CO2 mmol/L 25  --   --   --  26  --   --  26   LN-BLOOD UREA NITROGEN mg/dL 48*  --   --   --  50*  --   --  44*   LN-CREATININE mg/dL 1.22  --   --   --  1.28  --   --  1.32*   LN-CALCIUM mg/dL 7.7*  --   --   --  7.4*  --   --  7.4*   LN-PROTEIN TOTAL g/dL  --   --   --  5.4*  --   --   --   --    LN-BILIRUBIN TOTAL mg/dL  --   --   --  1.6*  --   --   --   --    LN-ALKALINE PHOSPHATASE U/L  --   --   --  68  --   --   --   --    LN-ALT (SGPT) U/L  --   --   --  52*  --   --   --   --    LN-AST (SGOT) U/L  --   --   --  121*  --   --   --   --     < > = values in this interval not displayed.     Results from last 7 days   Lab Units 04/29/19  1032 04/25/19  0458 04/24/19  1327 04/24/19  1218   LN-INR  1.35* 1.57* 1.99* 1.88*   LN-PARTIAL THROMBOPLASTIN TIME seconds 50*  --  81* 147*       Last ABG 4/30/19:  PH 7.48  PCO2 36 PAO2 60 Bicarb 27.4     Microbiology: NGTD  Imaging (all imaging is personally reviewed):     MRI head 4/30/19-1.  Limited by motion. Large evolving subacute right PCA distribution infarcts with moderate cytotoxic edema, sulcal effacement and mass effect on the right lateral ventricle. No hydrocephalus..  Minimal, punctate petechial hemorrhage in the right occipital infarct bed.Patchy areas of acute to early subacute ischemia in the posterior left frontal cortex and minimally within the right anterior frontal cortex and right caudate body.    PCXR 4/27/19-ET tube 3 cm above the georgina. NG tube tip overlies the stomach. Right internal jugular [place]-Nick catheter tip overlies the pulmonary artery outflow tract. Mediastinal chest tube.left pleural effusion. Left lower lobe atelectasis. Mild vascular congestion and pulmonary edema. Postop median sternotomy. Heart size upper limits of normal    Head CT 4/26/19-Moderately sized hypodensity and loss of gray-white matter differentiation right temporal  occipital region, consistent with evolving acute/subacute infarction.  2.  No significant global mass effect or hemorrhage.    CTA head neck 4/25/19-HEAD CT:  1.  No intracranial hemorrhage, mass lesions, hydrocephalus or CT evidence for an acute infarct. MRI is more sensitive for the evaluation of acute infarct.  2.  Chronic lacunae as detailed above.  3.  Mild diffuse cerebral parenchymal volume loss. Presumed chronic hypertensive/microvascular ischemic white matter changes.        HEAD CTA:   1.  There is cut off on the P2 segment of the right posterior cerebral artery.  2.  No high-grade stenosis or occlusion of the rest of the major intracranial arteries.  3.  There is 1.5 to 2 mm focal outpouching off the expected location of the origin of the right posterior communicating artery. This is likely an infundibulum versus small aneurysm.     PCXR 4/25/19- Postoperative changes from cardiac valvular surgery. Moderate cardiomegaly persists. There is mild central hilar congestion and slight interstitial prominence suggesting minimal edema. Left hemidiaphragm obscured which may relate to cardiomegaly or left lower lobe atelectasis. No pneumothorax or pleural effusion. Tubes and catheters appear in good position.    Impression:  1. Acute hypoxic respiratory failure  2. Aortic regurgitation.    S/p tissue AVR  4/24/19  3. Mitral stenosis.    S/p Tissue MVR 4/24/19  4. Tricuspid regurgitation   5. Heart failure  Dilated RV  Flolan started in OR        Now off  6.   Acute ischemic stroke  7.  Thrombocytopenia.   8.   fever   9.   Pericardial adhesions; s/p takedown of adhesions 4/24/19  10.   Acute blood loss anemia .  EBL 1025  11. Non English speaking.  Croatian     PLAN:   1. Continue full vent support  2. ID De-escalated abx to ceftriaxone for 5 days, and signed off  3. pan cultured last night for recurrent fever  4. Limit sedation as able  5. Continue TF  6. Per Dr Vaughn's note from 4/29-will need Family conference  "today to discuss prognosis, trach peg  7. [facility] NP will contact Dr Moya re' MRI results  8. Per heme \"These include platelet transfusion for counts less than 10 or less than 20 of bleeding.  Check peripheral smear.  Would expect platelet count recovery as all of his acute medical issues improve.\"  9. Other issues per [facility] and neurology     ICU DAILY CHECKLIST                           Can patient transfer out of ICU? no    FAST HUG:    Feeding:  Feeding: No.  Patient is receiving NPO  Tube feedings   Guzman:Yes  Analgesia/Sedation:Yes precedex  Thromboembolic prophylaxis: yes; Mode:  SCD  HOB>30:  Yes  Stress Ulcer Protocol Active: yes; Mode: PPI  Glycemic Control: Any glucose > 180 no; Mode of Insulin Therapy: Sliding Scale Insulin    INTUBATED:  Can patient have daily waking:  yes  Can patient have spontaneous breathing trial:  no    Restraints? yes    PHYSICAL THERAPY AND MOBILITY:  Can patient have PT and mobility trial: no  Activity: Bed Rest    transfer/discharge plans: stays ICU    The patient is critically ill with impairment in organ system and high risk of life threatening deterioration.     Total CCT spent 40 minutes thus far today.       Sandy Manzo APRN, [phone]  [facility]  "

## 2021-05-28 NOTE — PROGRESS NOTES
Addendum    Chart update    CTA head and neck-per Dr Guzman: he sees a small post cerebral artery T2 branch that is suspicious for clot  Tpa contraindicated due to recent valve surgery    Will ask Dr Guzman  Re' thrombectomy    I updated Dr Vaughn    Stroke team MD updated family

## 2021-05-28 NOTE — PLAN OF CARE
Problem: Mechanical Ventilation  Goal: Patient will maintain patent airway  Outcome: Progressing  Goal: Respiratory status - ventilation  Description  Movement of air in and out of the lungs.    Liberate from ventilator  Outcome: Progressing  Goal: ET tube will be managed safely  Outcome: Progressing     HARINDER Reza

## 2021-05-28 NOTE — PROGRESS NOTES
NEUROLOGY PROGRESS NOTE     ASSESSMENT & PLAN   Hospital Day#: 3  Visit diagnosis: Right posterior cerebral artery infarction    Rt posterior cerebral artery infarction; S/P AVR/MVR (Risk Factors:HLD,  A.fib, HTN)    Repeat CT of the head shows hypodensity in the right temporal occipital region.  Previously CTA showed a cut off of right P2.  MRI head when okay with cardiothoracic surgery    Blood pressure is running somewhat low, and I would recommend permissive hypertension if okay with cardiology    Aspirin on hold due to low platelets, Lipitor with goal of LDL less than 70.  Most recent LDL checked on 11/30/2018 was 53    It is not clear looking at the chart if patient had bioprosthetic valve or mechanical valve replacement.  If bioprosthetic, he will need Coumadin and aspirin for 3 months once okay with cardiothoracic surgery    Neurology Discharge Planning :  TBD    Patient Active Problem List    Diagnosis Date Noted     S/P MVR (mitral valve repair) 04/24/2019     ARF (acute respiratory failure) (H) 04/24/2019     Anemia due to blood loss, acute 04/24/2019     Coagulopathy (H) 04/24/2019     Non-English speaking patient 04/24/2019     A-fib (H) 09/14/2017     Heart failure with preserved ejection fraction (H) 09/14/2017     Moderate malnutrition (H) 08/31/2017     ALEENA (acute kidney injury) (H) 08/31/2017     Aortic valve disease, rheumatic 08/30/2017     Lightheadedness 08/30/2017     Atherosclerosis of native coronary artery of native heart without angina pectoris      Essential hypertension with goal blood pressure less than 130/85      Pure hypercholesterolemia      Dysuria 08/29/2017     Hypothyroidism, unspecified type      Mitral valve stenosis, unspecified etiology      Elevated LFTs 08/26/2016     Silent Stroke      Shoulder strain      Dysphagia      Hyperlipidemia      Rheumatic heart disease      Blurry vision      Hearing loss      Nonspecific reaction to tuberculin skin test without active  tuberculosis      Past Medical History: Patient  has a past medical history of ALEENA (acute kidney injury) (H), Anemia due to blood loss, acute (4/24/2019), Asthma, Atrial fibrillation (H), Blurry vision, CHF (congestive heart failure) (H), Chronic kidney disease, Coronary artery disease, Dysphagia, Dysuria, Hearing loss, Heart murmur, Heart murmur, Hyperlipidemia, Hypertension, Hypothyroidism, Mitral valve stenosis, Moderate malnutrition (H), Palpitations, Rheumatic heart disease, Shortness of breath, Stroke (H), and Valvular disease.     SUBJECTIVE     Patient remains intubated and according to family member he opens eyes and nods to question     OBJECTIVE     Vital signs in last 24 hours  Temp:  [98.3  F (36.8  C)-102.9  F (39.4  C)] 101.5  F (38.6  C)  Heart Rate:  [] 89  Resp:  [18-23] 20  BP: ()/(64-87) 115/87  Arterial Line BP: (100-139)/() 132/79  FiO2 (%):  [50 %-100 %] 60 %    Weight:   117 lb 8.1 oz (53.3 kg)    I/O last 24 hours    Intake/Output Summary (Last 24 hours) at 4/27/2019 0836  Last data filed at 4/27/2019 0800  Gross per 24 hour   Intake 2225 ml   Output 1123 ml   Net 1102 ml     Review of Systems   Pertinent items are noted in HPI.    General Physical Exam: Patient is alert and oriented x 0. Vital signs were reviewed and are documented in EMR. No Carotid bruit, thyromegaly, JVD or lymphadenopathy noted.  Neurological Exam:  Patient sleeping and does not open eyes to command.  According to the nurses he moves right side spontaneously.  Pupil 3 mm reactive face symmetrical no withdrawal to painful stimuli on the left moves right side to painful stimuli reflexes equivocal on the left ongoing on the right     DIAGNOSTIC STUDIES     Pertinent Radiology   Radiology Results: Personally reviewed image/s and Personally reviewed impression/s    CT  1.  Moderately sized hypodensity and loss of gray-white matter differentiation right temporal occipital region, consistent with evolving  acute/subacute infarction.  2.  No significant global mass effect or hemorrhage    4/25/19  HEAD CT:  1.  No intracranial hemorrhage, mass lesions, hydrocephalus or CT evidence for an acute infarct. MRI is more sensitive for the evaluation of acute infarct.  2.  Chronic lacunae as detailed above.  3.  Mild diffuse cerebral parenchymal volume loss. Presumed chronic hypertensive/microvascular ischemic white matter changes.    HEAD CTA:   1.  There is cut off on the P2 segment of the right posterior cerebral artery.  2.  No high-grade stenosis or occlusion of the rest of the major intracranial arteries.  3.  There is 1.5 to 2 mm focal outpouching off the expected location of the origin of the right posterior communicating artery. This is likely an infundibulum versus small aneurysm.    NECK CTA:  1.  No significant stenosis in the neck vessels based on NASCET criteria.  2.  No evidence for dissection.    Pertinent Labs   Lab Results: personally reviewed.   Recent Results (from the past 24 hour(s))   POCT Glucose    Collection Time: 04/26/19  8:58 AM   Result Value Ref Range    Glucose 81 70 - 139 mg/dL   Blood Gases, Arterial    Collection Time: 04/26/19  9:38 AM   Result Value Ref Range    pH, Arterial 7.47 (H) 7.37 - 7.44    pCO2, Arterial 29 (L) 35 - 45 mm Hg    pO2, Arterial 80 75 - 85 mm Hg    Bicarbonate, Arterial Calc 23.5 23.0 - 29.0 mmol/L    O2 Sat, Arterial 99.6 (H) 95.0 - 96.0 %    Oxyhemoglobin 96.6 (H) 95.0 - 96.0 %    Base Excess, Arterial Calc -1.9 mmol/L    Ventilation Mode VCV     Rate 20 rr/min    FIO2 0.60     Peep 5 cm H2O    Sample Stabilized Temperature 37.0 degrees C    Ventilator Tidal Volume 350 mL   POCT Glucose    Collection Time: 04/26/19 10:16 AM   Result Value Ref Range    Glucose 65 (L) 70 - 139 mg/dL   POCT Glucose    Collection Time: 04/26/19 10:26 AM   Result Value Ref Range    Glucose 150 (H) 70 - 139 mg/dL   Blood Gases, Arterial    Collection Time: 04/26/19 11:37 AM   Result Value  Ref Range    pH, Arterial 7.46 (H) 7.37 - 7.44    pCO2, Arterial 31 (L) 35 - 45 mm Hg    pO2, Arterial 77 75 - 85 mm Hg    Bicarbonate, Arterial Calc 23.9 23.0 - 29.0 mmol/L    O2 Sat, Arterial 99.4 (H) 95.0 - 96.0 %    Oxyhemoglobin 96.7 (H) 95.0 - 96.0 %    Base Excess, Arterial Calc -1.3 mmol/L    Ventilation Mode VCV     Rate 16 rr/min    FIO2 0.50     Peep 5 cm H2O    Sample Stabilized Temperature 37.0 degrees C    Ventilator Tidal Volume 350 mL   POCT Glucose    Collection Time: 04/26/19 11:56 AM   Result Value Ref Range    Glucose 82 70 - 139 mg/dL   Potassium    Collection Time: 04/26/19  1:00 PM   Result Value Ref Range    Potassium 4.1 3.5 - 5.0 mmol/L   POCT Glucose    Collection Time: 04/26/19  1:12 PM   Result Value Ref Range    Glucose 69 (L) 70 - 139 mg/dL   POCT Glucose    Collection Time: 04/26/19  1:56 PM   Result Value Ref Range    Glucose 77 70 - 139 mg/dL   POCT Glucose    Collection Time: 04/26/19  3:31 PM   Result Value Ref Range    Glucose 67 (L) 70 - 139 mg/dL   POCT Glucose    Collection Time: 04/26/19  4:05 PM   Result Value Ref Range    Glucose 64 (L) 70 - 139 mg/dL   POCT Glucose    Collection Time: 04/26/19  5:09 PM   Result Value Ref Range    Glucose 60 (L) 70 - 139 mg/dL   POCT Glucose    Collection Time: 04/26/19  6:04 PM   Result Value Ref Range    Glucose 63 (L) 70 - 139 mg/dL   POCT Glucose    Collection Time: 04/26/19  7:00 PM   Result Value Ref Range    Glucose 85 70 - 139 mg/dL   Blood Gases, Arterial    Collection Time: 04/26/19  7:20 PM   Result Value Ref Range    pH, Arterial 7.48 (H) 7.37 - 7.44    pCO2, Arterial 29 (L) 35 - 45 mm Hg    pO2, Arterial 54 (L) 75 - 85 mm Hg    Bicarbonate, Arterial Calc 23.8 23.0 - 29.0 mmol/L    O2 Sat, Arterial 94.1 (L) 95.0 - 96.0 %    Oxyhemoglobin 91.3 (L) 95.0 - 96.0 %    Base Excess, Arterial Calc -1.3 mmol/L    Ventilation Mode VCV     Rate 16 rr/min    FIO2 50.00     Peep 5 cm H2O    Sample Stabilized Temperature 37.0 degrees C     Ventilator Tidal Volume 350 mL   POCT Glucose    Collection Time: 04/26/19  7:54 PM   Result Value Ref Range    Glucose 74 70 - 139 mg/dL   POCT Glucose    Collection Time: 04/26/19  8:35 PM   Result Value Ref Range    Glucose 63 (L) 70 - 139 mg/dL   POCT Glucose    Collection Time: 04/26/19  9:03 PM   Result Value Ref Range    Glucose 138 70 - 139 mg/dL   POCT Glucose    Collection Time: 04/26/19 10:03 PM   Result Value Ref Range    Glucose 98 70 - 139 mg/dL   POCT Glucose    Collection Time: 04/26/19 11:04 PM   Result Value Ref Range    Glucose 83 70 - 139 mg/dL   POCT Glucose    Collection Time: 04/26/19 11:57 PM   Result Value Ref Range    Glucose 86 70 - 139 mg/dL   Crossmatch    Collection Time: 04/27/19 12:01 AM   Result Value Ref Range    Crossmatch Compatible     Blood Expiration Date 61178035175913     Unit Type A Pos     Unit Number [id number]     Status Released     Component Red Blood Cells     PRODUCT CODE [id number]     Blood Type 6200     CODING SYSTEM [id number]    Crossmatch    Collection Time: 04/27/19 12:01 AM   Result Value Ref Range    Crossmatch Compatible     Blood Expiration Date [id number]     Unit Type A Pos     Unit Number [id number]     Status Released     Component Red Blood Cells     PRODUCT CODE [id number]     Blood Type 6200     CODING SYSTEM [id number]    Platelet Product Information    Collection Time: 04/27/19 12:02 AM   Result Value Ref Range    Unit Type A Pos     Blood Expiration Date [id number]     Unit Number [id number]     Status Transfused     Component Platelets     PRODUCT CODE [id number]     Issue Date and Time [id number]     Blood Type 6200     CODING SYSTEM [id number]    Platelet Product Information    Collection Time: 04/27/19 12:02 AM   Result Value Ref Range    Unit Type A Pos     Blood Expiration Date 48879746312283     Unit Number [id number]     Status Transfused     Component Platelets     PRODUCT CODE [id number]     Issue Date and Time [id number]      Blood Type 6200     CODING SYSTEM [id number]    POCT Glucose    Collection Time: 04/27/19 12:52 AM   Result Value Ref Range    Glucose 97 70 - 139 mg/dL   POCT Glucose    Collection Time: 04/27/19  2:06 AM   Result Value Ref Range    Glucose 105 70 - 139 mg/dL   POCT Glucose    Collection Time: 04/27/19  3:13 AM   Result Value Ref Range    Glucose 92 70 - 139 mg/dL   Blood Gases, Arterial    Collection Time: 04/27/19  3:52 AM   Result Value Ref Range    pH, Arterial 7.45 (H) 7.37 - 7.44    pCO2, Arterial 30 (L) 35 - 45 mm Hg    pO2, Arterial 91 (H) 75 - 85 mm Hg    Bicarbonate, Arterial Calc 23.1 23.0 - 29.0 mmol/L    O2 Sat, Arterial 99.0 (H) 95.0 - 96.0 %    Oxyhemoglobin 96.4 (H) 95.0 - 96.0 %    Base Excess, Arterial Calc -2.4 mmol/L    Ventilation Mode VCV     Rate 16 rr/min    FIO2 60.00     Peep 5 cm H2O    Sample Stabilized Temperature 37.0 degrees C    Ventilator Tidal Volume 350 mL   POCT Glucose    Collection Time: 04/27/19  3:54 AM   Result Value Ref Range    Glucose 103 70 - 139 mg/dL   Magnesium    Collection Time: 04/27/19  3:55 AM   Result Value Ref Range    Magnesium 2.7 (H) 1.8 - 2.6 mg/dL   Potassium - Next AM    Collection Time: 04/27/19  3:55 AM   Result Value Ref Range    Potassium 4.0 3.5 - 5.0 mmol/L   POCT Glucose    Collection Time: 04/27/19  4:56 AM   Result Value Ref Range    Glucose 111 70 - 139 mg/dL   Blood Gases, Venous    Collection Time: 04/27/19  6:03 AM   Result Value Ref Range    pH, Venous 7.42 7.35 - 7.45    pCO2, Venous 37 35 - 50 mm Hg    pO2, Freddy 28 25 - 47 mm Hg    Base Excess, Venous -0.3 mmol/L    HCO3, Venous 23.9 (L) 24.0 - 30.0 mmol/L    Oxyhemoglobin 55.9 (L) 70.0 - 75.0 %    O2 Sat, Venous 57.8 (L) 70.0 - 75.0 %   POCT Glucose    Collection Time: 04/27/19  6:11 AM   Result Value Ref Range    Glucose 97 70 - 139 mg/dL   POCT Glucose    Collection Time: 04/27/19  7:24 AM   Result Value Ref Range    Glucose 111 70 - 139 mg/dL   HM2(CBC W/O DIFF)    Collection Time:  04/27/19  8:15 AM   Result Value Ref Range    WBC 4.0 4.0 - 11.0 thou/uL    RBC 3.83 (L) 4.40 - 6.20 mill/uL    Hemoglobin 11.8 (L) 14.0 - 18.0 g/dL    Hematocrit 35.3 (L) 40.0 - 54.0 %    MCV 92 80 - 100 fL    MCH 30.8 27.0 - 34.0 pg    MCHC 33.4 32.0 - 36.0 g/dL    RDW 15.8 (H) 11.0 - 14.5 %    Platelets 36 (LL) 140 - 440 thou/uL    MPV 12.3 8.5 - 12.5 fL         HOSPITAL MEDICATIONS       acetaminophen  650 mg Oral Q6H    Or     acetaminophen  650 mg Rectal Q6H     aspirin  300 mg Rectal Daily 0800     atorvastatin  40 mg Oral DAILY     baclofen  10 mg Oral BID     bisacodyl  10 mg Oral Once     bisacodyl  10 mg Rectal Once     chlorhexidine  15 mL Topical Q12H     docusate sodium  100 mg Oral BID     furosemide  20 mg Intravenous BID - diuretic     levothyroxine  88 mcg Oral Daily 0600     magnesium hydroxide  30 mL Oral DAILY     melatonin  3 mg Oral QHS     pantoprazole  40 mg Intravenous Q24H    Or     omeprazole  20 mg Oral Q24H    Or     omeprazole  20 mg Enteral Tube Q24H     polyethylene glycol  17 g Oral DAILY     sodium chloride  3 mL Intravenous Line Care        Total time spent for face to face visit, reviewing labs/imaging studies, counseling and coordination of care was: 30 Minutes More than 50% of this time was spent on counseling and coordination of care.    Adrien Guzman MD  Direct Secure Messaging: [email or web address]  Tel: [phone]    This note was dictated using voice recognition software.  Any grammatical or context distortions are unintentional and inherent to the software.

## 2021-05-28 NOTE — PROGRESS NOTES
NEUROLOGY PROGRESS NOTE     ASSESSMENT & PLAN   Hospital Day#: 14    IMPRESSION: 1.  Right PCA stroke.  Do believe the stroke could cause the the weakness.  Review of MRI there does appear to be some extension of the acute stroke into the right lateral thalamus but also into the posterior limb of the internal capsule, which could cause weakness in that area.  I have seen strokes before in the right PCA distribution that do cause weakness problems.  See how he does over time.  2.  Elevated CK.  We will check EEG to make sure there is no seizure activity that might cause elevation of CK.        Patient Active Problem List    Diagnosis Date Noted     Acute cardioembolic stroke (H)      Paralytic ileus (H) 05/02/2019     Small bowel obstruction (H)      Atrial fibrillation with RVR (H)      Hypernatremia      Thrombocytopenia (H)      Sepsis, due to unspecified organism (H)      S/P MVR (mitral valve repair) 04/24/2019     Acute respiratory failure with hypoxemia (H) 04/24/2019     Anemia due to blood loss, acute 04/24/2019     Coagulopathy (H) 04/24/2019     Non-English speaking patient 04/24/2019     Respiratory failure (H) 03/21/2019     A-fib (H) 09/14/2017     Heart failure with preserved ejection fraction (H) 09/14/2017     Moderate malnutrition (H) 08/31/2017     ALEENA (acute kidney injury) (H) 08/31/2017     Aortic valve disease, rheumatic 08/30/2017     Lightheadedness 08/30/2017     Atherosclerosis of native coronary artery of native heart without angina pectoris      Essential hypertension with goal blood pressure less than 130/85      Pure hypercholesterolemia      Dysuria 08/29/2017     Hypothyroidism, unspecified type      Mitral valve stenosis, unspecified etiology      Elevated LFTs 08/26/2016     Silent Stroke      Shoulder strain      Dysphagia      Hyperlipidemia      Rheumatic heart disease      Blurry vision      Hearing loss      Nonspecific reaction to tuberculin skin test without active  tuberculosis      Past Medical History: Patient  has a past medical history of ALEENA (acute kidney injury) (H), Anemia due to blood loss, acute (4/24/2019), Asthma, Atrial fibrillation (H), Blurry vision, CHF (congestive heart failure) (H), Chronic kidney disease, Coronary artery disease, Dysphagia, Dysuria, Hearing loss, Heart murmur, Heart murmur, Hyperlipidemia, Hypertension, Hypothyroidism, Mitral valve stenosis, Moderate malnutrition (H), Palpitations, Rheumatic heart disease, Shortness of breath, Stroke (H), and Valvular disease.     SUBJECTIVE     Patient seen in follow-up for Dr. Moya.  We reviewed.  MRI is reviewed.  Area of right posterior cerebral artery distribution stroke that likely is embolic in nature     OBJECTIVE     Vital signs in last 24 hours  Temp:  [97.9  F (36.6  C)-100.7  F (38.2  C)] 100  F (37.8  C)  Heart Rate:  [] 113  Resp:  [17-54] 26  BP: ()/(50-96) 137/86  FiO2 (%):  [24 %-30 %] 24 %    Weight:   108 lb 9.6 oz (49.3 kg)    I/O last 24 hours    Intake/Output Summary (Last 24 hours) at 5/8/2019 1036  Last data filed at 5/8/2019 1000  Gross per 24 hour   Intake 2639.5 ml   Output 2375 ml   Net 264.5 ml       Review of Systems   Unable-intubated    General Physical Exam: Patient is intubated and will not follow any commands. Vital signs were reviewed and are documented in EMR. Neurological Exam:  Patient will alert to voice.  Right pupil greater than left.  Does not respond to visual threat on left but does on right.  Moves right arm and leg.  Could not get movement of left arm or leg, but does grimace to painful stimulation positive left Babinski     DIAGNOSTIC STUDIES     Pertinent Radiology   Radiology Results: No results found.    Pertinent Labs   Lab Results: Personally reviewed  Recent Results (from the past 24 hour(s))   POCT Glucose - every 4 hours    Collection Time: 05/07/19 11:45 AM   Result Value Ref Range    Glucose 184 (H) 70 - 139 mg/dL   Potassium     Collection Time: 05/07/19  3:23 PM   Result Value Ref Range    Potassium 3.6 3.5 - 5.0 mmol/L   POCT Glucose - every 4 hours    Collection Time: 05/07/19  4:10 PM   Result Value Ref Range    Glucose 185 (H) 70 - 139 mg/dL   POCT Glucose - every 4 hours    Collection Time: 05/07/19  8:08 PM   Result Value Ref Range    Glucose 133 70 - 139 mg/dL   POCT Glucose - every 4 hours    Collection Time: 05/07/19 11:55 PM   Result Value Ref Range    Glucose 162 (H) 70 - 139 mg/dL   POCT Glucose - every 4 hours    Collection Time: 05/08/19  4:02 AM   Result Value Ref Range    Glucose 118 70 - 139 mg/dL   APTT(PTT)    Collection Time: 05/08/19  5:49 AM   Result Value Ref Range    PTT 68 (H) 24 - 37 seconds   HM2(CBC w/o Differential)    Collection Time: 05/08/19  5:49 AM   Result Value Ref Range    WBC 9.8 4.0 - 11.0 thou/uL    RBC 2.79 (L) 4.40 - 6.20 mill/uL    Hemoglobin 8.6 (L) 14.0 - 18.0 g/dL    Hematocrit 26.2 (L) 40.0 - 54.0 %    MCV 94 80 - 100 fL    MCH 30.8 27.0 - 34.0 pg    MCHC 32.8 32.0 - 36.0 g/dL    RDW 15.3 (H) 11.0 - 14.5 %    Platelets 122 (L) 140 - 440 thou/uL    MPV 11.9 8.5 - 12.5 fL   Basic Metabolic Panel    Collection Time: 05/08/19  5:49 AM   Result Value Ref Range    Sodium 145 136 - 145 mmol/L    Potassium 3.9 3.5 - 5.0 mmol/L    Chloride 116 (H) 98 - 107 mmol/L    CO2 24 22 - 31 mmol/L    Anion Gap, Calculation 5 5 - 18 mmol/L    Glucose 127 (H) 70 - 125 mg/dL    Calcium 7.8 (L) 8.5 - 10.5 mg/dL    BUN 22 8 - 22 mg/dL    Creatinine 0.74 0.70 - 1.30 mg/dL    GFR MDRD Af Amer >60 >60 mL/min/1.73m2    GFR MDRD Non Af Amer >60 >60 mL/min/1.73m2   CK Total    Collection Time: 05/08/19  5:49 AM   Result Value Ref Range    CK, Total 1,524 (HH) 30 - 190 U/L   AST (SGOT)    Collection Time: 05/08/19  5:49 AM   Result Value Ref Range     (H) 0 - 40 U/L   ALT (SGPT)    Collection Time: 05/08/19  5:49 AM   Result Value Ref Range     (H) 0 - 45 U/L   POCT Glucose - every 4 hours    Collection Time:  05/08/19  8:06 AM   Result Value Ref Range    Glucose 122 70 - 139 mg/dL         HOSPITAL MEDICATIONS       amiodarone  200 mg Per NG tube BID     aspirin  81 mg Enteral Tube DAILY     baclofen  10 mg Enteral Tube BID     cefTRIAXone (ROCEPHIN)  IV  1 g Intravenous Q24H     chlorhexidine  15 mL Topical Q12H     docusate sodium (COLACE) 50 mg/5 mL oral liquid  100 mg Oral BID     furosemide  20 mg Intravenous BID - diuretic     insulin aspart (NovoLOG) injection   Subcutaneous Q4H     levothyroxine  75 mcg Intravenous Daily     metoprolol tartrate  25 mg Enteral Tube BID     pantoprazole  40 mg Intravenous Q24H    Or     omeprazole  20 mg Oral Q24H    Or     omeprazole  20 mg Enteral Tube Q24H     potassium chloride  20 mEq Enteral Tube BID     sodium chloride  10-30 mL Intravenous Q8H FIXED TIMES     sodium chloride  3 mL Intravenous Line Care     vancomycin  750 mg Intravenous Q24H        Total time spent for face to face visit, reviewing labs/imaging studies, counseling and coordination of care was: 25 minutes more than 50% of this time was spent on counseling and coordination of care.    Roberto Michaud MD  [facility]  Direct Secure Messaging: [email or web address]  Tel: [phone]

## 2021-05-28 NOTE — PROGRESS NOTES
RESPIRATORY CARE NOTE      Vent Mode: VCV  FiO2 (%):  [50 %-100 %] 50 %  S RR:  [16] 16  S VT:  [350 mL] 350 mL  PEEP/CPAP (cm H2O):  [5 cm H2O-8 cm H2O] 8 cm H2O  Minute Ventilation (L/min):  [7.2 L/min-11.1 L/min] 11.1 L/min  PIP:  [21 cm H2O-46 cm H2O] 21 cm H2O  MAP (cm H2O):  [7-12] 10    Vent day #5 Pt remains on full support and sedation. No weaning done this shift. BS coarse Sx moderate amt thick tan secretions. RR seems irregular at times. Will continue to titrate settings and wean as tolerated. RT following.    ZEHRA MichaudT

## 2021-05-28 NOTE — PLAN OF CARE
Problem: Pain  Goal: Patient's pain/discomfort is manageable  Outcome: Progressing     Problem: Safety  Goal: Patient will be injury free during hospitalization  Outcome: Progressing     Problem: Daily Care  Goal: Daily care needs are met  Outcome: Progressing     Problem: Psychosocial Needs  Goal: Demonstrates ability to cope with hospitalization/illness  Outcome: Progressing     Problem: Potential for Compromised Skin Integrity  Goal: Skin integrity is maintained or improved  Outcome: Progressing     Problem: Potential for Falls  Goal: Patient will remain free of falls  Outcome: Progressing     Problem: Risk of Injury Due to Unsafe Behavior  Goal: Patient will remain safe while in restraint; physical/psychological needs will be met  Outcome: Progressing  Goal: Alternatives to restraint will be continually assessed with use of least restrictive device and discontinuation as soon as possible  Outcome: Progressing  Goal: Patient/Family will be able to communicate reason for restraint and steps for restraint application and removal  Outcome: Progressing     Problem: Day of Surgery  Goal: Patient will maintain patent airway  Outcome: Progressing  Goal: Maintain hemodynamics  Outcome: Progressing  Flolan infusing to ETT overnight, no weaning attempts initiated. Minimal secretions from ETT, mouth. Epi at 0.02 or higher, left on overnight per order. Cardene infusing at 0.5 to 1 to keep SBP less than 120 and d/t high SVR. Repositioned q 2 hours and PRN. 20-40 mL out of chest tubes per hour. Adequate urinary catheter output. Remains NPO. Family at bedside overnight. Pupils remain unequal, CV surgery fellow notified previously. Moves extremities to command but minimally responsive at times, precedex titrated as needed. PRN hydromorphine administered with evidence of pain. Will continue to monitor.

## 2021-05-28 NOTE — PROGRESS NOTES
Pharmacy Consult: Warfarin Management    Pharmacy consulted to dose warfarin for Kody Johns a 69 y.o. male    Ordering provider: Crystal Rasheed CNP    Reason for warfarin therapy per consult: Atrial Fibrillation  Goal INR Range per consult: 2-3    Subjective  Medication lists (home and hospital) were reviewed.    New medications that may increase bleeding risk/INR include: Ceftriaxone, amiodarone, aspirin, glucagon, pantoprazole, tramadol, heparin.    Restarted home medications that may increase bleeding risk/INR include: Levothyroxine, omeprazole.    Home Warfarin Dosing per documentation: 1 mg daily on Mon/Wed/Fri; and 2 mg daily rest of week    Other Anticoagulants: Heparin drip  Patient being bridged: Yes    Patient Active Problem List   Diagnosis     Rheumatic heart disease     Blurry vision     Hearing loss     Nonspecific reaction to tuberculin skin test without active tuberculosis     Dysphagia     Hyperlipidemia     Silent Stroke     Shoulder strain     Elevated LFTs     Dysuria     Hypothyroidism, unspecified type     Mitral valve stenosis, unspecified etiology     Aortic valve disease, rheumatic     Atherosclerosis of native coronary artery of native heart without angina pectoris     Essential hypertension with goal blood pressure less than 130/85     Pure hypercholesterolemia     Lightheadedness     Moderate malnutrition (H)     ALEENA (acute kidney injury) (H)     A-fib (H)     Heart failure with preserved ejection fraction (H)     S/P MVR (mitral valve repair)     Acute respiratory failure with hypoxemia (H)     Anemia due to blood loss, acute     Coagulopathy (H)     Non-English speaking patient     Thrombocytopenia (H)     Sepsis, due to unspecified organism (H)     Atrial fibrillation with RVR (H)     Hypernatremia     Paralytic ileus (H)     Small bowel obstruction (H)     Acute cardioembolic stroke (H)     Respiratory failure (H)    Past Medical History:   Diagnosis Date     ALEENA (acute kidney  injury) (H)      Anemia due to blood loss, acute 2019     Asthma      Atrial fibrillation (H)      Blurry vision      CHF (congestive heart failure) (H)      Chronic kidney disease      Coronary artery disease      Dysphagia     resolved     Dysuria      Hearing loss      Heart murmur      Heart murmur      Hyperlipidemia      Hypertension      Hypothyroidism      Mitral valve stenosis      Moderate malnutrition (H)      Palpitations      Rheumatic heart disease      Shortness of breath     exertion     Stroke (H)     Silent     Valvular disease         Social History     Tobacco Use     Smoking status: Former Smoker     Packs/day: 1.00     Years: 50.00     Pack years: 50.00     Types: Cigarettes     Last attempt to quit: 2014     Years since quittin.6     Smokeless tobacco: Former User     Tobacco comment: No Betel nut   Substance Use Topics     Alcohol use: No     Comment: Quit     Drug use: No       Objective   Labs:  Last 3 days:    Recent Labs     19  0626 19  0549 19  2255 19  0601   CREATININE 0.84 0.74  --  0.74   HGB 8.9* 8.6*  --  8.5*   HCT  --  26.2*  --  25.9*   * 122* 158 136*   BILITOT 2.1*  --   --  1.4*   * 107*  --  85*   * 115*  --  103*   ALKPHOS 164*  --   --  137*     Last 7 days:   Recent labs: (last 7 days)     19  1059 19  1415 19  0601   INR 1.19* 1.38* 1.26*       Warfarin Dosing History:    Date INR Warfarin Dose Comment   2019 1.26                         Assessment  The patient is resuming warfarin for the indication of Atrial Fibrillation per documentation with a goal INR of 2-3. INR today is subtherapeutic. The patient may benefit from starting with the higher of her two documented home doses of warfarin to start with.    Plan  1. Administer warfarin 2 mg via enteral tube today.  2. Check INR daily or as appropriate.  3. Continue to follow the patient's INR, PLT, and HGB as available.  4. Monitor for  potential drug/disease interactions.    Thank you for the consult,  Nehemias Marshall, PharmD 5/9/2019 2:44 PM

## 2021-05-28 NOTE — PROGRESS NOTES
Respiratory Care Note    Pt returned from CT ventilated via transport vent. Pt was placed back on vent on the settings below:    Vent Mode: PCV/VG  FiO2 (%):  [40 %] 40 %  S RR:  [20] 20  S VT:  [300 mL-325 mL] 325 mL  PEEP/CPAP (cm H2O):  [5 cm H2O] 5 cm H2O  Minute Ventilation (L/min):  [8.6 L/min-9.5 L/min] 8.6 L/min  PIP:  [14 cm H2O-22 cm H2O] 19 cm H2O  MAP (cm H2O):  [6-11] 8    Pts HR was 140-150s during transport, sats %.    ZEHRA SantosT

## 2021-05-28 NOTE — CONSULTS
Consultation - Hematology/Oncology  Kody Johns,  1950, MRN [id number]    Admitting Dx: Aortic regurgitation [I35.1]  Mitral stenosis [I05.0]  Tricuspid regurgitation [I07.1]  Atrial fibrillation (H) [I48.91]    PCP: Aristides Alegria MD, [phone]   Code status:  Full Code       Extended Emergency Contact Information  Primary Emergency Contact: Mark Roth   [facility]  Home Phone: [phone]  Mobile Phone: [phone]  Relation: Sibling  Secondary Emergency Contact: Crow Jhons   [place]  Mobile Phone: [phone]  Relation: Sibling  Preferred language: [facility]   needed? Yes       Assessment and Plan     1.  Thrombocytopenia: Acute.  Platelets prior to admission were basically normal.  Multifactorial etiology including critical illness/consumption, infection, antibiotics, and likely DIC.  Platelet factor for antibody is negative. HIT 4T score is low making heparin-induced thrombocytopenia very unlikely.  Would recommend supportive treatments at this time.  These include platelet transfusion for counts less than 10 or less than 20 of bleeding.  Check peripheral smear.  Would expect platelet count recovery as all of his acute medical issues improve.       Chief Complaint S/P MVR (mitral valve repair)       HPI      We have been requested by Nils to evaluate Kody Johns who is a 69 y.o. year old male for thrombocytopenia.  The patient was admitted on  for cardiac surgery.  He received heparin during the procedure on .  It appears he had prophylactic doses of heparin  and .  He had a stroke postoperatively.  He was then developed fevers and worsening platelets.  Patient is intubated and sedated and cannot provide any updated history.  History obtained from medical record.       Medical History  Active Ambulatory (Non-Hospital) Problems    Diagnosis     Heart failure with preserved ejection fraction (H)     Moderate malnutrition (H)      ALEENA (acute kidney injury) (H)     Lightheadedness     Atherosclerosis of native coronary artery of native heart without angina pectoris     Essential hypertension with goal blood pressure less than 130/85     Pure hypercholesterolemia     Dysuria     Hypothyroidism, unspecified type     Elevated LFTs     Silent Stroke     Shoulder strain     Dysphagia     Hyperlipidemia     Blurry vision     Hearing loss     Nonspecific reaction to tuberculin skin test without active tuberculosis     Past Medical History:   Diagnosis Date     ALEENA (acute kidney injury) (H)      Anemia due to blood loss, acute 4/24/2019     Asthma      Atrial fibrillation (H)      Blurry vision      CHF (congestive heart failure) (H)      Chronic kidney disease      Coronary artery disease      Dysphagia      Dysuria      Hearing loss      Heart murmur      Heart murmur      Hyperlipidemia      Hypertension      Hypothyroidism      Mitral valve stenosis      Moderate malnutrition (H)      Palpitations      Rheumatic heart disease      Shortness of breath      Stroke (H)      Valvular disease     Surgical History  He  has a past surgical history that includes Cataract extraction (Left, 06/13/2016); Cardiac catheterization; Coronary Angiogram (N/A, 11/30/2018); Eye surgery; pr replacement of mitral valve (N/A, 4/24/2019); Aortic valve replacement (N/A, 4/24/2019); and PICC Team Line Insertion (4/29/2019).   Social History  Reviewed, and he  reports that he quit smoking about 4 years ago. His smoking use included cigarettes. He has a 50.00 pack-year smoking history. He has quit using smokeless tobacco. He reports that he does not drink alcohol or use drugs.   Allergies  No Known Allergies Family History  Reviewed, and family history includes No Medical Problems in his father and mother.   Psychosocial Needs  Social History     Social History Narrative    Refugee, born in [facility].     Additional psychosocial needs reviewed per nursing assessment.      Prior to Admission Medications   Medications Prior to Admission   Medication Sig Dispense Refill Last Dose     albuterol (PROAIR HFA;PROVENTIL HFA;VENTOLIN HFA) 90 mcg/actuation inhaler Inhale 1-2 puffs every 6 (six) hours as needed. 1 Inhaler 5 4/22/2019     amoxicillin (AMOXIL) 500 MG capsule TAKE 4 TABLETS (2,000 mg) BY MOUTH 1 HOUR PRIOR TO PROCEDURE. 4 capsule 3 Unknown     atorvastatin (LIPITOR) 40 MG tablet TAKE 1 PILL BY MOUTH AT BEDTIME. FOR CHOLESTEROL 90 tablet 2 4/22/2019     baclofen (LIORESAL) 10 MG tablet TAKE 1 TABLET BY MOUTH TWICE DAILY 60 tablet 5 4/23/2019     enoxaparin (LOVENOX) 40 mg/0.4 mL syringe Inject 0.4 mL (40 mg total) under the skin every evening. 2 Syringe 0 4/22/2019     enoxaparin (LOVENOX) 60 mg/0.6 mL syringe Inject 0.6 mL (60 mg total) under the skin every morning. 3 Syringe 0 4/23/2019 at am     fluticasone (FLONASE) 50 mcg/actuation nasal spray 2 sprays into each nostril daily. (Patient taking differently: 2 sprays into each nostril daily as needed.       ) 10 g 3 4/22/2019     furosemide (LASIX) 20 MG tablet TAKE 1 TABLET (20 MG TOTAL) BY MOUTH DAILY FOR EDEMA 90 tablet 3 4/23/2019     levothyroxine (SYNTHROID, LEVOTHROID) 88 MCG tablet TAKE 1 PILL BY MOUTH EVERYDAY FOR THYROID 30 tablet 6 4/23/2019     metoprolol succinate (TOPROL-XL) 25 MG TAKE 1 PILL BY MOUTH EVERYDAY FOR BLOOD PRESSURE 90 tablet 3 4/23/2019     omeprazole (PRILOSEC) 20 MG capsule TAKE 1 PILL BY MOUTH EVERYDAY FOR STOMACH 30 capsule 8 4/23/2019     spironolactone (ALDACTONE) 25 MG tablet TAKE 1 PILL BY MOUTH EVERYDAY 30 tablet 8 4/23/2019     warfarin (COUMADIN/JANTOVEN) 1 MG tablet Take 1-2 mg by mouth See Admin Instructions. 1 mg daily on Mon/Wed/Fri; and 2 mg daily rest of week   4/19/2019     acetaminophen (TYLENOL) 500 MG tablet Take 500-1,000 mg by mouth every 6 (six) hours as needed for pain. Every 6-8 hours as needed. No more than 4,000MG of acetaminophen daily.   Unknown     ibuprofen  (ADVIL,MOTRIN) 600 MG tablet Take 600 mg by mouth every 6 (six) hours as needed for pain.   Unknown          Review of Systems:    Unable to obtain as he is intubated and sedated.    ECOG performance status is 4    Physical Exam:    Vitals:    04/29/19 1600 04/29/19 1615 04/29/19 1630 04/29/19 1645   BP: 97/57 94/60 94/61 (!) 87/59   Pulse: (!) 110 (!) 101 99 (!) 102   Resp:  25     Temp:  (!) 102  F (38.9  C)     TempSrc:  Core     SpO2: 96% 92% 94% 97%   Weight:       Height:         General: patient appears stated age of 69 y.o.  HEENT: Head: atraumatic, normocephalic.  Endotracheal tube in place.  Chest:  Normal respiratory effort on ventilator  Cardiac:  No edema.   Abdomen: abdomen is non-distended  Extremities: normal tone and muscle bulk.  Skin: Warm and dry.   CNS: Sedated on ventilator.     Pertinent Labs:    Lab Results: personally reviewed.   Lab Results   Component Value Date     (HH) 04/29/2019    K 3.3 (L) 04/29/2019     (H) 04/29/2019    CO2 26 04/29/2019    BUN 44 (H) 04/29/2019    CREATININE 1.32 (H) 04/29/2019    CALCIUM 7.4 (L) 04/29/2019     Lab Results   Component Value Date    WBC 4.1 04/29/2019    HGB 8.4 (L) 04/29/2019    HCT 26.3 (L) 04/29/2019    MCV 96 04/29/2019    PLT 27 (LL) 04/29/2019      Pertinent Radiology:    Radiology Results: Personally reviewed image/s and Personally reviewed impression/s    No results found.

## 2021-05-28 NOTE — PROGRESS NOTES
NEUROLOGY PROGRESS NOTE     ASSESSMENT & PLAN   Hospital Day#: 4  Visit diagnosis: Right posterior cerebral artery infarction    Rt posterior cerebral artery infarction; S/P AVR/MVR (Risk Factors:HLD,  A.fib, HTN)    Repeat CT of the head shows hypodensity in the right temporal occipital region.  Previously CTA showed a cut off of right P2.  MRI head when okay with cardiothoracic surgery    Blood pressure is running somewhat low, and I would recommend permissive hypertension if okay with cardiology    Aspirin on hold due to low platelets (63 this morning), HITS panel pending     Lipitor with goal of LDL less than 70.  Most recent LDL checked on 11/30/2018 was 53    Anticoagulation is on hold due to low platelets.  For bioprosthetic valves usually anticoagulation is recommended for 3 months followed by aspirin or Plavix    Neurology Discharge Planning :  TBD    Patient Active Problem List    Diagnosis Date Noted     Thrombocytopenia (H)      Sepsis, due to unspecified organism (H)      S/P MVR (mitral valve repair) 04/24/2019     ARF (acute respiratory failure) (H) 04/24/2019     Anemia due to blood loss, acute 04/24/2019     Coagulopathy (H) 04/24/2019     Non-English speaking patient 04/24/2019     A-fib (H) 09/14/2017     Heart failure with preserved ejection fraction (H) 09/14/2017     Moderate malnutrition (H) 08/31/2017     ALEENA (acute kidney injury) (H) 08/31/2017     Aortic valve disease, rheumatic 08/30/2017     Lightheadedness 08/30/2017     Atherosclerosis of native coronary artery of native heart without angina pectoris      Essential hypertension with goal blood pressure less than 130/85      Pure hypercholesterolemia      Dysuria 08/29/2017     Hypothyroidism, unspecified type      Mitral valve stenosis, unspecified etiology      Elevated LFTs 08/26/2016     Silent Stroke      Shoulder strain      Dysphagia      Hyperlipidemia      Rheumatic heart disease      Blurry vision      Hearing loss       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     SUBJECTIVE     Patient remains intubated and according to family member he opens eyes and nods to question     OBJECTIVE     Vital signs in last 24 hours  Temp:  [102.3  F (39.1  C)-104.5  F (40.3  C)] 104.5  F (40.3  C)  Heart Rate:  [] 112  Resp:  [23-29] 23  BP: ()/(55-91) 129/77  Arterial Line BP: (119-157)/(61-93) 119/83  FiO2 (%):  [50 %-60 %] 50 %    Weight:   116 lb 6.5 oz (52.8 kg)    I/O last 24 hours    Intake/Output Summary (Last 24 hours) at 4/28/2019 0800  Last data filed at 4/28/2019 0700  Gross per 24 hour   Intake 3432.93 ml   Output 4028 ml   Net -595.07 ml     Review of Systems   Pertinent items are noted in HPI.    General Physical Exam: Patient is alert and oriented x 0. Vital signs were reviewed and are documented in EMR. No Carotid bruit, thyromegaly, JVD or lymphadenopathy noted.  Neurological Exam:  Patient sleeping and does not open eyes to command.  According to the nurses he moves right side spontaneously.  Pupil 3 mm reactive face symmetrical no withdrawal to painful stimuli on the left moves right side to painful stimuli reflexes equivocal on the left ongoing on the right     DIAGNOSTIC STUDIES     Pertinent Radiology   Radiology Results: Personally reviewed image/s and Personally reviewed impression/s    CT  1.  Moderately sized hypodensity and loss of gray-white matter  differentiation right temporal occipital region, consistent with evolving acute/subacute infarction.  2.  No significant global mass effect or hemorrhage    4/25/19  HEAD CT:  1.  No intracranial hemorrhage, mass lesions, hydrocephalus or CT evidence for an acute infarct. MRI is more sensitive for the evaluation of acute infarct.  2.  Chronic lacunae as detailed above.  3.  Mild diffuse cerebral parenchymal volume loss. Presumed chronic hypertensive/microvascular ischemic white matter changes.    HEAD CTA:   1.  There is cut off on the P2 segment of the right posterior cerebral artery.  2.  No high-grade stenosis or occlusion of the rest of the major intracranial arteries.  3.  There is 1.5 to 2 mm focal outpouching off the expected location of the origin of the right posterior communicating artery. This is likely an infundibulum versus small aneurysm.    NECK CTA:  1.  No significant stenosis in the neck vessels based on NASCET criteria.  2.  No evidence for dissection.    Pertinent Labs   Lab Results: personally reviewed.   Recent Results (from the past 24 hour(s))   HM2(CBC W/O DIFF)    Collection Time: 04/27/19  8:15 AM   Result Value Ref Range    WBC 4.0 4.0 - 11.0 thou/uL    RBC 3.83 (L) 4.40 - 6.20 mill/uL    Hemoglobin 11.8 (L) 14.0 - 18.0 g/dL    Hematocrit 35.3 (L) 40.0 - 54.0 %    MCV 92 80 - 100 fL    MCH 30.8 27.0 - 34.0 pg    MCHC 33.4 32.0 - 36.0 g/dL    RDW 15.8 (H) 11.0 - 14.5 %    Platelets 36 (LL) 140 - 440 thou/uL    MPV 12.3 8.5 - 12.5 fL   Basic Metabolic Panel    Collection Time: 04/27/19  8:15 AM   Result Value Ref Range    Sodium 148 (H) 136 - 145 mmol/L    Potassium 4.2 3.5 - 5.0 mmol/L    Chloride 122 (H) 98 - 107 mmol/L    CO2 19 (L) 22 - 31 mmol/L    Anion Gap, Calculation 7 5 - 18 mmol/L    Glucose 104 70 - 125 mg/dL    Calcium 8.3 (L) 8.5 - 10.5 mg/dL    BUN 43 (H) 8 - 22 mg/dL    Creatinine 1.06 0.70 - 1.30 mg/dL    GFR MDRD Af Amer >60 >60 mL/min/1.73m2    GFR MDRD Non Af Amer >60  >60 mL/min/1.73m2   Procalcitonin    Collection Time: 04/27/19  8:15 AM   Result Value Ref Range    Procalcitonin 30.08 (H) 0.00 - 0.49 ng/mL   Blood Gases, Arterial    Collection Time: 04/27/19 11:48 AM   Result Value Ref Range    pH, Arterial 7.45 (H) 7.37 - 7.44    pCO2, Arterial 31 (L) 35 - 45 mm Hg    pO2, Arterial 57 (L) 75 - 85 mm Hg    Bicarbonate, Arterial Calc 23.4 23.0 - 29.0 mmol/L    O2 Sat, Arterial 94.4 (L) 95.0 - 96.0 %    Oxyhemoglobin 91.5 (L) 95.0 - 96.0 %    Base Excess, Arterial Calc -1.8 mmol/L    Ventilation Mode VCV     Rate 16 rr/min    FIO2 50.00     Peep 5 cm H2O    Sample Stabilized Temperature 37.0 degrees C    Ventilator Tidal Volume 350 mL   POCT Glucose    Collection Time: 04/27/19 11:51 AM   Result Value Ref Range    Glucose 132 70 - 139 mg/dL   Sputum culture    Collection Time: 04/27/19 12:00 PM   Result Value Ref Range    Gram Stain Result 2+ Polymorphonuclear leukocytes     Gram Stain Result 4+ Gram negative diplococci     Gram Stain Result 3+ Gram negative bacilli     Gram Stain Result 2+ Gram positive cocci     Gram Stain Result 2+ Gram positive bacilli, diphtheroid like    Urinalysis-UC if Indicated    Collection Time: 04/27/19  3:06 PM   Result Value Ref Range    Color, UA Yellow Colorless, Yellow, Straw, Light Yellow    Clarity, UA Cloudy (!) Clear    Glucose, UA Negative Negative    Bilirubin, UA Negative Negative    Ketones, UA Negative Negative    Specific Gravity, UA 1.018 1.001 - 1.030    Blood, UA Moderate (!) Negative    pH, UA 5.5 4.5 - 8.0    Protein, UA 30 mg/dL (!) Negative mg/dL    Urobilinogen, UA <2.0 E.U./dL <2.0 E.U./dL, 2.0 E.U./dL    Nitrite, UA Negative Negative    Leukocytes, UA Negative Negative    Bacteria, UA Few (!) None Seen hpf    RBC, UA 0-2 None Seen, 0-2 hpf    WBC, UA 0-5 None Seen, 0-5 hpf    Squam Epithel, UA 10-25 (!) None Seen, 0-5 lpf    Mucus, UA Few (!) None Seen lpf   POCT Glucose    Collection Time: 04/27/19  3:21 PM   Result Value Ref  Range    Glucose 122 70 - 139 mg/dL   POCT Glucose    Collection Time: 04/27/19  5:34 PM   Result Value Ref Range    Glucose 138 70 - 139 mg/dL   POCT Glucose    Collection Time: 04/27/19  7:49 PM   Result Value Ref Range    Glucose 121 70 - 139 mg/dL   Platelet Product Information    Collection Time: 04/28/19 12:03 AM   Result Value Ref Range    Unit Type A Pos     Blood Expiration Date 71466389273678     Unit Number [id number]     Status Transfused     Component Platelets     PRODUCT CODE T4144I15     Issue Date and Time [id number]     Blood Type 6200     CODING SYSTEM [id number]    Platelet Product Information    Collection Time: 04/28/19 12:03 AM   Result Value Ref Range    Unit Type A Pos     Blood Expiration Date 78031728002645     Unit Number [id number]     Status Transfused     Component Platelets     PRODUCT CODE V9299S13     Issue Date and Time 81417045504038     Blood Type 6200     CODING SYSTEM [id number]    Magnesium    Collection Time: 04/28/19  4:27 AM   Result Value Ref Range    Magnesium 2.4 1.8 - 2.6 mg/dL   Potassium - Next AM    Collection Time: 04/28/19  4:27 AM   Result Value Ref Range    Potassium 3.7 3.5 - 5.0 mmol/L   Platelet Count - every other day x 3    Collection Time: 04/28/19  4:27 AM   Result Value Ref Range    Platelets 63 (L) 140 - 440 thou/uL   Blood Gases, Venous    Collection Time: 04/28/19  4:27 AM   Result Value Ref Range    pH, Venous 7.47 (H) 7.35 - 7.45    pCO2, Venous 38 35 - 50 mm Hg    pO2, Freddy 29 25 - 47 mm Hg    Base Excess, Venous 3.8 mmol/L    HCO3, Venous 27.2 24.0 - 30.0 mmol/L    Oxyhemoglobin 59.4 (L) 70.0 - 75.0 %    O2 Sat, Venous 61.2 (L) 70.0 - 75.0 %   Hemoglobin    Collection Time: 04/28/19  4:27 AM   Result Value Ref Range    Hemoglobin 9.1 (L) 14.0 - 18.0 g/dL   POCT Glucose    Collection Time: 04/28/19  5:58 AM   Result Value Ref Range    Glucose 131 70 - 139 mg/dL   Blood Gases, Arterial    Collection Time: 04/28/19  7:27 AM   Result Value Ref Range     pH, Arterial 7.51 (H) 7.37 - 7.44    pCO2, Arterial 31 (L) 35 - 45 mm Hg    pO2, Arterial 64 (L) 75 - 85 mm Hg    Bicarbonate, Arterial Calc 26.5 23.0 - 29.0 mmol/L    O2 Sat, Arterial 96.7 (H) 95.0 - 96.0 %    Oxyhemoglobin 94.0 (L) 95.0 - 96.0 %    Base Excess, Arterial Calc 2.1 mmol/L    Ventilation Mode VCV     Rate 16 rr/min    FIO2 50.00     Peep 8 cm H2O    Sample Stabilized Temperature 37.0 degrees C    Ventilator Tidal Volume 300 mL         HOSPITAL MEDICATIONS       acetaminophen  650 mg Oral Q6H    Or     acetaminophen  650 mg Oral Q6H    Or     acetaminophen  650 mg Rectal Q6H     aspirin  300 mg Rectal Daily 0800     atorvastatin  40 mg Enteral Tube DAILY     baclofen  10 mg Enteral Tube BID     bisacodyl  10 mg Oral Once     chlorhexidine  15 mL Topical Q12H     docusate sodium  100 mg Oral BID    Or     docusate sodium (COLACE) 50 mg/5 mL oral liquid  100 mg Enteral Tube BID     levothyroxine  88 mcg Enteral Tube Daily 0600     magnesium hydroxide  30 mL Enteral Tube DAILY     melatonin  3 mg Enteral Tube QHS     pantoprazole  40 mg Intravenous Q24H    Or     omeprazole  20 mg Oral Q24H    Or     omeprazole  20 mg Enteral Tube Q24H     piperacillin-tazobactam  3.375 g Intravenous Q8H     polyethylene glycol  17 g Enteral Tube DAILY     potassium chloride liquid/packet  40 mEq Oral Once     sodium chloride  3 mL Intravenous Line Care     vancomycin  1,000 mg Intravenous Q24H        Total time spent for face to face visit, reviewing labs/imaging studies, counseling and coordination of care was: 30 Minutes More than 50% of this time was spent on counseling and coordination of care.    Adrien Guzman MD  [employer] Messaging: [email or web address]  Tel: [phone]    This note was dictated using voice recognition software.  Any grammatical or context distortions are unintentional and inherent to the software.

## 2021-05-28 NOTE — PROGRESS NOTES
NEUROLOGY PROGRESS NOTE     Kody Johns,  1950, MRN [id number] Date: 2019     [facility]   Code status:  Full Code   PCP: Aristides Alegria MD, [phone]      ASSESSMENT & PLAN   Hospital Day#: 7  Diagnosis code: Ischemic stroke    Ischemic stroke  Aortic/Mitral valve repair      Head CT shows right P2 stroke which fits with the CTA scan.    MRI shows PCA stroke.    Patient has bilateral arm and leg weakness. MRI does not explain this    Will get MRI cervical spine.     Continue permissive HTN.    Most likely cause of stroke is cardioembolic secondary to surgery.     Hold ASA (stopped today) due to low platelets. Needs anticoagulation long term.    Family meeting later today to help decide on Trach and PEG.    Thank you again for this referral, please feel free to contact me if you have any questions.     CHIEF COMPLAINT S/P MVR (mitral valve repair)     HISTORY OF PRESENT ILLNESS     We have been requested by Dr. Omer to evaluate Kody Johns who is a 69 y.o.  male for acute stroke.    This is a 69-year-old male on whom neurology is been consulted to evaluate for stroke.  Patient had a mitral valve surgery yesterday and was under sedation after that.  Sedation was lightened today when it was noticed that he was having some ophthalmoplegia.  Was mainly involving the left eye.  Stroke code was called.  He was not a TPA candidate because of the recent surgery and unclear time of onset.  Left arm and leg weakness was also noted.  Head CT was done which showed a right P2 occlusion.  The patient's exam is been stable since the stroke code.  He was not a candidate for thrombectomy because the blood vessel was too small for mechanical thrombectomy.      Patient is unable to provide any history because of intubation.  History is obtained through chart review and through talking to the son.      Patient remains intubated. Unable to extubate. He has spiked a fever.  Per family he has been  opening his eyes. Nodding to questions. Moving right arm but not the right leg.  He did move the left arm once for the family. Though the healthcare staff have not observed this.  No other new symptoms per the family.   MRI brain could not be done due to Pacemaker wires.    4/29  Patient remains in the ICU. Due to low platelets the pacemaker wires cannot be removed and patient cannot get the MRI. Family still wanting aggressive cares. Patients fever has been thought to be secondary to neurological disease.    4/30  Patient has been agitated when off the Precedex. Plan for MRI today and possibly family conference tomorrow. Family is hopeful that he will make good recovery. Discussed with family that there is no cure for stroke.     5/1  Family meeting later today. Patient still has not improved. MRI brain done last night.      PAST MEDICAL & SURGICAL HISTORY     Medical History  Patient Active Problem List    Diagnosis Date Noted     Atrial fibrillation with RVR (H)      Hypernatremia      Thrombocytopenia (H)      Sepsis, due to unspecified organism (H)      S/P MVR (mitral valve repair) 04/24/2019     ARF (acute respiratory failure) (H) 04/24/2019     Anemia due to blood loss, acute 04/24/2019     Coagulopathy (H) 04/24/2019     Non-English speaking patient 04/24/2019     A-fib (H) 09/14/2017     Heart failure with preserved ejection fraction (H) 09/14/2017     Moderate malnutrition (H) 08/31/2017     ALEENA (acute kidney injury) (H) 08/31/2017     Aortic valve disease, rheumatic 08/30/2017     Lightheadedness 08/30/2017     Atherosclerosis of native coronary artery of native heart without angina pectoris      Essential hypertension with goal blood pressure less than 130/85      Pure hypercholesterolemia      Dysuria 08/29/2017     Hypothyroidism, unspecified type      Mitral valve stenosis, unspecified etiology      Elevated LFTs 08/26/2016     Silent Stroke      Shoulder strain      Dysphagia      Hyperlipidemia       Rheumatic heart disease      Blurry vision      Hearing loss      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  Surgical History  He  has a past surgical history that includes Cataract extraction (Left, 06/13/2016); Cardiac catheterization; Coronary Angiogram (N/A, 11/30/2018); Eye surgery; pr replacement of mitral valve (N/A, 4/24/2019); Aortic valve replacement (N/A, 4/24/2019); and PICC Team Line Insertion (4/29/2019).     SOCIAL HISTORY     Reviewed, and he  reports that he quit smoking about 4 years ago. His smoking use included cigarettes. He has a 50.00 pack-year smoking history. He has quit using smokeless tobacco. He reports that he does not drink alcohol or use drugs.     FAMILY HISTORY     Reviewed, and family history includes No Medical Problems in his father and mother.     ALLERGIES     No Known Allergies     REVIEW OF SYSTEMS     Unable to do ROS due to intubation and AMS.     HOME & HOSPITAL MEDICATIONS     Prior to Admission Medications  Medications Prior to Admission   Medication Sig Dispense Refill Last Dose     albuterol (PROAIR HFA;PROVENTIL HFA;VENTOLIN HFA) 90 mcg/actuation inhaler Inhale 1-2 puffs every 6 (six) hours as needed. 1 Inhaler 5 4/22/2019     amoxicillin (AMOXIL) 500 MG capsule TAKE 4 TABLETS (2,000 mg) BY MOUTH 1 HOUR PRIOR TO PROCEDURE. 4 capsule 3 Unknown     atorvastatin (LIPITOR) 40 MG tablet TAKE 1 PILL BY MOUTH AT BEDTIME. FOR  CHOLESTEROL 90 tablet 2 4/22/2019     baclofen (LIORESAL) 10 MG tablet TAKE 1 TABLET BY MOUTH TWICE DAILY 60 tablet 5 4/23/2019     enoxaparin (LOVENOX) 40 mg/0.4 mL syringe Inject 0.4 mL (40 mg total) under the skin every evening. 2 Syringe 0 4/22/2019     enoxaparin (LOVENOX) 60 mg/0.6 mL syringe Inject 0.6 mL (60 mg total) under the skin every morning. 3 Syringe 0 4/23/2019 at am     fluticasone (FLONASE) 50 mcg/actuation nasal spray 2 sprays into each nostril daily. (Patient taking differently: 2 sprays into each nostril daily as needed.       ) 10 g 3 4/22/2019     furosemide (LASIX) 20 MG tablet TAKE 1 TABLET (20 MG TOTAL) BY MOUTH DAILY FOR EDEMA 90 tablet 3 4/23/2019     levothyroxine (SYNTHROID, LEVOTHROID) 88 MCG tablet TAKE 1 PILL BY MOUTH EVERYDAY FOR THYROID 30 tablet 6 4/23/2019     metoprolol succinate (TOPROL-XL) 25 MG TAKE 1 PILL BY MOUTH EVERYDAY FOR BLOOD PRESSURE 90 tablet 3 4/23/2019     omeprazole (PRILOSEC) 20 MG capsule TAKE 1 PILL BY MOUTH EVERYDAY FOR STOMACH 30 capsule 8 4/23/2019     spironolactone (ALDACTONE) 25 MG tablet TAKE 1 PILL BY MOUTH EVERYDAY 30 tablet 8 4/23/2019     warfarin (COUMADIN/JANTOVEN) 1 MG tablet Take 1-2 mg by mouth See Admin Instructions. 1 mg daily on Mon/Wed/Fri; and 2 mg daily rest of week   4/19/2019     acetaminophen (TYLENOL) 500 MG tablet Take 500-1,000 mg by mouth every 6 (six) hours as needed for pain. Every 6-8 hours as needed. No more than 4,000MG of acetaminophen daily.   Unknown     ibuprofen (ADVIL,MOTRIN) 600 MG tablet Take 600 mg by mouth every 6 (six) hours as needed for pain.   Unknown       Hospital Medications    acetaminophen  650 mg Oral Q6H    Or     acetaminophen  650 mg Oral Q6H    Or     acetaminophen  650 mg Rectal Q6H     atorvastatin  40 mg Enteral Tube DAILY     baclofen  10 mg Enteral Tube BID     cefTRIAXone (ROCEPHIN)  IV  1 g Intravenous DAILY     chlorhexidine  15 mL Topical Q12H     docusate sodium  100 mg Oral BID    Or      docusate sodium (COLACE) 50 mg/5 mL oral liquid  100 mg Enteral Tube BID     furosemide  40 mg Intravenous Q8H     insulin aspart (NovoLOG) injection   Subcutaneous Q4H FIXED TIMES     levothyroxine  88 mcg Enteral Tube Daily 0600     metoprolol tartrate  12.5 mg Enteral Tube BID     pantoprazole  40 mg Intravenous Q24H    Or     omeprazole  20 mg Oral Q24H    Or     omeprazole  20 mg Enteral Tube Q24H     polyethylene glycol  17 g Enteral Tube DAILY     potassium chloride  20 mEq Enteral Tube BID     sodium chloride  10-30 mL Intravenous Q8H FIXED TIMES     sodium chloride  3 mL Intravenous Line Care        PHYSICAL EXAM     Vital signs  Temp:  [99.8  F (37.7  C)-101.6  F (38.7  C)] 101  F (38.3  C)  Heart Rate:  [] 104  Resp:  [10-34] 10  BP: ()/() 97/66  FiO2 (%):  [35 %-50 %] 40 %    Weight:   120 lb 12.8 oz (54.8 kg)    GENERAL: Healthy appearing, alert, no acute distress, normal habitus.  CARDIOVASCULAR: Ext warm and well perfused.  NEUROLOGICAL:  Patient is lying in bed intubated. Opens eyes to voice Minimaly. Right pupil larger than left. EOM absent. VFI on right. Corneals present. NLF symmetric. Does not move any ext to pain or spontaneously. Tone symmetric.      DIAGNOSTIC STUDIES     Pertinent Radiology   Head CT images reviewed. No acute stroke seen  IMPRESSION:   CONCLUSION:   HEAD CT:  1.  No intracranial hemorrhage, mass lesions, hydrocephalus or CT evidence for an acute infarct. MRI is more sensitive for the evaluation of acute infarct.  2.  Chronic lacunae as detailed above.  3.  Mild diffuse cerebral parenchymal volume loss. Presumed chronic hypertensive/microvascular ischemic white matter changes.        HEAD CTA:   1.  There is cut off on the P2 segment of the right posterior cerebral artery.  2.  No high-grade stenosis or occlusion of the rest of the major intracranial arteries.  3.  There is 1.5 to 2 mm focal outpouching off the expected location of the origin of the right  posterior communicating artery. This is likely an infundibulum versus small aneurysm.        NECK CTA:  1.  No significant stenosis in the neck vessels based on NASCET criteria.  2.  No evidence for dissection.    Repeat Head CT 4/26 Images reviewed. Show right PCA stroke with question of pontine stroke.    Repeat Head CT images reviewed. 4/29  IMPRESSION:   CONCLUSION:  1.  Motion degraded examination demonstrates expected temporal evolution of right posterior cerebral artery territory infarct including interval development of small volume petechial hemorrhage as above. No hemorrhagic transformation. Mild local mass   effect with partial effacement of the posterior right lateral ventricle.  2.  Some loss of gray-white matter differentiation involving the parasagittal left parieto-occipital junction. It is uncertain if this is artifactual or reflect a new area of subacute ischemia/infarct. Consider MRI or follow-up head CT if the patient is   not an MRI candidate.  3.  Background of mild age-related changes elsewhere similar to the prior exam.    MRI brain images reviewed. Right PCA stroke seen.    Recent Results (from the past 24 hour(s))   Sodium    Collection Time: 04/30/19  6:01 PM   Result Value Ref Range    Sodium 148 (H) 136 - 145 mmol/L   POCT Glucose - every 4 hours    Collection Time: 04/30/19  7:47 PM   Result Value Ref Range    Glucose 141 (H) 70 - 139 mg/dL   POCT Glucose - every 4 hours    Collection Time: 04/30/19 11:38 PM   Result Value Ref Range    Glucose 138 70 - 139 mg/dL   Sodium    Collection Time: 04/30/19 11:53 PM   Result Value Ref Range    Sodium 147 (H) 136 - 145 mmol/L   Crossmatch    Collection Time: 05/01/19 12:03 AM   Result Value Ref Range    Crossmatch Compatible     Blood Expiration Date [id number]     Unit Type A Pos     Unit Number [id number]     Status Transfused     Component Red Blood Cells     PRODUCT CODE Q9937P20     Issue Date and Time [id number]     Blood Type  6200     CODING SYSTEM [id number]    POCT Glucose - every 4 hours    Collection Time: 05/01/19  3:47 AM   Result Value Ref Range    Glucose 164 (H) 70 - 139 mg/dL   Basic Metabolic Panel    Collection Time: 05/01/19  4:45 AM   Result Value Ref Range    Sodium 148 (H) 136 - 145 mmol/L    Potassium 3.6 3.5 - 5.0 mmol/L    Chloride 114 (H) 98 - 107 mmol/L    CO2 25 22 - 31 mmol/L    Anion Gap, Calculation 9 5 - 18 mmol/L    Glucose 212 (H) 70 - 125 mg/dL    Calcium 7.7 (L) 8.5 - 10.5 mg/dL    BUN 48 (H) 8 - 22 mg/dL    Creatinine 1.22 0.70 - 1.30 mg/dL    GFR MDRD Af Amer >60 >60 mL/min/1.73m2    GFR MDRD Non Af Amer 59 (L) >60 mL/min/1.73m2   Magnesium    Collection Time: 05/01/19  4:45 AM   Result Value Ref Range    Magnesium 2.6 1.8 - 2.6 mg/dL   Urinalysis-UC if Indicated    Collection Time: 05/01/19  7:28 AM   Result Value Ref Range    Color, UA Yellow Colorless, Yellow, Straw, Light Yellow    Clarity, UA Clear Clear    Glucose, UA Negative Negative    Bilirubin, UA Negative Negative    Ketones, UA Negative Negative    Specific Gravity, UA 1.014 1.001 - 1.030    Blood, UA Moderate (!) Negative    pH, UA 8.5 (H) 4.5 - 8.0    Protein, UA 50 mg/dL (!) Negative mg/dL    Urobilinogen, UA 2.0 E.U./dL <2.0 E.U./dL, 2.0 E.U./dL    Nitrite, UA Negative Negative    Leukocytes, UA Negative Negative    Bacteria, UA Few (!) None Seen hpf    RBC, UA 0-2 None Seen, 0-2 hpf    WBC, UA 0-5 None Seen, 0-5 hpf    Squam Epithel, UA 0-5 None Seen, 0-5 lpf   POCT Glucose - every 4 hours    Collection Time: 05/01/19  7:52 AM   Result Value Ref Range    Glucose 138 70 - 139 mg/dL   Culture/Gram Stain: Sputum    Collection Time: 05/01/19  8:26 AM   Result Value Ref Range    Gram Stain Result 3+ Polymorphonuclear leukocytes     Gram Stain Result 1+ Gram negative bacilli    HM2(CBC w/o Differential)    Collection Time: 05/01/19  9:57 AM   Result Value Ref Range    WBC 7.3 4.0 - 11.0 thou/uL    RBC 3.20 (L) 4.40 - 6.20 mill/uL    Hemoglobin 9.6  (L) 14.0 - 18.0 g/dL    Hematocrit 29.6 (L) 40.0 - 54.0 %    MCV 93 80 - 100 fL    MCH 30.0 27.0 - 34.0 pg    MCHC 32.4 32.0 - 36.0 g/dL    RDW 16.2 (H) 11.0 - 14.5 %    Platelets 42 (LL) 140 - 440 thou/uL    MPV 13.5 (H) 8.5 - 12.5 fL   POCT Glucose - every 4 hours    Collection Time: 05/01/19 12:13 PM   Result Value Ref Range    Glucose 142 (H) 70 - 139 mg/dL   Blood Gases, Arterial    Collection Time: 05/01/19 12:27 PM   Result Value Ref Range    pH, Arterial 7.55 (H) 7.37 - 7.44    pCO2, Arterial 29 (L) 35 - 45 mm Hg    pO2, Arterial 81 75 - 85 mm Hg    Bicarbonate, Arterial Calc 27.6 23.0 - 29.0 mmol/L    O2 Sat, Arterial 99.3 (H) 95.0 - 96.0 %    Oxyhemoglobin 96.0 95.0 - 96.0 %    Base Excess, Arterial Calc 3.4 mmol/L    Ventilation Mode VCV     Rate 20 rr/min    FIO2 0.30     Peep 7 cm H2O    Sample Stabilized Temperature 37.0 degrees C    Ventilator Tidal Volume 300 mL   Sodium    Collection Time: 05/01/19  1:30 PM   Result Value Ref Range    Sodium 146 (H) 136 - 145 mmol/L   POCT Glucose - every 4 hours    Collection Time: 05/01/19  4:07 PM   Result Value Ref Range    Glucose 146 (H) 70 - 139 mg/dL       Total time spent for face to face visit, reviewing labs/imaging studies, counseling and coordination of care was: 35 min More than 50% of this time was spent on counseling and coordination of care. Discussing prognosis with other consultants.      Dustin Moya MD  [employer] Messaging: [email or web address]  Tel: [phone]  This note was dictated using voice recognition software.  Any grammatical or context distortions are unintentional and inherent to the software.

## 2021-05-28 NOTE — PLAN OF CARE
Care Plan  Care Management      Care Management Goals of the Day: Care progression    Care Progression Reviewed With: Charge RN, MD, HUC, CMSW    Barriers to Discharge: Stroke, Significant Increase in ProC, Increasing CR, Low Platelets, Increased Temp, Vented, Critically Ill    Discharge Disposition: TBD    Expected Discharge Date: 5/1/19    Transportation: TBD      Care Coordination Narrative: Patient remains critically ill. Family is blaming themselves for having him go through with the surgery. Continue to follow for support.

## 2021-05-28 NOTE — PROGRESS NOTES
ICU PROGRESS NOTE:    Assessment/Plan:  69 y.o. [place]  male with rheumatic heart valve dz(AV regurgitation, , MV stenosis aand TV regurgitation) afib, and pericardial adhesions who was admitted 4/24/19 for AVR, MVR tissue valves, and takedown of pericardial adhesions by Dr Vaughn      NEURO:  Post-op acute stroke PCA with some encephalopathy, left sided weakness.    Appreciate neuro input    MRI confirmed PCA stroke     ASA on hold due to low plt    Permissive HTN per neuro    Avoid sedating meds    Tylenol prn pain    Cont precedex, low dose fentanyl prn pain RASS goal -1 to -2 for now and lighten sedation daily    CV:  Afib/RVR now rates better controlled. Requiring Chivo for BP support    MAP >65    apprecaite cards input    ASA on hold as above    Start a/c when OK with neuro/CV surg    Cont statin    Cont two times a day Lasix per CV surgery    Cont IV metop for now until able to take PO. CV surg started po metoprolol as well today.    RESP:  Unable to extubate post-op due to stroke, impaired mental status. Developed a VAP with klebs and H. Flu, s/p abx    abx per ID section below    Cont metanebs for LLL atelectasis/collapse    Titrate FiO2 for goal O2 sat 94-96%    ABg today 7.49/33/92 on 40% FiO2 Vt 325, pt overbreathing vent. Reduced Vt to 300cc (~6cc/kg IBW) no additional ABG's necessary at this time, can check every few days.     Follow ETCO2, acceptable parameters today.    Cont prn duonebs (added 5/4)    Trach/PEG discussions with family early next week.     GI:  Post-op ileus, abd distension, slightly improved today    Starting trickle feeds today    Resuming some po meds    RENAL:  HyperNa, likely incr free water losses with suctioning    Cot D5W    Na monitoring per CV surg. Improving.     Avoid nephrotoxins    Guzman in place strict UOP monitoring.     Elevated CK >1700, unclear why it was checked. Mild rhabdo? Will trend.    ID:  Recent VAP with kelsb and H. flu    Finished CTX 5/4    Follow fever  curve, wbc count    PCT markedly improved 30 -> 9, trend prn    HEMATOLOGIC:  Thrombocytopenia, unclear cause. HIT testing neg. ?critical illness, ?meds, BM suppression    Follow plt    OK to resume HSQ if OK with CV surg    hgb >7    Heme saw and signed off, expected to have plt recovery when acute issues resolved.     ENDOCRINE:  No issues    FSBG checks, insulin SS/drip per ICU protocol    ICU PROPHYLAXIS:    SCDs    Defer to CV surg re: resumptionof HSQ. plt counts improving.     PPI    peridex    DISPO/CODE STATUS: full code    FAMILY COMMUNICATION: will touch base with family later today    Lines/Drains/Tubes:  PICC line double lumen  PIV  Guzman  ETT   Rectal tube  OG tube    Restraints  Progress Note  Restraint Application    I recognize that restraints are physical and/or chemical interventions intended to restrict a person's movements. Restraints are currently needed to ensure the safety of this patient and/or others. My clinical rationale appears below.    Category/Type of Restraint     Non Violent:  Soft limb restraint x2  --  Behavior  Pulling at tubes/lines  --  Root Cause of the Behavior  Sedation/intubation  --  Less-Restrictive Measures that Failed  Non Violent Measures:  Close Observation  --  Response to the Restraint  Patient unable to pull at tubes/lines  --  Criteria for Release from the Restraint  Patient calm and off sedation    Overnight events:  No major events  Chivo down to 30  Sedation lightened and he is less agitated and following some simple commands.     Subjective:  Unable to assess as he is on sedation.     Objective:  Physical Exam:  Vent settings for last 24 hours:  Vent Mode: PCV/VG  FiO2 (%):  [35 %-40 %] 35 %  S RR:  [20] 20  S VT:  [300 mL] 300 mL  PEEP/CPAP (cm H2O):  [5 cm H2O] 5 cm H2O  Minute Ventilation (L/min):  [8.5 L/min-12 L/min] 9.4 L/min  PIP:  [11 cm H2O-26 cm H2O] 20 cm H2O  MAP (cm H2O):  [7-9] 7    /75   Pulse (!) 113   Temp 97.9  F (36.6  C) (Axillary)   " Resp 20   Ht 4' 11\" (1.499 m)   Wt 109 lb 1.6 oz (49.5 kg)   SpO2 98%   BMI 22.04 kg/m      Intake/Output last 3 shifts:  I/O last 3 completed shifts:  In: 4389 [I.V.:3789; NG/GT:100; IV Piggyback:500]  Out: 4475 [Urine:4325; Emesis/NG output:50; Stool:100]  Intake/Output this shift:  I/O this shift:  In: 180 [NG/GT:180]  Out: 1150 [Urine:1025; Emesis/NG output:125]    Physical Exam  Gen: intubated, sedated  HEENT: no OP lesions, no STEPHANIE  CV: RRR, no m/g/r  Resp: CTAB  Abd: soft, nontender, BS+  Neuro: PERRL, nonfocal  Ext: no edema    LAB:  Results from last 7 days   Lab Units 05/05/19  0755   LN-WHITE BLOOD CELL COUNT thou/uL 17.0*   LN-HEMOGLOBIN g/dL 10.8*   LN-HEMATOCRIT % 33.1*   LN-PLATELET COUNT thou/uL 96*     Results from last 7 days   Lab Units 05/05/19  0755 05/05/19  0041 05/04/19  1750 05/04/19  1139 05/04/19  0544  05/03/19  0558  05/02/19  0517  04/30/19  1338   LN-SODIUM mmol/L 149* 148* 148* 150* 150*   < > 149*   < > 148*   < > 147*   LN-POTASSIUM mmol/L 3.7  --   --  3.8 3.6  --  4.3  --  4.5   < >  --    LN-CHLORIDE mmol/L 115*  --   --   --   --   --  116*  --  115*   < >  --    LN-CO2 mmol/L 25  --   --   --   --   --  22  --  25   < >  --    LN-BLOOD UREA NITROGEN mg/dL 46*  --   --  56*  --   --  56*  --  51*   < >  --    LN-CREATININE mg/dL 1.07  --   --   --  1.37*  --  1.31*  --  1.33*   < >  --    LN-CALCIUM mg/dL 7.9*  --   --   --   --   --  7.7*  --  8.0*   < >  --    LN-PROTEIN TOTAL g/dL 5.8*  --   --   --   --   --   --   --   --   --  5.4*   LN-BILIRUBIN TOTAL mg/dL 2.7*  --   --   --   --   --   --   --   --   --  1.6*   LN-ALKALINE PHOSPHATASE U/L 114  --   --   --   --   --   --   --   --   --  68   LN-ALT (SGPT) U/L 129*  --   --   --   --   --   --   --   --   --  52*   LN-AST (SGOT) U/L 136*  --   --   --   --   --   --   --   --   --  121*    < > = values in this interval not displayed.       Current Facility-Administered Medications   Medication Dose Route Frequency " Provider Last Rate Last Dose     acetaminophen solution 650 mg (TYLENOL)  650 mg Enteral Tube Q4H PRN Sandy Manzo CNP   650 mg at 05/02/19 1742     acetaminophen suppository 650 mg (TYLENOL)  650 mg Rectal Q6H PRN Sandy Manzo CNP   650 mg at 04/28/19 0014     albumin human 5 % bottle 12.5 g  12.5 g Intravenous PRN Sandy Manzo CNP   12.5 g at 04/29/19 1222     aluminum-magnesium hydroxide-simethicone 200-200-20 mg/5 mL suspension 30 mL (MAALOX ADVANCED)  30 mL Enteral Tube Q4H PRN Sandy Manzo CNP         amiodarone 900 mg/500 ml standard 24 hour infusion  0.5 mg/min Intravenous Continuous Michael Aldrich MD 16.7 mL/hr at 05/05/19 0823 0.5 mg/min at 05/05/19 0823     baclofen tablet 10 mg (LIORESAL)  10 mg Enteral Tube BID Jojo Vaughn MD   10 mg at 05/05/19 1047     bisacodyl EC tablet 10 mg (DULCOLAX)  10 mg Oral Daily PRN Sandy Manzo CNP   10 mg at 05/02/19 0840     bisacodyl suppository 10 mg (DULCOLAX)  10 mg Rectal Daily PRN Sandy Manzo CNP   10 mg at 05/03/19 1147     chlorhexidine 0.12 % solution 15 mL (PERIDEX)  15 mL Topical Q12H Sandy Manzo CNP   15 mL at 05/05/19 1316     dexmedetomidine 400 mcg/100 mL in NS (PRECEDEX) (4mcg/mL)  0.1-1.5 mcg/kg/hr Intravenous Continuous PRN Sandy Manzo CNP 12 mL/hr at 05/05/19 1148 1 mcg/kg/hr at 05/05/19 1148     dextrose 5%  75 mL/hr Intravenous Continuous IvYahir busby MD   Stopped at 05/05/19 1148     dextrose 50 % (D50W) syringe 20-50 mL  20-50 mL Intravenous Q15 Min PRN Sandy Manzo CNP   20 mL at 04/26/19 2036     DOPamine 400mg/250mL in D5W (1.6 mg/ml)  2-20 mcg/kg/min Intravenous Continuous PRN Sandy Manzo CNP         EPINEPHrine 5 mg/250 mL in D5W (0.02 mg/mL)  0.01-0.3 mcg/kg/min Intravenous Continuous PRN Sandy Manzo CNP   Stopped at 04/27/19 0655     fentaNYL 2500 mcg/250 mL in NS (10 mcg/mL)   mcg/hr Intravenous Continuous Sandy Manzo CNP 5 mL/hr at 05/05/19 1151 50 mcg/hr at 05/05/19 1151      furosemide injection 20 mg (LASIX)  20 mg Intravenous BID - diuretic Crystal Rasheed CNP   20 mg at 05/05/19 0828     glucagon (human recombinant) injection 1 mg  1 mg Subcutaneous Q15 Min PRN Sandy Manzo CNP         insulin aspart U-100 injection (NovoLOG)   Subcutaneous Q4H Aydee Garibay MD   2 Units at 05/05/19 1315     ipratropium-albuterol 0.5-2.5 mg/3 mL nebulizer solution 3 mL (DUO-NEB)  3 mL Nebulization Q6H PRN Rafael Omer MD         levothyroxine SolR 75 mcg  75 mcg Intravenous Daily Crystal Rasheed CNP   75 mcg at 05/04/19 1702     lipase-protease-amylase 5,000-17,000- 24,000 unit capsule 2 capsule (ZENPEP)  2 capsule Enteral Tube PRN Sandy Manzo CNP        And     sodium bicarbonate tablet 325 mg  325 mg Enteral Tube PRN Sandy Manzo CNP         magnesium hydroxide suspension 30 mL (MILK OF MAG)  30 mL Enteral Tube Daily PRN Sandy Manzo CNP         metoprolol tartrate injection 2.5-5 mg (LOPRESSOR)  2.5-5 mg Intravenous Q6H Crystal Rasheed CNP   2.5 mg at 05/05/19 1044     metoprolol tartrate tablet 12.5 mg (LOPRESSOR)  12.5 mg Oral BID Crystal Rasheed CNP         midazolam (PF) injection 1 mg (VERSED)  1 mg Intravenous Q4H PRN Yahir Matthews MD   1 mg at 05/04/19 1323     naloxone injection 0.2-0.4 mg (NARCAN)  0.2-0.4 mg Intravenous PRN Sandy Manzo CNP        Or     naloxone injection 0.2-0.4 mg (NARCAN)  0.2-0.4 mg Intramuscular PRN Sandy Manzo CNP         niCARdipine 20 mg/200 mL NS (0.1 mg/ml)  0.5-15 mg/hr Intravenous Continuous PRN Sandy Manzo CNP   Stopped at 04/26/19 0701     norepinephrine 4 mg/250 ml in D5W (0.016 mg/ml)  2-30 mcg/min Intravenous Continuous PRN Sandy Manzo CNP         pantoprazole 40 mg injection  40 mg Intravenous Q24H Sandy Manzo CNP   40 mg at 05/05/19 0828    Or     omeprazole capsule 20 mg (PriLOSEC)  20 mg Oral Q24H Sandy Manzo CNP   20 mg at 05/01/19 0815    Or     omeprazole suspension 20 mg  (PriLOSEC)  20 mg Enteral Tube Q24H Sandy Manzo CNP   20 mg at 05/03/19 0844     ondansetron injection 4 mg (ZOFRAN)  4 mg Intravenous Q6H PRN Sandy Manzo CNP         oxyCODONE 5 mg/0.25 mL concentrated solution 5-10 mg (ROXICODONE)  5-10 mg Enteral Tube Q4H PRN Forabelardo, Juan B., PA-C   5 mg at 05/02/19 2226    Or     oxyCODONE immediate release tablet 5-10 mg (ROXICODONE)  5-10 mg Oral Q4H PRN Forcha, Juan B., PA-C   10 mg at 05/03/19 0258     phenylephrine 25 mg/250 ml in NS (0.1 mg/ml)   mcg/min Intravenous Continuous Rohan Ellington MD   Stopped at 05/05/19 1150     potassium chloride 10 mEq/7.5 mL solution 10 mEq (KAYCIEL)  10 mEq Enteral Tube Daily with Crystal Hardy CNP   10 mEq at 05/04/19 1651     sodium chloride 0.65 % nasal spray 1-2 spray (OCEAN)  1-2 spray Each Nare Q1H PRN Sandy Manzo CNP         sodium chloride 0.9%  25 mL/hr Intravenous Continuous Michael Aldrich MD   Stopped at 05/03/19 1300     sodium chloride bacteriostatic 0.9 % injection 1-5 mL  1-5 mL Intradermal Once PRN Forcha, Juan B., PA-C         sodium chloride flush 10-20 mL (NS)  10-20 mL Intravenous PRN Forcha, Juan B., PA-C         sodium chloride flush 10-30 mL (NS)  10-30 mL Intravenous PRN Forcha, Juan B., PA-C         sodium chloride flush 10-30 mL (NS)  10-30 mL Intravenous Q8H FIXED TIMES Forcha, Juan B., PA-C   10 mL at 05/05/19 1317     sodium chloride flush 20 mL (NS)  20 mL Intravenous PRN Forcha, Juan B., PA-C         sodium chloride flush 3 mL (NS)  3 mL Intravenous Line Care Rafael Omer MD   3 mL at 05/05/19 0300     vasopressin standard infusion 20 units in D5W 100 mL  0.5-6 Units/hr Intravenous Continuous PRN Sandy Manzo, CNP         Micro:  Sputum 5/1  1+ pan-sens Klebsiella  4/27 klebsiella + h. Flu  PCT 30 -> 9.1    Total Critical Care Time : 30 Minutes      Rafael Omer MD (Avi)  [facility] Pulmonary & Critical Care  Pager [phone]  Clinic  [phone]

## 2021-05-28 NOTE — PLAN OF CARE
Problem: Mechanical Ventilation  Goal: Patient will maintain patent airway  Outcome: Progressing  Goal: Respiratory status - ventilation  Description  Movement of air in and out of the lungs.    Liberate from ventilator  Outcome: Progressing  Goal: ET tube will be managed safely  Outcome: Progressing  HARINDER Michaud

## 2021-05-28 NOTE — PLAN OF CARE
Goal: Respiratory status - ventilation  Description  Movement of air in and out of the lungs.    Liberate from ventilator  Outcome: Progressing  Goal: ET tube will be managed safely  Outcome: Progressing     Problem: Mechanical Ventilation  Goal: Patient will maintain patent airway  Outcome: Progressing     HARINDER Santos

## 2021-05-28 NOTE — PROGRESS NOTES
Critical Care Progress Note     Admit Date: 4/24/2019  ICU Day: 9     Code Status: full code    CC: rheumatic valvular disease    HPI: 69 y.o. [place]  male with rheumatic heart valve dz(AV regurgitation, , MV stenosis aand TV regurgitation) afib, and pericardial adhesions who was admitted 4/24/19 for AVR, MVR tissue valves, and takedown of pericardial adhesions by Dr Vaughn     Case summary: Pt was an easy intubation, received 15 mg methadone preop, Echo showed boggy RV.  PAPs in the 30s preop, pt had hard time coming off extracorporeal circulation, needing many bumps of epi, flolan was started.  Also they took down pericardial adhesions.  To ICU on full vent support, flolan full strength, not paced,  On epi, insulin and precedex drips.  Protamine was given on arrival to ICU.    Interval events:  4/24/19: to OR for AVR/MVR    To ICU on Flolan  4/25/19: Flolan weaned off   When off sedation for SBT, pt noted to not move left side and    had left eye with upward deviation   Stroke code called    CTA shows right P2 occlusion   Neurology consulted   SBP parameters changed to     Failed SBT  4/26/19: fever    Failed SBT   repeat head CT  revealed evolving acute/subacute infarction  4/27/19: fever T max 105   ID consulted   HIT panel sent due to persistent thrombocytopenia   4/29/19: some labored breathing with vent, vent changes made at bed side    Does better with flow rate at 50   Seems neurogenic in origin     Heme consult for ? of HIT   Dr Vaughn met with son with    4/30/19: unable to do SBT   MRI completed-Large evolving subacute right PCA distribution infarcts with moderate cytotoxic edema, sulcal  effacement and mass effect on the right lateral ventricle  5/01/19: ID signed off-rec ceftriaxone for 5 days   Afib with RVR   Family conference-Family/Dr Vaughn/Neurology/myself  Up[dates given discussed possbile need for trach/peg   5/02/19: cards consulted, afib RVR continues   New ileus   OG to  "LIS   Dr Kang consulted for possible trach/peg-await FC 5/3 for final decision   5/03/19: cardilogy to do BATSHEVA and cardioversion   abd more distended   OG remiand to LIS-may need abd CT-d/w CVS     Major events over the last 24 hours: afib with RVR continues, abd more distended    Subjective: in bed, lightly sedated, not responding to me, over breathing the vent  ROS: unable to do    Drips: precedex      Ventilator: Mechanical Ventilation Day: # 10        Settings: 20/325/5/40%    /69   Pulse (!) 129   Temp 99  F (37.2  C) (Core)   Resp 20   Ht 4' 11\" (1.499 m)   Wt 121 lb 4.8 oz (55 kg)   SpO2 97%   BMI 24.50 kg/m       I/O last 3 completed shifts:  In: 1733.2 [I.V.:1383.2; NG/GT:350]  Out: 4285 [Urine:3785; Emesis/NG output:500]  Weight change:   Vent Mode: PCV/VG  FiO2 (%):  [40 %] 40 %  S RR:  [20] 20  S VT:  [300 mL-325 mL] 325 mL  PEEP/CPAP (cm H2O):  [5 cm H2O] 5 cm H2O  Minute Ventilation (L/min):  [8.6 L/min-11.3 L/min] 8.6 L/min  PIP:  [14 cm H2O-37 cm H2O] 19 cm H2O  MAP (cm H2O):  [6-14] 8      EXAM:   Mental status: in bed lightly sedated on vent    HEENT: NC right pupil larger than left, minimal rx(not new), OETT, FT  + cough and gag  Resp: few anterior rhonchi  Does not appear labored on vent presently  Cardiovascular: IRRR  Abdominal: distended nobs  Extremeties: no e/c/c  Neurology: does not move left side, minimal movement with right hand    Labs Personally reviewed: yes  Results from last 7 days   Lab Units 05/03/19  0558 05/02/19  1023 05/01/19  0957  04/29/19  1032   LN-WHITE BLOOD CELL COUNT thou/uL 11.6* 11.6* 7.3   < > 4.1   LN-HEMOGLOBIN g/dL 9.4* 10.4* 9.6*   < > 8.4*   LN-HEMATOCRIT % 29.7* 31.8* 29.6*   < > 26.3*   LN-PLATELET COUNT thou/uL 59*  59* 53* 42*   < > 27*   LN-MONOCYTES - REL (DIFF) %  --   --   --   --  8    < > = values in this interval not displayed.     Results from last 7 days   Lab Units 05/03/19  0558 05/03/19  0008 05/02/19  1725 05/02/19  0517  " 05/01/19  0445  04/30/19  1338   LN-SODIUM mmol/L 149* 148* 149* 148*   < > 148*   < > 147*   LN-POTASSIUM mmol/L 4.3  --   --  4.5  --  3.6  --   --    LN-CHLORIDE mmol/L 116*  --   --  115*  --  114*  --   --    LN-CO2 mmol/L 22  --   --  25  --  25  --   --    LN-BLOOD UREA NITROGEN mg/dL 56*  --   --  51*  --  48*  --   --    LN-CREATININE mg/dL 1.31*  --   --  1.33*  --  1.22  --   --    LN-CALCIUM mg/dL 7.7*  --   --  8.0*  --  7.7*  --   --    LN-PROTEIN TOTAL g/dL  --   --   --   --   --   --   --  5.4*   LN-BILIRUBIN TOTAL mg/dL  --   --   --   --   --   --   --  1.6*   LN-ALKALINE PHOSPHATASE U/L  --   --   --   --   --   --   --  68   LN-ALT (SGPT) U/L  --   --   --   --   --   --   --  52*   LN-AST (SGOT) U/L  --   --   --   --   --   --   --  121*    < > = values in this interval not displayed.     Results from last 7 days   Lab Units 04/29/19  1032   LN-INR  1.35*   LN-PARTIAL THROMBOPLASTIN TIME seconds 50*       Last ABG 4/30/19:  PH 7.48  PCO2 36 PAO2 60 Bicarb 27.4     Microbiology: NGTD  Imaging (all imaging is personally reviewed):     abd xray 5/3/19-There are persistent small and large bowel loops not significantly changed from the prior study. Nasogastric tube with tip in the stomach. Guzman catheter. No free intraperitoneal air is seen. Findings are consistent with ileus or distal bowel obstruction.     abd xray 5/2/19-NG tube tip and side-port are present within the region of the stomach. There is diffuse air-filled distention of the small bowel, increased from prior study. Findings reflect either obstruction or ileus. Upright view of the chest performed at   same time does not demonstrate any evidence of free air under the diaphragm    MRI head 4/30/19-1.  Limited by motion. Large evolving subacute right PCA distribution infarcts with moderate cytotoxic edema, sulcal effacement and mass effect on the right lateral ventricle. No hydrocephalus..  Minimal, punctate petechial hemorrhage  in the right occipital infarct bed.Patchy areas of acute to early subacute ischemia in the posterior left frontal cortex and minimally within the right anterior frontal cortex and right caudate body.    PCXR 4/27/19-ET tube 3 cm above the georgina. NG tube tip overlies the stomach. Right internal jugular Hollywood-Nick catheter tip overlies the pulmonary artery outflow tract. Mediastinal chest tube.left pleural effusion. Left lower lobe atelectasis. Mild vascular congestion and pulmonary edema. Postop median sternotomy. Heart size upper limits of normal    Head CT 4/26/19-Moderately sized hypodensity and loss of gray-white matter differentiation right temporal occipital region, consistent with evolving acute/subacute infarction.  2.  No significant global mass effect or hemorrhage.    CTA head neck 4/25/19-HEAD CT:  1.  No intracranial hemorrhage, mass lesions, hydrocephalus or CT evidence for an acute infarct. MRI is more sensitive for the evaluation of acute infarct.  2.  Chronic lacunae as detailed above.  3.  Mild diffuse cerebral parenchymal volume loss. Presumed chronic hypertensive/microvascular ischemic white matter changes.        HEAD CTA:   1.  There is cut off on the P2 segment of the right posterior cerebral artery.  2.  No high-grade stenosis or occlusion of the rest of the major intracranial arteries.  3.  There is 1.5 to 2 mm focal outpouching off the expected location of the origin of the right posterior communicating artery. This is likely an infundibulum versus small aneurysm.     PCXR 4/25/19- Postoperative changes from cardiac valvular surgery. Moderate cardiomegaly persists. There is mild central hilar congestion and slight interstitial prominence suggesting minimal edema. Left hemidiaphragm obscured which may relate to cardiomegaly or left lower lobe atelectasis. No pneumothorax or pleural effusion. Tubes and catheters appear in good position.    Impression:  1. Acute hypoxic respiratory  "failure  2. Aortic regurgitation.    S/p tissue AVR  4/24/19  3. Mitral stenosis.    S/p Tissue MVR 4/24/19  4. Tricuspid regurgitation   5. Heart failure  Dilated RV  Flolan started in OR        Now off  6.   Acute ischemic stroke  7. afib with RVR  8.  Thrombocytopenia.   9.   fever   10.   Pericardial adhesions; s/p takedown of adhesions 4/24/19  11. Ileus   12. Non English speaking.  Cam[place]     PLAN:   1. Continue full vent support  2. ID De-escalated abx to ceftriaxone for 3 more days, and signed off  3. Cards consulted for Afib RVR-BATSHEVA with cardioversion today  4. CV surgery notified of ongoing ileus, may need abd CT  5. low dose fentnayl drip for pain  6. Limit sedation as able  7. hold TF, put Og to LIS  8. Per heme \"These include platelet transfusion for counts less than 10 or less than 20 of bleeding.  Check peripheral smear.  Would expect platelet count recovery as all of his acute medical issues improve.\"  9. Family conference today to discuss trach/peg  10. Other issues per CVS and neurology     ICU DAILY CHECKLIST                           Can patient transfer out of ICU? no    FAST HUG:    Feeding:  Feeding: No.  Patient is receiving NPO  Tube feedings now on hold due to new ileus  Guzman:Yes  Analgesia/Sedation:Yes precedex  Thromboembolic prophylaxis: yes; Mode:  SCD  HOB>30:  Yes  Stress Ulcer Protocol Active: yes; Mode: PPI  Glycemic Control: Any glucose > 180 no; Mode of Insulin Therapy: Sliding Scale Insulin    INTUBATED:  Can patient have daily waking:  yes  Can patient have spontaneous breathing trial:  no    Restraints? yes    PHYSICAL THERAPY AND MOBILITY:  Can patient have PT and mobility trial: no  Activity: Bed Rest    transfer/discharge plans: stays ICU    The patient is critically ill with impairment in organ system and high risk of life threatening deterioration.     Total CCT spent 50 minutes thus far today.       Sandy Manzo APRN, [phone]  [facility] Pulmonary & Critical " Care

## 2021-05-28 NOTE — ANESTHESIA PREPROCEDURE EVALUATION
Anesthesia Evaluation      Patient summary reviewed     Airway    Pulmonary    (+) asthma  shortness of breath,     ROS comment: Vent Mode: PCV/VG  FiO2 (%):  (24 %-30 %) 24 %  S RR:  (16) 16  S VT:  (300 mL) 300 mL  PEEP/CPAP (cm H2O):  (5 cm H2O) 5 cm H2O  Minute Ventilation (L/min):  (8.6 L/min-12 L/min) 8.6 L/min  PIP:  (14 cm H2O-22 cm H2O) 22 cm H2O  MS SUP:  (12 cm H20) 12 cm H20  MAP (cm H2O):  (6-11) 11                                Cardiovascular   (+) hypertension, CAD, CHF, ,      Neuro/Psych    (+) CVA ,     Endo/Other    (+) hypothyroidism,      GI/Hepatic/Renal    (+)   chronic renal disease,      Other findings:     Rheumatic heart disease  Blurry vision  Hearing loss  Nonspecific reaction to tuberculin skin test without active tuberculosis  Dysphagia  Hyperlipidemia  Silent Stroke  Shoulder strain  Elevated LFTs  Dysuria  Hypothyroidism, unspecified type  Mitral valve stenosis, unspecified etiology  Aortic valve disease, rheumatic  Atherosclerosis of native coronary artery of native heart without angina pectoris  Essential hypertension with goal blood pressure less than 130/85  Pure hypercholesterolemia  Lightheadedness  Moderate malnutrition (H)  ALEENA (acute kidney injury) (H)  A-fib (H)  Heart failure with preserved ejection fraction (H)  S/P MVR (mitral valve repair)  Acute respiratory failure with hypoxemia (H)  Anemia due to blood loss, acute  Coagulopathy (H)  Non-English speaking patient  Thrombocytopenia (H)  Sepsis, due to unspecified organism (H)  Atrial fibrillation with RVR (H)  Hypernatremia  Paralytic ileus (H)  Small bowel obstruction (H)  Acute cardioembolic stroke (H)  Respiratory failure (H)       INTUBATED / VENTILATED    TACHYCARDIC    SBP IN 90S    ON FENTANYL AND DEXMEDETOMIDINE      Dental    (+) poor dentition                       Anesthesia Plan  Planned anesthetic: general endotracheal  INTUBATED / VENTILATED   DIRECT TO OR   DIRECT TO ICU POSTOP    FAMILY CONFERENCE  PLANNED BEFORE SURGERY AT WHICH TIME SON WILL PROVIDE CONSENTS  ASA 4

## 2021-05-28 NOTE — PROGRESS NOTES
Critical Care Progress Note     Admit Date: 4/24/2019  ICU Day: 15     Code Status: full code    CC: rheumatic valvular disease    HPI: 69 y.o. [place]ian  male with rheumatic heart valve dz(AV regurgitation, , MV stenosis aand TV regurgitation) afib, and pericardial adhesions who was admitted 4/24/19 for AVR, MVR tissue valves, and takedown of pericardial adhesions by Dr Vaughn     Case summary: Pt was an easy intubation, received 15 mg methadone preop, Echo showed boggy RV.  PAPs in the 30s preop, pt had hard time coming off extracorporeal circulation, needing many bumps of epi, flolan was started.  Also they took down pericardial adhesions.  To ICU on full vent support, flolan full strength, not paced,  On epi, insulin and precedex drips.  Protamine was given on arrival to ICU.    Interval events:  4/24/19: to OR for AVR/MVR    To ICU on Flolan  4/25/19: Flolan weaned off   When off sedation for SBT, pt noted to not move left side and    had left eye with upward deviation   Stroke code called    CTA shows right P2 occlusion   Neurology consulted   SBP parameters changed to     Failed SBT  4/26/19: fever    Failed SBT   repeat head CT  revealed evolving acute/subacute infarction  4/27/19: fever T max 105   ID consulted   HIT panel sent due to persistent thrombocytopenia   4/29/19: some labored breathing with vent, vent changes made at bed side    Does better with flow rate at 50   Seems neurogenic in origin     Heme consult for ? of HIT   Dr Vaughn met with son with    4/30/19: unable to do SBT   MRI completed-Large evolving subacute right PCA distribution infarcts with moderate cytotoxic edema, sulcal  effacement and mass effect on the right lateral ventricle  5/01/19: ID signed off-rec ceftriaxone for 5 days for klebsiella in sputum    Afib with RVR   Family conference-Family/Dr Vaughn/Neurology/myself  Up[dates given discussed possbile need for trach/peg   5/02/19: cards consulted, afib RVR  "continues   New ileus   OG to LIS   Dr Kang consulted for possible trach/peg-for next week  5/03/19: BATSHEVA and cardioversion defered per CV surgery, HR better on BB   abd more distended   OG to continuous sx for now  5/05/19: ileus improving, trophic feedings started  5/06/19: Tf back to goal  5/07/19: FC held, plan to proceed with trach/peg  5/08/19: trach PEG     Major events over the last 24 hours: No new issues, trach peg done    Subjective: in bed, lightly sedated, not responding to me, over breathing the vent  ROS: unable to do    Drips: precedex, fentanyl       Ventilator: Mechanical Ventilation intubated 4/24/19 for surgery                                             trached in OR 5/8/19        Settings: presently on phase 1 wean PS15 P5    /87   Pulse (!) 122   Temp 100.3  F (37.9  C) (Oral)   Resp (!) 29   Ht 4' 11\" (1.499 m)   Wt 105 lb 12.8 oz (48 kg)   SpO2 100%   BMI 21.37 kg/m       I/O last 3 completed shifts:  In: 2504.9 [I.V.:1304.9; NG/GT:800; IV Piggyback:400]  Out: 2850 [Urine:2650; Stool:200]  Weight change: -2 lb 12.8 oz (-1.27 kg)  Vent Mode: CPAP/PSV  FiO2 (%):  [24 %] 24 %  S RR:  [16] 16  S VT:  [300 mL] 300 mL  PEEP/CPAP (cm H2O):  [5 cm H2O] 5 cm H2O  Minute Ventilation (L/min):  [8.5 L/min-11.3 L/min] 10.3 L/min  PIP:  [15 cm H2O-23 cm H2O] 23 cm H2O  AZ SUP:  [15 cm H20] 15 cm H20  MAP (cm H2O):  [7-10] 9      EXAM:   Mental status: awake, up in chair!  HEENT: NC # 8 shiley trach  Resp: cta   Cardiovascular: IRRR  Abdominal: soft+ bs   Extremeties: no e/c/c  Neurology: moving  right hand/ arm more moving left hand intermittently    Labs Personally reviewed: yes  Results from last 7 days   Lab Units 05/09/19  0601 05/08/19  5940 05/08/19  0549 05/07/19  0626 05/06/19  0456   LN-WHITE BLOOD CELL COUNT thou/uL 11.6*  --  9.8  --  11.4*   LN-HEMOGLOBIN g/dL 8.5*  --  8.6* 8.9* 9.6*   LN-HEMATOCRIT % 25.9*  --  26.2*  --  29.3*   LN-PLATELET COUNT thou/uL 136* 158 122* 103* " 97*   LN-NEUTROPHILS RELATIVE PERCENT % 83*  --   --   --   --    LN-MONOCYTES RELATIVE PERCENT % 8  --   --   --   --      Results from last 7 days   Lab Units 05/09/19  0601 05/08/19  0549 05/07/19  1523 05/07/19  0626  05/06/19  0456   LN-SODIUM mmol/L 142 145  --  145   < > 147*   LN-POTASSIUM mmol/L 3.4* 3.9 3.6 3.3*  3.3*  --  3.7   LN-CHLORIDE mmol/L 113* 116*  --  114*  --  116*   LN-CO2 mmol/L 23 24  --  23  --  22   LN-BLOOD UREA NITROGEN mg/dL 24* 22  --  31*  --  40*   LN-CREATININE mg/dL 0.74 0.74  --  0.84  --  0.97   LN-CALCIUM mg/dL 7.4* 7.8*  --  7.8*  --  8.0*   LN-PROTEIN TOTAL g/dL 5.6*  --   --  5.6*  --  5.7*   LN-BILIRUBIN TOTAL mg/dL 1.4*  --   --  2.1*  --  2.5*   LN-ALKALINE PHOSPHATASE U/L 137*  --   --  164*  --  162*   LN-ALT (SGPT) U/L 103* 115*  --  132*  --  160*   LN-AST (SGOT) U/L 85* 107*  --  125*  --  189*    < > = values in this interval not displayed.     Results from last 7 days   Lab Units 05/09/19  0601 05/08/19  2255 05/08/19  0549  05/05/19  1415 05/03/19  1059   LN-INR   --   --   --   --  1.38* 1.19*   LN-PARTIAL THROMBOPLASTIN TIME seconds 64* 60* 68*   < > 33  --     < > = values in this interval not displayed.       Last ABG 5/5/19:  PH 7.55  PCO2 30 PAO2 104 Bicarb 28     Microbiology: 1+ Klebsiella pneumoniae in sputum  Imaging (all imaging is personally reviewed):       Ct chest abd pelvis-1.  Small left pleural effusion. There is complete atelectasis of the left lower lobe with peripheral fluid-filled bronchi. Dependent atelectasis is also present involving the posterior right lower lobe to a lesser extent. Infection within these areas of   atelectasis is not excluded.  2.  Bilateral upper lobe nodular infiltrate. There is also a focal patchy area of ground-glass opacity within the right upper lobe. These findings may reflect additional areas of infectious process. Follow-up CT scan to ensure resolution of the upper   lobe ground-glass nodular opacity is  recommended.  3.  No evidence of bowel obstruction. There is mild fluid and air-filled distention of the colon with some lesser degree of air and fluid filled distention of the small bowel. These findings suggest an ileus.  4.  Circumferential wall thickening of the ascending colon, compatible with colitis.  abd xray 5/3/19-There are persistent small and large bowel loops not significantly changed from the prior study. Nasogastric tube with tip in the stomach. Guzman catheter. No free intraperitoneal air is seen. Findings are consistent with ileus or distal bowel obstruction.     abd xray 5/2/19-NG tube tip and side-port are present within the region of the stomach. There is diffuse air-filled distention of the small bowel, increased from prior study. Findings reflect either obstruction or ileus. Upright view of the chest performed at   same time does not demonstrate any evidence of free air under the diaphragm    MRI head 4/30/19-1.  Limited by motion. Large evolving subacute right PCA distribution infarcts with moderate cytotoxic edema, sulcal effacement and mass effect on the right lateral ventricle. No hydrocephalus..  Minimal, punctate petechial hemorrhage in the right occipital infarct bed.Patchy areas of acute to early subacute ischemia in the posterior left frontal cortex and minimally within the right anterior frontal cortex and right caudate body.    PCXR 4/27/19-ET tube 3 cm above the georgina. NG tube tip overlies the stomach. Right internal jugular [place]-Nick catheter tip overlies the pulmonary artery outflow tract. Mediastinal chest tube.left pleural effusion. Left lower lobe atelectasis. Mild vascular congestion and pulmonary edema. Postop median sternotomy. Heart size upper limits of normal    Head CT 4/26/19-Moderately sized hypodensity and loss of gray-white matter differentiation right temporal occipital region, consistent with evolving acute/subacute infarction.  2.  No significant global mass effect  or hemorrhage.    CTA head neck 4/25/19-HEAD CT:  1.  No intracranial hemorrhage, mass lesions, hydrocephalus or CT evidence for an acute infarct. MRI is more sensitive for the evaluation of acute infarct.  2.  Chronic lacunae as detailed above.  3.  Mild diffuse cerebral parenchymal volume loss. Presumed chronic hypertensive/microvascular ischemic white matter changes.        HEAD CTA:   1.  There is cut off on the P2 segment of the right posterior cerebral artery.  2.  No high-grade stenosis or occlusion of the rest of the major intracranial arteries.  3.  There is 1.5 to 2 mm focal outpouching off the expected location of the origin of the right posterior communicating artery. This is likely an infundibulum versus small aneurysm.     PCXR 4/25/19- Postoperative changes from cardiac valvular surgery. Moderate cardiomegaly persists. There is mild central hilar congestion and slight interstitial prominence suggesting minimal edema. Left hemidiaphragm obscured which may relate to cardiomegaly or left lower lobe atelectasis. No pneumothorax or pleural effusion. Tubes and catheters appear in good position.    Impression:  1. Acute hypoxic respiratory failure  1. multifactorial  2. Aortic regurgitation.    S/p tissue AVR  4/24/19  3. Mitral stenosis.    S/p Tissue MVR 4/24/19  4. Tricuspid regurgitation   5. Heart failure  Dilated RV  Flolan started in OR, stopped 4/25  6.   Acute stroke PCA    Left sided weakness  7.   afib with RVR  8.  Thrombocytopenia.   9.   fever   10.   Pericardial adhesions; s/p takedown of adhesions 4/24/19  11. Ileus    Resolving   12. Non English speaking.  Vietnamese     PLAN:   1. Phase 1 weaning  2. Agree with re consulting ID for fever  3. Cards helping with rhythm isseus  4. TF now at goal  5. Dc precedex  6. TF  7. Get up in chair at least two times a day  8. Consult PTOT  9. Other issues per CVS and neurology     ICU DAILY CHECKLIST                           Can patient transfer out of  ICU? no    FAST HUG:    Feeding:  Feeding: No.  Patient is receiving NPO  Tf  Guzman:Yes  Analgesia/Sedation:no  Thromboembolic prophylaxis: yes; Mode:  SCD  HOB>30:  Yes  Stress Ulcer Protocol Active: yes; Mode: PPI  Glycemic Control: Any glucose > 180 no; Mode of Insulin Therapy: Sliding Scale Insulin    INTUBATED:  Can patient have daily waking:  NA  Can patient have spontaneous breathing trial:  Yes, phase 1    Restraints? yes    PHYSICAL THERAPY AND MOBILITY:  Can patient have PT and mobility trial: yes  Activity: get up  Chair at least BID    transfer/discharge plans: stays ICU  LTAC [place] soon    The patient is critically ill with impairment in organ system and high risk of life threatening deterioration.     Total CCT spent 35 minutes thus far today.       Sandy PORRAS, [phone]  [facility]

## 2021-05-28 NOTE — PROGRESS NOTES
RESPIRATORY CARE NOTE    S/P hear surgey  Admitted to the ICU for post operative management . Pt has 7.0 ETT at 20. Positive bilateral BS. Placed on full vent support.. VCV 20, 350, +5 100%. ABG will folow in an hour.   RT will overview mechanical ventilation goals, care of the artificial airway, need of suctioning, plan for progressing towards liberation from ventilator, readiness for extubation, and the post ventilator supportive interventions to for adequate  oxygenation, ventilation and airway clearance

## 2021-05-28 NOTE — PLAN OF CARE
Problem: Mechanical Ventilation  Goal: Patient will maintain patent airway  Outcome: Progressing  Goal: Respiratory status - ventilation  Description  Movement of air in and out of the lungs.    Liberate from ventilator  Outcome: Progressing  Goal: ET tube will be managed safely  Outcome: Progressing     HARINDER Santos

## 2021-05-28 NOTE — PLAN OF CARE
Care Plan  Care Management      Care Management Goals of the Day: Care progression    Care Progression Reviewed With: Charge RN, MD, HUC, CMSW    Barriers to Discharge: Stroke, Significant Increase in ProC, Increasing CR, Low Platelets, Increased Temp, Vented, Critically Ill    Discharge Disposition: TBD    Expected Discharge Date: 5/1/19    Transportation: D      Care Coordination   On 4/28 [facility] Wrote:   Narrative: Patient remains critically ill. Family is blaming themselves for having him go through with the surgery. Continue to follow for support.

## 2021-05-28 NOTE — PLAN OF CARE
Care Plan  Care Management    Care Management Goals of the Day: Follow progression of care    Care Progression Reviewed With: Charge RN, MD, HUC, RNCM, CMSW    Barriers to Discharge: intubated, sedated, critically ill    Discharge Disposition: TBD    Expected Discharge Date: TBD    Transportation: TBD    Care Coordination Narrative: Pt from home with family, Pt speaks Angolan, son speaks English. Here for planned heart procedure, stroke occurred and pt has been critically ill since. CM to follow and assist with DC recommendations.

## 2021-05-28 NOTE — PLAN OF CARE
Patient's son was present and two interpreters helped describe the situation to his son.  He understands that a significant stroke has occurred.  He is getting a repeat CT scan today and probable MRI tomorrow to further delineate the extent of the stroke.  We will likely arrange a family care conference on Wednesday and decide about a tracheostomy and percutaneous gastrostomy tube.

## 2021-05-28 NOTE — PROGRESS NOTES
RESPIRATORY CARE NOTE   Kody Johns  Pt continue to be on full ventlator support all this evening. BS coarse crackles bilaterally. CXR : ETT 7.0 20 at gums. SXN moderate amount thick tan creamy secretions.Aggressive pulmonary hygiene done via [employer]. No weaning trilas this evening. Vent settings PC-V, 325, +8, 55%. RT will overview mechanical ventilation goals, care of the artificial airway, need of suctioning, plan for progressing towards liberation from ventilator, readiness for extubation, and the post ventilator supportive interventions to for adequate  oxygenation, ventilation and airway clearance.  Said I Duale

## 2021-05-28 NOTE — PROGRESS NOTES
CVTS Daily Progress Note   5/4/2019   POD #10  s/p AVR/MVR  DOS 4/25/19- Dr. Vaughn    LOS: 10 days   EF 45%   A1C 6.7 %    Overnight events:  Improved HR   Atrial fib with RVR rates  today    Able to wean Chivo some.  WBC 20 up from 11 - Day 5 of [place]  Tm 99.9  Improved abd distention now with some stool. BS+     Steady increase in platelets 93 (59)  NELLIE  Negative  HIT neg    Normotensive with permissive HTN using chivo gtt   Pain controlled:Fentanyl gtt     Hgb 9.6 s/p- one unit PRBC 4/30       PLAN  Continue to hold po meds  Stop tube feedings- NG to continuous sx  Continue Metoprolol IV for HR  Continue diuresis  Rectal tube to digni care  Blood culture  Sputum culture  May need to re consult ID if fever spikes with continued leukocytosis.  Hold Atorvastatin with elevated CK total    IV metoprolol for HR >110 if BP >110.  Continue chivo for B/P goal of 120- permissive HTN    Will continue to observe, over weekend  Trach and peg to be re evaluated mid week.    BMP, mag, Plts, CBC, liver function in am    OBJECTIVE:    Temp:  [98.1  F (36.7  C)-99.9  F (37.7  C)] 98.1  F (36.7  C)  Heart Rate:  [] 108  Resp:  [17-48] 32  BP: ()/() 119/76  FiO2 (%):  [40 %-60 %] 40 %  Wt Readings from Last 2 Encounters:   05/04/19 0600 122 lb 2.2 oz (55.4 kg)   05/02/19 0600 121 lb 4.8 oz (55 kg)   05/01/19 0530 120 lb 12.8 oz (54.8 kg)   04/30/19 0600 122 lb 8 oz (55.6 kg)   04/29/19 0400 116 lb 2.9 oz (52.7 kg)   04/28/19 0200 116 lb 6.5 oz (52.8 kg)   04/27/19 0500 117 lb 8.1 oz (53.3 kg)   04/26/19 0400 112 lb 14 oz (51.2 kg)   04/25/19 0500 113 lb 8.6 oz (51.5 kg)   04/24/19 2000 105 lb 13.1 oz (48 kg)   04/24/19 0628 105 lb 12.8 oz (48 kg)   04/23/19 0937 106 lb 11.2 oz (48.4 kg)       Current Medications:    Scheduled Meds:    atorvastatin  40 mg Enteral Tube DAILY     baclofen  10 mg Enteral Tube BID     chlorhexidine  15 mL Topical Q12H     furosemide  40 mg Intravenous Q8H     insulin aspart  (NovoLOG) injection   Subcutaneous Q4H FIXED TIMES     levothyroxine  75 mcg Intravenous Daily     metoprolol tartrate  2.5-5 mg Intravenous Q6H     pantoprazole  40 mg Intravenous Q24H    Or     omeprazole  20 mg Oral Q24H    Or     omeprazole  20 mg Enteral Tube Q24H     potassium chloride  10 mEq Enteral Tube Daily with supper     potassium chloride  20 mEq Intravenous Once     sodium chloride  10-30 mL Intravenous Q8H FIXED TIMES     sodium chloride  3 mL Intravenous Line Care     Continuous Infusions:    amiodarone 900 mg/500 ml standard 24 hour infusion 0.5 mg/min (19 1022)     dexmedetomidine 400 mcg/100 mL in NS (PRECEDEX) (4mcg/mL) 1.3 mcg/kg/hr (19 1352)     dextrose 5% 75 mL/hr (19 1353)     DOPamine       epinephrine Stopped (19 0655)     fentaNYL 2500 mcg/250 mL in NS (10 mcg/mL) 75 mcg/hr (19 1425)     insulin infusion (1 unit/mL) Stopped (19 1906)     niCARdipine Stopped (19 0701)     norepinephrine IV infusion in D5W       phenylephrine IV infusion in NS 70 mcg/min (19 1350)     sodium chloride 0.9% Stopped (19 1300)     vasopressin       PRN Meds:.acetaminophen, acetaminophen, albumin human, aluminum-magnesium hydroxide-simethicone, bisacodyl, bisacodyl, dexmedetomidine 400 mcg/100 mL in NS (PRECEDEX) (4mcg/mL), dextrose 50 % (D50W), DOPamine, epinephrine, glucagon (human recombinant), ipratropium-albuterol **AND** [] Nebulizer treatment intermittent, lipase-protease-amylase **AND** sodium bicarbonate, magnesium hydroxide, midazolam, naloxone **OR** naloxone, niCARdipine, norepinephrine IV infusion in D5W, ondansetron, oxyCODONE intensol (ROXICODONE) conc solution 20 mg/mL **OR** oxyCODONE, sodium chloride, sodium chloride bacteriostatic, sodium chloride, sodium chloride, sodium chloride, vasopressin    Cardiographics:    Telemetry monitoring demonstrates Atrial fib with rates in the  per my personal review.    Lab Results (most  recent, reviewed):    Hemoglobin (g/dL)   Date Value   05/04/2019 11.1 (L)     WBC (thou/uL)   Date Value   05/04/2019 20.4 (H)     Potassium (mmol/L)   Date Value   05/04/2019 3.8     Creatinine (mg/dL)   Date Value   05/04/2019 1.37 (H)     Hemoglobin A1c (%)   Date Value   04/23/2019 6.7 (H)     Magnesium (mg/dL)   Date Value   05/04/2019 2.6     Calcium (mg/dL)   Date Value   05/03/2019 7.7 (L)       122 lb 2.2 oz (55.4 kg)  Admit weight  47.9 kg  UOP: 3.0L/ 24 hours  NG 60 cc/24 hours  Stool 200/24    Physical Exam:    General: Patient seen in bed.  Neuro: unresponsive on sedation, some  movement noted on Right UE and agitation with decreasing sedation.   Intubated, on vent 40%  CV: Atrial fib RVR rates   Pulm: non labored effort on vent.   With periods of agitation and increased RR   Sternal  Incision  C/D/I.  Abd: softer with + stool in bag     : Guzman with donta urine   Ext: Mod pedal edema, SCDs in place, warm  Feet cool, but have weak pulses, right dp palpable, left per doppler  Neuro:  on fentanyl gtt. - RN to try to lessen sedation to assess neuro status    ASSESSMENT    Kody Jonhs is a 69y.o. [place]  male with rheumatic heart valve dz(AV regurgitation, , MV stenosis aand TV regurgitation) afib, and pericardial adhesions who was admitted 4/24/19 for AVR, MVR tissue valves, and takedown of pericardial adhesions by Dr Vaughn      Principal Problem:    S/P MVR (mitral valve repair)  Active Problems:    Rheumatic heart disease    Mitral valve stenosis, unspecified etiology    Aortic valve disease, rheumatic    A-fib (H)    Acute respiratory failure with hypoxemia (H)    Anemia due to blood loss, acute    Coagulopathy (H)    Non-English speaking patient    Thrombocytopenia (H)    Sepsis, due to unspecified organism (H)    Atrial fibrillation with RVR (H)    Hypernatremia    Paralytic ileus (H)    Small bowel obstruction (H)    Acute cardioembolic stroke (H)      NEURO:   - Scheduled Tylenol and  PRN oxycodone for pain- hold  - Adding Fentanyl gtt for steady pain management   - Melatonin at bedtime- holding po meds    CV:   -Amiiodarone gtt  - Will hold on transition to oral amio with ileus   - Currently on Chivo for B/P - permissive HTN SBP goal of 120   - ASA 81mg  - Atorvastatin 40mg daily- hold with elevated CK   - cardiology consulted for rhythm management.   - IV  Metoprolol with limited response,     - Metoprolol 2.5-5 mg IV q6 with parameters    PULM:   - Vent weaning per pul critical care  - Maintaining oxygen saturations on Vent 35%    FEN/GI:  - +BM 4/30, smear BM yesterday   - Tube feedings on hold  - Abd less firm and less distended today  - ABD xray - ileus 5/3  - CT scan 5/4 showed ileus  - UA neg on 5/3  - amylase 156  - Continue electrolyte replacement protocol  - Bowel regimen    RENAL:  - Adequate UOP/hr. Continue to monitor closely via monzon.   - Cr 1.31 ( baseline 0.92)  - Monzon to remain in for close monitoring of I/O and during period of diuresis/relative immobility.  - Diuresis with 40mg IV Lasix three times a day.     - Klor 10 mEQ daily       HEME:  - Acute blood loss anemia post-op.   - hgb 9.6  - one pRBC 4/30 for hgb 8.0   - Thrombocytopenia- platelets 93 (59)   -- holding sub q heparin until platelets   - Negative HIT  - NELLIE- negative     ID:  - Mee op ppx complete  -Leukocytosis  - Blood culture  - Sputum culture  - UA 5/3 Neg  - Rocephin - for 5 days- last dose 5/4/19  - ID now signed off.      ENDO:   -  SSI q 4 hours   - synthroid enteral to IV   PPx:   - DVT: SCDs,   -Resume heparin when PLT ct >100  - GI: Omeprazole   - Ambulating with therapy : on hold.    DISPO:   - Continue critical care in ICU  Patient seen and discussed with Dr. Delaney

## 2021-05-28 NOTE — PLAN OF CARE
Problem: Pain  Goal: Patient's pain/discomfort is manageable  Outcome: Progressing   Pt has transitioned to fentanyl gtt   Problem: Safety  Goal: Patient will be injury free during hospitalization  Outcome: Progressing     Problem: Daily Care  Goal: Daily care needs are met  Outcome: Progressing     Problem: Potential for Compromised Skin Integrity  Goal: Skin integrity is maintained or improved  Outcome: Progressing   Meplex dressing on pt turned Q hours  Problem: Potential for Compromised Skin Integrity  Goal: Nutritional status is improving  Outcome: Not Progressing   Abcd distended and very firm , abd x Ray show ileus, OG now to LIS 1 med stool today .

## 2021-05-28 NOTE — PROGRESS NOTES
04/26/19 0032   Vent Settings   Resp Rate (Set) 20   FiO2 (%) 40 %   Vt (Set, mL) 350 mL   Waveform Square   PEEP/CPAP (cm H2O) 5 cm H2O   Insp Flow (L/min) 40 L/min   Trigger Sensitivity Flow (L/min) 2 L/min     Pt remained on the above vent setting for the night. BS were Clear/diminished bilaterally, pt has a 7.0 ETT at 21 @teeth and was not weaned for the niht. Pt was Suctioned scant white thin secretions and tolerated well. Rt will continue to monitor.    ZEHRA RezaT

## 2021-05-28 NOTE — CONSULTS
Clinical Nutrition Therapy Note    Received consult to initiate and manage TF. Pt had OG tube placed today. BG levels have been on the lower end (98-69 mg/dL today).    Initiate FiberSource HN at 15 ml/hr x 21 hours (hold x3 hours around levothyroxine administration). Advance 10 ml/hr q 8 hours as tolerated to goal rate of 50 ml/hr x 21 hours.  Free fluid flush of 30 mls q 8 hours.    At goal, TF to provide 1260 kcals, 56 g protein, 168 g CHO, 16 g fiber, 855 mls fluid from formula, 90 mls fluid from flush, for 945 mls fluid total. (Will be 2745 mls x24 hours w/ current IV fluids)    Would recommend titrate IV fluids to total of 50 mls/hr as able when TF initiated

## 2021-05-28 NOTE — PROGRESS NOTES
"Spiritual Care Note    Spiritual Assessment:  referred on patient's heart testing day. Interpretor is present for support with communication. Patient's son is also present. Patient admits to feeling nervious about his surgery and shares, \"they are working on the main engine in my body and I don't want to die.\" Patient shares he is Mosque, is part of a ganesh community and is trying to remain hopeful for a good outcome. Son shares he will be present on day of surgery as well as another family member. Patient appreciative of  visit and support offered.     Care Provided: Listening and support provided.     Plan of Care: Spiritual care will continue to follow as part of patient's care team.    KAYLA Ferraro, BCC    "

## 2021-05-28 NOTE — CONSULTS
GENERAL SURGERY CONSULTATION    Kody Johns  [place]  Medical Record #:  [id number]  YOB: 1950  Age:  69 y.o.     Date of Consultation: 5/2/2019    Reason for Consultation: Potential need for enteral feeding tube as well as tracheostomy    Kody Johns is a 69 y.o. male who presents with a history of cardiac surgical intervention and chart is reviewed regarding that issue.  At the current time he seen in the intensive care unit.  He is on chronic ventilation at the current time.  When of his sons is in attendance.  The son speaks relatively good English.  My understanding is that there will be a family conference on 5/3/2019 regarding patient's current clinical status and desire to level of care.  The son indicates that the patient has not had previous surgical intervention on the abdomen.    PHH:    Past Medical History:   Diagnosis Date     ALEENA (acute kidney injury) (H)      Anemia due to blood loss, acute 4/24/2019     Asthma      Atrial fibrillation (H)      Blurry vision      CHF (congestive heart failure) (H)      Chronic kidney disease      Coronary artery disease      Dysphagia     resolved     Dysuria      Hearing loss      Heart murmur      Heart murmur      Hyperlipidemia      Hypertension      Hypothyroidism      Mitral valve stenosis      Moderate malnutrition (H)      Palpitations      Rheumatic heart disease      Shortness of breath     exertion     Stroke (H)     Silent     Valvular disease         Past Surgical History:   Procedure Laterality Date     AORTIC VALVE REPLACEMENT N/A 4/24/2019    Procedure: AORTIC VALVE REPLACEMENT;  Surgeon: Jojo Vaughn MD;  Location: [facility] OR;  Service: Cardiovascular     CARDIAC CATHETERIZATION       CATARACT EXTRACTION Left 06/13/2016     CV CORONARY ANGIOGRAM N/A 11/30/2018    Procedure: Coronary Angiogram;  Surgeon: Jeff Deleon MD;  Location: [facility];  Service: Cardiology     EYE SURGERY      cataract      PICC AND MIDLINE TEAM LINE INSERTION  4/29/2019          UT REPLACEMENT OF MITRAL VALVE N/A 4/24/2019    Procedure: MITRAL VALVE REPLACEMENT, ANESTHESIA, TAKEDOWN OF PERICARDIAL ADHESIONS AND REINFORCEMENT OF KLS PLATING SYSTEM TRANESOPHAGEAL ECHOCARDIOGRAM AND EPI AORTIC ULTRASOUND;  Surgeon: Jojo Vaughn MD;  Location: [facility];  Service: Cardiovascular       ALLERGIES:  Patient has no known allergies.    MEDS:    Current Facility-Administered Medications:      acetaminophen tablet 650 mg (TYLENOL), 650 mg, Oral, Q6H **OR** acetaminophen solution 650 mg (TYLENOL), 650 mg, Oral, Q6H, 650 mg at 05/02/19 1220 **OR** acetaminophen suppository 650 mg (TYLENOL), 650 mg, Rectal, Q6H, Sandy Manzo CNP, 650 mg at 05/01/19 0538     acetaminophen solution 650 mg (TYLENOL), 650 mg, Enteral Tube, Q4H PRN, Sandy Manzo CNP, 650 mg at 05/01/19 0254     acetaminophen suppository 650 mg (TYLENOL), 650 mg, Rectal, Q6H PRN, Sandy Manzo CNP, 650 mg at 04/28/19 0014     albumin human 5 % bottle 12.5 g, 12.5 g, Intravenous, PRN, Sandy Manzo CNP, 12.5 g at 04/29/19 1222     aluminum-magnesium hydroxide-simethicone 200-200-20 mg/5 mL suspension 30 mL (MAALOX ADVANCED), 30 mL, Enteral Tube, Q4H PRN, Sandy Manzo CNP     amiodarone 900 mg/500 ml standard 24 hour infusion, 0.5 mg/min, Intravenous, Continuous, Delroy Delaney MD, Last Rate: 16.7 mL/hr at 05/01/19 1818, 0.5 mg/min at 05/01/19 1818     atorvastatin tablet 40 mg (LIPITOR), 40 mg, Enteral Tube, DAILY, Jojo Vaughn MD, 40 mg at 05/02/19 0841     baclofen tablet 10 mg (LIORESAL), 10 mg, Enteral Tube, BID, Jojo Vaughn MD, 10 mg at 05/02/19 0841     bisacodyl EC tablet 10 mg (DULCOLAX), 10 mg, Oral, Daily PRN, Sandy Manzo CNP, 10 mg at 05/02/19 0840     bisacodyl suppository 10 mg (DULCOLAX), 10 mg, Rectal, Daily PRN, Sandy Manzo CNP     cefTRIAXone 1 g in NaCl 0.9 % 50 mL (MINI-BAG Plus) (ROCEPHIN), 1 g, Intravenous,  DAILY, Aristides Stewart MD, Last Rate: 100 mL/hr at 05/02/19 0841, 1 g at 05/02/19 0841     chlorhexidine 0.12 % solution 15 mL (PERIDEX), 15 mL, Topical, Q12H, Sandy Manzo CNP, 15 mL at 05/02/19 0227     dexmedetomidine 400 mcg/100 mL in NS (PRECEDEX) (4mcg/mL), 0.1-1.5 mcg/kg/hr, Intravenous, Continuous PRN, Sandy Manzo CNP, Last Rate: 18 mL/hr at 05/02/19 0651, 1.5 mcg/kg/hr at 05/02/19 0651     dextrose 50 % (D50W) syringe 20-50 mL, 20-50 mL, Intravenous, Q15 Min PRN, Sandy Manzo CNP, 20 mL at 04/26/19 2036     docusate sodium capsule 100 mg (COLACE), 100 mg, Oral, BID, 100 mg at 05/01/19 0817 **OR** docusate sodium 100 mg/10 mL oral liquid 100 mg (COLACE), 100 mg, Enteral Tube, BID, Sandy Manzo CNP, 100 mg at 05/02/19 0841     DOPamine 400mg/250mL in D5W (1.6 mg/ml), 2-20 mcg/kg/min, Intravenous, Continuous PRN, Sandy Manzo CNP     EPINEPHrine 5 mg/250 mL in D5W (0.02 mg/mL), 0.01-0.3 mcg/kg/min, Intravenous, Continuous PRN, Sandy Manzo CNP, Stopped at 04/27/19 0655     fentaNYL 2500 mcg/250 mL in NS (10 mcg/mL), 25 mcg/hr, Intravenous, Continuous, Sandy Manzo CNP, Last Rate: 10 mL/hr at 05/02/19 1250, 100 mcg/hr at 05/02/19 1250     furosemide injection 40 mg (LASIX), 40 mg, Intravenous, Q8H, Crystal Rasheed, CNP, 40 mg at 05/02/19 0841     glucagon (human recombinant) injection 1 mg, 1 mg, Subcutaneous, Q15 Min PRN, Sandy Manzo CNP     insulin aspart U-100 injection (NovoLOG), , Subcutaneous, Q4H FIXED TIMES, Lorrie Palacios MD, 2 Units at 05/02/19 1252     insulin regular 1 Units/mL in sodium chloride 0.9% 250 mL, 0.5-20 Units/hr, Intravenous, Continuous, Sandy Manzo CNP, Stopped at 04/24/19 1906     levothyroxine tablet 88 mcg (SYNTHROID, LEVOTHROID), 88 mcg, Enteral Tube, Daily 0600, Jojo Vaughn MD, 88 mcg at 05/02/19 0525     lipase-protease-amylase 5,000-17,000- 24,000 unit capsule 2 capsule (ZENPEP), 2 capsule, Enteral Tube, PRN **AND** sodium bicarbonate  tablet 325 mg, 325 mg, Enteral Tube, PRN, Sandy Manzo CNP     magnesium hydroxide suspension 30 mL (MILK OF MAG), 30 mL, Enteral Tube, Daily PRN, Sandy Manzo CNP     metoprolol tartrate injection 2.5 mg (LOPRESSOR), 2.5 mg, Intravenous, Q5 Min PRN, Crystal Rasheed CNP     metoprolol tartrate tablet 12.5 mg (LOPRESSOR), 12.5 mg, Enteral Tube, BID, Jojo Vaughn MD     naloxone injection 0.2-0.4 mg (NARCAN), 0.2-0.4 mg, Intravenous, PRN **OR** naloxone injection 0.2-0.4 mg (NARCAN), 0.2-0.4 mg, Intramuscular, PRN, Sandy Manzo CNP     niCARdipine 20 mg/200 mL NS (0.1 mg/ml), 0.5-15 mg/hr, Intravenous, Continuous PRN, Sandy Manzo CNP, Stopped at 04/26/19 0701     norepinephrine 4 mg/250 ml in D5W (0.016 mg/ml), 2-30 mcg/min, Intravenous, Continuous PRN, Sandy Manzo CNP     pantoprazole 40 mg injection, 40 mg, Intravenous, Q24H **OR** omeprazole capsule 20 mg (PriLOSEC), 20 mg, Oral, Q24H, 20 mg at 05/01/19 0815 **OR** omeprazole suspension 20 mg (PriLOSEC), 20 mg, Enteral Tube, Q24H, Sandy Manzo CNP, 20 mg at 05/02/19 0840     ondansetron injection 4 mg (ZOFRAN), 4 mg, Intravenous, Q6H PRN, Sandy Manzo CNP     oxyCODONE 5 mg/0.25 mL concentrated solution 5-10 mg (ROXICODONE), 5-10 mg, Enteral Tube, Q4H PRN, 5 mg at 05/01/19 0409 **OR** oxyCODONE immediate release tablet 5-10 mg (ROXICODONE), 5-10 mg, Oral, Q4H PRN, Juan Wray PA-C, 10 mg at 05/02/19 0930     phenylephrine 25 mg/250 ml in NS (0.1 mg/ml),  mcg/min, Intravenous, Continuous, Rohan Ellington MD, Last Rate: 30 mL/hr at 05/02/19 1248, 50 mcg/min at 05/02/19 1248     polyethylene glycol packet 17 g (MIRALAX), 17 g, Enteral Tube, DAILY, Sandy Manzo CNP, 17 g at 05/02/19 0840     potassium chloride 10 mEq/7.5 mL solution 10 mEq (KAYCIEL), 10 mEq, Enteral Tube, Daily with fam, Crystal Rasheed, CNP     sodium chloride 0.65 % nasal spray 1-2 spray (OCEAN), 1-2 spray, Each Nare, Q1H PRN, Sandy Manzo, CNP      sodium chloride bacteriostatic 0.9 % injection 1-5 mL, 1-5 mL, Intradermal, Once PRN, Forcha, Juan B., PA-C     sodium chloride flush 10-20 mL (NS), 10-20 mL, Intravenous, PRN, Forcha, Juan B., PA-C     sodium chloride flush 10-30 mL (NS), 10-30 mL, Intravenous, PRN, Forcha, Juan B., PA-C     sodium chloride flush 10-30 mL (NS), 10-30 mL, Intravenous, Q8H FIXED TIMES, Forcha, Juan B., PA-C, 10 mL at 19 0526     sodium chloride flush 20 mL (NS), 20 mL, Intravenous, PRN, Forcha, Juan B., PA-C     sodium chloride flush 3 mL (NS), 3 mL, Intravenous, Line Care, Rafael Omer MD, 10 mL at 19 0955     sodium phosphates 133 mL rectal enema 1 enema (for FLEET), 1 enema, Rectal, Once PRN, Sandy Manzo CNP     vasopressin standard infusion 20 units in D5W 100 mL, 0.5-6 Units/hr, Intravenous, Continuous PRN, Sandy Manzo CNP    SOCIAL HABITS:    Social History     Tobacco Use   Smoking Status Former Smoker     Packs/day: 1.00     Years: 50.00     Pack years: 50.00     Types: Cigarettes     Last attempt to quit: 2014     Years since quittin.6   Smokeless Tobacco Former User   Tobacco Comment    No Betel nut       Social History     Substance and Sexual Activity   Alcohol Use No    Comment: Quit       Social History     Substance and Sexual Activity   Drug Use No       FAMILY HISTORY:    Family History   Problem Relation Age of Onset     No Medical Problems Mother      No Medical Problems Father      Heart disease Neg Hx        REVIEW OF SYSTEMS: Noted    PE:    Vitals:    19 1320   BP:    Pulse: (!) 160   Resp: (!) 35   Temp:    SpO2:        HEENT: On ventilator sedated  Chest: Benign  Lungs: Bilaterally equally of breath sounds.  Scattered rhonchi.  Heart: Rhythm and rate noted  Abd: Very distended abdomen at this time.  The patient does not have evidence of surgical incisions or hernias.  Ext:    Vascular:     LABS:    Results from last 7 days   Lab Units 19  0517    LN-SODIUM mmol/L 148*   LN-POTASSIUM mmol/L 4.5   LN-CHLORIDE mmol/L 115*   LN-CO2 mmol/L 25   LN-BLOOD UREA NITROGEN mg/dL 51*   LN-CREATININE mg/dL 1.33*   LN-CALCIUM mg/dL 8.0*      Results from last 7 days   Lab Units 05/02/19  1023  04/29/19  1032   LN-WHITE BLOOD CELL COUNT thou/uL 11.6*   < > 4.1   LN-HEMOGLOBIN g/dL 10.4*   < > 8.4*   LN-HEMATOCRIT % 31.8*   < > 26.3*   LN-PLATELET COUNT thou/uL 53*   < > 27*   LN-LYMPHOCYTES - REL (DIFF) %  --   --  22   LN-MONOCYTES - REL (DIFF) %  --   --  8    < > = values in this interval not displayed.      Results from last 7 days   Lab Units 04/30/19  1338   LN-ALKALINE PHOSPHATASE U/L 68   LN-BILIRUBIN TOTAL mg/dL 1.6*   LN-BILIRUBIN DIRECT mg/dL 1.1*   LN-PROTEIN TOTAL g/dL 5.4*   LN-ALT (SGPT) U/L 52*   LN-AST (SGOT) U/L 121*           Results from last 7 days   Lab Units 04/29/19  1032   LN-INR  1.35*       XRAYS:      ASSESSMENT: Shin with potential need for long-term ventilatory support and enteral feeding.  At the current time patient has an very distended abdomen.    PLAN: We will make decisions regarding intervention and needs on 5/3/2019.    Chandana kaminski md  [facility]

## 2021-05-28 NOTE — PROGRESS NOTES
"Pharmacy Consult: Vancomycin Dosing    Pharmacist consulted to dose vancomycin for Kody Johns, a 69 y.o. male.    Ordering provider: Dr. Zoey Sequeira    Indication for vancomycin therapy: Bacteremia    Goal Trough Range:  15-20 mcg/mL based on indication    Other current antimicrobials             vancomycin 750 mg in sodium chloride 0.9% 250 mL (VANCOCIN)  Every 24 hours          cefTRIAXone 1 g in NaCl 0.9 % 50 mL (MINI-BAG Plus) (ROCEPHIN)  Every 24 hours             Subjective/Objective:    Patient was admitted for S/P MVR (mitral valve repair) on 4/24/2019    Height: 4' 11\" (1.499 m)    Actual Body Weight (ABW): 49.5 kg (109 lb 1.6 oz)    Male patients must weigh at least 50 kg to calculate ideal body weight    BMI: Body mass index is 22.04 kg/m .    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     Dysphagia     Hyperlipidemia     Silent Stroke     Shoulder strain     Elevated LFTs     Dysuria     Hypothyroidism, unspecified type     Mitral valve stenosis, unspecified etiology     Aortic valve disease, rheumatic     Atherosclerosis of native coronary artery of native heart without angina pectoris     Essential hypertension with goal blood pressure less than 130/85     Pure hypercholesterolemia     Lightheadedness     Moderate malnutrition (H)     ALEENA (acute kidney injury) (H)     A-fib (H)     Heart failure with preserved ejection fraction (H)     S/P MVR (mitral valve repair)     Acute respiratory failure with hypoxemia (H)     Anemia due to blood loss, acute     Coagulopathy (H)     Non-English speaking patient     Thrombocytopenia (H)     Sepsis, due to unspecified organism (H)     Atrial fibrillation with RVR (H)     Hypernatremia     Paralytic ileus (H)     Small bowel obstruction (H)     Acute cardioembolic stroke (H)    Past Medical History: "   Diagnosis Date     ALEENA (acute kidney injury) (H)      Anemia due to blood loss, acute 4/24/2019     Asthma      Atrial fibrillation (H)      Blurry vision      CHF (congestive heart failure) (H)      Chronic kidney disease      Coronary artery disease      Dysphagia     resolved     Dysuria      Hearing loss      Heart murmur      Heart murmur      Hyperlipidemia      Hypertension      Hypothyroidism      Mitral valve stenosis      Moderate malnutrition (H)      Palpitations      Rheumatic heart disease      Shortness of breath     exertion     Stroke (H)     Silent     Valvular disease         Temp Readings from Current Encounter:     05/05/19 0400 05/05/19 0746 05/05/19 1104   Temp: 99.5  F (37.5  C) 98.8  F (37.1  C) 97.9  F (36.6  C)     Net Intake/Output (last 24 hours):  I/O last 3 completed shifts:  In: 4150.3 [I.V.:3370.3; NG/GT:280; IV Piggyback:500]  Out: 4125 [Urine:4000; Emesis/NG output:125]    Recent Labs     05/03/19  0558 05/04/19  0544 05/04/19  1139 05/05/19  0755 05/05/19  1415   WBC 11.6*  --  20.4* 17.0* 14.8*   LACTICACID  --   --   --  1.5  --    BUN 56*  --  56* 46*  --    CREATININE 1.31* 1.37*  --  1.07  --      Estimated Creatinine Clearance: 46.3 mL/min (by C-G formula based on SCr of 1.07 mg/dL).    No results for input(s): CULTURE in the last 72 hours.    Results for orders placed or performed during the hospital encounter of 04/24/19   Culture/Gram Stain: Sputum    Collection Time: 05/01/19  8:26 AM   Result Value Status    Culture 1+ Klebsiella pneumoniae (!) Final   Sputum culture    Collection Time: 04/27/19 12:00 PM   Result Value Status    Culture Usual lupe with Final    Culture 3+ Klebsiella pneumoniae (!) Final    Culture 3+ Haemophilus influenzae (!) Final       No results for input(s): VANCOMYCIN in the last 168 hours.    Vancomycin administrations: (last 120 hours)     None          Assessment/Plan:    Pharmacist was initially consulted by the cardiovascular surgery to  dose vancomycin for post-valvular replacement fever on 4/24. Total of two doses were given, then the therapy was de-escalated. Pharmacist is re-consulted to dose vancomycin for Bacteremia, goal trough range 15-20 mcg/mL.   1. Initiate vancomycin 750 mg IV every 24 hours (15.2 mg/kg actual body weight).  2. No vancomycin level available for assessment.  3. Pharmacist will plan to check a vancomycin trough level prior to the fourth dose, or when clinically appropriate.  4. Pharmacist will continue to follow.    Thank you for the consult.  Sydney Nur, PharmD 5/5/2019 9:42 PM

## 2021-05-28 NOTE — PROGRESS NOTES
Dr Meléndez notified of unequal pupils, no other focal neuro decifits noted.  Pt. does open eyes spontaneously and to voice.  Continue to monitor, notify if any further changes noted. Caty Cortez RN  .

## 2021-05-28 NOTE — PROGRESS NOTES
"  Clinical Nutrition Therapy Follow Up Note    Discussed in rounds.  Per cardiology, want to start trickle tube feeds via OG placed 4/26/19.    Nutrition Prescription:   Diet: NPO  IV dextrose or Fluids:    amiodarone 900 mg/500 ml standard 24 hour infusion Last Rate: 0.5 mg/min (05/05/19 0823)   dexmedetomidine 400 mcg/100 mL in NS (PRECEDEX) (4mcg/mL) Last Rate: 1.4 mcg/kg/hr (05/05/19 0805)   dextrose 5% Last Rate: 75 mL/hr (05/05/19 0823)   DOPamine    epinephrine Last Rate: Stopped (04/27/19 0655)   fentaNYL 2500 mcg/250 mL in NS (10 mcg/mL) Last Rate: 100 mcg/hr (05/05/19 0157)   niCARdipine Last Rate: Stopped (04/26/19 0701)   norepinephrine IV infusion in D5W    phenylephrine IV infusion in NS Last Rate: 20 mcg/min (05/05/19 0823)   sodium chloride 0.9% Last Rate: Stopped (05/03/19 1300)   vasopressin        Current Nutrition Intake:  Not applicable    Anthropometrics:  Height: 4' 11\" (149.9 cm)  Admission weight: 105#  Weight: 109 lb 1.6 oz (49.5 kg) with +1 edema    Physical Findings: 5/3  Moderate fat loss in orbital area.  Mild to moderate muscle loss in temporal area.  Mild muscle loss in clavicle and acromion area.  Edema to +2.    GI Status/Output:   Loose stool today, + bowel sounds per nursing, rectal tube removed.    Medications:  Levothyroxine-will be switching to oral down tube again  Medications reviewed.    Labs:  Na 149, creat 1.07, total bilirubin 2.7  Labs reviewed    Malnutrition: Not noted.  Day 3 of <50% estimated energy needs in the context of acute illness.    Nutrition Risk Level: moderate risk    Nutrition DX:  Swallow difficulty r/t intubation evidenced by NPO, need for nutrition support  Nutrition knowledge deficit r/t new DX evidenced by surgery-on hold    Goals:  New-Adequate nutrition via TF within 72 hrs  Participate in diet ed-on hold    Education needs:  Cardiac, diabetic diet-on hold    Intervention:    Will order Fibersource HN trickle feed at 15 ml/hr x 21 hrs, holding 1 hr " before and 2 hours after levothyroxine dose.  This will provide 378 calories, 17 grams protein, 52 grams CHO, 5 grams fiber, 255 ml free water.  Water flushes of 30 ml q 4 hrs will provide additional 180 ml free water.       Monitor:   Bowel function, labs, wt, hydration, TF tolerance

## 2021-05-28 NOTE — PROGRESS NOTES
Critical Care Progress Note     Admit Date: 4/24/2019  ICU Day: 14     Code Status: full code    CC: rheumatic valvular disease    HPI: 69 y.o. [place]  male with rheumatic heart valve dz(AV regurgitation, , MV stenosis aand TV regurgitation) afib, and pericardial adhesions who was admitted 4/24/19 for AVR, MVR tissue valves, and takedown of pericardial adhesions by Dr Vaughn     Case summary: Pt was an easy intubation, received 15 mg methadone preop, Echo showed boggy RV.  PAPs in the 30s preop, pt had hard time coming off extracorporeal circulation, needing many bumps of epi, flolan was started.  Also they took down pericardial adhesions.  To ICU on full vent support, flolan full strength, not paced,  On epi, insulin and precedex drips.  Protamine was given on arrival to ICU.    Interval events:  4/24/19: to OR for AVR/MVR    To ICU on Flolan  4/25/19: Flolan weaned off   When off sedation for SBT, pt noted to not move left side and    had left eye with upward deviation   Stroke code called    CTA shows right P2 occlusion   Neurology consulted   SBP parameters changed to     Failed SBT  4/26/19: fever    Failed SBT   repeat head CT  revealed evolving acute/subacute infarction  4/27/19: fever T max 105   ID consulted   HIT panel sent due to persistent thrombocytopenia   4/29/19: some labored breathing with vent, vent changes made at bed side    Does better with flow rate at 50   Seems neurogenic in origin     Heme consult for ? of HIT   Dr Vaughn met with son with    4/30/19: unable to do SBT   MRI completed-Large evolving subacute right PCA distribution infarcts with moderate cytotoxic edema, sulcal  effacement and mass effect on the right lateral ventricle  5/01/19: ID signed off-rec ceftriaxone for 5 days for klebsiella in sputum    Afib with RVR   Family conference-Family/Dr Vaughn/Neurology/myself  Up[dates given discussed possbile need for trach/peg   5/02/19: cards consulted, afib RVR  "continues   New ileus   OG to LIS   Dr Knag consulted for possible trach/peg-for next week  5/03/19: BATSHEVA and cardioversion defered per CV surgery, HR better on BB   abd more distended   OG to continuous sx for now  5/05/19: ileus improving, trophic feedings started  5/06/19: Tf back to goal  5/7/19: FC held, plan to proceed with trach/peg     Major events over the last 24 hours: No new issues    Subjective: in bed, lightly sedated, not responding to me, over breathing the vent  ROS: unable to do    Drips: precedex, fentanyl       Ventilator: Mechanical Ventilation Day: # 15        PCV Settings: 16/insp pressure 12/5/30%    /76   Pulse (!) 124   Temp 100.2  F (37.9  C) (Axillary)   Resp 22   Ht 4' 11\" (1.499 m)   Wt 108 lb 9.6 oz (49.3 kg)   SpO2 100%   BMI 21.93 kg/m       I/O last 3 completed shifts:  In: 2740.7 [I.V.:1037.7; NG/GT:1453; IV Piggyback:250]  Out: 2400 [Urine:2000; Stool:400]  Weight change: -1.6 oz (-0.045 kg)  Vent Mode: PCV/VG  FiO2 (%):  [24 %-30 %] 24 %  S RR:  [16] 16  S VT:  [300 mL] 300 mL  PEEP/CPAP (cm H2O):  [5 cm H2O] 5 cm H2O  Minute Ventilation (L/min):  [6.7 L/min-10.1 L/min] 8.9 L/min  PIP:  [12 cm H2O-22 cm H2O] 22 cm H2O  VT SUP:  [5 cm H20-12 cm H20] 10 cm H20  MAP (cm H2O):  [6-11] 7      EXAM:   Mental status: in bed lightly sedated on vent    HEENT: NC + gag cough  Resp: cta   Cardiovascular: IRRR  Abdominal: soft+ bs   Extremeties: no e/c/c  Neurology: moving  right hand/ arm more moving left hand intermittently    Labs Personally reviewed: yes  Results from last 7 days   Lab Units 05/08/19  0549 05/07/19  0626 05/06/19  0456 05/05/19  1415   LN-WHITE BLOOD CELL COUNT thou/uL 9.8  --  11.4* 14.8*   LN-HEMOGLOBIN g/dL 8.6* 8.9* 9.6* 10.6*   LN-HEMATOCRIT % 26.2*  --  29.3* 32.9*   LN-PLATELET COUNT thou/uL 122* 103* 97* 99*     Results from last 7 days   Lab Units 05/08/19  0549 05/07/19  1523 05/07/19  0626 05/06/19  1646 05/06/19  0456  05/05/19  0755 "   LN-SODIUM mmol/L 145  --  145 147* 147*   < > 149*   LN-POTASSIUM mmol/L 3.9 3.6 3.3*  3.3*  --  3.7  --  3.7   LN-CHLORIDE mmol/L 116*  --  114*  --  116*  --  115*   LN-CO2 mmol/L 24  --  23  --  22  --  25   LN-BLOOD UREA NITROGEN mg/dL 22  --  31*  --  40*  --  46*   LN-CREATININE mg/dL 0.74  --  0.84  --  0.97  --  1.07   LN-CALCIUM mg/dL 7.8*  --  7.8*  --  8.0*  --  7.9*   LN-PROTEIN TOTAL g/dL  --   --  5.6*  --  5.7*  --  5.8*   LN-BILIRUBIN TOTAL mg/dL  --   --  2.1*  --  2.5*  --  2.7*   LN-ALKALINE PHOSPHATASE U/L  --   --  164*  --  162*  --  114   LN-ALT (SGPT) U/L 115*  --  132*  --  160*  --  129*   LN-AST (SGOT) U/L 107*  --  125*  --  189*  --  136*    < > = values in this interval not displayed.     Results from last 7 days   Lab Units 05/08/19  0549 05/07/19  0626 05/07/19  0035 05/05/19  1415  05/03/19  1059   LN-INR   --   --   --  1.38*  --  1.19*   LN-PARTIAL THROMBOPLASTIN TIME seconds 68* 66* 81* 33   < >  --     < > = values in this interval not displayed.       Last ABG 5/5/19:  PH 7.55  PCO2 30 PAO2 104 Bicarb 28     Microbiology: 1+ Klebsiella pneumoniae in sputum  Imaging (all imaging is personally reviewed):       Ct chest abd pelvis-1.  Small left pleural effusion. There is complete atelectasis of the left lower lobe with peripheral fluid-filled bronchi. Dependent atelectasis is also present involving the posterior right lower lobe to a lesser extent. Infection within these areas of   atelectasis is not excluded.  2.  Bilateral upper lobe nodular infiltrate. There is also a focal patchy area of ground-glass opacity within the right upper lobe. These findings may reflect additional areas of infectious process. Follow-up CT scan to ensure resolution of the upper   lobe ground-glass nodular opacity is recommended.  3.  No evidence of bowel obstruction. There is mild fluid and air-filled distention of the colon with some lesser degree of air and fluid filled distention of the  small bowel. These findings suggest an ileus.  4.  Circumferential wall thickening of the ascending colon, compatible with colitis.  abd xray 5/3/19-There are persistent small and large bowel loops not significantly changed from the prior study. Nasogastric tube with tip in the stomach. Guzman catheter. No free intraperitoneal air is seen. Findings are consistent with ileus or distal bowel obstruction.     abd xray 5/2/19-NG tube tip and side-port are present within the region of the stomach. There is diffuse air-filled distention of the small bowel, increased from prior study. Findings reflect either obstruction or ileus. Upright view of the chest performed at   same time does not demonstrate any evidence of free air under the diaphragm    MRI head 4/30/19-1.  Limited by motion. Large evolving subacute right PCA distribution infarcts with moderate cytotoxic edema, sulcal effacement and mass effect on the right lateral ventricle. No hydrocephalus..  Minimal, punctate petechial hemorrhage in the right occipital infarct bed.Patchy areas of acute to early subacute ischemia in the posterior left frontal cortex and minimally within the right anterior frontal cortex and right caudate body.    PCXR 4/27/19-ET tube 3 cm above the georgina. NG tube tip overlies the stomach. Right internal jugular [place]-Nick catheter tip overlies the pulmonary artery outflow tract. Mediastinal chest tube.left pleural effusion. Left lower lobe atelectasis. Mild vascular congestion and pulmonary edema. Postop median sternotomy. Heart size upper limits of normal    Head CT 4/26/19-Moderately sized hypodensity and loss of gray-white matter differentiation right temporal occipital region, consistent with evolving acute/subacute infarction.  2.  No significant global mass effect or hemorrhage.    CTA head neck 4/25/19-HEAD CT:  1.  No intracranial hemorrhage, mass lesions, hydrocephalus or CT evidence for an acute infarct. MRI is more sensitive for the  evaluation of acute infarct.  2.  Chronic lacunae as detailed above.  3.  Mild diffuse cerebral parenchymal volume loss. Presumed chronic hypertensive/microvascular ischemic white matter changes.        HEAD CTA:   1.  There is cut off on the P2 segment of the right posterior cerebral artery.  2.  No high-grade stenosis or occlusion of the rest of the major intracranial arteries.  3.  There is 1.5 to 2 mm focal outpouching off the expected location of the origin of the right posterior communicating artery. This is likely an infundibulum versus small aneurysm.     PCXR 4/25/19- Postoperative changes from cardiac valvular surgery. Moderate cardiomegaly persists. There is mild central hilar congestion and slight interstitial prominence suggesting minimal edema. Left hemidiaphragm obscured which may relate to cardiomegaly or left lower lobe atelectasis. No pneumothorax or pleural effusion. Tubes and catheters appear in good position.    Impression:  1. Acute hypoxic respiratory failure  2. Aortic regurgitation.    S/p tissue AVR  4/24/19  3. Mitral stenosis.    S/p Tissue MVR 4/24/19  4. Tricuspid regurgitation   5. Heart failure  Dilated RV  Flolan started in OR        Now off  6.   acute stroke PCA    Left sided weakness  7.   afib with RVR  8.  Thrombocytopenia.   9.   fever   10.   Pericardial adhesions; s/p takedown of adhesions 4/24/19  11. Ileus    Resolving   12. Non English speaking.  [place]     PLAN:   1. Continue full vent support, add phase 1 weaning after trach today  2. Finished CTX 5/4 for Klebsiella pneumoniae  3. Cards helping with rhythm isseus  4. TF now at goal  5. Dc fentanyl drip, has prn oxycdone  6. Npo for surgery today  7. Trach/peg today  8. Other issues per CVS and neurology     ICU DAILY CHECKLIST                           Can patient transfer out of ICU? no    FAST HUG:    Feeding:  Feeding: No.  Patient is receiving NPO  Tf  Guzman:Yes  Analgesia/Sedation:Yes precedex  Thromboembolic  prophylaxis: yes; Mode:  SCD  HOB>30:  Yes  Stress Ulcer Protocol Active: yes; Mode: PPI  Glycemic Control: Any glucose > 180 no; Mode of Insulin Therapy: Sliding Scale Insulin    INTUBATED:  Can patient have daily waking:  yes  Can patient have spontaneous breathing trial:  yes    Restraints? yes    PHYSICAL THERAPY AND MOBILITY:  Can patient have PT and mobility trial: yes  Activity: get up  Chair at least BID    transfer/discharge plans: stays ICU    The patient is critically ill with impairment in organ system and high risk of life threatening deterioration.     Total CCT spent 35 minutes thus far today.       Sandy Manzo APRN, [phone]  [facility]

## 2021-05-28 NOTE — PLAN OF CARE
Care Plan  Care Management    Care Management Goals of the Day: Follow progression of care    Care Progression Reviewed With: Charge RN, MD, HUC, RNCM, CMSW    Barriers to Discharge: intubated, sedated,     Discharge Disposition: TBD    Expected Discharge Date: TBD    Transportation: TBD    Care Coordination Narrative: Pt from home with family, Pt speaks [place]ian, son speaks English. Here for planned heart procedure, stroke occurred and pt has been critically ill since. CM to follow and assist with DC recommendations.

## 2021-05-28 NOTE — PROGRESS NOTES
Progress Note    Assessment/Plan  S/P AVR/MVR POD # 2  Postop respiratory failure, sedated, intubated and on mechanical ventilation  Vent: Vt 350, R 20, PEEP 5, FiO2 60%, SPO2 98%  Sedation turned down yesterday to awakens patient and noted that patient is not moving his left upper and lower extremity and also had a left upward gaze.  Stroke code was called, and the first CT scan of the head showed;  HEAD CT:  1.  No intracranial hemorrhage, mass lesions, hydrocephalus or CT evidence for an acute infarct. MRI is more sensitive for the evaluation of acute infarct.  2.  Chronic lacunae as detailed above.  3.  Mild diffuse cerebral parenchymal volume loss. Presumed chronic hypertensive/microvascular ischemic white matter changes.        HEAD CTA:   1.  There is cut off on the P2 segment of the right posterior cerebral artery.  2.  No high-grade stenosis or occlusion of the rest of the major intracranial arteries.  3.  There is 1.5 to 2 mm focal outpouching off the expected location of the origin of the right posterior communicating artery. This is likely an infundibulum versus small aneurysm.        NECK CTA:  1.  No significant stenosis in the neck vessels based on NASCET criteria.  2.  No evidence for dissection.  On postop day #2, the neuro deficits seems to have gotten worse and a repeat CT scan of the head and MRI was done and both showed;  CONCLUSION:  1.  Moderately sized hypodensity and loss of gray-white matter differentiation right temporal occipital region, consistent with evolving acute/subacute infarction.  2.  No significant global mass effect or hemorrhage.  Patient is sedated at this point and does not moves extremities or follow commands  Postop cardiogenic shock requiring epinephrine and nicardipine drip for adequate hemodynamic support  Drips:Epi 0.03 mcg/kg/min.  Sedation: Precedex 0.4 mcg/kg/hr  Hemodynamics: CI 3.1, CO  4.3, CVP 17, PAP 30/17  Severe postop thrombocytopenia, Plt 39, down from 72  "yesterday  Transfused 2 packs of platelets  A. fib with RVR but given recent stroke would not want to give metoprolol as this will lower the blood pressure below the target range   Diabetes mellitus, preop A1c 6.7, currently off insulin drip  Incisions are clean dry intact, lung sounds diminished bilaterally  Chest tube drainage 140/300 serosanguineous  Left chest tube in today due to excessive drainage  Abdomen is soft and nontender, bowel sounds present all 4 quarters  Urine output adequate with Lasix overnight now marginal, no BM yet but positive bowel sounds as noted above  Weight 51.2 kg from 51.5 kg yesterday, preop weight is 48 kg  Lasix 20 mg IV twice daily x24 hours  Titrate epinephrine to keep CI > 2 or MAP > 70  Keep in the unit today        Subjective  Sedated, intubated and on mechanical ventilation  Objective  Laying in the bed with no apparent distress  Vital signs in last 24 hours  Temp:  [99.5  F (37.5  C)-103  F (39.4  C)] 100.3  F (37.9  C)  Heart Rate:  [] 96  Resp:  [20-55] 23  BP: ()/(50-70) 120/68  Arterial Line BP: ()/(47-80) 110/64  FiO2 (%):  [40 %-60 %] 60 %  Weight:   112 lb 14 oz (51.2 kg)  Preop weight 48 kg  Intake/Output last 3 shifts  I/O last 3 completed shifts:  In: 3221.7 [I.V.:2311.7; IV Piggyback:910]  Out: 3057 [Urine:2757; Chest Tube:300]  Intake/Output this shift:  I/O this shift:  In: -   Out: 28 [Urine:8; Chest Tube:20]    Review of Systems   Review of systems not obtained due to inability to communicate with the patient.     Physical Exam  /68   Pulse 96   Temp 100.3  F (37.9  C) Comment: bladder  Resp 23   Ht 4' 11\" (1.499 m)   Wt 112 lb 14 oz (51.2 kg)   SpO2 98%   BMI 22.80 kg/m    A. fib with RVR, incisions are clean dry intact, lungs sound diminished bilaterally  Abdomen soft and nontender  Mild bilateral lower extremity edema    Pertinent Labs   Lab Results: personally reviewed.   Lab Results   Component Value Date     04/25/2019 "    K 3.4 (L) 04/26/2019     (H) 04/25/2019    CO2 20 (L) 04/25/2019    BUN 17 04/25/2019    CREATININE 0.92 04/25/2019    CALCIUM 8.5 04/25/2019     Lab Results   Component Value Date    WBC 14.6 (H) 04/25/2019    HGB 9.9 (L) 04/26/2019    HCT 31.0 (L) 04/26/2019    MCV 92 04/25/2019    PLT 39 (LL) 04/26/2019       Pertinent Radiology   Radiology Results: Personally reviewed image/s, Personally reviewed impression/s and CT scan of the head showed moderate size hypodensity and loss of gray-white matter differentiation right temporal occipital region consistent with evolving acute/subacute infarct  EKG Results: personally reviewed.  and A. fib with RVR, history of chronic A. fib    Juanjaja Wray

## 2021-05-28 NOTE — TELEPHONE ENCOUNTER
Patient had pre procedure INR drawn today for surgery tomorrow. Lovenox bridging orders given to home care nurse on 4/15. No change to plan thus no call made to patient as instructions already given prior to this INR.

## 2021-05-28 NOTE — PROGRESS NOTES
RESPIRATORY CARE NOTE      Arterial Blood Gas result:  pH 7.45; pCO2 31; pO2 57; HCO3 23.4, %O2 Sat 94.4.    Vent Mode: VCV  FiO2 (%):  [50 %-100 %] 60 %  S RR:  [16] 16  S VT:  [350 mL] 350 mL  PEEP/CPAP (cm H2O):  [5 cm H2O] 5 cm H2O  Minute Ventilation (L/min):  [7.2 L/min-10 L/min] 8.8 L/min  PIP:  [23 cm H2O-46 cm H2O] 46 cm H2O  MAP (cm H2O):  [7-12] 12    Patient remains on full vent support today. ET tube is a 7.0  Secured 21 at the gums.    Lungs coarse t/o    Plan is to continue POC, monitor vent, wean as able.     ZEHRA NunezT

## 2021-05-28 NOTE — ANESTHESIA PROCEDURE NOTES
Central line    Start time: 4/24/2019 7:30 AM  End time: 4/24/2019 7:46 AM  Patient location: OR Post-induction  Indications: central pressure monitoring and vascular access  Performing Anesthesiologist: Yesica Kimball MD  Pre-procedure Checklist  Completed: patient identified, site marked, risks, benefits, and alternatives discussed, timeout performed, consent obtained, hand hygiene performed, all elements of maximal sterile barriers used including cap, mask, gown, sterile gloves, and large sheet and skin prep agent completely dried prior to procedure    Procedure Details:  Preparation: 2% chlorhexidine  Location details: right internal jugular  Catheter type: Introducer with [place]-Nick  Introducer type: MAC  Lumens:double lumenNumber of attempts: 1  Ultrasound evaluation of access site: yes  Vessel patent by US exam  Concurrent real time visualization of needle entryTransduced for venous waveform  manometry confirmation of venous access    Post-procedure:   line sutured and Antimicrobial disks with CHG applied  Assessment: blood return through all ports and free fluid flow  Complications: none

## 2021-05-28 NOTE — PROGRESS NOTES
Respiratory Therapy Note:  Vent day 12.  7 ETT, 20 at the teeth.  Increased secretions, suctioning a moderate amount of tan ETT secretions.  PCV-VG 20 300 35% +5. FiO2 titrated from 40% this shift.  PIP 25-26 Plat 10-15 iP 5.  No SBT per protocol. Metaneb two times a day.  Plan is to continue full ventilator support, scheduled bronchial hygiene.  Mary Merchant, LRT, 5/5/2019

## 2021-05-28 NOTE — PROGRESS NOTES
Pre-op heart teaching with . IS/ Flutter valve and splinting pillow demonstration and practice.                        RT Heart Teaching Worksheet       Date of Surgery 04/24/2019          Date Taught 04/23/2019  Time of Surgery 0730          Surgeon Johana    IS 1500 ml Achieved        IS 1300 ml Predicted        Height 56 in.      History : Smoker ?:yes   Quit ? :  yes           When? 2014                        History: COPD ?:no                                       Asthma ? : no               VLADIMIR ? : no               Home Machine ? : no                                         What Meds if Lung History?: n/a    Items to discuss with Patient : (done or not done)     Heart Pillow/ Coughing: done  Flutter Valve: done  Questions Answered:yes    Charting that needs to be done: (done or not done)    IS charted in Flowsheet yes  IS achieved placed in patient binder yes  Education Charted in Ed Activity yes  Charges/productivity yes      Comments/ Note :patient understands and return demonstation on IS and Flutter valve with good technique and effort. Patient will require interpretation to adequately perform procedures.     Haja Yuen, ZEHRAT

## 2021-05-28 NOTE — PROGRESS NOTES
[facility] Care Coordination Contact    Received after visit chart from care coordinator.  Completed following tasks: Mailed copy of care plan to client    [facility]:  Emailed completed PCA assessment to [facility].  Faxed copy of PCA assessment to [facility] and mailed copy to member.  Faxed MD Communication to PCP.     Miriam Nava  Care Management Specialist  [facility]  [phone]

## 2021-05-28 NOTE — ANESTHESIA PREPROCEDURE EVALUATION
Anesthesia Evaluation        Airway    Pulmonary    (+) asthma  a smoker (former)                         Cardiovascular   Exercise tolerance: > or = 4 METS  (+) hypertension, valvular problems/murmurs (s/p AVR; s/p MVR) AS, CAD, dysrhythmias (afib), CHF, , hypercholesterolemia,      Neuro/Psych    (+) CVA ,     Endo/Other    (+) hypothyroidism,      GI/Hepatic/Renal    (+) esophageal disease (dysphagia),  chronic renal disease (CKD) CRI,      Other findings: Labs 5/3/19:  WBC 11.6, Hgb 9.4, Plt 59  Na 149        Dental                         Anesthesia Plan  Planned anesthetic: general mask and total IV anesthesia    Induction: intravenous   Anesthetic plan and risks discussed with: patient    Post-op plan: routine recovery

## 2021-05-28 NOTE — PROGRESS NOTES
ETT # 7.0 and is secured 20 cm at the gum. Patient remains on the vent/fully supported with the following settings: VCV 20 300 50% 7. Ventilation/oxygenations are adequate. Pt become tachypeic which seems numerological. Dr. Garcia tried some vent changes, but now pt is calm with the previous vent settings. Neb is given . BS are coarse, and large amount of thick/tan secretions are suctioned. RT will monitor.    ZEHRA WilcoxT

## 2021-05-28 NOTE — PLAN OF CARE
Care Plan  Care Management    Care Management Goals of the Day: Follow progression of care    Care Progression Reviewed With: Charge RN, MD, HUC, RNCM, CMSW    Barriers to Discharge: POD 1 trach and PEG, febrile    Discharge Disposition: [facility]    Expected Discharge Date: 5/10/19    Transportation: [facility] stretcher    Care Coordination Narrative: Pt from home with family, Pt speaks Ivorian,  needed to ensure all family understand discussions. Pt is day 2 trach and PEG, therapy ordered and pt has been up in chair. Plan is for pt to transfer to [place] when medically stable, Adi aware and has sent for auth. Adi will set up ride when needed, potentially DC tomorrow if fevers are managed. CM to follow and assist with DC recommendations.

## 2021-05-28 NOTE — PLAN OF CARE
Problem: Pain  Goal: Patient's pain/discomfort is manageable  Outcome: Progressing     Problem: Daily Care  Goal: Daily care needs are met  Outcome: Progressing     Problem: Safety  Goal: Patient will be injury free during hospitalization  Outcome: Progressing     Sedated with 0.8 precedex to a RASS of -1. Follows commands with LUE and LLE flaccid except for weak finger grasp, wince to pain but no withdrawal. Febrile up to 101.9, MD aware, order for CXR and procalcitonin. Afib with RVR 90s-120s, heparin gtt continued at 17units/kg/hr, sBP stable >110. Tolerating TF running through PEG at goal of 45mL/hr with 200mL flush q4hr. Pain as evidenced by increased rate of breathing and restlessness, PRN oxycodone 5mg PO x1, PRN tramadol x1. Adarsh to chair, tolerating well.

## 2021-05-28 NOTE — PLAN OF CARE
Problem: Mechanical Ventilation  Goal: Patient will maintain patent airway  Outcome: Progressing  Goal: ET tube will be managed safely  Outcome: Progressing     HARINDER Santos

## 2021-05-28 NOTE — PROGRESS NOTES
Spiritual Care Note    Spiritual Assessment:  made a post surgery follow up visit with patient this morning; son is present. Patient remains intubated but is weaning. Son seems happy to see his father doing well and seems hopeful for a good recovery for him. No concerns noted.     Care Provided: Support provided.     Plan of Care: Spiritual care will continue to follow as part of patient's care team.    KAYLA Loyola, BCC

## 2021-05-28 NOTE — PLAN OF CARE
Problem: Pain  Goal: Patient's pain/discomfort is manageable  Outcome: Progressing     Problem: Safety  Goal: Patient will be injury free during hospitalization  Outcome: Progressing     Problem: Daily Care  Goal: Daily care needs are met  Outcome: Progressing     Problem: Potential for Compromised Skin Integrity  Goal: Skin integrity is maintained or improved  Outcome: Progressing  Goal: Nutritional status is improving  Outcome: Progressing     Problem: Urinary Incontinence  Goal: Perineal skin integrity is maintained or improved  Outcome: Progressing     Problem: Risk of Injury Due to Unsafe Behavior  Goal: Patient will remain safe while in restraint; physical/psychological needs will be met  Outcome: Progressing  Goal: Alternatives to restraint will be continually assessed with use of least restrictive device and discontinuation as soon as possible  Outcome: Progressing     Problem: Mechanical Ventilation  Goal: Patient will maintain patent airway  Outcome: Progressing  Goal: Oral health is maintained or improved  Outcome: Progressing  Goal: Respiratory status - ventilation  Description  Movement of air in and out of the lungs.    Liberate from ventilator  Outcome: Progressing  Goal: ET tube will be managed safely  Outcome: Progressing     Problem: Blood pressure is elevated above 140 over 90 mmHg  Goal: Blood pressure (BP) is within acceptable limits  Outcome: Progressing

## 2021-05-28 NOTE — PROGRESS NOTES
Progress Note    Assessment/Plan  We will proceed with trach and potential feeding tube as requested    Principal Problem:    S/P MVR (mitral valve repair)  Active Problems:    Rheumatic heart disease    Mitral valve stenosis, unspecified etiology    Aortic valve disease, rheumatic    A-fib (H)    Acute respiratory failure with hypoxemia (H)    Anemia due to blood loss, acute    Coagulopathy (H)    Non-English speaking patient    Thrombocytopenia (H)    Sepsis, due to unspecified organism (H)    Atrial fibrillation with RVR (H)    Hypernatremia    Paralytic ileus (H)    Small bowel obstruction (H)    Acute cardioembolic stroke (H)      Subjective  Progress notes clinical status noted  Objective    Vital signs in last 24 hours  Temp:  [98.8  F (37.1  C)-98.9  F (37.2  C)] 98.8  F (37.1  C)  Heart Rate:  [101-130] 123  Resp:  [16-33] 22  BP: ()/(53-87) 117/86  FiO2 (%):  [30 %-35 %] 30 %  Weight:   109 lb 14.4 oz (49.9 kg)    Intake/Output last 3 shifts  I/O last 3 completed shifts:  In: 3040.3 [I.V.:2172.3; NG/GT:508; IV Piggyback:360]  Out: 3000 [Urine:2775; Emesis/NG output:125; Stool:100]  Intake/Output this shift:  I/O this shift:  In: 620.4 [I.V.:385.4; NG/GT:135; IV Piggyback:100]  Out: 1225 [Urine:1150; Stool:75]      Physical Exam  No change    Pertinent Labs   Lab Results   Component Value Date    WBC 11.4 (H) 05/06/2019    HGB 9.6 (L) 05/06/2019    HCT 29.3 (L) 05/06/2019    MCV 93 05/06/2019    PLT 97 (L) 05/06/2019             Pertinent Radiology     No results found.        Chandana Kang

## 2021-05-28 NOTE — PROGRESS NOTES
04/25/19 0415   Vent Settings   Resp Rate (Set) 24   FiO2 (%) 40 %   Vt (Set, mL) 350 mL   Waveform Square   PEEP/CPAP (cm H2O) 5 cm H2O   Insp Flow (L/min) 40 L/min   Trigger Sensitivity Flow (L/min) 2 L/min   Humidification Heater;BTPS   Heater Temperature 98.6  F (37  C)       Pt remained on the above vent setting for the night. Veletri was changed to half strength this shift .BS were diminished bilaterally, pt has a 7.0 ETT at 21 @teeth and was not weaned for the night. Pt was Suctioned scant white secretion  and tolerated well. Rt will continue to monitor.    ZEHRA RezaT

## 2021-05-28 NOTE — OP NOTE
OPERATIVE REPORT    Kody Johns   [facility]  Medical Record #:  [id number]  YOB: 1950  Age:  69 y.o.    PROCEDURE DATE:  5/8/2019    PREOPERATIVE DIAGNOSIS: Respiratory failure status post cardiac surgery need for long-term ventilatory support tracheostomy tube and the desire for percutaneous endoscopic gastrostomy tube for enteral feeding    POSTOPERATIVE DIAGNOSIS: Same    PROCEDURE: 1.  Low pressure cuff 8 Shiley tracheostomy tube placement.  2.  Upper GI endoscopy with placement of percutaneous endoscopic gastrostomy tube    OPERATING SURGEON:  Chandana Kang    ASSISTANT: Technician    ANESTHESIA: General    DESCRIPTION OF PROCEDURE: With the patient in supine position with endotracheal tube in place the abdomen and neck are prepped draped usual sterile fashion.  Risk benefits complications surgical mention regarding tracheostomy and percutaneous gastrostomy were reviewed with the patient son through an .  First the diagnostic endoscope was placed in examination revealed a normal esophagus stomach and duodenum.  The stomach was insufflated with air and transilluminated and using a Seldinger technique a percutaneous endoscopic gastrostomy was placed with guidewire being placed into the stomach via percutaneous puncture and grasped with a snare forceps and drawn through the oropharynx.  Tube was threaded retrograde fashion and under direct visualization of the stomach is secured to the posterior abdominal wall.  The tube was secured and attention turned to the neck.  A transverse incision is made subcutaneous tissue and platysma muscle divided midline at fascial and muscle structures divided in the thyroid isthmus divided and ligated with running 3-0 silk suture.  The second tracheal ring is isolated and portion removed and the 8 low-pressure cuffed Shiley tube was placed without difficulty.  Good ventilation is obtained.  The tube was secured and patient discharged to the  intensive care unit and essentially his preoperative status.  Estimated blood loss is minimal there were no complications.  Sponge and counts are correct x2.    EBL:  * No blood loss documented between In Room and Out of Room log events - 5/8/2019  2:32 PM to 5/8/2019  3:40 PM *    SPECIMENS:  [unfilled]    Chandana kaminski md  [facility] [place]

## 2021-05-28 NOTE — PLAN OF CARE
Problem: Pain  Goal: Patient's pain/discomfort is manageable  Outcome: Progressing   Continue to monitor, use  or family to help communicate, PRN medications.  Plan for vent overnight. Caty Cortez RN

## 2021-05-28 NOTE — PATIENT CARE CONFERENCE
Request out to Dr Moya office for Care Conference btwn 1-230 pm tomorrow per CV surgery availability. Will await return call from staff, Danae.    1245pm Danae states Dr Moya will not be attending the care conference tomorrow, says he spent 2-3 hours with the family last edson and they know to call him if needed. Crystal Rasheed with CV surgery aware. Plan is fam cc tomorrow at 130pm, ZAID Velasco plan to attend, son is aware and will inform family,  requested.

## 2021-05-28 NOTE — PLAN OF CARE
Problem: Pain  Goal: Patient's pain/discomfort is manageable  Outcome: Progressing     Problem: Safety  Goal: Patient will be injury free during hospitalization  Outcome: Progressing     Problem: Daily Care  Goal: Daily care needs are met  Outcome: Progressing     Problem: Psychosocial Needs  Goal: Demonstrates ability to cope with hospitalization/illness  Outcome: Progressing     Problem: Knowledge Deficit  Goal: Patient/family/caregiver demonstrates understanding of disease process, treatment plan, medications, and discharge instructions  Outcome: Progressing     Problem: Potential for Compromised Skin Integrity  Goal: Skin integrity is maintained or improved  Outcome: Progressing  Goal: Nutritional status is improving  Outcome: Progressing     Problem: Urinary Incontinence  Goal: Perineal skin integrity is maintained or improved  Outcome: Progressing

## 2021-05-28 NOTE — PROGRESS NOTES
CVTS Daily Progress Note   5/3/2019   POD #9  s/p AVR/MVR  DOS 4/25/19- Dr. Vaughn    LOS: 9 days   EF 45%   A1C 6.7 %    Overnight events:  More sustained HR >of 115-150   Will try BB with thomas     NELLIE  Negative  HIT neg  Slight increase in  platelets    Abd continued distention and firm this am     Normotensive with permissive HTN using thomas gtt   Pain controlled: dilaudid/oxycodone  + BM/+BS- .   Tolerating diet: tube feedings   .   Hgb 9.6 s/p- one unit PRBC 4/30       PLAN  ABD xray  Stop tube feedings- Ng to continuous sx    Metoprolol IV for HR if B/P will tolerate   Consider diltiazem gtt - will discuss with Cardiology  CT non con chest/abd/pelvis  Dulcolax supp  Fleets enema  Rectal tube   Continue diuresis  IV metoprolol for HR >110 if BP >110.  Continue thomas for B/P goal of 120  Continue to hold heparin with low platelets  Titrate thomas for permissive HTN -130.    Will continue to observe, over weekend  Trach and peg to be re evaluated mid week.      BMP, Plts  in am    OBJECTIVE:    Temp:  [99  F (37.2  C)-102.2  F (39  C)] 99  F (37.2  C)  Heart Rate:  [] 114  Resp:  [17-35] 31  BP: ()/() 76/50  FiO2 (%):  [40 %] 40 %  Wt Readings from Last 2 Encounters:   05/02/19 0600 121 lb 4.8 oz (55 kg)   05/01/19 0530 120 lb 12.8 oz (54.8 kg)   04/30/19 0600 122 lb 8 oz (55.6 kg)   04/29/19 0400 116 lb 2.9 oz (52.7 kg)   04/28/19 0200 116 lb 6.5 oz (52.8 kg)   04/27/19 0500 117 lb 8.1 oz (53.3 kg)   04/26/19 0400 112 lb 14 oz (51.2 kg)   04/25/19 0500 113 lb 8.6 oz (51.5 kg)   04/24/19 2000 105 lb 13.1 oz (48 kg)   04/24/19 0628 105 lb 12.8 oz (48 kg)   04/23/19 0937 106 lb 11.2 oz (48.4 kg)       Current Medications:    Scheduled Meds:    acetaminophen  650 mg Enteral Tube Q6H    Or     acetaminophen  650 mg Oral Q6H    Or     acetaminophen  650 mg Rectal Q6H     atorvastatin  40 mg Enteral Tube DAILY     baclofen  10 mg Enteral Tube BID     cefTRIAXone (ROCEPHIN)  IV  1 g Intravenous DAILY      chlorhexidine  15 mL Topical Q12H     docusate sodium  100 mg Oral BID    Or     docusate sodium (COLACE) 50 mg/5 mL oral liquid  100 mg Enteral Tube BID     furosemide  40 mg Intravenous Q8H     insulin aspart (NovoLOG) injection   Subcutaneous Q4H FIXED TIMES     levothyroxine  88 mcg Enteral Tube Daily 0600     metoprolol tartrate  12.5 mg Enteral Tube BID     pantoprazole  40 mg Intravenous Q24H    Or     omeprazole  20 mg Oral Q24H    Or     omeprazole  20 mg Enteral Tube Q24H     polyethylene glycol  17 g Enteral Tube DAILY     potassium chloride  10 mEq Enteral Tube Daily with supper     sodium chloride  10-30 mL Intravenous Q8H FIXED TIMES     sodium chloride  3 mL Intravenous Line Care     Continuous Infusions:    amiodarone 900 mg/500 ml standard 24 hour infusion 0.5 mg/min (05/03/19 0316)     dexmedetomidine 400 mcg/100 mL in NS (PRECEDEX) (4mcg/mL) 1.4 mcg/kg/hr (05/03/19 1240)     DOPamine       epinephrine Stopped (04/27/19 0655)     fentaNYL 2500 mcg/250 mL in NS (10 mcg/mL) 100 mcg/hr (05/03/19 1239)     insulin infusion (1 unit/mL) Stopped (04/24/19 1906)     midazolam       niCARdipine Stopped (04/26/19 0701)     norepinephrine IV infusion in D5W       phenylephrine IV infusion in NS 80 mcg/min (05/03/19 1322)     sodium chloride 0.9% 25 mL/hr (05/03/19 1100)     vasopressin       PRN Meds:.acetaminophen, acetaminophen, albumin human, aluminum-magnesium hydroxide-simethicone, bisacodyl, bisacodyl, dexmedetomidine 400 mcg/100 mL in NS (PRECEDEX) (4mcg/mL), dextrose 50 % (D50W), DOPamine, epinephrine, glucagon (human recombinant), lipase-protease-amylase **AND** sodium bicarbonate, magnesium hydroxide, metoprolol tartrate, midazolam, naloxone **OR** naloxone, niCARdipine, norepinephrine IV infusion in D5W, ondansetron, oxyCODONE intensol (ROXICODONE) conc solution 20 mg/mL **OR** oxyCODONE, sodium chloride, sodium chloride bacteriostatic, sodium chloride, sodium chloride, sodium chloride, sodium  phosphates 133 mL, vasopressin    Cardiographics:    Telemetry monitoring demonstrates Atrial fib with rates in the  per my personal review.    Imaging:    Ct Chest Abdomen Pelvis Without Oral Without Iv Contrast    Result Date: 5/3/2019  EXAM: CT CHEST ABDOMEN PELVIS WO ORAL WO IV CONTRAST LOCATION: [facility] DATE/TIME: 5/3/2019 12:26 PM INDICATION: Firm and distended abdomen. Assess for bowel obstruction. COMPARISON: None. TECHNIQUE: Helical thin-section CT scan of the chest, abdomen, and pelvis was performed without IV contrast. Multiplanar reformats were obtained. Dose reduction techniques were used. CONTRAST: None. FINDINGS: CHEST: Respiratory motion artifact grades image quality. However, there appear to be numerous tiny pulmonary nodules involving the bilateral lungs which are apparent within the upper lobes. There is a 9 mm ground-glass nodular infiltrate involving the right upper lobe on image 30 of series 3. There is a small left pleural effusion with complete atelectasis of the left lower lobe with peripheral fluid-filled bronchi. Dependent atelectasis is present involving the posterior portion of the right lower lobe. Infection within these areas of atelectatic lung are not excluded. Endotracheal tube is in place with tip 1.5 cm above the georgina. The heart size is enlarged. Surgical changes of an aortic valve and mitral valve replacement. The ascending thoracic aorta is dilated measuring 4.2 cm in caliber. There are a few enlarged mediastinal lymph nodes which demonstrate internal calcification, compatible with prior granulomatous disease.  ABDOMEN: The gallbladder, liver, spleen, pancreas, adrenal glands, and kidneys are grossly normal on this unenhanced CT scan. The abdominal aorta is normal in caliber. No lymphadenopathy. NG tube is present with tip and side port in the stomach. There is some circumferential wall thickening involving the ascending colon. There is fluid and air-filled  prominence throughout the colon point of transition to suggest an obstruction. There are also loops of small bowel which demonstrate mild air and/or fluid-filled prominence. No free air. There is no abscess. PELVIS: The Guzman catheter is present within the urinary bladder. No pelvic free fluid or lymphadenopathy. The appendix is normal in caliber where visualized. No lymphadenopathy. MUSCULOSKELETAL: No suspicious osseous lesions.     CONCLUSION: 1.  Small left pleural effusion. There is complete atelectasis of the left lower lobe with peripheral fluid-filled bronchi. Dependent atelectasis is also present involving the posterior right lower lobe to a lesser extent. Infection within these areas of  atelectasis is not excluded. 2.  Bilateral upper lobe nodular infiltrate. There is also a focal patchy area of ground-glass opacity within the right upper lobe. These findings may reflect additional areas of infectious process. Follow-up CT scan to ensure resolution of the upper lobe ground-glass nodular opacity is recommended. 3.  No evidence of bowel obstruction. There is mild fluid and air-filled distention of the colon with some lesser degree of air and fluid filled distention of the small bowel. These findings suggest an ileus. 4.  Circumferential wall thickening of the ascending colon, compatible with colitis. REFERENCE: Guidelines for Management of Incidental Pulmonary Nodules Detected on CT Images: From the Fleischner Society 2017. Guidelines apply to incidental nodules in patients who are 35 years or older. Guidelines do not apply to lung cancer screening, patients with immunosuppression, or patients with known primary cancer. SUBSOLID NODULES Nodule size 6 mm or greater CT at 6-12 months to confirm persistence, then CT every 2 years until 5 years.     Xr Abdomen Ap Portable    Result Date: 5/3/2019  EXAM: XR ABDOMEN AP PORTABLE LOCATION: [facility] DATE/TIME: 5/3/2019 9:13 AM INDICATION: fu ileus  COMPARISON: 05/02/2019. FINDINGS: There are persistent small and large bowel loops not significantly changed from the prior study. Nasogastric tube with tip in the stomach. Guzman catheter. No free intraperitoneal air is seen. Findings are consistent with ileus or distal bowel obstruction.       Lab Results (most recent, reviewed):    Hemoglobin (g/dL)   Date Value   05/03/2019 9.4 (L)     WBC (thou/uL)   Date Value   05/03/2019 11.6 (H)     Potassium (mmol/L)   Date Value   05/03/2019 4.3     Creatinine (mg/dL)   Date Value   05/03/2019 1.31 (H)     Hemoglobin A1c (%)   Date Value   04/23/2019 6.7 (H)     Magnesium (mg/dL)   Date Value   05/03/2019 2.6     Calcium (mg/dL)   Date Value   05/03/2019 7.7 (L)       121 lb 4.8 oz (55 kg)  Admit weight  47.9 kg  UOP: 3.7L/ 24 hours   cc/24 hours    Physical Exam:    General: Patient seen in bed.  Neuro: unresponsive on sedation, some small movement noted on Right UE  Intubated, on vent 40%  CV: Atrial fib RVR rates 100- 150   Some decrease in HR this afternoon after BB.   Pulm: non labored effort on vent.   With periods of agitation and increased RR   Sternal  Incision  C/D/I.  Abd:increased distention and firmness today  : Guzman with donta urine   Ext: Mod pedal edema, SCDs in place, warm  Feet cool, but have weak pulses, right dp palpable, left per doppler  Neuro: Unresponsive to firm touch and voice but on fentanyl gtt.       ASSESSMENT/PLAN:    Kody Johns is a 69y.o. [place]  male with rheumatic heart valve dz(AV regurgitation, , MV stenosis aand TV regurgitation) afib, and pericardial adhesions who was admitted 4/24/19 for AVR, MVR tissue valves, and takedown of pericardial adhesions by Dr Vaughn      Principal Problem:    S/P MVR (mitral valve repair)  Active Problems:    Rheumatic heart disease    Mitral valve stenosis, unspecified etiology    Aortic valve disease, rheumatic    A-fib (H)    ARF (acute respiratory failure) (H)    Anemia due to blood  loss, acute    Coagulopathy (H)    Non-English speaking patient    Thrombocytopenia (H)    Sepsis, due to unspecified organism (H)    Atrial fibrillation with RVR (H)    Hypernatremia    Paralytic ileus (H)    Small bowel obstruction (H)      NEURO:   - Scheduled Tylenol and PRN oxycodone for pain  - Adding Fentanyl gtt for steady pain management   - possible discomfort increase with ileus and abd distention.  - Melatonin QHS    CV:   -Amiiodarone gtt  - Will hold on transition to oral amio with ileus   - Currently on Chivo for B/P - permissive HTN SBP goal of 120   - ASA 81mg  - Atorvastatin 40mg daily  - cardiology consulted for rhythm management.   - IV  Metoprolol with limited response,     - Metoprolol 2.5-5 mg IV q6 with parameters    PULM:   - Vent weaning per pul critical care  - Maintaining oxygen saturations on Vent 35%    FEN/GI:  - +BM 4/30, smear BM yesterday   - Tube feedings on hold  - Abd more firm and distended today  - ABD xray - ileus yesterday  - CT scan today shows ileus    - Continue electrolyte replacement protocol  - Bowel regimen    RENAL:  - Adequate UOP/hr. Continue to monitor closely via monzon.   - Cr 1.31 ( baseline 0.92)  - Monzon to remain in for close monitoring of I/O and during period of diuresis/relative immobility.  - Diuresis with 40mg IV Lasix three times a day.     - Klor 10 mEQ daily       HEME:  - Acute blood loss anemia post-op.   - hgb 9.6  - one pRBC 4/30 for hgb 8.0   - Thrombocytopenia- platelets improving slightly 59   -- holding sub q heparin   - Negative HIT  - NELLIE- negative     ID:  - Mee op ppx complete  - Rocephin - for 5 days- last dose tomorrow  - ID now signed off.      ENDO:   -  SSI q 4 hours   - synthroid enteral to IV   PPx:   - DVT: SCDs,   - heparin being held d/t low platelets   - GI: Omeprazole   - Ambulating with therapy : on hold.    DISPO:   - Continue critical care in ICU  Patient seen and discussed with Demetrice Meléndez/Johana/Philip and Tika Manzo  NP.

## 2021-05-28 NOTE — PROGRESS NOTES
[place] Note: Met with patient and son via [employer]. I explained [facility]. Son had no further questions. Son stated he was informed about [place] with last family meeting. Son requested address only. Well update son and family when patient ready to transfer.     Adi Casillas RT  Referral Specialist  [phone]

## 2021-05-28 NOTE — PROGRESS NOTES
Vent Mode: PCV  FiO2 (%):  [30 %-40 %] 30 %  S RR:  [16-20] 16  S VT:  [300 mL] 300 mL  PEEP/CPAP (cm H2O):  [5 cm H2O] 5 cm H2O  Minute Ventilation (L/min):  [8.5 L/min-10.4 L/min] 10.1 L/min  PIP:  [11 cm H2O-24 cm H2O] 22 cm H2O  MAP (cm H2O):  [7-12] 12     Pt remained on the above vent setting for the night. BS were clear/diminished  bilaterally, pt has a 7.0 ETT at 20 @gums and was not weaned for the night. Pt was Suctioned scant thick white secretion and did have Gag reflux in response to suction , and tolerated well. Rt will continue to monitor.      HARINDER Reza

## 2021-05-28 NOTE — PLAN OF CARE
Problem: Mechanical Ventilation  Goal: Patient will maintain patent airway  Outcome: Progressing  Goal: Oral health is maintained or improved  Outcome: Progressing  Goal: Respiratory status - ventilation  Description  Movement of air in and out of the lungs.    Liberate from ventilator  Outcome: Progressing  Goal: ET tube will be managed safely  Outcome: Progressing     Vent Mode: PCV/VG  FiO2 (%):  [40 %-55 %] 40 %  S RR:  [20] 20  S VT:  [300 mL-325 mL] 300 mL  PEEP/CPAP (cm H2O):  [5 cm H2O-8 cm H2O] 5 cm H2O  Minute Ventilation (L/min):  [7.5 L/min-12 L/min] 12 L/min  PIP:  [15 cm H2O-30 cm H2O] 23 cm H2O  MAP (cm H2O):  [7-18] 7    Pt continues to be on full mechanical ventilator support, day 11, with the above settings.  ETT size 7.0 secured 20 cm at the gums.  Breath sounds diminished bilaterally. Pt tolerated metaneb x1 fairly well. Scant amount of tan, white secretions via inline suction. Positive cough and gag reflex noted. , RR high 20s, SpO2 98%.     Ling Nava, LRT

## 2021-05-28 NOTE — PROGRESS NOTES
ICU PROGRESS NOTE:    Assessment/Plan:  69 y.o. Fijian  male with rheumatic heart valve dz(AV regurgitation, , MV stenosis aand TV regurgitation) afib, and pericardial adhesions who was admitted 4/24/19 for AVR, MVR tissue valves, and takedown of pericardial adhesions by Dr Vaughn        NEURO:  Post-op acute stroke PCA with some encephalopathy, left sided weakness.    Appreciate neuro input    MRI confirmed PCA stroke     ASA on hold due to low plt    Permissive HTN per neuro    Avoid sedating meds    Tylenol prn pain    Cont precedex, low dose fentanyl prn pain    CV:  Afib/RVR now rates better controlled. Requiring [facility] for BP support    MAP >65    apprecaite cards input    ASA on hold as above    Start a/c when OK with neuro/CV surg    Cont statin    Cont q8h Lasix    Cont IV metop for now until able to take PO    RESP:  Unable to extubate post-op due to stroke, impaired mental status. Developed a VAP with klebs and H. Flu, s/p abx    abx per ID section below    Cont metanebs for LLL atelectasis/collapse    Titrate FiO2 for goal O2 sat 94-96%    ABg today 7.49/33/92 on 40% FiO2 Vt 325, pt overbreathing vent. Reduced Vt to 300cc (~6cc/kg IBW) no additional ABG's necessary today.     Follow ETCO2, acceptable parameters today.    Add prn duonebs    Trach/PEG discussions with family early next week.     GI:  Post-op ileus, abd distension, slightly improved today    Cont NG tube to LWS    NPO, no po meds today    RENAL:  HyperNa, likely incr free water losses with suctioning    Cot D5W    Na monitoring    Avoid nephrotoxins    Guzman in place strict UOP monitoring.     Elevated CK >1700, unclear why it was checked. Mild rhabdo? Will trend.    ID:  Recent VAP with kelsb and H. flu    Finish CTX today    Follow fever curve, wbc count    PCT markedly improved 30 -> 9    HEMATOLOGIC:  Thrombocytopenia, unclear cause. HIT testing neg. ?critical illness, ?meds, BM suppression    Follow plt    No HSQ, no ASA    hgb  ">7    Heme saw and signed off, expected to have plt recovery when acute issues resolved.     ENDOCRINE:  No issues    FSBG checks, insulin SS/drip per ICU protocol    ICU PROPHYLAXIS:    SCDs    PPI    peridex    DISPO/CODE STATUS: full code    FAMILY COMMUNICATION: will touch base with family later today    Lines/Drains/Tubes:  PICC line double lumen  PIV  Guzman  ETT   Rectal tube  OG tube    Restraints  Progress Note  Restraint Application    I recognize that restraints are physical and/or chemical interventions intended to restrict a person's movements. Restraints are currently needed to ensure the safety of this patient and/or others. My clinical rationale appears below.    Category/Type of Restraint     Non Violent:  Soft limb restraint x2  --  Behavior  Pulling at tubes/lines  --  Root Cause of the Behavior  Sedation/intubation  --  Less-Restrictive Measures that Failed  Non Violent Measures:  Close Observation  --  Response to the Restraint  Patient unable to pull at tubes/lines  --  Criteria for Release from the Restraint  Patient calm and off sedation    Overnight events:  No major events  abd distension slightly improved today    Subjective:  Unable to assess as he is on sedation.     Objective:  Physical Exam:  Vent settings for last 24 hours:  Vent Mode: PCV/VG  FiO2 (%):  [40 %-60 %] 40 %  S RR:  [20] 20  S VT:  [325 mL] 325 mL  PEEP/CPAP (cm H2O):  [5 cm H2O-10 cm H2O] 5 cm H2O  Minute Ventilation (L/min):  [7.5 L/min-12 L/min] 11.9 L/min  PIP:  [15 cm H2O-30 cm H2O] 15 cm H2O  MAP (cm H2O):  [9-18] 11    /80   Pulse (!) 102   Temp 98.4  F (36.9  C) (Axillary)   Resp 23   Ht 4' 11\" (1.499 m)   Wt 122 lb 2.2 oz (55.4 kg)   SpO2 99%   BMI 24.67 kg/m      Intake/Output last 3 shifts:  I/O last 3 completed shifts:  In: 2187.1 [I.V.:2077.1; NG/GT:60; IV Piggyback:50]  Out: 3310 [Urine:3050; Emesis/NG output:60; Stool:200]  Intake/Output this shift:  I/O this shift:  In: -   Out: 600 " [Urine:600]    Physical Exam  Gen: intubated, sedated  HEENT: no OP lesions, no STEPHANIE  CV: RRR, no m/g/r  Resp: CTAB  Abd: soft, nontender, BS+  Neuro: PERRL, nonfocal  Ext: no edema    LAB:  Results from last 7 days   Lab Units 05/03/19  0558   LN-WHITE BLOOD CELL COUNT thou/uL 11.6*   LN-HEMOGLOBIN g/dL 9.4*   LN-HEMATOCRIT % 29.7*   LN-PLATELET COUNT thou/uL 59*  59*     Results from last 7 days   Lab Units 05/04/19  0544 05/04/19  0001 05/03/19  1840  05/03/19  0558  05/02/19  0517  05/01/19  0445  04/30/19  1338   LN-SODIUM mmol/L 150* 152* 151*   < > 149*   < > 148*   < > 148*   < > 147*   LN-POTASSIUM mmol/L 3.6  --   --   --  4.3  --  4.5  --  3.6  --   --    LN-CHLORIDE mmol/L  --   --   --   --  116*  --  115*  --  114*  --   --    LN-CO2 mmol/L  --   --   --   --  22  --  25  --  25  --   --    LN-BLOOD UREA NITROGEN mg/dL  --   --   --   --  56*  --  51*  --  48*  --   --    LN-CREATININE mg/dL 1.37*  --   --   --  1.31*  --  1.33*  --  1.22  --   --    LN-CALCIUM mg/dL  --   --   --   --  7.7*  --  8.0*  --  7.7*  --   --    LN-PROTEIN TOTAL g/dL  --   --   --   --   --   --   --   --   --   --  5.4*   LN-BILIRUBIN TOTAL mg/dL  --   --   --   --   --   --   --   --   --   --  1.6*   LN-ALKALINE PHOSPHATASE U/L  --   --   --   --   --   --   --   --   --   --  68   LN-ALT (SGPT) U/L  --   --   --   --   --   --   --   --   --   --  52*   LN-AST (SGOT) U/L  --   --   --   --   --   --   --   --   --   --  121*    < > = values in this interval not displayed.       Current Facility-Administered Medications   Medication Dose Route Frequency Provider Last Rate Last Dose     acetaminophen solution 650 mg (TYLENOL)  650 mg Enteral Tube Q4H PRN Sandy Manzo CNP   650 mg at 05/02/19 1742     acetaminophen solution 650 mg (TYLENOL)  650 mg Enteral Tube Q6H Sandy Manzo CNP   650 mg at 05/04/19 0026    Or     acetaminophen tablet 650 mg (TYLENOL)  650 mg Oral Q6H Sandy Manzo CNP        Or     acetaminophen  suppository 650 mg (TYLENOL)  650 mg Rectal Q6H Sandy Manzo CNP   650 mg at 05/03/19 1251     acetaminophen suppository 650 mg (TYLENOL)  650 mg Rectal Q6H PRN Sandy Manzo CNP   650 mg at 04/28/19 0014     albumin human 5 % bottle 12.5 g  12.5 g Intravenous PRN Sandy Manzo CNP   12.5 g at 04/29/19 1222     aluminum-magnesium hydroxide-simethicone 200-200-20 mg/5 mL suspension 30 mL (MAALOX ADVANCED)  30 mL Enteral Tube Q4H PRN Sandy Manzo CNP         amiodarone 900 mg/500 ml standard 24 hour infusion  0.5 mg/min Intravenous Continuous Michael Aldrich MD 16.7 mL/hr at 05/04/19 1022 0.5 mg/min at 05/04/19 1022     atorvastatin tablet 40 mg (LIPITOR)  40 mg Enteral Tube DAILY Jojo Vaughn MD   40 mg at 05/03/19 0844     baclofen tablet 10 mg (LIORESAL)  10 mg Enteral Tube BID Jojo Vaughn MD   10 mg at 05/03/19 2033     bisacodyl EC tablet 10 mg (DULCOLAX)  10 mg Oral Daily PRN Sandy Manzo CNP   10 mg at 05/02/19 0840     bisacodyl suppository 10 mg (DULCOLAX)  10 mg Rectal Daily PRN Sandy Manzo CNP   10 mg at 05/03/19 1147     chlorhexidine 0.12 % solution 15 mL (PERIDEX)  15 mL Topical Q12H Sandy Manzo CNP   15 mL at 05/04/19 0251     dexmedetomidine 400 mcg/100 mL in NS (PRECEDEX) (4mcg/mL)  0.1-1.5 mcg/kg/hr Intravenous Continuous PRN Sandy Manzo CNP 18 mL/hr at 05/04/19 0750 1.5 mcg/kg/hr at 05/04/19 0750     dextrose 5%  75 mL/hr Intravenous Continuous IvYahir busby MD 75 mL/hr at 05/04/19 0934 75 mL/hr at 05/04/19 0934     dextrose 50 % (D50W) syringe 20-50 mL  20-50 mL Intravenous Q15 Min PRN Sandy Manzo, CNP   20 mL at 04/26/19 2036     DOPamine 400mg/250mL in D5W (1.6 mg/ml)  2-20 mcg/kg/min Intravenous Continuous PRN Sandy Manzo CNP         EPINEPHrine 5 mg/250 mL in D5W (0.02 mg/mL)  0.01-0.3 mcg/kg/min Intravenous Continuous PRN Sandy Manzo, CNP   Stopped at 04/27/19 0655     fentaNYL 2500 mcg/250 mL in NS (10 mcg/mL)   mcg/hr Intravenous  Continuous Sandy Manzo CNP 10 mL/hr at 05/04/19 0800 100 mcg/hr at 05/04/19 0800     furosemide injection 40 mg (LASIX)  40 mg Intravenous Q8H Crystal Rasheed CNP   40 mg at 05/04/19 0937     glucagon (human recombinant) injection 1 mg  1 mg Subcutaneous Q15 Min PRN Sandy Manzo CNP         insulin aspart U-100 injection (NovoLOG)   Subcutaneous Q4H FIXED TIMES Lorrie Palacios MD   2 Units at 05/04/19 0513     insulin regular 1 Units/mL in sodium chloride 0.9% 250 mL  0.5-20 Units/hr Intravenous Continuous Sandy Manzo CNP   Stopped at 04/24/19 1906     ipratropium-albuterol 0.5-2.5 mg/3 mL nebulizer solution 3 mL (DUO-NEB)  3 mL Nebulization Q6H PRN Rafael Omer MD         levothyroxine SolR 75 mcg  75 mcg Intravenous Daily Crystal Rasheed CNP         lipase-protease-amylase 5,000-17,000- 24,000 unit capsule 2 capsule (ZENPEP)  2 capsule Enteral Tube PRN Sandy Manzo CNP        And     sodium bicarbonate tablet 325 mg  325 mg Enteral Tube PRN Sandy Manzo CNP         magnesium hydroxide suspension 30 mL (MILK OF MAG)  30 mL Enteral Tube Daily PRN Sandy Manzo CNP         metoprolol tartrate injection 2.5-5 mg (LOPRESSOR)  2.5-5 mg Intravenous Q6H Crystal Rasheed CNP         midazolam (PF) injection 1 mg (VERSED)  1 mg Intravenous Q1 Min PRN Michael Aldrich MD         midazolam (PF) injection 1 mg (VERSED)  1 mg Intravenous Q4H PRN Yahir Matthews MD   1 mg at 05/04/19 0140     naloxone injection 0.2-0.4 mg (NARCAN)  0.2-0.4 mg Intravenous PRN Sandy Manzo CNP        Or     naloxone injection 0.2-0.4 mg (NARCAN)  0.2-0.4 mg Intramuscular PRN Sandy Manzo CNP         niCARdipine 20 mg/200 mL NS (0.1 mg/ml)  0.5-15 mg/hr Intravenous Continuous PRN Sandy Manzo CNP   Stopped at 04/26/19 0701     norepinephrine 4 mg/250 ml in D5W (0.016 mg/ml)  2-30 mcg/min Intravenous Continuous PRN Sandy Manzo, DMITRY         pantoprazole 40 mg injection  40 mg Intravenous Q24H  Sandy Manzo CNP   40 mg at 05/04/19 0937    Or     omeprazole capsule 20 mg (PriLOSEC)  20 mg Oral Q24H Sandy Manzo CNP   20 mg at 05/01/19 0815    Or     omeprazole suspension 20 mg (PriLOSEC)  20 mg Enteral Tube Q24H Sandy Manzo CNP   20 mg at 05/03/19 0844     ondansetron injection 4 mg (ZOFRAN)  4 mg Intravenous Q6H PRN Sandy Manzo CNP         oxyCODONE 5 mg/0.25 mL concentrated solution 5-10 mg (ROXICODONE)  5-10 mg Enteral Tube Q4H PRN Forabelardo Juan B., PA-C   5 mg at 05/02/19 2226    Or     oxyCODONE immediate release tablet 5-10 mg (ROXICODONE)  5-10 mg Oral Q4H PRN Forabelardo Juan B., PA-C   10 mg at 05/03/19 0258     phenylephrine 25 mg/250 ml in NS (0.1 mg/ml)   mcg/min Intravenous Continuous Rohan Ellington MD 36 mL/hr at 05/04/19 1018 60 mcg/min at 05/04/19 1018     potassium chloride 10 mEq/7.5 mL solution 10 mEq (KAYCIEL)  10 mEq Enteral Tube Daily with Crystal Hardy CNP   10 mEq at 05/02/19 1622     potassium chloride in water 20 mEq IVPB central IV  20 mEq Intravenous Once Jojo Vaughn MD   20 mEq at 05/04/19 0938     sodium chloride 0.65 % nasal spray 1-2 spray (OCEAN)  1-2 spray Each Nare Q1H PRN Sandy Manzo CNP         sodium chloride 0.9%  25 mL/hr Intravenous Continuous Michael Aldrich MD   Stopped at 05/03/19 1300     sodium chloride bacteriostatic 0.9 % injection 1-5 mL  1-5 mL Intradermal Once PRN Forabelardo, Juan B., PA-C         sodium chloride flush 10-20 mL (NS)  10-20 mL Intravenous PRN Forcha, Juan B., PA-C         sodium chloride flush 10-30 mL (NS)  10-30 mL Intravenous PRN Forcha, Juan B., PA-C         sodium chloride flush 10-30 mL (NS)  10-30 mL Intravenous Q8H FIXED TIMES ForJuan zhou, PA-C   10 mL at 05/04/19 0542     sodium chloride flush 20 mL (NS)  20 mL Intravenous PRN ForJuan zhou, PA-C         sodium chloride flush 3 mL (NS)  3 mL Intravenous Line Care Rafael Omer MD   3 mL at 05/04/19 0934      vasopressin standard infusion 20 units in D5W 100 mL  0.5-6 Units/hr Intravenous Continuous PRN Sandy Manzo, CNP         Micro:  Sputum 5/1  1+ pan-sens Klebsiella  4/27 klebsiella + h. Flu  PCT 30 -> 9.1    Total Critical Care Time : 30 Minutes      Rafael Omer MD (Avi)  [facility]  Pager [phone]  Clinic [phone]

## 2021-05-28 NOTE — PLAN OF CARE
Problem: Pain  Goal: Patient's pain/discomfort is manageable  Outcome: Progressing   Family noted that pt. was agitated at start of shift and mouthing that he was having pain. Pt currently on fentanyl at 75mcg/hr and precedex drip at 1.3mcg/kg/min. Pt awake RR upper 20's-low 30's. Fentanyl and precedex increased to provide pt with more comfort

## 2021-05-28 NOTE — PROGRESS NOTES
Critical Care Progress Note     Admit Date: 4/24/2019  ICU Day: 16     Code Status: full code    CC: rheumatic valvular disease    HPI: 69 y.o. [place]ian  male with rheumatic heart valve dz(AV regurgitation, , MV stenosis aand TV regurgitation) afib, and pericardial adhesions who was admitted 4/24/19 for AVR, MVR tissue valves, and takedown of pericardial adhesions by Dr Vaughn     Case summary: Pt was an easy intubation, received 15 mg methadone preop, Echo showed boggy RV.  PAPs in the 30s preop, pt had hard time coming off extracorporeal circulation, needing many bumps of epi, flolan was started.  Also they took down pericardial adhesions.  To ICU on full vent support, flolan full strength, not paced,  On epi, insulin and precedex drips.  Protamine was given on arrival to ICU.    Interval events:  4/24/19: to OR for AVR/MVR    To ICU on Flolan  4/25/19: Flolan weaned off   When off sedation for SBT, pt noted to not move left side and    had left eye with upward deviation   Stroke code called    CTA shows right P2 occlusion   Neurology consulted   SBP parameters changed to     Failed SBT  4/26/19: fever    Failed SBT   repeat head CT  revealed evolving acute/subacute infarction  4/27/19: fever T max 105   ID consulted   HIT panel sent due to persistent thrombocytopenia   4/29/19: some labored breathing with vent, vent changes made at bed side    Does better with flow rate at 50   Seems neurogenic in origin     Heme consult for ? of HIT   Dr Vaughn met with son with    4/30/19: unable to do SBT   MRI completed-Large evolving subacute right PCA distribution infarcts with moderate cytotoxic edema, sulcal  effacement and mass effect on the right lateral ventricle  5/01/19: ID signed off-rec ceftriaxone for 5 days for klebsiella in sputum    Afib with RVR   Family conference-Family/Dr Vaughn/Neurology/myself  Up[dates given discussed possbile need for trach/peg   5/02/19: cards consulted, afib RVR  "continues   New ileus   OG to LIS   Dr Kang consulted for possible trach/peg-for next week  5/03/19: BATSHEVA and cardioversion defered per CV surgery, HR better on BB   abd more distended   OG to continuous sx for now  5/05/19: ileus improving, trophic feedings started  5/06/19: Tf back to goal  5/07/19: FC held, plan to proceed with trach/peg  5/08/19: trach/ PEG  5/09/19: on phase 1  weaned 15/5 for 95  Min   ID re tagged with new fever     Major events over the last 24 hours: No new issues    Subjective: in bed, awake on vent via trach  ROS: unable to do    Drips: none      Ventilator: Mechanical Ventilation intubated 4/24/19 for surgery                                             trached in OR 5/8/19        Settings: presently on phase 1 wean PS15 P5    /88   Pulse (!) 103   Temp 99.9  F (37.7  C) (Oral)   Resp (!) 107   Ht 4' 11\" (1.499 m)   Wt 105 lb 12.8 oz (48 kg)   SpO2 100%   BMI 21.37 kg/m       I/O last 3 completed shifts:  In: 2526.8 [I.V.:706.8; NG/GT:1320; IV Piggyback:500]  Out: 1445 [Urine:1445]  Weight change:   Vent Mode: PCV/VG  FiO2 (%):  [24 %] 24 %  S RR:  [16] 16  S VT:  [300 mL] 300 mL  PEEP/CPAP (cm H2O):  [5 cm H2O] 5 cm H2O  Minute Ventilation (L/min):  [8.3 L/min-12.3 L/min] 12.3 L/min  PIP:  [6 cm H2O-26 cm H2O] 24 cm H2O  AL SUP:  [15 cm H20] 15 cm H20  MAP (cm H2O):  [5-10] 6      EXAM:   Mental status: awake, in bed on vent support via trach  HEENT: NC # 8 shiley trach  Resp: cta   Cardiovascular: IRRR  Abdominal: soft+ bs   Extremeties: no e/c/c  Neurology: moving  right hand/ arm more moving left hand intermittently    Labs Personally reviewed: yes  Results from last 7 days   Lab Units 05/10/19  0459 05/09/19  0601 05/08/19  9445 05/08/19  0549 05/07/19  0626 05/06/19  0456   LN-WHITE BLOOD CELL COUNT thou/uL  --  11.6*  --  9.8  --  11.4*   LN-HEMOGLOBIN g/dL  --  8.5*  --  8.6* 8.9* 9.6*   LN-HEMATOCRIT %  --  25.9*  --  26.2*  --  29.3*   LN-PLATELET COUNT thou/uL " 167 136* 158 122* 103* 97*   LN-NEUTROPHILS RELATIVE PERCENT %  --  83*  --   --   --   --    LN-MONOCYTES RELATIVE PERCENT %  --  8  --   --   --   --      Results from last 7 days   Lab Units 05/10/19  0500 05/09/19  1816 05/09/19  0601 05/08/19  0549  05/07/19  0626   LN-SODIUM mmol/L 140  --  142 145  --  145   LN-POTASSIUM mmol/L 3.4* 3.8 3.4* 3.9   < > 3.3*  3.3*   LN-CHLORIDE mmol/L 110*  --  113* 116*  --  114*   LN-CO2 mmol/L 24  --  23 24  --  23   LN-BLOOD UREA NITROGEN mg/dL 19  --  24* 22  --  31*   LN-CREATININE mg/dL 0.69*  --  0.74 0.74  --  0.84   LN-CALCIUM mg/dL 7.7*  --  7.4* 7.8*  --  7.8*   LN-PROTEIN TOTAL g/dL 6.0  --  5.6*  --   --  5.6*   LN-BILIRUBIN TOTAL mg/dL 1.4*  --  1.4*  --   --  2.1*   LN-ALKALINE PHOSPHATASE U/L 147*  --  137*  --   --  164*   LN-ALT (SGPT) U/L 104*  --  103* 115*  --  132*   LN-AST (SGOT) U/L 77*  --  85* 107*  --  125*    < > = values in this interval not displayed.     Results from last 7 days   Lab Units 05/10/19  0719 05/10/19  0633 05/10/19  0500 05/09/19  1500 05/09/19  0601   LN-INR   --   --  1.39* 1.25* 1.26*   LN-PARTIAL THROMBOPLASTIN TIME seconds 47* >300*  --   --  64*       Last ABG 5/5/19:  PH 7.55  PCO2 30 PAO2 104 Bicarb 28     Microbiology: 1+ Klebsiella pneumoniae in sputum  Imaging (all imaging is personally reviewed):       Ct chest abd pelvis-1.  Small left pleural effusion. There is complete atelectasis of the left lower lobe with peripheral fluid-filled bronchi. Dependent atelectasis is also present involving the posterior right lower lobe to a lesser extent. Infection within these areas of   atelectasis is not excluded.  2.  Bilateral upper lobe nodular infiltrate. There is also a focal patchy area of ground-glass opacity within the right upper lobe. These findings may reflect additional areas of infectious process. Follow-up CT scan to ensure resolution of the upper   lobe ground-glass nodular opacity is recommended.  3.  No  evidence of bowel obstruction. There is mild fluid and air-filled distention of the colon with some lesser degree of air and fluid filled distention of the small bowel. These findings suggest an ileus.  4.  Circumferential wall thickening of the ascending colon, compatible with colitis.  abd xray 5/3/19-There are persistent small and large bowel loops not significantly changed from the prior study. Nasogastric tube with tip in the stomach. Guzman catheter. No free intraperitoneal air is seen. Findings are consistent with ileus or distal bowel obstruction.     abd xray 5/2/19-NG tube tip and side-port are present within the region of the stomach. There is diffuse air-filled distention of the small bowel, increased from prior study. Findings reflect either obstruction or ileus. Upright view of the chest performed at   same time does not demonstrate any evidence of free air under the diaphragm    MRI head 4/30/19-1.  Limited by motion. Large evolving subacute right PCA distribution infarcts with moderate cytotoxic edema, sulcal effacement and mass effect on the right lateral ventricle. No hydrocephalus..  Minimal, punctate petechial hemorrhage in the right occipital infarct bed.Patchy areas of acute to early subacute ischemia in the posterior left frontal cortex and minimally within the right anterior frontal cortex and right caudate body.    PCXR 4/27/19-ET tube 3 cm above the georgina. NG tube tip overlies the stomach. Right internal jugular [place]-Nick catheter tip overlies the pulmonary artery outflow tract. Mediastinal chest tube.left pleural effusion. Left lower lobe atelectasis. Mild vascular congestion and pulmonary edema. Postop median sternotomy. Heart size upper limits of normal    Head CT 4/26/19-Moderately sized hypodensity and loss of gray-white matter differentiation right temporal occipital region, consistent with evolving acute/subacute infarction.  2.  No significant global mass effect or  hemorrhage.    CTA head neck 4/25/19-HEAD CT:  1.  No intracranial hemorrhage, mass lesions, hydrocephalus or CT evidence for an acute infarct. MRI is more sensitive for the evaluation of acute infarct.  2.  Chronic lacunae as detailed above.  3.  Mild diffuse cerebral parenchymal volume loss. Presumed chronic hypertensive/microvascular ischemic white matter changes.        HEAD CTA:   1.  There is cut off on the P2 segment of the right posterior cerebral artery.  2.  No high-grade stenosis or occlusion of the rest of the major intracranial arteries.  3.  There is 1.5 to 2 mm focal outpouching off the expected location of the origin of the right posterior communicating artery. This is likely an infundibulum versus small aneurysm.     PCXR 4/25/19- Postoperative changes from cardiac valvular surgery. Moderate cardiomegaly persists. There is mild central hilar congestion and slight interstitial prominence suggesting minimal edema. Left hemidiaphragm obscured which may relate to cardiomegaly or left lower lobe atelectasis. No pneumothorax or pleural effusion. Tubes and catheters appear in good position.    Impression:  1. Acute hypoxic respiratory failure  1. multifactorial  2. Aortic regurgitation.    S/p tissue AVR  4/24/19  3. Mitral stenosis.    S/p Tissue MVR 4/24/19  4. Tricuspid regurgitation   5. Heart failure  Dilated RV  Flolan started in OR, stopped 4/25  6.   Acute stroke PCA    Left sided weakness  7.   afib with RVR  8.  Thrombocytopenia.   9.   fever   10.   Pericardial adhesions; s/p takedown of adhesions 4/24/19  11. Ileus    Resolved   12. Non English speaking.  Hungarian     PLAN:   1. Phase 1 weaning  2. ID guiding antibiotics   3. TF now at goal  4. Get up in chair at least two times a day  5. PTOT  6. Other issues per CVS and neurology    LTAC today if ok with all services    I spent 30 min on vent management today    Sandy Manzo APRN, [phone]  [facility]

## 2021-05-28 NOTE — PROGRESS NOTES
NEUROLOGY PROGRESS NOTE     Kody Johns,  1950, MRN [id number] Date: 2019     [facility]   Code status:  Full Code   PCP: Aristides Alegria MD, [phone]      ASSESSMENT & PLAN   Hospital Day#: 2  Diagnosis code: Ischemic stroke    Ischemic stroke  Aortic/Mitral valve repair      Head CT shows right P2 stroke which fits with the CTA scan.    Exam suggests more deficits than just a PCA stroke.    Suspect brainstem (upper titus/medulla) stroke in addition to PCA stroke.    MRI when able to do - to help with prognostication.     Permissive HTN (if safe from cardiology stand point).    Most likely cause of stroke is cardioembolic secondary to surgery.     Anticoagulation when safe from CVT surgery team. Aspirin in the meantime.    Discussed prognosis with the family. Need to wait 1-2 days to prognosticate. Most likely will need Trach and PEG.    Thank you again for this referral, please feel free to contact me if you have any questions.     CHIEF COMPLAINT S/P MVR (mitral valve repair)     HISTORY OF PRESENT ILLNESS     We have been requested by Dr. Omer to evaluate Kody Johns who is a 69 y.o.  male for acute stroke.    This is a 69-year-old male on whom neurology is been consulted to evaluate for stroke.  Patient had a mitral valve surgery yesterday and was under sedation after that.  Sedation was lightened today when it was noticed that he was having some ophthalmoplegia.  Was mainly involving the left eye.  Stroke code was called.  He was not a TPA candidate because of the recent surgery and unclear time of onset.  Left arm and leg weakness was also noted.  Head CT was done which showed a right P2 occlusion.  The patient's exam is been stable since the stroke code.  He was not a candidate for thrombectomy because the blood vessel was too small for mechanical thrombectomy.      Patient is unable to provide any history because of intubation.  History is obtained through chart review and  through talking to the son.    4/26  Patient remains intubated. Unable to extubate. He has spiked a fever.  Per family he has been opening his eyes. Nodding to questions. Moving right arm but not the right leg.  He did move the left arm once for the family. Though the healthcare staff have not observed this.  No other new symptoms per the family.   MRI brain could not be done due to Pacemaker wires.     PAST MEDICAL & SURGICAL HISTORY     Medical History  Patient Active Problem List    Diagnosis Date Noted     S/P MVR (mitral valve repair) 04/24/2019     ARF (acute respiratory failure) (H) 04/24/2019     Anemia due to blood loss, acute 04/24/2019     Coagulopathy (H) 04/24/2019     Non-English speaking patient 04/24/2019     A-fib (H) 09/14/2017     Heart failure with preserved ejection fraction (H) 09/14/2017     Moderate malnutrition (H) 08/31/2017     ALEENA (acute kidney injury) (H) 08/31/2017     Aortic valve disease, rheumatic 08/30/2017     Lightheadedness 08/30/2017     Atherosclerosis of native coronary artery of native heart without angina pectoris      Essential hypertension with goal blood pressure less than 130/85      Pure hypercholesterolemia      Dysuria 08/29/2017     Hypothyroidism, unspecified type      Mitral valve stenosis, unspecified etiology      Elevated LFTs 08/26/2016     Silent Stroke      Shoulder strain      Dysphagia      Hyperlipidemia      Rheumatic heart disease      Blurry vision      Hearing loss      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  Surgical History  He  has a past surgical history that includes Cataract extraction (Left, 06/13/2016); Cardiac catheterization; Coronary Angiogram (N/A, 11/30/2018); Eye surgery; pr replacement of mitral valve (N/A, 4/24/2019); and Aortic valve replacement (N/A, 4/24/2019).     SOCIAL HISTORY     Reviewed, and he  reports that he quit smoking about 4 years ago. His smoking use included cigarettes. He has a 50.00 pack-year smoking history. He has quit using smokeless tobacco. He reports that he does not drink alcohol or use drugs.     FAMILY HISTORY     Reviewed, and family history includes No Medical Problems in his father and mother.     ALLERGIES     No Known Allergies     REVIEW OF SYSTEMS     Unable to do ROS due to intubation and AMS.     HOME & HOSPITAL MEDICATIONS     Prior to Admission Medications  Medications Prior to Admission   Medication Sig Dispense Refill Last Dose     albuterol (PROAIR HFA;PROVENTIL HFA;VENTOLIN HFA) 90 mcg/actuation inhaler Inhale 1-2 puffs every 6 (six) hours as needed. 1 Inhaler 5 4/22/2019     amoxicillin (AMOXIL) 500 MG capsule TAKE 4 TABLETS (2,000 mg) BY MOUTH 1 HOUR PRIOR TO PROCEDURE. 4 capsule 3 Unknown     atorvastatin (LIPITOR) 40 MG tablet TAKE 1 PILL BY MOUTH AT BEDTIME. FOR CHOLESTEROL 90 tablet 2 4/22/2019     baclofen (LIORESAL) 10 MG tablet TAKE 1 TABLET BY MOUTH TWICE DAILY 60 tablet 5 4/23/2019     enoxaparin (LOVENOX) 40 mg/0.4 mL syringe Inject 0.4 mL (40 mg total) under the skin every evening. 2 Syringe 0 4/22/2019     enoxaparin (LOVENOX) 60 mg/0.6 mL syringe Inject 0.6 mL (60 mg total) under the skin every morning. 3 Syringe 0 4/23/2019 at am     fluticasone (FLONASE) 50 mcg/actuation nasal spray 2 sprays into each nostril daily. (Patient taking differently: 2 sprays into each nostril daily as needed.       ) 10 g 3 4/22/2019     furosemide (LASIX)  20 MG tablet TAKE 1 TABLET (20 MG TOTAL) BY MOUTH DAILY FOR EDEMA 90 tablet 3 4/23/2019     levothyroxine (SYNTHROID, LEVOTHROID) 88 MCG tablet TAKE 1 PILL BY MOUTH EVERYDAY FOR THYROID 30 tablet 6 4/23/2019     metoprolol succinate (TOPROL-XL) 25 MG TAKE 1 PILL BY MOUTH EVERYDAY FOR BLOOD PRESSURE 90 tablet 3 4/23/2019     omeprazole (PRILOSEC) 20 MG capsule TAKE 1 PILL BY MOUTH EVERYDAY FOR STOMACH 30 capsule 8 4/23/2019     spironolactone (ALDACTONE) 25 MG tablet TAKE 1 PILL BY MOUTH EVERYDAY 30 tablet 8 4/23/2019     warfarin (COUMADIN/JANTOVEN) 1 MG tablet Take 1-2 mg by mouth See Admin Instructions. 1 mg daily on Mon/Wed/Fri; and 2 mg daily rest of week   4/19/2019     acetaminophen (TYLENOL) 500 MG tablet Take 500-1,000 mg by mouth every 6 (six) hours as needed for pain. Every 6-8 hours as needed. No more than 4,000MG of acetaminophen daily.   Unknown     ibuprofen (ADVIL,MOTRIN) 600 MG tablet Take 600 mg by mouth every 6 (six) hours as needed for pain.   Unknown       Hospital Medications    acetaminophen  650 mg Oral Q6H    Or     acetaminophen  650 mg Rectal Q6H     aspirin  300 mg Rectal Daily 0800     atorvastatin  40 mg Oral DAILY     baclofen  10 mg Oral BID     bisacodyl  10 mg Oral Once     [START ON 4/27/2019] bisacodyl  10 mg Rectal Once     chlorhexidine  15 mL Topical Q12H     docusate sodium  100 mg Oral BID     levothyroxine  88 mcg Oral Daily 0600     magnesium hydroxide  30 mL Oral DAILY     melatonin  3 mg Oral QHS     omeprazole  20 mg Oral QAM AC     polyethylene glycol  17 g Oral DAILY     sodium chloride  3 mL Intravenous Line Care        PHYSICAL EXAM     Vital signs  Temp:  [99.5  F (37.5  C)-101.9  F (38.8  C)] 101.6  F (38.7  C)  Heart Rate:  [] 107  Resp:  [20-27] 23  BP: ()/(54-70) 97/64  Arterial Line BP: ()/(48-70) 120/60  FiO2 (%):  [40 %-60 %] 50 %    Weight:   112 lb 14 oz (51.2 kg)    GENERAL: Healthy appearing, alert, no acute distress, normal  habitus.  CARDIOVASCULAR: Ext warm and well perfused.  NEUROLOGICAL:  Patient is lying in bed intubated. Opens eyes to voice. Right eye is more open than left ? Ptosis. Does not follow all commands. Pupils symmetric. EOM absent to dolls eye manuver. VFIT on right, absent on left. Corneal present. NLF appear symmetric.  No spontaneous movements. Moves right leg to painful stimulation and right arm slightly. Cannot elict movement on left side.   Reflexes 2+ and brisk.    Unable to check finger to nose or romberg and gait due to decreased mental status.      DIAGNOSTIC STUDIES     Pertinent Radiology   Head CT images reviewed. No acute stroke seen  IMPRESSION:   CONCLUSION:   HEAD CT:  1.  No intracranial hemorrhage, mass lesions, hydrocephalus or CT evidence for an acute infarct. MRI is more sensitive for the evaluation of acute infarct.  2.  Chronic lacunae as detailed above.  3.  Mild diffuse cerebral parenchymal volume loss. Presumed chronic hypertensive/microvascular ischemic white matter changes.        HEAD CTA:   1.  There is cut off on the P2 segment of the right posterior cerebral artery.  2.  No high-grade stenosis or occlusion of the rest of the major intracranial arteries.  3.  There is 1.5 to 2 mm focal outpouching off the expected location of the origin of the right posterior communicating artery. This is likely an infundibulum versus small aneurysm.        NECK CTA:  1.  No significant stenosis in the neck vessels based on NASCET criteria.  2.  No evidence for dissection.    Repeat Head CT 4/26 Images reviewed. Show right PCA stroke with question of pontine stroke.    Recent Results (from the past 24 hour(s))   POCT Glucose    Collection Time: 04/25/19  5:57 PM   Result Value Ref Range    Glucose 119 70 - 139 mg/dL   POCT Glucose    Collection Time: 04/25/19  6:59 PM   Result Value Ref Range    Glucose 116 70 - 139 mg/dL   POCT Glucose    Collection Time: 04/25/19  8:01 PM   Result Value Ref Range     Glucose 107 70 - 139 mg/dL   POCT Glucose    Collection Time: 04/25/19  8:57 PM   Result Value Ref Range    Glucose 97 70 - 139 mg/dL   Blood Gases, Arterial    Collection Time: 04/25/19  9:41 PM   Result Value Ref Range    pH, Arterial 7.43 7.37 - 7.44    pCO2, Arterial 33 (L) 35 - 45 mm Hg    pO2, Arterial 69 (L) 75 - 85 mm Hg    Bicarbonate, Arterial Calc 23.4 23.0 - 29.0 mmol/L    O2 Sat, Arterial 98.1 (H) 95.0 - 96.0 %    Oxyhemoglobin 94.9 (L) 95.0 - 96.0 %    Base Excess, Arterial Calc -1.9 mmol/L    Ventilation Mode VCV     Rate 20 rr/min    FIO2 40.00     Peep 5 cm H2O    Sample Stabilized Temperature 37.0 degrees C    Ventilator Tidal Volume 350 mL   POCT Glucose    Collection Time: 04/25/19 10:00 PM   Result Value Ref Range    Glucose 91 70 - 139 mg/dL   POCT Glucose    Collection Time: 04/25/19 10:54 PM   Result Value Ref Range    Glucose 92 70 - 139 mg/dL   POCT Glucose    Collection Time: 04/25/19 11:59 PM   Result Value Ref Range    Glucose 74 70 - 139 mg/dL   POCT Glucose    Collection Time: 04/26/19 12:56 AM   Result Value Ref Range    Glucose 79 70 - 139 mg/dL   POCT Glucose    Collection Time: 04/26/19  2:00 AM   Result Value Ref Range    Glucose 76 70 - 139 mg/dL   POCT Glucose    Collection Time: 04/26/19  3:00 AM   Result Value Ref Range    Glucose 66 (L) 70 - 139 mg/dL   POCT Glucose    Collection Time: 04/26/19  3:16 AM   Result Value Ref Range    Glucose 133 70 - 139 mg/dL   POCT Glucose    Collection Time: 04/26/19  3:59 AM   Result Value Ref Range    Glucose 98 70 - 139 mg/dL   Blood Gases, Arterial    Collection Time: 04/26/19  4:20 AM   Result Value Ref Range    pH, Arterial 7.46 (H) 7.37 - 7.44    pCO2, Arterial 31 (L) 35 - 45 mm Hg    pO2, Arterial 59 (L) 75 - 85 mm Hg    Bicarbonate, Arterial Calc 23.9 23.0 - 29.0 mmol/L    O2 Sat, Arterial 96.1 (H) 95.0 - 96.0 %    Oxyhemoglobin 93.3 (L) 95.0 - 96.0 %    Base Excess, Arterial Calc -1.2 mmol/L    Ventilation Mode VCV     Rate 20  rr/min    FIO2 40.00     Peep 5 cm H2O    Sample Stabilized Temperature 37.0 degrees C    Ventilator Tidal Volume 350 mL   POCT Glucose    Collection Time: 04/26/19  5:02 AM   Result Value Ref Range    Glucose 86 70 - 139 mg/dL   Magnesium    Collection Time: 04/26/19  5:04 AM   Result Value Ref Range    Magnesium 1.9 1.8 - 2.6 mg/dL   Potassium - Next AM    Collection Time: 04/26/19  5:04 AM   Result Value Ref Range    Potassium 3.4 (L) 3.5 - 5.0 mmol/L   Hemoglobin    Collection Time: 04/26/19  5:04 AM   Result Value Ref Range    Hemoglobin 9.9 (L) 14.0 - 18.0 g/dL   Blood Gases, Venous    Collection Time: 04/26/19  5:04 AM   Result Value Ref Range    pH, Venous 7.40 7.35 - 7.45    pCO2, Venous 38 35 - 50 mm Hg    pO2, Freddy 29 25 - 47 mm Hg    Base Excess, Venous -1.1 mmol/L    HCO3, Venous 23.4 (L) 24.0 - 30.0 mmol/L    Oxyhemoglobin 62.4 (L) 70.0 - 75.0 %    O2 Sat, Venous 63.6 (L) 70.0 - 75.0 %   Calcium, Ionized, Measured    Collection Time: 04/26/19  5:04 AM   Result Value Ref Range    Calcium, Ionized Measured 1.06 (L) 1.11 - 1.30 mmol/L    Calcium, Ionized pH 7.4 1.07 (L) 1.11 - 1.30 mmol/L    pH 7.43 7.35 - 7.45   Hematocrit    Collection Time: 04/26/19  5:04 AM   Result Value Ref Range    Hematocrit 31.0 (L) 40.0 - 54.0 %   Platelet Count - every other day x 3    Collection Time: 04/26/19  5:04 AM   Result Value Ref Range    Platelets 39 (LL) 140 - 440 thou/uL   POCT Glucose    Collection Time: 04/26/19  6:00 AM   Result Value Ref Range    Glucose 79 70 - 139 mg/dL   POCT Glucose    Collection Time: 04/26/19  7:03 AM   Result Value Ref Range    Glucose 80 70 - 139 mg/dL   Platelet Product Information    Collection Time: 04/26/19  7:09 AM   Result Value Ref Range    Unit Type A Pos     Blood Expiration Date 02849717306100     Unit Number [id number]     Status Issued     Component Platelets     PRODUCT CODE [id number]     Issue Date and Time [id number]     Blood Type 6200     CODING SYSTEM [id number]     POCT Glucose    Collection Time: 04/26/19  8:01 AM   Result Value Ref Range    Glucose 87 70 - 139 mg/dL   POCT Glucose    Collection Time: 04/26/19  8:58 AM   Result Value Ref Range    Glucose 81 70 - 139 mg/dL   Platelet Product Information    Collection Time: 04/26/19  9:06 AM   Result Value Ref Range    Unit Type A Pos     Blood Expiration Date 03626443767833     Unit Number [id number]     Status Issued     Component Platelets     PRODUCT CODE [id number]     Issue Date and Time [id number]     Blood Type 6200     CODING SYSTEM [id number]    Blood Gases, Arterial    Collection Time: 04/26/19  9:38 AM   Result Value Ref Range    pH, Arterial 7.47 (H) 7.37 - 7.44    pCO2, Arterial 29 (L) 35 - 45 mm Hg    pO2, Arterial 80 75 - 85 mm Hg    Bicarbonate, Arterial Calc 23.5 23.0 - 29.0 mmol/L    O2 Sat, Arterial 99.6 (H) 95.0 - 96.0 %    Oxyhemoglobin 96.6 (H) 95.0 - 96.0 %    Base Excess, Arterial Calc -1.9 mmol/L    Ventilation Mode VCV     Rate 20 rr/min    FIO2 0.60     Peep 5 cm H2O    Sample Stabilized Temperature 37.0 degrees C    Ventilator Tidal Volume 350 mL   POCT Glucose    Collection Time: 04/26/19 10:16 AM   Result Value Ref Range    Glucose 65 (L) 70 - 139 mg/dL   POCT Glucose    Collection Time: 04/26/19 10:26 AM   Result Value Ref Range    Glucose 150 (H) 70 - 139 mg/dL   Blood Gases, Arterial    Collection Time: 04/26/19 11:37 AM   Result Value Ref Range    pH, Arterial 7.46 (H) 7.37 - 7.44    pCO2, Arterial 31 (L) 35 - 45 mm Hg    pO2, Arterial 77 75 - 85 mm Hg    Bicarbonate, Arterial Calc 23.9 23.0 - 29.0 mmol/L    O2 Sat, Arterial 99.4 (H) 95.0 - 96.0 %    Oxyhemoglobin 96.7 (H) 95.0 - 96.0 %    Base Excess, Arterial Calc -1.3 mmol/L    Ventilation Mode VCV     Rate 16 rr/min    FIO2 0.50     Peep 5 cm H2O    Sample Stabilized Temperature 37.0 degrees C    Ventilator Tidal Volume 350 mL   POCT Glucose    Collection Time: 04/26/19 11:56 AM   Result Value Ref Range    Glucose 82 70 - 139 mg/dL    Potassium    Collection Time: 04/26/19  1:00 PM   Result Value Ref Range    Potassium 4.1 3.5 - 5.0 mmol/L   POCT Glucose    Collection Time: 04/26/19  1:12 PM   Result Value Ref Range    Glucose 69 (L) 70 - 139 mg/dL   POCT Glucose    Collection Time: 04/26/19  1:56 PM   Result Value Ref Range    Glucose 77 70 - 139 mg/dL   POCT Glucose    Collection Time: 04/26/19  3:31 PM   Result Value Ref Range    Glucose 67 (L) 70 - 139 mg/dL   POCT Glucose    Collection Time: 04/26/19  4:05 PM   Result Value Ref Range    Glucose 64 (L) 70 - 139 mg/dL       Total time spent for face to face visit, reviewing labs/imaging studies, counseling and coordination of care was: 25 min More than 50% of this time was spent on counseling and coordination of care.  Discussed prognosis with family.     Dustin Moya MD  [employer] Messaging: [email or web address]  Tel: [phone]  This note was dictated using voice recognition software.  Any grammatical or context distortions are unintentional and inherent to the software.

## 2021-05-28 NOTE — PROGRESS NOTES
Respiratory Care Note    Vent Mode: CPAP/PSV  FiO2 (%):  [40 %] 40 %  S RR:  [20-24] 20  S VT:  [350 mL] 350 mL  PEEP/CPAP (cm H2O):  [5 cm H2O] 5 cm H2O  Minute Ventilation (L/min):  [8.6 L/min-12 L/min] 9.7 L/min  PIP:  [12 cm H2O-47 cm H2O] 26 cm H2O  LA SUP:  [5 cm H20] 5 cm H20  MAP (cm H2O):  [6-10] 10    Pt currently on full vent support and tolerating well. PIP 25 PLAT 18, sats 93%, BBS clear. Suctioned scant amount of secretions. ABG drawn at 2141 results: 7.43/33/69/23.4/98.1/-1.9. Attempted PS x2 this shift. Both attempts, pt RR increased 30-35, -400 ml. SBT ended due to increased RR and RSBI > 105. Will PS again as able. RT will continue to follow.     ZEHRA SantosT

## 2021-05-28 NOTE — CONSULTS
Infectious Disease Consultation:  Requesting MD:DILSHAD  Reason for consult:fever      HISTORY:  Iranian man POD 4:  Procedure:      AORTIC VALVE REPLACEMENT, MITRAL VALVE REPLACEMENT, ANESTHESIA, TAKEDOWN OF PERICARDIAL ADHESIONS AND REINFORCEMENT OF KLS PLATING SYSTEM TRANESOPHAGEAL ECHOCARDIOGRAM AND EPI AORTIC ULTRASOUND    Despite zosyn, vanco running temp 104 past 24 hours and 101 the days before that.  ProC way up, but micro and CXR pretty underwhelming to date.  Not able to give ROS or travel hx.     Wbc down and plts down, hgb down 5+ grams from preop too.     Course c/b post op stroke see DR. Moya note April 25.     Pertinent past history, past infectious disease history:  Active Problem List        Patient Active Problem List     Diagnosis Date Noted     S/P MVR (mitral valve repair) 04/24/2019     ARF (acute respiratory failure) (H) 04/24/2019     Anemia due to blood loss, acute 04/24/2019     Coagulopathy (H) 04/24/2019     A-fib (H) 09/14/2017     Heart failure with preserved ejection fraction (H) 09/14/2017     Moderate malnutrition (H) 08/31/2017     ALEENA (acute kidney injury) (H) 08/31/2017     Aortic valve disease, rheumatic 08/30/2017     Lightheadedness 08/30/2017     Atherosclerosis of native coronary artery of native heart without angina pectoris       Essential hypertension with goal blood pressure less than 130/85       Pure hypercholesterolemia       Dysuria 08/29/2017     Hypothyroidism, unspecified type       Mitral valve stenosis, unspecified etiology       Elevated LFTs 08/26/2016     Silent Stroke       Shoulder strain       Dysphagia       Hyperlipidemia       Rheumatic heart disease       Blurry vision       Hearing loss       Nonspecific reaction to tuberculin skin test without active tuberculosis             Medical/Surgical History        Past Medical History:   Diagnosis Date     ALEENA (acute kidney injury) (H)       Anemia due to blood loss, acute 4/24/2019     Asthma       Atrial  fibrillation (H)       Blurry vision       CHF (congestive heart failure) (H)       Chronic kidney disease       Coronary artery disease       Dysphagia       resolved     Dysuria       Hearing loss       Heart murmur       Heart murmur       Hyperlipidemia       Hypertension       Hypothyroidism       Mitral valve stenosis       Moderate malnutrition (H)       Palpitations       Rheumatic heart disease       Shortness of breath       exertion     Stroke (H)       Silent     Valvular disease              Past Surgical History:   Procedure Laterality Date     CARDIAC CATHETERIZATION         CATARACT EXTRACTION Left 06/13/2016     CV CORONARY ANGIOGRAM N/A 11/30/2018     Procedure: Coronary Angiogram;  Surgeon: Jeff Deleon MD;  Location: [facility];  Service: Cardiology     EYE SURGERY         cataract           Allergies   No Known Allergies       Medications:  OUTpatient medications           Prior to Admission medications    Medication Sig Start Date End Date Taking? Authorizing Provider   albuterol (PROAIR HFA;PROVENTIL HFA;VENTOLIN HFA) 90 mcg/actuation inhaler Inhale 1-2 puffs every 6 (six) hours as needed. 11/5/18   Yes Aristides Alegria MD   amoxicillin (AMOXIL) 500 MG capsule TAKE 4 TABLETS (2,000 mg) BY MOUTH 1 HOUR PRIOR TO PROCEDURE. 6/5/18   Yes Aristides Alegria MD   atorvastatin (LIPITOR) 40 MG tablet TAKE 1 PILL BY MOUTH AT BEDTIME. FOR CHOLESTEROL 2/12/19   Yes Ashleigh Lang MD   baclofen (LIORESAL) 10 MG tablet TAKE 1 TABLET BY MOUTH TWICE DAILY 7/19/18   Yes Aristides Alegria MD   enoxaparin (LOVENOX) 40 mg/0.4 mL syringe Inject 0.4 mL (40 mg total) under the skin every evening. 4/16/19   Yes Aristides Alegria MD   enoxaparin (LOVENOX) 60 mg/0.6 mL syringe Inject 0.6 mL (60 mg total) under the skin every morning. 4/16/19   Yes Aristides Alegria MD   fluticasone (FLONASE) 50 mcg/actuation nasal spray 2 sprays into each nostril daily.  Patient taking differently: 2 sprays into  each nostril daily as needed.           12/21/16   Yes Aristides Alegria MD   furosemide (LASIX) 20 MG tablet TAKE 1 TABLET (20 MG TOTAL) BY MOUTH DAILY FOR EDEMA 1/15/19   Yes Aristides Alegria MD   levothyroxine (SYNTHROID, LEVOTHROID) 88 MCG tablet TAKE 1 PILL BY MOUTH EVERYDAY FOR THYROID 4/18/19   Yes Aristides Alegria MD   metoprolol succinate (TOPROL-XL) 25 MG TAKE 1 PILL BY MOUTH EVERYDAY FOR BLOOD PRESSURE 12/18/18   Yes Aristides Alegria MD   omeprazole (PRILOSEC) 20 MG capsule TAKE 1 PILL BY MOUTH EVERYDAY FOR STOMACH 9/25/18   Yes Aristides Alegria MD   spironolactone (ALDACTONE) 25 MG tablet TAKE 1 PILL BY MOUTH EVERYDAY 9/25/18   Yes Aristides Alegria MD   warfarin (COUMADIN/JANTOVEN) 1 MG tablet Take 1-2 mg by mouth See Admin Instructions. 1 mg daily on Mon/Wed/Fri; and 2 mg daily rest of week     Yes Aristides Alegria MD   acetaminophen (TYLENOL) 500 MG tablet Take 500-1,000 mg by mouth every 6 (six) hours as needed for pain. Every 6-8 hours as needed. No more than 4,000MG of acetaminophen daily.       PROVIDER, HISTORICAL   ibuprofen (ADVIL,MOTRIN) 600 MG tablet Take 600 mg by mouth every 6 (six) hours as needed for pain.       PROVIDER, HISTORICAL              Medications:  INpatient medications     acetaminophen  650 mg Oral Q6H     Or     acetaminophen  650 mg Rectal Q6H     albumin human           [START ON 4/25/2019] aspirin  81 mg Oral DAILY     atorvastatin  40 mg Oral DAILY     baclofen  10 mg Oral BID     [START ON 4/25/2019] bisacodyl  10 mg Oral Once     [START ON 4/27/2019] bisacodyl  10 mg Rectal Once     chlorhexidine  15 mL Topical Q12H     [START ON 4/25/2019] docusate sodium  100 mg Oral BID     [START ON 4/25/2019] heparin (PF)  5,000 Units Subcutaneous Q8H FIXED TIMES     levothyroxine  88 mcg Oral Daily 0600     [START ON 4/26/2019] magnesium hydroxide  30 mL Oral DAILY     melatonin  3 mg Oral QHS     mupirocin  1 application Each Nare BID     [START ON 4/25/2019] omeprazole  20  mg Oral QAM AC     [START ON 2019] polyethylene glycol  17 g Oral DAILY     protamine  50 mg Intravenous Once     vancomycin  1,000 mg Intravenous Q12H              Family History  Social History     Reviewed          Family History   Problem Relation Age of Onset     No Medical Problems Mother       No Medical Problems Father       Heart disease Neg Hx         Reviewed  Social History               Socioeconomic History     Marital status:        Spouse name: Jesus Johns     Number of children: 4     Years of education: Not on file     Highest education level: Not on file   Occupational History     Not on file   Social Needs     Financial resource strain: Not on file     Food insecurity:       Worry: Not on file       Inability: Not on file     Transportation needs:       Medical: Not on file       Non-medical: Not on file   Tobacco Use     Smoking status: Former Smoker       Packs/day: 1.00       Years: 50.00       Pack years: 50.00       Types: Cigarettes       Last attempt to quit: 2014       Years since quittin.6     Smokeless tobacco: Former User     Tobacco comment: No Betel nut   Substance and Sexual Activity     Alcohol use: No       Comment: Quit     Drug use: No     Sexual activity: Not on file   Lifestyle     Physical activity:       Days per week: Not on file       Minutes per session: Not on file     Stress: Not on file   Relationships     Social connections:       Talks on phone: Not on file       Gets together: Not on file       Attends Restorationism service: Not on file       Active member of club or organization: Not on file       Attends meetings of clubs or organizations: Not on file       Relationship status: Not on file     Intimate partner violence:       Fear of current or ex partner: Not on file       Emotionally abused: Not on file       Physically abused: Not on file       Forced sexual activity: Not on file   Other Topics Concern     Not on file   Social History Narrative      Refugee, born in [facility].         Psychosocial Needs  Social History          Social History Narrative     Refugee, born in [facility].      Additional psychosocial needs reviewed per nursing assessment           Medications:  Reviewed prior to admission meds as applicable in chart review.  Current meds are reviewed in the EMR listed MAR.     ANTIBIOTICS:    Current:vanco, zosyn   Prior:   Allergy to:none    SH/FH and  travel history(if applicable to consult):above  REVIEW OF SYSTEMS:  All other systems negative    EXAMINATION:  Vitals:    04/28/19 1300   BP: 117/77   Pulse: (!) 109   Resp:    Temp:    SpO2: 96%     Alert, awake  Vitals tabulated above, reviewed  HEENT:nl  Neck supple without lymphadenopathy  Sclera clear  CARDIOVASCULAR regular rate and rhythm, no murmur  Incision ok  Lungs CLEAR TO AUSCULTATION   Abdomen soft, NT/ND, absent HEPATOSPLENOMEGALY  Skin normal  Joints normal  Neurologic exam non focal  Wound:  NA        CLINICAL DATABASE FOR---LAB/MICRO/CULTURES/IMAGING STUDIES:  Results from last 7 days   Lab Units 04/28/19  0427 04/27/19  0815 04/26/19  0504 04/25/19  0458  04/24/19  1328   LN-WHITE BLOOD CELL COUNT thou/uL  --  4.0  --  14.6*  --  11.8*   LN-HEMOGLOBIN g/dL 9.1* 11.8* 9.9* 10.8*   < > 11.3*   LN-HEMATOCRIT %  --  35.3* 31.0* 32.3*  --  33.7*   LN-PLATELET COUNT thou/uL 63* 36* 39* 72*   < > 52*    < > = values in this interval not displayed.       Results from last 7 days   Lab Units 04/28/19  1014   LN-SODIUM mmol/L 154*   LN-POTASSIUM mmol/L 4.0   LN-CHLORIDE mmol/L 122*   LN-CO2 mmol/L 23   LN-BLOOD UREA NITROGEN mg/dL 48*   LN-CREATININE mg/dL 1.31*   LN-CALCIUM mg/dL 8.1*     CONCLUSION:  1.  Moderately sized hypodensity and loss of gray-white matter differentiation right temporal occipital region, consistent with evolving acute/subacute infarction.  2.  No significant global mass effect or hemorrhage.   micro negative      IMPRESSION:  Fever---->CVA?  Drug? Bacterial? Post  cardiotomy ([place])?  Hematoma somewhere?  This fever curve looks non infectious, but the proC does not.       PLAN:  Spoke with English speaking person in room, my impression was this was his son.   On maximal abx  Follow above differential for clues to elucidate the cause.         SHERLY PAREKH MD  [facility]  Office [phone]

## 2021-05-28 NOTE — PROGRESS NOTES
Pt remains on full ventilator support. BS coarse. Suctioned small white thin secretions. Pt weaned on PS 15/5 for 94 minutes today. Wean ended per CNP request. RT following.    Vent Mode: PCV/VG  FiO2 (%):  [24 %] 24 %  S RR:  [16] 16  S VT:  [300 mL] 300 mL  PEEP/CPAP (cm H2O):  [5 cm H2O] 5 cm H2O  Minute Ventilation (L/min):  [8.5 L/min-11.3 L/min] 11.2 L/min  PIP:  [6 cm H2O-23 cm H2O] 6 cm H2O  IN SUP:  [15 cm H20] 15 cm H20  MAP (cm H2O):  [5-10] 5     ZEHRA VoraT

## 2021-05-28 NOTE — PLAN OF CARE
Problem: Mechanical Ventilation  Goal: Patient will maintain patent airway  Outcome: Progressing  Goal: Oral health is maintained or improved  Outcome: Progressing  Goal: Respiratory status - ventilation  Description  Movement of air in and out of the lungs.    Liberate from ventilator  Outcome: Progressing  Goal: ET tube will be managed safely  Outcome: Progressing     HARINDER Barnes

## 2021-05-28 NOTE — PROGRESS NOTES
CVTS Daily Progress Note   5/7/2019   POD #13  s/p AVR/MVR  DOS 4/25/19- Dr. Vaughn    LOS: 13 days   EF 45%   A1C 6.7 %    Overnight events:  Tolerating resumed feedings  Intermittently awake and follows some commands  Off [place] after brief episode of hypotension    Atrial fib with RVR rates  -on heparin gtt.     On  Rochepin- + Klebs.   On Vanco- started 5/5/19 + Staph epidermis    Blood cx - Staphylococcus epidermis  Sputum  cx - + Klebs    Tm 99.7  Abd soft with BS in all 4 quads.  Steady increase in platelets 103  NELLIE  Negative  HIT neg    Normotensive with permissive HTN   Pain controlled:Fentanyl gtt- 25 mcg  Precedex 1 mcg     Hgb 8.9 s/p- (PRBC 4/30)       PLAN  Continue tube feedings  Stop at MN  - NPO  Family conference today - family agreeable to trach and peg   Anticipate trach and peg  2:30 Wed-  5/8/19  CK in am    Hold Atorvastatin with elevated CK total    IV metoprolol for HR >110 if BP >110.  PO metoprolol      OBJECTIVE:    Temp:  [98.3  F (36.8  C)-100.3  F (37.9  C)] 98.3  F (36.8  C)  Heart Rate:  [] 122  Resp:  [14-33] 24  BP: ()/(47-90) 121/85  FiO2 (%):  [30 %] 30 %  Wt Readings from Last 2 Encounters:   05/07/19 0330 108 lb 11.2 oz (49.3 kg)   05/06/19 0515 109 lb 14.4 oz (49.9 kg)   05/05/19 0430 109 lb 1.6 oz (49.5 kg)   05/04/19 0600 122 lb 2.2 oz (55.4 kg)   05/02/19 0600 121 lb 4.8 oz (55 kg)   05/01/19 0530 120 lb 12.8 oz (54.8 kg)   04/30/19 0600 122 lb 8 oz (55.6 kg)   04/29/19 0400 116 lb 2.9 oz (52.7 kg)   04/28/19 0200 116 lb 6.5 oz (52.8 kg)   04/27/19 0500 117 lb 8.1 oz (53.3 kg)   04/26/19 0400 112 lb 14 oz (51.2 kg)   04/25/19 0500 113 lb 8.6 oz (51.5 kg)   04/24/19 2000 105 lb 13.1 oz (48 kg)   04/24/19 0628 105 lb 12.8 oz (48 kg)   04/23/19 0937 106 lb 11.2 oz (48.4 kg)       Current Medications:    Scheduled Meds:    amiodarone  200 mg Per NG tube BID     aspirin  81 mg Enteral Tube DAILY     baclofen  10 mg Enteral Tube BID     cefTRIAXone (ROCEPHIN)  IV   1 g Intravenous Q24H     chlorhexidine  15 mL Topical Q12H     docusate sodium (COLACE) 50 mg/5 mL oral liquid  100 mg Oral BID     insulin aspart (NovoLOG) injection   Subcutaneous Q4H     levothyroxine  75 mcg Intravenous Daily     metoprolol tartrate  25 mg Enteral Tube BID     pantoprazole  40 mg Intravenous Q24H    Or     omeprazole  20 mg Oral Q24H    Or     omeprazole  20 mg Enteral Tube Q24H     potassium chloride  10 mEq Enteral Tube Daily with supper     sodium chloride  10-30 mL Intravenous Q8H FIXED TIMES     sodium chloride  3 mL Intravenous Line Care     vancomycin  750 mg Intravenous Q24H     Continuous Infusions:    dexmedetomidine 400 mcg/100 mL in NS (PRECEDEX) (4mcg/mL) 1 mcg/kg/hr (19 0802)     fentaNYL 2500 mcg/250 mL in NS (10 mcg/mL) 50 mcg/hr (19 0916)     heparin infusion (100 units/ml) 17 Units/kg/hr (19 1006)     niCARdipine Stopped (19 0701)     norepinephrine IV infusion in D5W       phenylephrine IV infusion in NS Stopped (19 0458)     sodium chloride 0.9% Stopped (19 1300)     PRN Meds:.acetaminophen, acetaminophen, albumin human, aluminum-magnesium hydroxide-simethicone, bisacodyl, bisacodyl, dexmedetomidine 400 mcg/100 mL in NS (PRECEDEX) (4mcg/mL), dextrose 50 % (D50W), glucagon (human recombinant), ipratropium-albuterol **AND** [] Nebulizer treatment intermittent, lipase-protease-amylase **AND** sodium bicarbonate, magnesium hydroxide, midazolam, naloxone **OR** naloxone, niCARdipine, norepinephrine IV infusion in D5W, ondansetron, oxyCODONE intensol (ROXICODONE) conc solution 20 mg/mL **OR** oxyCODONE, polyethylene glycol, sodium chloride, sodium chloride bacteriostatic, sodium chloride, sodium chloride, sodium chloride    Cardiographics:    Telemetry monitoring demonstrates Atrial fib with rates in the  per my personal review.    Lab Results (most recent, reviewed):    Hemoglobin (g/dL)   Date Value   2019 8.9 (L)     WBC  (thou/uL)   Date Value   05/06/2019 11.4 (H)     Potassium (mmol/L)   Date Value   05/07/2019 3.3 (L)   05/07/2019 3.3 (L)     Creatinine (mg/dL)   Date Value   05/07/2019 0.84     Hemoglobin A1c (%)   Date Value   04/23/2019 6.7 (H)     Magnesium (mg/dL)   Date Value   05/05/2019 2.5     Calcium (mg/dL)   Date Value   05/07/2019 7.8 (L)       108 lb 11.2 oz (49.3 kg)  Admit weight  47.9 kg  UOP: 1.3L/ 24 hours  Stool 100/24 hrs    Physical Exam:    General: Patient seen in bed.  Neuro: More awake and responding to    movement noted on Right UE and less agitation with decreasing sedation.   Intubated, on vent 40%  CV: Atrial fib RVR rates   Pulm: non labored effort on vent. 28%   Sternal  Incision  C/D/I.  Abd: softer with + stool in bag     : Guzman with donta urine   Ext: Mod pedal edema, SCDs in place, warm  Feet cool, + pulses, palpable  Neuro:  More awake and moving on right, some faint movement noted in left hand    ASSESSMENT    Kody Johns is a 69y.o. Vietnamese  male with rheumatic heart valve dz(AV regurgitation, , MV stenosis aand TV regurgitation) afib, and pericardial adhesions who was admitted 4/24/19 for AVR, MVR tissue valves, and takedown of pericardial adhesions by Dr Vaughn      Principal Problem:    S/P MVR (mitral valve repair)  Active Problems:    Rheumatic heart disease    Mitral valve stenosis, unspecified etiology    Aortic valve disease, rheumatic    A-fib (H)    Acute respiratory failure with hypoxemia (H)    Anemia due to blood loss, acute    Coagulopathy (H)    Non-English speaking patient    Thrombocytopenia (H)    Sepsis, due to unspecified organism (H)    Atrial fibrillation with RVR (H)    Hypernatremia    Paralytic ileus (H)    Small bowel obstruction (H)    Acute cardioembolic stroke (H)      NEURO:   - Scheduled Tylenol and PRN oxycodone/tramadol for pain-   - Weaning Fentanyl gtt use tramadol- Melatonin at bedtime-  - weaning precedex     CV:   -Amiiodarone  ET  - permissive HTN SBP goal of 120   - ASA 81mg  - Atorvastatin 40mg daily- hold with elevated CK     - Metoprolol 2.5-5 mg IV q6 with parameters    PULM:   - Vent weaning per pul critical care  - Maintaining oxygen saturations on Vent 28 %    FEN/GI:  - Digni care - liquid stools, small amount  - Tube feedings- trickle feeds  - Abd less firm and less distended today  - ABD xray - ileus 5/3  - CT scan 5/4 showed ileus  - UA neg on 5/3  - amylase 156  - Continue electrolyte replacement protocol  - Bowel regimen  - elevated lifer enzymes- Hold Acetaminophen    RENAL:  - Adequate UOP/hr. Continue to monitor closely via monzon.   - Cr 0.84( baseline 0.92)  - Monzon to remain in for close monitoring of I/O and during period of diuresis/relative immobility.  - Diuresis with 20mg IV Lasix two times a day.     - Potassium replacement protocol-  - Klor 20 two times a day   - Mag replacement protocol    HEME:  - Acute blood loss anemia post-op.   - hgb 8.9  - one pRBC 4/30 for hgb 8.0   - Thrombocytopenia- platelets 103 (59)     - Negative HIT  - NELLIE- negative     ID:  - Mee op ppx complete  -Leukocytosis now trending down  -On  Rochepin- + Klebs. + sputum   On Vanco- started 5/5/19 + Staph epidermis- + blood cx    - UA 5/3 Neg  - ID now signed off.      ENDO:   -  SSI q 4 hours   - synthroid IV could resume OG tomorrow if continues to tolerating feeding and med  PPx:   - DVT: SCDs,   - Low intensity no bolus heparin gtt  - GI: Omeprazole   - Ambulating with therapy : on hold.    DISPO:   - Continue critical care in ICU

## 2021-05-28 NOTE — PROGRESS NOTES
[facility] Note: We are continue to follow as plan of care progresses. Admission will depend on patient having a permanent airway in plan, PEG, insurance authorization and MD approval for transfer.     Adi Casillas RT  Referral Specialist  [phone]

## 2021-05-28 NOTE — PROGRESS NOTES
RESPIRATORY CARE NOTE      Patient continues on PCV-VG. Patient was low Vt alarming appears that he was taking small breaths at the end of a ventilator breath. Increased I-Time to 1.0 and changed pressure trigger to flow trigger 2 lpm. Decreased low Vt alarm to 60. Alarms have stopped and patient appears to resting more comfortably.    Vent Mode: PCV/VG  FiO2 (%):  [30 %-35 %] 30 %  S RR:  [16] 16  S VT:  [300 mL] 300 mL  PEEP/CPAP (cm H2O):  [5 cm H2O] 5 cm H2O  Minute Ventilation (L/min):  [6.5 L/min-10.1 L/min] 9.4 L/min  PIP:  [5 cm H2O-23 cm H2O] 5 cm H2O  TX SUP:  [5 cm H20] 5 cm H20  MAP (cm H2O):  [2-12] 2    Will continue to monitor.         Haja Yuen, LRT

## 2021-05-28 NOTE — PROGRESS NOTES
"  Clinical Nutrition Therapy Nutrition Support Note      Reason:  RD managing TF.    Subjective:  Pt intubated.  Family at bedside.  TF started 4/26, currently at 25 ml/hr advancing to goal.  Temp today of 102.3 degrees.  Chart reviewed.    Nutrition History:  Information from family/caregiver and chart.  Diet prior to admission:  General.  Pt eats mae with meat and/or veggies and rice.  Recent food/fluid intake: good.   Cultural/Restorationism food needs or preferences: Swedish  Food allergies or intolerances: nkfa    Nutrition Prescription:   Diet: Fibersource HN 25 ml/hr advancing 10 ml q 8 hrs to goal of 50 ml/hr x 21 hrs.  Water flush 30 ml q 8 hrs    IV dextrose or Fluids:    dexmedetomidine 400 mcg/100 mL in NS (PRECEDEX) (4mcg/mL) Last Rate: 1 mcg/kg/hr (04/27/19 0341)   DOPamine    epinephrine Last Rate: Stopped (04/27/19 0655)   insulin infusion (1 unit/mL) Last Rate: Stopped (04/24/19 1906)   niCARdipine Last Rate: Stopped (04/26/19 0701)   norepinephrine IV infusion in D5W    sodium chloride 0.9% Last Rate: 50 mL/hr (04/27/19 0400)   sodium chloride 0.9% Last Rate: 25 mL/hr (04/27/19 0400)   vasopressin        Current Nutrition Intake:  OG placed 4/26.  X-ray indicates gastric.  TF at goal to provide:  1260 calories, 57 g protein, 172 g carb, 16 g fiber, 938 ml free water.  Additional fluids given with meds.  IV fluids    Above meets estimated nutrition needs    Anthropometrics:  Height: 4' 11\" (149.9 cm)  Admission weight: 105#  Weight: 117 lb 8.1 oz (53.3 kg)  I/Os indicate 5.6 L gain.    Physical Findings:  Edema noted.    GI Status/Output:   GI symptoms per nursing:   Last BM recorded: n/a  Urine output decreased in past 24 hrs.    Skin/Wound:   Gerardo Scale Score: 10    Medications:  Levothyroxine.  IV lasix  Medications reviewed.    Labs:  Na 148  fsbg 97, 111  Labs reviewed    Estimated Nutrition Needs:  Using ideal weight of 45.5 kg.    Energy Needs: 3347-1374 kcals daily per 25-30 kcal/kg   Protein " Needs: 55-68 g daily, 1.2-1.5 g/kg.    Malnutrition: Not noted    Nutrition Risk Level: low risk    Nutrition DX: Nutrition knowledge deficit r/t new DX evidenced by surgery  New-Swallow difficulty r/t intubation evidenced by NPO, need for nutrition support    Goals:  Participate in diet ed-on hold  New-Tolerate TF at goal    Education needs:  Cardiac, diabetic diet-on hold    Plan:  TF advancing to goal.  Pt with hypernatremia and fever.  Will increase water flush to 140 ml q hrs.  This increases total free water to 1408 mls/day from TF and water flushes.    Monitor:   TF tolerance, labs, wt, hydration    See Care Plan for Problems, Goals, and Interventions.

## 2021-05-28 NOTE — PROGRESS NOTES
Progress Note    Assessment/Plan  Gastrostomy and trach tube are okay at this point    Principal Problem:    S/P MVR (mitral valve repair)  Active Problems:    Rheumatic heart disease    Mitral valve stenosis, unspecified etiology    Aortic valve disease, rheumatic    A-fib (H)    Acute respiratory failure with hypoxemia (H)    Anemia due to blood loss, acute    Coagulopathy (H)    Non-English speaking patient    Thrombocytopenia (H)    Sepsis, due to unspecified organism (H)    Atrial fibrillation with RVR (H)    Hypernatremia    Paralytic ileus (H)    Small bowel obstruction (H)    Acute cardioembolic stroke (H)    Respiratory failure (H)      Subjective  Responsive  Objective    Vital signs in last 24 hours  Temp:  [98.1  F (36.7  C)-101.9  F (38.8  C)] 100.3  F (37.9  C)  Heart Rate:  [] 124  Resp:  [14-47] 29  BP: ()/() 116/87  FiO2 (%):  [24 %] 24 %  Weight:   105 lb 12.8 oz (48 kg)    Intake/Output last 3 shifts  I/O last 3 completed shifts:  In: 2504.9 [I.V.:1304.9; NG/GT:800; IV Piggyback:400]  Out: 2850 [Urine:2650; Stool:200]  Intake/Output this shift:  No intake/output data recorded.      Physical Exam  Abdomen benign    Pertinent Labs   Lab Results   Component Value Date    WBC 11.6 (H) 05/09/2019    HGB 8.5 (L) 05/09/2019    HCT 25.9 (L) 05/09/2019    MCV 95 05/09/2019     (L) 05/09/2019             Pertinent Radiology     Xr Chest 1 View Portable    Result Date: 5/9/2019  EXAM: XR CHEST 1 VIEW PORTABLE LOCATION: [facility] DATE/TIME: 5/9/2019 5:40 AM INDICATION: fever, increase bronchial secretions COMPARISON: None. FINDINGS: No significant change. Opacity persists at left lung base which may represent lobar atelectasis or pneumonia. Mild diffuse interstitial prominence elsewhere. No definite pleural effusion. Prominent cardiomegaly with postoperative changes related to cardiac valvular surgery. Tubes and catheters project in good position, interval placement of  tracheostomy tube.          Chandana Kang         OG

## 2021-05-28 NOTE — PLAN OF CARE
Care Plan  Care Management    Care Management Goals of the Day: Follow progression of care    Care Progression Reviewed With: Charge RN, MD, HUC, RNCM, CMSW    Barriers to Discharge: intubated, sedated, pending trach and PEG    Discharge Disposition: LTACH likely    Expected Discharge Date: 5/10/19    Transportation: [facility] stretcher    Care Coordination Narrative: Pt from home with family, Pt speaks Turkmen, son speaks English. Here for planned heart procedure, stroke occurred and pt has been critically ill, now showing some slight improvement. Plan is for trach and PEG today. CM to follow and assist with DC recommendations.

## 2021-05-28 NOTE — PLAN OF CARE
Problem: Pain  Goal: Patient's pain/discomfort is manageable  Outcome: Progressing     Problem: Potential for Compromised Skin Integrity  Goal: Skin integrity is maintained or improved  Outcome: Progressing

## 2021-05-28 NOTE — ANESTHESIA CARE TRANSFER NOTE
Last vitals:   Vitals:    05/08/19 1601   BP: (!) 141/105   Pulse: (!) 114   Resp:    Temp: 36.7  C (98.1  F)   SpO2: 100%     Patient's level of consciousness is unresponsive and intubated/sedated  Spontaneous respirations: no: intubated/sedated  Maintains airway independently: no: intubated/sedated  Dentition unchanged: yes  Oropharynx: endotracheal tube in place    QCDR Measures:  ASA# 20 - Surgical Safety Checklist: WHO surgical safety checklist completed prior to induction    PQRS# 430 - Adult PONV Prevention: 4558F - Pt received => 2 anti-emetic agents (different classes) preop & intraop  ASA# 8 - Peds PONV Prevention: NA - Not pediatric patient, not GA or 2 or more risk factors NOT present  PQRS# 424 - Mee-op Temp Management: 4559F - At least one body temp DOCUMENTED => 35.5C or 95.9F within required timeframe  PQRS# 426 - PACU Transfer Protocol: - Transfer of care checklist used  ASA# 14 - Acute Post-op Pain: ASA14B - Patient did NOT experience pain >= 7 out of 10     Pt transported via CRNA, SRNA, Circulator RN on monitor with VSS. Manual ventilation and all lines intact and functioning properly. Report given to receiving RN and all questions answered

## 2021-05-28 NOTE — PROGRESS NOTES
ETT # 7.0 and is secured 20 cm at the teeth gum. Patient remains on the vent/fully supported with the following settings: PCV-VG 16 300 24% 5. Ventilation/oxygens are adequate. BS are coarse; nothing is suctioned, and the patient has gag reflex. Patient tolerated MetaNeb. No vent tran is made during this shift. RT will monitor.    ZEHRA WilcoxT

## 2021-05-28 NOTE — PROGRESS NOTES
CVTS Daily Progress Note   5/9/2019   POD #15  s/p AVR/MVR  DOS 4/25/19- Dr. Vaughn   5/8/19- Trach/PEG- Dr. Kang     LOS: 15 days   EF 45%   A1C 6.7 %    Overnight events:  No acute events    Pt is up in chair  Chivo is has been off  Heparin restarted  Tube feedings resumed    PLAN  Gentle diuresis -   ID consult   Pharmacy to dose coumadin    Hold Atorvastatin with elevated CK total and tylenol for elevated LFT- Continues trending down could resume if needed      OBJECTIVE:  Continues to be intermittently awake and follows some commands.   Increased movement/strength on right side - LE, minimal movement on Left fingers, no movement noted on Left arm or leg    Atrial fib with RVR rates  -    On  Rochepin- + Klebs.   On Vanco- started 5/5/19 + Staph epidermis    Blood cx - Staphylococcus epidermis  Sputum  cx - + Klebs    Tm 101.9  Abd soft with BS in all 4 quads- liquid stools  Steady increase in platelets 136  NELLIE  Negative  HIT neg    Normotensive with permissive HTN   Pain controlled:Fentanyl gtt- 25 mcg  Precedex 1 mcg     Hgb 8.9 s/p- (PRBC 4/30)     Temp:  [98.1  F (36.7  C)-101.9  F (38.8  C)] 100.3  F (37.9  C)  Heart Rate:  [] 121  Resp:  [14-47] 29  BP: ()/() 116/87  FiO2 (%):  [24 %] 24 %  Wt Readings from Last 2 Encounters:   05/09/19 0100 105 lb 12.8 oz (48 kg)   05/08/19 0400 108 lb 9.6 oz (49.3 kg)   05/07/19 0330 108 lb 11.2 oz (49.3 kg)   05/06/19 0515 109 lb 14.4 oz (49.9 kg)   05/05/19 0430 109 lb 1.6 oz (49.5 kg)   05/04/19 0600 122 lb 2.2 oz (55.4 kg)   05/02/19 0600 121 lb 4.8 oz (55 kg)   05/01/19 0530 120 lb 12.8 oz (54.8 kg)   04/30/19 0600 122 lb 8 oz (55.6 kg)   04/29/19 0400 116 lb 2.9 oz (52.7 kg)   04/28/19 0200 116 lb 6.5 oz (52.8 kg)   04/27/19 0500 117 lb 8.1 oz (53.3 kg)   04/26/19 0400 112 lb 14 oz (51.2 kg)   04/25/19 0500 113 lb 8.6 oz (51.5 kg)   04/24/19 2000 105 lb 13.1 oz (48 kg)   04/24/19 0628 105 lb 12.8 oz (48 kg)   04/23/19 0937 106 lb 11.2 oz  (48.4 kg)       Current Medications:    Scheduled Meds:    amiodarone  200 mg Per NG tube BID     aspirin  81 mg Enteral Tube DAILY     baclofen  10 mg Enteral Tube BID     cefTRIAXone (ROCEPHIN)  IV  1 g Intravenous Q24H     chlorhexidine  15 mL Topical Q12H     docusate sodium (COLACE) 50 mg/5 mL oral liquid  100 mg Oral BID     senna (SENOKOT) syrup  8.8 mg Enteral Tube BID    And     docusate sodium (COLACE) 50 mg/5 mL oral liquid  50 mg Enteral Tube BID     insulin aspart (NovoLOG) injection   Subcutaneous Q6H     levothyroxine  75 mcg Intravenous Daily     metoprolol tartrate  25 mg Enteral Tube BID     pantoprazole  40 mg Intravenous Q24H    Or     omeprazole  20 mg Oral Q24H    Or     omeprazole  20 mg Enteral Tube Q24H     potassium chloride liquid/packet  30 mEq Enteral Tube Q4H     sodium chloride  10-30 mL Intravenous Q8H FIXED TIMES     sodium chloride  3 mL Intravenous Line Care     vancomycin  750 mg Intravenous Q12H     warfarin - daily dose required   Other Med Consult or Protocol     warfarin  2 mg Enteral Tube Once Warfarin     Continuous Infusions:    heparin infusion (100 units/ml) 17 Units/kg/hr (19 0800)     norepinephrine IV infusion in D5W       phenylephrine IV infusion in NS Stopped (19 0458)     PRN Meds:.albumin human, aluminum-magnesium hydroxide-simethicone, bisacodyl, bisacodyl, dextrose 50 % (D50W), glucagon (human recombinant), ipratropium-albuterol **AND** [] Nebulizer treatment intermittent, lipase-protease-amylase **AND** sodium bicarbonate, magnesium hydroxide, midazolam, naloxone **OR** naloxone, norepinephrine IV infusion in D5W, ondansetron, oxyCODONE intensol (ROXICODONE) conc solution 20 mg/mL **OR** oxyCODONE, polyethylene glycol, sodium chloride, sodium chloride bacteriostatic, sodium chloride, sodium chloride, sodium chloride, traMADol    Cardiographics:    Telemetry monitoring demonstrates Atrial fib with rates in the  per my personal  review.    Lab Results (most recent, reviewed):    Hemoglobin (g/dL)   Date Value   05/09/2019 8.5 (L)     WBC (thou/uL)   Date Value   05/09/2019 11.6 (H)     Potassium (mmol/L)   Date Value   05/09/2019 3.4 (L)     Creatinine (mg/dL)   Date Value   05/09/2019 0.74     Hemoglobin A1c (%)   Date Value   04/23/2019 6.7 (H)     Magnesium (mg/dL)   Date Value   05/09/2019 2.0     Calcium (mg/dL)   Date Value   05/09/2019 7.4 (L)       105 lb 12.8 oz (48 kg)  Admit weight  47.9 kg  UOP: 2.6L/ 24 hours  Stool 200/24 hrs    Physical Exam:    General: Patient seen in bed.  Neuro: More awake and responding to    movement noted on Right UE and less agitation with decreasing sedation.   Intubated, on vent 40%  CV: Atrial fib RVR rates   Pulm: non labored effort on vent. 28%   Sternal  Incision  C/D/I.  Abd: softer with + stool in bag     : Guzman with donta urine   -Dignicare for liquid stool    Ext: Mod pedal edema, SCDs in place, warm  Feet warm, + pulses, palpable  Neuro:  More awake and moving on right, some faint movement noted in left fingers    ASSESSMENT    Kody Johns is a 69y.o. [place]  male with rheumatic heart valve dz(AV regurgitation, , MV stenosis aand TV regurgitation) afib, and pericardial adhesions who was admitted 4/24/19 for AVR, MVR tissue valves, and takedown of pericardial adhesions by Dr Vaughn      Principal Problem:    S/P MVR (mitral valve repair)  Active Problems:    Rheumatic heart disease    Mitral valve stenosis, unspecified etiology    Aortic valve disease, rheumatic    A-fib (H)    Acute respiratory failure with hypoxemia (H)    Anemia due to blood loss, acute    Coagulopathy (H)    Non-English speaking patient    Thrombocytopenia (H)    Sepsis, due to unspecified organism (H)    Atrial fibrillation with RVR (H)    Hypernatremia    Paralytic ileus (H)    Small bowel obstruction (H)    Acute cardioembolic stroke (H)    Respiratory failure (H)      NEURO:   - Scheduled  Tylenol and PRN oxycodone/tramadol for pain-   - Weaning Fentanyl gtt -off  -  use tramadol- Melatonin at bedtime-  - weaning precedex  0.9    CV:   -Amiiodarone ET  - permissive HTN SBP goal of 120   - ASA 81mg  - Atorvastatin 40mg daily- hold with elevated CK   - gentle diuresis - lasix 20 mg po two times a day  - Metoprolol 25 mg two times a day    PULM:   - S/P Trach 5/8/19   - Vent weaning per pul critical care  - Maintaining oxygen saturations on Vent 28 %    FEN/GI:  -s/p PEG  - Digni care - liquid stools, small amount  - Tube feedings- resumed 45 cc/hr  - free water 200 cc q 4 hours   - Abd no distended   - ABD xray - ileus 5/3- resolved  - CT scan 5/4 showed ileus  - UA neg on 5/3  - amylase 156  - Continue electrolyte replacement protocol  - Bowel regimen  - elevated LFT's  Enzymes- trending down  - Hold  Acetaminophen/atorvastatin    RENAL:  - Adequate UOP/hr. Continue to monitor closely via monzon.   - Cr 0.74( baseline 0.92)  - Monzon to remain in for close monitoring of I/O and during period of diuresis/relative immobility.  - Diuresis - gentle  - Potassium replacement protocol-  - Klor 20 two times a day - on hold  - Mag replacement protocol    HEME:  - Acute blood loss anemia post-op.   - hgb 8.5  - one pRBC 4/30 for hgb 8.0   - Thrombocytopenia-resolving   - Heparin gtt until INR >2.0  - Negative HIT  - NELLIE- negative     ID:  - re consulted ID for antibx management  -Leukocytosis now trending down  -On  Rochepin- + Klebs. + sputum   On Vanco- started 5/5/19 + Staph epidermis- + blood cx  Repeat blood cx on 5/6 pending   - UA 5/3 Neg      ENDO:   -  SSI q 6 hours   - synthroid IV could resume OG tomorrow if continues to tolerating feeding and med  PPx:   - DVT: SCDs,   - Low intensity no bolus heparin gtt  - GI: Omeprazole   - Ambulating with therapy : on hold.    DISPO:   - Continue critical care in ICU  [facility] for rehab

## 2021-05-28 NOTE — PATIENT CARE CONFERENCE
verified for 130pm [facility] today. Providers Jovany and Wili aware, son in room aware and he states his family will be here, also.   Fam CC cancelled, pt currently too ill to discuss trach and PEG. Staff and family aware.

## 2021-05-28 NOTE — PROGRESS NOTES
Pt transported to MRI without incident. Placed back on vent, settings: VCV 20, 300, +7, 40%. Sats mid-90's, RR pattern is irregular, BS coarse. Sxn x2 for no secretions. Will continue to monitor.    Marilyn Tuttle LRT

## 2021-05-28 NOTE — PROGRESS NOTES
"Pharmacy Consult: Vancomycin Dosing    Pharmacist consulted to dose vancomycin for Kody Johns, a 69 y.o. male.    Ordering provider: Dr. Sequeira    Indication for vancomycin therapy per consult: Bacteremia    Goal Trough Range: 15-20 mcg/mL based on indication    Other current antimicrobials             vancomycin 750 mg in sodium chloride 0.9% 250 mL (VANCOCIN)  Every 12 hours          cefTRIAXone 1 g in NaCl 0.9 % 50 mL (MINI-BAG Plus) (ROCEPHIN)  Every 24 hours             Subjective/Objective:    Patient was admitted for S/P MVR (mitral valve repair) on 4/24/2019    Height: 4' 11\" (1.499 m)    Actual Body Weight (ABW): 48 kg (105 lb 12.8 oz)    Male patients must weigh at least 50 kg to calculate ideal body weight    BMI: Body mass index is 21.37 kg/m .    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     Dysphagia     Hyperlipidemia     Silent Stroke     Shoulder strain     Elevated LFTs     Dysuria     Hypothyroidism, unspecified type     Mitral valve stenosis, unspecified etiology     Aortic valve disease, rheumatic     Atherosclerosis of native coronary artery of native heart without angina pectoris     Essential hypertension with goal blood pressure less than 130/85     Pure hypercholesterolemia     Lightheadedness     Moderate malnutrition (H)     ALEENA (acute kidney injury) (H)     A-fib (H)     Heart failure with preserved ejection fraction (H)     S/P MVR (mitral valve repair)     Acute respiratory failure with hypoxemia (H)     Anemia due to blood loss, acute     Coagulopathy (H)     Non-English speaking patient     Thrombocytopenia (H)     Sepsis, due to unspecified organism (H)     Atrial fibrillation with RVR (H)     Hypernatremia     Paralytic ileus (H)     Small bowel obstruction (H)     Acute cardioembolic stroke (H)     Respiratory failure (H) "    Past Medical History:   Diagnosis Date     ALEENA (acute kidney injury) (H)      Anemia due to blood loss, acute 4/24/2019     Asthma      Atrial fibrillation (H)      Blurry vision      CHF (congestive heart failure) (H)      Chronic kidney disease      Coronary artery disease      Dysphagia     resolved     Dysuria      Hearing loss      Heart murmur      Heart murmur      Hyperlipidemia      Hypertension      Hypothyroidism      Mitral valve stenosis      Moderate malnutrition (H)      Palpitations      Rheumatic heart disease      Shortness of breath     exertion     Stroke (H)     Silent     Valvular disease         Temp Readings from Current Encounter:     05/08/19 2000 05/09/19 0000 05/09/19 0400   Temp: 99.9  F (37.7  C) (!) 101.9  F (38.8  C) (!) 101  F (38.3  C)     Net Intake/Output (last 24 hours):  I/O last 3 completed shifts:  In: 2504.9 [I.V.:1304.9; NG/GT:800; IV Piggyback:400]  Out: 2850 [Urine:2650; Stool:200]    Recent Labs     05/07/19  0626 05/08/19  0549 05/09/19  0601   WBC  --  9.8 11.6*   NEUTROABS  --   --  9.4*   PROCAL  --   --  0.69*   BUN 31* 22 24*   CREATININE 0.84 0.74 0.74     Estimated Creatinine Clearance: 60 mL/min (by C-G formula based on SCr of 0.74 mg/dL).    No results for input(s): CULTURE in the last 72 hours.    Results for orders placed or performed during the hospital encounter of 04/24/19   Culture, Blood Positive Work-up    Collection Time: 05/04/19  7:57 PM   Result Value Status    Culture Staphylococcus epidermidis (!) Final   Culture/Gram Stain: Sputum    Collection Time: 05/01/19  8:26 AM   Result Value Status    Culture 1+ Klebsiella pneumoniae (!) Final   Sputum culture    Collection Time: 04/27/19 12:00 PM   Result Value Status    Culture Usual lupe with Final    Culture 3+ Klebsiella pneumoniae (!) Final    Culture 3+ Haemophilus influenzae (!) Final       Recent labs: (last 7 days)     05/08/19  2255   VANCOMYCIN 4.3       Vancomycin administrations: (last  120 hours)     Date/Time Action Medication Dose Rate    05/08/19 2256 New Bag    vancomycin 750 mg in sodium chloride 0.9% 250 mL (VANCOCIN) 750 mg 132.5 mL/hr    05/07/19 2226 New Bag    vancomycin 750 mg in sodium chloride 0.9% 250 mL (VANCOCIN) 750 mg 132.5 mL/hr    05/06/19 2134 New Bag    vancomycin 750 mg in sodium chloride 0.9% 250 mL (VANCOCIN) 750 mg 132.5 mL/hr    05/05/19 2353 New Bag    vancomycin 750 mg in sodium chloride 0.9% 250 mL (VANCOCIN) 750 mg 132.5 mL/hr          Assessment/Plan:    Pharmacist consulted to dose vancomycin for Bacteremia, goal trough range 15-20 mcg/mL. The patient was ordered vancomycin 750 mg IV every 24 hours. The vancomycin level of 4.3 mcg/mL on 5/8/2019 was drawn prior to the fourth vancomycin dose of this regimen and was drawn 24 hours and 29 minutes after the prior vancomycin dose, so this represents an approximate trough level. The patient may benefit from a vancomycin regimen adjustment.    1. Change to vancomycin 750 mg IV every 12 hours (about 15.6 mg/kg actual body weight).  2. Pharmacist will plan to re-check a vancomycin trough level when necessary after the new vancomycin steady state has been approximately reached.  3. Pharmacist will continue to follow.    Thank you for the consult.  Nehemias Marshall, PharmD 5/9/2019 8:12 AM

## 2021-05-28 NOTE — PROGRESS NOTES
"Patient Outreach:   Reason: goal's follow up; check most/urgent need.     Call pt phone number list in chart and spoke with Mark, emergency contact. Patient present. Mark assisted with questions. Update action steps in the goals section. Completed two goals today. Verified new goal. Check most/urgent need. Verified \"Pre-admit [facility] OR-gen Surgery schedule for 04/24/2019\" listed in chart.     Patient shared per Mark:   -no urgent need or goal at this time.   -been feel tired a lot lately.   -no concerns with sleep.   -Little with poor appetite.   -have heart surgery appt schedule 04/24/2019. Hope will feel better after the surgery. Hope for a fast recovery.     Mark stated: \"I will keep you update regards to his surgery status after his surgery date 04/24/19.    Goal completed:   Goal: I would like to be approved for a upper and lower dentures in the next 2-4 months.  Goal: I need help with registration for Thanksgiving Meals on Wheels dinner for the family.    Mark and pt confirmed no further questions per Mark.     Plan:   Next outreach: 07-.   "

## 2021-05-28 NOTE — PROGRESS NOTES
Pt was seen by Cardiac Rehab Staff for Pre-Op orientation to Inpatient Cardiac Rehab. Reviewed sternal precautions and functional mobility techniques for post-op recovery.  , Godwin ROMO was present.  Crystal Villasenor, OTR/L   4/23/2019

## 2021-05-28 NOTE — PROGRESS NOTES
Critical Care Progress Note     Admit Date: 4/24/2019  ICU Day: 8     Code Status: full code    CC: rheumatic valvular disease    HPI: 69 y.o. [place]  male with rheumatic heart valve dz(AV regurgitation, , MV stenosis aand TV regurgitation) afib, and pericardial adhesions who was admitted 4/24/19 for AVR, MVR tissue valves, and takedown of pericardial adhesions by Dr Vaughn     Case summary: Pt was an easy intubation, received 15 mg methadone preop, Echo showed boggy RV.  PAPs in the 30s preop, pt had hard time coming off extracorporeal circulation, needing many bumps of epi, flolan was started.  Also they took down pericardial adhesions.  To ICU on full vent support, flolan full strength, not paced,  On epi, insulin and precedex drips.  Protamine was given on arrival to ICU.    Interval events:  4/24/19: to OR for AVR/MVR    To ICU on Flolan  4/25/19: Flolan weaned off   When off sedation for SBT, pt noted to not move left side and    had left eye with upward deviation   Stroke code called    CTA shows right P2 occlusion   Neurology consulted   SBP parameters changed to     Failed SBT  4/26/19: fever    Failed SBT   repeat head CT  revealed evolving acute/subacute infarction  4/27/19: fever T max 105   ID consulted   HIT panel sent due to persistent thrombocytopenia   4/29/19: some labored breathing with vent, vent changes made at bed side    Does better with flow rate at 50   Seems neurogenic in origin     Heme consult for ? of HIT   Dr Vaughn met with son with    4/30/19: unable to do SBT   MRI completed-Large evolving subacute right PCA distribution infarcts with moderate cytotoxic edema, sulcal  effacement and mass effect on the right lateral ventricle  5/01/19: ID signed off-rec ceftriaxone for 5 days   Afib with RVR   Family conference-Family/Dr Vaughn/Neurology/myself  Up[dates given discussed possbile need for trach/peg   5/02/19: cards consulted   New ileus     Major events over the  "last 24 hours: afib rvr    Subjective: in bed, lightly sedated, not responding to me, over breathing the vent  ROS: unable to do    Drips: precedex, thomas      Ventilator: Mechanical Ventilation Day: # 9        Settings: 20/300/5/40%    /63   Pulse (!) 144   Temp 99  F (37.2  C) (Core)   Resp 25   Ht 4' 11\" (1.499 m)   Wt 121 lb 4.8 oz (55 kg)   SpO2 96%   BMI 24.50 kg/m       I/O last 3 completed shifts:  In: 3098.2 [I.V.:1148.2; NG/GT:1950]  Out: 3860 [Urine:3860]  Weight change: 8 oz (0.227 kg)  Vent Mode: VCV  FiO2 (%):  [35 %-40 %] 40 %  S RR:  [20] 20  S VT:  [300 mL] 300 mL  PEEP/CPAP (cm H2O):  [7 cm H2O] 7 cm H2O  Minute Ventilation (L/min):  [8.4 L/min-10.5 L/min] 10.5 L/min  PIP:  [9 cm H2O-50 cm H2O] 50 cm H2O  NM SUP:  [5 cm H20] 5 cm H20  MAP (cm H2O):  [7-22] 12      EXAM:   Mental status: in bed lightly sedated on vent    HEENT: NC right pupil larger than left, minimal rx(not new), OETT, FT  + cough and gag  Resp: few anterior rhonchi  Does not appear labored on vent presently  Cardiovascular: IRRR  Abdominal: distended  Extremeties: no e/c/c  Neurology: does not move left side, minimal movement  on right    Labs Personally reviewed: yes  Results from last 7 days   Lab Units 05/01/19  0957 04/30/19  0559 04/29/19  1930 04/29/19  1032   LN-WHITE BLOOD CELL COUNT thou/uL 7.3 6.3  --  4.1   LN-HEMOGLOBIN g/dL 9.6* 8.0*  --  8.4*   LN-HEMATOCRIT % 29.6* 25.5*  --  26.3*   LN-PLATELET COUNT thou/uL 42* 46* 47* 27*   LN-MONOCYTES - REL (DIFF) %  --   --   --  8     Results from last 7 days   Lab Units 05/02/19  0517 05/02/19  0010 05/01/19  1330 05/01/19  0445  04/30/19  1338 04/30/19  0559   LN-SODIUM mmol/L 148* 147* 146* 148*   < > 147* 147*   LN-POTASSIUM mmol/L 4.5  --   --  3.6  --   --  3.9   LN-CHLORIDE mmol/L 115*  --   --  114*  --   --  115*   LN-CO2 mmol/L 25  --   --  25  --   --  26   LN-BLOOD UREA NITROGEN mg/dL 51*  --   --  48*  --   --  50*   LN-CREATININE mg/dL 1.33*  --   --  " 1.22  --   --  1.28   LN-CALCIUM mg/dL 8.0*  --   --  7.7*  --   --  7.4*   LN-PROTEIN TOTAL g/dL  --   --   --   --   --  5.4*  --    LN-BILIRUBIN TOTAL mg/dL  --   --   --   --   --  1.6*  --    LN-ALKALINE PHOSPHATASE U/L  --   --   --   --   --  68  --    LN-ALT (SGPT) U/L  --   --   --   --   --  52*  --    LN-AST (SGOT) U/L  --   --   --   --   --  121*  --     < > = values in this interval not displayed.     Results from last 7 days   Lab Units 04/29/19  1032   LN-INR  1.35*   LN-PARTIAL THROMBOPLASTIN TIME seconds 50*       Last ABG 4/30/19:  PH 7.48  PCO2 36 PAO2 60 Bicarb 27.4     Microbiology: NGTD  Imaging (all imaging is personally reviewed):     MRI head 4/30/19-1.  Limited by motion. Large evolving subacute right PCA distribution infarcts with moderate cytotoxic edema, sulcal effacement and mass effect on the right lateral ventricle. No hydrocephalus..  Minimal, punctate petechial hemorrhage in the right occipital infarct bed.Patchy areas of acute to early subacute ischemia in the posterior left frontal cortex and minimally within the right anterior frontal cortex and right caudate body.    PCXR 4/27/19-ET tube 3 cm above the georgina. NG tube tip overlies the stomach. Right internal jugular Marquand-Nick catheter tip overlies the pulmonary artery outflow tract. Mediastinal chest tube.left pleural effusion. Left lower lobe atelectasis. Mild vascular congestion and pulmonary edema. Postop median sternotomy. Heart size upper limits of normal    Head CT 4/26/19-Moderately sized hypodensity and loss of gray-white matter differentiation right temporal occipital region, consistent with evolving acute/subacute infarction.  2.  No significant global mass effect or hemorrhage.    CTA head neck 4/25/19-HEAD CT:  1.  No intracranial hemorrhage, mass lesions, hydrocephalus or CT evidence for an acute infarct. MRI is more sensitive for the evaluation of acute infarct.  2.  Chronic lacunae as detailed above.  3.   "Mild diffuse cerebral parenchymal volume loss. Presumed chronic hypertensive/microvascular ischemic white matter changes.        HEAD CTA:   1.  There is cut off on the P2 segment of the right posterior cerebral artery.  2.  No high-grade stenosis or occlusion of the rest of the major intracranial arteries.  3.  There is 1.5 to 2 mm focal outpouching off the expected location of the origin of the right posterior communicating artery. This is likely an infundibulum versus small aneurysm.     PCXR 4/25/19- Postoperative changes from cardiac valvular surgery. Moderate cardiomegaly persists. There is mild central hilar congestion and slight interstitial prominence suggesting minimal edema. Left hemidiaphragm obscured which may relate to cardiomegaly or left lower lobe atelectasis. No pneumothorax or pleural effusion. Tubes and catheters appear in good position.    Impression:  1. Acute hypoxic respiratory failure  2. Aortic regurgitation.    S/p tissue AVR  4/24/19  3. Mitral stenosis.    S/p Tissue MVR 4/24/19  4. Tricuspid regurgitation   5. Heart failure  Dilated RV  Flolan started in OR        Now off  6.   Acute ischemic stroke  7.  Thrombocytopenia.   8.   fever   9.   Pericardial adhesions; s/p takedown of adhesions 4/24/19  10.   Acute blood loss anemia .  EBL 1025  11. Non English speaking.  Malaysian   12. New ileua  13. A fib with  RVR    PLAN:   1. Continue full vent support  2. ID De-escalated abx to ceftriaxone for 4 more days, and signed off  3. Cards consulted for Afib RVR  4. I will add low dose fentnayl drip for pain  5. Limit sedation as able  6. hold TF, put Og to LIS  7. Fu MRI neck per neurology  8. Will ask LSW to help set up FC 5/3 to decide on Trach/peg  9. Per heme \"These include platelet transfusion for counts less than 10 or less than 20 of bleeding.  Check peripheral smear.  Would expect platelet count recovery as all of his acute medical issues improve.\"  10. Other issues per CVS and " neurology     ICU DAILY CHECKLIST                           Can patient transfer out of ICU? no    FAST HUG:    Feeding:  Feeding: No.  Patient is receiving NPO  Tube feedings now on hold due to new ileus  Guzman:Yes  Analgesia/Sedation:Yes precedex  Thromboembolic prophylaxis: yes; Mode:  SCD  HOB>30:  Yes  Stress Ulcer Protocol Active: yes; Mode: PPI  Glycemic Control: Any glucose > 180 no; Mode of Insulin Therapy: Sliding Scale Insulin    INTUBATED:  Can patient have daily waking:  yes  Can patient have spontaneous breathing trial:  no    Restraints? yes    PHYSICAL THERAPY AND MOBILITY:  Can patient have PT and mobility trial: no  Activity: Bed Rest    transfer/discharge plans: stays ICU    The patient is critically ill with impairment in organ system and high risk of life threatening deterioration.     Total CCT spent 50 minutes thus far today.       Sandy PORRAS, [phone]  [facility]

## 2021-05-28 NOTE — PLAN OF CARE
Pt's BP's dropped to 69/50's at midnight following completion of albumin. Spoke with Dr. Sequeira and started thomas-synephrine at 20mcg/min. Pressure immediately rebounded. Also decreased dexmetomidine to1mcg/kg/min as pt seemed very sedated RASS-2. Able to wean thomas during the shift and turned it off. On hearin drip with anti XA at 2300. Result sub therapeutic for > 24 hours. Dr. Sequeira updated. Labs drawn peripherally PTT in acceptable range even though Anti XA less than previous results. Wants to adjust heparin per PTT's

## 2021-05-28 NOTE — PROGRESS NOTES
NEUROLOGY PROGRESS NOTE     Kody Johns,  1950, MRN [id number] Date: 5/3/2019     [facility]   Code status:  Full Code   PCP: Aristides Alegria MD, [phone]      ASSESSMENT & PLAN   Hospital Day#: 9  Diagnosis code: Ischemic stroke    Ischemic stroke  Aortic/Mitral valve repair      Head CT shows right P2 stroke which fits with the CTA scan.    MRI shows PCA stroke.    Patient has bilateral arm and leg weakness. MRI does not explain this. MRI cervical spine negative. He moved right arm today.    Can slow bring the BP down.    Most likely cause of stroke is cardioembolic secondary to surgery.     Hold ASA (stopped today) due to low platelets. Needs anticoagulation long term.    Will continue to follow.    Thank you again for this referral, please feel free to contact me if you have any questions.     CHIEF COMPLAINT S/P MVR (mitral valve repair)     HISTORY OF PRESENT ILLNESS     We have been requested by Dr. Omer to evaluate Kody Johns who is a 69 y.o.  male for acute stroke.    This is a 69-year-old male on whom neurology is been consulted to evaluate for stroke.  Patient had a mitral valve surgery yesterday and was under sedation after that.  Sedation was lightened today when it was noticed that he was having some ophthalmoplegia.  Was mainly involving the left eye.  Stroke code was called.  He was not a TPA candidate because of the recent surgery and unclear time of onset.  Left arm and leg weakness was also noted.  Head CT was done which showed a right P2 occlusion.  The patient's exam is been stable since the stroke code.  He was not a candidate for thrombectomy because the blood vessel was too small for mechanical thrombectomy.      Patient is unable to provide any history because of intubation.  History is obtained through chart review and through talking to the son.      Patient remains intubated. Unable to extubate. He has spiked a fever.  Per family he has been opening his  eyes. Nodding to questions. Moving right arm but not the right leg.  He did move the left arm once for the family. Though the healthcare staff have not observed this.  No other new symptoms per the family.   MRI brain could not be done due to Pacemaker wires.    4/29  Patient remains in the ICU. Due to low platelets the pacemaker wires cannot be removed and patient cannot get the MRI. Family still wanting aggressive cares. Patients fever has been thought to be secondary to neurological disease.    4/30  Patient has been agitated when off the Precedex. Plan for MRI today and possibly family conference tomorrow. Family is hopeful that he will make good recovery. Discussed with family that there is no cure for stroke.     5/1  Family meeting later today. Patient still has not improved. MRI brain done last night.     5/2  Patients MRI cervical spine does not explain why he cannot move left side. Updated family on results.   Plan to proceed with PEG and Trach most likely. Family meeting tomorrow afternoon to decide.    5/3  Family meeting was cancelled for today. Family most likely planning on Trach and PEG today. Discussed with youngest son.     Cardiology to do BATSHEVA with cardioversion.  Abd CT shows ileus.     PAST MEDICAL & SURGICAL HISTORY     Medical History  Patient Active Problem List    Diagnosis Date Noted     Paralytic ileus (H) 05/02/2019     Small bowel obstruction (H)      Atrial fibrillation with RVR (H)      Hypernatremia      Thrombocytopenia (H)      Sepsis, due to unspecified organism (H)      S/P MVR (mitral valve repair) 04/24/2019     ARF (acute respiratory failure) (H) 04/24/2019     Anemia due to blood loss, acute 04/24/2019     Coagulopathy (H) 04/24/2019     Non-English speaking patient 04/24/2019     A-fib (H) 09/14/2017     Heart failure with preserved ejection fraction (H) 09/14/2017     Moderate malnutrition (H) 08/31/2017     ALEENA (acute kidney injury) (H) 08/31/2017     Aortic valve disease,  rheumatic 08/30/2017     Lightheadedness 08/30/2017     Atherosclerosis of native coronary artery of native heart without angina pectoris      Essential hypertension with goal blood pressure less than 130/85      Pure hypercholesterolemia      Dysuria 08/29/2017     Hypothyroidism, unspecified type      Mitral valve stenosis, unspecified etiology      Elevated LFTs 08/26/2016     Silent Stroke      Shoulder strain      Dysphagia      Hyperlipidemia      Rheumatic heart disease      Blurry vision      Hearing loss      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  Surgical History  He  has a past surgical history that includes Cataract extraction (Left, 06/13/2016); Cardiac catheterization; Coronary Angiogram (N/A, 11/30/2018); Eye surgery; pr replacement of mitral valve (N/A, 4/24/2019); Aortic valve replacement (N/A, 4/24/2019); and PICC Team Line Insertion (4/29/2019).     SOCIAL HISTORY     Reviewed, and he  reports that he quit smoking about 4 years ago. His smoking use included cigarettes. He has a 50.00 pack-year smoking history. He has quit using smokeless tobacco. He reports that he does not drink alcohol or use drugs.     FAMILY HISTORY     Reviewed, and family history includes No Medical Problems in his father and mother.     ALLERGIES     No Known Allergies     REVIEW OF SYSTEMS     Unable to do ROS due to intubation and AMS.     HOME & HOSPITAL MEDICATIONS      Prior to Admission Medications  Medications Prior to Admission   Medication Sig Dispense Refill Last Dose     albuterol (PROAIR HFA;PROVENTIL HFA;VENTOLIN HFA) 90 mcg/actuation inhaler Inhale 1-2 puffs every 6 (six) hours as needed. 1 Inhaler 5 4/22/2019     amoxicillin (AMOXIL) 500 MG capsule TAKE 4 TABLETS (2,000 mg) BY MOUTH 1 HOUR PRIOR TO PROCEDURE. 4 capsule 3 Unknown     atorvastatin (LIPITOR) 40 MG tablet TAKE 1 PILL BY MOUTH AT BEDTIME. FOR CHOLESTEROL 90 tablet 2 4/22/2019     baclofen (LIORESAL) 10 MG tablet TAKE 1 TABLET BY MOUTH TWICE DAILY 60 tablet 5 4/23/2019     enoxaparin (LOVENOX) 40 mg/0.4 mL syringe Inject 0.4 mL (40 mg total) under the skin every evening. 2 Syringe 0 4/22/2019     enoxaparin (LOVENOX) 60 mg/0.6 mL syringe Inject 0.6 mL (60 mg total) under the skin every morning. 3 Syringe 0 4/23/2019 at am     fluticasone (FLONASE) 50 mcg/actuation nasal spray 2 sprays into each nostril daily. (Patient taking differently: 2 sprays into each nostril daily as needed.       ) 10 g 3 4/22/2019     furosemide (LASIX) 20 MG tablet TAKE 1 TABLET (20 MG TOTAL) BY MOUTH DAILY FOR EDEMA 90 tablet 3 4/23/2019     levothyroxine (SYNTHROID, LEVOTHROID) 88 MCG tablet TAKE 1 PILL BY MOUTH EVERYDAY FOR THYROID 30 tablet 6 4/23/2019     metoprolol succinate (TOPROL-XL) 25 MG TAKE 1 PILL BY MOUTH EVERYDAY FOR BLOOD PRESSURE 90 tablet 3 4/23/2019     omeprazole (PRILOSEC) 20 MG capsule TAKE 1 PILL BY MOUTH EVERYDAY FOR STOMACH 30 capsule 8 4/23/2019     spironolactone (ALDACTONE) 25 MG tablet TAKE 1 PILL BY MOUTH EVERYDAY 30 tablet 8 4/23/2019     warfarin (COUMADIN/JANTOVEN) 1 MG tablet Take 1-2 mg by mouth See Admin Instructions. 1 mg daily on Mon/Wed/Fri; and 2 mg daily rest of week   4/19/2019     acetaminophen (TYLENOL) 500 MG tablet Take 500-1,000 mg by mouth every 6 (six) hours as needed for pain. Every 6-8 hours as needed. No more than 4,000MG of acetaminophen daily.   Unknown     ibuprofen  (ADVIL,MOTRIN) 600 MG tablet Take 600 mg by mouth every 6 (six) hours as needed for pain.   Unknown       Hospital Medications    acetaminophen  650 mg Enteral Tube Q6H    Or     acetaminophen  650 mg Oral Q6H    Or     acetaminophen  650 mg Rectal Q6H     atorvastatin  40 mg Enteral Tube DAILY     baclofen  10 mg Enteral Tube BID     cefTRIAXone (ROCEPHIN)  IV  1 g Intravenous DAILY     chlorhexidine  15 mL Topical Q12H     docusate sodium  100 mg Oral BID    Or     docusate sodium (COLACE) 50 mg/5 mL oral liquid  100 mg Enteral Tube BID     furosemide  40 mg Intravenous Q8H     insulin aspart (NovoLOG) injection   Subcutaneous Q4H FIXED TIMES     [START ON 5/4/2019] levothyroxine  75 mcg Intravenous Daily     metoprolol tartrate  2.5-5 mg Intravenous Q6H     pantoprazole  40 mg Intravenous Q24H    Or     omeprazole  20 mg Oral Q24H    Or     omeprazole  20 mg Enteral Tube Q24H     polyethylene glycol  17 g Enteral Tube DAILY     potassium chloride  10 mEq Enteral Tube Daily with supper     sodium chloride  10-30 mL Intravenous Q8H FIXED TIMES     sodium chloride  3 mL Intravenous Line Care        PHYSICAL EXAM     Vital signs  Temp:  [97  F (36.1  C)-102.2  F (39  C)] 98.1  F (36.7  C)  Heart Rate:  [] 132  Resp:  [17-35] 18  BP: ()/() 160/91  FiO2 (%):  [40 %-60 %] 60 %    Weight:   121 lb 4.8 oz (55 kg)    GENERAL: Healthy appearing, alert, no acute distress, normal habitus.  CARDIOVASCULAR: Ext warm and well perfused.  NEUROLOGICAL:  Patient is lying in bed intubated. Opens eyes to voice Minimaly. Right pupil larger than left. EOM absent. VFI on right. Corneals present. NLF symmetric. Does not move any ext to pain or spontaneously except did move the right arm for me today. Tone symmetric.      DIAGNOSTIC STUDIES     Pertinent Radiology   Head CT images reviewed. No acute stroke seen  IMPRESSION:   CONCLUSION:   HEAD CT:  1.  No intracranial hemorrhage, mass lesions, hydrocephalus or CT  evidence for an acute infarct. MRI is more sensitive for the evaluation of acute infarct.  2.  Chronic lacunae as detailed above.  3.  Mild diffuse cerebral parenchymal volume loss. Presumed chronic hypertensive/microvascular ischemic white matter changes.        HEAD CTA:   1.  There is cut off on the P2 segment of the right posterior cerebral artery.  2.  No high-grade stenosis or occlusion of the rest of the major intracranial arteries.  3.  There is 1.5 to 2 mm focal outpouching off the expected location of the origin of the right posterior communicating artery. This is likely an infundibulum versus small aneurysm.        NECK CTA:  1.  No significant stenosis in the neck vessels based on NASCET criteria.  2.  No evidence for dissection.    Repeat Head CT 4/26 Images reviewed. Show right PCA stroke with question of pontine stroke.    Repeat Head CT images reviewed. 4/29  IMPRESSION:   CONCLUSION:  1.  Motion degraded examination demonstrates expected temporal evolution of right posterior cerebral artery territory infarct including interval development of small volume petechial hemorrhage as above. No hemorrhagic transformation. Mild local mass   effect with partial effacement of the posterior right lateral ventricle.  2.  Some loss of gray-white matter differentiation involving the parasagittal left parieto-occipital junction. It is uncertain if this is artifactual or reflect a new area of subacute ischemia/infarct. Consider MRI or follow-up head CT if the patient is   not an MRI candidate.  3.  Background of mild age-related changes elsewhere similar to the prior exam.    MRI brain images reviewed. Right PCA stroke seen.    MRI cervical spine  IMPRESSION:   CONCLUSION:  1.  Markedly limited by patient motion. No definite canal narrowing or gross cord signal abnormality     2.  Evaluation of the foramina is limited though there is likely significant foraminal narrowing at a few levels.     3.  No marrow edema or  acute injury.    Recent Results (from the past 24 hour(s))   Sodium    Collection Time: 05/02/19  5:25 PM   Result Value Ref Range    Sodium 149 (H) 136 - 145 mmol/L   POCT Glucose - every 4 hours    Collection Time: 05/02/19  8:09 PM   Result Value Ref Range    Glucose 90 70 - 139 mg/dL   POCT Glucose - every 4 hours    Collection Time: 05/02/19 11:30 PM   Result Value Ref Range    Glucose 94 70 - 139 mg/dL   Sodium    Collection Time: 05/03/19 12:08 AM   Result Value Ref Range    Sodium 148 (H) 136 - 145 mmol/L   POCT Glucose - every 4 hours    Collection Time: 05/03/19  4:08 AM   Result Value Ref Range    Glucose 106 70 - 139 mg/dL   Magnesium    Collection Time: 05/03/19  5:58 AM   Result Value Ref Range    Magnesium 2.6 1.8 - 2.6 mg/dL   Basic Metabolic Panel    Collection Time: 05/03/19  5:58 AM   Result Value Ref Range    Sodium 149 (H) 136 - 145 mmol/L    Potassium 4.3 3.5 - 5.0 mmol/L    Chloride 116 (H) 98 - 107 mmol/L    CO2 22 22 - 31 mmol/L    Anion Gap, Calculation 11 5 - 18 mmol/L    Glucose 117 70 - 125 mg/dL    Calcium 7.7 (L) 8.5 - 10.5 mg/dL    BUN 56 (H) 8 - 22 mg/dL    Creatinine 1.31 (H) 0.70 - 1.30 mg/dL    GFR MDRD Af Amer >60 >60 mL/min/1.73m2    GFR MDRD Non Af Amer 54 (L) >60 mL/min/1.73m2   Platelet Count    Collection Time: 05/03/19  5:58 AM   Result Value Ref Range    Platelets 59 (L) 140 - 440 thou/uL   HM2(CBC w/o Differential)    Collection Time: 05/03/19  5:58 AM   Result Value Ref Range    WBC 11.6 (H) 4.0 - 11.0 thou/uL    RBC 3.13 (L) 4.40 - 6.20 mill/uL    Hemoglobin 9.4 (L) 14.0 - 18.0 g/dL    Hematocrit 29.7 (L) 40.0 - 54.0 %    MCV 95 80 - 100 fL    MCH 30.0 27.0 - 34.0 pg    MCHC 31.6 (L) 32.0 - 36.0 g/dL    RDW 15.8 (H) 11.0 - 14.5 %    Platelets 59 (L) 140 - 440 thou/uL    MPV 15.2 (H) 8.5 - 12.5 fL   POCT Glucose - every 4 hours    Collection Time: 05/03/19  7:42 AM   Result Value Ref Range    Glucose 119 70 - 139 mg/dL   Sodium    Collection Time: 05/03/19 10:59 AM    Result Value Ref Range    Sodium 149 (H) 136 - 145 mmol/L   Protime-INR    Collection Time: 05/03/19 10:59 AM   Result Value Ref Range    INR 1.19 (H) 0.90 - 1.10   Amylase    Collection Time: 05/03/19 10:59 AM   Result Value Ref Range    Amylase 156 (H) 5 - 120 U/L   POCT Glucose - every 4 hours    Collection Time: 05/03/19 11:36 AM   Result Value Ref Range    Glucose 144 (H) 70 - 139 mg/dL   POCT Glucose for diabetic patients who arrive NPO - implement hypoglycemic or hyperglycemic protocol as necessary    Collection Time: 05/03/19 12:31 PM   Result Value Ref Range    Glucose 146 (H) 70 - 139 mg/dL       Total time spent for face to face visit, reviewing labs/imaging studies, counseling and coordination of care was: 25 min More than 50% of this time was spent on counseling and coordination of care. Discussing imaging results with family.       Dustin Moya MD  [employer] Secure Messaging: [email or web address]  Tel: [phone]  This note was dictated using voice recognition software.  Any grammatical or context distortions are unintentional and inherent to the software.

## 2021-05-28 NOTE — PLAN OF CARE
Problem: Mechanical Ventilation  Goal: Patient will maintain patent airway  Outcome: Progressing  Goal: Oral health is maintained or improved  Outcome: Progressing  Goal: Respiratory status - ventilation  Description  Movement of air in and out of the lungs.    Liberate from ventilator  Outcome: Progressing  Goal: ET tube will be managed safely  Outcome: Progressing     Vent Mode: VCV  FiO2 (%):  [40 %-60 %] 40 %  S RR:  [20-24] 20  S VT:  [350 mL] 350 mL  PEEP/CPAP (cm H2O):  [5 cm H2O] 5 cm H2O  Minute Ventilation (L/min):  [8.6 L/min-12 L/min] 12 L/min  PIP:  [6 cm H2O-32 cm H2O] 12 cm H2O  MAP (cm H2O):  [6-10] 6     Pt continues to be on full mechanical ventilator support with the above settings.  ETT size 7.0 secured 20 cm at the gums.  Breath sounds clear bilaterally.  Minimal amount of white secretions via inline suction. Quarter strength veletri was started at 0903 and stopped at 1216 as ordered.  ABG result via above vent settings was 7.42/31/87/22.1/99.7/-3.6. Plan is to assess for readiness to wean and extubate when appropriate.    ZEHRA McfaddenT

## 2021-05-28 NOTE — PROGRESS NOTES
PULMONARY / CRITICAL CARE PROGRESS NOTE    Date / Time of Admission:  4/24/2019  5:39 AM    Assessment:   Principal Problem:    S/P MVR (mitral valve repair)  Active Problems:    Rheumatic heart disease    Mitral valve stenosis, unspecified etiology    Aortic valve disease, rheumatic    A-fib (H)    ARF (acute respiratory failure) (H)    Anemia due to blood loss, acute    Coagulopathy (H)    Non-English speaking patient    Thrombocytopenia (H)    Sepsis, due to unspecified organism (H)        Advance Directives: Full code      Plan:   Neuro:  Right posterior cerebral artery infarction probably embolic.  Cannot anticoagulate at this point due to low platelets.  On precedex.     Cardiovascular:  Status post AVR, MVR with bioprosthetic valves.  Off vasopressors.  Started gentle diuresis.  4.8 L positive  Atrial fibrillation with RVR.  Heparin drip to be started once platelets are over 90 according toCV surgery.      Respiratory:  Continue mechanical ventilation.  Wean PEEP and FiO2 as tolerated.  Check ABG in the morning.  Today he has been hyperventilating and his pH is 7.51.  On the chest x-ray he does appear to have a left lower lobe infiltrate versus atelectasis so with this positive sputum for gram-negative rods he would qualify for healthcare associated pneumonia.  Already on Zosyn.    GI:  Continue PPI  Continue tube feeds.    :  Increasing creatinine consistent with acute kidney injury.  Nonoliguric.  Overloaded.  Agree with continuing diuresis.  Worsening hyponatremia.  I will give him 500 mL of D5W and increase his free water.  Follow sodium every 6 hours.    ID:  Significantly increased procalcitonin and fevers.  Thrombocytopenia.  All pointing towards sepsis.  Panculture and continue vancomycin and Zosyn.  Sputum is growing gram-negative rods.  Already on Zosyn.  Considering that she is not hypotensive I would not change antibiotics at this point.  ID consulted by CV surgery.    Heme:  Thrombocytopenia  has improved.  HIT panel ordered by primary service.  If negative then explanation for his low platelets would be sepsis and DIC.    Once platelets over 90 he can be started on a heparin drip according to surgery.    Endocrine:  Due to the patient getting D5W for hypernatremia his blood glucose is over 180.  I have added sliding scale insulin.  Start on D5W only for 5 hours and then DC.        Family has a lot of questions as to why the stroke occurred.  They state that they were not properly informed about the risk of stroke during surgery.  Family also would like to discuss with CV surgery and address their concerns.  I agreed and confirmed that CV surgery would be the best way to go addressing the concerns regarding complications of heart surgery.  All other questions answered regarding thrombocytopenia, sepsis,  A. fib and the risk of possible of further strokes occurring at this point since we cannot anticoagulate.    ICU DAILY CHECKLIST                           Can patient transfer out of MICU? no    FAST HUG:    Feeding:  Feeding: Yes.  Patient is receiving TUBE FEEDS    Guzman:Yes  Thromboembolic prophylaxis: yes; Mode:  SCDs  HOB>30:  Yes  Stress Ulcer Protocol Active: yes; Mode: PPI  Glycemic Control: Any glucose > 180 yes; Mode of Insulin Therapy: Sliding Scale Insulin    INTUBATED:  Can patient have daily waking:  not applicable  Can patient have spontaneous breathing trial:  no    Restraints? Yes    PROVIDER RESTRAINT FOR NON-VIOLENT BEHAVIOR FACE TO FACE EVALUATION    Patient's Immediate Situation:  Patient demonstrated the following behaviors: Pulling/tugging at invasive lines or tubes and does not respond to verbal/non-verbal redirection    Patient's Reaction to the intervention:  Does patient understand the reason for restraint/seclusion? No    Medical Condition:  Is there any evidence of compromise of Skin integrity, Respiratory, Cardiovascular, Musculoskeletal, Hydration? No    Behavioral  Condition:  In consultation with the RN, is there a need to continue this restraint or seclusion? Yes    See Restraint Flowsheet for complete restraint documentation and assessment.    Yahir Matthews MD          PHYSICAL THERAPY AND MOBILITY:  Can patient have PT and mobility trial: no  Activity: Bed Rest    Critical Care Time greater than: 45 Minutes  Total time spent with patient greater than: 45 Minutes        Subjective:   HPI:  Kody Johns is a 69 y.o. male who underwent bioprosthetic replacement of his aortic and mitral valves.  His postop course has been complicated by a PCA embolic stroke, sepsis and thrombocytopenia.    Principal Problem:    S/P MVR (mitral valve repair)  Active Problems:    Rheumatic heart disease    Mitral valve stenosis, unspecified etiology    Aortic valve disease, rheumatic    A-fib (H)    ARF (acute respiratory failure) (H)    Anemia due to blood loss, acute    Coagulopathy (H)    Non-English speaking patient    Thrombocytopenia (H)    Sepsis, due to unspecified organism (H)      Allergies: Patient has no known allergies.     MEDS:  Scheduled Meds:    acetaminophen  650 mg Oral Q6H    Or     acetaminophen  650 mg Oral Q6H    Or     acetaminophen  650 mg Rectal Q6H     aspirin  300 mg Rectal Daily 0800     atorvastatin  40 mg Enteral Tube DAILY     baclofen  10 mg Enteral Tube BID     bisacodyl  10 mg Oral Once     chlorhexidine  15 mL Topical Q12H     docusate sodium  100 mg Oral BID    Or     docusate sodium (COLACE) 50 mg/5 mL oral liquid  100 mg Enteral Tube BID     furosemide  40 mg Intravenous BID - diuretic     insulin aspart (NovoLOG) injection   Subcutaneous Q4H FIXED TIMES     levothyroxine  88 mcg Enteral Tube Daily 0600     magnesium hydroxide  30 mL Enteral Tube DAILY     melatonin  3 mg Enteral Tube QHS     metoprolol tartrate  12.5 mg Oral BID     metoprolol tartrate  12.5 mg Oral 0330, 1300, 1700 - CV Metoprolol     pantoprazole  40 mg Intravenous Q24H    Or      "omeprazole  20 mg Oral Q24H    Or     omeprazole  20 mg Enteral Tube Q24H     piperacillin-tazobactam  3.375 g Intravenous Q8H     polyethylene glycol  17 g Enteral Tube DAILY     sodium chloride  3 mL Intravenous Line Care     vancomycin  1,000 mg Intravenous Q24H     Continuous Infusions:    dexmedetomidine 400 mcg/100 mL in NS (PRECEDEX) (4mcg/mL) 1.5 mcg/kg/hr (04/28/19 1129)     DOPamine       epinephrine Stopped (04/27/19 0655)     insulin infusion (1 unit/mL) Stopped (04/24/19 1906)     niCARdipine Stopped (04/26/19 0701)     norepinephrine IV infusion in D5W       sodium chloride 0.9% 50 mL/hr (04/28/19 0400)     sodium chloride 0.9% 25 mL/hr (04/28/19 0400)     vasopressin       PRN Meds:.acetaminophen, acetaminophen, albumin human, aluminum-magnesium hydroxide-simethicone, bisacodyl, bisacodyl, dexmedetomidine 400 mcg/100 mL in NS (PRECEDEX) (4mcg/mL), dextrose 50 % (D50W), DOPamine, epinephrine, glucagon (human recombinant), HYDROmorphone, lipase-protease-amylase **AND** sodium bicarbonate, magnesium hydroxide, naloxone **OR** naloxone, niCARdipine, norepinephrine IV infusion in D5W, ondansetron, oxyCODONE intensol (ROXICODONE) conc solution 20 mg/mL **OR** oxyCODONE, sodium chloride, sodium phosphates 133 mL, traZODone, vasopressin      Objective:   VITALS:  /86   Pulse (!) 112   Temp (!) 104.5  F (40.3  C) (Bladder)   Resp (!) 33 Comment: /vent  Ht 4' 11\" (1.499 m)   Wt 116 lb 6.5 oz (52.8 kg)   SpO2 96%   BMI 23.51 kg/m    EXAM:  General appearance: Sleeping.  Somewhat tachypneic.  Head: Normocephalic, without obvious abnormality, atraumatic  Lungs: Bilateral rhonchi  Heart: Irregularly irregular   Abdomen: soft, non-tender; bowel sounds normal; no masses,  no organomegaly  Extremities: extremities normal, atraumatic, no cyanosis or edema  Neurologic: Does not remove extremities on the left side.  Withdrawal to pain on the right.    I&O:      Intake/Output Summary (Last 24 hours) at " 4/28/2019 1235  Last data filed at 4/28/2019 1200  Gross per 24 hour   Intake 3732.93 ml   Output 3766 ml   Net -33.07 ml       Data Review:  CBC:   Lab Results   Component Value Date    WBC 4.0 04/27/2019    RBC 3.83 (L) 04/27/2019     BMP:   Lab Results   Component Value Date    CO2 23 04/28/2019    BUN 48 (H) 04/28/2019    CREATININE 1.31 (H) 04/28/2019    CALCIUM 8.1 (L) 04/28/2019     Serum Glucose range:Invalid input(s): GLUCOSEPOCT    Chest x-ray: Left lower lobe atelectasis versus infiltrate.    By:  Yahir Matthews, 4/28/2019, 11:17 AM    Primary Care Physician:  Aristides Alegria MD

## 2021-05-28 NOTE — PROGRESS NOTES
RESPIRATORY CARE NOTE    Arterial Blood Gas result:  pH 7.51; pCO2 34; pO2 67; HCO3 28, %O2 Sat 98.1.    Vent Mode: VCV  FiO2 (%):  [50 %] 50 %  S RR:  [16] 16  S VT:  [350 mL] 350 mL  PEEP/CPAP (cm H2O):  [8 cm H2O] 8 cm H2O  Minute Ventilation (L/min):  [8.7 L/min-13.6 L/min] 8.7 L/min  PIP:  [17 cm H2O-39 cm H2O] 39 cm H2O  MAP (cm H2O):  [9-12] 12    Vent day #6 Pt remains on full support and sedation. No weaning done this shift. BS coarse with crackles Sx small to moderate amt thick tan secretions.  Will continue to titrate settings and wean as tolerated. RT following.       Maynor aMrtinez LRT

## 2021-05-28 NOTE — PROGRESS NOTES
Pharmacy Consult to evaluate for medication related stroke core measures    Kody Johns, 69 y.o. male  admitted for AVR and MVR on 4/24/2019.    TPA was not given because of Clinical contraindications. patient had surgery for AVR and MVR day before    VTE Prophylaxis: SCDs placed on 4/24/19, as appropriate prior to end of hospital day 2.    Antithrombotic: aspirin was to start on 4/26 per order of Dr Meléndez, CV surgery fellow ordered hold aspirin since plts 39. Initiate then Continue antithrombotic therapy on discharge to meet quality measures, unless contraindicated.    Anticoagulation if history of A-fib/flutter: patient has history of afib but per progress notes has been in NSR since 11/2017.  Neurology progress note mentions considering anticoagulation if OK with CV surgery.  Patient platelets today are 39 so that would need to be taken into account before initiating anticoagulation    LDL Calculated   Date Value Ref Range Status   11/30/2018 53 <=129 mg/dL Final     Patient's home statin, Lipitor (atorvastatin) restarted; continue statin on discharge to meet quality measures, unless contraindicated.     Recommendations: 1.  Follow platelets closely  2.  Consider if clinically appropriate to initiate antithrombotic in hospital and continue at discharge to meet CMS core measures and if not, please document reason not ordered.  3.  Consider if clinically appropriate to initiate anticoagulation since patient has history of afib and if not please document in notes reason why it was not.  4.  Patient last had LDL checked 11/30/18-LDL 53.  May want to consider checking LDL this admission to assure it continues to be below 70    Thank you for the consult.    Sammie Whiteside, PharmD 4/26/2019 10:51 AM

## 2021-05-28 NOTE — PLAN OF CARE
Problem: Pain  Goal: Patient's pain/discomfort is manageable  Outcome: Progressing     Problem: Safety  Goal: Patient will be injury free during hospitalization  Outcome: Progressing     Problem: Daily Care  Goal: Daily care needs are met  Outcome: Progressing     Problem: Psychosocial Needs  Goal: Demonstrates ability to cope with hospitalization/illness  Outcome: Progressing     Problem: Potential for Compromised Skin Integrity  Goal: Skin integrity is maintained or improved  Outcome: Progressing     Problem: Risk of Injury Due to Unsafe Behavior  Goal: Patient will remain safe while in restraint; physical/psychological needs will be met  Outcome: Progressing  Goal: Alternatives to restraint will be continually assessed with use of least restrictive device and discontinuation as soon as possible  Outcome: Progressing  Goal: Patient/Family will be able to communicate reason for restraint and steps for restraint application and removal  Outcome: Progressing   Sedation reducing with more awakening, moving right arm: restraint needed at this time as pt. Unsafe with tubes/lines and unable to fully assess his understanding of need for ETT especially.  Problem: Glucose Imbalance  Goal: Achieve optimal glucose control  Outcome: Progressing     Problem: Blood pressure is elevated above 140 over 90 mmHg  Goal: Blood pressure (BP) is within acceptable limits  Outcome: Progressing   BP stable and able to wean off NEosynephrine drip

## 2021-05-28 NOTE — PROGRESS NOTES
Vent Mode: PCV/VG  FiO2 (%):  [24 %-30 %] 24 %  S RR:  [16] 16  S VT:  [300 mL] 300 mL  PEEP/CPAP (cm H2O):  [5 cm H2O] 5 cm H2O  Minute Ventilation (L/min):  [8.6 L/min-12 L/min] 8.6 L/min  PIP:  [14 cm H2O-22 cm H2O] 22 cm H2O  VA SUP:  [12 cm H20] 12 cm H20  MAP (cm H2O):  [6-11] 11        Pt remains on full vent support with the above settings,tolerated well. Pplat 14cm H2O, BS bilaterally coarse;rhonchi, Sxned for large amount of thick creemy secretion during 0800 visit and clear small amount of thick secretion around noon and 1600. No change made with the vent settings in this shift.  Pt has good gag reflex during suctioning.  SBT was done 12/5 and pt tolerated well for 2 hrs without any distress, placed on full support for rest.  Pt was not able to wean during evening shift due to hypotensive.     RT Will continue monitor SpO2/ABG,CXR, SXN PRN.    Anastasia Wheeler, LRT

## 2021-05-28 NOTE — PROGRESS NOTES
CVTS Daily Progress Note   5/5/2019   POD #11  s/p AVR/MVR  DOS 4/25/19- Dr. Vaughn    LOS: 11 days   EF 45%   A1C 6.7 %    Overnight events:  Improved HR   Atrial fib with RVR rates      Able to wean Chivo some.  WBC 17  (20) - Day 5 of Rochepin  Tm 99.5  Abd soft with BS in all 4 quads.  Steady increase in platelets 96  NELLIE  Negative  HIT neg    Normotensive with permissive HTN using chivo gtt   Pain controlled:Fentanyl gtt     Hgb 9.6 s/p- one unit PRBC 4/30       PLAN  Resume some po meds-  ASA   Amio og  Metoprolol og  Bowel meds- colace and miralax  Begin trickle feeds  Free water  Soften diuresis  Continue Ceftriaxone  Replaced Calcium x 2  Wean Chivo off  Resume pain medications  Follow liver functions  Na checks every 12 hours      Hold Atorvastatin with elevated CK total    IV metoprolol for HR >110 if BP >110.  Continue chivo for B/P goal of 120- permissive HTN    Will continue to observe, over weekend  Trach and peg to be re evaluated mid week.    BMP, mag, Plts, CBC, liver function in am    OBJECTIVE:    Temp:  [97.9  F (36.6  C)-99.5  F (37.5  C)] 97.9  F (36.6  C)  Heart Rate:  [] 120  Resp:  [18-31] 19  BP: ()/(53-88) 106/72  FiO2 (%):  [35 %-40 %] 35 %  Wt Readings from Last 2 Encounters:   05/05/19 0430 109 lb 1.6 oz (49.5 kg)   05/04/19 0600 122 lb 2.2 oz (55.4 kg)   05/02/19 0600 121 lb 4.8 oz (55 kg)   05/01/19 0530 120 lb 12.8 oz (54.8 kg)   04/30/19 0600 122 lb 8 oz (55.6 kg)   04/29/19 0400 116 lb 2.9 oz (52.7 kg)   04/28/19 0200 116 lb 6.5 oz (52.8 kg)   04/27/19 0500 117 lb 8.1 oz (53.3 kg)   04/26/19 0400 112 lb 14 oz (51.2 kg)   04/25/19 0500 113 lb 8.6 oz (51.5 kg)   04/24/19 2000 105 lb 13.1 oz (48 kg)   04/24/19 0628 105 lb 12.8 oz (48 kg)   04/23/19 0937 106 lb 11.2 oz (48.4 kg)       Current Medications:    Scheduled Meds:    amiodarone  200 mg Per NG tube BID     aspirin  81 mg Enteral Tube DAILY     baclofen  10 mg Enteral Tube BID     calcium chloride IVPB  0.34 g  Intravenous Once     cefTRIAXone (ROCEPHIN)  IV  1 g Intravenous Q24H     chlorhexidine  15 mL Topical Q12H     furosemide  20 mg Intravenous BID - diuretic     insulin aspart (NovoLOG) injection   Subcutaneous Q4H     levothyroxine  75 mcg Intravenous Daily     metoprolol tartrate  12.5 mg Oral BID     pantoprazole  40 mg Intravenous Q24H    Or     omeprazole  20 mg Oral Q24H    Or     omeprazole  20 mg Enteral Tube Q24H     potassium chloride  10 mEq Enteral Tube Daily with supper     sodium chloride  10-30 mL Intravenous Q8H FIXED TIMES     sodium chloride  3 mL Intravenous Line Care     Continuous Infusions:    dexmedetomidine 400 mcg/100 mL in NS (PRECEDEX) (4mcg/mL) 1 mcg/kg/hr (19 1148)     dextrose 5% Stopped (19 1148)     fentaNYL 2500 mcg/250 mL in NS (10 mcg/mL) 50 mcg/hr (19 1151)     heparin infusion (100 units/ml) 12 Units/kg/hr (19 1509)     niCARdipine Stopped (19 0701)     norepinephrine IV infusion in D5W       phenylephrine IV infusion in NS Stopped (19 1150)     sodium chloride 0.9% Stopped (19 1300)     PRN Meds:.acetaminophen, acetaminophen, albumin human, aluminum-magnesium hydroxide-simethicone, bisacodyl, bisacodyl, dexmedetomidine 400 mcg/100 mL in NS (PRECEDEX) (4mcg/mL), dextrose 50 % (D50W), glucagon (human recombinant), ipratropium-albuterol **AND** [] Nebulizer treatment intermittent, lipase-protease-amylase **AND** sodium bicarbonate, magnesium hydroxide, midazolam, naloxone **OR** naloxone, niCARdipine, norepinephrine IV infusion in D5W, ondansetron, oxyCODONE intensol (ROXICODONE) conc solution 20 mg/mL **OR** oxyCODONE, sodium chloride, sodium chloride bacteriostatic, sodium chloride, sodium chloride, sodium chloride    Cardiographics:    Telemetry monitoring demonstrates Atrial fib with rates in the  per my personal review.    Lab Results (most recent, reviewed):    Hemoglobin (g/dL)   Date Value   2019 10.6 (L)     WBC  (thou/uL)   Date Value   05/05/2019 14.8 (H)     Potassium (mmol/L)   Date Value   05/05/2019 3.7     Creatinine (mg/dL)   Date Value   05/05/2019 1.07     Hemoglobin A1c (%)   Date Value   04/23/2019 6.7 (H)     Magnesium (mg/dL)   Date Value   05/05/2019 2.5     Calcium (mg/dL)   Date Value   05/05/2019 7.9 (L)       109 lb 1.6 oz (49.5 kg)  Admit weight  47.9 kg  UOP: 1.3L/ 24 hours  Stool 100/24 hrs    Physical Exam:    General: Patient seen in bed.  Neuro: More awake and responding to    movement noted on Right UE and less agitation with decreasing sedation.   Intubated, on vent 40%  CV: Atrial fib RVR rates   Pulm: non labored effort on vent.    Sternal  Incision  C/D/I.  Abd: softer with + stool in bag     : Guzman with donta urine   Ext: Mod pedal edema, SCDs in place, warm  Feet cool, but have weak pulses, right dp palpable, left per doppler  Neuro:  More awake and moving on right, some faint movement noted in left hand    ASSESSMENT    Kody Johns is a 69y.o. [place]  male with rheumatic heart valve dz(AV regurgitation, , MV stenosis aand TV regurgitation) afib, and pericardial adhesions who was admitted 4/24/19 for AVR, MVR tissue valves, and takedown of pericardial adhesions by Dr Vaughn      Principal Problem:    S/P MVR (mitral valve repair)  Active Problems:    Rheumatic heart disease    Mitral valve stenosis, unspecified etiology    Aortic valve disease, rheumatic    A-fib (H)    Acute respiratory failure with hypoxemia (H)    Anemia due to blood loss, acute    Coagulopathy (H)    Non-English speaking patient    Thrombocytopenia (H)    Sepsis, due to unspecified organism (H)    Atrial fibrillation with RVR (H)    Hypernatremia    Paralytic ileus (H)    Small bowel obstruction (H)    Acute cardioembolic stroke (H)      NEURO:   - Scheduled Tylenol and PRN oxycodone for pain- hold  - Adding Fentanyl gtt for steady pain management   - Melatonin at bedtime- holding po meds    CV:    -Amiiodarone gtt  - Will hold on transition to oral amio with ileus   - Currently on Chivo for B/P - permissive HTN SBP goal of 120   - ASA 81mg  - Atorvastatin 40mg daily- hold with elevated CK   - cardiology consulted for rhythm management.   - IV  Metoprolol with limited response,     - Metoprolol 2.5-5 mg IV q6 with parameters    PULM:   - Vent weaning per pul critical care  - Maintaining oxygen saturations on Vent 35%    FEN/GI:  - Digni care - liquid stools, small amount  - Tube feedings- trickle feeds  - Abd less firm and less distended today  - ABD xray - ileus 5/3  - CT scan 5/4 showed ileus  - UA neg on 5/3  - amylase 156  - Continue electrolyte replacement protocol  - Bowel regimen    RENAL:  - Adequate UOP/hr. Continue to monitor closely via monzon.   - Cr 1.07( baseline 0.92)  - Monzon to remain in for close monitoring of I/O and during period of diuresis/relative immobility.  - Diuresis with 20mg IV Lasix two times a day.     - Potassium replacement protocol  - Mag replacement protocol        HEME:  - Acute blood loss anemia post-op.   - hgb 9.6  - one pRBC 4/30 for hgb 8.0   - Thrombocytopenia- platelets 96 (59)     - Negative HIT  - NELLIE- negative     ID:  - Mee op ppx complete  -Leukocytosis now trending down  - Blood culture- pending   - Sputum culture-5/1     + Klebs  - UA 5/3 Neg  - Rocephin -   - ID now signed off.      ENDO:   -  SSI q 4 hours   - synthroid IV could resume OG tomorrow if continues to tolerating feeding and med  PPx:   - DVT: SCDs,   - Low intensity no bolus heparin gtt  - GI: Omeprazole   - Ambulating with therapy : on hold.    DISPO:   - Continue critical care in ICU  Patient seen and discussed with Dr. Delaney

## 2021-05-28 NOTE — PLAN OF CARE
Care Plan  Care Management    Care Management Goals of the Day: Follow progression of care    Care Progression Reviewed With: Charge RN, MD, HUC, RNCM, CMSW    Barriers to Discharge: intubated, stroke code called this afternoon    Discharge Disposition: TBD    Expected Discharge Date: TBD    Transportation: family if able    Care Coordination Narrative: Pt from home with family, Pt speaks Tunisian, son speaks English. Here for planned procedure, stroke code called this afternoon for left sided weakness and left upward gaze per provider note. CM to follow and assist.

## 2021-05-28 NOTE — PLAN OF CARE
Problem: Mechanical Ventilation  Goal: Patient will maintain patent airway  Outcome: Progressing  Goal: Oral health is maintained or improved  Outcome: Progressing  Goal: Respiratory status - ventilation  Description  Movement of air in and out of the lungs.    Liberate from ventilator  Outcome: Progressing  Goal: ET tube will be managed safely  Outcome: Progressing     Vent Mode: PCV/VG  FiO2 (%):  [24 %-28 %] 24 %  S RR:  [16] 16  S VT:  [300 mL] 300 mL  PEEP/CPAP (cm H2O):  [5 cm H2O] 5 cm H2O  Minute Ventilation (L/min):  [6.7 L/min-10.1 L/min] 9 L/min  PIP:  [12 cm H2O-23 cm H2O] 23 cm H2O  [place] SUP:  [5 cm H20-10 cm H20] 10 cm H20  MAP (cm H2O):  [6-7] 7     Pt continues to be on full mechanical ventilator support with the above settings. Vent day 15. ETT size 7.0 secured 20 cm at the gums.  Breath sounds diminished and coarse bilaterally this morning, diminished and clear this afternoon. Small amount of white secretions via inline suction. SBT via PS 5/5 started and increased to 10/5 to prevent muscle fatigue. SBT completed for 111 minutes and was terminated to allow rest. Order for vent phase I weaning was placed this morning. Plan for tracheostomy this afternoon.    ZEHRA McfaddenT

## 2021-05-28 NOTE — PROGRESS NOTES
"Pharmacy Consult: Pharmacist Managed Heparin Infusion Titration (aPTT)    Pharmacy was consulted to transition Kody Johns, a 69 y.o. male, from RN managed Heparin infusion  to a Pharmacist-managed  low intensity heparin infusion (goal aPTT 58-90 sec) for AVR, MVR, A-fib.     A pharmacist will be adjusting this patient's heparin infusion utilizing the heparin aPTT order set (see titration tables below).     Ordering provider: Zoey Sequeira MD    SUBJECTIVE    Antiplatelets: aspirin    OBJECTIVE    Heparin dosing weight: 49.3 kg  (maximum heparin dosing weight of 100 kg)    Labs:    Last 3 days:   Recent Labs     05/04/19  1139 05/05/19  0755 05/05/19  1415 05/05/19  2051 05/06/19  0456 05/06/19  1122 05/06/19  1647 05/06/19  2259 05/07/19  0035 05/07/19  0626   PLT 93* 96* 99*  --  97*  --   --   --   --  103*   PTT  --   --  33  --   --   --   --   --  81* 66*   HGB 11.1* 10.8* 10.6*  --  9.6*  --   --   --   --  8.9*   ANTIXAHEPARI  --   --   --  <0.10* 0.12* 0.16* 0.19* 0.19* 0.16* 0.18*       ASSESSMENT  The patient is starting on a low intensity heparin infusion (goal aPTT 58-90 sec) for AVR, MVR, A-fib.    PLAN  1. Utilize the \"Heparin Infusion Dose Adjustment - Pharmacist Managed\" order set to order aPTT levels, heparin bolus doses, and to communicate infusion rate changes.  1. Refer to charting in Epic for an accurate review of heparin bolus doses and infusion rate changes.  2. Continue to follow the patient's aPTT, anti-Xa, PLT, and HGB as available.   3. Monitor for potential drug/disease interactions.     Thank you for the consult,  Penny Collier sue 5/7/2019 9:46 AM    References:   1 Am J Health-Syst Pharm. 2016; 73:2037-41    Pharmacist Managed Heparin HIGH Intensity Infusion Titration (aPTT)    aPTT value Bolus dosing Infusion adjustment Next aPTT   Less than 44 60 units/kg    (max 6000 units) Increase by 6 units/kg/hr 6 hours   44 - 50 40 units/kg    (max 4000 units) Increase by 4 units/kg/hr 6 " hours   51 - 61 30 units/kg    (max 3000 units) Increase by 2 units/kg/hr 6 hours   62 - 99 No bolus No change in rate 6 hours if first aPTT;  if second consecutive assay 62-99, next aPTT in AM   100 - 117 No bolus Decrease by 1 unit/kg/hr 6 hours   Greater than 117 No bolus Hold infusion 60 min and decrease previous rate by 3 units/kg/hr 6 hours after restart     Pharmacist Managed Heparin LOW Intensity Infusion Titration (aPTT)    aPTT value Bolus dosing Infusion adjustment Next aPTT   Less than 44 60 units/kg    (max 6000 units) Increase by 2 units/kg/hr 6 hours   44 - 57 30 units/kg    (max 3000 units) Increase by 1 unit/kg/hr 6 hours   58 - 90 No bolus No change in rate 6 hours if first aPTT;    if second consecutive assay 58-90, next aPTT in AM   91 - 108 No bolus Decrease by 2 unit/kg/hr 6 hours   109 - 126 No bolus Hold infusion 60 min and decrease previous rate by 3 units/kg/hr 6 hours after restart   Greater than 126 No bolus Hold infusion 60 min and decrease previous rate by 5 units/kg/hr 6 hours after restart

## 2021-05-28 NOTE — PLAN OF CARE
Problem: Pain  Goal: Patient's pain/discomfort is manageable  Outcome: Progressing   PRN pain medications given    Problem: Safety  Goal: Patient will be injury free during hospitalization  Outcome: Progressing    Problem: Blood pressure is elevated above 140 over 90 mmHg  Goal: Blood pressure (BP) is within acceptable limits  Outcome: Progressing   Target goal for sbp between 100-120 cont to stay on phenylephrone at 80

## 2021-05-28 NOTE — PLAN OF CARE
Problem: Mechanical Ventilation  Goal: Patient will maintain patent airway  Outcome: Progressing     Problem: Mechanical Ventilation  Goal: Respiratory status - ventilation  Description  Movement of air in and out of the lungs.    Liberate from ventilator  Outcome: Progressing     Problem: Mechanical Ventilation  Goal: ET tube will be managed safely  Outcome: Progressing

## 2021-05-28 NOTE — PROGRESS NOTES
Vent Mode: PCV/VG  FiO2 (%):  [40 %-60 %] 50 %  S RR:  [20] 20  S VT:  [325 mL] 325 mL  PEEP/CPAP (cm H2O):  [5 cm H2O-10 cm H2O] 8 cm H2O  Minute Ventilation (L/min):  [8.6 L/min-12 L/min] 8.9 L/min  PIP:  [19 cm H2O-30 cm H2O] 30 cm H2O  MAP (cm H2O):  [8-18] 18     Continues on vent. #7 ETT in place at 20 cm at the gums. BBS present and equal. Sx for very  Scant secretions.  Cough and gag reflex not  Detected. Did have a gag reflex on [place] shift.

## 2021-05-28 NOTE — PLAN OF CARE
Problem: Pain  Goal: Patient's pain/discomfort is manageable  Outcome: Progressing     Problem: Potential for Compromised Skin Integrity  Goal: Nutritional status is improving  Outcome: Progressing     Problem: Blood pressure is elevated above 140 over 90 mmHg  Goal: Blood pressure (BP) is within acceptable limits  Outcome: Progressing     Sedated to RASS -1, slight movement of L fingers, otherwise flaccid LUE and LLE with wincing to pain. R pupil 4mm, L 3mm, brisk and round. Low grade fevers up to 100.7. A-fib with RVR 120bpm, BP stable. TF held at midnight with plan for trach and PEG today. Guzman patent and draining with 600mL out, rectal tube with 300mL liquid output. PRN oxycodone 5mg PO x1 for pain. Turn q2hrs for skin integrity.

## 2021-05-28 NOTE — PROGRESS NOTES
Vent Mode: PCV/VG  FiO2 (%):  [24 %] 24 %  S RR:  [16] 16  S VT:  [300 mL] 300 mL  PEEP/CPAP (cm H2O):  [5 cm H2O] 5 cm H2O  Minute Ventilation (L/min):  [8.5 L/min-10.1 L/min] 8.5 L/min  PIP:  [12 cm H2O-23 cm H2O] 22 cm H2O  MI SUP:  [5 cm H20-10 cm H20] 10 cm H20  MAP (cm H2O):  [6-10] 10    Patient recently arrived back to room 5134 from the OR. Patient was placed back on full vent support with the settings listed above. Patient now has a size 8 Shiley tracheostomy tube. Spare trachs were just placed on the counter in his room. Patient is not in distress. Tolerating well. RT following.    ZEHRA GaleanaT

## 2021-05-28 NOTE — PROGRESS NOTES
"Blood pressure 99/64, pulse 88, temperature (!) 101.7  F (38.7  C), temperature source Core, resp. rate 24, height 4' 10\" (1.473 m), weight 105 lb 12.8 oz (48 kg), SpO2 99 %.      Vent Mode: VCV  FiO2 (%):  [40 %-100 %] 40 %  S RR:  [20-24] 24  S VT:  [350 mL] 350 mL  PEEP/CPAP (cm H2O):  [5 cm H2O] 5 cm H2O  Minute Ventilation (L/min):  [8.6 L/min-9.2 L/min] 8.6 L/min  PIP:  [6 cm H2O-31 cm H2O] 31 cm H2O  MAP (cm H2O):  [8-10] 10    Pt currently on full vent support and full strength Veletri, no wean this shift.  Breath sounds are coarse, suctioning small amounts of thin clear/white secretions.  Will wean Veletri when able.  RT will continue  To monitor.     ZEHRA DiazT     "

## 2021-05-28 NOTE — PLAN OF CARE
Problem: Mechanical Ventilation  Goal: Patient will maintain patent airway  Outcome: Progressing  Goal: Respiratory status - ventilation  Description  Movement of air in and out of the lungs.    Liberate from ventilator  Outcome: Progressing

## 2021-05-28 NOTE — OP NOTE
DATE OF SERVICE: 04/24/2019    OPERATING AREA:  Room 2.    TITLE OF PROCEDURES:  1.  Takedown of pericardial adhesions.  2.  Epiaortic ultrasound of the ascending aorta.  3.  Mitral valve replacement using a 25 mm St. Kirill Epic biologic mitral valve.  4.  Aortic valve replacement using a 21 mm St. Kirill Trifecta biologic aortic valve.  5.  Bilateral sternal reinforcement using the KLS Geraldo strips.    ATTENDING SURGEON:  Jojo Vaughn MD    FIRST ASSISTANT:  ST DEBBY Kong     RESIDENT SURGEON:  DONNA Galindo    ANESTHESIA:  General endotracheal.    SKIN PREP:  Betadine and DuraPrep.    INCISIONS:  Median sternotomy.    DRAINS:  One 32-[place] mediastinal, one 24-[place] Quintin right chest.    CULTURE:  None.    SPECIMENS:  Mitral valve leaflets and aortic valve leaflets.    CLOSURE:  Sternal reinforcement with the KLS Geraldo strips.    PREOPERATIVE DIAGNOSES:  1.  Rheumatic heart disease of the aortic and mitral valves.  2.  Mitral stenosis and mitral regurgitation.  3.  Aortic stenosis and aortic insufficiency.  4.  Some degree of pulmonary hypertension.  5.  Globally enlarged heart with permanent atrial fibrillation.    POSTOPERATIVE DIAGNOSES:  6.  Pericarditis    BRIEF HISTORY:  Mr. Johns is a 69-year-old gentleman who has had heart failure for  several years.  A year ago he was evaluated at [facility] and found to  have rheumatic heart disease involving his aortic valve and mitral valve.   Additionally, he had some degree of pulmonary hypertension with associated tricuspid  regurgitation.  Over the course of the year he had his teeth extracted.  Coronary  angiography failed to demonstrate any coronary artery disease, and today he is ready  to undergo the above procedure.    FINDINGS AT OPERATION:  The patient's atria were enormous, the left atrium measured  approximately 7 cm in length.  It was not possible to do a ligation of the left  atrial appendage because of the dense  adhesions around that part of the heart.  The  adhesions involving the anterior surface of the heart and inferior surface of the  heart could be taken down and were not as dense and were somewhat foamy.  The aortic  valve had fusion of the left and right cusps.  The left and right coronary ostia  were well visualized.  The annulus sized to a 21 mm St. Kirill Trifecta biologic  aortic valve.  The mitral valve also had calcific involvement of the posterior  leaflet, obvious calcific nodules were encountered.  There was relative sparing of  the annulus.  Once the valve was excised, the annulus sized to a 25 mm St. Kirill Epic  biologic mitral valve.  The patient's CVP was 20 at the beginning of the operation  and his pulmonary artery was also enlarged.  His ascending aorta was somewhat  enlarged, but generally free of atherosclerosis.    DESCRIPTION OF PROCEDURE:  The patient arrived in the operating room and an arterial  line was placed.  Satisfactory general endotracheal anesthesia was induced.  An OG  tube was inserted followed by a transesophageal echo probe, a Grafton-Nick catheter and  a Guzman catheter.  The patient's neck, chest, abdomen, both groins and lower  extremities were prepped and draped in a standard sterile fashion.      A complete median sternotomy was made.  A Cao retractor was inserted.  The  pericardium was opened and pericarditis was encountered.  Using Metzenbaum scissors  as well as cautery, all the adhesions were removed from the anterior surface of the  heart and the inferior surface of the heart.  Of note, the right heart appeared to  be tense and very full.  The right atrium was enormous, as was the left atrium.   Heparin was administered.  Epiaortic ultrasound of the ascending aorta was  performed.  Pursestring sutures were placed in the ascending aorta and superior vena  cava.  When the ACT was appropriate, cannulation of these was performed and  cardiopulmonary bypass was established.  An  additional cannula was placed in the  inferior vena cava.  The patient was cooled to 32 degrees centigrade.  A retrograde  cardioplegia catheter was inserted via the right atrium into the coronary sinus.   The aorta was crossclamped and 500 mL of cold blood cardioplegic solution was  administered antegrade and an additional 700 mL of cold blood cardioplegic solution  was administered retrograde.  A good arrest was achieved.  Cold topical saline and  slush was applied to the heart intermittently during the period of aortic  crossclamp.      Attention was first turned to the aortic valve.  A transverse aortotomy incision was  made approximately 2 cm proximal to the annulus and 3 sutures of 4-0 Prolene were  positioned to allow excellent exposure of the aortic valve.  The findings were as  described.  The aortic valve was excised leaflet by leaflet and sent to pathology as a specimen.   Minimal debridement of the annulus was necessary.  The annulus sized to a 21 mm St. Kirill Trifecta biologic  aortic valve.  Five pledgeted 2-0 Ethibond sutures were placed along the noncoronary  cusp from the ventricular to the aortic surface of the annulus.         Next, attention was turned to the mitral valve.  Caval snares were placed around the  superior vena cava and inferior vena cava and the patient was placed on total  cardiopulmonary bypass.  A transseptal approach to the mitral valve was utilized.  A  right atriotomy incision was made and that was extended into the dome of the left atrium  and into the fossa ovalis, which was bisected.  Stay sutures were placed to allow  excellent exposure of the mitral valve.  The mitral valve appearance was compatible  with rheumatic heart disease.  The anterior leaflet was excised followed by most of  the posterior leaflet.  Calcific nodules were encountered in the leaflets.  The  leaflets were sent to pathology as specimen.  The annulus sized to a 25 mm St. Kirill  Epic biologic mitral  valve.  The annulus was then rimmed with fifteen 2-0 Ethibond  pledgeted sutures passed from the atrial to the ventricular surface of the annulus.   The 25 mm St. Kirill Epic biologic mitral valve was appropriately soaked and then  brought onto the operative field.  The annular sutures were passed through its  sewing ring and the valve was lowered into place. The annular sutures were secured  using the Cor-Knot.  Once the valve was seated, it was tested and there was no  significant perivalvular leak.  The dome of the left atrium was closed in 2 layers  using pledgeted 4-0 Prolene.  The fossa ovalis was also closed in 2 layers using  pledgeted 4-0 Prolene.  The right atriotomy was then closed in 2 layers using  pledgeted 4-0 Prolene on either end of the suture line.      Throughout this period of time, approximately every 20 minutes, the patient received  a bolus dose of cold blood cardioplegia in a retrograde fashion.  Of note,  continuous CO2 was also administered into the pericardial space and this was started  prior to opening the aorta.      Attention was then turned to performing the aortic valve replacement.  The annular  sutures were completed along the left coronary cusp and right coronary cusp and then  the 21 mm St. Kirill Trifecta biologic mitral valve was appropriately soaked and  brought onto the operative field.  The annular sutures were then passed through its  sewing ring.  As this was being done, gentle warming was begun.  The valve was then  lowered into place and its taniya sutures were secured first, and then beginning at  the mid aspect of the noncoronary cusp and working clockwise, the 15 annular sutures  were secured using the Cor-Knot.  Once the valve was seated, retrograde cardioplegia  was administered to demonstrate that in fact the left and right coronary ostia had  in no way been impinged upon by the seating of the valve.  The transverse aortotomy  was then closed in 2 layers using pledgeted  4-0 Prolene.    It should be noted that a pulmonary artery vent had also been inserted earlier in  the operation.      At this point in time, a hot shot was delivered in a retrograde fashion and  de-airing maneuvers were performed.  The aortic crossclamp was released.  The aortic  crossclamp time was 2 hours and 1 minute.  The patient required defibrillation  approximately 3 times.  Ventricular pacing wires were applied and later atrial  pacing wires were applied.  The patient was AV sequentially paced at a rate of 90.   The retrograde cardioplegic catheter was removed and that site repaired with two 4-0  Prolene sutures.  The pulmonary artery vent was removed and that site repaired with  2 layers of 4-0 Prolene.  Gentle ventilation was resumed.  A period of re-perfusion  and deairing ensued.  The left pleural space and pericardial spaces were drained of  any accumulated blood.  After a while, by transesophageal echo, it was clear that  there was no significant perivalvular leak and that adequate deairing had been  performed.      The patient was placed on moderate doses of epinephrine.  When he was warm, the  heart was allowed to fill and eject and the patient  from cardiopulmonary  bypass without difficulty.  Total time on cardiopulmonary bypass was 2 hours and 31  minutes.  It should be noted the patient was also placed on Flolan.  The patient was  decannulated and the cannulation sites oversewn with 4-0 Prolene.  Protamine was  administered to reverse the heparin.  Hemostasis was satisfactory.  The patient did  receive 2 units of FFP on pump.  The mediastinum was drained with a 32-[place]  chest tube and the right pleural space was drained with a 24-[place] Quintin drain.   The patient's sternum was noted to be soft and osteopenic; therefore, it was  reinforced on either side using KLS Geraldo strips that were secured in place using 9  mm screws.  Once that was done, the sternum was approximated using  several #6  stainless steel sternal wires.  The sternotomy wound was irrigated with warm  antibiotic containing solution.  It was closed in 4 layers using 2 layers of running  0 Vicryl, an additional layer of 2-0 Vicryl and a subcuticular stitch of 4-0  Monocryl.  Dermabond was applied to the skin.  The sponge, needle and instrument  counts were reported as correct.  The estimated blood loss was 1025.  Mr. Johns was  brought to the intensive care unit in critical condition.      DONTRELL CASILLAS MD  sn  D 04/25/2019 07:07:26  T 04/25/2019 08:33:50  R 04/25/2019 08:33:50  [id number]        cc: MIKE CASILLAS MD

## 2021-05-28 NOTE — PLAN OF CARE
Problem: Mechanical Ventilation  Goal: Patient will maintain patent airway  Outcome: Progressing  Goal: Oral health is maintained or improved  Outcome: Progressing  Goal: Respiratory status - ventilation  Description  Movement of air in and out of the lungs.    Liberate from ventilator  Outcome: Progressing  Goal: ET tube will be managed safely  Outcome: Progressing

## 2021-05-28 NOTE — PROGRESS NOTES
Vent Mode: VCV  FiO2 (%):  [50 %-100 %] 50 %  S RR:  [16-20] 20  S VT:  [300 mL-350 mL] 300 mL  PEEP/CPAP (cm H2O):  [5 cm H2O-8 cm H2O] 7 cm H2O  Minute Ventilation (L/min):  [8.7 L/min-13.6 L/min] 13.6 L/min  PIP:  [17 cm H2O-40 cm H2O] 30 cm H2O  MAP (cm H2O):  [9-12] 11      Pt remains on full vent support with the above settings,tolerated well. Pplat 17cm H2O, BS bilaterally coarse/crackles, Sxned for large amount of thick tan/yellow secretion. RR changed from 16 to 20 and PEEP changed from 8 to 5 first and then 5 to 7 by intensivist due to high RR on PEEP of 5.  Pt transported to head CT with portable vent and tolerated well.  No SBT was done due to hemodynamically unstable.  RT Will continue monitor SpO2/ABG,CXR, SXN PRN, and assess for PS trial when appropriate.       Anastasia Wheeler, LRT

## 2021-05-28 NOTE — PROGRESS NOTES
"Critical Care Progress Note     Admit Date: 4/24/2019  ICU Day: 5     Code Status: full code    CC: rheumatic valvular disease    HPI: 69 y.o. [place]  male with rheumatic heart valve dz(AV regurgitation, , MV stenosis aand TV regurgitation) afib, and pericardial adhesions who was admitted 4/24/19 for AVR, MVR tissue valves, and takedown of pericardial adhesions by Dr Vaughn     Case summary: Pt was an easy intubation, received 15 mg methadone preop, Echo showed boggy RV.  PAPs in the 30s preop, pt had hard time coming off extracorporeal circulation, needing many bumps of epi, flolan was started.  Also they took down pericardial adhesions.  To ICU on full vent support, flolan full strength, not paced,  On epi, insulin and precedex drips.  Protamine was given on arrival to ICU.    Interval events:  4/24/19: to OR for AVR/MVR    To ICU on Flolan  4/25/19: Flolan weaned off   When off sedation for SBT, pt noted to not move left side and  left eye with upward deviation   Stroke code called    CTA shows right P2 occlusion   Neurology consulted   SBP parameters changed to     Failed SBT  4/26/19: fever    Failed SBT   repeat head CT  revealed evolving acute/subacute infarction  4/27/19: fever T max 105   ID consulted   HIT panel sent due to persistent thrombocytopenia      Major events over the last 24 hours: overbreathing vent abg with alkalosis    Subjective: in bed, lightly sedated, not responding to me  ROS: unable to do    Drips: precedex       Ventilator: Mechanical Ventilation Day: #5        Settings: 20/350/5/50%    /75   Pulse (!) 124   Temp (!) 103.6  F (39.8  C) (Oral)   Resp 25   Ht 4' 11\" (1.499 m)   Wt 116 lb 2.9 oz (52.7 kg)   SpO2 100%   BMI 23.47 kg/m    PAP: (25-37)/(11-24) 33/18  CVP:  [8 mmHg-21 mmHg] 14 mmHg  I/O last 3 completed shifts:  In: 4334.2 [I.V.:2329.2; NG/GT:1950; IV Piggyback:55]  Out: 5005 [Urine:4965; Chest Tube:40]  Weight change: -3.5 oz (-0.1 kg)  Vent Mode: " VCV  FiO2 (%):  [50 %] 50 %  S RR:  [16] 16  S VT:  [350 mL] 350 mL  PEEP/CPAP (cm H2O):  [8 cm H2O] 8 cm H2O  Minute Ventilation (L/min):  [8.7 L/min-13.6 L/min] 11.3 L/min  PIP:  [17 cm H2O-39 cm H2O] 18 cm H2O  MAP (cm H2O):  [9-12] 10      EXAM:   Mental status: in bed lightly sedated on vent    HEENT: NC PERRL OETT, FT  Resp: few anterior rhonchi  Pt appears to be dyspneic on present vent setting  I asked Dr Palacios to exam pt-he decerased peep to 5, increased flow to 50 Iincreased RR to 20  Pt seems to not struggle on these settings  Cardiovascular: IRRR  Abdominal: soft  Extremeties: no e/c/c  Neurology: does not move left side when sedation lightened    Labs Personally reviewed: yes  Results from last 7 days   Lab Units 04/29/19  0629 04/28/19  0427 04/27/19  0815  04/25/19  0458  04/24/19  1328   LN-WHITE BLOOD CELL COUNT thou/uL 4.9 4.8 4.0  --  14.6*  --  11.8*   LN-HEMOGLOBIN g/dL 8.5* 9.0*  9.1* 11.8*   < > 10.8*   < > 11.3*   LN-HEMATOCRIT % 26.9* 28.7* 35.3*   < > 32.3*  --  33.7*   LN-PLATELET COUNT thou/uL 28* 62*  63* 36*   < > 72*   < > 52*   LN-NEUTROPHILS RELATIVE PERCENT %  --   --   --   --  84*  --  88*   LN-MONOCYTES RELATIVE PERCENT %  --   --   --   --  11*  --  5    < > = values in this interval not displayed.     Results from last 7 days   Lab Units 04/29/19  0524 04/29/19  0059 04/28/19 2009 04/28/19  1400 04/28/19  1014  04/27/19  0815   LN-SODIUM mmol/L 150*  150* 150* 152* 151* 154*  --  148*   LN-POTASSIUM mmol/L 3.4*  --   --  3.9 4.0   < > 4.2   LN-CHLORIDE mmol/L 117*  --   --   --  122*  --  122*   LN-CO2 mmol/L 26  --   --   --  23  --  19*   LN-BLOOD UREA NITROGEN mg/dL 44*  --   --   --  48*  --  43*   LN-CREATININE mg/dL 1.32*  --   --   --  1.31*  --  1.06   LN-CALCIUM mg/dL 7.4*  --   --   --  8.1*  --  8.3*    < > = values in this interval not displayed.     Results from last 7 days   Lab Units 04/25/19  0458 04/24/19  1327 04/24/19  1218   LN-INR  1.57* 1.99* 1.88*    LN-PARTIAL THROMBOPLASTIN TIME seconds  --  81* 147*       Last ABG 4/29/19:  PH 7.51  PCO2 34 PAO2 67 Bicarb 28 SAT 98.1    Microbiology: na  Imaging (all imaging is personally reviewed):       PCXR 4/27/19-ET tube 3 cm above the georgina. NG tube tip overlies the stomach. Right internal jugular [place] catheter tip overlies the pulmonary artery outflow tract. Mediastinal chest tube.left pleural effusion. Left lower lobe atelectasis. Mild vascular congestion and pulmonary edema. Postop median sternotomy. Heart size upper limits of normal    Head CT 4/26/19-Moderately sized hypodensity and loss of gray-white matter differentiation right temporal occipital region, consistent with evolving acute/subacute infarction.  2.  No significant global mass effect or hemorrhage.    CTA head neck 4/25/19-HEAD CT:  1.  No intracranial hemorrhage, mass lesions, hydrocephalus or CT evidence for an acute infarct. MRI is more sensitive for the evaluation of acute infarct.  2.  Chronic lacunae as detailed above.  3.  Mild diffuse cerebral parenchymal volume loss. Presumed chronic hypertensive/microvascular ischemic white matter changes.        HEAD CTA:   1.  There is cut off on the P2 segment of the right posterior cerebral artery.  2.  No high-grade stenosis or occlusion of the rest of the major intracranial arteries.  3.  There is 1.5 to 2 mm focal outpouching off the expected location of the origin of the right posterior communicating artery. This is likely an infundibulum versus small aneurysm.     PCXR 4/25/19- Postoperative changes from cardiac valvular surgery. Moderate cardiomegaly persists. There is mild central hilar congestion and slight interstitial prominence suggesting minimal edema. Left hemidiaphragm obscured which may relate to cardiomegaly or left lower lobe atelectasis. No pneumothorax or pleural effusion. Tubes and catheters appear in good position.    Impression:  1. Acute hypoxic respiratory failure  2. Aortic  regurgitation.    S/p tissue AVR  4/24/19  3. Mitral stenosis.    S/p Tissue MVR 4/24/19  4. Tricuspid regurgitation   5. Heart failure  Dilated RV  Flolan started in OR        Now off  6.   Acute ischemic stroke  7.  Thrombocytopenia.   8.   fever   9.   Pericardial adhesions; s/p takedown of adhesions 4/24/19  10.   Acute blood loss anemia .  EBL 1025  11. Non English speaking.  [place]ian     PLAN:   1. Continue full vent support  2. Vent changes RR20. Flow 50, peep 5  Repeat ABG  3. SBP parameters changed to   4. Will need MRI, need to wait until temp pacing wires are out  5. Continue TF  6. Have Brazilian  available for discussion with son-here at 1100 today  7. Continue abx per ID  8. Pt may need trach  9. Can't use AC due to thrombocytopenia, fu on HIT panel  10. Other issues per CVS and neurology   ICU DAILY CHECKLIST                           Can patient transfer out of ICU? no    FAST HUG:    Feeding:  Feeding: No.  Patient is receiving NPO  Tube feedings   Guzman:Yes  Analgesia/Sedation:Yes precedex  Thromboembolic prophylaxis: yes; Mode:  SCD  HOB>30:  Yes  Stress Ulcer Protocol Active: yes; Mode: PPI  Glycemic Control: Any glucose > 180 no; Mode of Insulin Therapy: Sliding Scale Insulin    INTUBATED:  Can patient have daily waking:  yes  Can patient have spontaneous breathing trial:  no    Restraints? yes    PHYSICAL THERAPY AND MOBILITY:  Can patient have PT and mobility trial: no  Activity: Bed Rest    transfer/discharge plans: stays ICU    The patient is critically ill with impairment in organ system and high risk of life threatening deterioration.     Total CCT spent 50 minutes thus far today.       Sandy Manzo APRN, [phone]  [facility]

## 2021-05-28 NOTE — PROGRESS NOTES
Vent Mode: PCV/VG  FiO2 (%):  [30 %] 30 %  S RR:  [16] 16  S VT:  [300 mL] 300 mL  PEEP/CPAP (cm H2O):  [5 cm H2O] 5 cm H2O  Minute Ventilation (L/min):  [6.5 L/min-10.6 L/min] 10 L/min  PIP:  [5 cm H2O-22 cm H2O] 14 cm H2O  [place] SUP:  [5 cm H20] 5 cm H20  MAP (cm H2O):  [2-8] 7        Pt remained on the above vent setting for the night. BS were coarse/diminished  bilaterally, pt has a 7.0 ETT at 20 @gums and was not weaned for the night. Pt was Suctioned scant thick white secretion and did have Gag reflux in response to suction , and tolerated well. Rt will continue to monitor.       HARINDER Reza

## 2021-05-28 NOTE — PROGRESS NOTES
RESPIRATORY CARE NOTE        Vent Mode: PCV/VG  FiO2 (%):  [40 %] 40 %  S RR:  [20] 20  S VT:  [300 mL-325 mL] 325 mL  PEEP/CPAP (cm H2O):  [5 cm H2O-7 cm H2O] 5 cm H2O  Minute Ventilation (L/min):  [9.5 L/min-11.3 L/min] 9.5 L/min  PIP:  [14 cm H2O-50 cm H2O] 22 cm H2O  MAP (cm H2O):  [6-14] 11    Pt continues on full vent support. No weaning done this shift. BS coarse. Sxn x1 for no secretions. Pt tolerated well. Will continue to monitor.       ZEHRA BarnesT

## 2021-05-28 NOTE — PRE-PROCEDURE INSTRUCTIONS
PRE-OP OPEN HEART EDUCATION    DIAGNOSIS: AV/MV stenosis   PROCEDURE: AVR/MVR, LAAL  EF 45%      PLANNED DISCHARGE DISPOSITION: home with family     Lab Results   Component Value Date    WBC 4.4 04/15/2019    HGB 15.3 04/15/2019    HCT 44.3 04/15/2019    MCV 90 04/15/2019     (L) 04/15/2019     Lab Results   Component Value Date    CREATININE 1.00 04/15/2019    BUN 14 04/15/2019     04/15/2019    K 4.1 04/15/2019     (H) 04/15/2019    CO2 23 04/15/2019     EKG:   Atrial fibrillation   Voltage criteria for left ventricular hypertrophy   Nonspecific ST abnormality   Abnormal ECG   When compared with ECG of 30-NOV-2018 07:31,   No significant change was found   Confirmed by DEREK BRAVO MD LOC: ([id number]) on 4/16/2019 8:34:45 AM     Saw pt and family in CE pre-op and discussed the following:  Procedure purpose and course, including anesthesia induction, invasive lines and use, heart-lung bypass,  intubation, sternal closing, pacing wires-use and indication- and chest tubes- use and indications    ICU course: Wake up in ICU, possibly intubated, planned extubation within 4 hours post-op, monitoring of vital signs, titration of gtts pressors and insulin. Discussed  measurement of I&Os, monzon catheter, CT. Restraints- use. Discussed VS monitor and that the nurse will be with the patient as a 1:1 immediately post-operatively.  The hospital stay, uncomplicated, is an expected 5-6 days.     Importance of pain control- use of narcotics to maintain a tolerable level of pain. Instructed pt on use of pain scale of 0-10 and instructed to not let pain become a 10 and the importance of notifying RN when pain level is increasing. Discussed that there will be an increase in sternal pain with deep breaths and activity.    Pulmonary toilet: Discussed the importance of the use of IS, deep breathing, and strong coughing in the prevention of PNA and to aid in decreasing respiratory complications, such as atelectasis.       Activity: dangle at bedside night of surgery, up in chair POD#1 and subsequent days TID for meals. Discussed increasing activity with cardiac rehab.  Discussed purpose of activity including increased lung compliance, decreased post-op complications such as atelectasis, PNA, DVT.    Also discussed post-operative home course and instructions including: No lifting >10lb for 6 wks, no driving 3-4wks, no overhead reaching. Questions and concerns answered.                    4/23/2019 9:30 AM

## 2021-05-28 NOTE — PLAN OF CARE
Problem: Mechanical Ventilation  Goal: Patient will maintain patent airway  Outcome: Progressing  Goal: Respiratory status - ventilation  Description  Movement of air in and out of the lungs.    Liberate from ventilator  Outcome: Progressing  Goal: ET tube will be managed safely  Outcome: Progressing   Michael Owens, LRT

## 2021-05-28 NOTE — PROGRESS NOTES
Spiritual Care Note    Spiritual Assessment:   made a follow up visit with patient and family this morning. Patient remains intubated, sedated and did suffer a stroke following surgery per nursing staff. Patient's son is present and shares he is doing ok and seems to be trying to remain hopeful for his father. Patient has had consistent presence from his family during his stay.     Care Provided: Listening and support provided.     Plan of Care: Spiritual care will continue to follow as part of patient's care team.    KAYLA Loyola, BCC

## 2021-05-28 NOTE — ANESTHESIA PROCEDURE NOTES
BATSHEVA    Patient location during procedure: OR  Start time: 4/24/2019 7:50 AM  Staffing:  Performing  Anesthesiologist: Yesica Kimball MD  BATSHEVA:  Type/Reason: Monitoring BATSHEVA  Technique: blind insertion  Difficulty: easy  Anesthesia Monitoring: see additional note

## 2021-05-28 NOTE — PROGRESS NOTES
Progress Note    Assessment/Plan  I will plan on trach and PEG tomorrow.  Patient's abdominal distention is significant decreased.  Will review risk benefits complications of the surgical interventions with family tomorrow with     Principal Problem:    S/P MVR (mitral valve repair)  Active Problems:    Rheumatic heart disease    Mitral valve stenosis, unspecified etiology    Aortic valve disease, rheumatic    A-fib (H)    Acute respiratory failure with hypoxemia (H)    Anemia due to blood loss, acute    Coagulopathy (H)    Non-English speaking patient    Thrombocytopenia (H)    Sepsis, due to unspecified organism (H)    Atrial fibrillation with RVR (H)    Hypernatremia    Paralytic ileus (H)    Small bowel obstruction (H)    Acute cardioembolic stroke (H)    Respiratory failure (H)      Subjective  Response on ventilator  Objective    Vital signs in last 24 hours  Temp:  [97.9  F (36.6  C)-100.3  F (37.9  C)] 97.9  F (36.6  C)  Heart Rate:  [] 93  Resp:  [14-33] 26  BP: ()/(47-90) 101/67  FiO2 (%):  [24 %-30 %] 24 %  Weight:   108 lb 11.2 oz (49.3 kg)    Intake/Output last 3 shifts  I/O last 3 completed shifts:  In: 2447.1 [I.V.:1124.1; NG/GT:1323]  Out: 2350 [Urine:2300; Stool:50]  Intake/Output this shift:  No intake/output data recorded.      Physical Exam  Abdomen much less distended    Pertinent Labs   Lab Results   Component Value Date    WBC 11.4 (H) 05/06/2019    HGB 8.9 (L) 05/07/2019    HCT 29.3 (L) 05/06/2019    MCV 93 05/06/2019     (L) 05/07/2019             Pertinent Radiology     No results found.        Chandana Kang         JSPROG

## 2021-05-28 NOTE — PROGRESS NOTES
NEUROLOGY PROGRESS NOTE     Kody Johns,  1950, MRN [id number] Date: 2019     [facility]   Code status:  Full Code   PCP: Aristides Alegria MD, [phone]      ASSESSMENT & PLAN   Hospital Day#: 8  Diagnosis code: Ischemic stroke    Ischemic stroke  Aortic/Mitral valve repair      Head CT shows right P2 stroke which fits with the CTA scan.    MRI shows PCA stroke.    Patient has bilateral arm and leg weakness. MRI does not explain this. MRI cervical spine negative.    Can slow bring the BP down.    Most likely cause of stroke is cardioembolic secondary to surgery.     Hold ASA (stopped today) due to low platelets. Needs anticoagulation long term.    Family meeting tomorrow to help decide on Trach and PEG.    Will continue to follow.    Thank you again for this referral, please feel free to contact me if you have any questions.     CHIEF COMPLAINT S/P MVR (mitral valve repair)     HISTORY OF PRESENT ILLNESS     We have been requested by Dr. Omer to evaluate Kody Johns who is a 69 y.o.  male for acute stroke.    This is a 69-year-old male on whom neurology is been consulted to evaluate for stroke.  Patient had a mitral valve surgery yesterday and was under sedation after that.  Sedation was lightened today when it was noticed that he was having some ophthalmoplegia.  Was mainly involving the left eye.  Stroke code was called.  He was not a TPA candidate because of the recent surgery and unclear time of onset.  Left arm and leg weakness was also noted.  Head CT was done which showed a right P2 occlusion.  The patient's exam is been stable since the stroke code.  He was not a candidate for thrombectomy because the blood vessel was too small for mechanical thrombectomy.      Patient is unable to provide any history because of intubation.  History is obtained through chart review and through talking to the son.      Patient remains intubated. Unable to extubate. He has spiked a fever.  Per  family he has been opening his eyes. Nodding to questions. Moving right arm but not the right leg.  He did move the left arm once for the family. Though the healthcare staff have not observed this.  No other new symptoms per the family.   MRI brain could not be done due to Pacemaker wires.    4/29  Patient remains in the ICU. Due to low platelets the pacemaker wires cannot be removed and patient cannot get the MRI. Family still wanting aggressive cares. Patients fever has been thought to be secondary to neurological disease.    4/30  Patient has been agitated when off the Precedex. Plan for MRI today and possibly family conference tomorrow. Family is hopeful that he will make good recovery. Discussed with family that there is no cure for stroke.     5/1  Family meeting later today. Patient still has not improved. MRI brain done last night.     5/2  Patients MRI cervical spine does not explain why he cannot move left side. Updated family on results.   Plan to proceed with PEG and Trach most likely. Family meeting tomorrow afternoon to decide.     PAST MEDICAL & SURGICAL HISTORY     Medical History  Patient Active Problem List    Diagnosis Date Noted     Paralytic ileus (H) 05/02/2019     Small bowel obstruction (H)      Atrial fibrillation with RVR (H)      Hypernatremia      Thrombocytopenia (H)      Sepsis, due to unspecified organism (H)      S/P MVR (mitral valve repair) 04/24/2019     ARF (acute respiratory failure) (H) 04/24/2019     Anemia due to blood loss, acute 04/24/2019     Coagulopathy (H) 04/24/2019     Non-English speaking patient 04/24/2019     A-fib (H) 09/14/2017     Heart failure with preserved ejection fraction (H) 09/14/2017     Moderate malnutrition (H) 08/31/2017     ALEENA (acute kidney injury) (H) 08/31/2017     Aortic valve disease, rheumatic 08/30/2017     Lightheadedness 08/30/2017     Atherosclerosis of native coronary artery of native heart without angina pectoris      Essential  hypertension with goal blood pressure less than 130/85      Pure hypercholesterolemia      Dysuria 08/29/2017     Hypothyroidism, unspecified type      Mitral valve stenosis, unspecified etiology      Elevated LFTs 08/26/2016     Silent Stroke      Shoulder strain      Dysphagia      Hyperlipidemia      Rheumatic heart disease      Blurry vision      Hearing loss      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  Surgical History  He  has a past surgical history that includes Cataract extraction (Left, 06/13/2016); Cardiac catheterization; Coronary Angiogram (N/A, 11/30/2018); Eye surgery; pr replacement of mitral valve (N/A, 4/24/2019); Aortic valve replacement (N/A, 4/24/2019); and PICC Team Line Insertion (4/29/2019).     SOCIAL HISTORY     Reviewed, and he  reports that he quit smoking about 4 years ago. His smoking use included cigarettes. He has a 50.00 pack-year smoking history. He has quit using smokeless tobacco. He reports that he does not drink alcohol or use drugs.     FAMILY HISTORY     Reviewed, and family history includes No Medical Problems in his father and mother.     ALLERGIES     No Known Allergies     REVIEW OF SYSTEMS     Unable to do ROS due to intubation and AMS.     HOME & HOSPITAL MEDICATIONS     Prior to Admission Medications  Medications Prior to Admission   Medication Sig Dispense Refill Last Dose     albuterol (PROAIR HFA;PROVENTIL  HFA;VENTOLIN HFA) 90 mcg/actuation inhaler Inhale 1-2 puffs every 6 (six) hours as needed. 1 Inhaler 5 4/22/2019     amoxicillin (AMOXIL) 500 MG capsule TAKE 4 TABLETS (2,000 mg) BY MOUTH 1 HOUR PRIOR TO PROCEDURE. 4 capsule 3 Unknown     atorvastatin (LIPITOR) 40 MG tablet TAKE 1 PILL BY MOUTH AT BEDTIME. FOR CHOLESTEROL 90 tablet 2 4/22/2019     baclofen (LIORESAL) 10 MG tablet TAKE 1 TABLET BY MOUTH TWICE DAILY 60 tablet 5 4/23/2019     enoxaparin (LOVENOX) 40 mg/0.4 mL syringe Inject 0.4 mL (40 mg total) under the skin every evening. 2 Syringe 0 4/22/2019     enoxaparin (LOVENOX) 60 mg/0.6 mL syringe Inject 0.6 mL (60 mg total) under the skin every morning. 3 Syringe 0 4/23/2019 at am     fluticasone (FLONASE) 50 mcg/actuation nasal spray 2 sprays into each nostril daily. (Patient taking differently: 2 sprays into each nostril daily as needed.       ) 10 g 3 4/22/2019     furosemide (LASIX) 20 MG tablet TAKE 1 TABLET (20 MG TOTAL) BY MOUTH DAILY FOR EDEMA 90 tablet 3 4/23/2019     levothyroxine (SYNTHROID, LEVOTHROID) 88 MCG tablet TAKE 1 PILL BY MOUTH EVERYDAY FOR THYROID 30 tablet 6 4/23/2019     metoprolol succinate (TOPROL-XL) 25 MG TAKE 1 PILL BY MOUTH EVERYDAY FOR BLOOD PRESSURE 90 tablet 3 4/23/2019     omeprazole (PRILOSEC) 20 MG capsule TAKE 1 PILL BY MOUTH EVERYDAY FOR STOMACH 30 capsule 8 4/23/2019     spironolactone (ALDACTONE) 25 MG tablet TAKE 1 PILL BY MOUTH EVERYDAY 30 tablet 8 4/23/2019     warfarin (COUMADIN/JANTOVEN) 1 MG tablet Take 1-2 mg by mouth See Admin Instructions. 1 mg daily on Mon/Wed/Fri; and 2 mg daily rest of week   4/19/2019     acetaminophen (TYLENOL) 500 MG tablet Take 500-1,000 mg by mouth every 6 (six) hours as needed for pain. Every 6-8 hours as needed. No more than 4,000MG of acetaminophen daily.   Unknown     ibuprofen (ADVIL,MOTRIN) 600 MG tablet Take 600 mg by mouth every 6 (six) hours as needed for pain.   Unknown       Hospital Medications    acetaminophen  650 mg  Enteral Tube Q6H    Or     acetaminophen  650 mg Oral Q6H    Or     acetaminophen  650 mg Rectal Q6H     atorvastatin  40 mg Enteral Tube DAILY     baclofen  10 mg Enteral Tube BID     cefTRIAXone (ROCEPHIN)  IV  1 g Intravenous DAILY     chlorhexidine  15 mL Topical Q12H     docusate sodium  100 mg Oral BID    Or     docusate sodium (COLACE) 50 mg/5 mL oral liquid  100 mg Enteral Tube BID     furosemide  40 mg Intravenous Q8H     insulin aspart (NovoLOG) injection   Subcutaneous Q4H FIXED TIMES     levothyroxine  88 mcg Enteral Tube Daily 0600     metoprolol tartrate  12.5 mg Enteral Tube BID     pantoprazole  40 mg Intravenous Q24H    Or     omeprazole  20 mg Oral Q24H    Or     omeprazole  20 mg Enteral Tube Q24H     polyethylene glycol  17 g Enteral Tube DAILY     potassium chloride  10 mEq Enteral Tube Daily with supper     sodium chloride  10-30 mL Intravenous Q8H FIXED TIMES     sodium chloride  3 mL Intravenous Line Care        PHYSICAL EXAM     Vital signs  Temp:  [99  F (37.2  C)-102.1  F (38.9  C)] 100.1  F (37.8  C)  Heart Rate:  [] 151  Resp:  [0-63] 20  BP: ()/() 122/87  FiO2 (%):  [40 %] 40 %    Weight:   121 lb 4.8 oz (55 kg)    GENERAL: Healthy appearing, alert, no acute distress, normal habitus.  CARDIOVASCULAR: Ext warm and well perfused.  NEUROLOGICAL:  Patient is lying in bed intubated. Opens eyes to voice Minimaly. Right pupil larger than left. EOM absent. VFI on right. Corneals present. NLF symmetric. Does not move any ext to pain or spontaneously. Tone symmetric.      DIAGNOSTIC STUDIES     Pertinent Radiology   Head CT images reviewed. No acute stroke seen  IMPRESSION:   CONCLUSION:   HEAD CT:  1.  No intracranial hemorrhage, mass lesions, hydrocephalus or CT evidence for an acute infarct. MRI is more sensitive for the evaluation of acute infarct.  2.  Chronic lacunae as detailed above.  3.  Mild diffuse cerebral parenchymal volume loss. Presumed chronic  hypertensive/microvascular ischemic white matter changes.        HEAD CTA:   1.  There is cut off on the P2 segment of the right posterior cerebral artery.  2.  No high-grade stenosis or occlusion of the rest of the major intracranial arteries.  3.  There is 1.5 to 2 mm focal outpouching off the expected location of the origin of the right posterior communicating artery. This is likely an infundibulum versus small aneurysm.        NECK CTA:  1.  No significant stenosis in the neck vessels based on NASCET criteria.  2.  No evidence for dissection.    Repeat Head CT 4/26 Images reviewed. Show right PCA stroke with question of pontine stroke.    Repeat Head CT images reviewed. 4/29  IMPRESSION:   CONCLUSION:  1.  Motion degraded examination demonstrates expected temporal evolution of right posterior cerebral artery territory infarct including interval development of small volume petechial hemorrhage as above. No hemorrhagic transformation. Mild local mass   effect with partial effacement of the posterior right lateral ventricle.  2.  Some loss of gray-white matter differentiation involving the parasagittal left parieto-occipital junction. It is uncertain if this is artifactual or reflect a new area of subacute ischemia/infarct. Consider MRI or follow-up head CT if the patient is   not an MRI candidate.  3.  Background of mild age-related changes elsewhere similar to the prior exam.    MRI brain images reviewed. Right PCA stroke seen.    MRI cervical spine  IMPRESSION:   CONCLUSION:  1.  Markedly limited by patient motion. No definite canal narrowing or gross cord signal abnormality     2.  Evaluation of the foramina is limited though there is likely significant foraminal narrowing at a few levels.     3.  No marrow edema or acute injury.    Recent Results (from the past 24 hour(s))   POCT Glucose - every 4 hours    Collection Time: 05/01/19  9:07 PM   Result Value Ref Range    Glucose 166 (H) 70 - 139 mg/dL   Sodium     Collection Time: 05/02/19 12:10 AM   Result Value Ref Range    Sodium 147 (H) 136 - 145 mmol/L   POCT Glucose - every 4 hours    Collection Time: 05/02/19  4:32 AM   Result Value Ref Range    Glucose 127 70 - 139 mg/dL   Magnesium    Collection Time: 05/02/19  5:17 AM   Result Value Ref Range    Magnesium 2.6 1.8 - 2.6 mg/dL   Basic Metabolic Panel    Collection Time: 05/02/19  5:17 AM   Result Value Ref Range    Sodium 148 (H) 136 - 145 mmol/L    Potassium 4.5 3.5 - 5.0 mmol/L    Chloride 115 (H) 98 - 107 mmol/L    CO2 25 22 - 31 mmol/L    Anion Gap, Calculation 8 5 - 18 mmol/L    Glucose 179 (H) 70 - 125 mg/dL    Calcium 8.0 (L) 8.5 - 10.5 mg/dL    BUN 51 (H) 8 - 22 mg/dL    Creatinine 1.33 (H) 0.70 - 1.30 mg/dL    GFR MDRD Af Amer >60 >60 mL/min/1.73m2    GFR MDRD Non Af Amer 53 (L) >60 mL/min/1.73m2   POCT Glucose - every 4 hours    Collection Time: 05/02/19  7:59 AM   Result Value Ref Range    Glucose 187 (H) 70 - 139 mg/dL   ECG 12 lead MUSE    Collection Time: 05/02/19  8:16 AM   Result Value Ref Range    SYSTOLIC BLOOD PRESSURE  mmHg    DIASTOLIC BLOOD PRESSURE  mmHg    VENTRICULAR RATE 114 BPM    ATRIAL RATE 147 BPM    P-R INTERVAL  ms    QRS DURATION 82 ms    Q-T INTERVAL 422 ms    QTC CALCULATION (BEZET) 581 ms    P Axis  degrees    R AXIS 25 degrees    T AXIS 137 degrees    MUSE DIAGNOSIS       Atrial fibrillation with rapid ventricular response  Nonspecific ST and T wave abnormality , probably digitalis effect  Abnormal ECG  When compared with ECG of 25-APR-2019 07:41,  ST now depressed in Lateral leads  Nonspecific T wave abnormality, worse in Inferior leads  T wave inversion now evident in Lateral leads  Confirmed by JOSE ALBERTO KUMAR MD LOC:JN ([id number]) on 5/2/2019 1:56:55 PM     HM2(CBC W/O DIFF)    Collection Time: 05/02/19 10:23 AM   Result Value Ref Range    WBC 11.6 (H) 4.0 - 11.0 thou/uL    RBC 3.43 (L) 4.40 - 6.20 mill/uL    Hemoglobin 10.4 (L) 14.0 - 18.0 g/dL    Hematocrit 31.8 (L) 40.0 - 54.0 %     MCV 93 80 - 100 fL    MCH 30.3 27.0 - 34.0 pg    MCHC 32.7 32.0 - 36.0 g/dL    RDW 15.9 (H) 11.0 - 14.5 %    Platelets 53 (L) 140 - 440 thou/uL    MPV 14.2 (H) 8.5 - 12.5 fL   Lactic Acid    Collection Time: 05/02/19 10:41 AM   Result Value Ref Range    Lactic Acid 1.9 0.5 - 2.2 mmol/L   Procalcitonin    Collection Time: 05/02/19 10:41 AM   Result Value Ref Range    Procalcitonin 9.21 (H) 0.00 - 0.49 ng/mL   POCT Glucose - every 4 hours    Collection Time: 05/02/19 11:44 AM   Result Value Ref Range    Glucose 150 (H) 70 - 139 mg/dL   POCT Glucose - every 4 hours    Collection Time: 05/02/19  4:03 PM   Result Value Ref Range    Glucose 76 70 - 139 mg/dL       Total time spent for face to face visit, reviewing labs/imaging studies, counseling and coordination of care was: 25 min More than 50% of this time was spent on counseling and coordination of care. Discussing imaging results with family.       Dustin Moya MD  Direct Secure Messaging: [email or web address]  Tel: [phone]  This note was dictated using voice recognition software.  Any grammatical or context distortions are unintentional and inherent to the software.

## 2021-05-28 NOTE — PROGRESS NOTES
Vent Mode: VCV  FiO2 (%):  [40 %-50 %] 40 %  S RR:  [20] 20  S VT:  [300 mL] 300 mL  PEEP/CPAP (cm H2O):  [7 cm H2O] 7 cm H2O  Minute Ventilation (L/min):  [9.2 L/min-11.3 L/min] 9.2 L/min  PIP:  [14 cm H2O-50 cm H2O] 14 cm H2O  SD SUP:  [5 cm H20] 5 cm H20  MAP (cm H2O):  [7-20] 7    Pt remains on full vent support with the above settings,tolerated well. Pplat 30 cm H2O while PIP measured 36 cm H2O, BS bilaterally crackles, Sxned for small amount of thick tan secretion and sputum sent to lab. No change made with the  vent setting in this shift. Pt is not ready for PS trial at this moment.     RT Will continue monitor SpO2/ABG,CXR, SXN PRN, and assess for PS trial when appropriate.       Anastasia Wheeler, LRT

## 2021-05-28 NOTE — PROGRESS NOTES
Respiratory Therapy Note:  Vent Day 11.  7 ETT, 20 at the gums.  PCV-VG 20 300 40% +5.  PEEP and FiO2 titrated this shift from +8 and 50%.  ABG 7.49/33/92/26.6.  Vt decreased to 300 from 325.  No repeat ABG per MD.  No SBT per protocol secondary to mental status and hemodynamic instability.  Metaneb for bronchial hygiene, suctioning out a small amount of tan ETT secretions.  Plan is to continue ventilator support and scheduled Metaneb. Mary Merchant, LRT, 5/4/2019

## 2021-05-28 NOTE — PLAN OF CARE
Problem: Pain  Goal: Patient's pain/discomfort is manageable  Outcome: Progressing   PRN oxycodone  Problem: Safety  Goal: Patient will be injury free during hospitalization  Outcome: Progressing   Free from injury  Problem: Daily Care  Goal: Daily care needs are met  Outcome: Progressing   Needs met  Problem: Potential for Compromised Skin Integrity  Goal: Skin integrity is maintained or improved  Outcome: Progressing pt turned every 2 hours  Goal: Nutritional status is improving  Outcome: Progressing   Tube feeding at goal

## 2021-05-28 NOTE — PROGRESS NOTES
Pharmacy Consult: Warfarin Management    Pharmacy consulted to dose warfarin for Kody Johns a 69 y.o. male    Ordering provider: Crystal Rasheed CNP    Reason for warfarin therapy per consult: Atrial Fibrillation  Goal INR Range per consult: 2-3    Subjective  Medication lists (home and hospital) were reviewed.    New medications that may increase bleeding risk/INR include: Amiodarone, aspirin, glucagon, pantoprazole, tramadol, heparin.    Restarted home medications that may increase bleeding risk/INR include: Levothyroxine, omeprazole.    Home Warfarin Dosing per documentation: 1 mg daily on Mon/Wed/Fri; and 2 mg daily rest of week    Other Anticoagulants: Heparin drip  Patient being bridged: Yes    Patient Active Problem List   Diagnosis     Rheumatic heart disease     Blurry vision     Hearing loss     Nonspecific reaction to tuberculin skin test without active tuberculosis     Dysphagia     Hyperlipidemia     Silent Stroke     Shoulder strain     Elevated LFTs     Dysuria     Hypothyroidism, unspecified type     Mitral valve stenosis, unspecified etiology     Aortic valve disease, rheumatic     Atherosclerosis of native coronary artery of native heart without angina pectoris     Essential hypertension with goal blood pressure less than 130/85     Pure hypercholesterolemia     Lightheadedness     Moderate malnutrition (H)     ALEENA (acute kidney injury) (H)     A-fib (H)     Heart failure with preserved ejection fraction (H)     S/P MVR (mitral valve repair)     Acute respiratory failure with hypoxemia (H)     Anemia due to blood loss, acute     Coagulopathy (H)     Non-English speaking patient     Thrombocytopenia (H)     Sepsis, due to unspecified organism (H)     Atrial fibrillation with RVR (H)     Hypernatremia     Paralytic ileus (H)     Small bowel obstruction (H)     Acute cardioembolic stroke (H)     Respiratory failure (H)    Past Medical History:   Diagnosis Date     ALEENA (acute kidney injury) (H)       Anemia due to blood loss, acute 2019     Asthma      Atrial fibrillation (H)      Blurry vision      CHF (congestive heart failure) (H)      Chronic kidney disease      Coronary artery disease      Dysphagia     resolved     Dysuria      Hearing loss      Heart murmur      Heart murmur      Hyperlipidemia      Hypertension      Hypothyroidism      Mitral valve stenosis      Moderate malnutrition (H)      Palpitations      Rheumatic heart disease      Shortness of breath     exertion     Stroke (H)     Silent     Valvular disease         Social History     Tobacco Use     Smoking status: Former Smoker     Packs/day: 1.00     Years: 50.00     Pack years: 50.00     Types: Cigarettes     Last attempt to quit: 2014     Years since quittin.6     Smokeless tobacco: Former User     Tobacco comment: No Betel nut   Substance Use Topics     Alcohol use: No     Comment: Quit     Drug use: No       Objective   Labs:  Last 3 days:    Recent Labs     19  0549 19  2255 19  0601 05/10/19  0459 05/10/19  0500   CREATININE 0.74  --  0.74  --  0.69*   HGB 8.6*  --  8.5*  --   --    HCT 26.2*  --  25.9*  --   --    * 158 136* 167  --    BILITOT  --   --  1.4*  --  1.4*   *  --  85*  --  77*   *  --  103*  --  104*   ALKPHOS  --   --  137*  --  147*     Last 7 days:   Recent labs: (last 7 days)     19  1059 19  1415 19  0601 19  1500 05/10/19  0500   INR 1.19* 1.38* 1.26* 1.25* 1.39*       Warfarin Dosing History:    Date INR Warfarin Dose Comment   2019 1.26 and 1.25 2 mg    5/10/2019 1.39                   Assessment  The patient is continuing warfarin for the indication of Atrial Fibrillation per documentation with a goal INR of 2-3. INR today is subtherapeutic. The patient may benefit from receiving her normal home dose of warfarin for today.    Plan  1. Administer warfarin 1 mg via enteral tube today.  2. Check INR daily or as  appropriate.  3. Continue to follow the patient's INR, PLT, and HGB as available.  4. Monitor for potential drug/disease interactions.    Thank you for the consult,  Nehemias Marshall, PharmD 5/10/2019 8:21 AM

## 2021-05-28 NOTE — PLAN OF CARE
Problem: Daily Care  Goal: Daily care needs are met  4/29/2019 1629 by Leilani Sanchez, RN  Outcome: Progressing  4/29/2019 1328 by Leilani Sanchez, RN  Outcome: Progressing   With repositioning patient controlled afib turned into afib -180. Order from [facility] for amio drip and mag bump. Running now. Some effect noted. Has been hypotensive and [facility] also aware of this.      Problem: Pre-Op Day  Goal: Activity related to CV surgery  4/29/2019 1629 by Leilani Sanchez, RN  Outcome: Progressing  4/29/2019 1328 by Leilani Sanchez, RN  Outcome: Progressing   Turn and reposition Q2hrs. Micro turns as tolerates.    Patient overbreathing vent, settings changed multiple times over shift. Patient doesn't tolerate lower peep. Precedex max at this time d/t breathing. Tolerating. Neuro has remained the same. Minimal repsonse. Will spontaneously move RUE and RLE, but will not open eyes or follow commands. Md aware. MRI tomorrow. Care conference with [facility] at 6 pm.     Leilani Sanchez, AMY'

## 2021-05-28 NOTE — PLAN OF CARE
Problem: Daily Care  Goal: Daily care needs are met  Outcome: Not Progressing    Pt continues to have high temps despite scheduled Tylenol and cooling blanket. Pain meds given to help with breathing for he continue to over breathe vent; cont monitor.

## 2021-05-28 NOTE — PROGRESS NOTES
Infectious Disease Progress Note    Assessment/Plan    IMPRESSION:  Fever----likely multifactorial.    Patient has gram-negative rods in the sputum.    Acute brain injury can also cause fever.        PLAN:  Spoke with his son.   Disontinue vancomycin.    Change Zosyn to meropenem pending final culture report.      Principal Problem:    S/P MVR (mitral valve repair)  Active Problems:    Rheumatic heart disease    Mitral valve stenosis, unspecified etiology    Aortic valve disease, rheumatic    A-fib (H)    ARF (acute respiratory failure) (H)    Anemia due to blood loss, acute    Coagulopathy (H)    Non-English speaking patient    Thrombocytopenia (H)    Sepsis, due to unspecified organism (H)    Atrial fibrillation with RVR (H)    Hypernatremia      Aristides Stewart MD  [phone]      Subjective  Not responsive.  Sedated.  Discussed with nurse at bedside.    Objective    Vital signs in last 24 hours  Temp:  [102.3  F (39.1  C)-105.3  F (40.7  C)] 104.7  F (40.4  C)  Heart Rate:  [] 108  Resp:  [24-36] 25  BP: ()/(51-86) 83/51  Arterial Line BP: (106-145)/() 134/83  FiO2 (%):  [50 %] 50 %  Weight:   116 lb 2.9 oz (52.7 kg)    Intake/Output last 3 shifts  I/O last 3 completed shifts:  In: 4334.2 [I.V.:2329.2; NG/GT:1950; IV Piggyback:55]  Out: 5005 [Urine:4965; Chest Tube:40]  Intake/Output this shift:  I/O this shift:  In: -   Out: 250 [Urine:250]    Review of Systems   Review of systems not obtained due to inability to communicate with the patient. , otherwise negative.    Physical Exam  Sedated, asleep.  Vitals tabulated above, reviewed  HEENT:nl orogastric and endotracheal tube noted.  Neck supple without lymphadenopathy  Sclera clear  CARDIOVASCULAR regular rate and rhythm, no murmur  Incision ok  Lungs some diffuse rhonchi.  Chest tubes in place.  Abdomen soft, NT/ND, absent HEPATOSPLENOMEGALY  Skin normal  Joints normal  Neurologic exam non focal  Wound:  NA        Pertinent Labs   Lab  Results: personally reviewed.     Results from last 7 days   Lab Units 04/29/19  0629 04/28/19  0427 04/27/19  0815   LN-WHITE BLOOD CELL COUNT thou/uL 4.9 4.8 4.0   LN-HEMOGLOBIN g/dL 8.5* 9.0*  9.1* 11.8*   LN-HEMATOCRIT % 26.9* 28.7* 35.3*   LN-PLATELET COUNT thou/uL 28* 62*  63* 36*        Results from last 7 days   Lab Units 04/29/19  0524 04/29/19  0059 04/28/19 2009 04/28/19  1400 04/28/19  1014  04/27/19  0815   LN-SODIUM mmol/L 150*  150* 150* 152* 151* 154*  --  148*   LN-POTASSIUM mmol/L 3.4*  --   --  3.9 4.0   < > 4.2   LN-CHLORIDE mmol/L 117*  --   --   --  122*  --  122*   LN-CO2 mmol/L 26  --   --   --  23  --  19*   LN-BLOOD UREA NITROGEN mg/dL 44*  --   --   --  48*  --  43*   LN-CREATININE mg/dL 1.32*  --   --   --  1.31*  --  1.06   LN-CALCIUM mg/dL 7.4*  --   --   --  8.1*  --  8.3*    < > = values in this interval not displayed.     Blood cultures remain negative to date.    Procedure Component Value Units Date/Time   Sputum culture [[id number]] (Abnormal) Collected: 04/27/19 1200   Order Status: Completed Specimen: Respiratory Updated: 04/28/19 1140    Culture 3+ Gram Negative RodsAbnormal     Gram Stain Result 2+ Polymorphonuclear leukocytes     4+ Gram negative diplococci     3+ Gram negative bacilli     2+ Gram positive cocci     2+ Gram positive bacilli, diphtheroid like   Sputum culture [[id number]]    Order Status: Sent Specimen: Respiratory        Pertinent Radiology   Radiology Results: Personally reviewed image/s and Personally reviewed impression/s    No results found.

## 2021-05-28 NOTE — PLAN OF CARE
Problem: Pain  Goal: Patient's pain/discomfort is manageable  Outcome: Progressing     Problem: Safety  Goal: Patient will be injury free during hospitalization  Outcome: Progressing     Problem: Daily Care  Goal: Daily care needs are met  Outcome: Progressing     Problem: Potential for Falls  Goal: Patient will remain free of falls  Outcome: Progressing  Epi and cardene titrated per BP perimeters. Cardene off in AM as SBP able to be greater than 120. STAT head CT ordered per CV surgery, see significant event note from writer. Repositioned q 2 hours and PRN. Decreasing ADRIANEo, MD aware. Minimal chest tube output. Will continue to monitor.

## 2021-05-28 NOTE — BRIEF OP NOTE
[facility]    Brief Operative Note    Pre-operative diagnosis: Aortic regurgitation [I35.1]  Mitral stenosis [I05.0]  Tricuspid regurgitation [I07.1]  Atrial fibrillation (H) [I48.91]  Post-operative diagnosis Same  Procedure: AORTIC VALVE REPLACEMENT, MITRAL VALVE REPLACEMENT, ANESTHESIA, TAKEDOWN OF PERICARDIAL ADHESIONS AND REINFORCEMENT OF KLS PLATING SYSTEM TRANESOPHAGEAL ECHOCARDIOGRAM AND EPI AORTIC ULTRASOUND  Surgeon: Surgeon(s) and Role:     * Jojo Vaughn MD - Primary   DONNA Galindo - Fellow   Tiffany Morrissey - Surg Tech/Surg Assist  Anesthesia: General   Estimated blood loss: 1025 mL from 4/24/2019  7:01 AM to 4/24/2019 12:23 PM  Drains:     Chest Tube 1 Anterior Mediastinal 32 Fr. (Active)       Chest Tube 2 Left Pleural 24 Fr. (Active)       Urethral Catheter Latex;Temperature probe 16 Fr. (Active)     Specimens:   ID Type Source Tests Collected by Time   A :  Tissue Heart Valve, Aortic SURGICAL PATHOLOGY EXAM Jojo Vaughn MD 4/24/2019 0913   B :  Tissue Heart Valve, Mitral (Bicuspid) SURGICAL PATHOLOGY EXAM Jojo Vaughn MD 4/24/2019 0926     Findings:   Mitral valve replaced with 25mm EPIC bioprosthetic valve, Aortic valve replaced with 21mm [employer] Kirill Trifecta Bioprosthetic valve.  Complications: None.  Implants:           Implant Name Type Inv. Item Serial No.  Lot No. LRB No. Used Action   LEAD ARTIRIAL PACING TEMPORARY 53CM [id number] - [id number] Lead LEAD ARTIRIAL PACING TEMPORARY 53CM 6495F  [employer] [id number] N/A 2 Implanted   PLEDGET PTFE SOFT PREMIPATCH 9X5MM [id number] - [id number] Graft PLEDGET PTFE SOFT PREMIPATCH 9X5MM [id number]  [employer] [id number] N/A 5 Implanted   HOFFMAN STERNAL WIRE SINGLE #6 046-032 - [id number] Wire HOFFMAN STERNAL WIRE SINGLE #6 046-032  [employer] [id number] N/A 2 Implanted   DEVICE HOFFMAN ENDO COR KNOT QUICK LOAD RELOAD 740638 - [id number] Wire DEVICE HOFFMAN ENDO COR KNOT QUICK LOAD RELOAD [id number]  [employer] [id number] N/A 4  Implanted   VALVE MITRAL EPIC STENTED PORCINE 25MM [id number] - [id number] Valve VALVE MITRAL EPIC STENTED PORCINE 25MM [id number] [employer]  N/A 1 Implanted   VALVE AORTIC ST LISSA TRIFECTA GLIDE BIOPROSTH 21MM [id number]-[id number] - [id number] Valve VALVE AORTIC ST LISSA TRIFECTA GLIDE BIOPROSTH 21MM TFGT-21A [id number] [employer]  N/A 1 Implanted   DEVICE HOFFMAN ENDO COR KNOT QUICK LOAD 924626 - [id number] Wire DEVICE HOFFMAN ENDO COR KNOT QUICK LOAD [id number]  [employer] [id number] N/A 1 Implanted   LEVEL ONE THORACIC, STERILE, PLATE, STERNAL, LSS, STR, 2.3 M - [id number] Metallic Hardware/[place] LEVEL ONE THORACIC, STERILE, PLATE, STERNAL, LSS, STR, 2.3 M  [employer] [id number] N/A 1 Implanted   LEVEL ONE THORACIC, SCREW, STERNAL, MAXDRIVE, LOCKING, DRILL - S  Metallic Hardware/[place] LEVEL ONE THORACIC, SCREW, STERNAL, MAXDRIVE, LOCKING, DRILL   [facility] MEDICAL   N/A 17 Implanted   SCREW STERNAL 2.3 X 13MM - S  Metallic Hardware/[place] SCREW STERNAL 2.3 X 13MM   [employer]   N/A 1 Implanted

## 2021-05-28 NOTE — PROGRESS NOTES
ETT # 7.0 and is secured 21 cm at the teeth. Patient remains on the vent/fully supported with the following settings: VCV 16 350 60% 5. sats are mid 90s. BS are clear; small/tan secretions are suctioned. Vent change is not made during this shift. RT will continue to monitor.    ZEHRA WilcoxT

## 2021-05-28 NOTE — H&P (VIEW-ONLY)
Thank you for asking the [facility] team to see Kody Johns .      Assessment/Plan:   The son will discuss with the family tonight and decide for/against surgery in the next few days.     In the mean time I am having Kody increase his furosemide to 20mg two times a day x 3 days given his complaints of pnd/orthopnea.    50 minutes spent discussing his disease, treatment options and potential outcomes.      Current History:   Kody Johns is a 69 y.o. with [place] in [facility] [facility] and has had heart failure and valve disease as well as mild coronary disease by angiogram in 2015.   Right and left heart catheterization showed a mitral valve gradient of 7-8 mmHg with simultaneous pulmonary capillary wedge pressure and LVEDP. He has also had atrial fibrillation with RVR and acute heart failure secondary to mitral regurgitation. Echo on August 29, 2017 showed ejection fraction of 55%, moderate to severe mitral regurgitation (rheumatic heart disease), moderate aortic regurgitation, and mild aortic stenosis. He was seen by Dr. Manriquez who thought he was not a candidate for percutaneous valve replacement. He had been in normal sinus rhythm since November 2017. Echo 11/30/18 showed EF decline to 45% and the persistent valve disease.      Mr. Johns returns for f/u today with his brother and son to discuss his upcoming surgery April 24th. Kody noted pnd at night but that he is doing well otherwise. In their culture the family decides for/against surgery and the son comes to meet me today for the first time. We reviewed the patient's valve disease, depressed ejection fraction, arrhythmia. I quoted the 4-8% risk of perioperative mortality that Dr. Vaughn put in her note from April 2018. We reviewed the heart failure hospitalizations and that Kody has had his teeth removed for this surgery. The son reports being very scared of the surgery and that they are all worried that he might have a bad outcome.     Past  Medical History:     Past Medical History:   Diagnosis Date     Atrial fibrillation (H)      CHF (congestive heart failure) (H)      Coronary artery disease      Heart murmur      Hyperlipidemia      Palpitations      Stroke (H)      Valvular disease        Past Surgical History:     Past Surgical History:   Procedure Laterality Date     CARDIAC CATHETERIZATION       CATARACT EXTRACTION Left 06/13/2016     CV CORONARY ANGIOGRAM N/A 11/30/2018    Procedure: Coronary Angiogram;  Surgeon: Jeff Deleon MD;  Location: [facility] Cath Lab;  Service: Cardiology       Family History:     Family History   Problem Relation Age of Onset     No Medical Problems Mother      No Medical Problems Father      Heart disease Neg Hx        Social History:    reports that he quit smoking about 4 years ago. His smoking use included cigarettes. He has a 50.00 pack-year smoking history. He has quit using smokeless tobacco. He reports that he does not drink alcohol or use drugs.    Meds:     Current Outpatient Medications   Medication Sig Note     acetaminophen (TYLENOL) 500 MG tablet Take 500-1,000 mg by mouth every 6 (six) hours as needed for pain. Every 6-8 hours as needed. No more than 4,000MG of acetaminophen daily.      albuterol (PROAIR HFA;PROVENTIL HFA;VENTOLIN HFA) 90 mcg/actuation inhaler Inhale 1-2 puffs every 6 (six) hours as needed.      amoxicillin (AMOXIL) 500 MG capsule TAKE 4 TABLETS (2,000 mg) BY MOUTH 1 HOUR PRIOR TO PROCEDURE.      atorvastatin (LIPITOR) 40 MG tablet TAKE 1 PILL BY MOUTH AT BEDTIME. FOR CHOLESTEROL      baclofen (LIORESAL) 10 MG tablet TAKE 1 TABLET BY MOUTH TWICE DAILY      bromfenac (PROLENSA) 0.07 % Drop 1 drop daily.      fluticasone (FLONASE) 50 mcg/actuation nasal spray 2 sprays into each nostril daily. 8/29/2017: 8/29/17: pt's brother states that he ran out of this medication a few days ago but is still supposed to be taking it.      furosemide (LASIX) 20 MG tablet TAKE 1 TABLET (20 MG  "TOTAL) BY MOUTH DAILY FOR EDEMA      ibuprofen (ADVIL,MOTRIN) 600 MG tablet Take 600 mg by mouth every 6 (six) hours as needed for pain.      levothyroxine (SYNTHROID, LEVOTHROID) 88 MCG tablet Take 1 tablet (88 mcg total) by mouth daily.      metoprolol succinate (TOPROL-XL) 25 MG TAKE 1 PILL BY MOUTH EVERYDAY FOR BLOOD PRESSURE      nasal dilator (BREATHE RIGHT) Strp strip Apply qhs      omeprazole (PRILOSEC) 20 MG capsule TAKE 1 PILL BY MOUTH EVERYDAY FOR STOMACH      polyethylene glycol (MIRALAX) 17 gram packet Take 17 g by mouth daily as needed.       spironolactone (ALDACTONE) 25 MG tablet TAKE 1 PILL BY MOUTH EVERYDAY      traMADol (ULTRAM) 50 mg tablet Take 50 mg by mouth 2 (two) times a day as needed for pain.      warfarin (COUMADIN/JANTOVEN) 1 MG tablet TAKE 1 TO 2 TABLETS (1 TO 2 MG) BY MOUTH DAILY. ADJUST DOSE BASED ON INR RESULTS AS DIRECTED.        Allergies:   Patient has no known allergies.    Review of Systems:   Review of Systems:   General: WNL  Eyes: WNL  Ears/Nose/Throat: WNL  Lungs: WNL  Heart: Chest Pain, Arm Pain, Shortness of Breath with activity, Irregular Heartbeat(Pt states that he has chest pain that is 2/10 at this time. )  Stomach: Heartburn  Bladder: Frequent Urination at Night  Muscle/Joints: Joint Pain, Muscle Weakness  Skin: WNL  Nervous System: Dizziness, Loss of Balance  Mental Health: Confusion     Blood: WNL       Objective:      Physical Exam  @LASTENCWT:3@  4' 11.25\" (1.505 m)  @BMI:3@  BP 94/52 (Patient Site: Left Arm, Patient Position: Sitting, Cuff Size: Adult Small)   Pulse 72   Resp 16   Ht 4' 11.25\" (1.505 m)   Wt 109 lb (49.4 kg)   BMI 21.83 kg/m      General Appearance:   Alert, cooperative and in no acute distress.   HEENT:     Neck:    Chest:    Lungs:      Cardiovascular:      Abdomen:     Extremities:    Skin:    Neurologic:          Lab Review   Lab Results   Component Value Date     11/30/2018     11/05/2018     09/14/2017    K 4.4 " 11/30/2018    K 3.4 (L) 11/05/2018    K 4.5 09/14/2017     11/30/2018     (H) 11/05/2018     (H) 09/14/2017    CO2 27 11/30/2018    CO2 21 (L) 11/05/2018    CO2 23 09/14/2017    BUN 21 11/30/2018    BUN 14 11/05/2018    BUN 18 09/14/2017    CREATININE 0.97 11/30/2018    CREATININE 0.81 11/05/2018    CREATININE 0.91 06/22/2018    CALCIUM 9.2 11/30/2018    CALCIUM 9.0 11/05/2018    CALCIUM 9.3 09/14/2017     Lab Results   Component Value Date    WBC 8.3 11/30/2018    WBC 7.3 09/06/2017    WBC 10.6 08/29/2017    HGB 14.9 11/30/2018    HGB 17.0 09/06/2017    HGB 16.8 08/29/2017    HCT 46.1 11/30/2018    HCT 52.8 09/06/2017    HCT 47.9 08/29/2017    MCV 92 11/30/2018    MCV 94 09/06/2017    MCV 90 08/29/2017     11/30/2018     09/06/2017     08/29/2017     Lab Results   Component Value Date    CHOL 97 11/30/2018    CHOL 115 11/05/2018    CHOL 185 07/20/2017    TRIG 86 11/30/2018    TRIG 62 11/05/2018    TRIG 83 07/20/2017    HDL 27 (L) 11/30/2018    HDL 34 (L) 11/05/2018    HDL 34 (L) 07/20/2017    LDLDIRECT 111 10/13/2016    LDLDIRECT 94 11/24/2014    LDLDIRECT 101 05/08/2014     Lab Results   Component Value Date     (H) 08/29/2017     (H) 04/02/2014         Ashleigh Lang M.D.

## 2021-05-28 NOTE — PROGRESS NOTES
NEUROLOGY PROGRESS NOTE     ASSESSMENT & PLAN   Hospital Day#: 15    Pression: Right posterior cerebral artery distribution stroke with extension into right thalamus/posterior limb internal capsule.  Patient has slight petechial change but there is no intracranial hemorrhage noted.  Weakness related to involvement of posterior limb of internal capsule on right side and left field cut consistent with stroke involving the right occipital lobe.  Will recheck CK tomorrow to see if coming down.  LT and AST elevation likely related to muscle breakdown.        Patient Active Problem List    Diagnosis Date Noted     Acute cardioembolic stroke (H)      Paralytic ileus (H) 05/02/2019     Small bowel obstruction (H)      Atrial fibrillation with RVR (H)      Hypernatremia      Thrombocytopenia (H)      Sepsis, due to unspecified organism (H)      S/P MVR (mitral valve repair) 04/24/2019     Acute respiratory failure with hypoxemia (H) 04/24/2019     Anemia due to blood loss, acute 04/24/2019     Coagulopathy (H) 04/24/2019     Non-English speaking patient 04/24/2019     Respiratory failure (H) 03/21/2019     A-fib (H) 09/14/2017     Heart failure with preserved ejection fraction (H) 09/14/2017     Moderate malnutrition (H) 08/31/2017     ALEENA (acute kidney injury) (H) 08/31/2017     Aortic valve disease, rheumatic 08/30/2017     Lightheadedness 08/30/2017     Atherosclerosis of native coronary artery of native heart without angina pectoris      Essential hypertension with goal blood pressure less than 130/85      Pure hypercholesterolemia      Dysuria 08/29/2017     Hypothyroidism, unspecified type      Mitral valve stenosis, unspecified etiology      Elevated LFTs 08/26/2016     Silent Stroke      Shoulder strain      Dysphagia      Hyperlipidemia      Rheumatic heart disease      Blurry vision      Hearing loss      Nonspecific reaction to tuberculin skin test without active tuberculosis      Past Medical History: Patient   has a past medical history of ALEENA (acute kidney injury) (H), Anemia due to blood loss, acute (4/24/2019), Asthma, Atrial fibrillation (H), Blurry vision, CHF (congestive heart failure) (H), Chronic kidney disease, Coronary artery disease, Dysphagia, Dysuria, Hearing loss, Heart murmur, Heart murmur, Hyperlipidemia, Hypertension, Hypothyroidism, Mitral valve stenosis, Moderate malnutrition (H), Palpitations, Rheumatic heart disease, Shortness of breath, Stroke (H), and Valvular disease.     SUBJECTIVE     EEG reviewed.  There is some background slowing noted with focal slowing noted in the right posterior quadrant that would be consistent with a structural abnormality in that region.  No epileptiform activity.     OBJECTIVE     Vital signs in last 24 hours  Temp:  [98.7  F (37.1  C)-101.9  F (38.8  C)] 99.2  F (37.3  C)  Heart Rate:  [] 117  Resp:  [27-47] 30  BP: (104-144)/(69-88) 118/80  FiO2 (%):  [24 %] 24 %    Weight:   105 lb 12.8 oz (48 kg)    I/O last 24 hours    Intake/Output Summary (Last 24 hours) at 5/9/2019 2006  Last data filed at 5/9/2019 1400  Gross per 24 hour   Intake 2596.8 ml   Output 1295 ml   Net 1301.8 ml       Review of Systems   UnAble due to tracheostomy    General Physical Exam: Patient is alert . Vital signs were reviewed and are documented in EMR..  Neurological Exam:  Alert.  No response to visual threat on left.  Positive tracking in right field.  Moves right arm and leg purposefully.  No movement of left leg.  Will move left hand occasionally.       DIAGNOSTIC STUDIES     Pertinent Radiology   Radiology Results: Xr Chest 1 View Portable    Result Date: 5/9/2019  EXAM: XR CHEST 1 VIEW PORTABLE LOCATION: [facility] DATE/TIME: 5/9/2019 5:40 AM INDICATION: fever, increase bronchial secretions COMPARISON: None. FINDINGS: No significant change. Opacity persists at left lung base which may represent lobar atelectasis or pneumonia. Mild diffuse interstitial prominence  elsewhere. No definite pleural effusion. Prominent cardiomegaly with postoperative changes related to cardiac valvular surgery. Tubes and catheters project in good position, interval placement of tracheostomy tube.      Pertinent Labs   Lab Results:reviewed.  Recent Results (from the past 24 hour(s))   POCT Glucose - every 4 hours    Collection Time: 05/08/19  8:46 PM   Result Value Ref Range    Glucose 191 (H) 70 - 139 mg/dL   Platelet Count    Collection Time: 05/08/19 10:55 PM   Result Value Ref Range    Platelets 158 140 - 440 thou/uL   APTT(PTT)    Collection Time: 05/08/19 10:55 PM   Result Value Ref Range    PTT 60 (H) 24 - 37 seconds   Vancomycin (Vancocin )    Collection Time: 05/08/19 10:55 PM   Result Value Ref Range    Vancomycin 4.3 <=25.0 ug/mL   POCT Glucose - every 4 hours    Collection Time: 05/09/19 12:01 AM   Result Value Ref Range    Glucose 152 (H) 70 - 139 mg/dL   POCT Glucose - every 4 hours    Collection Time: 05/09/19  4:02 AM   Result Value Ref Range    Glucose 183 (H) 70 - 139 mg/dL   APTT(PTT)    Collection Time: 05/09/19  6:01 AM   Result Value Ref Range    PTT 64 (H) 24 - 37 seconds   Comprehensive Metabolic Panel    Collection Time: 05/09/19  6:01 AM   Result Value Ref Range    Sodium 142 136 - 145 mmol/L    Potassium 3.4 (L) 3.5 - 5.0 mmol/L    Chloride 113 (H) 98 - 107 mmol/L    CO2 23 22 - 31 mmol/L    Anion Gap, Calculation 6 5 - 18 mmol/L    Glucose 179 (H) 70 - 125 mg/dL    BUN 24 (H) 8 - 22 mg/dL    Creatinine 0.74 0.70 - 1.30 mg/dL    GFR MDRD Af Amer >60 >60 mL/min/1.73m2    GFR MDRD Non Af Amer >60 >60 mL/min/1.73m2    Bilirubin, Total 1.4 (H) 0.0 - 1.0 mg/dL    Calcium 7.4 (L) 8.5 - 10.5 mg/dL    Protein, Total 5.6 (L) 6.0 - 8.0 g/dL    Albumin 2.0 (L) 3.5 - 5.0 g/dL    Alkaline Phosphatase 137 (H) 45 - 120 U/L    AST 85 (H) 0 - 40 U/L     (H) 0 - 45 U/L   Procalcitonin    Collection Time: 05/09/19  6:01 AM   Result Value Ref Range    Procalcitonin 0.69 (H) 0.00 -  0.49 ng/mL   HM1 (CBC with Diff)    Collection Time: 05/09/19  6:01 AM   Result Value Ref Range    WBC 11.6 (H) 4.0 - 11.0 thou/uL    RBC 2.72 (L) 4.40 - 6.20 mill/uL    Hemoglobin 8.5 (L) 14.0 - 18.0 g/dL    Hematocrit 25.9 (L) 40.0 - 54.0 %    MCV 95 80 - 100 fL    MCH 31.3 27.0 - 34.0 pg    MCHC 32.8 32.0 - 36.0 g/dL    RDW 16.5 (H) 11.0 - 14.5 %    Platelets 136 (L) 140 - 440 thou/uL    MPV 11.3 8.5 - 12.5 fL    Neutrophils % 83 (H) 50 - 70 %    Lymphocytes % 8 (L) 20 - 40 %    Monocytes % 8 2 - 10 %    Eosinophils % 1 0 - 6 %    Basophils % 0 0 - 2 %    Neutrophils Absolute 9.4 (H) 2.0 - 7.7 thou/uL    Lymphocytes Absolute 0.9 0.8 - 4.4 thou/uL    Monocytes Absolute 0.9 0.0 - 0.9 thou/uL    Eosinophils Absolute 0.2 0.0 - 0.4 thou/uL    Basophils Absolute 0.0 0.0 - 0.2 thou/uL   Magnesium    Collection Time: 05/09/19  6:01 AM   Result Value Ref Range    Magnesium 2.0 1.8 - 2.6 mg/dL   INR    Collection Time: 05/09/19  6:01 AM   Result Value Ref Range    INR 1.26 (H) 0.90 - 1.10   POCT Glucose - every 4 hours    Collection Time: 05/09/19  8:04 AM   Result Value Ref Range    Glucose 134 70 - 139 mg/dL   POCT Glucose    Collection Time: 05/09/19  8:24 AM   Result Value Ref Range    Glucose 117 70 - 139 mg/dL   POCT Glucose - every 4 hours    Collection Time: 05/09/19 11:29 AM   Result Value Ref Range    Glucose 178 (H) 70 - 139 mg/dL   INR    Collection Time: 05/09/19  3:00 PM   Result Value Ref Range    INR 1.25 (H) 0.90 - 1.10   Urinalysis-UC if Indicated    Collection Time: 05/09/19  3:45 PM   Result Value Ref Range    Color, UA Straw Colorless, Yellow, Straw, Light Yellow    Clarity, UA Clear Clear    Glucose, UA Negative Negative    Bilirubin, UA Negative Negative    Ketones, UA Negative Negative    Specific Gravity, UA 1.005 1.001 - 1.030    Blood, UA Small (!) Negative    pH, UA 7.0 4.5 - 8.0    Protein, UA Negative Negative mg/dL    Urobilinogen, UA <2.0 E.U./dL <2.0 E.U./dL, 2.0 E.U./dL    Nitrite, UA  Negative Negative    Leukocytes, UA Negative Negative    Bacteria, UA Few (!) None Seen hpf    RBC, UA 3-5 (!) None Seen, 0-2 hpf    WBC, UA 0-5 None Seen, 0-5 hpf    Squam Epithel, UA 0-5 None Seen, 0-5 lpf   POCT Glucose - every 4 hours    Collection Time: 05/09/19  6:15 PM   Result Value Ref Range    Glucose 172 (H) 70 - 139 mg/dL   Potassium    Collection Time: 05/09/19  6:16 PM   Result Value Ref Range    Potassium 3.8 3.5 - 5.0 mmol/L   POCT Glucose - every 4 hours    Collection Time: 05/09/19  7:51 PM   Result Value Ref Range    Glucose 190 (H) 70 - 139 mg/dL         HOSPITAL MEDICATIONS       amiodarone  200 mg Per NG tube BID     aspirin  81 mg Enteral Tube DAILY     baclofen  10 mg Enteral Tube BID     chlorhexidine  15 mL Topical Q12H     docusate sodium (COLACE) 50 mg/5 mL oral liquid  100 mg Oral BID     senna (SENOKOT) syrup  8.8 mg Enteral Tube BID    And     docusate sodium (COLACE) 50 mg/5 mL oral liquid  50 mg Enteral Tube BID     furosemide  20 mg Enteral Tube BID - diuretic     insulin aspart (NovoLOG) injection   Subcutaneous Q6H     levothyroxine  75 mcg Intravenous Daily     metoprolol tartrate  25 mg Enteral Tube BID     pantoprazole  40 mg Intravenous Q24H    Or     omeprazole  20 mg Oral Q24H    Or     omeprazole  20 mg Enteral Tube Q24H     [START ON 5/10/2019] potassium chloride  20 mEq Enteral Tube DAILY     QUEtiapine  12.5 mg Enteral Tube BID     sodium chloride  10-30 mL Intravenous Q8H FIXED TIMES     sodium chloride  3 mL Intravenous Line Care     vancomycin  750 mg Intravenous Q12H     warfarin - daily dose required   Other Med Consult or Protocol        Total time spent for face to face visit, reviewing labs/imaging studies, counseling and coordination of care was: 15 minutes More than 50% of this time was spent on counseling and coordination of care.    Roberto Michaud MD  [facility]  Direct Secure Messaging: [email or web address]  Tel:  [phone]

## 2021-05-28 NOTE — PROGRESS NOTES
PULMONARY / CRITICAL CARE PROGRESS NOTE    Date / Time of Admission:  4/24/2019  5:39 AM    Assessment:   Principal Problem:    S/P MVR (mitral valve repair)  Active Problems:    Rheumatic heart disease    Mitral valve stenosis, unspecified etiology    Aortic valve disease, rheumatic    A-fib (H)    ARF (acute respiratory failure) (H)    Anemia due to blood loss, acute    Coagulopathy (H)    Non-English speaking patient        Advance Directives: Full code      Plan:   Neuro:  Right posterior cerebral artery infarction probably embolic.  Cannot anticoagulate at this point due to low platelets.  On precedex.     Cardiovascular:  Status post AVR, MVR with bioprosthetic valves.  Off epinephrine drip this morning.  Started gentle diuresis.  5.5 L positive.    Respiratory:  Continue mechanical ventilation.  Wean PEEP and FiO2 as tolerated.  Check ABG in the morning.  Stop ABGs every 8 hours.    GI:  Continue PPI  Continue tube feeds.    :  Increase in BUN.  With his low platelets will need to keep an eye on his stool making sure that he is not developing upper GI bleed.    ID:  Significantly increased procalcitonin and fevers.  Thrombocytopenia.  All pointing towards sepsis.  Panculture and continue vancomycin and Zosyn.    Heme:  Worsening thrombocytopenia.  HIT panel ordered by primary service.  If negative then explanation for his low platelets would be sepsis and DIC.  At              ICU DAILY CHECKLIST                           Can patient transfer out of MICU? no    FAST HUG:    Feeding:  Feeding: Yes.  Patient is receiving TUBE FEEDS    Guzman:Yes  Thromboembolic prophylaxis: yes; Mode:  SCDs  HOB>30:  Yes  Stress Ulcer Protocol Active: yes; Mode: PPI  Glycemic Control: Any glucose > 180 no; Mode of Insulin Therapy: Sliding Scale Insulin    INTUBATED:  Can patient have daily waking:  not applicable  Can patient have spontaneous breathing trial:  no    Restraints? Yes    PROVIDER RESTRAINT FOR NON-VIOLENT  BEHAVIOR FACE TO FACE EVALUATION    Patient's Immediate Situation:  Patient demonstrated the following behaviors: Pulling/tugging at invasive lines or tubes and does not respond to verbal/non-verbal redirection    Patient's Reaction to the intervention:  Does patient understand the reason for restraint/seclusion? No    Medical Condition:  Is there any evidence of compromise of Skin integrity, Respiratory, Cardiovascular, Musculoskeletal, Hydration? No    Behavioral Condition:  In consultation with the RN, is there a need to continue this restraint or seclusion? Yes    See Restraint Flowsheet for complete restraint documentation and assessment.    Yahir Matthews MD          PHYSICAL THERAPY AND MOBILITY:  Can patient have PT and mobility trial: no  Activity: Bed Rest    Critical Care Time greater than: 45 Minutes  Total time spent with patient greater than: 45 Minutes        Subjective:   HPI:  Kody Johns is a 69 y.o. male who underwent bioprosthetic replacement of his aortic and mitral valves.  His postop course has been complicated by a PCA embolic stroke, sepsis and thrombocytopenia.    Principal Problem:    S/P MVR (mitral valve repair)  Active Problems:    Rheumatic heart disease    Mitral valve stenosis, unspecified etiology    Aortic valve disease, rheumatic    A-fib (H)    ARF (acute respiratory failure) (H)    Anemia due to blood loss, acute    Coagulopathy (H)    Non-English speaking patient      Allergies: Patient has no known allergies.     MEDS:  Scheduled Meds:    acetaminophen  650 mg Oral Q6H    Or     acetaminophen  650 mg Oral Q6H    Or     acetaminophen  650 mg Rectal Q6H     aspirin  300 mg Rectal Daily 0800     [START ON 4/28/2019] atorvastatin  40 mg Enteral Tube DAILY     baclofen  10 mg Enteral Tube BID     bisacodyl  10 mg Oral Once     bisacodyl  10 mg Rectal Once     chlorhexidine  15 mL Topical Q12H     docusate sodium  100 mg Oral BID    Or     docusate sodium (COLACE) 50 mg/5 mL  "oral liquid  100 mg Enteral Tube BID     furosemide  20 mg Intravenous BID - diuretic     [START ON 4/28/2019] levothyroxine  88 mcg Enteral Tube Daily 0600     [START ON 4/28/2019] magnesium hydroxide  30 mL Enteral Tube DAILY     melatonin  3 mg Enteral Tube QHS     pantoprazole  40 mg Intravenous Q24H    Or     omeprazole  20 mg Oral Q24H    Or     omeprazole  20 mg Enteral Tube Q24H     piperacillin-tazobactam  3.375 g Intravenous Once    And     piperacillin-tazobactam  3.375 g Intravenous Q8H     [START ON 4/28/2019] polyethylene glycol  17 g Enteral Tube DAILY     sodium chloride  3 mL Intravenous Line Care     vancomycin  1,000 mg Intravenous Q24H     Continuous Infusions:    dexmedetomidine 400 mcg/100 mL in NS (PRECEDEX) (4mcg/mL) 1 mcg/kg/hr (04/27/19 0341)     DOPamine       epinephrine Stopped (04/27/19 0655)     insulin infusion (1 unit/mL) Stopped (04/24/19 1906)     niCARdipine Stopped (04/26/19 0701)     norepinephrine IV infusion in D5W       sodium chloride 0.9% 50 mL/hr (04/27/19 0400)     sodium chloride 0.9% 25 mL/hr (04/27/19 0400)     vasopressin       PRN Meds:.acetaminophen, acetaminophen, albumin human, aluminum-magnesium hydroxide-simethicone, bisacodyl, bisacodyl, dexmedetomidine 400 mcg/100 mL in NS (PRECEDEX) (4mcg/mL), dextrose 50 % (D50W), DOPamine, epinephrine, glucagon (human recombinant), HYDROmorphone, lipase-protease-amylase **AND** sodium bicarbonate, magnesium hydroxide, naloxone **OR** naloxone, niCARdipine, norepinephrine IV infusion in D5W, ondansetron, oxyCODONE intensol (ROXICODONE) conc solution 20 mg/mL **OR** oxyCODONE, sodium chloride, sodium phosphates 133 mL, traZODone, vasopressin      Objective:   VITALS:  /78   Pulse 95   Temp (!) 102.6  F (39.2  C)   Resp 20   Ht 4' 11\" (1.499 m)   Wt 117 lb 8.1 oz (53.3 kg)   SpO2 97%   BMI 23.73 kg/m    EXAM:  General appearance: Sleeping.  Somewhat tachypneic.  Head: Normocephalic, without obvious abnormality, " atraumatic  Lungs: clear to auscultation bilaterally  Heart: Irregularly irregular   Abdomen: soft, non-tender; bowel sounds normal; no masses,  no organomegaly  Extremities: extremities normal, atraumatic, no cyanosis or edema  Neurologic: Does not remove extremities on the left side.  Withdrawal to pain on the right.    I&O:      Intake/Output Summary (Last 24 hours) at 4/27/2019 1117  Last data filed at 4/27/2019 0800  Gross per 24 hour   Intake 2225 ml   Output 990 ml   Net 1235 ml       Data Review:  CBC:   Lab Results   Component Value Date    WBC 4.0 04/27/2019    RBC 3.83 (L) 04/27/2019     BMP:   Lab Results   Component Value Date    CO2 19 (L) 04/27/2019    BUN 43 (H) 04/27/2019    CREATININE 1.06 04/27/2019    CALCIUM 8.3 (L) 04/27/2019     Serum Glucose range:Invalid input(s): GLUCOSEPOCT    Chest x-ray: Left lower lobe atelectasis versus infiltrate.    By:  Yahir Matthews, 4/27/2019, 11:17 AM    Primary Care Physician:  Aristides Alegria MD

## 2021-05-28 NOTE — PROGRESS NOTES
Dr Meléndez (fellow) here with Dr Vaughn.  Reviewed chest xray, hemodynamic waveforms and PA numbers.  [place] advanced at bedside by Dr Meléndez - he stated 3cm.  Appears approx 53cm at hub.  Waveforms appropriate.  Pt tolerated well. Caty Cortez RN

## 2021-05-28 NOTE — PLAN OF CARE
Problem: Mechanical Ventilation  Goal: Patient will maintain patent airway  Outcome: Progressing  Goal: Respiratory status - ventilation  Description  Movement of air in and out of the lungs.    Liberate from ventilator  Outcome: Progressing     Problem: Chronic Tracheostomy  Goal: Patient is able to maintain stable respiratory status with long term  tracheostomy  Outcome: Progressing     Jenn Marino, LRT

## 2021-05-28 NOTE — PLAN OF CARE
Respiratory Care Note:    Problem: Mechanical Ventilation  Goal: Patient will maintain patent airway  Outcome: Progressing  Goal: Respiratory status - ventilation  Description  Movement of air in and out of the lungs.    Liberate from ventilator  Outcome: Progressing  Goal: ET tube will be managed safely  Outcome: Progressing     Vent Mode: VCV  FiO2 (%):  [40 %-50 %] 40 %  S RR:  [20] 20  S VT:  [300 mL] 300 mL  PEEP/CPAP (cm H2O):  [7 cm H2O] 7 cm H2O  Minute Ventilation (L/min):  [9.5 L/min-15.1 L/min] 10.3 L/min  PIP:  [16 cm H2O-50 cm H2O] 50 cm H2O  HI SUP:  [5 cm H20] 5 cm H20  MAP (cm H2O):  [8-21] 17    Patient remains on full vent support with the settings listed above. No wean this evening. Breath sounds remain coarse. I was able to suction out a small amount of thick white secretions. Patient keeps breathing fast. Saturations are in the mid 90's. No major events this evening. Fio2 has been weaned down. RT will continue to follow.    HARINDER Galeana

## 2021-05-28 NOTE — PROGRESS NOTES
Respiratory Care Note    Vent Mode: VCV  FiO2 (%):  [50 %-100 %] 60 %  S RR:  [16] 16  S VT:  [350 mL] 350 mL  PEEP/CPAP (cm H2O):  [5 cm H2O-8 cm H2O] 8 cm H2O  Minute Ventilation (L/min):  [7.2 L/min-10.5 L/min] 10.5 L/min  PIP:  [23 cm H2O-46 cm H2O] 28 cm H2O  MAP (cm H2O):  [7-12] 12    Pt remains on full vent support and tolerating well. PIP 23 PLAT 16, sats 98%. No ABGs drawn this shift. Suctioned moderate amount of thick tan inline. No PS. Plan is to wean PEEP and FiO2 as tolerated. Will follow up with ABG tomorrow morning per MD. RT will continue to follow.     HARINDER Santos

## 2021-05-28 NOTE — PLAN OF CARE
Problem: Mechanical Ventilation  Goal: Patient will maintain patent airway  Outcome: Progressing  Goal: Oral health is maintained or improved  Outcome: Progressing  Goal: Respiratory status - ventilation  Description  Movement of air in and out of the lungs.    Liberate from ventilator  Outcome: Progressing  Goal: ET tube will be managed safely  Outcome: Progressing     Vent Mode: PCV  FiO2 (%):  [30 %-40 %] 30 %  S RR:  [16-20] 16  S VT:  [300 mL] 300 mL  PEEP/CPAP (cm H2O):  [5 cm H2O] 5 cm H2O  Minute Ventilation (L/min):  [8.5 L/min-10.4 L/min] 9 L/min  PIP:  [11 cm H2O-26 cm H2O] 23 cm H2O  MAP (cm H2O):  [7-9] 8     Pt continues to be on full mechanical ventilator support with the above settings.  ETT size 7.0 secured 20 cm at the gums.  Breath sounds diminished and clear bilaterally.  Minimal amount of white secretions via inline suction. Pt tolerated metaneb x1 well. Vent mode changed from VCV to PCV due to high RR ~30 and vent dyssynchrony. ABG result was 7.55/30/104/28.1/100 via following ventilator settings: mode PCV, PIP 14, rate 20, fiO2 35%. Rate decreased to 16 from 20 and PIP decreased to 12 from 14 per Dr. Omer. Observed vt ~450 ml. Rt monitoring.    ZEHRA McfaddenT

## 2021-05-28 NOTE — PLAN OF CARE
Problem: Mechanical Ventilation  Goal: Patient will maintain patent airway  Outcome: Progressing     Problem: Mechanical Ventilation  Goal: Respiratory status - ventilation  Description  Movement of air in and out of the lungs.    Liberate from ventilator  Outcome: Progressing     Problem: Mechanical Ventilation  Goal: ET tube will be managed safely  Outcome: Progressing    ZEHRA WilcoxT

## 2021-05-28 NOTE — PROGRESS NOTES
Pharmacy Note - Admission Medication History   ______________________________________________________________________  Prior To Admission (PTA) med list completed and updated in EMR.     PTA Med List   Medication Sig Last Dose     albuterol (PROAIR HFA;PROVENTIL HFA;VENTOLIN HFA) 90 mcg/actuation inhaler Inhale 1-2 puffs every 6 (six) hours as needed. 4/22/2019     amoxicillin (AMOXIL) 500 MG capsule TAKE 4 TABLETS (2,000 mg) BY MOUTH 1 HOUR PRIOR TO PROCEDURE. Unknown     atorvastatin (LIPITOR) 40 MG tablet TAKE 1 PILL BY MOUTH AT BEDTIME. FOR CHOLESTEROL 4/22/2019     baclofen (LIORESAL) 10 MG tablet TAKE 1 TABLET BY MOUTH TWICE DAILY 4/22/2019     enoxaparin (LOVENOX) 40 mg/0.4 mL syringe Inject 0.4 mL (40 mg total) under the skin every evening. 4/22/2019     enoxaparin (LOVENOX) 60 mg/0.6 mL syringe Inject 0.6 mL (60 mg total) under the skin every morning. 4/23/2019 at am     fluticasone (FLONASE) 50 mcg/actuation nasal spray 2 sprays into each nostril daily. (Patient taking differently: 2 sprays into each nostril daily as needed.       ) 4/22/2019     furosemide (LASIX) 20 MG tablet TAKE 1 TABLET (20 MG TOTAL) BY MOUTH DAILY FOR EDEMA 4/22/2019     levothyroxine (SYNTHROID, LEVOTHROID) 88 MCG tablet TAKE 1 PILL BY MOUTH EVERYDAY FOR THYROID 4/22/2019     metoprolol succinate (TOPROL-XL) 25 MG TAKE 1 PILL BY MOUTH EVERYDAY FOR BLOOD PRESSURE 4/22/2019     omeprazole (PRILOSEC) 20 MG capsule TAKE 1 PILL BY MOUTH EVERYDAY FOR STOMACH 4/22/2019     spironolactone (ALDACTONE) 25 MG tablet TAKE 1 PILL BY MOUTH EVERYDAY 4/22/2019     warfarin (COUMADIN/JANTOVEN) 1 MG tablet Take 1-2 mg by mouth See Admin Instructions. 1 mg daily on Mon/Wed/Fri; and 2 mg daily rest of week 4/19/2019       Information source(s): Patient, Clinic records and Prescription bottles  Patient was asked about OTC/herbal products specifically.  PTA med list reflects this.  Based on the pharmacist s assessment, the PTA med list information  appears somewhat reliable:due to language barrier  Allergies were reviewed, assessed, and updated with the patient.    Medications currently not available for use during hospital stay. Family/Patient representative states they will bring albuterol, Flonase to [facility].   Thank you for the opportunity to participate in the care of this patient.    Marino Villanueva, PharmD     4/23/2019     10:46 AM

## 2021-05-28 NOTE — PROGRESS NOTES
[facility] Care Coordination Contact      TRANSITIONS OF CARE (EUGENIE) LOG   EUGENIE tasks should be completed by the CC within one (1) business day of notification of each transition. Follow up contact with member is required after return to their usual care setting.  Note:  If CC finds out about the transitions fifteen (15) days or more after the member has returned to their usual care setting, no EUGENIE log is needed. However, the CC should check in with the member to discuss the transition process, any changes needed to the care plan and document it in a case note.    Member Name:  Kody Johns O Name:  Rehana O/Health Plan Member ID#: [id number]   Product: [employer] Care Coordinator Contact:  MISSAEL Lord Agency/County/Care System: [facility]   Transition Communication Actions from Care Management Contact   Transition #1   Notification Date: 4/24/2019 Transition Date:   4/24/2019 Transition From: Home     Is this the member s usual care setting?               yes Transition To: Hospital, [facility]   Transition Type:  Planned  Reason for Admission/Comments:  [facility] Care Coordination Contact    CC received notification of Hospital admission.  Hospital admission occurred on 4/24/19 at [facility] with Dx of Mitral Valve Repair and Aortic Valve Replacement Surgery.       Shared CC contact info, care plan/services with receiving setting--Date completed: 4/24/19    CC contacted Hospital /discharge planner ( for Name and Phone Number) and left a message with this CC contact information, reviewed community POC as well requested to be notified of concerns, care conferences and discharge planning.  CC reached out to family son regarding transition and left a message requesting a return call.  Reviewed and update care plan as needed.  Notified community service providers and placed services PCA on hold as needed.  Transition log initiated.   PCP notified of hospitalization via EMR.  "     4/25/19 - Kody remains in ICU, is intubated and lightly sedated.  The plan at this time is to keep him in ICU     5/3/19 - Care Coordinator received a voicemail from Marjan - [phone] - Nurse Care Manager - stating they were going to have a family care conference today (5/3) but it was cancelled because Kody is too ill to do the two procedures that they were going to discuss.  The Cardiologist spoke to the family about how poorly oKdy is doing.  Another tenative family meeting is schedule for next Wed (5/8) pending how Kody is doing.  She will call Care Coordinator next week to update.      Leilani Ignacio, [facility]  [phone]     Notified PCP of transition--Date completed:  4/24/19    via  EMR   Transition #2   Transition #3  (if applicable)   Notification Date: 5/10/2019         [facility]  [facility] [place]  Transition Date: 5/10/2019     Transition Type:    Planned  Notified PCP -- Date completed: EMR              Shared CC contact info, care plan/services with receiving setting or, if applicable, home care agency--Date completed:  5/10/19  *Complete additional tasks below, if this transition is a return to usual care setting.      Comments:      Notification Date:  5/14/19        Transition To:  [facility]  Transition Date:   5/13/19           Transition Type:    Planned  Notified PCP--Date completed: EMR       Shared CC contact info, care plan/services with receiving setting or, if applicable, home care agency--Date completed: 5/14/19      *Complete additional tasks below, if this transition is a return to usual care setting.      Comments:  Per Epic \"PT noticed to develop headaches this morning with some difficulty opening his left eye. Ct head done showed a hemorrhagic transformation of his CVA and transferred to [facility]. ER attending discussed case with intensivist, neurosurgery and cardiology and pt will be admitted to neuro ICU\".     *Complete tasks below " when the member is discharging TO their usual care setting within one (1) business day of notification.  For situations where the Care Coordinator is notified of the discharge prior to the date of discharge, the Care Coordinator must follow up with the member or designated representative to confirm that discharge actually occurred and discuss required EUGENIE tasks as outlined in the EUGENIE Instructions.  (This includes situations where it may be a  new  usual care setting for the member. (i.e., a community member who decides upon permanent nursing home placement following hospitalization and rehab).    Date completed:   Communicated with member or their designated representative about the following:  care transition process; about changes to the member s health status; plan of care updates; education about transitions and how to prevent unplanned transitions/readmissions  Four Pillars for Optimal Transition:    Check  Yes  - if the member, family member and/or SNF/facility staff manages the following:    If  No  provide explanation in the comments section.          []  Yes     []  No     Does the member have a follow-up appointment scheduled with primary care or specialist? (Mental health hospitalizations--the appt. should be w/in 7 days)   []  Yes     []  No     Can the member manage their medications or is there a system in place to manage medications (e.g. home care set-up)?         []  Yes     []  No     Can the member verbalize warning signs and symptoms to watch for and how to respond?         []  Yes     []  No     Does the member use a Personal Health Care Record?  Check  Yes  if visit summary, discharge summary, and/or healthcare summary are being used as a PHR.                                                                                                                                                                                    [] Yes      [] No      Have you updated the member s care plan?  If  No   provide explanation in comments.   Comments:  CC updated POC

## 2021-05-28 NOTE — PLAN OF CARE
Problem: Mechanical Ventilation  Goal: Patient will maintain patent airway  Outcome: Progressing  Goal: Oral health is maintained or improved  Outcome: Progressing  Goal: Respiratory status - ventilation  Description  Movement of air in and out of the lungs.    Liberate from ventilator  Outcome: Progressing  Goal: ET tube will be managed safely  Outcome: Progressing     Vent Mode: VCV  FiO2 (%):  [40 %-60 %] 50 %  S RR:  [16-20] 16  S VT:  [350 mL] 350 mL  PEEP/CPAP (cm H2O):  [5 cm H2O] 5 cm H2O  Minute Ventilation (L/min):  [8.6 L/min-10.7 L/min] 9.4 L/min  PIP:  [15 cm H2O-47 cm H2O] 33 cm H2O  NM SUP:  [5 cm H20] 5 cm H20  MAP (cm H2O):  [7-10] 9    Pt continues to be on full mechanical ventilator support with the above settings.  ETT size 7.0 secured 21 cm at the gums.  Breath sounds clear bilaterally.  Minimal amount of white, tan secretions via inline suction. SBT on hold per MD due to change in pt condition. Rate was decreased from 20 to 16 per Dr. Wray/Dr. Manzo. RR 22, SpO2 96%. Pt will remain on full vent support at this time.     ZEHRA McfaddenT

## 2021-05-28 NOTE — DISCHARGE SUMMARY
DC pt from cardiac rehab at this time due to medical status.  Will initiate when appropriate with order.

## 2021-05-28 NOTE — PROGRESS NOTES
"Vent Mode: VCV  FiO2 (%):  [40 %-60 %] 60 %  S RR:  [16-20] 16  S VT:  [350 mL] 350 mL  PEEP/CPAP (cm H2O):  [5 cm H2O] 5 cm H2O  Minute Ventilation (L/min):  [8.2 L/min-10.6 L/min] 10 L/min  PIP:  [27 cm H2O-35 cm H2O] 35 cm H2O  MAP (cm H2O):  [9-11] 11    Patient remains intubated and ventilated on above settings. ABG drawn C02 was decreased due to patient overbreathing the ventilator and Pa02 was low so Sp02 was raised to 60%. RT will continue to monitor.    BP 97/67   Pulse (!) 110   Temp (!) 101.6  F (38.7  C) (Bladder)   Resp 23 Comment: /vent  Ht 4' 11\" (1.499 m)   Wt 112 lb 14 oz (51.2 kg)   SpO2 94%   BMI 22.80 kg/m      "

## 2021-05-28 NOTE — PROGRESS NOTES
"Critical Care Progress Note     Admit Date: 4/24/2019  ICU Day: 2     Code Status: full code    CC: rheumatic valvular disease    HPI: 69 y.o. Mongolian  male with rheumatic heart valve dz(AV regurgitation, , MV stenosis aand TV regurgitation) afib, and pericardial adhesions who was admitted 4/24/19 for AVR, MVR tissue valves, and takedown of pericardial adhesions by Dr Vaughn     Case summary: Pt was an easy intubation, received 15 mg methadone preop, Echo showed boggy RV.  PAPs in the 30s preop, pt had hard time coming off extracorporeal circulation, needing many bumps of epi, flolan was started.  Also they took down pericardial adhesions.  To ICU on full vent support, flolan full strength, not paced,  On epi, insulin and precedex drips.  Protamine was given on arrival to ICU.    Interval events:  4/24/19: to OR for AVR/MVR    To ICU on Flolan  4/25/19: Flolan weaned off   When off sedation for SBT, pt noted to not move left side and  left eye with upward deviation   Stroke code called    CTA shows right P2 occlusion   Neurology consulted   SBP parameters changed to     Failed SBT     Major events over the last 24 hours: repeat head CT  revealed evolving acute/subacute infarction    Subjective: in bed, lightly sedated, will move right arm  Follows some commands by son  ROS: unable to do    Drips: precedex, epi       Ventilator: Mechanical Ventilation Day: 3        Settings: 20/350/5/45%    BP 95/54   Pulse 97   Temp 99.6  F (37.6  C) (Bladder)   Resp 21   Ht 4' 11\" (1.499 m)   Wt 112 lb 14 oz (51.2 kg)   SpO2 95%   BMI 22.80 kg/m    PAP: (17-35)/(5-22) 25/11  CVP:  [8 mmHg-25 mmHg] 25 mmHg  I/O last 3 completed shifts:  In: 3221.7 [I.V.:2311.7; IV Piggyback:910]  Out: 3057 [Urine:2757; Chest Tube:300]  Weight change: 7 lb 0.9 oz (3.2 kg)  Vent Mode: VCV  FiO2 (%):  [40 %-60 %] 60 %  S RR:  [20] 20  S VT:  [350 mL] 350 mL  PEEP/CPAP (cm H2O):  [5 cm H2O] 5 cm H2O  Minute Ventilation (L/min):  [8.6 " L/min-12 L/min] 9.5 L/min  PIP:  [12 cm H2O-47 cm H2O] 31 cm H2O  MT SUP:  [5 cm H20] 5 cm H20  MAP (cm H2O):  [6-10] 9      EXAM:   Mental status: in bed lightly sedated  HEENT: NC PERRL OETT  Resp: cta  Cardiovascular: RRR  Abdominal: soft  Extremeties: no e/c/c  Neurology: does not move left side when sedation lightened  Other: ETT SG AlineCTs     Labs Personally reviewed: yes  Results from last 7 days   Lab Units 04/26/19  0504 04/25/19  0458 04/24/19  1702 04/24/19  1328 04/24/19  1157   LN-WHITE BLOOD CELL COUNT thou/uL  --  14.6*  --  11.8* 20.1*   LN-HEMOGLOBIN g/dL 9.9* 10.8* 10.7* 11.3* 9.3*   LN-HEMATOCRIT % 31.0* 32.3*  --  33.7* 28.2*   LN-PLATELET COUNT thou/uL 39* 72* 88* 52* 82*   LN-NEUTROPHILS RELATIVE PERCENT %  --  84*  --  88*  --    LN-MONOCYTES RELATIVE PERCENT %  --  11*  --  5  --      Results from last 7 days   Lab Units 04/26/19  0504 04/25/19 0458 04/24/19  1328 04/23/19  0913   LN-SODIUM mmol/L  --  145 147* 138   LN-POTASSIUM mmol/L 3.4* 4.2 3.8 4.0   LN-CHLORIDE mmol/L  --  118* 118* 108*   LN-CO2 mmol/L  --  20* 24 24   LN-BLOOD UREA NITROGEN mg/dL  --  17 15 16   LN-CREATININE mg/dL  --  0.92 0.80 0.96   LN-CALCIUM mg/dL  --  8.5 8.0* 9.3     Results from last 7 days   Lab Units 04/25/19 0458 04/24/19  1327 04/24/19  1218   LN-INR  1.57* 1.99* 1.88*   LN-PARTIAL THROMBOPLASTIN TIME seconds  --  81* 147*       Last ABG 4/26/19:  PH 7.46  PCO2 31 PAO2 59 Bicarb 23 SAT 96    Microbiology: na  Imaging (all imaging is personally reviewed):     PCXR 4/25/19- Postoperative changes from cardiac valvular surgery. Moderate cardiomegaly persists. There is mild central hilar congestion and slight interstitial prominence suggesting minimal edema. Left hemidiaphragm obscured which may relate to cardiomegaly or left lower lobe atelectasis. No pneumothorax or pleural effusion. Tubes and catheters appear in good position.    Impression:  1. Acute hypoxic respiratory failure  2. Aortic  regurgitation.    POD# 2 tissue AVR    3. Mitral stenosis.    POD# 2Tissue MVR   4. Tricuspid regurgitation   5. Heart failure  Dilated RV  Flolan started in OR        Now off  6.   Acute ischemic stroke  7.  Thrombocytopenia.    8.   Pericardial adhesions; s/p takedown of adhesions 4/24  9.   Acute blood loss anemia .  EBL 1025  10. Non English speaking.  Lao     PLAN:   1. Continue full vent support  2. increase o2, repeat abg  3. SBP parameters changed to   4. Will need MRI, need to wait until temp pacing wires are out  5. Place OG  6. Have Lao  available for discussion with son today  7. Other issues per CVS and neurology   ICU DAILY CHECKLIST                           Can patient transfer out of ICU? no    FAST HUG:    Feeding:  Feeding: No.  Patient is receiving NPO    Guzman:Yes  Analgesia/Sedation:Yes precedex  Thromboembolic prophylaxis: yes; Mode:  Heparin and SCDs  HOB>30:  Yes  Stress Ulcer Protocol Active: yes; Mode: PPI  Glycemic Control: Any glucose > 180 no; Mode of Insulin Therapy: Sliding Scale Insulin    INTUBATED:  Can patient have daily waking:  yes  Can patient have spontaneous breathing trial:  no    Restraints? yes    PHYSICAL THERAPY AND MOBILITY:  Can patient have PT and mobility trial: no  Activity: Bed Rest    transfer/discharge plans: stays ICU    The patient is critically ill with impairment in organ system and high risk of life threatening deterioration.     Total CCT spent 55 minutes thus far today.       Sandy PORRAS, [phone]  [facility]

## 2021-05-28 NOTE — PROGRESS NOTES
NEUROLOGY PROGRESS NOTE     Kody Johns,  1950, MRN [id number] Date: 2019     [facility]   Code status:  Full Code   PCP: Aristides Alegria MD, [phone]      ASSESSMENT & PLAN   Hospital Day#: 5  Diagnosis code: Ischemic stroke    Ischemic stroke  Aortic/Mitral valve repair      Head CT shows right P2 stroke which fits with the CTA scan.    Exam suggests more deficits than just a PCA stroke.    Suspect brainstem (upper titus/medulla) stroke in addition to PCA stroke.    Unable to do MRI - will repeat head CT as exam has worsened.    Permissive HTN (if safe from cardiology stand point).    Most likely cause of stroke is cardioembolic secondary to surgery.     Hold ASA due to low platelets. Needs anticoagulation long term.    Discussed prognosis with the family. Prognosticate after head CT. Most likely will need Trach and PEG.     Thank you again for this referral, please feel free to contact me if you have any questions.     CHIEF COMPLAINT S/P MVR (mitral valve repair)     HISTORY OF PRESENT ILLNESS     We have been requested by Dr. Omer to evaluate Kody Johns who is a 69 y.o.  male for acute stroke.    This is a 69-year-old male on whom neurology is been consulted to evaluate for stroke.  Patient had a mitral valve surgery yesterday and was under sedation after that.  Sedation was lightened today when it was noticed that he was having some ophthalmoplegia.  Was mainly involving the left eye.  Stroke code was called.  He was not a TPA candidate because of the recent surgery and unclear time of onset.  Left arm and leg weakness was also noted.  Head CT was done which showed a right P2 occlusion.  The patient's exam is been stable since the stroke code.  He was not a candidate for thrombectomy because the blood vessel was too small for mechanical thrombectomy.      Patient is unable to provide any history because of intubation.  History is obtained through chart review and through talking  to the son.    4/26  Patient remains intubated. Unable to extubate. He has spiked a fever.  Per family he has been opening his eyes. Nodding to questions. Moving right arm but not the right leg.  He did move the left arm once for the family. Though the healthcare staff have not observed this.  No other new symptoms per the family.   MRI brain could not be done due to Pacemaker wires.    4/29  Patient remains in the ICU. Due to low platelets the pacemaker wires cannot be removed and patient cannot get the MRI. Family still wanting aggressive cares. Patients fever has been thought to be secondary to neurological disease.     PAST MEDICAL & SURGICAL HISTORY     Medical History  Patient Active Problem List    Diagnosis Date Noted     Atrial fibrillation with RVR (H)      Hypernatremia      Thrombocytopenia (H)      Sepsis, due to unspecified organism (H)      S/P MVR (mitral valve repair) 04/24/2019     ARF (acute respiratory failure) (H) 04/24/2019     Anemia due to blood loss, acute 04/24/2019     Coagulopathy (H) 04/24/2019     Non-English speaking patient 04/24/2019     A-fib (H) 09/14/2017     Heart failure with preserved ejection fraction (H) 09/14/2017     Moderate malnutrition (H) 08/31/2017     ALEENA (acute kidney injury) (H) 08/31/2017     Aortic valve disease, rheumatic 08/30/2017     Lightheadedness 08/30/2017     Atherosclerosis of native coronary artery of native heart without angina pectoris      Essential hypertension with goal blood pressure less than 130/85      Pure hypercholesterolemia      Dysuria 08/29/2017     Hypothyroidism, unspecified type      Mitral valve stenosis, unspecified etiology      Elevated LFTs 08/26/2016     Silent Stroke      Shoulder strain      Dysphagia      Hyperlipidemia      Rheumatic heart disease      Blurry vision      Hearing loss      Nonspecific reaction to tuberculin skin test without active tuberculosis      Past Medical History: Patient  has a past medical history of  ALEENA (acute kidney injury) (H), Anemia due to blood loss, acute (4/24/2019), Asthma, Atrial fibrillation (H), Blurry vision, CHF (congestive heart failure) (H), Chronic kidney disease, Coronary artery disease, Dysphagia, Dysuria, Hearing loss, Heart murmur, Heart murmur, Hyperlipidemia, Hypertension, Hypothyroidism, Mitral valve stenosis, Moderate malnutrition (H), Palpitations, Rheumatic heart disease, Shortness of breath, Stroke (H), and Valvular disease.  Surgical History  He  has a past surgical history that includes Cataract extraction (Left, 06/13/2016); Cardiac catheterization; Coronary Angiogram (N/A, 11/30/2018); Eye surgery; pr replacement of mitral valve (N/A, 4/24/2019); Aortic valve replacement (N/A, 4/24/2019); and PICC Team Line Insertion (4/29/2019).     SOCIAL HISTORY     Reviewed, and he  reports that he quit smoking about 4 years ago. His smoking use included cigarettes. He has a 50.00 pack-year smoking history. He has quit using smokeless tobacco. He reports that he does not drink alcohol or use drugs.     FAMILY HISTORY     Reviewed, and family history includes No Medical Problems in his father and mother.     ALLERGIES     No Known Allergies     REVIEW OF SYSTEMS     Unable to do ROS due to intubation and AMS.     HOME & HOSPITAL MEDICATIONS     Prior to Admission Medications  Medications Prior to Admission   Medication Sig Dispense Refill Last Dose     albuterol (PROAIR HFA;PROVENTIL HFA;VENTOLIN HFA) 90 mcg/actuation inhaler Inhale 1-2 puffs every 6 (six) hours as needed. 1 Inhaler 5 4/22/2019     amoxicillin (AMOXIL) 500 MG capsule TAKE 4 TABLETS (2,000 mg) BY MOUTH 1 HOUR PRIOR TO PROCEDURE. 4 capsule 3 Unknown     atorvastatin (LIPITOR) 40 MG tablet TAKE 1 PILL BY MOUTH AT BEDTIME. FOR CHOLESTEROL 90 tablet 2 4/22/2019     baclofen (LIORESAL) 10 MG tablet TAKE 1 TABLET BY MOUTH TWICE DAILY 60 tablet 5 4/23/2019     enoxaparin (LOVENOX) 40 mg/0.4 mL syringe Inject 0.4 mL (40 mg total) under  the skin every evening. 2 Syringe 0 4/22/2019     enoxaparin (LOVENOX) 60 mg/0.6 mL syringe Inject 0.6 mL (60 mg total) under the skin every morning. 3 Syringe 0 4/23/2019 at am     fluticasone (FLONASE) 50 mcg/actuation nasal spray 2 sprays into each nostril daily. (Patient taking differently: 2 sprays into each nostril daily as needed.       ) 10 g 3 4/22/2019     furosemide (LASIX) 20 MG tablet TAKE 1 TABLET (20 MG TOTAL) BY MOUTH DAILY FOR EDEMA 90 tablet 3 4/23/2019     levothyroxine (SYNTHROID, LEVOTHROID) 88 MCG tablet TAKE 1 PILL BY MOUTH EVERYDAY FOR THYROID 30 tablet 6 4/23/2019     metoprolol succinate (TOPROL-XL) 25 MG TAKE 1 PILL BY MOUTH EVERYDAY FOR BLOOD PRESSURE 90 tablet 3 4/23/2019     omeprazole (PRILOSEC) 20 MG capsule TAKE 1 PILL BY MOUTH EVERYDAY FOR STOMACH 30 capsule 8 4/23/2019     spironolactone (ALDACTONE) 25 MG tablet TAKE 1 PILL BY MOUTH EVERYDAY 30 tablet 8 4/23/2019     warfarin (COUMADIN/JANTOVEN) 1 MG tablet Take 1-2 mg by mouth See Admin Instructions. 1 mg daily on Mon/Wed/Fri; and 2 mg daily rest of week   4/19/2019     acetaminophen (TYLENOL) 500 MG tablet Take 500-1,000 mg by mouth every 6 (six) hours as needed for pain. Every 6-8 hours as needed. No more than 4,000MG of acetaminophen daily.   Unknown     ibuprofen (ADVIL,MOTRIN) 600 MG tablet Take 600 mg by mouth every 6 (six) hours as needed for pain.   Unknown       Hospital Medications    acetaminophen  650 mg Oral Q6H    Or     acetaminophen  650 mg Oral Q6H    Or     acetaminophen  650 mg Rectal Q6H     aspirin  300 mg Rectal Daily 0800     atorvastatin  40 mg Enteral Tube DAILY     baclofen  10 mg Enteral Tube BID     bisacodyl  10 mg Oral Once     chlorhexidine  15 mL Topical Q12H     docusate sodium  100 mg Oral BID    Or     docusate sodium (COLACE) 50 mg/5 mL oral liquid  100 mg Enteral Tube BID     furosemide  40 mg Intravenous BID - diuretic     insulin aspart (NovoLOG) injection   Subcutaneous Q4H FIXED TIMES      levothyroxine  88 mcg Enteral Tube Daily 0600     magnesium hydroxide  30 mL Enteral Tube DAILY     magnesium sulfate IVPB  2 g Intravenous Once     melatonin  3 mg Enteral Tube QHS     meropenem  500 mg Intravenous Q12H     metoprolol tartrate  12.5 mg Oral BID     metoprolol tartrate  12.5 mg Oral 0330, 1300, 1700 - CV Metoprolol     pantoprazole  40 mg Intravenous Q24H    Or     omeprazole  20 mg Oral Q24H    Or     omeprazole  20 mg Enteral Tube Q24H     polyethylene glycol  17 g Enteral Tube DAILY     sodium chloride  10-30 mL Intravenous Q8H FIXED TIMES     sodium chloride  3 mL Intravenous Line Care        PHYSICAL EXAM     Vital signs  Temp:  [101.8  F (38.8  C)-104.7  F (40.4  C)] 102  F (38.9  C)  Heart Rate:  [] 97  Resp:  [22-36] 25  BP: ()/(50-86) 90/54  Arterial Line BP: (110-134)/(72-83) 134/83  FiO2 (%):  [50 %-100 %] 50 %    Weight:   116 lb 2.9 oz (52.7 kg)    GENERAL: Healthy appearing, alert, no acute distress, normal habitus.  CARDIOVASCULAR: Ext warm and well perfused.  NEUROLOGICAL:  Patient is lying in bed intubated. Opens eyes to voice Minimaly. Right pupil larger than left. EOM absent. VFI on right. Corneals present. NLF symmetric. Does not move any ext to pain or spontaneously. Tone symmetric.      DIAGNOSTIC STUDIES     Pertinent Radiology   Head CT images reviewed. No acute stroke seen  IMPRESSION:   CONCLUSION:   HEAD CT:  1.  No intracranial hemorrhage, mass lesions, hydrocephalus or CT evidence for an acute infarct. MRI is more sensitive for the evaluation of acute infarct.  2.  Chronic lacunae as detailed above.  3.  Mild diffuse cerebral parenchymal volume loss. Presumed chronic hypertensive/microvascular ischemic white matter changes.        HEAD CTA:   1.  There is cut off on the P2 segment of the right posterior cerebral artery.  2.  No high-grade stenosis or occlusion of the rest of the major intracranial arteries.  3.  There is 1.5 to 2 mm focal outpouching off the  expected location of the origin of the right posterior communicating artery. This is likely an infundibulum versus small aneurysm.        NECK CTA:  1.  No significant stenosis in the neck vessels based on NASCET criteria.  2.  No evidence for dissection.    Repeat Head CT 4/26 Images reviewed. Show right PCA stroke with question of pontine stroke.    Recent Results (from the past 24 hour(s))   Sodium    Collection Time: 04/28/19  8:09 PM   Result Value Ref Range    Sodium 152 (HH) 136 - 145 mmol/L   POCT Glucose - every 4 hours    Collection Time: 04/28/19  8:18 PM   Result Value Ref Range    Glucose 175 (H) 70 - 139 mg/dL   POCT Glucose    Collection Time: 04/28/19 11:46 PM   Result Value Ref Range    Glucose 147 (H) 70 - 139 mg/dL   Sodium    Collection Time: 04/29/19 12:59 AM   Result Value Ref Range    Sodium 150 (H) 136 - 145 mmol/L   POCT Glucose - every 4 hours    Collection Time: 04/29/19  3:48 AM   Result Value Ref Range    Glucose 176 (H) 70 - 139 mg/dL   Magnesium    Collection Time: 04/29/19  5:24 AM   Result Value Ref Range    Magnesium 2.2 1.8 - 2.6 mg/dL   Basic Metabolic Panel    Collection Time: 04/29/19  5:24 AM   Result Value Ref Range    Sodium 150 (H) 136 - 145 mmol/L    Potassium 3.4 (L) 3.5 - 5.0 mmol/L    Chloride 117 (H) 98 - 107 mmol/L    CO2 26 22 - 31 mmol/L    Anion Gap, Calculation 7 5 - 18 mmol/L    Glucose 195 (H) 70 - 125 mg/dL    Calcium 7.4 (L) 8.5 - 10.5 mg/dL    BUN 44 (H) 8 - 22 mg/dL    Creatinine 1.32 (H) 0.70 - 1.30 mg/dL    GFR MDRD Af Amer >60 >60 mL/min/1.73m2    GFR MDRD Non Af Amer 54 (L) >60 mL/min/1.73m2   Sodium    Collection Time: 04/29/19  5:24 AM   Result Value Ref Range    Sodium 150 (H) 136 - 145 mmol/L   Blood Gases, Arterial    Collection Time: 04/29/19  6:05 AM   Result Value Ref Range    pH, Arterial 7.51 (H) 7.37 - 7.44    pCO2, Arterial 34 (L) 35 - 45 mm Hg    pO2, Arterial 67 (L) 75 - 85 mm Hg    Bicarbonate, Arterial Calc 28.0 23.0 - 29.0 mmol/L    O2  Sat, Arterial 98.1 (H) 95.0 - 96.0 %    Oxyhemoglobin 94.7 (L) 95.0 - 96.0 %    Base Excess, Arterial Calc 4.0 mmol/L    Ventilation Mode VCV     Rate 16 rr/min    FIO2 50.00     Peep 8 cm H2O    Sample Stabilized Temperature 37.0 degrees C    Ventilator Tidal Volume 350 mL   HM2(CBC w/o Differential)    Collection Time: 04/29/19  6:29 AM   Result Value Ref Range    WBC 4.9 4.0 - 11.0 thou/uL    RBC 2.82 (L) 4.40 - 6.20 mill/uL    Hemoglobin 8.5 (L) 14.0 - 18.0 g/dL    Hematocrit 26.9 (L) 40.0 - 54.0 %    MCV 95 80 - 100 fL    MCH 30.1 27.0 - 34.0 pg    MCHC 31.6 (L) 32.0 - 36.0 g/dL    RDW 15.8 (H) 11.0 - 14.5 %    Platelets 28 (LL) 140 - 440 thou/uL    MPV 13.3 (H) 8.5 - 12.5 fL   POCT Glucose - every 4 hours    Collection Time: 04/29/19  7:52 AM   Result Value Ref Range    Glucose 191 (H) 70 - 139 mg/dL   Morphology,Smear Review (MORP)    Collection Time: 04/29/19 10:32 AM   Result Value Ref Range    Pathology, Smear Review See Separate Pathology Report (!) (none)    WBC 4.1 4.0 - 11.0 thou/uL    RBC 2.75 (L) 4.40 - 6.20 mill/uL    Hemoglobin 8.4 (L) 14.0 - 18.0 g/dL    Hematocrit 26.3 (L) 40.0 - 54.0 %    MCV 96 80 - 100 fL    MCH 30.5 27.0 - 34.0 pg    MCHC 31.9 (L) 32.0 - 36.0 g/dL    RDW 15.8 (H) 11.0 - 14.5 %    Platelets 27 (LL) 140 - 440 thou/uL    MPV 13.4 (H) 8.5 - 12.5 fL   Protime-INR    Collection Time: 04/29/19 10:32 AM   Result Value Ref Range    INR 1.35 (H) 0.90 - 1.10   APTT    Collection Time: 04/29/19 10:32 AM   Result Value Ref Range    PTT 50 (H) 24 - 37 seconds   Manual Differential    Collection Time: 04/29/19 10:32 AM   Result Value Ref Range    Total Neutrophils % 70 50 - 70 %    Lymphocytes % 22 20 - 40 %    Monocytes % 8 2 - 10 %    Eosinophils %  0 0 - 6 %    Basophils % 0 0 - 2 %    Total Neutrophils Absolute 2.9 2.0 - 7.7 thou/ul    Lymphocytes Absolute 0.9 0.8 - 4.4 thou/uL    Monocytes Absolute 0.3 0.0 - 0.9 thou/uL    Eosinophils Absolute 0.0 0.0 - 0.4 thou/uL    Basophils  Absolute 0.0 0.0 - 0.2 thou/uL    Manual nRBC per 100 Cells 1 (H) 0-<1    Dohle Bodies 1+ (!) Negative    Platelet Estimate Decreased (!) Normal    Ovalocytes 1+ (!) Negative   Fibrinogen    Collection Time: 04/29/19 10:32 AM   Result Value Ref Range    Fibrinogen 571 (H) 170 - 410 mg/dL   Blood Gases, Arterial    Collection Time: 04/29/19 10:57 AM   Result Value Ref Range    pH, Arterial 7.55 (H) 7.37 - 7.44    pCO2, Arterial 30 (L) 35 - 45 mm Hg    pO2, Arterial 73 (L) 75 - 85 mm Hg    Bicarbonate, Arterial Calc 28.0 23.0 - 29.0 mmol/L    O2 Sat, Arterial 99.0 (H) 95.0 - 96.0 %    Oxyhemoglobin 95.6 95.0 - 96.0 %    Base Excess, Arterial Calc 3.9 mmol/L    Ventilation Mode VCV     Rate 20 rr/min    FIO2 50.00     Peep 5 cm H2O    Sample Stabilized Temperature 37.0 degrees C    Ventilator Tidal Volume 350 mL   POCT Glucose - every 4 hours    Collection Time: 04/29/19 11:26 AM   Result Value Ref Range    Glucose 159 (H) 70 - 139 mg/dL   Sodium    Collection Time: 04/29/19 12:26 PM   Result Value Ref Range    Sodium 151 (HH) 136 - 145 mmol/L   Potassium    Collection Time: 04/29/19 12:26 PM   Result Value Ref Range    Potassium 3.3 (L) 3.5 - 5.0 mmol/L   Platelet Product Information    Collection Time: 04/29/19 12:57 PM   Result Value Ref Range    Unit Type A Neg     Blood Expiration Date [id number]     Unit Number [id number]     Status Issued     Component Platelets     PRODUCT CODE [id number]     Issue Date and Time [id number]     Blood Type 0600     CODING SYSTEM [id number]    POCT Glucose - every 4 hours    Collection Time: 04/29/19  4:21 PM   Result Value Ref Range    Glucose 204 (H) 70 - 139 mg/dL       Total time spent for face to face visit, reviewing labs/imaging studies, counseling and coordination of care was: 25 min More than 50% of this time was spent on counseling and coordination of care.  Discussed prognosis with family.     Dustin Moya MD  [employer] Messaging:  [email or web address]  Tel: [phone]  This note was dictated using voice recognition software.  Any grammatical or context distortions are unintentional and inherent to the software.

## 2021-05-28 NOTE — PROGRESS NOTES
NEUROLOGY PROGRESS NOTE     Kody Johns,  1950, MRN [id number] Date: 2019     [facility]   Code status:  Full Code   PCP: Aristides Alegria MD, [phone]      ASSESSMENT & PLAN   Hospital Day#: 12  Diagnosis code: Ischemic stroke    Ischemic stroke  Aortic/Mitral valve repair  Elevated CK with generalized weakness - ? Critical care myopathy      Head CT shows right P2 stroke which fits with the CTA scan.    MRI shows PCA stroke.    MRI brain/MRI cervical spine do not explain arm and leg weakness.    Patients CK came back elevated and he might have critical care myopathy though his weakness is asymmetrical which is generally not seen in critical care myopathy.    Decrease sedation and avoid paralytic agents.     Follow CK. Hydration as much as possible.    BP can be normalized.     Most likely cause of stroke is cardioembolic secondary to surgery.     Hold ASA (stopped today) due to low platelets. Needs anticoagulation long term.    Family meeting (tomorrow) to decide on Trach and PEG. Placement to LTC later this week.  Will continue to follow.    Thank you again for this referral, please feel free to contact me if you have any questions.     CHIEF COMPLAINT S/P MVR (mitral valve repair)     HISTORY OF PRESENT ILLNESS     We have been requested by Dr. Omer to evaluate Kody Johns who is a 69 y.o.  male for acute stroke.    This is a 69-year-old male on whom neurology is been consulted to evaluate for stroke.  Patient had a mitral valve surgery yesterday and was under sedation after that.  Sedation was lightened today when it was noticed that he was having some ophthalmoplegia.  Was mainly involving the left eye.  Stroke code was called.  He was not a TPA candidate because of the recent surgery and unclear time of onset.  Left arm and leg weakness was also noted.  Head CT was done which showed a right P2 occlusion.  The patient's exam is been stable since the stroke code.  He was not a  candidate for thrombectomy because the blood vessel was too small for mechanical thrombectomy.      Patient is unable to provide any history because of intubation.  History is obtained through chart review and through talking to the son.    4/26  Patient remains intubated. Unable to extubate. He has spiked a fever.  Per family he has been opening his eyes. Nodding to questions. Moving right arm but not the right leg.  He did move the left arm once for the family. Though the healthcare staff have not observed this.  No other new symptoms per the family.   MRI brain could not be done due to Pacemaker wires.    4/29  Patient remains in the ICU. Due to low platelets the pacemaker wires cannot be removed and patient cannot get the MRI. Family still wanting aggressive cares. Patients fever has been thought to be secondary to neurological disease.    4/30  Patient has been agitated when off the Precedex. Plan for MRI today and possibly family conference tomorrow. Family is hopeful that he will make good recovery. Discussed with family that there is no cure for stroke.     5/1  Family meeting later today. Patient still has not improved. MRI brain done last night.     5/2  Patients MRI cervical spine does not explain why he cannot move left side. Updated family on results.   Plan to proceed with PEG and Trach most likely. Family meeting tomorrow afternoon to decide.    5/3  Family meeting was cancelled for today. Family most likely planning on Trach and PEG today. Discussed with youngest son.     Cardiology to do BATSHEVA with cardioversion.  Abd CT shows ileus.    5/4  No new events. Family meeting has not been set for so far. No significant arm or leg movements seen.     5/5  Patients CK came back elevated. Sedation has been lightened and patient is getting more alert today. Family meeting still pending.     5/6  Sedation has been decreased and patient is waking up. Family meeting scheduled for tomorrow.      PAST MEDICAL &  SURGICAL HISTORY     Medical History  Patient Active Problem List    Diagnosis Date Noted     Acute cardioembolic stroke (H)      Paralytic ileus (H) 05/02/2019     Small bowel obstruction (H)      Atrial fibrillation with RVR (H)      Hypernatremia      Thrombocytopenia (H)      Sepsis, due to unspecified organism (H)      S/P MVR (mitral valve repair) 04/24/2019     Acute respiratory failure with hypoxemia (H) 04/24/2019     Anemia due to blood loss, acute 04/24/2019     Coagulopathy (H) 04/24/2019     Non-English speaking patient 04/24/2019     A-fib (H) 09/14/2017     Heart failure with preserved ejection fraction (H) 09/14/2017     Moderate malnutrition (H) 08/31/2017     ALEENA (acute kidney injury) (H) 08/31/2017     Aortic valve disease, rheumatic 08/30/2017     Lightheadedness 08/30/2017     Atherosclerosis of native coronary artery of native heart without angina pectoris      Essential hypertension with goal blood pressure less than 130/85      Pure hypercholesterolemia      Dysuria 08/29/2017     Hypothyroidism, unspecified type      Mitral valve stenosis, unspecified etiology      Elevated LFTs 08/26/2016     Silent Stroke      Shoulder strain      Dysphagia      Hyperlipidemia      Rheumatic heart disease      Blurry vision      Hearing loss      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  Surgical History  He  has a past surgical history that includes  Cataract extraction (Left, 06/13/2016); Cardiac catheterization; Coronary Angiogram (N/A, 11/30/2018); Eye surgery; pr replacement of mitral valve (N/A, 4/24/2019); Aortic valve replacement (N/A, 4/24/2019); and PICC Team Line Insertion (4/29/2019).     SOCIAL HISTORY     Reviewed, and he  reports that he quit smoking about 4 years ago. His smoking use included cigarettes. He has a 50.00 pack-year smoking history. He has quit using smokeless tobacco. He reports that he does not drink alcohol or use drugs.     FAMILY HISTORY     Reviewed, and family history includes No Medical Problems in his father and mother.     ALLERGIES     No Known Allergies     REVIEW OF SYSTEMS     Unable to do ROS due to intubation and AMS.     HOME & HOSPITAL MEDICATIONS     Prior to Admission Medications  Medications Prior to Admission   Medication Sig Dispense Refill Last Dose     albuterol (PROAIR HFA;PROVENTIL HFA;VENTOLIN HFA) 90 mcg/actuation inhaler Inhale 1-2 puffs every 6 (six) hours as needed. 1 Inhaler 5 4/22/2019     amoxicillin (AMOXIL) 500 MG capsule TAKE 4 TABLETS (2,000 mg) BY MOUTH 1 HOUR PRIOR TO PROCEDURE. 4 capsule 3 Unknown     atorvastatin (LIPITOR) 40 MG tablet TAKE 1 PILL BY MOUTH AT BEDTIME. FOR CHOLESTEROL 90 tablet 2 4/22/2019     baclofen (LIORESAL) 10 MG tablet TAKE 1 TABLET BY MOUTH TWICE DAILY 60 tablet 5 4/23/2019     enoxaparin (LOVENOX) 40 mg/0.4 mL syringe Inject 0.4 mL (40 mg total) under the skin every evening. 2 Syringe 0 4/22/2019     enoxaparin (LOVENOX) 60 mg/0.6 mL syringe Inject 0.6 mL (60 mg total) under the skin every morning. 3 Syringe 0 4/23/2019 at am     fluticasone (FLONASE) 50 mcg/actuation nasal spray 2 sprays into each nostril daily. (Patient taking differently: 2 sprays into each nostril daily as needed.       ) 10 g 3 4/22/2019     furosemide (LASIX) 20 MG tablet TAKE 1 TABLET (20 MG TOTAL) BY MOUTH DAILY FOR EDEMA 90 tablet 3 4/23/2019     levothyroxine (SYNTHROID, LEVOTHROID) 88 MCG  tablet TAKE 1 PILL BY MOUTH EVERYDAY FOR THYROID 30 tablet 6 4/23/2019     metoprolol succinate (TOPROL-XL) 25 MG TAKE 1 PILL BY MOUTH EVERYDAY FOR BLOOD PRESSURE 90 tablet 3 4/23/2019     omeprazole (PRILOSEC) 20 MG capsule TAKE 1 PILL BY MOUTH EVERYDAY FOR STOMACH 30 capsule 8 4/23/2019     spironolactone (ALDACTONE) 25 MG tablet TAKE 1 PILL BY MOUTH EVERYDAY 30 tablet 8 4/23/2019     warfarin (COUMADIN/JANTOVEN) 1 MG tablet Take 1-2 mg by mouth See Admin Instructions. 1 mg daily on Mon/Wed/Fri; and 2 mg daily rest of week   4/19/2019     acetaminophen (TYLENOL) 500 MG tablet Take 500-1,000 mg by mouth every 6 (six) hours as needed for pain. Every 6-8 hours as needed. No more than 4,000MG of acetaminophen daily.   Unknown     ibuprofen (ADVIL,MOTRIN) 600 MG tablet Take 600 mg by mouth every 6 (six) hours as needed for pain.   Unknown       Hospital Medications    amiodarone  200 mg Per NG tube BID     aspirin  81 mg Enteral Tube DAILY     baclofen  10 mg Enteral Tube BID     cefTRIAXone (ROCEPHIN)  IV  1 g Intravenous Q24H     chlorhexidine  15 mL Topical Q12H     docusate sodium (COLACE) 50 mg/5 mL oral liquid  100 mg Oral BID     furosemide  20 mg Intravenous BID - diuretic     insulin aspart (NovoLOG) injection   Subcutaneous Q4H     levothyroxine  75 mcg Intravenous Daily     metoprolol tartrate  25 mg Enteral Tube BID     pantoprazole  40 mg Intravenous Q24H    Or     omeprazole  20 mg Oral Q24H    Or     omeprazole  20 mg Enteral Tube Q24H     potassium chloride  10 mEq Enteral Tube Daily with supper     sodium chloride  10-30 mL Intravenous Q8H FIXED TIMES     sodium chloride  3 mL Intravenous Line Care     vancomycin  750 mg Intravenous Q24H        PHYSICAL EXAM     Vital signs  Temp:  [98.6  F (37  C)-98.9  F (37.2  C)] 98.6  F (37  C)  Heart Rate:  [101-130] 124  Resp:  [16-33] 28  BP: ()/(53-87) 122/86  FiO2 (%):  [30 %-35 %] 30 %    Weight:   109 lb 14.4 oz (49.9 kg)    GENERAL: Healthy  appearing, alert, no acute distress, normal habitus.  CARDIOVASCULAR: Ext warm and well perfused.  NEUROLOGICAL:  Patient is lying in bed intubated. Opens eyes to voice Minimaly. Slightly more alert today. Right pupil larger than left. EOM absent. VFI on right. Corneals present. NLF symmetric. Does not move any ext to pain or spontaneously. Starting to move right UE today. Tone symmetric.      DIAGNOSTIC STUDIES     Pertinent Radiology   Head CT images reviewed. No acute stroke seen  IMPRESSION:   CONCLUSION:   HEAD CT:  1.  No intracranial hemorrhage, mass lesions, hydrocephalus or CT evidence for an acute infarct. MRI is more sensitive for the evaluation of acute infarct.  2.  Chronic lacunae as detailed above.  3.  Mild diffuse cerebral parenchymal volume loss. Presumed chronic hypertensive/microvascular ischemic white matter changes.        HEAD CTA:   1.  There is cut off on the P2 segment of the right posterior cerebral artery.  2.  No high-grade stenosis or occlusion of the rest of the major intracranial arteries.  3.  There is 1.5 to 2 mm focal outpouching off the expected location of the origin of the right posterior communicating artery. This is likely an infundibulum versus small aneurysm.        NECK CTA:  1.  No significant stenosis in the neck vessels based on NASCET criteria.  2.  No evidence for dissection.    Repeat Head CT 4/26 Images reviewed. Show right PCA stroke with question of pontine stroke.    Repeat Head CT images reviewed. 4/29  IMPRESSION:   CONCLUSION:  1.  Motion degraded examination demonstrates expected temporal evolution of right posterior cerebral artery territory infarct including interval development of small volume petechial hemorrhage as above. No hemorrhagic transformation. Mild local mass   effect with partial effacement of the posterior right lateral ventricle.  2.  Some loss of gray-white matter differentiation involving the parasagittal left parieto-occipital junction. It  is uncertain if this is artifactual or reflect a new area of subacute ischemia/infarct. Consider MRI or follow-up head CT if the patient is   not an MRI candidate.  3.  Background of mild age-related changes elsewhere similar to the prior exam.    MRI brain images reviewed. Right PCA stroke seen.    MRI cervical spine  IMPRESSION:   CONCLUSION:  1.  Markedly limited by patient motion. No definite canal narrowing or gross cord signal abnormality     2.  Evaluation of the foramina is limited though there is likely significant foraminal narrowing at a few levels.     3.  No marrow edema or acute injury.    CK came back elevated.    Recent Results (from the past 24 hour(s))   Blood Gases, Arterial    Collection Time: 05/05/19  6:31 PM   Result Value Ref Range    pH, Arterial 7.55 (H) 7.37 - 7.44    pCO2, Arterial 30 (L) 35 - 45 mm Hg    pO2, Arterial 104 (H) 75 - 85 mm Hg    Bicarbonate, Arterial Calc 28.1 23.0 - 29.0 mmol/L    O2 Sat, Arterial 100.0 (H) 95.0 - 96.0 %    Oxyhemoglobin 96.8 (H) 95.0 - 96.0 %    Base Excess, Arterial Calc 4.1 mmol/L    Ventilation Mode PCV     Rate 20 rr/min    FIO2 0.35     Peep 5 cm H2O    Sample Stabilized Temperature 37.0 degrees C   Sodium    Collection Time: 05/05/19  7:05 PM   Result Value Ref Range    Sodium 147 (H) 136 - 145 mmol/L   POCT Glucose - every 4 hours    Collection Time: 05/05/19  8:12 PM   Result Value Ref Range    Glucose 149 (H) 70 - 139 mg/dL   Anti-Xa Heparin Level    Collection Time: 05/05/19  8:51 PM   Result Value Ref Range    Anti-Xa Heparin Assay <0.10 (L) 0.30 - 0.70 IU/mL   POCT Glucose - every 4 hours    Collection Time: 05/05/19 11:48 PM   Result Value Ref Range    Glucose 145 (H) 70 - 139 mg/dL   POCT Glucose    Collection Time: 05/06/19  4:01 AM   Result Value Ref Range    Glucose 126 70 - 139 mg/dL   Anti-Xa Heparin Level    Collection Time: 05/06/19  4:56 AM   Result Value Ref Range    Anti-Xa Heparin Assay 0.12 (L) 0.30 - 0.70 IU/mL   Basic Metabolic  Panel    Collection Time: 05/06/19  4:56 AM   Result Value Ref Range    Sodium 147 (H) 136 - 145 mmol/L    Potassium 3.7 3.5 - 5.0 mmol/L    Chloride 116 (H) 98 - 107 mmol/L    CO2 22 22 - 31 mmol/L    Anion Gap, Calculation 9 5 - 18 mmol/L    Glucose 129 (H) 70 - 125 mg/dL    Calcium 8.0 (L) 8.5 - 10.5 mg/dL    BUN 40 (H) 8 - 22 mg/dL    Creatinine 0.97 0.70 - 1.30 mg/dL    GFR MDRD Af Amer >60 >60 mL/min/1.73m2    GFR MDRD Non Af Amer >60 >60 mL/min/1.73m2   HM2(CBC w/o Differential)    Collection Time: 05/06/19  4:56 AM   Result Value Ref Range    WBC 11.4 (H) 4.0 - 11.0 thou/uL    RBC 3.15 (L) 4.40 - 6.20 mill/uL    Hemoglobin 9.6 (L) 14.0 - 18.0 g/dL    Hematocrit 29.3 (L) 40.0 - 54.0 %    MCV 93 80 - 100 fL    MCH 30.5 27.0 - 34.0 pg    MCHC 32.8 32.0 - 36.0 g/dL    RDW 14.9 (H) 11.0 - 14.5 %    Platelets 97 (L) 140 - 440 thou/uL    MPV 12.9 (H) 8.5 - 12.5 fL   Hepatic Profile    Collection Time: 05/06/19  4:56 AM   Result Value Ref Range    Bilirubin, Total 2.5 (H) 0.0 - 1.0 mg/dL    Bilirubin, Direct 1.6 (H) <=0.5 mg/dL    Protein, Total 5.7 (L) 6.0 - 8.0 g/dL    Albumin 2.0 (L) 3.5 - 5.0 g/dL    Alkaline Phosphatase 162 (H) 45 - 120 U/L     (H) 0 - 40 U/L     (H) 0 - 45 U/L   Calcium, Ionized, Measured    Collection Time: 05/06/19  4:56 AM   Result Value Ref Range    Calcium, Ionized Measured 1.07 (L) 1.11 - 1.30 mmol/L    Calcium, Ionized pH 7.4 1.09 (L) 1.11 - 1.30 mmol/L    pH 7.44 7.35 - 7.45   CK Total    Collection Time: 05/06/19  4:56 AM   Result Value Ref Range    CK, Total 1,202 (HH) 30 - 190 U/L   Amylase    Collection Time: 05/06/19  4:56 AM   Result Value Ref Range    Amylase 103 5 - 120 U/L   POCT Glucose - every 4 hours    Collection Time: 05/06/19  8:25 AM   Result Value Ref Range    Glucose 160 (H) 70 - 139 mg/dL   Calcium, Ionized, Measured    Collection Time: 05/06/19 11:10 AM   Result Value Ref Range    Calcium, Ionized Measured 1.06 (L) 1.11 - 1.30 mmol/L    Calcium,  Ionized pH 7.4 1.08 (L) 1.11 - 1.30 mmol/L    pH 7.45 7.35 - 7.45   Anti-Xa Heparin Level    Collection Time: 05/06/19 11:22 AM   Result Value Ref Range    Anti-Xa Heparin Assay 0.16 (L) 0.30 - 0.70 IU/mL   POCT Glucose - every 4 hours    Collection Time: 05/06/19 12:22 PM   Result Value Ref Range    Glucose 156 (H) 70 - 139 mg/dL       Total time spent for face to face visit, reviewing labs/imaging studies, counseling and coordination of care was: 25 min More than 50% of this time was spent on counseling and coordination of care. Discussing prognosis with family.       Dustin Moya MD  Direct Secure Messaging: [email or web address]  Tel: [phone]  This note was dictated using voice recognition software.  Any grammatical or context distortions are unintentional and inherent to the software.

## 2021-05-28 NOTE — PROGRESS NOTES
Attempted CPAP trial 5/5 per MD request. Patient did well for approximately 10 minutes with RR 13, Vt 540 and breathing comfortably without any distress. SpO2 remained > 96%. After 10 minutes patient had 2 apnea spells where ventilator changed back to full ventilator support. Patient was returned to the PCV-VG settings RR 16, .8 I-time, FiO2 30 % PEEP 5, I-Pressure 12. Patient resumed normal patient response breathing comfortable without any issues. Patient resting comfortably after.     Haja Yuen, LRT

## 2021-05-28 NOTE — PROGRESS NOTES
"RESPIRATORY CARE NOTE     Patient Name: Kody Johns  Today's Date: 4/30/2019     Pt continues on the following settings:  Vent Mode: VCV  FiO2 (%):  [50 %-100 %] 50 %  S RR:  [20] 20  S VT:  [300 mL-350 mL] 300 mL  PEEP/CPAP (cm H2O):  [5 cm H2O-8 cm H2O] 7 cm H2O  Minute Ventilation (L/min):  [9.2 L/min-15.1 L/min] 9.5 L/min  PIP:  [16 cm H2O-49 cm H2O] 37 cm H2O  SD SUP:  [5 cm H20] 5 cm H20  MAP (cm H2O):  [8-21] 20   Plateau pressure: 23 cm H2O     Pt is intubated with  # 7.0 ETT secured  20 at the teeth.  Pt has a minimal cough with suction. RT suctioned pt for small amt clear  RT will continue to follow per MD's orders.     /80   Pulse 97   Temp 98.8  F (37.1  C) (Core)   Resp 17   Ht 4' 11\" (1.499 m)   Wt 122 lb 8 oz (55.6 kg)   SpO2 100%   BMI 24.74 kg/m        Michael Owens, LRT  "

## 2021-05-28 NOTE — PROGRESS NOTES
Bedside EEG was performed that included photic stimulation; hyperventilation was deferred. The patient was awake throughout the recording.  EEG #   EEG [id number] system was used for this recording.

## 2021-05-28 NOTE — PROGRESS NOTES
Critical Care Progress Note     Admit Date: 4/24/2019  ICU Day: 13     Code Status: full code    CC: rheumatic valvular disease    HPI: 69 y.o. [place]  male with rheumatic heart valve dz(AV regurgitation, , MV stenosis aand TV regurgitation) afib, and pericardial adhesions who was admitted 4/24/19 for AVR, MVR tissue valves, and takedown of pericardial adhesions by Dr Vaughn     Case summary: Pt was an easy intubation, received 15 mg methadone preop, Echo showed boggy RV.  PAPs in the 30s preop, pt had hard time coming off extracorporeal circulation, needing many bumps of epi, flolan was started.  Also they took down pericardial adhesions.  To ICU on full vent support, flolan full strength, not paced,  On epi, insulin and precedex drips.  Protamine was given on arrival to ICU.    Interval events:  4/24/19: to OR for AVR/MVR    To ICU on Flolan  4/25/19: Flolan weaned off   When off sedation for SBT, pt noted to not move left side and    had left eye with upward deviation   Stroke code called    CTA shows right P2 occlusion   Neurology consulted   SBP parameters changed to     Failed SBT  4/26/19: fever    Failed SBT   repeat head CT  revealed evolving acute/subacute infarction  4/27/19: fever T max 105   ID consulted   HIT panel sent due to persistent thrombocytopenia   4/29/19: some labored breathing with vent, vent changes made at bed side    Does better with flow rate at 50   Seems neurogenic in origin     Heme consult for ? of HIT   Dr Vaughn met with son with    4/30/19: unable to do SBT   MRI completed-Large evolving subacute right PCA distribution infarcts with moderate cytotoxic edema, sulcal  effacement and mass effect on the right lateral ventricle  5/01/19: ID signed off-rec ceftriaxone for 5 days for klebsiella in sputum    Afib with RVR   Family conference-Family/Dr Vaughn/Neurology/myself  Up[dates given discussed possbile need for trach/peg   5/02/19: cards consulted, afib RVR  "continues   New ileus   OG to LIS   Dr Kang consulted for possible trach/peg-for next week  5/03/19: BATSHEVA and cardioversion defered per CV surgery, HR better on BB   abd more distended   OG to continuous sx for now  5/05/19: ileus improving, trophic feedings started  5/06/19: Tf back to goal     Major events over the last 24 hours: Chivo drip started for hypotension     Subjective: in bed, lightly sedated, not responding to me, over breathing the vent  ROS: unable to do    Drips: precedex, fentnayl      Ventilator: Mechanical Ventilation Day: # 14        PCV Settings: 16/insp pressure 12/5/30%    /66   Pulse (!) 118   Temp 98.3  F (36.8  C) (Axillary)   Resp 17   Ht 4' 11\" (1.499 m)   Wt 108 lb 11.2 oz (49.3 kg)   SpO2 100%   BMI 21.95 kg/m       I/O last 3 completed shifts:  In: 2011.8 [I.V.:1071.8; NG/GT:840; IV Piggyback:100]  Out: 2775 [Urine:2650; Stool:125]  Weight change: -1 lb 3.2 oz (-0.544 kg)  Vent Mode: CPAP/PSV  FiO2 (%):  [30 %] 30 %  S RR:  [16] 16  S VT:  [300 mL] 300 mL  PEEP/CPAP (cm H2O):  [5 cm H2O] 5 cm H2O  Minute Ventilation (L/min):  [6.5 L/min-12 L/min] 12 L/min  PIP:  [5 cm H2O-21 cm H2O] 21 cm H2O  HI SUP:  [5 cm H20-12 cm H20] 12 cm H20  MAP (cm H2O):  [2-7] 6      EXAM:   Mental status: in bed lightly sedated on vent    HEENT: NC + gag cough  Resp: cta   Cardiovascular: IRRR  Abdominal: soft+ bs   Extremeties: no e/c/c  Neurology: moving  right hand/ arm more    Labs Personally reviewed: yes  Results from last 7 days   Lab Units 05/07/19  0626 05/06/19  0456 05/05/19  1415 05/05/19  0755   LN-WHITE BLOOD CELL COUNT thou/uL  --  11.4* 14.8* 17.0*   LN-HEMOGLOBIN g/dL 8.9* 9.6* 10.6* 10.8*   LN-HEMATOCRIT %  --  29.3* 32.9* 33.1*   LN-PLATELET COUNT thou/uL 103* 97* 99* 96*     Results from last 7 days   Lab Units 05/07/19  0626 05/06/19  1646 05/06/19  0456  05/05/19  0755   LN-SODIUM mmol/L 145 147* 147*   < > 149*   LN-POTASSIUM mmol/L 3.3*  3.3*  --  3.7  --  3.7 "   LN-CHLORIDE mmol/L 114*  --  116*  --  115*   LN-CO2 mmol/L 23  --  22  --  25   LN-BLOOD UREA NITROGEN mg/dL 31*  --  40*  --  46*   LN-CREATININE mg/dL 0.84  --  0.97  --  1.07   LN-CALCIUM mg/dL 7.8*  --  8.0*  --  7.9*   LN-PROTEIN TOTAL g/dL 5.6*  --  5.7*  --  5.8*   LN-BILIRUBIN TOTAL mg/dL 2.1*  --  2.5*  --  2.7*   LN-ALKALINE PHOSPHATASE U/L 164*  --  162*  --  114   LN-ALT (SGPT) U/L 132*  --  160*  --  129*   LN-AST (SGOT) U/L 125*  --  189*  --  136*    < > = values in this interval not displayed.     Results from last 7 days   Lab Units 05/07/19  0626 05/07/19  0035 05/05/19  1415 05/03/19  1059   LN-INR   --   --  1.38* 1.19*   LN-PARTIAL THROMBOPLASTIN TIME seconds 66* 81* 33  --        Last ABG 5/5/19:  PH 7.55  PCO2 30 PAO2 104 Bicarb 28     Microbiology: 1+ Klebsiella pneumoniae in sputum  Imaging (all imaging is personally reviewed):       Ct chest abd pelvis-1.  Small left pleural effusion. There is complete atelectasis of the left lower lobe with peripheral fluid-filled bronchi. Dependent atelectasis is also present involving the posterior right lower lobe to a lesser extent. Infection within these areas of   atelectasis is not excluded.  2.  Bilateral upper lobe nodular infiltrate. There is also a focal patchy area of ground-glass opacity within the right upper lobe. These findings may reflect additional areas of infectious process. Follow-up CT scan to ensure resolution of the upper   lobe ground-glass nodular opacity is recommended.  3.  No evidence of bowel obstruction. There is mild fluid and air-filled distention of the colon with some lesser degree of air and fluid filled distention of the small bowel. These findings suggest an ileus.  4.  Circumferential wall thickening of the ascending colon, compatible with colitis.  abd xray 5/3/19-There are persistent small and large bowel loops not significantly changed from the prior study. Nasogastric tube with tip in the stomach. Guzman  catheter. No free intraperitoneal air is seen. Findings are consistent with ileus or distal bowel obstruction.     abd xray 5/2/19-NG tube tip and side-port are present within the region of the stomach. There is diffuse air-filled distention of the small bowel, increased from prior study. Findings reflect either obstruction or ileus. Upright view of the chest performed at   same time does not demonstrate any evidence of free air under the diaphragm    MRI head 4/30/19-1.  Limited by motion. Large evolving subacute right PCA distribution infarcts with moderate cytotoxic edema, sulcal effacement and mass effect on the right lateral ventricle. No hydrocephalus..  Minimal, punctate petechial hemorrhage in the right occipital infarct bed.Patchy areas of acute to early subacute ischemia in the posterior left frontal cortex and minimally within the right anterior frontal cortex and right caudate body.    PCXR 4/27/19-ET tube 3 cm above the georgina. NG tube tip overlies the stomach. Right internal jugular Yulee-Nick catheter tip overlies the pulmonary artery outflow tract. Mediastinal chest tube.left pleural effusion. Left lower lobe atelectasis. Mild vascular congestion and pulmonary edema. Postop median sternotomy. Heart size upper limits of normal    Head CT 4/26/19-Moderately sized hypodensity and loss of gray-white matter differentiation right temporal occipital region, consistent with evolving acute/subacute infarction.  2.  No significant global mass effect or hemorrhage.    CTA head neck 4/25/19-HEAD CT:  1.  No intracranial hemorrhage, mass lesions, hydrocephalus or CT evidence for an acute infarct. MRI is more sensitive for the evaluation of acute infarct.  2.  Chronic lacunae as detailed above.  3.  Mild diffuse cerebral parenchymal volume loss. Presumed chronic hypertensive/microvascular ischemic white matter changes.        HEAD CTA:   1.  There is cut off on the P2 segment of the right posterior cerebral  artery.  2.  No high-grade stenosis or occlusion of the rest of the major intracranial arteries.  3.  There is 1.5 to 2 mm focal outpouching off the expected location of the origin of the right posterior communicating artery. This is likely an infundibulum versus small aneurysm.     PCXR 4/25/19- Postoperative changes from cardiac valvular surgery. Moderate cardiomegaly persists. There is mild central hilar congestion and slight interstitial prominence suggesting minimal edema. Left hemidiaphragm obscured which may relate to cardiomegaly or left lower lobe atelectasis. No pneumothorax or pleural effusion. Tubes and catheters appear in good position.    Impression:  1. Acute hypoxic respiratory failure  2. Aortic regurgitation.    S/p tissue AVR  4/24/19  3. Mitral stenosis.    S/p Tissue MVR 4/24/19  4. Tricuspid regurgitation   5. Heart failure  Dilated RV  Flolan started in OR        Now off  6.   acute stroke PCA    Left sided weakness  7.   afib with RVR  8.  Thrombocytopenia.   9.   fever   10.   Pericardial adhesions; s/p takedown of adhesions 4/24/19  11. Ileus    Resolving   12. Non English speaking.  Malagasy     PLAN:   1. Continue full vent support  2. Finished CTX 5/4 for Klebsiella pneumoniae  3. Cards helping with rhythm isseus  4. TF now at goal  5. Decrease Fentanyl drip to 25, start using gut for PRN pain meds-stop drip over the next few days  6. Limit sedation as able  7. Trach peg tomorrow by Dr Kang  8. Family meeting today at 1300 with spoken (either in person or via phone) to confirm plans for trach/peg  9. Other issues per CVS and neurology     ICU DAILY CHECKLIST                           Can patient transfer out of ICU? no    FAST HUG:    Feeding:  Feeding: No.  Patient is receiving NPO  Tube feedings trickle  Guzman:Yes  Analgesia/Sedation:Yes precedex  Thromboembolic prophylaxis: yes; Mode:  SCD  HOB>30:  Yes  Stress Ulcer Protocol Active: yes; Mode: PPI  Glycemic  Control: Any glucose > 180 no; Mode of Insulin Therapy: Sliding Scale Insulin    INTUBATED:  Can patient have daily waking:  yes  Can patient have spontaneous breathing trial:  no    Restraints? yes    PHYSICAL THERAPY AND MOBILITY:  Can patient have PT and mobility trial: no  Activity: Bed Rest    transfer/discharge plans: stays ICU    The patient is critically ill with impairment in organ system and high risk of life threatening deterioration.     Total CCT spent 40 minutes thus far today.       Sandy PORRAS, [phone]  [facility]

## 2021-05-28 NOTE — PROGRESS NOTES
CVTS Daily Progress Note   5/8/2019   POD #14  s/p AVR/MVR  DOS 4/25/19- Dr. Vaughn    LOS: 14 days   EF 45%   A1C 6.7 %    Overnight events:  Tube feedings held for trach and peg placement today    Continues to be intermittently awake and follows some commands.   Increased movement/strength on right side - LE, minimal movement on Left fingers no movement noted on Left arm or leg    Atrial fib with RVR rates  -on heparin gtt.     On  Rochepin- + Klebs.   On Vanco- started 5/5/19 + Staph epidermis    Blood cx - Staphylococcus epidermis  Sputum  cx - + Klebs    Tm 99.7  Abd soft with BS in all 4 quads.  Steady increase in platelets 103  NELLIE  Negative  HIT neg    Normotensive with permissive HTN   Pain controlled:Fentanyl gtt- 25 mcg  Precedex 1 mcg     Hgb 8.9 s/p- (PRBC 4/30)       PLAN  Continue tube feedings  Holding lasix today for hypotension.  Heparin on hold for OR   Hold Atorvastatin with elevated CK total and tylenol for elevated LFT- trend is down could resume if needed    IV metoprolol for HR >110 if BP >110.  PO metoprolol      OBJECTIVE:    Temp:  [97.9  F (36.6  C)-100.7  F (38.2  C)] 99.2  F (37.3  C)  Heart Rate:  [] 121  Resp:  [17-54] 26  BP: ()/(50-96) 128/80  FiO2 (%):  [24 %-30 %] 24 %  Wt Readings from Last 2 Encounters:   05/08/19 0400 108 lb 9.6 oz (49.3 kg)   05/07/19 0330 108 lb 11.2 oz (49.3 kg)   05/06/19 0515 109 lb 14.4 oz (49.9 kg)   05/05/19 0430 109 lb 1.6 oz (49.5 kg)   05/04/19 0600 122 lb 2.2 oz (55.4 kg)   05/02/19 0600 121 lb 4.8 oz (55 kg)   05/01/19 0530 120 lb 12.8 oz (54.8 kg)   04/30/19 0600 122 lb 8 oz (55.6 kg)   04/29/19 0400 116 lb 2.9 oz (52.7 kg)   04/28/19 0200 116 lb 6.5 oz (52.8 kg)   04/27/19 0500 117 lb 8.1 oz (53.3 kg)   04/26/19 0400 112 lb 14 oz (51.2 kg)   04/25/19 0500 113 lb 8.6 oz (51.5 kg)   04/24/19 2000 105 lb 13.1 oz (48 kg)   04/24/19 0628 105 lb 12.8 oz (48 kg)   04/23/19 0937 106 lb 11.2 oz (48.4 kg)       Current  Medications:    Scheduled Meds:    amiodarone  200 mg Per NG tube BID     aspirin  81 mg Enteral Tube DAILY     baclofen  10 mg Enteral Tube BID     cefTRIAXone (ROCEPHIN)  IV  1 g Intravenous Q24H     chlorhexidine  15 mL Topical Q12H     docusate sodium (COLACE) 50 mg/5 mL oral liquid  100 mg Oral BID     furosemide  20 mg Intravenous BID - diuretic     insulin aspart (NovoLOG) injection   Subcutaneous Q4H     levothyroxine  75 mcg Intravenous Daily     metoprolol tartrate  25 mg Enteral Tube BID     pantoprazole  40 mg Intravenous Q24H    Or     omeprazole  20 mg Oral Q24H    Or     omeprazole  20 mg Enteral Tube Q24H     potassium chloride  20 mEq Enteral Tube BID     sodium chloride  10-30 mL Intravenous Q8H FIXED TIMES     sodium chloride  3 mL Intravenous Line Care     vancomycin  750 mg Intravenous Q24H     Continuous Infusions:    dexmedetomidine 400 mcg/100 mL in NS (PRECEDEX) (4mcg/mL) 0.9 mcg/kg/hr (19 1012)     heparin infusion (100 units/ml) Stopped (19 0907)     niCARdipine Stopped (19 0701)     norepinephrine IV infusion in D5W       phenylephrine IV infusion in NS Stopped (19 0458)     sodium chloride 0.9% Stopped (19 1300)     PRN Meds:.albumin human, aluminum-magnesium hydroxide-simethicone, bisacodyl, bisacodyl, dexmedetomidine 400 mcg/100 mL in NS (PRECEDEX) (4mcg/mL), dextrose 50 % (D50W), glucagon (human recombinant), ipratropium-albuterol **AND** [] Nebulizer treatment intermittent, lipase-protease-amylase **AND** sodium bicarbonate, magnesium hydroxide, midazolam, naloxone **OR** naloxone, niCARdipine, norepinephrine IV infusion in D5W, ondansetron, oxyCODONE intensol (ROXICODONE) conc solution 20 mg/mL **OR** oxyCODONE, polyethylene glycol, sodium chloride, sodium chloride bacteriostatic, sodium chloride, sodium chloride, sodium chloride, traMADol    Cardiographics:    Telemetry monitoring demonstrates Atrial fib with rates in the  per my personal  review.    Lab Results (most recent, reviewed):    Hemoglobin (g/dL)   Date Value   05/08/2019 8.6 (L)     WBC (thou/uL)   Date Value   05/08/2019 9.8     Potassium (mmol/L)   Date Value   05/08/2019 3.9     Creatinine (mg/dL)   Date Value   05/08/2019 0.74     Hemoglobin A1c (%)   Date Value   04/23/2019 6.7 (H)     Magnesium (mg/dL)   Date Value   05/05/2019 2.5     Calcium (mg/dL)   Date Value   05/08/2019 7.8 (L)       108 lb 9.6 oz (49.3 kg)  Admit weight  47.9 kg  UOP: 2L/ 24 hours  Stool 400/24 hrs    Physical Exam:    General: Patient seen in bed.  Neuro: More awake and responding to    movement noted on Right UE and less agitation with decreasing sedation.   Intubated, on vent 40%  CV: Atrial fib RVR rates   Pulm: non labored effort on vent. 28%   Sternal  Incision  C/D/I.  Abd: softer with + stool in bag     : Guzman with donta urine   -Dignicare for liquid stool    Ext: Mod pedal edema, SCDs in place, warm  Feet warm, + pulses, palpable  Neuro:  More awake and moving on right, some faint movement noted in left fingers    ASSESSMENT    Kody Johns is a 69y.o. [place]  male with rheumatic heart valve dz(AV regurgitation, , MV stenosis aand TV regurgitation) afib, and pericardial adhesions who was admitted 4/24/19 for AVR, MVR tissue valves, and takedown of pericardial adhesions by Dr Vaughn      Principal Problem:    S/P MVR (mitral valve repair)  Active Problems:    Rheumatic heart disease    Mitral valve stenosis, unspecified etiology    Aortic valve disease, rheumatic    A-fib (H)    Acute respiratory failure with hypoxemia (H)    Anemia due to blood loss, acute    Coagulopathy (H)    Non-English speaking patient    Thrombocytopenia (H)    Sepsis, due to unspecified organism (H)    Atrial fibrillation with RVR (H)    Hypernatremia    Paralytic ileus (H)    Small bowel obstruction (H)    Acute cardioembolic stroke (H)    Respiratory failure (H)      NEURO:   - Scheduled Tylenol and  PRN oxycodone/tramadol for pain-   - Weaning Fentanyl gtt -off  -  use tramadol- Melatonin at bedtime-  - weaning precedex  0.9    CV:   -Amiiodarone ET  - permissive HTN SBP goal of 120   - ASA 81mg  - Atorvastatin 40mg daily- hold with elevated CK   - lasix on hold re evaluate tomorrow  - Metoprolol 2.5-5 mg IV q6 with parameters    PULM:   - Vent weaning per pul critical care  - Maintaining oxygen saturations on Vent 28 %    FEN/GI:  - Digni care - liquid stools, small amount  - Tube feedings- Holding for   - Abd no distended   - ABD xray - ileus 5/3- resolving   - CT scan 5/4 showed ileus  - UA neg on 5/3  - amylase 156  - Continue electrolyte replacement protocol  - Bowel regimen  - elevated LFT's  enzymes- Hold  Acetaminophen/atorvastatin    RENAL:  - Adequate UOP/hr. Continue to monitor closely via monzon.   - Cr 0.74( baseline 0.92)  - Monzon to remain in for close monitoring of I/O and during period of diuresis/relative immobility.  - Diuresis on hold  - Potassium replacement protocol-  - Klor 20 two times a day - on hold  - Mag replacement protocol    HEME:  - Acute blood loss anemia post-op.   - hgb 8.6  - one pRBC 4/30 for hgb 8.0   - Thrombocytopenia-resolving     - Negative HIT  - NELLIE- negative     ID:  - Mee op ppx complete  -Leukocytosis now trending down  -On  Rochepin- + Klebs. + sputum   On Vanco- started 5/5/19 + Staph epidermis- + blood cx  Repeat blood cx on 5/6 pending     - UA 5/3 Neg        ENDO:   -  SSI q 4 hours   - synthroid IV could resume OG tomorrow if continues to tolerating feeding and med  PPx:   - DVT: SCDs,   - Low intensity no bolus heparin gtt  - GI: Omeprazole   - Ambulating with therapy : on hold.    DISPO:   - Continue critical care in ICU

## 2021-05-28 NOTE — PROGRESS NOTES
Progress Note    Assessment/Plan  S/P AVR/MVR POD # 3  Febrile, temp 101.5  F  Postop respiratory failure, sedated, intubated and on mechanical ventilation  Vent: Vt 350, R 16, PEEP 5, FiO2 60%, SPO2 99%  Postop CVA with left side weakness noted on postop day #1. Stroke code was called, and the first CT scan of the head showed;  HEAD CT:  1.  No intracranial hemorrhage, mass lesions, hydrocephalus or CT evidence for an acute infarct. MRI is more sensitive for the evaluation of acute infarct.  2.  Chronic lacunae as detailed above.  3.  Mild diffuse cerebral parenchymal volume loss. Presumed chronic hypertensive/microvascular ischemic white matter changes.        HEAD CTA:   1.  There is cut off on the P2 segment of the right posterior cerebral artery.  2.  No high-grade stenosis or occlusion of the rest of the major intracranial arteries.  3.  There is 1.5 to 2 mm focal outpouching off the expected location of the origin of the right posterior communicating artery. This is likely an infundibulum versus small aneurysm.        NECK CTA:  1.  No significant stenosis in the neck vessels based on NASCET criteria.  2.  No evidence for dissection.  On postop day #2, the neuro deficits seems to have gotten worse and a repeat CT scan of the head and MRI was done and both showed;  CONCLUSION:  1.  Moderately sized hypodensity and loss of gray-white matter differentiation right temporal occipital region, consistent with evolving acute/subacute infarction.  2.  No significant global mass effect or hemorrhage.  Patient is sedated at this point and does not moves extremities or follow commands  MRI of the head when pacer wires out however due to low platelets, would not remove pacer wires at this time  Postop cardiogenic shock resolving  Epi is now off.  Sedation: Precedex 1 mcg/kg/hr  Hemodynamics: CI  2.7, CO   3.8, CVP  18, PAP  53/20  Severe postop thrombocytopenia, Plt 36, down from 39 yesterday  Transfused  another 2 packs  "of platelets today  A. fib with RVR but given recent stroke would not want to give metoprolol as this will lower the blood pressure below the target range   Diabetes mellitus, preop A1c 6.7, currently off insulin drip  Incisions are clean dry intact, lung sounds diminished bilaterally  Chest tube drainage  50/450 serosanguineous  Left chest tube in today due to excessive drainage  Abdomen is soft and nontender, bowel sounds present all 4 quarters  Urine output adequate with Lasix overnight now marginal, no BM yet but positive bowel sounds as noted above  Weight  53.3 kg from  51.2 kg yesterday, preop weight is 48 kg  Continue Lasix 20 mg IV twice daily x24 hours  Titrate epinephrine to keep CI > 2 or MAP > 70  Given the febrile of 101.5  F post double valve, will start broad-based antibiotics while awaiting for sputum culture.  Zosyn and Vanco tension today  Keep in the unit today        Subjective  Sedated, intubated on mechanical ventilation  Objective    Vital signs in last 24 hours  Temp:  [98.3  F (36.8  C)-102.9  F (39.4  C)] 101.5  F (38.6  C)  Heart Rate:  [] 89  Resp:  [18-23] 20  BP: ()/(64-87) 115/87  Arterial Line BP: (100-139)/() 132/79  FiO2 (%):  [50 %-100 %] 60 %  Weight:   117 lb 8.1 oz (53.3 kg)    Intake/Output last 3 shifts  I/O last 3 completed shifts:  In: 2225 [I.V.:2046; NG/GT:179]  Out: 1127 [Urine:677; Chest Tube:450]  Intake/Output this shift:  I/O this shift:  In: -   Out: 24 [Urine:24]    Review of Systems   Review of systems not obtained due to inability to communicate with the patient.     Physical Exam  /87   Pulse 89   Temp (!) 101.5  F (38.6  C) (Bladder)   Resp 20   Ht 4' 11\" (1.499 m)   Wt 117 lb 8.1 oz (53.3 kg)   SpO2 99%   BMI 23.73 kg/m    Chronic A. fib, rate in the 90s to 100s, incisions are clean dry intact, lungs sound diminished bilaterally  Abdomen is soft and nontender  Mild lower extremity edema    Pertinent Labs   Lab Results: personally " reviewed.   Lab Results   Component Value Date     04/25/2019    K 4.0 04/27/2019     (H) 04/25/2019    CO2 20 (L) 04/25/2019    BUN 17 04/25/2019    CREATININE 0.92 04/25/2019    CALCIUM 8.5 04/25/2019     Lab Results   Component Value Date    WBC 4.0 04/27/2019    HGB 11.8 (L) 04/27/2019    HCT 35.3 (L) 04/27/2019    MCV 92 04/27/2019    PLT 36 (LL) 04/27/2019       Pertinent Radiology   Radiology Results: Personally reviewed image/s, Personally reviewed impression/s and Left cyst versus effusion right lung mostly clear  EKG Results: personally reviewed.  and A. fib rate in the 90s to 100s    Juan Wray

## 2021-05-28 NOTE — PLAN OF CARE
Problem: Pain  Goal: Patient's pain/discomfort is manageable  Outcome: Progressing     Problem: Safety  Goal: Patient will be injury free during hospitalization  Outcome: Progressing

## 2021-05-28 NOTE — PLAN OF CARE
Problem: Daily Care  Goal: Daily care needs are met  Outcome: Progressing      Distended abdomen, abdominal x ray unchanged, PRN suppository and fleets enema given, rectal tube placed for decompression, OG was switched to continous suction but remains to have little to no output, had small BM after suppository and liquid 200 ml after enema

## 2021-05-28 NOTE — CONSULTS
NEUROLOGY CONSULTATION NOTE     Kody Johns,  1950, MRN [id number] Date: 2019     [facility]   Code status:  Full Code   PCP: Aristides Alegria MD, [phone]      ASSESSMENT & PLAN   Hospital Day#: 1  Diagnosis code: Ischemic stroke    Ischemic stroke  Mitral valve repair      Patients Head CT is negative but clinically suspect ischemic stroke.    CTA shows right P2 occlusion.    Will get MRI to better clarify extent of stroke and prognostication.    Permissive HTN (if safe from cardiology stand point).    Most likely cause of stroke is cardioembolic secondary to surgery.     Will need to look at MRI to decide if anti-coagulation could be started. Also need to check with surgery team.    ASA 81 mg in the meantime.    Thank you again for this referral, please feel free to contact me if you have any questions.     CHIEF COMPLAINT S/P MVR (mitral valve repair)     HISTORY OF PRESENT ILLNESS     We have been requested by Dr. Omer to evaluate Kody Johns who is a 69 y.o.  male for acute stroke.    This is a 69-year-old male on whom neurology is been consulted to evaluate for stroke.  Patient had a mitral valve surgery yesterday and was under sedation after that.  Sedation was lightened today when it was noticed that he was having some ophthalmoplegia.  Was mainly involving the left eye.  Stroke code was called.  He was not a TPA candidate because of the recent surgery and unclear time of onset.  Left arm and leg weakness was also noted.  Head CT was done which showed a right P2 occlusion.  The patient's exam is been stable since the stroke code.  He was not a candidate for thrombectomy because the blood vessel was too small for mechanical thrombectomy.      Patient is unable to provide any history because of intubation.  History is obtained through chart review and through talking to the son.     PAST MEDICAL & SURGICAL HISTORY     Medical History  Patient Active Problem List    Diagnosis  Date Noted     S/P MVR (mitral valve repair) 04/24/2019     ARF (acute respiratory failure) (H) 04/24/2019     Anemia due to blood loss, acute 04/24/2019     Coagulopathy (H) 04/24/2019     Non-English speaking patient 04/24/2019     A-fib (H) 09/14/2017     Heart failure with preserved ejection fraction (H) 09/14/2017     Moderate malnutrition (H) 08/31/2017     ALEENA (acute kidney injury) (H) 08/31/2017     Aortic valve disease, rheumatic 08/30/2017     Lightheadedness 08/30/2017     Atherosclerosis of native coronary artery of native heart without angina pectoris      Essential hypertension with goal blood pressure less than 130/85      Pure hypercholesterolemia      Dysuria 08/29/2017     Hypothyroidism, unspecified type      Mitral valve stenosis, unspecified etiology      Elevated LFTs 08/26/2016     Silent Stroke      Shoulder strain      Dysphagia      Hyperlipidemia      Rheumatic heart disease      Blurry vision      Hearing loss      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  Surgical History  He  has a past surgical history that includes Cataract extraction (Left, 06/13/2016); Cardiac catheterization; Coronary Angiogram (N/A, 11/30/2018); Eye surgery; pr replacement of mitral valve (N/A, 4/24/2019); and Aortic valve replacement (N/A, 4/24/2019).     SOCIAL HISTORY     Reviewed, and he  reports that he quit smoking about 4 years ago. His smoking use included  cigarettes. He has a 50.00 pack-year smoking history. He has quit using smokeless tobacco. He reports that he does not drink alcohol or use drugs.     FAMILY HISTORY     Reviewed, and family history includes No Medical Problems in his father and mother.     ALLERGIES     No Known Allergies     REVIEW OF SYSTEMS     Unable to do ROS due to intubation and AMS.     HOME & HOSPITAL MEDICATIONS     Prior to Admission Medications  Medications Prior to Admission   Medication Sig Dispense Refill Last Dose     albuterol (PROAIR HFA;PROVENTIL HFA;VENTOLIN HFA) 90 mcg/actuation inhaler Inhale 1-2 puffs every 6 (six) hours as needed. 1 Inhaler 5 4/22/2019     amoxicillin (AMOXIL) 500 MG capsule TAKE 4 TABLETS (2,000 mg) BY MOUTH 1 HOUR PRIOR TO PROCEDURE. 4 capsule 3 Unknown     atorvastatin (LIPITOR) 40 MG tablet TAKE 1 PILL BY MOUTH AT BEDTIME. FOR CHOLESTEROL 90 tablet 2 4/22/2019     baclofen (LIORESAL) 10 MG tablet TAKE 1 TABLET BY MOUTH TWICE DAILY 60 tablet 5 4/23/2019     enoxaparin (LOVENOX) 40 mg/0.4 mL syringe Inject 0.4 mL (40 mg total) under the skin every evening. 2 Syringe 0 4/22/2019     enoxaparin (LOVENOX) 60 mg/0.6 mL syringe Inject 0.6 mL (60 mg total) under the skin every morning. 3 Syringe 0 4/23/2019 at am     fluticasone (FLONASE) 50 mcg/actuation nasal spray 2 sprays into each nostril daily. (Patient taking differently: 2 sprays into each nostril daily as needed.       ) 10 g 3 4/22/2019     furosemide (LASIX) 20 MG tablet TAKE 1 TABLET (20 MG TOTAL) BY MOUTH DAILY FOR EDEMA 90 tablet 3 4/23/2019     levothyroxine (SYNTHROID, LEVOTHROID) 88 MCG tablet TAKE 1 PILL BY MOUTH EVERYDAY FOR THYROID 30 tablet 6 4/23/2019     metoprolol succinate (TOPROL-XL) 25 MG TAKE 1 PILL BY MOUTH EVERYDAY FOR BLOOD PRESSURE 90 tablet 3 4/23/2019     omeprazole (PRILOSEC) 20 MG capsule TAKE 1 PILL BY MOUTH EVERYDAY FOR STOMACH 30 capsule 8 4/23/2019     spironolactone (ALDACTONE) 25 MG tablet TAKE 1 PILL BY MOUTH EVERYDAY  30 tablet 8 4/23/2019     warfarin (COUMADIN/JANTOVEN) 1 MG tablet Take 1-2 mg by mouth See Admin Instructions. 1 mg daily on Mon/Wed/Fri; and 2 mg daily rest of week   4/19/2019     acetaminophen (TYLENOL) 500 MG tablet Take 500-1,000 mg by mouth every 6 (six) hours as needed for pain. Every 6-8 hours as needed. No more than 4,000MG of acetaminophen daily.   Unknown     ibuprofen (ADVIL,MOTRIN) 600 MG tablet Take 600 mg by mouth every 6 (six) hours as needed for pain.   Unknown       Hospital Medications    acetaminophen  650 mg Oral Q6H    Or     acetaminophen  650 mg Rectal Q6H     [START ON 4/26/2019] aspirin  300 mg Rectal Daily 0800     atorvastatin  40 mg Oral DAILY     baclofen  10 mg Oral BID     bisacodyl  10 mg Oral Once     [START ON 4/27/2019] bisacodyl  10 mg Rectal Once     chlorhexidine  15 mL Topical Q12H     docusate sodium  100 mg Oral BID     heparin (PF)  5,000 Units Subcutaneous Q8H FIXED TIMES     levothyroxine  88 mcg Oral Daily 0600     [START ON 4/26/2019] magnesium hydroxide  30 mL Oral DAILY     melatonin  3 mg Oral QHS     omeprazole  20 mg Oral QAM AC     polyethylene glycol  17 g Oral DAILY     sodium chloride  3 mL Intravenous Line Care        PHYSICAL EXAM     Vital signs  Temp:  [100.2  F (37.9  C)-103  F (39.4  C)] 101.9  F (38.8  C)  Heart Rate:  [] 94  Resp:  [23-55] 23  BP: ()/(50-73) 91/63  Arterial Line BP: ()/(47-80) 109/55  FiO2 (%):  [40 %] 40 %    Weight:   113 lb 8.6 oz (51.5 kg)    GENERAL: Healthy appearing, alert, no acute distress, normal habitus.  CARDIOVASCULAR: Ext warm and well perfused.  NEUROLOGICAL:  Patient is lying in bed intubated. Opens eyes to voice. Right eye is more open than left ? Ptosis. Does not follow all commands. Pupils symmetric. EOM absent to dolls eye manuver. VFIT on right, absent on left. Corneal present. NLF appear symmetric.  No spontaneous movements. Moves right leg to painful stimulation on right arm slightly. Cannot  elict movement on left side.   Reflexes 2+ and brisk.    Unable to check finger to nose or romberg and gait due to decreased mental status.      DIAGNOSTIC STUDIES     Pertinent Radiology   Head CT images reviewed. No acute stroke seen  IMPRESSION:   CONCLUSION:   HEAD CT:  1.  No intracranial hemorrhage, mass lesions, hydrocephalus or CT evidence for an acute infarct. MRI is more sensitive for the evaluation of acute infarct.  2.  Chronic lacunae as detailed above.  3.  Mild diffuse cerebral parenchymal volume loss. Presumed chronic hypertensive/microvascular ischemic white matter changes.        HEAD CTA:   1.  There is cut off on the P2 segment of the right posterior cerebral artery.  2.  No high-grade stenosis or occlusion of the rest of the major intracranial arteries.  3.  There is 1.5 to 2 mm focal outpouching off the expected location of the origin of the right posterior communicating artery. This is likely an infundibulum versus small aneurysm.        NECK CTA:  1.  No significant stenosis in the neck vessels based on NASCET criteria.  2.  No evidence for dissection.  Recent Results (from the past 24 hour(s))   POCT Glucose    Collection Time: 04/24/19  8:58 PM   Result Value Ref Range    Glucose 126 70 - 139 mg/dL   Blood Gases, Arterial    Collection Time: 04/24/19  9:53 PM   Result Value Ref Range    pH, Arterial 7.38 7.37 - 7.44    pCO2, Arterial 33 (L) 35 - 45 mm Hg    pO2, Arterial 89 (H) 75 - 85 mm Hg    Bicarbonate, Arterial Calc 21.1 (L) 23.0 - 29.0 mmol/L    O2 Sat, Arterial 99.6 (H) 95.0 - 96.0 %    Oxyhemoglobin 96.2 (H) 95.0 - 96.0 %    Base Excess, Arterial Calc -4.9 mmol/L    Ventilation Mode VCV     Rate 24 rr/min    FIO2 0.40     Peep 5 cm H2O    Sample Stabilized Temperature 37.0 degrees C    Ventilator Tidal Volume 350 mL   POCT Glucose    Collection Time: 04/24/19  9:59 PM   Result Value Ref Range    Glucose 145 (H) 70 - 139 mg/dL   POCT Glucose    Collection Time: 04/24/19 10:58 PM    Result Value Ref Range    Glucose 135 70 - 139 mg/dL   POCT Glucose    Collection Time: 04/24/19 11:57 PM   Result Value Ref Range    Glucose 140 (H) 70 - 139 mg/dL   Plasma Product Information    Collection Time: 04/25/19 12:04 AM   Result Value Ref Range    Unit Type A Pos     Blood Expiration Date 68496219739378     Unit Number [id number]     Status Transfused     Component FROZEN PLASMA     PRODUCT CODE W7850I08     Issue Date and Time [id number]     Blood Type 6200     CODING SYSTEM [id number]    Plasma Product Information    Collection Time: 04/25/19 12:04 AM   Result Value Ref Range    Unit Type A Pos     Blood Expiration Date 20239472150566     Unit Number [id number]     Status Transfused     Component FROZEN PLASMA     PRODUCT CODE R0719Z31     Issue Date and Time [id number]     Blood Type 6200     CODING SYSTEM [id number]    Platelet Product Information    Collection Time: 04/25/19 12:04 AM   Result Value Ref Range    Unit Type AB Neg     Blood Expiration Date 99443140800059     Unit Number [id number]     Status Transfused     Component Platelets     PRODUCT CODE S1257R27     Issue Date and Time [id number]     Blood Type 2800     CODING SYSTEM [id number]    POCT Glucose    Collection Time: 04/25/19 12:56 AM   Result Value Ref Range    Glucose 147 (H) 70 - 139 mg/dL   POCT Glucose    Collection Time: 04/25/19  2:01 AM   Result Value Ref Range    Glucose 146 (H) 70 - 139 mg/dL   POCT Glucose    Collection Time: 04/25/19  2:59 AM   Result Value Ref Range    Glucose 143 (H) 70 - 139 mg/dL   POCT Glucose    Collection Time: 04/25/19  3:57 AM   Result Value Ref Range    Glucose 137 70 - 139 mg/dL   Basic Metabolic Panel    Collection Time: 04/25/19  4:58 AM   Result Value Ref Range    Sodium 145 136 - 145 mmol/L    Potassium 4.2 3.5 - 5.0 mmol/L    Chloride 118 (H) 98 - 107 mmol/L    CO2 20 (L) 22 - 31 mmol/L    Anion Gap, Calculation 7 5 - 18 mmol/L    Glucose 151 (H) 70 - 125 mg/dL    Calcium 8.5  8.5 - 10.5 mg/dL    BUN 17 8 - 22 mg/dL    Creatinine 0.92 0.70 - 1.30 mg/dL    GFR MDRD Af Amer >60 >60 mL/min/1.73m2    GFR MDRD Non Af Amer >60 >60 mL/min/1.73m2   INR    Collection Time: 04/25/19  4:58 AM   Result Value Ref Range    INR 1.57 (H) 0.90 - 1.10   Magnesium    Collection Time: 04/25/19  4:58 AM   Result Value Ref Range    Magnesium 2.1 1.8 - 2.6 mg/dL   Calcium, Ionized, Measured    Collection Time: 04/25/19  4:58 AM   Result Value Ref Range    Calcium, Ionized Measured 1.05 (L) 1.11 - 1.30 mmol/L    Calcium, Ionized pH 7.4 1.07 (L) 1.11 - 1.30 mmol/L    pH 7.44 7.35 - 7.45   Blood Gases, Venous    Collection Time: 04/25/19  4:58 AM   Result Value Ref Range    pH, Venous 7.40 7.35 - 7.45    pCO2, Venous 38 35 - 50 mm Hg    pO2, Freddy 34 25 - 47 mm Hg    Base Excess, Venous -1.1 mmol/L    HCO3, Venous 23.4 (L) 24.0 - 30.0 mmol/L    Oxyhemoglobin 68.4 (L) 70.0 - 75.0 %    O2 Sat, Venous 70.7 70.0 - 75.0 %   HM1 (CBC with Diff)    Collection Time: 04/25/19  4:58 AM   Result Value Ref Range    WBC 14.6 (H) 4.0 - 11.0 thou/uL    RBC 3.52 (L) 4.40 - 6.20 mill/uL    Hemoglobin 10.8 (L) 14.0 - 18.0 g/dL    Hematocrit 32.3 (L) 40.0 - 54.0 %    MCV 92 80 - 100 fL    MCH 30.7 27.0 - 34.0 pg    MCHC 33.4 32.0 - 36.0 g/dL    RDW 14.9 (H) 11.0 - 14.5 %    Platelets 72 (L) 140 - 440 thou/uL    MPV 10.7 8.5 - 12.5 fL    Neutrophils % 84 (H) 50 - 70 %    Lymphocytes % 5 (L) 20 - 40 %    Monocytes % 11 (H) 2 - 10 %    Eosinophils % 0 0 - 6 %    Basophils % 0 0 - 2 %    Neutrophils Absolute 12.2 (H) 2.0 - 7.7 thou/uL    Lymphocytes Absolute 0.7 (L) 0.8 - 4.4 thou/uL    Monocytes Absolute 1.6 (H) 0.0 - 0.9 thou/uL    Eosinophils Absolute 0.0 0.0 - 0.4 thou/uL    Basophils Absolute 0.0 0.0 - 0.2 thou/uL   POCT Glucose    Collection Time: 04/25/19  4:59 AM   Result Value Ref Range    Glucose 138 70 - 139 mg/dL   Blood Gases, Arterial    Collection Time: 04/25/19  5:53 AM   Result Value Ref Range    pH, Arterial 7.44 7.37 -  7.44    pCO2, Arterial 31 (L) 35 - 45 mm Hg    pO2, Arterial 89 (H) 75 - 85 mm Hg    Bicarbonate, Arterial Calc 23.1 23.0 - 29.0 mmol/L    O2 Sat, Arterial 99.6 (H) 95.0 - 96.0 %    Oxyhemoglobin 96.5 (H) 95.0 - 96.0 %    Base Excess, Arterial Calc -2.3 mmol/L    Ventilation Mode VCV     Rate 24 rr/min    FIO2 40.00     Peep 5 cm H2O    Sample Stabilized Temperature 37.0 degrees C    Ventilator Tidal Volume 350 mL   POCT Glucose    Collection Time: 04/25/19  5:55 AM   Result Value Ref Range    Glucose 142 (H) 70 - 139 mg/dL   POCT Glucose    Collection Time: 04/25/19  6:56 AM   Result Value Ref Range    Glucose 157 (H) 70 - 139 mg/dL   EKG 12 lead    Collection Time: 04/25/19  7:41 AM   Result Value Ref Range    SYSTOLIC BLOOD PRESSURE  mmHg    DIASTOLIC BLOOD PRESSURE  mmHg    VENTRICULAR RATE 89 BPM    ATRIAL RATE 89 BPM    P-R INTERVAL 144 ms    QRS DURATION 82 ms    Q-T INTERVAL 392 ms    QTC CALCULATION (BEZET) 476 ms    P Axis 218 degrees    R AXIS 72 degrees    T AXIS 33 degrees    MUSE DIAGNOSIS       Atrial flutter with 2 to 1 block  Voltage criteria for left ventricular hypertrophy  ST elevation, consider early repolarization, pericarditis, or injury  Abnormal ECG  When compared with ECG of 24-APR-2019 13:18,  Incomplete right bundle branch block is no longer Present  ST elevation now present in Lateral leads  Confirmed by KATHIA SOL MD LOC:JN ([id number]) on 4/25/2019 2:34:30 PM     POCT Glucose    Collection Time: 04/25/19  8:03 AM   Result Value Ref Range    Glucose 144 (H) 70 - 139 mg/dL   POCT Glucose    Collection Time: 04/25/19  9:00 AM   Result Value Ref Range    Glucose 126 70 - 139 mg/dL   POCT Glucose    Collection Time: 04/25/19  9:59 AM   Result Value Ref Range    Glucose 149 (H) 70 - 139 mg/dL   POCT Glucose    Collection Time: 04/25/19 10:52 AM   Result Value Ref Range    Glucose 149 (H) 70 - 139 mg/dL   POCT Glucose    Collection Time: 04/25/19 12:06 PM   Result Value Ref Range    Glucose 126  70 - 139 mg/dL   Blood Gases, Arterial    Collection Time: 04/25/19 12:22 PM   Result Value Ref Range    pH, Arterial 7.42 7.37 - 7.44    pCO2, Arterial 31 (L) 35 - 45 mm Hg    pO2, Arterial 87 (H) 75 - 85 mm Hg    Bicarbonate, Arterial Calc 22.1 (L) 23.0 - 29.0 mmol/L    O2 Sat, Arterial 99.7 (H) 95.0 - 96.0 %    Oxyhemoglobin 96.1 (H) 95.0 - 96.0 %    Base Excess, Arterial Calc -3.6 mmol/L    Ventilation Mode VCV     Rate 20 rr/min    FIO2 0.40     Peep 5 cm H2O    Sample Stabilized Temperature 37.0 degrees C    Ventilator Tidal Volume 350 mL   POCT Glucose    Collection Time: 04/25/19 12:58 PM   Result Value Ref Range    Glucose 127 70 - 139 mg/dL   POCT Glucose    Collection Time: 04/25/19  2:03 PM   Result Value Ref Range    Glucose 131 70 - 139 mg/dL   POCT Glucose    Collection Time: 04/25/19  3:17 PM   Result Value Ref Range    Glucose 132 70 - 139 mg/dL   POCT Glucose    Collection Time: 04/25/19  4:22 PM   Result Value Ref Range    Glucose 128 70 - 139 mg/dL   POCT Glucose    Collection Time: 04/25/19  5:02 PM   Result Value Ref Range    Glucose 115 70 - 139 mg/dL   POCT Glucose    Collection Time: 04/25/19  5:57 PM   Result Value Ref Range    Glucose 119 70 - 139 mg/dL   POCT Glucose    Collection Time: 04/25/19  6:59 PM   Result Value Ref Range    Glucose 116 70 - 139 mg/dL   POCT Glucose    Collection Time: 04/25/19  8:01 PM   Result Value Ref Range    Glucose 107 70 - 139 mg/dL       Total time spent for face to face visit, reviewing labs/imaging studies, counseling and coordination of care was: 70 min More than 50% of this time was spent on counseling and coordination of care.  Discussed prognosis with family.     Dustin Moya MD  Direct Secure Messaging: [email or web address]  Tel: [phone]  This note was dictated using voice recognition software.  Any grammatical or context distortions are unintentional and inherent to the software.

## 2021-05-28 NOTE — PROGRESS NOTES
"  Clinical Nutrition Therapy Nutrition Support Follow Up Note      Reason:  RD managing TF.    Per Rounds and chart review: Pt had trach and PEG yesterday.  TF at goal. Pt up in chair.    Nutrition History:  Information from family/caregiver and chart.  Diet prior to admission:  General.  Pt eats mae with meat and/or veggies and rice.  Recent food/fluid intake: good.   Cultural/Restorationism food needs or preferences: Kiswahili  Food allergies or intolerances: nkfa  TF started 4/26.      Nutrition Prescription:   Diet: fibersource HN at 45 ml/hr   Water flush 200 ml q 4 hrs (MD)  IV dextrose or Fluids:    heparin infusion (100 units/ml) Last Rate: 17 Units/kg/hr (05/09/19 0800)   norepinephrine IV infusion in D5W    phenylephrine IV infusion in NS Last Rate: Stopped (05/07/19 0458)       Current Nutrition Intake:  OG placed 4/26.  .  TF provides:  1296 calories, 58 g protein, 177 g carb, 16 g fiber, 881ml free water  + 1200 mL water flushes = 1881 mL water daily    Anthropometrics:  Height: 4' 11\" (149.9 cm)  Admission weight: 105# (5/9)  Weight: 105 lb 12.8 oz (48 kg)    Physical Findings: 5/3  Moderate fat loss in orbital area.  Mild to moderate muscle loss in temporal area.  Mild muscle loss in clavicle and acromion area.  Edema to +2.    GI Status/Output:   GI symptoms per nursing:   Last  ml liquid per rectal tube last 24 hours  No residuals    Skin/Wound:   Gerardo Scale Score: 13  +1  Edema extremities    Medications:  Levothyroxine-IV  Medications reviewed.    Labs:  Na 142 wdl was elevated, K 3.4 L, BUN 24 sl H, Creat 0.74, Glucose 179 H  Labs reviewed    Estimated Nutrition Needs:  Using ideal weight of 45.5 kg.    Energy Needs: 9243-2927 kcals daily per 25-30 kcal/kg   Protein Needs: 55-68 g daily, 1.2-1.5 g/kg.    Malnutrition: Not noted.  Not enough criteria met.    Nutrition Risk Level: moderate risk    Nutrition DX:  Swallow difficulty r/t intubation evidenced by NPO, need for nutrition support   " Nutrition knowledge deficit r/t new DX evidenced by surgery-on hold    Goals:  Tolerate TF - progressing  Participate in diet ed-on hold    Plan:If levothyroxine adjusted to be given per FT then goal TF rate to adjust to 50ml/hr  x 21 hrs.       Monitor:   TF tolerance, labs, wt, hydration    See Care Plan for Problems, Goals, and Interventions.

## 2021-05-28 NOTE — TELEPHONE ENCOUNTER
ANTICOAGULATION  MANAGEMENT: Discharge Continuity of Care Review    Hospital admission on  4/24 for mitral and aortic valve repair.    Discharge disposition: [facility]    INR Results:       Recent labs: (last 7 days)     05/09/19  0601 05/09/19  1500 05/10/19  0500 05/11/19  0621 05/12/19  0644 05/13/19  0623   INR 1.26* 1.25* 1.39* 1.25* 1.38* 1.85*       Warfarin inpatient management: Warfarin held    Warfarin discharge instructions: home regimen continued     Medication Changes Affecting Anticoagulation: Yes: Vancomycin    Additional Factors Affecting Anticoagulation: Yes: feeding via enteric tube    Plan     Will follow up after discharge from [facility]    ACM will resume monitoring upon discharge from [facility]    Anticoagulation calendar updated    Swapna Phan RN

## 2021-05-28 NOTE — ANESTHESIA POSTPROCEDURE EVALUATION
Patient: Kody Johns  AORTIC VALVE REPLACEMENT, MITRAL VALVE REPLACEMENT, ANESTHESIA, TAKEDOWN OF PERICARDIAL ADHESIONS AND REINFORCEMENT OF KLS PLATING SYSTEM TRANESOPHAGEAL ECHOCARDIOGRAM AND EPI AORTIC ULTRASOUND  Anesthesia type: general    Patient location: ICU  Last vitals:   Vitals Value Taken Time   BP 88/51 4/24/2019  1:42 PM   Temp 37.7  C (99.9  F) 4/24/2019  3:50 PM   Pulse 86 4/24/2019  4:14 PM   Resp 24 4/24/2019  3:59 PM   SpO2 100 % 4/24/2019  4:14 PM   Vitals shown include unvalidated device data.  Post vital signs: stable  Level of consciousness: sedated  Post-anesthesia pain: pain controlled  Post-anesthesia nausea and vomiting: no  Pulmonary: ETT, ventilator  Cardiovascular: BP currently stable on epinephrine infusion and inhaled flolan.    Hydration: adequate  Anesthetic events: no    QCDR Measures:  ASA# 11 - Mee-op Cardiac Arrest: ASA11B - Patient did NOT experience unanticipated cardiac arrest  ASA# 12 - Mee-op Mortality Rate: ASA12B - Patient did NOT die  ASA# 13 - PACU Re-Intubation Rate: ASA13X - Exclusion: organ donor or direct ICU transfer  ASA# 10 - Composite Anes Safety: ASA10A - No serious adverse event    Additional Notes:  Pt will remain intubated because of flolan.  ICU managing ventilator.

## 2021-05-28 NOTE — ANESTHESIA PROCEDURE NOTES
Arterial Line  Reason for Procedure: hemodynamic monitoring and multiple ABGs  Patient location during procedure: OR pre-induction  Start time: 4/24/2019 7:09 AM  End time: 4/24/2019 7:14 AM  Staffing:  Performing  Anesthesiologist: Yesica Kimball MD  Sterile Precautions:  sterile barriers used during insertion: cap, mask, sterile gloves, large sheet, and hand hygiene used.  Arterial Line:   Immediately prior to procedure a time out was called to verify the correct patient, procedure, equipment, support staff and site/side marked as required  Laterality: right  Location: radial  Prepped with: ChloroPrep    Needle gauge: 20 G  Number of Attempts: 1  Secured with: tape, transparent dressing and pressure dressing  Flushed with: saline  1% lidocaine local anesthesia used for skin prep.   See MAR for additional medications given.  Ultrasound evaluation of access site: yes  Vessel patent by US exam    Concurrent real time visualization of needle entry

## 2021-05-28 NOTE — PROGRESS NOTES
NEUROLOGY PROGRESS NOTE     Kody Johns,  1950, MRN [id number] Date: 2019     [facility]   Code status:  Full Code   PCP: Aristides Alegria MD, [phone]      ASSESSMENT & PLAN   Hospital Day#: 11  Diagnosis code: Ischemic stroke    Ischemic stroke  Aortic/Mitral valve repair  Elevated CK with generalized weakness - ? Critical care myopathy      Head CT shows right P2 stroke which fits with the CTA scan.    MRI shows PCA stroke.    MRI brain/MRI cervical spine do not explain arm and leg weakness.    Patients CK came back elevated and he might have critical care myopathy though his weakness is asymmetrical which is generally not seen in critical care myopathy.    Decrease sedation and avoid paralytic agents.     Follow CK.    BP can be normalized.     Most likely cause of stroke is cardioembolic secondary to surgery.     Hold ASA (stopped today) due to low platelets. Needs anticoagulation long term.    Family meeting (TBD) to decide on Trach and PEG.    Will continue to follow.  Discussed plan with ICU team and CV surgery team regarding post-stroke cares.     Thank you again for this referral, please feel free to contact me if you have any questions.     CHIEF COMPLAINT S/P MVR (mitral valve repair)     HISTORY OF PRESENT ILLNESS     We have been requested by Dr. Omer to evaluate Kody Johns who is a 69 y.o.  male for acute stroke.    This is a 69-year-old male on whom neurology is been consulted to evaluate for stroke.  Patient had a mitral valve surgery yesterday and was under sedation after that.  Sedation was lightened today when it was noticed that he was having some ophthalmoplegia.  Was mainly involving the left eye.  Stroke code was called.  He was not a TPA candidate because of the recent surgery and unclear time of onset.  Left arm and leg weakness was also noted.  Head CT was done which showed a right P2 occlusion.  The patient's exam is been stable since the stroke code.  He  was not a candidate for thrombectomy because the blood vessel was too small for mechanical thrombectomy.      Patient is unable to provide any history because of intubation.  History is obtained through chart review and through talking to the son.    4/26  Patient remains intubated. Unable to extubate. He has spiked a fever.  Per family he has been opening his eyes. Nodding to questions. Moving right arm but not the right leg.  He did move the left arm once for the family. Though the healthcare staff have not observed this.  No other new symptoms per the family.   MRI brain could not be done due to Pacemaker wires.    4/29  Patient remains in the ICU. Due to low platelets the pacemaker wires cannot be removed and patient cannot get the MRI. Family still wanting aggressive cares. Patients fever has been thought to be secondary to neurological disease.    4/30  Patient has been agitated when off the Precedex. Plan for MRI today and possibly family conference tomorrow. Family is hopeful that he will make good recovery. Discussed with family that there is no cure for stroke.     5/1  Family meeting later today. Patient still has not improved. MRI brain done last night.     5/2  Patients MRI cervical spine does not explain why he cannot move left side. Updated family on results.   Plan to proceed with PEG and Trach most likely. Family meeting tomorrow afternoon to decide.    5/3  Family meeting was cancelled for today. Family most likely planning on Trach and PEG today. Discussed with youngest son.     Cardiology to do BATSHEVA with cardioversion.  Abd CT shows ileus.    5/4  No new events. Family meeting has not been set for so far. No significant arm or leg movements seen.     5/5  Patients CK came back elevated. Sedation has been lightened and patient is getting more alert today. Family meeting still pending.      PAST MEDICAL & SURGICAL HISTORY     Medical History  Patient Active Problem List    Diagnosis Date Noted      Acute cardioembolic stroke (H)      Paralytic ileus (H) 05/02/2019     Small bowel obstruction (H)      Atrial fibrillation with RVR (H)      Hypernatremia      Thrombocytopenia (H)      Sepsis, due to unspecified organism (H)      S/P MVR (mitral valve repair) 04/24/2019     Acute respiratory failure with hypoxemia (H) 04/24/2019     Anemia due to blood loss, acute 04/24/2019     Coagulopathy (H) 04/24/2019     Non-English speaking patient 04/24/2019     A-fib (H) 09/14/2017     Heart failure with preserved ejection fraction (H) 09/14/2017     Moderate malnutrition (H) 08/31/2017     ALEENA (acute kidney injury) (H) 08/31/2017     Aortic valve disease, rheumatic 08/30/2017     Lightheadedness 08/30/2017     Atherosclerosis of native coronary artery of native heart without angina pectoris      Essential hypertension with goal blood pressure less than 130/85      Pure hypercholesterolemia      Dysuria 08/29/2017     Hypothyroidism, unspecified type      Mitral valve stenosis, unspecified etiology      Elevated LFTs 08/26/2016     Silent Stroke      Shoulder strain      Dysphagia      Hyperlipidemia      Rheumatic heart disease      Blurry vision      Hearing loss      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  Surgical History  He  has a past surgical history that includes Cataract extraction (Left, 06/13/2016); Cardiac catheterization; Coronary Angiogram (N/A,  11/30/2018); Eye surgery; pr replacement of mitral valve (N/A, 4/24/2019); Aortic valve replacement (N/A, 4/24/2019); and PICC Team Line Insertion (4/29/2019).     SOCIAL HISTORY     Reviewed, and he  reports that he quit smoking about 4 years ago. His smoking use included cigarettes. He has a 50.00 pack-year smoking history. He has quit using smokeless tobacco. He reports that he does not drink alcohol or use drugs.     FAMILY HISTORY     Reviewed, and family history includes No Medical Problems in his father and mother.     ALLERGIES     No Known Allergies     REVIEW OF SYSTEMS     Unable to do ROS due to intubation and AMS.     HOME & HOSPITAL MEDICATIONS     Prior to Admission Medications  Medications Prior to Admission   Medication Sig Dispense Refill Last Dose     albuterol (PROAIR HFA;PROVENTIL HFA;VENTOLIN HFA) 90 mcg/actuation inhaler Inhale 1-2 puffs every 6 (six) hours as needed. 1 Inhaler 5 4/22/2019     amoxicillin (AMOXIL) 500 MG capsule TAKE 4 TABLETS (2,000 mg) BY MOUTH 1 HOUR PRIOR TO PROCEDURE. 4 capsule 3 Unknown     atorvastatin (LIPITOR) 40 MG tablet TAKE 1 PILL BY MOUTH AT BEDTIME. FOR CHOLESTEROL 90 tablet 2 4/22/2019     baclofen (LIORESAL) 10 MG tablet TAKE 1 TABLET BY MOUTH TWICE DAILY 60 tablet 5 4/23/2019     enoxaparin (LOVENOX) 40 mg/0.4 mL syringe Inject 0.4 mL (40 mg total) under the skin every evening. 2 Syringe 0 4/22/2019     enoxaparin (LOVENOX) 60 mg/0.6 mL syringe Inject 0.6 mL (60 mg total) under the skin every morning. 3 Syringe 0 4/23/2019 at am     fluticasone (FLONASE) 50 mcg/actuation nasal spray 2 sprays into each nostril daily. (Patient taking differently: 2 sprays into each nostril daily as needed.       ) 10 g 3 4/22/2019     furosemide (LASIX) 20 MG tablet TAKE 1 TABLET (20 MG TOTAL) BY MOUTH DAILY FOR EDEMA 90 tablet 3 4/23/2019     levothyroxine (SYNTHROID, LEVOTHROID) 88 MCG tablet TAKE 1 PILL BY MOUTH EVERYDAY FOR THYROID 30 tablet 6 4/23/2019     metoprolol  succinate (TOPROL-XL) 25 MG TAKE 1 PILL BY MOUTH EVERYDAY FOR BLOOD PRESSURE 90 tablet 3 4/23/2019     omeprazole (PRILOSEC) 20 MG capsule TAKE 1 PILL BY MOUTH EVERYDAY FOR STOMACH 30 capsule 8 4/23/2019     spironolactone (ALDACTONE) 25 MG tablet TAKE 1 PILL BY MOUTH EVERYDAY 30 tablet 8 4/23/2019     warfarin (COUMADIN/JANTOVEN) 1 MG tablet Take 1-2 mg by mouth See Admin Instructions. 1 mg daily on Mon/Wed/Fri; and 2 mg daily rest of week   4/19/2019     acetaminophen (TYLENOL) 500 MG tablet Take 500-1,000 mg by mouth every 6 (six) hours as needed for pain. Every 6-8 hours as needed. No more than 4,000MG of acetaminophen daily.   Unknown     ibuprofen (ADVIL,MOTRIN) 600 MG tablet Take 600 mg by mouth every 6 (six) hours as needed for pain.   Unknown       Hospital Medications    baclofen  10 mg Enteral Tube BID     calcium chloride IVPB  0.34 g Intravenous Once     chlorhexidine  15 mL Topical Q12H     furosemide  20 mg Intravenous BID - diuretic     insulin aspart (NovoLOG) injection   Subcutaneous Q4H     levothyroxine  75 mcg Intravenous Daily     metoprolol tartrate  2.5-5 mg Intravenous Q6H     metoprolol tartrate  12.5 mg Oral BID     pantoprazole  40 mg Intravenous Q24H    Or     omeprazole  20 mg Oral Q24H    Or     omeprazole  20 mg Enteral Tube Q24H     potassium chloride  10 mEq Enteral Tube Daily with supper     sodium chloride  10-30 mL Intravenous Q8H FIXED TIMES     sodium chloride  3 mL Intravenous Line Care        PHYSICAL EXAM     Vital signs  Temp:  [98.1  F (36.7  C)-99.5  F (37.5  C)] 98.8  F (37.1  C)  Heart Rate:  [] 117  Resp:  [18-33] 19  BP: ()/(50-88) 102/73  FiO2 (%):  [35 %-40 %] 35 %    Weight:   109 lb 1.6 oz (49.5 kg)    GENERAL: Healthy appearing, alert, no acute distress, normal habitus.  CARDIOVASCULAR: Ext warm and well perfused.  NEUROLOGICAL:  Patient is lying in bed intubated. Opens eyes to voice Minimaly. Right pupil larger than left. EOM absent. VFI on right.  Corneals present. NLF symmetric. Does not move any ext to pain or spontaneously. Starting to move right UE today. Tone symmetric.      DIAGNOSTIC STUDIES     Pertinent Radiology   Head CT images reviewed. No acute stroke seen  IMPRESSION:   CONCLUSION:   HEAD CT:  1.  No intracranial hemorrhage, mass lesions, hydrocephalus or CT evidence for an acute infarct. MRI is more sensitive for the evaluation of acute infarct.  2.  Chronic lacunae as detailed above.  3.  Mild diffuse cerebral parenchymal volume loss. Presumed chronic hypertensive/microvascular ischemic white matter changes.        HEAD CTA:   1.  There is cut off on the P2 segment of the right posterior cerebral artery.  2.  No high-grade stenosis or occlusion of the rest of the major intracranial arteries.  3.  There is 1.5 to 2 mm focal outpouching off the expected location of the origin of the right posterior communicating artery. This is likely an infundibulum versus small aneurysm.        NECK CTA:  1.  No significant stenosis in the neck vessels based on NASCET criteria.  2.  No evidence for dissection.    Repeat Head CT 4/26 Images reviewed. Show right PCA stroke with question of pontine stroke.    Repeat Head CT images reviewed. 4/29  IMPRESSION:   CONCLUSION:  1.  Motion degraded examination demonstrates expected temporal evolution of right posterior cerebral artery territory infarct including interval development of small volume petechial hemorrhage as above. No hemorrhagic transformation. Mild local mass   effect with partial effacement of the posterior right lateral ventricle.  2.  Some loss of gray-white matter differentiation involving the parasagittal left parieto-occipital junction. It is uncertain if this is artifactual or reflect a new area of subacute ischemia/infarct. Consider MRI or follow-up head CT if the patient is   not an MRI candidate.  3.  Background of mild age-related changes elsewhere similar to the prior exam.    MRI brain  images reviewed. Right PCA stroke seen.    MRI cervical spine  IMPRESSION:   CONCLUSION:  1.  Markedly limited by patient motion. No definite canal narrowing or gross cord signal abnormality     2.  Evaluation of the foramina is limited though there is likely significant foraminal narrowing at a few levels.     3.  No marrow edema or acute injury.    CK came back elevated.    Recent Results (from the past 24 hour(s))   Blood Gases, Arterial    Collection Time: 05/04/19 11:04 AM   Result Value Ref Range    pH, Arterial 7.49 (H) 7.37 - 7.44    pCO2, Arterial 33 (L) 35 - 45 mm Hg    pO2, Arterial 92 (H) 75 - 85 mm Hg    Bicarbonate, Arterial Calc 26.6 23.0 - 29.0 mmol/L    O2 Sat, Arterial 99.5 (H) 95.0 - 96.0 %    Oxyhemoglobin 96.1 (H) 95.0 - 96.0 %    Base Excess, Arterial Calc 2.1 mmol/L    Ventilation Mode PCV/VG     Rate 20 rr/min    FIO2 0.40     Peep 5 cm H2O    Sample Stabilized Temperature 37.0 degrees C    Ventilator Tidal Volume 325 mL   POCT Glucose - every 4 hours    Collection Time: 05/04/19 11:18 AM   Result Value Ref Range    Glucose 147 (H) 70 - 139 mg/dL   Sodium    Collection Time: 05/04/19 11:39 AM   Result Value Ref Range    Sodium 150 (H) 136 - 145 mmol/L   HM2(CBC w/o Differential)    Collection Time: 05/04/19 11:39 AM   Result Value Ref Range    WBC 20.4 (H) 4.0 - 11.0 thou/uL    RBC 3.70 (L) 4.40 - 6.20 mill/uL    Hemoglobin 11.1 (L) 14.0 - 18.0 g/dL    Hematocrit 34.7 (L) 40.0 - 54.0 %    MCV 94 80 - 100 fL    MCH 30.0 27.0 - 34.0 pg    MCHC 32.0 32.0 - 36.0 g/dL    RDW 15.4 (H) 11.0 - 14.5 %    Platelets 93 (L) 140 - 440 thou/uL    MPV 14.2 (H) 8.5 - 12.5 fL   Potassium    Collection Time: 05/04/19 11:39 AM   Result Value Ref Range    Potassium 3.8 3.5 - 5.0 mmol/L   BUN    Collection Time: 05/04/19 11:39 AM   Result Value Ref Range    BUN 56 (H) 8 - 22 mg/dL   POCT Glucose - every 4 hours    Collection Time: 05/04/19  4:16 PM   Result Value Ref Range    Glucose 138 70 - 139 mg/dL   Sodium     Collection Time: 05/04/19  5:50 PM   Result Value Ref Range    Sodium 148 (H) 136 - 145 mmol/L   POCT Glucose - every 4 hours    Collection Time: 05/04/19  8:14 PM   Result Value Ref Range    Glucose 181 (H) 70 - 139 mg/dL   POCT Glucose - every 4 hours    Collection Time: 05/05/19 12:40 AM   Result Value Ref Range    Glucose 167 (H) 70 - 139 mg/dL   Sodium    Collection Time: 05/05/19 12:41 AM   Result Value Ref Range    Sodium 148 (H) 136 - 145 mmol/L   POCT Glucose - every 4 hours    Collection Time: 05/05/19  3:55 AM   Result Value Ref Range    Glucose 143 (H) 70 - 139 mg/dL   Magnesium    Collection Time: 05/05/19  7:55 AM   Result Value Ref Range    Magnesium 2.5 1.8 - 2.6 mg/dL   Basic Metabolic Panel    Collection Time: 05/05/19  7:55 AM   Result Value Ref Range    Sodium 149 (H) 136 - 145 mmol/L    Potassium 3.7 3.5 - 5.0 mmol/L    Chloride 115 (H) 98 - 107 mmol/L    CO2 25 22 - 31 mmol/L    Anion Gap, Calculation 9 5 - 18 mmol/L    Glucose 148 (H) 70 - 125 mg/dL    Calcium 7.9 (L) 8.5 - 10.5 mg/dL    BUN 46 (H) 8 - 22 mg/dL    Creatinine 1.07 0.70 - 1.30 mg/dL    GFR MDRD Af Amer >60 >60 mL/min/1.73m2    GFR MDRD Non Af Amer >60 >60 mL/min/1.73m2   HM2(CBC w/o Differential)    Collection Time: 05/05/19  7:55 AM   Result Value Ref Range    WBC 17.0 (H) 4.0 - 11.0 thou/uL    RBC 3.59 (L) 4.40 - 6.20 mill/uL    Hemoglobin 10.8 (L) 14.0 - 18.0 g/dL    Hematocrit 33.1 (L) 40.0 - 54.0 %    MCV 92 80 - 100 fL    MCH 30.1 27.0 - 34.0 pg    MCHC 32.6 32.0 - 36.0 g/dL    RDW 15.2 (H) 11.0 - 14.5 %    Platelets 96 (L) 140 - 440 thou/uL    MPV 12.8 (H) 8.5 - 12.5 fL   Amylase    Collection Time: 05/05/19  7:55 AM   Result Value Ref Range    Amylase 92 5 - 120 U/L   Hepatic Profile    Collection Time: 05/05/19  7:55 AM   Result Value Ref Range    Bilirubin, Total 2.7 (H) 0.0 - 1.0 mg/dL    Bilirubin, Direct 1.7 (H) <=0.5 mg/dL    Protein, Total 5.8 (L) 6.0 - 8.0 g/dL    Albumin 2.0 (L) 3.5 - 5.0 g/dL    Alkaline  Phosphatase 114 45 - 120 U/L     (H) 0 - 40 U/L     (H) 0 - 45 U/L   Calcium, Ionized, Measured    Collection Time: 05/05/19  7:55 AM   Result Value Ref Range    Calcium, Ionized Measured 1.02 (L) 1.11 - 1.30 mmol/L    Calcium, Ionized pH 7.4 1.05 (L) 1.11 - 1.30 mmol/L    pH 7.47 (H) 7.35 - 7.45   Lactic Acid    Collection Time: 05/05/19  7:55 AM   Result Value Ref Range    Lactic Acid 1.5 0.5 - 2.2 mmol/L   POCT Glucose - every 4 hours    Collection Time: 05/05/19  8:02 AM   Result Value Ref Range    Glucose 139 70 - 139 mg/dL       Total time spent for face to face visit, reviewing labs/imaging studies, counseling and coordination of care was: 35 min More than 50% of this time was spent on counseling and coordination of care. Discussed case with ICU team and CV surgery.       Dustin Moya MD  [employer] Messaging: [email or web address]  Tel: [phone]  This note was dictated using voice recognition software.  Any grammatical or context distortions are unintentional and inherent to the software.

## 2021-05-28 NOTE — PROGRESS NOTES
[facility] Hematology and Oncology Inpatient Progress Note    Patient: Kody Johns  MRN: [id number]  Date of Service: 5/1/2019        Reason for Visit    1.  Thrombocytopenia    Assessment and Plan    1.  Thrombocytopenia: Platelet factor for antibody and serotonin release assay are both negative.  Unlikely to have HIT.  Would not pursue any further work-up for his low platelets at this time.  Please seem to be slowly improving.  No transfusions indicated unless bleeding or platelet count less than 10,000.  We will sign off at this point.  Please call with questions thanks.    History of Present Illness    Mr. Metzger is a 61-year-old gentleman who was admitted to the hospital on April 24 for cardiac surgery.  Since then he had a stroke.  He also developed thrombocytopenia.  He is also being treated for infection.  Remains intubated and sedated.  Cannot provide any updated history.     Review of Systems    Unable to get updated review of system given his intubation and sedation.    Physical Exam    Recent Vitals 5/1/2019   Height -   Weight -   BSA (m2) -   BP 88/60   Pulse 97   Temp -   Temp src -   SpO2 98   Some recent data might be hidden     General: patient appears stated age of 69 y.o.  HEENT: Head: atraumatic, normocephalic.  Endotracheal tube in place.  Chest:  Normal respiratory effort  Cardiac:  No edema.   Abdomen: abdomen is soft  Extremities: normal tone and muscle bulk.  Skin: no lesions or rash. Warm and dry.     Lab Results    Results from last 7 days   Lab Units 05/01/19  0957 04/30/19  0559 04/29/19  1930 04/29/19  1032  04/25/19  0458   LN-WHITE BLOOD CELL COUNT thou/uL 7.3 6.3  --  4.1   < > 14.6*   LN-HEMOGLOBIN g/dL 9.6* 8.0*  --  8.4*   < > 10.8*   LN-HEMATOCRIT % 29.6* 25.5*  --  26.3*   < > 32.3*   LN-PLATELET COUNT thou/uL 42* 46* 47* 27*   < > 72*   LN-NEUTROPHILS RELATIVE PERCENT %  --   --   --   --   --  84*   LN-TOTAL NEUTROPHILS-REL(DIFF) %  --   --   --  70  --   --     LN-LYMPHOCYTES RELATIVE PERCENT %  --   --   --   --   --  5*   LN-LYMPHOCYTES - REL (DIFF) %  --   --   --  22  --   --    LN-MONOCYTES RELATIVE PERCENT %  --   --   --   --   --  11*   LN-MONOCYTES - REL (DIFF) %  --   --   --  8  --   --     < > = values in this interval not displayed.     Results from last 7 days   Lab Units 05/01/19  1330 05/01/19  0445 04/30/19  2353  04/30/19  1338 04/30/19  0559 04/30/19  0048  04/29/19  0524   LN-SODIUM mmol/L 146* 148* 147*   < > 147* 147* 147*   < > 150*  150*   LN-POTASSIUM mmol/L  --  3.6  --   --   --  3.9 3.4*   < > 3.4*   LN-CHLORIDE mmol/L  --  114*  --   --   --  115*  --   --  117*   LN-CO2 mmol/L  --  25  --   --   --  26  --   --  26   LN-BLOOD UREA NITROGEN mg/dL  --  48*  --   --   --  50*  --   --  44*   LN-CREATININE mg/dL  --  1.22  --   --   --  1.28  --   --  1.32*   LN-CALCIUM mg/dL  --  7.7*  --   --   --  7.4*  --   --  7.4*   LN-ALBUMIN g/dL  --   --   --   --  2.3*  --   --   --   --    LN-PROTEIN TOTAL g/dL  --   --   --   --  5.4*  --   --   --   --    LN-BILIRUBIN TOTAL mg/dL  --   --   --   --  1.6*  --   --   --   --    LN-ALKALINE PHOSPHATASE U/L  --   --   --   --  68  --   --   --   --    LN-ALT (SGPT) U/L  --   --   --   --  52*  --   --   --   --    LN-AST (SGOT) U/L  --   --   --   --  121*  --   --   --   --     < > = values in this interval not displayed.     Imaging    Cta Head And Neck    Result Date: 4/25/2019  EXAM: CTA HEAD AND NECK LOCATION: [facility] DATE/TIME: 4/25/2019 3:06 PM INDICATION: Neuro deficit(s), subacute neuro changes. Patient had a recent cardiac surgery. Developed left pupil deviation. COMPARISON: Brain MRI and MRA 08/30/2017. CONTRAST: Iohexol (Omni) 90 mL TECHNIQUE: Head and neck CT angiogram with IV contrast. Noncontrast head CT followed by axial helical CT images of the head and neck vessels obtained during the arterial phase of intravenous contrast administration. Axial 2D reconstructed images  and multiplanar 3D MIP reconstructed images of the head and neck vessels were performed by the technologist. Dose reduction techniques were used. FINDINGS: NONCONTRAST HEAD CT: INTRACRANIAL CONTENTS: No intracranial hemorrhage. Mild diffuse cerebral parenchymal volume loss. No midline shift. The basilar cisterns are patent. Chronic lacunae in the right cerebellar hemisphere and basal ganglia. Mild periventricular and scattered foci of deep white matter hypodensities involving both cerebral hemispheres. No CT evidence for an acute infarct. VISUALIZED ORBITS/SINUSES/MASTOIDS: No significant orbital abnormality. Mild mucosal thickening of the ethmoid air cells. No significant middle ear or mastoid effusion. Mild nasoseptal bowing to the left anteriorly. OSSEOUS STRUCTURES/SOFT TISSUES: No significant abnormality. HEAD CTA: ANTERIOR CIRCULATION: No significant stenosis or occlusion. Standard [place] of [place] anatomy. POSTERIOR CIRCULATION: There is cut off on the P2 segment of the right posterior cerebral artery. The intracranial vertebral arteries, the basilar artery and the left posterior cerebral artery are patent without hemodynamically significant stenosis. Dominant left vertebral artery. ANEURYSM/VASCULAR MALFORMATION: There is 1.5 to 2 mm focal outpouching off the expected location of the origin of the right posterior communicating artery. DURAL VENOUS SINUSES: Expected enhancement of the major dural venous sinuses. NECK CTA: RIGHT CAROTID: No measurable stenosis in the right ICA based on NASCET criteria. LEFT CAROTID: No measurable stenosis in the left ICA based on NASCET criteria. VERTEBRAL ARTERIES: Dominant left and smaller right vertebral arteries are patent in the neck and into the head. AORTIC ARCH: Classic aortic arch anatomy with no significant stenosis at the origin of the great vessels. MISCELLANEOUS: No evidence for dissection or pseudoaneurysm. Postsurgical changes of recent median sternotomy.  Bilateral dependent atelectasis. Mild degenerative changes of the cervical spine.     CONCLUSION: HEAD CT: 1.  No intracranial hemorrhage, mass lesions, hydrocephalus or CT evidence for an acute infarct. MRI is more sensitive for the evaluation of acute infarct. 2.  Chronic lacunae as detailed above. 3.  Mild diffuse cerebral parenchymal volume loss. Presumed chronic hypertensive/microvascular ischemic white matter changes. HEAD CTA: 1.  There is cut off on the P2 segment of the right posterior cerebral artery. 2.  No high-grade stenosis or occlusion of the rest of the major intracranial arteries. 3.  There is 1.5 to 2 mm focal outpouching off the expected location of the origin of the right posterior communicating artery. This is likely an infundibulum versus small aneurysm. NECK CTA: 1.  No significant stenosis in the neck vessels based on NASCET criteria. 2.  No evidence for dissection. Discussed the head CT findings with Dr. Palacios at 2:57 PM. Discussed the head and neck CT angiogram findings with Dr. Palacios at 3:08 PM. 04/25/2019.    Xr Chest 1 View Portable    Result Date: 4/27/2019  EXAM: XR CHEST 1 VIEW PORTABLE LOCATION: [facility] DATE/TIME: 04/27/2019, 8:05 AM INDICATION: Postop evaluation. COMPARISON: 04/26/2019. FINDINGS: ET tube 3 cm above the georgina. NG tube tip overlies the stomach. Right internal jugular Wilmont-Nick catheter tip overlies the pulmonary artery outflow tract. Mediastinal chest tube. Left pleural effusion. Left lower lobe atelectasis. Mild vascular congestion and pulmonary edema. Postop median sternotomy. Heart size upper limits of normal.     Xr Chest 1 View Portable    Result Date: 4/26/2019  EXAM: XR CHEST 1 VIEW PORTABLE LOCATION: [facility] DATE/TIME: 4/26/2019 11:11 AM INDICATION: to assess swan placement COMPARISON: 4/25/2019 FINDINGS: No significant change. Wilmont-Nick catheter appears in good position. Other tubes and catheters also remain in good position.  Atelectasis at left lung base unchanged. No pneumothorax. Modest cardiomegaly and postoperative appearance heart unchanged.    Xr Chest 1 View Portable    Result Date: 4/25/2019  EXAM: XR CHEST 1 VIEW PORTABLE LOCATION: [facility] DATE/TIME: 4/25/2019 5:26 AM INDICATION: ICU pt, stable with no clinical status changes post op cardiac surgery post op cardiac surgery COMPARISON: 4/24/2019 FINDINGS: No significant change. Postoperative changes from cardiac valvular surgery. Moderate cardiomegaly persists. There is mild central hilar congestion and slight interstitial prominence suggesting minimal edema. Left hemidiaphragm obscured which may relate to cardiomegaly or left lower lobe atelectasis. No pneumothorax or pleural effusion. Tubes and catheters appear in good position.    Xr Chest 1 View Portable    Result Date: 4/24/2019  EXAM: XR CHEST 1 VIEW PORTABLE LOCATION: [facility] DATE/TIME: 4/24/2019 2:37 PM INDICATION: ICU pt, stable with no clinical status changes post op cardiac surgery post op cardiac surgery COMPARISON: 4/17/2019 FINDINGS: Postoperative changes after cardiac valvular surgery. Moderate cardiomegaly unchanged. Mild atelectasis at left lung base with lungs otherwise clear. No pneumothorax. Tubes and catheters appear in good position.    Xr Chest 2 Views    Result Date: 4/17/2019  EXAM: XR CHEST 2 VIEWS LOCATION: [facility] DATE/TIME: 4/17/2019 12:12 PM INDICATION: Encounter for other preprocedural examination. COMPARISON: 11/22/2018. FINDINGS: Stable moderate cardiomegaly. Pulmonary vascular redistribution and congestion. Mild vascular redistribution. No florid pulmonary edema. No consolidation. No pleural effusion. Ectatic aorta.     Xr Abdomen Ap Portable    Result Date: 4/26/2019  EXAM: XR ABDOMEN AP PORTABLE LOCATION: [facility] DATE/TIME: 4/26/2019 5:06 PM INDICATION: check TF placement COMPARISON: Earlier today at 1000 hours FINDINGS: NG tube is present  and appears in stable position within left upper quadrant. No additional feeding tube identified. Coils of electrical leads overlie the region of the xiphoid and multiple other chest tubes are also present, potentially hampering visualization of enteric tubes     Xr Abdomen Ap Portable    Result Date: 4/26/2019  EXAM: XR ABDOMEN AP PORTABLE LOCATION: [facility] DATE/TIME: 4/26/2019 10:04 AM INDICATION: for OG placement COMPARISON: None. FINDINGS: NG tube remains in good position within left upper quadrant. Visible bowel gas pattern normal.     Ct Head Without Contrast    Result Date: 4/29/2019  EXAM: CT HEAD WO CONTRAST LOCATION: [facility] DATE/TIME: 4/29/2019 6:20 PM INDICATION: Follow up stroke COMPARISON: CT head 4/26/2019 TECHNIQUE: Routine without IV contrast. Multiplanar reformats. Dose reduction techniques were used. FINDINGS: INTRACRANIAL CONTENTS: Images mildly degraded by patient motion. Expected temporal evolution of right posterior cerebral artery territory infarct involving the parasagittal right occipital lobe and posterior temporal lobe as well as the posterior lateral  right thalamus. Increased low-attenuation change, mild local mass effect, and new subtle gyriform hyperdensity within the parasagittal right occipital and posterior right temporal lobes suggesting small volume petechial hemorrhage. No space-occupying hemorrhagic transformation. Poor visualization of gray-white matter differentiation at the parasagittal left parieto-occipital junction (axial image 32 for example). Mild presumed chronic small vessel ischemic changes. Mild generalized volume loss. No hydrocephalus. VISUALIZED ORBITS/SINUSES/MASTOIDS: Prior bilateral cataract surgery. Visualized portions of the orbits are otherwise unremarkable. No significant paranasal sinus mucosal disease. No significant middle ear or mastoid effusion. OSSEOUS STRUCTURES/SOFT TISSUES: No significant abnormality.     CONCLUSION: 1.   Motion degraded examination demonstrates expected temporal evolution of right posterior cerebral artery territory infarct including interval development of small volume petechial hemorrhage as above. No hemorrhagic transformation. Mild local mass effect with partial effacement of the posterior right lateral ventricle. 2.  Some loss of gray-white matter differentiation involving the parasagittal left parieto-occipital junction. It is uncertain if this is artifactual or reflect a new area of subacute ischemia/infarct. Consider MRI or follow-up head CT if the patient is not an MRI candidate. 3.  Background of mild age-related changes elsewhere similar to the prior exam.    Ct Head Without Contrast    Result Date: 4/26/2019  EXAM: CT HEAD WO CONTRAST LOCATION: [facility] DATE/TIME: 4/26/2019 6:32 AM INDICATION: Abnormal finding prior exam, P2 cut off on CTA COMPARISON: 4/25/2019 TECHNIQUE: Routine without IV contrast. Multiplanar reformats. Dose reduction techniques were used. FINDINGS: Moderately sized hypodensity and loss of gray-white matter differentiation right temporal occipital region, consistent with evolving acute/subacute infarction. No significant global mass effect or hemorrhage. Underlying mild atrophy and presumed chronic small vessel ischemia. Normal orbits. Clear paranasal sinuses and mastoid air cells.     CONCLUSION: 1.  Moderately sized hypodensity and loss of gray-white matter differentiation right temporal occipital region, consistent with evolving acute/subacute infarction. 2.  No significant global mass effect or hemorrhage. Results were called to Dr. Meléndez at 4/26/2019 6:48 AM.    Mr Brain Without Contrast    Result Date: 4/30/2019  EXAM: MR BRAIN WO CONTRAST LOCATION: [facility] DATE/TIME: 4/30/2019 9:33 PM INDICATION: Stroke. COMPARISON: CT head 04/29/2019, brain MRI from 08/30/2017. TECHNIQUE: Routine multiplanar multisequence head MRI without intravenous contrast.  FINDINGS: Mildly limited by motion. INTRACRANIAL CONTENTS: Large zone of evolving subacute ischemia in the right PCA distribution including the right medial occipital lobe lateral thalamus and majority of the right hippocampus. Minimal presumed petechial hemorrhage along the lateral portion of the right occipital infarct. Moderate associated vasogenic edema with sulcal effacement and localized mass effect. There is moderate effacement of the right atrium, temporal and occipital horns lateral ventricle without definite hydrocephalus.  No space-occupying hemorrhage. Minimal diffusion hyperintensity in the cortex of the left occipital lobe axial diffusion image 9 is favored to be artifactual. There are scattered patchy acute to early subacute infarcts in both frontal lobes more prominently in the left posterior frontal cortex. Small acute to early subacute infarct in the right caudate body. Mild age-related change. Normal position of the cerebellar tonsils. SELLA: No significant abnormality accounting for technique. OSSEOUS STRUCTURES/SOFT TISSUES: No aggressive osseous lesion involving the calvarium, skull base, or visualized upper cervical spine. The major intracranial vascular flow voids are maintained. ORBITS: No significant abnormality accounting for technique. SINUSES/MASTOIDS: Mild mucosal thickening ethmoid air cells and small left sphenoid sinus fluid level. No significant middle ear or mastoid effusion.     CONCLUSION: 1.  Limited by motion. Large evolving subacute right PCA distribution infarcts with moderate cytotoxic edema, sulcal effacement and mass effect on the right lateral ventricle. No hydrocephalus. 2.  Minimal, punctate petechial hemorrhage in the right occipital infarct bed. 3.  Patchy areas of acute to early subacute ischemia in the posterior left frontal cortex and minimally within the right anterior frontal cortex and right caudate body. NOTE: ABNORMAL REPORT THE DICTATION ABOVE DESCRIBES AN  ABNORMALITY FOR WHICH FOLLOW-UP IS NEEDED.     Xr Chest 1 View For Picc Placement Portable    Addendum Date: 4/30/2019    Addendum: Request received to assess for pacer wires. Epicardial pacer leads are not included in the field-of-view of this study. A right IJ sheath transmits a device which is likely a Goree-Nick catheter in the main pulmonary artery. Clinically correlate  to exclude a pacer through the sheath.    Result Date: 4/30/2019  EXAM: XR CHEST 1 VIEW FOR PICC LINE PLACEMENT PORTABLE LOCATION: [facility] DATE/TIME: 4/29/2019 3:10 PM INDICATION: verify catheter placement COMPARISON: 04/27/2019. FINDINGS: Endotracheal tube tip is 3.0 cm above the georgina. NG tube with tip below the inferior border of the image. There is a right internal jugular Goree-Nick catheter with the tip at the expected location of the main pulmonary outflow tract. There are  mediastinal and right chest drains. New right PICC. The tip is in the expected location of the right atrium. Lung volumes are low. Bilateral atelectasis with trace effusions not excluded. No pneumothorax on portable imaging. Unchanged cardiac size. Median sternotomy.    Us Carotid Bilateral    Result Date: 4/17/2019  EXAM: US CAROTID BILATERAL LOCATION: [facility] DATE/TIME: 4/17/2019 1:10 PM INDICATION: Pre op eval, history of atrial fibrillation/rheumatic heart disease. COMPARISON: None. TECHNIQUE: Duplex exam performed utilizing 2D gray-scale imaging, Doppler interrogation with color-flow and spectral waveform analysis. FINDINGS: RIGHT: There is minimal atheromatous plaque. Normal waveforms with no significant stenosis. LEFT: There is minimal atheromatous plaque. Normal waveforms with no significant stenosis. Both vertebral arteries and subclavian artery waveforms are normal. VELOCITY CHART: The following velocities were obtained in the RIGHT carotid system. CCA: 55/17 cm/s ICA: 63/24 cm/s ECA: 48/6 cm/s ICA/CCA: PS 1.1 The following  velocities were obtained in the LEFT carotid system. CCA: 47/15 cm/s ICA: 76/37 cm/s ECA: 101/26 cm/s ICA/CCA: PS 1.6     CONCLUSION: 1. Minimal atheromatous plaque in the carotid arteries. 2. No significant stenosis on either side. Evaluation based on velocities and NASCET criteria.      Signed by: Dong Boles MD

## 2021-05-28 NOTE — PROGRESS NOTES
Intensivist update     Reviewed CTA head/neck with IR (Dr. Peoples). The P2 segment occlusion is beyond the location where neuro IR can typically intervene. Pt is not tPA candidate due to recent heart surgery.  Updated CV surgery team. They will decide re: [facility] drip vs. No intervention at all and just continuing to monitor his symptoms.    Rafael Omer MD (Avi)  [facility] Pulmonary & Critical Care  Pager [phone]  Clinic [phone]    UPDATE:  Spoke with Dr. Vaughn. No intervention for the small P2 PCA cutoff. Dr. Vaughn also spoke to Dr. Peoples on the phone.  Failed SBT at 4:30pm (high RR, low Vt) RSBI ~102  He says he is in pain and anxious  Will plan to give some pain medication, start low dose precedex and retry SBT.   I am not confident he can be extubated today.

## 2021-05-28 NOTE — PROGRESS NOTES
"Critical Care Progress Note     Admit Date: 4/24/2019  ICU Day: 1     Code Status: full code    CC: rheumatic valvular disease    HPI: 69 y.o. [place]  male with rheumatic heart valve dz(AV regurgitation, , MV stenosis aand TV regurgitation) afib, and pericardial adhesions who was admitted 4/24/19 for AVR, MVR tissue valves, and takedown of pericardial adhesions by Dr Vaughn     Case summary: Pt was an easy intubation, received 15 mg methadone preop, Echo showed boggy RV.  PAPs in the 30s preop, pt had hard time coming off extracorporeal circulation, needing many bumps of epi, flolan was started.  Also they took down pericardial adhesions.  To ICU on full vent support, flolan full strength, not paced,  On epi, insulin and precedex drips.  Protamine was given on arrival to ICU.    Major events over the last 24 hours: remains intubated, flolan to change to 1/4 strength     Subjective: in bed lightly sedated on full vent support  ROS: unable to do    Drips: precedex, epi and nicardipine    Floaln-being decreased to 1/4 presently    Ventilator: Mechanical Ventilation Day: 2        Settings: 20/350/5/40%    BP (!) 78/54   Pulse 89   Temp (!) 100.9  F (38.3  C) (Core)   Resp 24   Ht 4' 10\" (1.473 m)   Wt 113 lb 8.6 oz (51.5 kg)   SpO2 97%   BMI 23.73 kg/m    PAP: (22-33)/(3-21) 27/17  CVP:  [6 mmHg-17 mmHg] 14 mmHg  I/O last 3 completed shifts:  In: 7489.3 [I.V.:4386.3; Blood:1679; IV Piggyback:1424]  Out: 3188 [Urine:1583; Blood:1025; Chest Tube:580]  Weight change: 0.3 oz (0.009 kg)  Vent Mode: VCV  FiO2 (%):  [40 %-100 %] 40 %  S RR:  [20-24] 24  S VT:  [350 mL] 350 mL  PEEP/CPAP (cm H2O):  [5 cm H2O] 5 cm H2O  Minute Ventilation (L/min):  [8.6 L/min-10 L/min] 10 L/min  PIP:  [6 cm H2O-32 cm H2O] 32 cm H2O  MAP (cm H2O):  [8-10] 10      EXAM:   Mental status: in bed lightly sedated  HEENT: NC PERRL OETT  Resp: cta  Cardiovascular: RRR  Abdominal: soft  Extremeties: no e/c/c  Neurology: sedated  Other: ETT SG " AlineCTs flolan    Labs Personally reviewed: yes  Results from last 7 days   Lab Units 04/25/19  0458 04/24/19  1702 04/24/19  1328 04/24/19  1157   LN-WHITE BLOOD CELL COUNT thou/uL 14.6*  --  11.8* 20.1*   LN-HEMOGLOBIN g/dL 10.8* 10.7* 11.3* 9.3*   LN-HEMATOCRIT % 32.3*  --  33.7* 28.2*   LN-PLATELET COUNT thou/uL 72* 88* 52* 82*   LN-NEUTROPHILS RELATIVE PERCENT % 84*  --  88*  --    LN-MONOCYTES RELATIVE PERCENT % 11*  --  5  --      Results from last 7 days   Lab Units 04/25/19  0458 04/24/19  1328 04/23/19  0913   LN-SODIUM mmol/L 145 147* 138   LN-POTASSIUM mmol/L 4.2 3.8 4.0   LN-CHLORIDE mmol/L 118* 118* 108*   LN-CO2 mmol/L 20* 24 24   LN-BLOOD UREA NITROGEN mg/dL 17 15 16   LN-CREATININE mg/dL 0.92 0.80 0.96   LN-CALCIUM mg/dL 8.5 8.0* 9.3     Results from last 7 days   Lab Units 04/25/19  0458 04/24/19  1327 04/24/19  1218   LN-INR  1.57* 1.99* 1.88*   LN-PARTIAL THROMBOPLASTIN TIME seconds  --  81* 147*       Last ABG:  PH 7.44  PCO2 31 PAO2 89 Bicarb 23 SAT 99    Microbiology: na  Imaging (all imaging is personally reviewed):     PCXR 4/25/19- Postoperative changes from cardiac valvular surgery. Moderate cardiomegaly persists. There is mild central hilar congestion and slight interstitial prominence suggesting minimal edema. Left hemidiaphragm obscured which may relate to cardiomegaly or left lower lobe atelectasis. No pneumothorax or pleural effusion. Tubes and catheters appear in good position.    Impression:  1. Acute respiratory failure due to GA for case.  To ICU from OR on full vent support  2. Aortic regurgitation.  POD# 1 tissue AVR    3. Mitral stenosis.  POD# 1Tissue MVR   4. Tricuspid regurgitation   5. Heart failure  Dilated RV  Flolan started in OR  6. Coagulopathy .    7. A fib.    8. Pericardial adhesions; s/p takedown of adhesions 4/24  9. Acute blood loss anemia .  EBL 1025  10. hyperglycemia.  To ICU on insulin drip  11. Non English speaking.  [place]     PLAN:   1. Continue full  vent support  2. Flolan to 1/4 strength, DC in 2 hours  3. Have Cuban  available for BT after flolan off  4. Pressors per [facility]   ICU DAILY CHECKLIST                           Can patient transfer out of ICU? no    FAST HUG:    Feeding:  Feeding: No.  Patient is receiving NPO    Guzman:Yes  Analgesia/Sedation:Yes precedex  Thromboembolic prophylaxis: yes; Mode:  Heparin and SCDs  HOB>30:  Yes  Stress Ulcer Protocol Active: yes; Mode: PPI  Glycemic Control: Any glucose > 180 no; Mode of Insulin Therapy: Sliding Scale Insulin    INTUBATED:  Can patient have daily waking:  yes  Can patient have spontaneous breathing trial:  yes    Restraints? yes    PHYSICAL THERAPY AND MOBILITY:  Can patient have PT and mobility trial: no  Activity: Bed Rest    transfer/discharge plans: stays ICU    The patient is critically ill with impairment in organ system and high risk of life threatening deterioration.     Total CCT spent 40 minutes thus far today.       Sandy PORRAS, [phone]  [facility] Pulmonary & Critical Care

## 2021-05-28 NOTE — PLAN OF CARE
Care Plan  Care Management      Care Management Goals of the Day: Care Progression    Care Progression Reviewed With: Charge RN, MD, HUC, CMSW    Barriers to Discharge: Plans for TRACH and PEG    Discharge Disposition: LTACH    Expected Discharge Date: 5/10/19    Transportation: Stretcher Likely      Care Coordination Narrative: [facility] requested for 5/7/19 to discuss plans for TRACH and PEG on 5/8/19 or 5/9/19.  [facility]  requested for anytime between 2 and 3:30 for an hour.

## 2021-05-28 NOTE — PROGRESS NOTES
05/02/19 1700   Vent Settings   Trigger Sensitivity Pressure (cm H2O) -3 cm H2O   PT has been double stacking breaths and guppy breathing throughout shift. RT decreased sensitivity and increased Insp time to slow the increased Rate with agitation from high 30's to low 50's. Pt has been tachy 130's-170s throughout the day as well.  Any stimuli causes pt to increase a great amount of RR or HR.    MD increased VT to 325 from 300 to prevent double stacking as well. Pt RR currently mid 20's HR around 160. Rt to continue to monitor.  Gag is intermittant. BS coarse, and thick tan secretions. RT to continue to monitor.

## 2021-05-28 NOTE — PROGRESS NOTES
"Critical Care Progress Note     Admit Date: 4/24/2019  ICU Day: 6     Code Status: full code    CC: rheumatic valvular disease    HPI: 69 y.o. [place]  male with rheumatic heart valve dz(AV regurgitation, , MV stenosis aand TV regurgitation) afib, and pericardial adhesions who was admitted 4/24/19 for AVR, MVR tissue valves, and takedown of pericardial adhesions by Dr Vaughn     Case summary: Pt was an easy intubation, received 15 mg methadone preop, Echo showed boggy RV.  PAPs in the 30s preop, pt had hard time coming off extracorporeal circulation, needing many bumps of epi, flolan was started.  Also they took down pericardial adhesions.  To ICU on full vent support, flolan full strength, not paced,  On epi, insulin and precedex drips.  Protamine was given on arrival to ICU.    Interval events:  4/24/19: to OR for AVR/MVR    To ICU on Flolan  4/25/19: Flolan weaned off   When off sedation for SBT, pt noted to not move left side and    had left eye with upward deviation   Stroke code called    CTA shows right P2 occlusion   Neurology consulted   SBP parameters changed to     Failed SBT  4/26/19: fever    Failed SBT   repeat head CT  revealed evolving acute/subacute infarction  4/27/19: fever T max 105   ID consulted   HIT panel sent due to persistent thrombocytopenia   4/29/19: some labored breathing with vent, vent changes made at bed side    Does better with flow rate at 50   Seems neurogenic in origin     Heme consult for ? of HIT   Dr Vaughn met with son and intertpreter     Major events over the last 24 hours: PW out    Subjective: in bed, lightly sedated, not responding to me  ROS: unable to do    Drips: precedex       Ventilator: Mechanical Ventilation Day: #6        Settings: 20/300/5/50%    BP 91/58   Pulse 94   Temp (!) 101.1  F (38.4  C) (Core)   Resp (!) 60   Ht 4' 11\" (1.499 m)   Wt 122 lb 8 oz (55.6 kg)   SpO2 100%   BMI 24.74 kg/m    PAP: (24-44)/(13-33) 27/17  CVP:  [13 mmHg-31 mmHg] " 15 mmHg  I/O last 3 completed shifts:  In: 1771.3 [I.V.:740.3; Blood:231; NG/GT:750; IV Piggyback:50]  Out: 1870 [Urine:1800; Chest Tube:70]  Weight change: 6 lb 5.1 oz (2.866 kg)  Vent Mode: VCV  FiO2 (%):  [50 %-100 %] 50 %  S RR:  [20] 20  S VT:  [300 mL-350 mL] 300 mL  PEEP/CPAP (cm H2O):  [5 cm H2O-8 cm H2O] 7 cm H2O  Minute Ventilation (L/min):  [9.2 L/min-15.1 L/min] 15.1 L/min  PIP:  [16 cm H2O-49 cm H2O] 16 cm H2O  VT SUP:  [5 cm H20] 5 cm H20  MAP (cm H2O):  [8-21] 8      EXAM:   Mental status: in bed lightly sedated on vent    HEENT: NC right pupil larger than left, minimal rx(not new), OETT, FT  Resp: few anterior rhonchi  Does not appear labored on vent presently  Cardiovascular: IRRR  Abdominal: soft  Extremeties: no e/c/c  Neurology: does not move left side    Labs Personally reviewed: yes  Results from last 7 days   Lab Units 04/30/19  0559 04/29/19  1930 04/29/19  1032 04/29/19  0629  04/25/19  0458  04/24/19  1328   LN-WHITE BLOOD CELL COUNT thou/uL 6.3  --  4.1 4.9   < > 14.6*  --  11.8*   LN-HEMOGLOBIN g/dL 8.0*  --  8.4* 8.5*   < > 10.8*   < > 11.3*   LN-HEMATOCRIT % 25.5*  --  26.3* 26.9*   < > 32.3*  --  33.7*   LN-PLATELET COUNT thou/uL 46* 47* 27* 28*   < > 72*   < > 52*   LN-NEUTROPHILS RELATIVE PERCENT %  --   --   --   --   --  84*  --  88*   LN-MONOCYTES RELATIVE PERCENT %  --   --   --   --   --  11*  --  5   LN-MONOCYTES - REL (DIFF) %  --   --  8  --   --   --   --   --     < > = values in this interval not displayed.     Results from last 7 days   Lab Units 04/30/19  0559 04/30/19  0048 04/29/19  1930 04/29/19  1721  04/29/19  0524  04/28/19  1014   LN-SODIUM mmol/L 147* 147*  --  150*   < > 150*  150*   < > 154*   LN-POTASSIUM mmol/L 3.9 3.4* 3.4*  --    < > 3.4*   < > 4.0   LN-CHLORIDE mmol/L 115*  --   --   --   --  117*  --  122*   LN-CO2 mmol/L 26  --   --   --   --  26  --  23   LN-BLOOD UREA NITROGEN mg/dL 50*  --   --   --   --  44*  --  48*   LN-CREATININE mg/dL 1.28  --    --   --   --  1.32*  --  1.31*   LN-CALCIUM mg/dL 7.4*  --   --   --   --  7.4*  --  8.1*    < > = values in this interval not displayed.     Results from last 7 days   Lab Units 04/29/19  1032 04/25/19  0458 04/24/19  1327 04/24/19  1218   LN-INR  1.35* 1.57* 1.99* 1.88*   LN-PARTIAL THROMBOPLASTIN TIME seconds 50*  --  81* 147*       Last ABG 4/29/19:  PH 7.51  PCO2 34 PAO2 67 Bicarb 28 SAT 98.1    Microbiology: na  Imaging (all imaging is personally reviewed):       PCXR 4/27/19-ET tube 3 cm above the georgina. NG tube tip overlies the stomach. Right internal jugular [place]-Nick catheter tip overlies the pulmonary artery outflow tract. Mediastinal chest tube.left pleural effusion. Left lower lobe atelectasis. Mild vascular congestion and pulmonary edema. Postop median sternotomy. Heart size upper limits of normal    Head CT 4/26/19-Moderately sized hypodensity and loss of gray-white matter differentiation right temporal occipital region, consistent with evolving acute/subacute infarction.  2.  No significant global mass effect or hemorrhage.    CTA head neck 4/25/19-HEAD CT:  1.  No intracranial hemorrhage, mass lesions, hydrocephalus or CT evidence for an acute infarct. MRI is more sensitive for the evaluation of acute infarct.  2.  Chronic lacunae as detailed above.  3.  Mild diffuse cerebral parenchymal volume loss. Presumed chronic hypertensive/microvascular ischemic white matter changes.        HEAD CTA:   1.  There is cut off on the P2 segment of the right posterior cerebral artery.  2.  No high-grade stenosis or occlusion of the rest of the major intracranial arteries.  3.  There is 1.5 to 2 mm focal outpouching off the expected location of the origin of the right posterior communicating artery. This is likely an infundibulum versus small aneurysm.     PCXR 4/25/19- Postoperative changes from cardiac valvular surgery. Moderate cardiomegaly persists. There is mild central hilar congestion and slight  "interstitial prominence suggesting minimal edema. Left hemidiaphragm obscured which may relate to cardiomegaly or left lower lobe atelectasis. No pneumothorax or pleural effusion. Tubes and catheters appear in good position.    Impression:  1. Acute hypoxic respiratory failure  2. Aortic regurgitation.    S/p tissue AVR  4/24/19  3. Mitral stenosis.    S/p Tissue MVR 4/24/19  4. Tricuspid regurgitation   5. Heart failure  Dilated RV  Flolan started in OR        Now off  6.   Acute ischemic stroke  7.  Thrombocytopenia.   8.   fever   9.   Pericardial adhesions; s/p takedown of adhesions 4/24/19  10.   Acute blood loss anemia .  EBL 1025  11. Non English speaking.  Russian     PLAN:   1. Continue full vent support  2. To get MRI today  3. SBP parameters changed to 120 today per Dr Delaney  4. Sedation vacation  5. Continue TF  6. Per Dr Vaughn's note from 4/29-will need Family conference   7. Continue abx per ID  8. Pt may need trach  9. Per heme \"These include platelet transfusion for counts less than 10 or less than 20 of bleeding.  Check peripheral smear.  Would expect platelet count recovery as all of his acute medical issues improve.\"  10. Other issues per CVS and neurology     ICU DAILY CHECKLIST                           Can patient transfer out of ICU? no    FAST HUG:    Feeding:  Feeding: No.  Patient is receiving NPO  Tube feedings   Guzman:Yes  Analgesia/Sedation:Yes precedex  Thromboembolic prophylaxis: yes; Mode:  SCD  HOB>30:  Yes  Stress Ulcer Protocol Active: yes; Mode: PPI  Glycemic Control: Any glucose > 180 no; Mode of Insulin Therapy: Sliding Scale Insulin    INTUBATED:  Can patient have daily waking:  yes  Can patient have spontaneous breathing trial:  no    Restraints? yes    PHYSICAL THERAPY AND MOBILITY:  Can patient have PT and mobility trial: no  Activity: Bed Rest    transfer/discharge plans: stays ICU    The patient is critically ill with impairment in organ system and high risk of " life threatening deterioration.     Total CCT spent 45 minutes thus far today.       aSndy PORRAS, [phone]  [facility]

## 2021-05-28 NOTE — PROGRESS NOTES
PROVIDER RESTRAINT FOR NON-VIOLENT BEHAVIOR FACE TO FACE EVALUATION    Patient's Immediate Situation:  Patient demonstrated the following behaviors: Pulling/tugging at invasive lines or tubes and does not respond to verbal/non-verbal redirection    Patient's Reaction to the intervention:  Does patient understand the reason for restraint/seclusion? Unable to Express    Medical Condition:  Is there any evidence of compromise of Skin integrity, Respiratory, Cardiovascular, Musculoskeletal, Hydration? No    Behavioral Condition:  In consultation with the RN, is there a need to continue this restraint or seclusion? Yes    See Restraint Flowsheet for complete restraint documentation and assessment.    Sandy Manzo, CNP

## 2021-05-28 NOTE — PROGRESS NOTES
Vent Mode: VCV  FiO2 (%):  [35 %-40 %] 40 %  S RR:  [20] 20  S VT:  [300 mL] 300 mL  PEEP/CPAP (cm H2O):  [7 cm H2O] 7 cm H2O  Minute Ventilation (L/min):  [8.4 L/min-10.3 L/min] 8.4 L/min  PIP:  [9 cm H2O-46 cm H2O] 46 cm H2O  NE SUP:  [5 cm H20] 5 cm H20  MAP (cm H2O):  [7-22] 14    Sx patient for scant secretions.   No cough or gag reflex was noted.  Eye lids seemed to open a little and resp  Rate increased.

## 2021-05-28 NOTE — PROGRESS NOTES
Vent Mode: PCV/VG  FiO2 (%):  [24 %] 24 %  S RR:  [16] 16  S VT:  [300 mL] 300 mL  PEEP/CPAP (cm H2O):  [5 cm H2O] 5 cm H2O  Minute Ventilation (L/min):  [10 L/min-11.5 L/min] 10 L/min  PIP:  [6 cm H2O-23 cm H2O] 21 cm H2O  WI SUP:  [15 cm H20] 15 cm H20  MAP (cm H2O):  [5-10] 10       05/09/19 2158   Weaning Assessment    Wean Start Time  1944   Wean End Time 2158   Vent Wean Time Calculation (min) 134 min     Patient was just flipped back to full support following a PS trial on 15/5 for 134 mins. Wean ended to give the patient a break for the night. Will do another wean in the AM. Breath sounds remain coarse. I was able to suction up a small amount of thick white secretions. Saturations have remained in the high 90's all evening. No major events. Patient does become tachypnic at times. RT will continue to follow.    ZEHRA GaleanaT

## 2021-05-28 NOTE — PLAN OF CARE
Care Plan  Care Management    Care Management Goals of the Day: Follow progression of care, assist with DC    Care Progression Reviewed With: Charge RN, MD, HUC, RNCM, CMSW    Barriers to Discharge: none    Discharge Disposition: [facility]    Expected Discharge Date: 5/10/19    Transportation: HE stretcher at 1pm    Care Coordination Narrative: Pt from home with family, Pt speaks Jordanian,  needed to ensure all family understand discussions. Pt is day 3 trach and PEG, therapy ordered and pt has been up in chair. Plan is for pt to transfer to [facility] today at 1pm via HE stretcher, charge and staff nurse aware, HUC aware. Son is aware via  in room, he denies questions at this time. CM to follow through DC.

## 2021-05-28 NOTE — PROGRESS NOTES
NEUROLOGY PROGRESS NOTE     Kody Johns,  1950, MRN [id number] Date: 2019     [facility]   Code status:  Full Code   PCP: Aristides Alegria MD, [phone]      ASSESSMENT & PLAN   Hospital Day#: 13  Diagnosis code: Ischemic stroke    Ischemic stroke  Aortic/Mitral valve repair  Elevated CK with L>R weakness - ? Critical care myopathy      Head CT shows right P2 stroke which fits with the CTA scan.    MRI shows PCA stroke.    MRI brain/MRI cervical spine do not explain arm and leg weakness. Though improving with decreased sedation.    Patients CK came back elevated and he might have critical care myopathy though his weakness is asymmetrical which is generally not seen in critical care myopathy.    Decrease sedation and avoid paralytic agents.     Follow CK. Hydration as much as possible.    BP can be normalized.     Most likely cause of stroke is cardioembolic secondary to surgery.     Hold ASA (stopped today) due to low platelets. Needs anticoagulation long term.    Family meeting (when possible) to decide on Trach and PEG. Placement to LTC later this week.    Will continue to follow.    Thank you again for this referral, please feel free to contact me if you have any questions.     CHIEF COMPLAINT S/P MVR (mitral valve repair)     HISTORY OF PRESENT ILLNESS     We have been requested by Dr. Omer to evaluate Kody Johns who is a 69 y.o.  male for acute stroke.    This is a 69-year-old male on whom neurology is been consulted to evaluate for stroke.  Patient had a mitral valve surgery yesterday and was under sedation after that.  Sedation was lightened today when it was noticed that he was having some ophthalmoplegia.  Was mainly involving the left eye.  Stroke code was called.  He was not a TPA candidate because of the recent surgery and unclear time of onset.  Left arm and leg weakness was also noted.  Head CT was done which showed a right P2 occlusion.  The patient's exam is been stable  since the stroke code.  He was not a candidate for thrombectomy because the blood vessel was too small for mechanical thrombectomy.      Patient is unable to provide any history because of intubation.  History is obtained through chart review and through talking to the son.    4/26  Patient remains intubated. Unable to extubate. He has spiked a fever.  Per family he has been opening his eyes. Nodding to questions. Moving right arm but not the right leg.  He did move the left arm once for the family. Though the healthcare staff have not observed this.  No other new symptoms per the family.   MRI brain could not be done due to Pacemaker wires.    4/29  Patient remains in the ICU. Due to low platelets the pacemaker wires cannot be removed and patient cannot get the MRI. Family still wanting aggressive cares. Patients fever has been thought to be secondary to neurological disease.    4/30  Patient has been agitated when off the Precedex. Plan for MRI today and possibly family conference tomorrow. Family is hopeful that he will make good recovery. Discussed with family that there is no cure for stroke.     5/1  Family meeting later today. Patient still has not improved. MRI brain done last night.     5/2  Patients MRI cervical spine does not explain why he cannot move left side. Updated family on results.   Plan to proceed with PEG and Trach most likely. Family meeting tomorrow afternoon to decide.    5/3  Family meeting was cancelled for today. Family most likely planning on Trach and PEG today. Discussed with youngest son.     Cardiology to do BATSHEVA with cardioversion.  Abd CT shows ileus.    5/4  No new events. Family meeting has not been set for so far. No significant arm or leg movements seen.     5/5  Patients CK came back elevated. Sedation has been lightened and patient is getting more alert today. Family meeting still pending.     5/6  Sedation has been decreased and patient is waking up. Family meeting scheduled  for tomorrow.     5/7  Patients exam is slowly improving as the sedation has been lightened. Plan for Trach and PEG later this week. No family meeting yet. CK still elevated.     PAST MEDICAL & SURGICAL HISTORY     Medical History  Patient Active Problem List    Diagnosis Date Noted     Acute cardioembolic stroke (H)      Paralytic ileus (H) 05/02/2019     Small bowel obstruction (H)      Atrial fibrillation with RVR (H)      Hypernatremia      Thrombocytopenia (H)      Sepsis, due to unspecified organism (H)      S/P MVR (mitral valve repair) 04/24/2019     Acute respiratory failure with hypoxemia (H) 04/24/2019     Anemia due to blood loss, acute 04/24/2019     Coagulopathy (H) 04/24/2019     Non-English speaking patient 04/24/2019     Respiratory failure (H) 03/21/2019     A-fib (H) 09/14/2017     Heart failure with preserved ejection fraction (H) 09/14/2017     Moderate malnutrition (H) 08/31/2017     ALEENA (acute kidney injury) (H) 08/31/2017     Aortic valve disease, rheumatic 08/30/2017     Lightheadedness 08/30/2017     Atherosclerosis of native coronary artery of native heart without angina pectoris      Essential hypertension with goal blood pressure less than 130/85      Pure hypercholesterolemia      Dysuria 08/29/2017     Hypothyroidism, unspecified type      Mitral valve stenosis, unspecified etiology      Elevated LFTs 08/26/2016     Silent Stroke      Shoulder strain      Dysphagia      Hyperlipidemia      Rheumatic heart disease      Blurry vision      Hearing loss      Nonspecific reaction to tuberculin skin test without active tuberculosis      Past Medical History: Patient  has a past medical history of ALEENA (acute kidney injury) (H), Anemia due to blood loss, acute (4/24/2019), Asthma, Atrial fibrillation (H), Blurry vision, CHF (congestive heart failure) (H), Chronic kidney disease, Coronary artery disease, Dysphagia, Dysuria, Hearing loss, Heart murmur, Heart murmur, Hyperlipidemia, Hypertension,  Hypothyroidism, Mitral valve stenosis, Moderate malnutrition (H), Palpitations, Rheumatic heart disease, Shortness of breath, Stroke (H), and Valvular disease.  Surgical History  He  has a past surgical history that includes Cataract extraction (Left, 06/13/2016); Cardiac catheterization; Coronary Angiogram (N/A, 11/30/2018); Eye surgery; pr replacement of mitral valve (N/A, 4/24/2019); Aortic valve replacement (N/A, 4/24/2019); and PICC Team Line Insertion (4/29/2019).     SOCIAL HISTORY     Reviewed, and he  reports that he quit smoking about 4 years ago. His smoking use included cigarettes. He has a 50.00 pack-year smoking history. He has quit using smokeless tobacco. He reports that he does not drink alcohol or use drugs.     FAMILY HISTORY     Reviewed, and family history includes No Medical Problems in his father and mother.     ALLERGIES     No Known Allergies     REVIEW OF SYSTEMS     Unable to do ROS due to intubation and AMS.     HOME & HOSPITAL MEDICATIONS     Prior to Admission Medications  Medications Prior to Admission   Medication Sig Dispense Refill Last Dose     albuterol (PROAIR HFA;PROVENTIL HFA;VENTOLIN HFA) 90 mcg/actuation inhaler Inhale 1-2 puffs every 6 (six) hours as needed. 1 Inhaler 5 4/22/2019     amoxicillin (AMOXIL) 500 MG capsule TAKE 4 TABLETS (2,000 mg) BY MOUTH 1 HOUR PRIOR TO PROCEDURE. 4 capsule 3 Unknown     atorvastatin (LIPITOR) 40 MG tablet TAKE 1 PILL BY MOUTH AT BEDTIME. FOR CHOLESTEROL 90 tablet 2 4/22/2019     baclofen (LIORESAL) 10 MG tablet TAKE 1 TABLET BY MOUTH TWICE DAILY 60 tablet 5 4/23/2019     enoxaparin (LOVENOX) 40 mg/0.4 mL syringe Inject 0.4 mL (40 mg total) under the skin every evening. 2 Syringe 0 4/22/2019     enoxaparin (LOVENOX) 60 mg/0.6 mL syringe Inject 0.6 mL (60 mg total) under the skin every morning. 3 Syringe 0 4/23/2019 at am     fluticasone (FLONASE) 50 mcg/actuation nasal spray 2 sprays into each nostril daily. (Patient taking differently: 2  sprays into each nostril daily as needed.       ) 10 g 3 4/22/2019     furosemide (LASIX) 20 MG tablet TAKE 1 TABLET (20 MG TOTAL) BY MOUTH DAILY FOR EDEMA 90 tablet 3 4/23/2019     levothyroxine (SYNTHROID, LEVOTHROID) 88 MCG tablet TAKE 1 PILL BY MOUTH EVERYDAY FOR THYROID 30 tablet 6 4/23/2019     metoprolol succinate (TOPROL-XL) 25 MG TAKE 1 PILL BY MOUTH EVERYDAY FOR BLOOD PRESSURE 90 tablet 3 4/23/2019     omeprazole (PRILOSEC) 20 MG capsule TAKE 1 PILL BY MOUTH EVERYDAY FOR STOMACH 30 capsule 8 4/23/2019     spironolactone (ALDACTONE) 25 MG tablet TAKE 1 PILL BY MOUTH EVERYDAY 30 tablet 8 4/23/2019     warfarin (COUMADIN/JANTOVEN) 1 MG tablet Take 1-2 mg by mouth See Admin Instructions. 1 mg daily on Mon/Wed/Fri; and 2 mg daily rest of week   4/19/2019     acetaminophen (TYLENOL) 500 MG tablet Take 500-1,000 mg by mouth every 6 (six) hours as needed for pain. Every 6-8 hours as needed. No more than 4,000MG of acetaminophen daily.   Unknown     ibuprofen (ADVIL,MOTRIN) 600 MG tablet Take 600 mg by mouth every 6 (six) hours as needed for pain.   Unknown       Hospital Medications    amiodarone  200 mg Per NG tube BID     aspirin  81 mg Enteral Tube DAILY     baclofen  10 mg Enteral Tube BID     cefTRIAXone (ROCEPHIN)  IV  1 g Intravenous Q24H     chlorhexidine  15 mL Topical Q12H     docusate sodium (COLACE) 50 mg/5 mL oral liquid  100 mg Oral BID     furosemide  20 mg Intravenous BID - diuretic     insulin aspart (NovoLOG) injection   Subcutaneous Q4H     levothyroxine  75 mcg Intravenous Daily     metoprolol tartrate  25 mg Enteral Tube BID     pantoprazole  40 mg Intravenous Q24H    Or     omeprazole  20 mg Oral Q24H    Or     omeprazole  20 mg Enteral Tube Q24H     potassium chloride  20 mEq Enteral Tube BID     sodium chloride  10-30 mL Intravenous Q8H FIXED TIMES     sodium chloride  3 mL Intravenous Line Care     vancomycin  750 mg Intravenous Q24H        PHYSICAL EXAM     Vital signs  Temp:  [98.3  F  (36.8  C)-100.3  F (37.9  C)] 99.7  F (37.6  C)  Heart Rate:  [] 122  Resp:  [14-33] 30  BP: ()/(47-90) 84/62  FiO2 (%):  [28 %-30 %] 28 %    Weight:   108 lb 11.2 oz (49.3 kg)    GENERAL: Healthy appearing, alert, no acute distress, normal habitus.  CARDIOVASCULAR: Ext warm and well perfused.  NEUROLOGICAL:  Patient is lying in bed intubated. Opens eyes to voice. Slightly more alert today. Right pupil larger than left. EOM present. VFI on right. Corneals present. NLF symmetric. Moves right arm, right leg and left arm. No movement in left leg today.     DIAGNOSTIC STUDIES     Pertinent Radiology   Head CT images reviewed. No acute stroke seen  IMPRESSION:   CONCLUSION:   HEAD CT:  1.  No intracranial hemorrhage, mass lesions, hydrocephalus or CT evidence for an acute infarct. MRI is more sensitive for the evaluation of acute infarct.  2.  Chronic lacunae as detailed above.  3.  Mild diffuse cerebral parenchymal volume loss. Presumed chronic hypertensive/microvascular ischemic white matter changes.        HEAD CTA:   1.  There is cut off on the P2 segment of the right posterior cerebral artery.  2.  No high-grade stenosis or occlusion of the rest of the major intracranial arteries.  3.  There is 1.5 to 2 mm focal outpouching off the expected location of the origin of the right posterior communicating artery. This is likely an infundibulum versus small aneurysm.        NECK CTA:  1.  No significant stenosis in the neck vessels based on NASCET criteria.  2.  No evidence for dissection.    Repeat Head CT 4/26 Images reviewed. Show right PCA stroke with question of pontine stroke.    Repeat Head CT images reviewed. 4/29  IMPRESSION:   CONCLUSION:  1.  Motion degraded examination demonstrates expected temporal evolution of right posterior cerebral artery territory infarct including interval development of small volume petechial hemorrhage as above. No hemorrhagic transformation. Mild local mass   effect with  partial effacement of the posterior right lateral ventricle.  2.  Some loss of gray-white matter differentiation involving the parasagittal left parieto-occipital junction. It is uncertain if this is artifactual or reflect a new area of subacute ischemia/infarct. Consider MRI or follow-up head CT if the patient is   not an MRI candidate.  3.  Background of mild age-related changes elsewhere similar to the prior exam.    MRI brain images reviewed. Right PCA stroke seen.    MRI cervical spine  IMPRESSION:   CONCLUSION:  1.  Markedly limited by patient motion. No definite canal narrowing or gross cord signal abnormality     2.  Evaluation of the foramina is limited though there is likely significant foraminal narrowing at a few levels.     3.  No marrow edema or acute injury.    CK came back elevated.    Recent Results (from the past 24 hour(s))   POCT Glucose - every 4 hours    Collection Time: 05/06/19  4:21 PM   Result Value Ref Range    Glucose 143 (H) 70 - 139 mg/dL   Sodium    Collection Time: 05/06/19  4:46 PM   Result Value Ref Range    Sodium 147 (H) 136 - 145 mmol/L   Anti-Xa Heparin Level    Collection Time: 05/06/19  4:47 PM   Result Value Ref Range    Anti-Xa Heparin Assay 0.19 (L) 0.30 - 0.70 IU/mL   POCT Glucose - every 4 hours    Collection Time: 05/06/19  8:00 PM   Result Value Ref Range    Glucose 147 (H) 70 - 139 mg/dL   Anti-Xa Heparin Level    Collection Time: 05/06/19 10:59 PM   Result Value Ref Range    Anti-Xa Heparin Assay 0.19 (L) 0.30 - 0.70 IU/mL   POCT Glucose - every 4 hours    Collection Time: 05/07/19 12:07 AM   Result Value Ref Range    Glucose 125 70 - 139 mg/dL   APTT    Collection Time: 05/07/19 12:35 AM   Result Value Ref Range    PTT 81 (H) 24 - 37 seconds   Anti-Xa Heparin Level    Collection Time: 05/07/19 12:35 AM   Result Value Ref Range    Anti-Xa Heparin Assay 0.16 (L) 0.30 - 0.70 IU/mL   POCT Glucose - every 4 hours    Collection Time: 05/07/19  4:48 AM   Result Value Ref  Range    Glucose 165 (H) 70 - 139 mg/dL   Platelet Count - DO NOT remove unless platelet count already ordered by other source    Collection Time: 05/07/19  6:26 AM   Result Value Ref Range    Platelets 103 (L) 140 - 440 thou/uL   Hemoglobin    Collection Time: 05/07/19  6:26 AM   Result Value Ref Range    Hemoglobin 8.9 (L) 14.0 - 18.0 g/dL   CK Total    Collection Time: 05/07/19  6:26 AM   Result Value Ref Range    CK, Total 1,228 (HH) 30 - 190 U/L   Potassium - Next AM    Collection Time: 05/07/19  6:26 AM   Result Value Ref Range    Potassium 3.3 (L) 3.5 - 5.0 mmol/L   Anti-Xa Heparin Level    Collection Time: 05/07/19  6:26 AM   Result Value Ref Range    Anti-Xa Heparin Assay 0.18 (L) 0.30 - 0.70 IU/mL   APTT    Collection Time: 05/07/19  6:26 AM   Result Value Ref Range    PTT 66 (H) 24 - 37 seconds   Comprehensive Metabolic Panel    Collection Time: 05/07/19  6:26 AM   Result Value Ref Range    Sodium 145 136 - 145 mmol/L    Potassium 3.3 (L) 3.5 - 5.0 mmol/L    Chloride 114 (H) 98 - 107 mmol/L    CO2 23 22 - 31 mmol/L    Anion Gap, Calculation 8 5 - 18 mmol/L    Glucose 177 (H) 70 - 125 mg/dL    BUN 31 (H) 8 - 22 mg/dL    Creatinine 0.84 0.70 - 1.30 mg/dL    GFR MDRD Af Amer >60 >60 mL/min/1.73m2    GFR MDRD Non Af Amer >60 >60 mL/min/1.73m2    Bilirubin, Total 2.1 (H) 0.0 - 1.0 mg/dL    Calcium 7.8 (L) 8.5 - 10.5 mg/dL    Protein, Total 5.6 (L) 6.0 - 8.0 g/dL    Albumin 2.1 (L) 3.5 - 5.0 g/dL    Alkaline Phosphatase 164 (H) 45 - 120 U/L     (H) 0 - 40 U/L     (H) 0 - 45 U/L   POCT Glucose - every 4 hours    Collection Time: 05/07/19  7:53 AM   Result Value Ref Range    Glucose 181 (H) 70 - 139 mg/dL   POCT Glucose - every 4 hours    Collection Time: 05/07/19 11:45 AM   Result Value Ref Range    Glucose 184 (H) 70 - 139 mg/dL       Total time spent for face to face visit, reviewing labs/imaging studies, counseling and coordination of care was: 25 min More than 50% of this time was spent on  counseling and coordination of care. Discussing prognosis and change in exam with family.       Dustin Moya MD  Direct Secure Messaging: [email or web address]  Tel: [phone]  This note was dictated using voice recognition software.  Any grammatical or context distortions are unintentional and inherent to the software.

## 2021-05-28 NOTE — PLAN OF CARE
Care Plan  Care Management    Care Management Goals of the Day: Follow progression of care    Care Progression Reviewed With: Charge RN, MD, HUC, RNCM, CMSW    Barriers to Discharge: intubated, sedated, critically ill    Discharge Disposition: TBD    Expected Discharge Date: TBD    Transportation: TBD    Care Coordination Narrative: Pt from home with family, Pt speaks [place], son speaks English. Here for planned heart procedure, stroke occurred and pt has been critically ill since. Fam CC cancelled for today as pt is too ill to consider trach/PEG at this time. Staff and family aware. Per Dr Vaughn, tentative plan for fam CC next Wednesday 5/8/19 at noon. CM to follow.

## 2021-05-28 NOTE — PROGRESS NOTES
Progress Note    Assessment/Plan  S/P AVR/MVR POD # 16  Postop respiratory failure requiring prolonged mechanical ventilation and eventual tracheostomy, currently being weaned from the vent  Postop CVA with left-sided neglect, showing mild movement and withdrawing from pain on the left side  Acute on chronic A. fib with RVR, heart rate occasionally in the 120s to 130s  Currently on heparin drip bridge for warfarin till INR > 2  Postop cardiogenic shock requiring multiple and prolonged pressors now resolved  Postop malnutrition secondary to prolonged mechanical ventilation, underwent PEG tube placement and is currently receiving tube feedings via PEG tube  Postop hospital-acquired pneumonia secondary to prolonged ventilation now resolving  Profound and severe postop thrombocytopenia now resolved, last platelet 167  Postop acute blood loss anemia requiring blood transfusions, last hemoglobin 8.5  Incisions are clean dry intact, lungs sounds clear  Abdomen is soft and nontender, bowel sounds present  Patient has rectal bag that is draining and a Guzman catheter, and I will be removed today  Weight 48 kg, preop weight 48 kg, basically at preop weight  Continue to wean vent as tolerated  Ready for discharge to acute care rehab today  Call CV surgery at [phone] at time of discharge to schedule CV surgery follow-up appointment    Subjective  Trach and unresponsive to questions or commands  Objective  Trach and on mechanical ventilation with no apparent distress  Vital signs in last 24 hours  Temp:  [97.6  F (36.4  C)-99.9  F (37.7  C)] 99.9  F (37.7  C)  Heart Rate:  [] 103  Resp:  [] 107  BP: (107-150)/() 150/88  FiO2 (%):  [24 %] 24 %  Weight:   105 lb 12.8 oz (48 kg)  Preop weight 48 kg  Intake/Output last 3 shifts  I/O last 3 completed shifts:  In: 2526.8 [I.V.:706.8; NG/GT:1320; IV Piggyback:500]  Out: 1445 [Urine:1445]  Intake/Output this shift:  No intake/output data recorded.    Review of  "Systems   Review of systems not obtained due to inability to communicate with the patient.     Physical Exam  /88   Pulse (!) 103   Temp 99.9  F (37.7  C) (Oral)   Resp (!) 107   Ht 4' 11\" (1.499 m)   Wt 105 lb 12.8 oz (48 kg)   SpO2 100%   BMI 21.37 kg/m        Pertinent Labs   Lab Results: personally reviewed.   Lab Results   Component Value Date     05/10/2019    K 3.4 (L) 05/10/2019     (H) 05/10/2019    CO2 24 05/10/2019    BUN 19 05/10/2019    CREATININE 0.69 (L) 05/10/2019    CALCIUM 7.7 (L) 05/10/2019     Lab Results   Component Value Date    WBC 11.6 (H) 05/09/2019    HGB 8.5 (L) 05/09/2019    HCT 25.9 (L) 05/09/2019    MCV 95 05/09/2019     05/10/2019       Pertinent Radiology   Radiology Results: Personally reviewed image/s, Personally reviewed impression/s and Bilateral pulmonary congestion, left basilar atelectasis versus left pleural effusion  EKG Results: personally reviewed.  and Acute on chronic atrial fibrillation, with RVR    Juan Wray            "

## 2021-05-28 NOTE — PROGRESS NOTES
Addendum    Pt weaned PS10/5 x 95 min stopped when he became tacpyneic  Anxious now  precedex stopped this am      Will add   seroquel 12.5 two times a day + prn for anxiety

## 2021-05-28 NOTE — PROGRESS NOTES
Vent Mode: PCV/VG  FiO2 (%):  [24 %] 24 %  S RR:  [16] 16  S VT:  [300 mL] 300 mL  PEEP/CPAP (cm H2O):  [5 cm H2O] 5 cm H2O  Minute Ventilation (L/min):  [8.5 L/min-10.1 L/min] 9.4 L/min  PIP:  [12 cm H2O-23 cm H2O] 15 cm H2O  FL SUP:  [5 cm H20-10 cm H20] 10 cm H20  MAP (cm H2O):  [7-10] 7    Patient remains on full vent support with the settings listed above. Breath sounds are clear. I was able to suction up a small amount of clear thin secretions. No weaning this evening. No metaneb this evening per MD. Trach does have a small amount of oozing (bloody) around his new trach site, which is to be expected since it was just placed. New gauze applied and faceplate cleaned. Saturations are % consistently.     -Patient does have a cuff leak.    -Patient does have a gag/cough.       RT will continue to follow.    ZEHRA GaleanaT

## 2021-05-28 NOTE — PROGRESS NOTES
"05-:   Happen to run into Mark, family member/brother for this patient in the clinic today.   Mark request to talk with writer for a quick update about this patient inpatient status. Note: no patient record is shared.    Mark shared:   -Pt have has the two valves placement in the heart. \"Gates have a stroke later after the valves placement while inpatient.\" Currently in the hospital. Been inpatient for 11 days today, 05/07/19. Family's member are worried about his situation. Plan for Gates today or tomorrow, maybe the hospitalist team will take out the breathing tube and thinking about perform a \"trachea in the throat\" for him. Kody is able to make some noise today. Maybe he is getting better. Hoping for the good.     Thanks Mark for shared info (above). Refer Mark and family member to seek further advice with pcp, surgery team, and hospitalist care team for further information. End with conversation.     Plan:   Writer will reach out to follow up hospital discharge with patient (after pt discharge from the hospital).   Next outreach: 08/07/19; Or sooner.        "

## 2021-05-28 NOTE — PROGRESS NOTES
Vent Mode: PCV/VG  FiO2 (%):  [40 %-50 %] 40 %  S RR:  [20] 20  S VT:  [300 mL-325 mL] 300 mL  PEEP/CPAP (cm H2O):  [5 cm H2O-8 cm H2O] 5 cm H2O  Minute Ventilation (L/min):  [7.5 L/min-12 L/min] 10.4 L/min  PIP:  [15 cm H2O-26 cm H2O] 26 cm H2O  MAP (cm H2O):  [7-12] 9    #7.0 oral ETT in place at 20cm at gums.  BS coarse and diminished. Sx small amt  Yellow/tan secretions. Patient does have  A minimal cough and gag reflex.   No parameter changes this shift. RT to continue   to follow.

## 2021-05-28 NOTE — PLAN OF CARE
Problem: Pain  Goal: Patient's pain/discomfort is manageable  Outcome: Progressing   Appears comfortable at this time.      Problem: Potential for Compromised Skin Integrity  Goal: Nutritional status is improving  Outcome: Progressing   Goal tube feeding.    1 unit platelets given, CVS to remove pacer wires and chest tubes. Then to remove swan if tolerating. Has low platelets prior to removal. PICC to be inserted today. Family conference at 6pm.    Leilani Sanchez RN

## 2021-05-28 NOTE — ANESTHESIA PREPROCEDURE EVALUATION
Anesthesia Evaluation      Patient summary reviewed   No history of anesthetic complications     Airway   Mallampati: II  Neck ROM: full   Pulmonary - normal exam    breath sounds clear to auscultation  (+) asthma  mild,  well controlled, shortness of breath,                          Cardiovascular   Exercise tolerance: < 4 METS  (+) hypertension, valvular problems/murmurs (mild AS, mild MS, mod AR, mod MR, Mod TR) MS, MR, AS and AI, CAD (mild), dysrhythmias (a.fib), CHF, , hypercholesterolemia,     ECG reviewed  Rhythm: regular  Rate: normal,         Neuro/Psych    (+) CVA ,     Endo/Other    (+) hypothyroidism,      GI/Hepatic/Renal    (+)   chronic renal disease CRI,   (-) GERD     Other findings: 04/17/19 1310 US Carotid Bilateral SCAN  Impression:   CONCLUSION:  1. Minimal atheromatous plaque in the carotid arteries.  2. No significant stenosis on either side.     11/30/18 Echo  Summary       When compared to the previous study dated 2/28/2018, no significant change.    Normal left ventricular size.    Left ventricle ejection fraction is mildly decreased. The estimated left ventricular ejection fraction is 45%.    Sigmoid septum hypertrophy noted. E/e' > 15, suggesting elevated LV filling pressures.    TAPSE is normal, which is consistent with normal right ventricular systolic function.    Left Atrium: Left atrial volume is severely increased. Right Atrium: Cavity is moderately dilated.    Mild aortic stenosis. There is mild to moderate aortic regurgitation.    Mild mitral Rheumatic mitral stenosis. Mild to moderate mitral regurgitation.    Moderate tricuspid valve regurgitation    11/16/18 coronary angio  Angiography via femoral (unable to pass wire via left radial)  LM normal  LAD mild dz  Circ mild dz  RCA normal          Dental - normal exam                        Anesthesia Plan  Planned anesthetic: general endotracheal  Phenylephrine inline  Glidescope    Ketamine 25 mg IV  Magnesium 1 gram/hour x 2  hours  Precedex  Methadone   Decadron 8 mg IV    BATSHEVA for monitoring  ASA 3   Induction: intravenous   Anesthetic plan and risks discussed with: patient and  services used (Ukrainian interpretier present)  Anesthesia plan special considerations: video-assisted, antiemetics, CVP line, arterial catheterization, pulmonary artery catheterization, IV therapy two IVs, dexmedetomidine  Post-op plan: routine recovery and extended intubation/vent support

## 2021-05-28 NOTE — PLAN OF CARE
Care Plan  Care Management    Care Management Goals of the Day: Follow progression of care    Care Progression Reviewed With: Charge RN, MD, HUC, RNCM, CMSW    Barriers to Discharge: intubated, sedated, pending trach and PEG    Discharge Disposition: LTACH likely    Expected Discharge Date: TBD    Transportation: [facility] stretcher    Care Coordination Narrative: Pt from home with family, Pt speaks Malian, son speaks English. Here for planned heart procedure, stroke occurred and pt has been critically ill since. CM to follow and assist with DC recommendations.

## 2021-05-29 NOTE — TELEPHONE ENCOUNTER
RN cannot approve Refill Request    RN can NOT refill this medication medication not on med list.       Vicky Benton, [facility] Triage/Med Refill 6/17/2019    Requested Prescriptions   Pending Prescriptions Disp Refills     spironolactone (ALDACTONE) 25 MG tablet [Pharmacy Med Name: SPIRONOLACTONE 25 MG TABS 25 TAB] 30 tablet 8     Sig: TAKE 1 PILL BY MOUTH EVERYDAY       Diuretics/Combination Diuretics Refill Protocol  Passed - 6/15/2019 10:26 AM        Passed - Visit with PCP or prescribing provider visit in past 12 months     Last office visit with prescriber/PCP: 3/20/2019 Aristides Alegria MD OR same dept: 3/20/2019 Aristides Alegria MD OR same specialty: 3/20/2019 Aristides Alegria MD  Last physical: 4/15/2019 Last MTM visit: Visit date not found   Next visit within 3 mo: Visit date not found  Next physical within 3 mo: Visit date not found  Prescriber OR PCP: Aristides Alegria MD  Last diagnosis associated with med order: 1. Dysphagia  - omeprazole (PRILOSEC) 20 MG capsule; TAKE 1 PILL BY MOUTH EVERYDAY FOR STOMACH  Dispense: 90 capsule; Refill: 2    If protocol passes may refill for 12 months if within 3 months of last provider visit (or a total of 15 months).             Passed - Serum Potassium in past 12 months      Lab Results   Component Value Date    Potassium 4.4 06/07/2019             Passed - Serum Sodium in past 12 months      Lab Results   Component Value Date    Sodium 135 (L) 06/07/2019             Passed - Blood pressure on file in past 12 months     BP Readings from Last 1 Encounters:   06/07/19 100/68             Passed - Serum Creatinine in past 12 months      Creatinine   Date Value Ref Range Status   06/07/2019 0.76 0.70 - 1.30 mg/dL Final           Signed Prescriptions Disp Refills    omeprazole (PRILOSEC) 20 MG capsule 90 capsule 2     Sig: TAKE 1 PILL BY MOUTH EVERYDAY FOR STOMACH       GI Medications Refill Protocol Passed - 6/15/2019 10:26 AM        Passed - PCP or prescribing  provider visit in last 12 or next 3 months.     Last office visit with prescriber/PCP: 3/20/2019 Aristides Alegria MD OR same dept: 3/20/2019 Aristides Alegria MD OR same specialty: 3/20/2019 Aristides Alegria MD  Last physical: 4/15/2019 Last MTM visit: Visit date not found   Next visit within 3 mo: Visit date not found  Next physical within 3 mo: Visit date not found  Prescriber OR PCP: Aristides Alegria MD  Last diagnosis associated with med order: 1. Dysphagia  - omeprazole (PRILOSEC) 20 MG capsule; TAKE 1 PILL BY MOUTH EVERYDAY FOR STOMACH  Dispense: 90 capsule; Refill: 2    If protocol passes may refill for 12 months if within 3 months of last provider visit (or a total of 15 months).

## 2021-05-29 NOTE — PROGRESS NOTES
TRANSITIONS OF CARE (EUGENIE) LOG   EUGENIE tasks should be completed by the CC within one (1) business day of notification of each transition. Follow up contact with member is required after return to their usual care setting.  Note:  If CC finds out about the transitions fifteen (15) days or more after the member has returned to their usual care setting, no EUGENIE log is needed. However, the CC should check in with the member to discuss the transition process, any changes needed to the care plan and document it in a case note.    Member Name:  Kody Johns MCO Name:  [facility] MCO/Health Plan Member ID#: [id number]   Product: MSC+ Care Coordinator Contact:  Leilnai Ignacio, [facility] Agency/County/Care System: [place]   Transition Communication Actions from Care Management Contact   Transition #1   Notification Date: 5/17/19 Transition Date:   5/13/19 Transition From: [facility]     Is this the member s usual care setting?               no Transition To: [facility]   Transition Type:  Unplanned  Reason for Admission/Comments:  Headache; difficulty opening left eye     Shared CC contact info, care plan/services with receiving setting--Date completed: 5/17/19    Notified PCP of transition--Date completed:  5/13/19     via  EMR   Transition #2   Transition #3  (if applicable)   Notification Date: 5/17/19         Transition To:  [facility]  Transition Date: 5/17/19     Transition Type:    Planned  Notified PCP -- Date completed: 5/17/19 EMR              Shared CC contact info, care plan/services with receiving setting or, if applicable, home care agency--Date completed:  5/17/19  *Complete additional tasks below, if this transition is a return to usual care setting.      Comments:  Kody returned back to [place] for continued care.    Notification Date:     7/2/19     Transition To:  Home  Transition Date:     6/25/19         Transition Type:    Planned  Notified PCP--Date completed:     6/25/19 EMR    Shared CC contact info, care plan/services with receiving setting or, if applicable, home care agency--Date completed:   7/2/19   *Complete additional tasks below, if this transition is a return to usual care setting.      Comments:  Care Coordinator left a voicemail to Kody's son, Mark, to check to see how the transition home went and to answer any questions they may have.  Care Coordinator requested a return call.     *Complete tasks below when the member is discharging TO their usual care setting within one (1) business day of notification.  For situations where the Care Coordinator is notified of the discharge prior to the date of discharge, the Care Coordinator must follow up with the member or designated representative to confirm that discharge actually occurred and discuss required EUGENIE tasks as outlined in the EUGENIE Instructions.  (This includes situations where it may be a  new  usual care setting for the member. (i.e., a community member who decides upon permanent nursing home placement following hospitalization and rehab).    Date completed:   Communicated with member or their designated representative about the following:  care transition process; about changes to the member s health status; plan of care updates; education about transitions and how to prevent unplanned transitions/readmissions  Four Pillars for Optimal Transition:    Check  Yes  - if the member, family member and/or SNF/facility staff manages the following:    If  No  provide explanation in the comments section.          [x]  Yes     []  No     Does the member have a follow-up appointment scheduled with primary care or specialist? (Mental health hospitalizations--the appt. should be w/in 7 days)   []  Yes     [x]  No     Can the member manage their medications or is there a system in place to manage medications (e.g. home care set-up)?         [x]  Yes     []  No     Can the member verbalize warning signs and symptoms to  watch for and how to respond?         [x]  Yes     []  No     Does the member use a Personal Health Care Record?  Check  Yes  if visit summary, discharge summary, and/or healthcare summary are being used as a PHR.                                                                                                                                                                                    [x] Yes      [] No      Have you updated the member s care plan?  If  No  provide explanation in comments.   Comments:     Leilani Ignacio, [facility]  [phone]

## 2021-05-29 NOTE — TELEPHONE ENCOUNTER
Called [place] through [place] ([place] [phone]). Spoke to pt's brother, who has consent to communicate. Relayed information to him and he stated understanding.

## 2021-05-29 NOTE — TELEPHONE ENCOUNTER
Refill Approved    Rx renewed per Medication Renewal Policy. Medication was last renewed on 9/25/18.    Vicky Benton, [facility] Triage/Med Refill 6/17/2019     Requested Prescriptions   Pending Prescriptions Disp Refills     omeprazole (PRILOSEC) 20 MG capsule [Pharmacy Med Name: OMEPRAZOLE DR 20 MG CAPSULE 20 CAP] 30 capsule 8     Sig: TAKE 1 PILL BY MOUTH EVERYDAY FOR STOMACH       GI Medications Refill Protocol Passed - 6/15/2019 10:26 AM        Passed - PCP or prescribing provider visit in last 12 or next 3 months.     Last office visit with prescriber/PCP: 3/20/2019 Aristides Alegria MD OR same dept: 3/20/2019 Aristides Alegria MD OR same specialty: 3/20/2019 Aristides Alegria MD  Last physical: 4/15/2019 Last MTM visit: Visit date not found   Next visit within 3 mo: Visit date not found  Next physical within 3 mo: Visit date not found  Prescriber OR PCP: Aristides Alegria MD  Last diagnosis associated with med order: 1. Dysphagia  - omeprazole (PRILOSEC) 20 MG capsule [Pharmacy Med Name: OMEPRAZOLE DR 20 MG CAPSULE 20 CAP]; TAKE 1 PILL BY MOUTH EVERYDAY FOR STOMACH  Dispense: 30 capsule; Refill: 8    If protocol passes may refill for 12 months if within 3 months of last provider visit (or a total of 15 months).             spironolactone (ALDACTONE) 25 MG tablet [Pharmacy Med Name: SPIRONOLACTONE 25 MG TABS 25 TAB] 30 tablet 8     Sig: TAKE 1 PILL BY MOUTH EVERYDAY       Diuretics/Combination Diuretics Refill Protocol  Passed - 6/15/2019 10:26 AM        Passed - Visit with PCP or prescribing provider visit in past 12 months     Last office visit with prescriber/PCP: 3/20/2019 Aristides Alegria MD OR same dept: 3/20/2019 Aristides Alegria MD OR same specialty: 3/20/2019 Aristides Alegria MD  Last physical: 4/15/2019 Last MTM visit: Visit date not found   Next visit within 3 mo: Visit date not found  Next physical within 3 mo: Visit date not found  Prescriber OR PCP: Aristides Alegria MD  Last diagnosis  associated with med order: 1. Dysphagia  - omeprazole (PRILOSEC) 20 MG capsule [Pharmacy Med Name: OMEPRAZOLE DR 20 MG CAPSULE 20 CAP]; TAKE 1 PILL BY MOUTH EVERYDAY FOR STOMACH  Dispense: 30 capsule; Refill: 8    If protocol passes may refill for 12 months if within 3 months of last provider visit (or a total of 15 months).             Passed - Serum Potassium in past 12 months      Lab Results   Component Value Date    Potassium 4.4 06/07/2019             Passed - Serum Sodium in past 12 months      Lab Results   Component Value Date    Sodium 135 (L) 06/07/2019             Passed - Blood pressure on file in past 12 months     BP Readings from Last 1 Encounters:   06/07/19 100/68             Passed - Serum Creatinine in past 12 months      Creatinine   Date Value Ref Range Status   06/07/2019 0.76 0.70 - 1.30 mg/dL Final

## 2021-05-29 NOTE — PROGRESS NOTES
Patient Outreach:   Reason: 06/07/19 hospital discharge follow up    Writer received a hospital discharge notification. Contact patient to follow up. Call and Mark, family member, English speaking answer the call. Asked to speak with patient or prefer spoken person represent for patient. Mark state info below. [facility] phone number is provide. Confirmed outreach frequency move from every three months to every two months. Explained reason- Mark is informed to notified the pt about it. Mark confirmed understand and no questions.      Mark shared:   -Kody Johns isn't with me at this time. Family assist Kody with most need and personal care.   -Kody is doing a little better. Have memory lost. Kody may need more extra personal care assistance.   -Kody's son spend most of the time with him while inpatient. I plan to visit Kody Johns this evening. I will check with his son and Kody for most need at this time. I will keep you update about his condition and most needed.     Plan:   Next patient outreach due Monday, 08/12/19.

## 2021-05-29 NOTE — TELEPHONE ENCOUNTER
ANTICOAGULATION  MANAGEMENT: Discharge Continuity of Care Review    Hospital admission on  5/17 for multiple issues. Patient has been back and forth from hospital to [facility] following:    Acute respiratory failure after surgery  CVA after valve replacement, hemorrhagic conversion, acute encephalopathy  AVR/MVR in late April  Afib with RVR, then bradycardia  Multiple other concerns    Discharge disposition: [facility] in patient rehab    INR Results:       Recent labs: (last 7 days)     06/06/19  1302 06/07/19  0533   INR 1.15* 1.15*       Warfarin inpatient management: not updated on tracking calendar. Patient has been in the hospital for 6 weeks, now to rehab facility    Warfarin discharge instructions: rehab facility to manage     Medication Changes Affecting Anticoagulation: Not reviewed extensively at this time    Additional Factors Affecting Anticoagulation: Not reviewed extensively at this time    Plan          ACM will resume monitoring upon discharge from TCU/rehab         Jessica Garza RN

## 2021-05-30 NOTE — PROGRESS NOTES
Subjective: Patient comes in for follow-up.  He had a previous mitral and aortic valve replacement he had postop complications with respiratory failure he had a tracheotomy.    He suffered a hemorrhagic stroke.    He was in rehab at [facility] than at [facility].    I saw him for a hospital/TCU discharge for follow-up on 7/9/2019.    He has gained a couple pounds he is at 95 pounds    His swelling is minimal now.  He does have the low protein.  Also had low sodium in the past we did stop his sodium chloride    I went through all of his medications there were some multiple medications in medicines that he had before he went into the hospital that they had questions on    The med list is presently accurate.    Patient has history of atrial fibrillation his heart sounded regular here rate in the 90 range she does see cardiology on 8/8/2019.    Also has an upcoming appointment with a neurologist to follow-up on his hemorrhagic stroke.    He still getting feeding tubes twice a day.  We will continue that for now    He has hypothyroid his level on 7/9/2019 was therapeutic on the T4 TSH will stay on 88 mcg daily.    He is getting some left-sided leg.  He had been on baclofen in the past and will get back on that.  If he had the right hemorrhagic CVA.  Will await neurology input question possibly some gabapentin.    Labs today included iron studies CBC INR BMP and magnesium.        Tobacco status: He  reports that he quit smoking about 4 years ago. His smoking use included cigarettes. He has a 50.00 pack-year smoking history. He has quit using smokeless tobacco.    Patient Active Problem List    Diagnosis Date Noted     S/P AVR 07/09/2019     H/O mitral valve replacement 07/09/2019     Metabolic encephalopathy 05/20/2019     Encephalopathy 05/17/2019     Atrial flutter with rapid ventricular response (H) 05/14/2019     Intracerebral bleed (H) 05/13/2019     Hemorrhagic stroke (H) 05/13/2019     Tracheostomy care (H)       Acute respiratory failure (H) 05/10/2019     Acute cardioembolic stroke (H)      Paralytic ileus (H) 05/02/2019     Small bowel obstruction (H)      Atrial fibrillation with RVR (H)      Hypernatremia      Thrombocytopenia (H)      Sepsis, due to unspecified organism (H)      S/P MVR (mitral valve repair) 04/24/2019     Acute respiratory failure with hypoxemia (H) 04/24/2019     Anemia due to blood loss, acute 04/24/2019     Coagulopathy (H) 04/24/2019     Non-English speaking patient 04/24/2019     Respiratory failure (H) 03/21/2019     A-fib (H) 09/14/2017     Heart failure with preserved ejection fraction (H) 09/14/2017     Moderate malnutrition (H) 08/31/2017     ALEENA (acute kidney injury) (H) 08/31/2017     Aortic valve disease, rheumatic 08/30/2017     Lightheadedness 08/30/2017     Atherosclerosis of native coronary artery of native heart without angina pectoris      Essential hypertension with goal blood pressure less than 130/85      Pure hypercholesterolemia      Dysuria 08/29/2017     Hypothyroidism, unspecified type      Mitral valve stenosis, unspecified etiology      Elevated LFTs 08/26/2016     Silent Stroke      Shoulder strain      Dysphagia      Hyperlipidemia      Rheumatic heart disease      Blurry vision      Hearing loss      Nonspecific reaction to tuberculin skin test without active tuberculosis        Current Outpatient Medications   Medication Sig Dispense Refill     acetaminophen (TYLENOL) 500 MG tablet Take 1 tablet (500 mg total) by mouth every 6 (six) hours as needed for pain. 20 tablet 0     amiodarone (PACERONE) 200 MG tablet 1 po bid 60 tablet 3     atorvastatin (LIPITOR) 10 MG tablet 1 po qd 30 tablet 6     baclofen (LIORESAL) 10 MG tablet TAKE 1 PILL BY MOUTH 2 TIMES EVERYDAY 360 tablet 0     ferrous sulfate 220 mg (44 mg iron)/5 mL solution Take 7 mL (308 mg total) by mouth 2 (two) times a day. 420 mL 1     levothyroxine (SYNTHROID, LEVOTHROID) 88 MCG tablet TAKE 1 PILL BY  MOUTH EVERYDAY FOR THYROID 30 tablet 6     magnesium oxide (MAG-OX) 400 mg (241.3 mg magnesium) tablet 1 po qd 30 tablet 3     omeprazole (PRILOSEC) 20 MG capsule TAKE 1 PILL BY MOUTH EVERYDAY FOR STOMACH 90 capsule 2     polyethylene glycol (GLYCOLAX) 17 gram/dose powder 17 gm in 8 oz water daily 510 g 6     tamsulosin (FLOMAX) 0.4 mg cap Take 1 capsule (0.4 mg total) by mouth daily after supper. 30 capsule 5     warfarin (COUMADIN/JANTOVEN) 1 MG tablet Take 1 tablet (1 mg total) by mouth daily. Adjust dose based on INR results as directed. 90 tablet 1     No current facility-administered medications for this visit.        ROS:   10 point review of systems positive as outlined above otherwise negative.  He is improving with strength.    Objective:    BP 99/72 (Patient Site: Left Arm, Patient Position: Sitting, Cuff Size: Adult Small)   Pulse 96   Resp 12   Wt (!) 95 lb (43.1 kg)   BMI 17.95 kg/m    Body mass index is 17.95 kg/m .      General appearance no acute distress    HEENT neck was supple oropharynx is clear    Is gastric tubes in place no redness    Abdomen soft bowel sounds normal    Lungs diminished breath sounds but clear heart was regular now rate in the 90 skin was normal no rashes    No muscle wasting    Complains of some weakness and burning symptoms on the left side.  Arm and leg.    Labs INR 2.9.    Magnesium normal    Iron low but ferritin normal.    Hemoglobin is good at 13.4.  Patient will continue on the liquid iron supplementation.    Sodium is normal off the sodium chloride will stay off of that.    GFR over 60        Results for orders placed or performed in visit on 07/23/19   INR   Result Value Ref Range    INR 2.90 (H) 0.90 - 1.10   Basic Metabolic Panel   Result Value Ref Range    Sodium 140 136 - 145 mmol/L    Potassium 3.7 3.5 - 5.0 mmol/L    Chloride 109 (H) 98 - 107 mmol/L    CO2 24 22 - 31 mmol/L    Anion Gap, Calculation 7 5 - 18 mmol/L    Glucose 149 (H) 70 - 125 mg/dL     Calcium 8.4 (L) 8.5 - 10.5 mg/dL    BUN 19 8 - 22 mg/dL    Creatinine 1.05 0.70 - 1.30 mg/dL    GFR MDRD Af Amer >60 >60 mL/min/1.73m2    GFR MDRD Non Af Amer >60 >60 mL/min/1.73m2   Magnesium   Result Value Ref Range    Magnesium 1.8 1.8 - 2.6 mg/dL   HM2(CBC w/o Differential)   Result Value Ref Range    WBC 3.7 (L) 4.0 - 11.0 thou/uL    RBC 4.20 (L) 4.40 - 6.20 mill/uL    Hemoglobin 13.4 (L) 14.0 - 18.0 g/dL    Hematocrit 40.2 40.0 - 54.0 %    MCV 96 80 - 100 fL    MCH 31.9 27.0 - 34.0 pg    MCHC 33.3 32.0 - 36.0 g/dL    RDW 13.6 11.0 - 14.5 %    Platelets 94 (L) 140 - 440 thou/uL    MPV 8.2 7.0 - 10.0 fL   Iron   Result Value Ref Range    Iron 26 (L) 42 - 175 ug/dL   Ferritin   Result Value Ref Range    Ferritin 58 27 - 300 ng/mL       Assessment:  1. S/P AVR     2. S/P MVR (mitral valve repair)     3. Hemorrhagic stroke (H)     4. Hypothyroidism, unspecified type     5. Moderate protein-calorie malnutrition (H)  polyethylene glycol (GLYCOLAX) 17 gram/dose powder   6. Hyponatremia  Basic Metabolic Panel   7. Atrial fibrillation, unspecified type (H)  INR    warfarin (COUMADIN/JANTOVEN) 1 MG tablet   8. Atherosclerosis of native coronary artery of native heart without angina pectoris  atorvastatin (LIPITOR) 10 MG tablet    DISCONTINUED: atorvastatin (LIPITOR) 10 MG tablet   9. Pure hypercholesterolemia  atorvastatin (LIPITOR) 10 MG tablet    DISCONTINUED: atorvastatin (LIPITOR) 10 MG tablet   10. Essential hypertension with goal blood pressure less than 130/85  amiodarone (PACERONE) 200 MG tablet   11. Benign prostatic hyperplasia with urinary obstruction  tamsulosin (FLOMAX) 0.4 mg cap   12. Low magnesium level  magnesium oxide (MAG-OX) 400 mg (241.3 mg magnesium) tablet    Magnesium   13. Moderate protein-calorie malnutrition (H)  polyethylene glycol (GLYCOLAX) 17 gram/dose powder    low albumin     14. Iron deficiency anemia, unspecified iron deficiency anemia type  ferrous sulfate 220 mg (44 mg iron)/5 mL  solution    HM2(CBC w/o Differential)    Iron    Ferritin     Status post aortic valve replacement and mitral valve replacement    Status post right hemisphere CVA hemorrhagic.    Atrial fibrillation rate controlled on amiodarone.  Had been on metoprolol in the past will await cardiology input    Hypertension stable    BPH on tamsulosin    Low magnesium continue supplement    Iron deficiency continue supplement    Continue tube feeding        Plan: Await consultations from cardiology neurology    Follow-up with me in 2 weeks.    This transcription uses voice recognition software, which may contain typographical errors.

## 2021-05-30 NOTE — PROGRESS NOTES
Subjective: This patient comes in for hospital discharge follow-up.    Patient was admitted to [facility] for surgery on 4/24/2019 he had aortic valve replacement and mitral valve replacement.  He ended up having respiratory failure needed a ventilator and ultimately a tracheostomy.  He had bacteremia.    He was discharged to [place] for rehab.    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  He was then discharged to [facility] for rehab from June 7 through June 25.    I reviewed the admission stays at [facility] and [facility].    Patient most recently was seen at the emergency room at [facility] on 7/4/2019 he had a UTI with E. coli he was treated with Ceftinir, it was sensitive to that.    At that time INR was 1.9 CMP was normal except albumin low at 2.6, sodium was up to 138.  He has stopped taking his sodium chloride tablets because of taste intolerance.    Patient still has a feeding tube his weight is at 93 pounds, he was at 108 pounds preoperatively.    Patient has atrial fibrillation, he had an EKG which showed atrial flutter 2-1 on 7/4/2019.  Heart sounded irregularly irregular today rate at 9200.    He was due for recheck on his INR and this was done today    He has a follow-up with cardiology on 7/16/2019.    I have also referred him to neurology for follow-up.    Patient no longer has a catheter in.    Patient complains of decreased appetite.  He does get Isosource with fiber 1.5 Bill 250 mL twice daily but is only taking it 250 mL daily.  I encouraged his son to have him take that twice a day by the feeding tube as his albumin was only 2.6 on 7/4/2019.    Today I did check T4 TSH in addition to the INR he continues on his levothyroxine 88 mcg a day.    He has been on the amiodarone 200 mg 1-2 times a day.  We will leave that up to cardiology.    Additional meds were reviewed,  reconciliation,.    Denies any urinary symptoms at this time.  Still is finishing the Omnicef as discussed.    Tobacco status: He  reports that he quit smoking about 4 years ago. His smoking use included cigarettes. He has a 50.00 pack-year smoking history. He has quit using smokeless tobacco.    Patient Active Problem List    Diagnosis Date Noted     S/P AVR 07/09/2019     H/O mitral valve replacement 07/09/2019     Metabolic encephalopathy 05/20/2019     Encephalopathy 05/17/2019     Atrial flutter with rapid ventricular response (H) 05/14/2019     Intracerebral bleed (H) 05/13/2019     Hemorrhagic stroke (H) 05/13/2019     Tracheostomy care (H)      Acute respiratory failure (H) 05/10/2019     Acute cardioembolic stroke (H)      Paralytic ileus (H) 05/02/2019     Small bowel obstruction (H)      Atrial fibrillation with RVR (H)      Hypernatremia      Thrombocytopenia (H)      Sepsis, due to unspecified organism (H)      S/P MVR (mitral valve repair) 04/24/2019     Acute respiratory failure with hypoxemia (H) 04/24/2019     Anemia due to blood loss, acute 04/24/2019     Coagulopathy (H) 04/24/2019     Non-English speaking patient 04/24/2019     Respiratory failure (H) 03/21/2019     A-fib (H) 09/14/2017     Heart failure with preserved ejection fraction (H) 09/14/2017     Moderate malnutrition (H) 08/31/2017     ALEENA (acute kidney injury) (H) 08/31/2017     Aortic valve disease, rheumatic 08/30/2017     Lightheadedness 08/30/2017     Atherosclerosis of native coronary artery of native heart without angina pectoris      Essential hypertension with goal blood pressure less than 130/85      Pure hypercholesterolemia      Dysuria 08/29/2017     Hypothyroidism, unspecified type      Mitral valve stenosis, unspecified etiology      Elevated LFTs 08/26/2016     Silent Stroke      Shoulder strain      Dysphagia      Hyperlipidemia      Rheumatic heart disease      Blurry vision      Hearing loss      Nonspecific reaction  to tuberculin skin test without active tuberculosis        Current Outpatient Medications   Medication Sig Dispense Refill     acetaminophen (TYLENOL) 500 MG tablet Take 1 tablet (500 mg total) by mouth every 6 (six) hours as needed for pain. 20 tablet 0     amiodarone (PACERONE) 200 MG tablet 1 tablet (200 mg total) by Enteral Tube route 2 (two) times a day.  0     atorvastatin (LIPITOR) 10 MG tablet 1 tablet (10 mg total) by Enteral Tube route daily.  0     cefdinir (OMNICEF) 300 MG capsule Take 1 capsule (300 mg total) by mouth 2 (two) times a day for 7 days. 14 capsule 0     ferrous sulfate 220 mg (44 mg iron)/5 mL solution Take 7 mL by mouth 2 (two) times a day.       levothyroxine (SYNTHROID, LEVOTHROID) 88 MCG tablet TAKE 1 PILL BY MOUTH EVERYDAY FOR THYROID 30 tablet 6     magnesium oxide (MAG-OX) 400 mg (241.3 mg magnesium) tablet 1 tablet (400 mg total) by Enteral Tube route 3 (three) times a day. (Patient taking differently: 400 mg by Enteral Tube route daily.       )  0     omeprazole (PRILOSEC) 20 MG capsule TAKE 1 PILL BY MOUTH EVERYDAY FOR STOMACH 90 capsule 2     polyethylene glycol (GLYCOLAX) 17 gram/dose powder 17 gm in 8 oz water daily 510 g 6     senna-docusate (PERICOLACE) 8.6-50 mg tablet Take 1 tablet by mouth daily. 20 tablet 0     sodium chloride 1 gram tablet 2 g by Enteral Tube route 2 (two) times a day.       tamsulosin (FLOMAX) 0.4 mg cap Take 1 capsule (0.4 mg total) by mouth daily after supper.  0     warfarin (COUMADIN/JANTOVEN) 1 MG tablet Take 1 mg by mouth daily. Adjust dose based on INR results as directed.       No current facility-administered medications for this visit.        ROS:   10 point review of systems positive as discussed above otherwise negative.  In addition he has had some constipation issues.  I did add some MiraLAX 17 g in 8 ounces of water to his diet.    Regarding his ability to swallow he was given the okay to advance diet as tolerates.    Objective:    BP  115/86 (Patient Site: Left Arm, Patient Position: Sitting, Cuff Size: Adult Small)   Pulse (!) 104   Resp 16   Wt (!) 93 lb (42.2 kg)   BMI 17.57 kg/m    Body mass index is 17.57 kg/m .    General appearance tired    Weight down 15 pounds from preop weight.    HEENT oropharynx well-hydrated neck without tenderness    No masses    Lungs clear throughout no rales or rhonchi slightly diminished breath sounds    Heart was irregularly irregular rate .    Abdomen soft bowel sounds normal no guarding or rebound he does have a feeding tube.  No sign of infection.    Neurologically he has some weakness in the left arm and leg.      Lower extremities with trace edema bilaterally.  Skin was normal no sores no rashes.    INR 2.6, T4 TSH pending.      Results for orders placed or performed in visit on 07/09/19   INR   Result Value Ref Range    INR 2.60 (H) 0.90 - 1.10       Assessment:  1. Hospital discharge follow-up     2. Atrial fibrillation, unspecified type (H)  INR   3. S/P AVR  INR   4. S/P MVR (mitral valve repair)  INR   5. Hemorrhagic stroke (H)  Ambulatory referral to Neurology    right PCA   with left hemiplegia   6. Urinary tract infection without hematuria, site unspecified     7. Hypothyroidism, unspecified type  T4, Total    Thyroid Stimulating Hormone (TSH)   8. Moderate protein-calorie malnutrition (H)  polyethylene glycol (GLYCOLAX) 17 gram/dose powder    low albumin     9. Constipation, unspecified constipation type     10. Hyponatremia       Hospital discharge follow-up status post aortic and mitral valve replaced    Atrial fibrillation presently rate is at the upper end of normal therapeutic INR    Postop complications with bacteremia as well as hemorrhagic CVA with residual left hemiplegia    Recent UTI resolving with antibiotics    Hypothyroid await T4 TSH    Protein calorie malnutrition with constipation add MiraLAX.  Increase the tube feedings to 250 mL twice daily    Recheck in 2  weeks    History of hyponatremia now off of sodium chloride tablets recheck sodium in 2 weeks    Plan: As discussed above in addition we will have a follow-up with cardiology in 1 week and referral to neurology    This transcription uses voice recognition software, which may contain typographical errors.

## 2021-05-30 NOTE — TELEPHONE ENCOUNTER
RN cannot approve Refill Request    RN can NOT refill this medication med is not covered by policy/route to provider. Last office visit: 7/9/2019 Aristides Alegria MD Last Physical: 4/15/2019 Last MTM visit: Visit date not found Last visit same specialty: 7/9/2019 Aristides Alegria MD.  Next visit within 3 mo: Visit date not found  Next physical within 3 mo: Visit date not found      Mary Barclay, [facility] Triage/Med Refill 7/16/2019    Requested Prescriptions   Pending Prescriptions Disp Refills     baclofen (LIORESAL) 10 MG tablet [Pharmacy Med Name: BACLOFEN 10 MG TABLET 10 TAB] 360 tablet 0     Sig: TAKE 1 PILL BY MOUTH 2 TIMES EVERYDAY       There is no refill protocol information for this order

## 2021-05-30 NOTE — PROGRESS NOTES
Writer catch pt in clinic today at lab dept regards to goal follow up and schedule pt for a RECAM/RN Assessment with ccc nurse. NOTE: pt is in a wheelchair. Spoke with pt. Agency  assign for appt with pcp is use. Pt is accompanies by spouse. Pt is informed about [employer] and RN or SW Assessment required yearly now.     Check how pt is doing. Went over goal.   Pt confirmed doing better after surgery. Hope to feel stronger.   Spouse confirmed pt is doing better had a valve replacement surgery.    Pt agreed to RECAM/RN Assessment and set as new goal to discuss with [facility] nurse about take good care of self and health. Will contact writer later today due to other medical appt per pt request.     1:30PM:   Writer contact pt regards to schedule RECAM appt. Mark, family member/brother answer the call. Brother confirmed pt present with him. Brother assist with schedule RECAM appt for pt. Pt and brother request appt with ccc nurse and pcp schedule same day. Appt 8/06/2019 at 8:15AM with pcp is cancelled and reschedule per pt and brother request.     Appt reschedule Monday, 8/12/2019 at 10:30AM with Dr. Alegria, pcp. RECAM appt schedule Monday, 8/12/19 at 11:00AM with CCC nurse at [facility] [place] [place].     Both confirmed no further questions.     Plan:   Next outreach: 8/23/19.

## 2021-05-30 NOTE — TELEPHONE ENCOUNTER
ANTICOAGULATION  MANAGEMENT    Assessment     Today's INR result of 2.2 is Therapeutic (goal INR of 2.0-3.0)        Warfarin taken as previously instructed    No new diet changes affecting INR    No new medication/supplements affecting INR    Continues to tolerate warfarin with no reported s/s of bleeding or thromboembolism     Previous INR was Therapeutic     Patient recently home from prolonged hospitalization (4/24/19) for complications following AVR. Patient had an intracranial bleed in May.    Plan:     Spoke with brother/PCA Mark regarding INR result and instructed:     Warfarin Dosing Instructions:  Continue current warfarin dose 2 mg daily on Fri; and 1 mg daily rest of week      Instructed patient to follow up no later than: 7-10 days    Education provided: target INR goal and significance of current INR result    Mark verbalizes understanding and agrees to warfarin dosing plan.    Instructed to call the [facility] for any changes, questions or concerns. (#[phone])   ?   Jessica Garza RN    Subjective/Objective:      Kody HOPPER Andreas, a 69 y.o. male is on warfarin.     Kody reports:     Home warfarin dose: as updated on anticoagulation calendar per template     Missed doses: No     Medication changes:  No     S/S of bleeding or thromboembolism:  No     New Injury or illness:  No     Changes in diet or alcohol consumption:  No     Upcoming surgery, procedure or cardioversion:  No    Anticoagulation Episode Summary     Current INR goal:   2.0-3.0   TTR:   55.5 % (1.6 y)   Next INR check:   7/23/2019   INR from last check:   2.20 (7/12/2019)   Weekly max warfarin dose:      Target end date:      INR check location:      Preferred lab:      Send INR reminders to:   [facility]    Indications    A-fib (H) [I48.91]  Aortic valve disease  rheumatic [I06.9]  Mitral valve stenosis  unspecified etiology [I05.0]  Silent Stroke [I66.9]           Comments:            Anticoagulation Care Providers      Provider Role Specialty Phone number    Aristides Alegria MD Referring Family Medicine [phone]    Haja Ascencio MD  Cardiology [phone]

## 2021-05-30 NOTE — TELEPHONE ENCOUNTER
----- Message from Aristides Alegria MD sent at 7/23/2019  6:48 PM CDT -----  Please contact patient's family at [phone]    Let them know that the iron levels are getting better but stay on the liquid iron.  The hemoglobin look good    The kidney function looks stable.  The sodium look good so he does not need to go back on the sodium chloride.    Magnesium levels at the low end of normal so stay on the magnesium at the same dose.    No change in medications recheck in 2 weeks as we discussed

## 2021-05-30 NOTE — PROGRESS NOTES
[employer]    Open Encounter today.  Episodes created, care management status validated and encounters linked    Chart routed to RN and CHW.

## 2021-05-30 NOTE — TELEPHONE ENCOUNTER
ANTICOAGULATION  MANAGEMENT    Assessment     Today's INR result of 2.9 is Therapeutic (goal INR of 2.0-3.0)        Warfarin taken as previously instructed    No new diet changes affecting INR    No new medication/supplements affecting INR    Continues to tolerate warfarin with no reported s/s of bleeding or thromboembolism     Previous INR was Therapeutic    Plan:     Spoke with Mark regarding INR result and instructed:     Warfarin Dosing Instructions:  Continue current warfarin dose 2 mg daily on Fridays; and 1 mg daily rest of week  (0 % change)    Instructed patient to follow up no later than: two weeks    Education provided: importance of consistent vitamin K intake, impact of vitamin K foods on INR, importance of therapeutic range, importance of following up for INR monitoring at instructed interval and importance of taking warfarin as instructed    Mark verbalizes understanding and agrees to warfarin dosing plan.    Instructed to call the [facility] for any changes, questions or concerns. (#[phone])   ?   Crystal Portillo RN    Subjective/Objective:      Kody HOPPER Andreas, a 69 y.o. male is on warfarin.     Kody reports:     Home warfarin dose: template incorrect; verbally confirmed home dose with Mark and updated on anticoagulation calendar     Missed doses: No     Medication changes:  No     S/S of bleeding or thromboembolism:  No     New Injury or illness:  No     Changes in diet or alcohol consumption:  No     Upcoming surgery, procedure or cardioversion:  No    Anticoagulation Episode Summary     Current INR goal:   2.0-3.0   TTR:   56.3 % (1.6 y)   Next INR check:   8/6/2019   INR from last check:   2.90 (7/23/2019)   Weekly max warfarin dose:      Target end date:      INR check location:      Preferred lab:      Send INR reminders to:   [facility]    Indications    A-fib (H) [I48.91]  Aortic valve disease  rheumatic [I06.9]  Mitral valve stenosis  unspecified etiology  [I05.0]  Silent Stroke [I66.9]           Comments:            Anticoagulation Care Providers     Provider Role Specialty Phone number    Aristides Alegria MD [facility] Family Medicine [phone]    Haja Ascencio MD  Cardiology [phone]

## 2021-05-30 NOTE — TELEPHONE ENCOUNTER
"ANTICOAGULATION  MANAGEMENT: Discharge Continuity of Care Review    Emergency room visit on  7/4 for abdominal pain.    Discharge disposition: Home    INR Results:       Recent labs: (last 7 days)     07/04/19  1157   INR 1.90*       Warfarin inpatient management: More warfarin administered than maintenance regimen    Warfarin discharge instructions: \"Patient's INR is subtherapeutic and his discussed increasing warfarin options with pharmacy who recommended increasing his warfarin to 2 tablets the next 2 days and then resuming his usual dose with close follow-up with his primary care doctor.  Patient does have appointment scheduled with his primary care doctor the next 7 days.\"     Medication Changes Affecting Anticoagulation: Omnicef for presumed UTI    Additional Factors Affecting Anticoagulation: None new, [facility] has not managed this patient in some time d/t long hospitalization and prolonged recovery.     Plan     No adjustment to anticoagulation plan needed    Recommended follow up is scheduled       Jessica Garza RN                  "

## 2021-05-30 NOTE — PROGRESS NOTES
[facility] Care Coordination Contact    Care Coordinator received a Early PCA Assessment Request from Kody's PCA agency, [employer], due to Change of Condition.  Care Coordinator contacted Kody's son, Mark, to schedule Kody's early reassessment/early PCA assessment.  Mark said Kody has many doctor appts in the next two weeks and wanted to schedule the visit with Care Coordinator for July 19 at 11am.  Care Coordinator stated that date is fine but if he wants the visit earlier to increase hours earlier he can call Care Coordinator.  He shared that currently the PCA agency has told them they have an access of PCA hours since Kody was in the hospital for 3 months so the PCA is actually doing 15-16 hour days at this time.  Care Coordinator understood and let them know that when Care Coordinator comes out to do the new assessment the PCA will only be able provide the new # of PCA hours authorized since it will be a new Service Agreement authorized.  Mark understood.     Care Coordinator also asked if [employer] is coming again and he said he has talked to them and hopes that they are coming next week.      Leilani Ignacio, MISSAEL  [facility]  [phone]

## 2021-05-30 NOTE — PROGRESS NOTES
[facility] Care Coordination Contact  [facility] Change in Condition Assessment    Home visit for Change in Condition Health Risk Assessment with Kody Johns completed on 7/19/2019    Reason for Early reassessment: Health Status Change  Yes, if yes explain Kody had a heart surgery back in April and during the surgery he had many complications including a stroke.  Kody was hospitalized for about 2.5 months and recently was discharged home.  Kody's health has drastically changed since last assessment and a new PCA assessment was reqeusted/needed.    Type of residence:: Private home - no stairs  Current living arrangement:: I live in a private home with family     Assessment completed with:: Children, Family, Patient    Current Care Plan  Member currently receiving the following home care services:     Member currently receiving the following community resources: PCA, referral for SNV and HomeCare     Medication Review  Medication reconciliation completed in Epic: YES  Medication set-up & administration: RN set up weekly  Family caregiver administers medications  Medication Risk Assessment Medication (1 or more, place referral to MTM):  N/A: No risk factors identified  MTM Referral Placed: No: No risk factors idenified    Mental/Behavioral Health   Depression Screening: See PHQ assessment flowsheet.   Mental health DX:: No      Mental Health Diagnosis: No  Mental Health Services: None: No further intervention needed at this time.    Falls Assessment:   Fallen 2 or more times in the past year?: No   Any fall with injury in the past year?: No    ADL/IADL Dependencies:   Dependent ADLs:: Toileting, Eating, Dressing, Wheelchair-with assist, Ambulation-cane, Bathing, Transfers, Positioning, Incontinence, Grooming, Independent  Dependent IADLs:: Cleaning, Cooking, Laundry, Shopping, Meal Preparation, Medication Management, Money Management, Incontinence, Transportation    [facility] Plan sponsored benefits:  Shared information re: Silver Sneakers/gym memberships, ASA, Calcium +D.    PCA Assessment completed at visit: Yes     Elderly Waiver Eligibility: Yes, but member declines EW service; will not open to EW    Care Plan & Recommendations: Early Reassessment completed with Kody and his PCA Responsible Party due to Change of Condition in Kody's health.  Kody had a heart surgery in April 2019 and had some serious complications with the surgery including a stroke.  Kody was hospitalized for over 2 months and has recently returned home.  PCA agency and family requested an early PCA assessment due to Kody's health changes.  Care Coordinator completed a new Health RIsk Assessment, OBRA Level 1 screening and also an early PCA assessment.  Kody's new hours come out to 12 hours per day.  Kody's family also discussed the need for Kody to have a transport wheelchair.  They shared that they do encourage Kody to walk as much as he can but he is very weak and the stroke effected the left side of his body so walking long distances is hard/painful.  The family would like a Transport Wheelchair to take Kody to and from medical appoitments as well as take him outside (to the park).  The family also discussed with Care Coordinator the want for a hospital bed.  Kody is unable to position himself or sit up in bed on his own.  Family have to lift him up to a sitting position which can be very hard (physically) for family to do.  Kody also states he likes that a lift chair can have him in a sitting position so he does not have to lay down all day.  Care Coordinator will confirm that a hospital bed is covered under insurance and let family know and Care Coordinator also let family know that they will need to discuss the want for the wheelchair and hospital bed with Kody's PCP so it can be noted in his chart for insurance purposes.  The family shared that since Kody's surgery (and possibly stroke) Kody's behaviors have increased as well as his confusion has  increased.  Kody becomes verbally and physically aggressive towards PCA and family most days.  Kody will throw objects when he is upset and will yell at PCA and family when upset.  Kody will only allow his one son to give him his medications and nobody else.  Kody is very resistive to his medications so the son must talk Kody through what each medication is for and why he needs to take it.  Kody usually will then take medications but family reports it can take a while to get all medications in Kody.  Kody has a feeding tube and family feed him through the feeding tube 2x per day.  Family state they try to feed Kody soft rice via mouth but Kody will only take about a teaspoon of food before he says he is full or that his stomach is hurting.  Kody also tends to choke on food and meds and has been throwing up more often.  Kody is very confused and does not remember where he is, what day it is, what time it is or even if it is day or night.  Kody's family report that they have to state who they are to Kody a few times before he recognizes them but family did state they are not sure if this is due to his memory confusion or poor vision.  Family reports that Kody forgets where rooms are, for example, after he goes to the bathroom and family is helping him walk back to his room, Kody will walk to the wrong rooms and family will need to redirect him to his bedroom.  Kody reports pain and tingling on his left side, specifically his left arm and left leg.  The left side of his body was effected by the stroke.  Kody's family will contact Care Coordinator if they have any other questions, concerns or needs.      See [facility] for detailed assessment information.    Follow-Up Plan: Member informed of future contact, plan to f/u with member with a 6 month telephone assessment.  Contact information shared with member and family, encouraged member to call with any questions or concerns at any time.    [facility] continuum providers: Please refer to  [facility] on the [facility] Problem List to view this patient's [facility] Care Plan Summary.    MISSAEL Lord  [facility]  [phone]

## 2021-05-30 NOTE — TELEPHONE ENCOUNTER
ANTICOAGULATION  MANAGEMENT    Assessment     Today's INR result of 2.6 is Therapeutic (goal INR of 2.0-3.0)        Mark states patient was discharged from TCU on 7/4. Mark states after ED visit on 7/4 patient took 2mg 7/4-7/5 but prior to that and after has been taking 1mg daily    No new diet changes affecting INR    Interaction between Cefidinir (has four days left per Mark) and warfarin may be affecting INR    Continues to tolerate warfarin with no reported s/s of bleeding or thromboembolism     Previous INR was Subtherapeutic    Plan:     Spoke with Mark (brother) regarding INR result and instructed:     Warfarin Dosing Instructions: 1mg Tues/Wed/Thurs    Instructed patient to follow up no later than: 7/12    Education provided: importance of therapeutic range, target INR goal and significance of current INR result and potential interaction between warfarin and Mark    Mark verbalizes understanding and agrees to warfarin dosing plan.    Instructed to call the [facility] for any changes, questions or concerns. (#[phone])   ?   Swapna Phan RN    Subjective/Objective:      Kody Johns, a 69 y.o. male is on warfarin.     Kody reports:     Home warfarin dose: verbally confirmed home dose with Mark and updated on anticoagulation calendar     Missed doses: No     Medication changes:  Yes: Cefdinir (has four days left)     S/S of bleeding or thromboembolism:  No     New Injury or illness:  Yes: ED visit for abdominal pain     Changes in diet or alcohol consumption:  No     Upcoming surgery, procedure or cardioversion:  No    Anticoagulation Episode Summary     Current INR goal:   2.0-3.0   TTR:   55.2 % (1.6 y)   Next INR check:   7/12/2019   INR from last check:   2.60 (7/9/2019)   Weekly max warfarin dose:      Target end date:      INR check location:      Preferred lab:      Send INR reminders to:   ANTICOAGULATION POOL A (WBY,WBE,MID,RSC)    Indications    A-fib (H) [I48.91]  Aortic  valve disease  rheumatic [I06.9]  Mitral valve stenosis  unspecified etiology [I05.0]  Silent Stroke [I66.9]           Comments:            Anticoagulation Care Providers     Provider Role Specialty Phone number    Aristides Alegria MD [facility] Family Medicine [phone]    Hjaa Ascencio MD  Cardiology [phone]

## 2021-05-31 NOTE — TELEPHONE ENCOUNTER
ANTICOAGULATION  MANAGEMENT- Home Care/Care Facility Result    Assessment     Today's INR result of 1.95 is Subtherapeutic (goal INR of 2.0-3.0)        Warfarin taken as previously instructed    No new diet changes affecting INR    No new medication/supplements affecting INR    Continues to tolerate warfarin with no reported s/s of bleeding or thromboembolism     Previous INR was Therapeutic at 2.00 on 8/20/19.    Reported no new complaints.    Plan:     Spoke with Esme, from [facility] discussed INR result and instructed:     Warfarin Dosing Instructions:  (nurse verified, he only has 1mg tabs).   - Change warfarin dose to 1 mg daily on Monday, Wednesday, Friday; and 1.5 mg daily rest of week.   - (5.9 % change)    Next INR to be drawn:  1-2 wks.   - scheduled on 9/10/19.    Education provided: importance of consistent vitamin K intake, target INR goal and significance of current INR result, importance of taking warfarin as instructed and importance of notifying clinic for changes in medications    Paige verbalizes understanding and agrees to warfarin dosing plan.   ?   Sulema Almaraz RN    Subjective/Objective:      Kody Johns, a 69 y.o. male is established on warfarin.     Home care/care facility RN's report of [facility] INR, recent warfarin dosing, diet changes, medication changes, and symptoms is documented below.    Additional findings: verbally confirmed home dose with Esme and updated on anticoagulation calendar    - on warfarin for Atrial fibrillation    Anticoagulation Episode Summary     Current INR goal:   2.0-3.0   TTR:   55.5 % (1.7 y)   Next INR check:   9/10/2019   INR from last check:   1.95! (8/22/2019)   Weekly max warfarin dose:      Target end date:      INR check location:      Preferred lab:      Send INR reminders to:   [facility]    Indications    A-fib (H) [I48.91]  Aortic valve disease  rheumatic [I06.9]  Mitral valve stenosis  unspecified etiology [I05.0]  Silent  Stroke [I66.9]           Comments:            Anticoagulation Care Providers     Provider Role Specialty Phone number    Aristides Alegria MD [facility] Family Medicine [phone]    Haja Ascencio MD  Cardiology [phone]

## 2021-05-31 NOTE — TELEPHONE ENCOUNTER
"Caller is pt's brother (Mark) with pt immediately present.  States \"His feeding tube got dislodged.\"  Not able to be put into place by family.  Instructed brother to take pt to ED for this purpose.  Caller verbalizes understanding.  Agrees to plan.    Montse Schreiber RN BSBA  [facility] RN Triage     Reason for Disposition    [1] G-tube is broken or cracked AND [2] is not usable    Protocols used: FEEDING TUBE SYMPTOMS AND [id number]      "

## 2021-05-31 NOTE — PROGRESS NOTES
[facility] Care Coordination Contact    Received after visit chart from care coordinator.  Completed following tasks:    Mailed copy of care plan to client.    [facility]:  Emailed completed PCA assessment to [facility].  Faxed copy of PCA assessment to PCA Agency and mailed copy to member.  Faxed MD Communication to PCP.     Martín Short  Care Management Specialist  [facility]  [phone]

## 2021-05-31 NOTE — TELEPHONE ENCOUNTER
INR result is 2.1  INR   Date Value Ref Range Status   08/22/2019 1.95 (H) 0.90 - 1.10 Final       Will the patient be seen, or did they already see, MD or CNP today? No    Most Recent Warfarin dose day/week  Sunday Monday Tuesday Wednesday Thursday Friday Saturday     1.5 mg 1 mg 1.5 mg 1 mg 1.5 mg     Sunday Monday Tuesday Wednesday Thursday Friday Saturday   1.5 mg 1 mg            Has the patient missed any doses of Coumadin, Warfarin, Jantoven in the past 7 days? No    Has the patients medications changed since the last visit? No    Has the patient experienced any bleeding recently? No    Has the patient experienced any injuries or illness recently? No    Has the patient experienced any 'new' shortness of breath, severe headaches, or changes in vision recently? Yes vision getting worse    Has the patient had any changes in their diet, or alcohol consumption? No    Is the patient here today to prepare for any type of upcoming surgery, procedure, or for a cardioversion procedure? No    What phone number can we reach the patient at today? [phone] Esme.

## 2021-05-31 NOTE — TELEPHONE ENCOUNTER
ANTICOAGULATION  MANAGEMENT- Home Care/Care Facility Result    Assessment     Today's INR result of 1.9 is Subtherapeutic (goal INR of 2.0-3.0)        Warfarin taken as previously instructed    No new diet changes affecting INR    No new medication/supplements affecting INR    Continues to tolerate warfarin with no reported s/s of bleeding or thromboembolism     Previous INR was Therapeutic    Plan:     Spoke with nurse Shar discussed INR result and instructed:     Warfarin Dosing Instructions: Change warfarin dose to 1.5 mg daily on Tues/Thurs/Sat; and 1 mg daily rest of week  (6 % change)    Next INR to be drawn: two weeks    Education provided: importance of therapeutic range    Shar verbalizes understanding and agrees to warfarin dosing plan.   ?   Swapna Phan RN    Subjective/Objective:      Kody Johns, a 69 y.o. male is established on warfarin.     Home care/care facility RN's report of [facility] INR, recent warfarin dosing, diet changes, medication changes, and symptoms is documented below.    Additional findings: none    Anticoagulation Episode Summary     Current INR goal:   2.0-3.0   TTR:   57.0 % (1.7 y)   Next INR check:   8/20/2019   INR from last check:   1.90! (8/6/2019)   Weekly max warfarin dose:      Target end date:      INR check location:      Preferred lab:      Send INR reminders to:   [facility]    Indications    A-fib (H) [I48.91]  Aortic valve disease  rheumatic [I06.9]  Mitral valve stenosis  unspecified etiology [I05.0]  Silent Stroke [I66.9]           Comments:            Anticoagulation Care Providers     Provider Role Specialty Phone number    Aristides Alegria MD Referring Family Medicine [phone]    Haja Ascencio MD  Cardiology [phone]

## 2021-05-31 NOTE — TELEPHONE ENCOUNTER
INR result is 1.9  INR   Date Value Ref Range Status   08/22/2019 1.95 (H) 0.90 - 1.10 Final       Will the patient be seen, or did they already see, MD or CNP today? No    Most Recent Warfarin dose day/week  Sunday Monday Tuesday Wednesday Thursday Friday Saturday      1 1.5 1 1.5     Sunday Monday Tuesday Wednesday Thursday Friday Saturday   1 1 1.5           Has the patient missed any doses of Coumadin, Warfarin, Jantoven in the past 7 days? No    Has the patients medications changed since the last visit? No    Has the patient experienced any bleeding recently? No    Has the patient experienced any injuries or illness recently? No    Has the patient experienced any 'new' shortness of breath, severe headaches, or changes in vision recently? No    Has the patient had any changes in their diet, or alcohol consumption? No    Is the patient here today to prepare for any type of upcoming surgery, procedure, or for a cardioversion procedure? No    What phone number can we reach the patient at today? [facility] home care nurse [phone].

## 2021-05-31 NOTE — PROGRESS NOTES
Clinic Care Coordination Contact    Situation: Patient chart reviewed by care coordinator.    Background: Patient was schedule for visit with RN CCC for reassessment - no show   Care Guide Delegation:  Due Date: Within 2 weeks from today's date 08/12/19    Delegation: No Show for RN Appointment. Please Follow unsuccessful outreach, no show standard work.

## 2021-05-31 NOTE — TELEPHONE ENCOUNTER
INR result is 1.9  INR   Date Value Ref Range Status   07/23/2019 2.90 (H) 0.90 - 1.10 Final       Will the patient be seen, or did they already see, MD or CNP today? No    Most Recent Warfarin dose day/week  Sunday Monday Tuesday Wednesday Thursday Friday Saturday        2 1 Sunday Monday Tuesday Wednesday Thursday Friday Saturday   1 1 1 1 1         Has the patient missed any doses of Coumadin, Warfarin, Jantoven in the past 7 days? No    Has the patients medications changed since the last visit? No    Has the patient experienced any bleeding recently? No    Has the patient experienced any injuries or illness recently? No    Has the patient experienced any 'new' shortness of breath, severe headaches, or changes in vision recently? No    Has the patient had any changes in their diet, or alcohol consumption? No    Is the patient here today to prepare for any type of upcoming surgery, procedure, or for a cardioversion procedure? No    What phone number can we reach the patient at today? home phone listed in demographics.

## 2021-05-31 NOTE — PATIENT INSTRUCTIONS - HE
Kody Johns,    It was a pleasure to see you today at the [facility].     My recommendations after this visit include:    - Take Metoprolol Succinate 25 mg at bed time and see if this helps with dizziness    - I changed Furosemide as needed. Please call if persistent weight gain with shortness of breath, abdominal bloating or leg swelling    - Keep salt intake < 2000 mg/day, daily weight, fluid intake of 1.5 Liter (50 ounces of fluid per day) and stay active as tolerated     - Follow up with CV surgery in 1-2 weeks     - Follow up with Dr. Lang in 4 weeks     - Follow up in Heart Failure Clinic as needed    - Please call  [phone], if you have any questions or concerns    Adarsh Jaramillo, CNP

## 2021-05-31 NOTE — TELEPHONE ENCOUNTER
ANTICOAGULATION  MANAGEMENT: Discharge Continuity of Care Review    Emergency room visit on  8/22 for abdominal pain/problems with feeding tube.    Discharge disposition: Home    INR Results:       Recent labs: (last 7 days)     08/20/19 08/22/19 2015   INR 2.00* 1.95*       Warfarin inpatient management: not applicable    Warfarin discharge instructions: home regimen continued     Medication Changes Affecting Anticoagulation: No    Additional Factors Affecting Anticoagulation: Yes: tube feeding interruption for a few days    Plan     No adjustment to anticoagulation plan needed            Jessica Garza RN

## 2021-05-31 NOTE — TELEPHONE ENCOUNTER
ANTICOAGULATION  MANAGEMENT- Home Care/Care Facility Result    Assessment     Today's INR result of 2.1 is Therapeutic (goal INR of 2.0-3.0)        Warfarin taken as previously instructed    No new diet changes affecting INR    No new medication/supplements affecting INR    Continues to tolerate warfarin with no reported s/s of bleeding or thromboembolism     Previous INR was Subtherapeutic    Plan:     Spoke with home care nurse Esme discussed INR result and instructed:     Warfarin Dosing Instructions: Continue current warfarin dose    1 mg every Mon, Wed, Fri; 1.5 mg all other days       Next INR to be drawn: 2 weeks.     Education provided: importance of taking warfarin as instructed    Esme verbalizes understanding and agrees to warfarin dosing plan.   ?   Baldo Marquez RN    Subjective/Objective:      Kody Johns, a 69 y.o. male is established on warfarin.     Home care/care facility RN's report of [facility] INR, recent warfarin dosing, diet changes, medication changes, and symptoms is documented below.    Additional findings: verbally confirmed home dose with Esme and updated on anticoagulation calendar    Anticoagulation Episode Summary     Current INR goal:   2.0-3.0   TTR:   73.0 %   Next INR check:   9/17/2019   INR from last check:   2.10 (9/3/2019)   Weekly max warfarin dose:      Target end date:      INR check location:      Preferred lab:      Send INR reminders to:   [facility]    Indications    A-fib (H) [I48.91]  Aortic valve disease  rheumatic [I06.9]  Mitral valve stenosis  unspecified etiology [I05.0]  Silent Stroke [I66.9]           Comments:            Anticoagulation Care Providers     Provider Role Specialty Phone number    Aristides Alegria MD Referring Family Medicine [phone]    Haja Ascencio MD  Cardiology [phone]

## 2021-05-31 NOTE — PROGRESS NOTES
Patient Outreach:   Reason: reschedule RECAM/RE-Assessment appt per ccc nurse.     Note: pt is schedule on Monday, 8/12/19 at 11:00AM with ccc nurse at [facility]. Pt is no show to appt.     CHW/Clinic Care Guide contact pt today and spoke with Mark, brother/family member who speak English. Asked to speak with pt. Mark isn't home per Mark. Left detail message for Mark to coordinate appt date and time with pt and get back with CHW. Mark confirmed will coordinate with his brother to reschedule the RECAM appt. Explained RECAM/RE-Assessment per Mark questions. CHW phone number is provide. Mark confirmed understand and no further questions.     Message route to update ccc nurse.     Plan:   Waiting to hear from pt and Mark, family member.

## 2021-05-31 NOTE — TELEPHONE ENCOUNTER
ANTICOAGULATION  MANAGEMENT- Home Care/Care Facility Result    Assessment     Today's INR result of 2.0 is Therapeutic (goal INR of 2.0-3.0)        Missed dose(s) may be affecting INR. Missed dose on 8/18    No new diet changes affecting INR    Interaction between Keflex (ends in 3 days) and warfarin may be affecting INR    Continues to tolerate warfarin with no reported s/s of bleeding or thromboembolism     Previous INR was Subtherapeutic    Plan:     Spoke with Esme discussed INR result and instructed:     Warfarin Dosing Instructions: Continue current warfarin dose 1.5 mg daily on Tues/Thurs/SAt; and 1 mg daily rest of week  (0 % change)    Next INR to be drawn: one week    Education provided: importance of therapeutic range and potential interaction between warfarin and Cephalexin    Esme verbalizes understanding and agrees to warfarin dosing plan.   ?   Swapna Phan RN    Subjective/Objective:      Kody Johns, a 69 y.o. male is established on warfarin.     Home care/care facility RN's report of [facility] INR, recent warfarin dosing, diet changes, medication changes, and symptoms is documented below.    Additional findings: none    Anticoagulation Episode Summary     Current INR goal:   2.0-3.0   TTR:   55.8 % (1.7 y)   Next INR check:   8/27/2019   INR from last check:   2.00 (8/20/2019)   Weekly max warfarin dose:      Target end date:      INR check location:      Preferred lab:      Send INR reminders to:   [facility]    Indications    A-fib (H) [I48.91]  Aortic valve disease  rheumatic [I06.9]  Mitral valve stenosis  unspecified etiology [I05.0]  Silent Stroke [I66.9]           Comments:            Anticoagulation Care Providers     Provider Role Specialty Phone number    Aristides Alegria MD Referring Family Medicine [phone]    Haja Ascencio MD  Cardiology [phone]

## 2021-05-31 NOTE — PROGRESS NOTES
Patient Outreach:   Reason: reschedule RECAM/RE-Assessment appt per [facility] nurse.     Attempt #2:   [facility] call pt and spoke with Mark, family member today regards to above.     Mark reported; Kody was in the ED yesterday and discharged to home. He is doing better and now resting. Kody isn't feel good last week or so and have not connect with [facility] about RECAM appt. Will connect with you (FYI: sometime next week).     Mark confirmed to connect with pt and will coordinate with [facility]. Mark is informed prefer RECAM appt done by end of September 2019. Mark confirmed understand and no further questions. W/[facility] phone number is provide.    Plan:   Status: enrolled.   Follow up: one month 9/23/2019.

## 2021-05-31 NOTE — TELEPHONE ENCOUNTER
Called and spoke with mendoza on CTC , Message was given, remash understood, no further questions.

## 2021-05-31 NOTE — TELEPHONE ENCOUNTER
INR result is   2.0    Will the patient be seen, or did they already see, MD or CNP today? No    Most Recent Warfarin dose day/week  Sunday Monday Tuesday Wednesday Thursday Friday Saturday     1.5 1 1.5 1 1.5     Sunday Monday Tuesday Wednesday Thursday Friday Saturday   1 1            Has the patient missed any doses of Coumadin, Warfarin, Jantoven in the past 7 days? Yes, one day    Has the patients medications changed since the last visit? Yes, cephalexin (KEFLEX) 500 MG capsule on 8/13    Has the patient experienced any bleeding recently? No    Has the patient experienced any injuries or illness recently? No    Has the patient experienced any 'new' shortness of breath, severe headaches, or changes in vision recently? Yes, the patient experienced dizziness    Has the patient had any changes in their diet, or alcohol consumption? No    Is the patient here today to prepare for any type of upcoming surgery, procedure, or for a cardioversion procedure? No    What phone number can we reach the patient at today? [phone].

## 2021-05-31 NOTE — PROGRESS NOTES
Subjective: Patient comes in for follow-up he had the aortic and mitral valve replacement he had a hemorrhagic stroke.    He sees cardiology tomorrow.  He did see neurology at [facility], Dr. Mendenhall.  He is getting a head scan on the Saturday    Patient is on tube feeding Isosource 1.5 kcal/mL gets 375 kcal per container takes 2 a day for a total of 750 kcal    Patient has increased 1 pound since last visit.  We will keep the feeding tube going.    He has had some abdominal symptoms with some increased gas, he is use some Gas-X over-the-counter I did give her prescription today.    Patient has had normal bowel movements no blood.    He is on warfarin he has history of atrial fibrillation his heart sounded regular today rate was at 90.    Patient continues on iron daily we will recheck in 2 to 3 weeks and check ferritin and iron and CBC.    Patient had labs from 7/23/2019 which showed normal sodium magnesium and hemoglobin.    No additional concerns or issues      Tobacco status: He  reports that he quit smoking about 4 years ago. His smoking use included cigarettes. He has a 50.00 pack-year smoking history. He has quit using smokeless tobacco.    Patient Active Problem List    Diagnosis Date Noted     S/P AVR 07/09/2019     H/O mitral valve replacement 07/09/2019     Metabolic encephalopathy 05/20/2019     Encephalopathy 05/17/2019     Atrial flutter with rapid ventricular response (H) 05/14/2019     Intracerebral bleed (H) 05/13/2019     Hemorrhagic stroke (H) 05/13/2019     Tracheostomy care (H)      Acute respiratory failure (H) 05/10/2019     Acute cardioembolic stroke (H)      Paralytic ileus (H) 05/02/2019     Small bowel obstruction (H)      Atrial fibrillation with RVR (H)      Hypernatremia      Thrombocytopenia (H)      Sepsis, due to unspecified organism (H)      S/P MVR (mitral valve repair) 04/24/2019     Acute respiratory failure with hypoxemia (H) 04/24/2019     Anemia due to blood loss, acute  04/24/2019     Coagulopathy (H) 04/24/2019     Non-English speaking patient 04/24/2019     Respiratory failure (H) 03/21/2019     A-fib (H) 09/14/2017     Heart failure with preserved ejection fraction (H) 09/14/2017     Moderate malnutrition (H) 08/31/2017     ALEENA (acute kidney injury) (H) 08/31/2017     Aortic valve disease, rheumatic 08/30/2017     Lightheadedness 08/30/2017     Atherosclerosis of native coronary artery of native heart without angina pectoris      Essential hypertension with goal blood pressure less than 130/85      Pure hypercholesterolemia      Dysuria 08/29/2017     Hypothyroidism, unspecified type      Mitral valve stenosis, unspecified etiology      Elevated LFTs 08/26/2016     Silent Stroke      Shoulder strain      Dysphagia      Hyperlipidemia      Rheumatic heart disease      Blurry vision      Hearing loss      Nonspecific reaction to tuberculin skin test without active tuberculosis        Current Outpatient Medications   Medication Sig Dispense Refill     acetaminophen (TYLENOL) 500 MG tablet Take 1 tablet (500 mg total) by mouth every 6 (six) hours as needed for pain. 20 tablet 0     amiodarone (PACERONE) 200 MG tablet 1 po bid 60 tablet 3     atorvastatin (LIPITOR) 10 MG tablet 1 po qd 30 tablet 6     baclofen (LIORESAL) 10 MG tablet TAKE 1 PILL BY MOUTH 2 TIMES EVERYDAY 360 tablet 0     ferrous sulfate 220 mg (44 mg iron)/5 mL solution Take 7 mL (308 mg total) by mouth 2 (two) times a day. 420 mL 1     levothyroxine (SYNTHROID, LEVOTHROID) 88 MCG tablet TAKE 1 PILL BY MOUTH EVERYDAY FOR THYROID 30 tablet 6     magnesium oxide (MAG-OX) 400 mg (241.3 mg magnesium) tablet 1 po qd 30 tablet 3     meclizine (ANTIVERT) 25 mg tablet 1 po two times a day prn dizziness 40 tablet 1     omeprazole (PRILOSEC) 20 MG capsule TAKE 1 PILL BY MOUTH EVERYDAY FOR STOMACH 90 capsule 2     polyethylene glycol (GLYCOLAX) 17 gram/dose powder 17 gm in 8 oz water daily 510 g 6     simethicone  (MYLICON,GAS-X) 180 mg capsule 1 po two times a day prn abdominal gas 60 capsule 3     tamsulosin (FLOMAX) 0.4 mg cap Take 1 capsule (0.4 mg total) by mouth daily after supper. 30 capsule 5     warfarin (COUMADIN/JANTOVEN) 1 MG tablet Take 1 tablet (1 mg total) by mouth daily. Adjust dose based on INR results as directed. 90 tablet 1     No current facility-administered medications for this visit.        ROS:   10 point review of systems positive as discussed above otherwise negative    Objective:    /82 (Patient Site: Left Arm, Patient Position: Sitting, Cuff Size: Adult Small)   Pulse 100   Resp 16   Wt (!) 96 lb (43.5 kg)   BMI 18.14 kg/m    Body mass index is 18.14 kg/m .      General appearance no acute distress    HEENT neck without adenopathy oropharynx is clear heart sounded regular    Valvular heart sounds from the mitral and aortic valve replacement    Lungs diminished breath sounds but clear    Abdomen soft no bloating.  And the G-tube looks good no sign of infection.    Extremities without edema.    Skin was normal no rashes.    INR was 1.9 previous labs as outlined above hemoglobin 13.4 on 7/23/2019    Results for orders placed or performed in visit on 08/06/19   INR   Result Value Ref Range    INR 1.90 (!) 0.9 - 1.1       Assessment:  1. S/P AVR     2. Moderate protein-calorie malnutrition (H)  polyethylene glycol (GLYCOLAX) 17 gram/dose powder   3. Iron deficiency anemia, unspecified iron deficiency anemia type  ferrous sulfate 220 mg (44 mg iron)/5 mL solution   4. Abdominal gas pain  simethicone (MYLICON,GAS-X) 180 mg capsule   5. Atrial fibrillation, unspecified type (H)     6. S/P MVR (mitral valve repair)     7. Vertigo  meclizine (ANTIVERT) 25 mg tablet   8. Hemorrhagic stroke (H)       Status post aortic mitral valve replacement.  Some postop problems with a hemorrhagic CVA slowly making improvement    Gaining some weight leave this G-tube and continue Isosource 750 [place]  daily.    Continue to follow with neurology await head scan follow-up    See cardiology tomorrow    Follow-up with me in 2 to 3 weeks we will recheck iron ferritin and CBC.    Plan: As outlined above    This transcription uses voice recognition software, which may contain typographical errors.

## 2021-06-01 NOTE — PATIENT INSTRUCTIONS - HE
S/P AVR (aortic valve replacement) with 21 mm [facility] Kirill Trifecta Bioprosthetic valve on 04/24/2019  S/P MVR (mitral valve replacement) with 25 mm St Kirill Epic Bioprosthetic valve on 04/24/2019  Postop ischemic stroke, then converted to hemorrhagic stroke  Residual visual changes, for which the patient is asked to follow up with neurology and possible eye specialist  Complaint of persistent dizziness, currently on Meclizine therapy  Patient is dejected and frustrated with progress and wondering if surgery was worth the pain  Will start antidepressant and have PCP follow up and manage  Zoloft 100 mg daily  Incisions are healed, lungs sounds clear and no sternal instability is appreciated  Patient still has a Peg tube  Will recommend that patient follow up with ENT for the dizziness, however he needs to continue to take Meclizine till seen  By ENT or Neurology  Will also recommend that patient sees an eye specialist for his worsening vision  Will not need any further CV surgery follow up, but advised to call with questions or concerns

## 2021-06-01 NOTE — TELEPHONE ENCOUNTER
ANTICOAGULATION  MANAGEMENT- Home Care/Care Facility Result    Assessment     Today's INR result of 3.9 is Supratherapeutic (goal INR of 2.0-3.0)        Warfarin taken as previously instructed    No new diet changes affecting INR    Interaction between tylenol for left arm pain and warfarin may be affecting INR     Instructed to have the patient discuss problem with PCP during OV on 10/4/19.    Continues to tolerate warfarin with no reported s/s of bleeding or thromboembolism     Previous INR was Therapeutic     Worsening vision- instructed to have the patient make appointment with eye MD.    Plan:     Spoke with Home Care Nurse Esme discussed INR result and instructed:     Warfarin Dosing Instructions: hold warfarin dose today then continue current warfarin dose    1 mg every Mon, Wed, Fri; 1.5 mg all other day        (0 % change)    Next INR to be drawn: one week    Education provided: importance of therapeutic range, target INR goal and significance of current INR result, potential interaction between warfarin and impact of pain on INR.    Esme verbalizes understanding and agrees to warfarin dosing plan.   ?   Miriam Huggins RN    Subjective/Objective:      Kody Johns, a 69 y.o. male is established on warfarin.     Home care/care facility RN's report of [facility] INR, recent warfarin dosing, diet changes, medication changes, and symptoms is documented below.    Additional findings: medication changes: Tylenol for left arm pain from shoulder    Anticoagulation Episode Summary     Current INR goal:   2.0-3.0   TTR:   73.2 %   Next INR check:   10/8/2019   INR from last check:   3.90! (10/1/2019)   Weekly max warfarin dose:      Target end date:      INR check location:      Preferred lab:      Send INR reminders to:   [facility]    Indications    A-fib (H) [I48.91]  Aortic valve disease  rheumatic [I06.9]  Mitral valve stenosis  unspecified etiology [I05.0]  Silent Stroke [I66.9]           Comments:             Anticoagulation Care Providers     Provider Role Specialty Phone number    Aristides Alegria MD Referring Family Medicine [phone]    Haja Ascencio MD  Cardiology [phone]

## 2021-06-01 NOTE — TELEPHONE ENCOUNTER
RN cannot approve Refill Request    RN can NOT refill this medication medication not on med list.       Vicky Benton, [facility] Triage/Med Refill 9/25/2019    Requested Prescriptions   Pending Prescriptions Disp Refills     ferrous sulfate 220 mg (44 mg iron)/5 mL solution [Pharmacy Med Name: FERROUS SULFATE 220 (44 FE) 220 (44 FE) LIQ] 420 mL 1     Sig: TAKE 7 ML (308 MG TOTAL) BY MOUTH 2 (TWO) TIMES A DAY.       Iron Supplements Refill Protocol Passed - 9/24/2019 10:55 AM        Passed - PCP or prescribing provider visit in past 12 months       Last office visit with prescriber/PCP: 8/7/2019 Aristides Alegria MD OR same dept: 8/7/2019 Aristides Alegria MD OR same specialty: 8/7/2019 Aristides Alegria MD  Last physical: 4/15/2019 Last MTM visit: Visit date not found   Next visit within 3 mo: Visit date not found  Next physical within 3 mo: Visit date not found  Prescriber OR PCP: Aristides Alegria MD  Last diagnosis associated with med order: 1. Iron deficiency anemia, unspecified iron deficiency anemia type  - ferrous sulfate 220 mg (44 mg iron)/5 mL solution [Pharmacy Med Name: FERROUS SULFATE 220 (44 FE) 220 (44 FE) LIQ]; TAKE 7 ML (308 MG TOTAL) BY MOUTH 2 (TWO) TIMES A DAY.  Dispense: 420 mL; Refill: 1    If protocol passes may refill for 12 months if within 3 months of last provider visit (or a total of 15 months).             Passed - Hemoglobin or Hematocrit in last 12 months     Hemoglobin   Date Value Ref Range Status   08/22/2019 12.8 (L) 14.0 - 18.0 g/dL Final     Hematocrit   Date Value Ref Range Status   08/22/2019 39.3 (L) 40.0 - 54.0 % Final

## 2021-06-01 NOTE — TELEPHONE ENCOUNTER
ANTICOAGULATION  MANAGEMENT- Home Care/Care Facility Result    Assessment     Today's INR result of 2.90 is Therapeutic (goal INR of 2.0-3.0)        Warfarin taken as previously instructed    No new diet changes affecting INR    No new medication/supplements affecting INR    Continues to tolerate warfarin with no reported s/s of bleeding or thromboembolism     Previous INR was Therapeutic at 2.10 on 9/3/19.    Complains of shortness of breath started yesterday with no other accompanying symptoms.    Plan:     Spoke with Esme from [facility] and  is also present, discussed INR result and instructed:    1.  Advised, if worsening shortness of breath persist, needs evaluation by his PCP.    - has f/u today @ [facility]   2.  Also recommended to eat extra helping of salad or vegetables tonight, to ensure INR stability.    Warfarin Dosing Instructions:   - Continue current warfarin dose 1 mg daily on Mon/Wed/Fri; and 1.5 mg daily rest of week.    Next INR to be drawn:  2 wks.  Scheduled on 10/1/19.    Education provided: impact of vitamin K foods on INR, target INR goal and significance of current INR result, importance of notifying clinic for changes in medications, when to seek medical attention/emergency care and importance of notifying clinic for diarrhea, nausea/vomiting, reduced intake and/or illness    Esme /  also present, verbalizes understanding and agrees to warfarin dosing plan.   ?   Sulema Almaraz RN    Subjective/Objective:      Kody Johns, a 69 y.o. male is established on warfarin.     Home care/care facility RN's report of [facility] INR, recent warfarin dosing, diet changes, medication changes, and symptoms is documented below.    Additional findings: verbally confirmed home dose with Esme and  also present and updated on anticoagulation calendar    Anticoagulation Episode Summary     Current INR goal:   2.0-3.0   TTR:   75.0 %   Next INR check:    10/1/2019   INR from last check:   2.90 (9/17/2019)   Weekly max warfarin dose:      Target end date:      INR check location:      Preferred lab:      Send INR reminders to:   [facility]    Indications    A-fib (H) [I48.91]  Aortic valve disease  rheumatic [I06.9]  Mitral valve stenosis  unspecified etiology [I05.0]  Silent Stroke [I66.9]           Comments:            Anticoagulation Care Providers     Provider Role Specialty Phone number    Aristides Alegria MD [facility] Family Medicine [phone]    Haja Ascencio MD  Cardiology [phone]

## 2021-06-01 NOTE — PROGRESS NOTES
"  Assessment:       1. S/P AVR (aortic valve replacement) with 21 mm St Kirill Trifecta Bioprosthetic valve on 04/24/2019  sertraline (ZOLOFT) 100 MG tablet   2. S/P MVR (mitral valve replacement) with 25 mm St Kirill Epic Bioprosthetic valve on 04/24/2019  sertraline (ZOLOFT) 100 MG tablet   3. Postoperative depression, initial encounter  sertraline (ZOLOFT) 100 MG tablet         /68 (Patient Site: Right Arm, Patient Position: Sitting, Cuff Size: Adult Regular)   Pulse 60   Temp 97.7  F (36.5  C) (Oral)   Resp 16   Ht 5' 1\" (1.549 m)   Wt (!) 99 lb 3.2 oz (45 kg)   BMI 18.74 kg/m      Plan:   S/P AVR (aortic valve replacement) with 21 mm St Kirill Trifecta Bioprosthetic valve on 04/24/2019  S/P MVR (mitral valve replacement) with 25 mm St Kirill Epic Bioprosthetic valve on 04/24/2019  Postop ischemic stroke, then converted to hemorrhagic stroke  Residual visual changes, for which the patient is asked to follow up with neurology and possible eye specialist  Complaint of persistent dizziness, currently on Meclizine therapy  Patient is dejected and frustrated with progress and wondering if surgery was worth the pain  Will start antidepressant and have PCP follow up and manage  Zoloft 100 mg daily  Incisions are healed, lungs sounds clear and no sternal instability is appreciated  Patient still has a Peg tube  Will recommend that patient follow up with ENT for the dizziness, however he needs to continue to take Meclizine till seen  By ENT or Neurology  Will also recommend that patient sees an eye specialist for his worsening vision  Will not need any further CV surgery follow up, but advised to call with questions or concerns    Current Outpatient Medications   Medication Sig     acetaminophen (TYLENOL) 500 MG tablet Take 1 tablet (500 mg total) by mouth every 6 (six) hours as needed for pain.     amiodarone (PACERONE) 200 MG tablet 1 po bid     atorvastatin (LIPITOR) 10 MG tablet 1 po qd     baclofen (LIORESAL) 10 " MG tablet TAKE 1 PILL BY MOUTH 2 TIMES EVERYDAY     furosemide (LASIX) 20 MG tablet Take 1 tablet (20 mg total) by mouth daily as needed.     gabapentin (NEURONTIN) 100 MG capsule Take 100 mg by mouth 2 (two) times a day.     levothyroxine (SYNTHROID, LEVOTHROID) 88 MCG tablet TAKE 1 PILL BY MOUTH EVERYDAY FOR THYROID     magnesium oxide (MAG-OX) 400 mg (241.3 mg magnesium) tablet 1 po qd     meclizine (ANTIVERT) 25 mg tablet 1 po two times a day prn dizziness     metoprolol succinate (TOPROL-XL) 25 MG Take 25 mg by mouth at bedtime.           omeprazole (PRILOSEC) 20 MG capsule TAKE 1 PILL BY MOUTH EVERYDAY FOR STOMACH     polyethylene glycol (GLYCOLAX) 17 gram/dose powder 17 gm in 8 oz water daily     simethicone (MYLICON,GAS-X) 180 mg capsule 1 po two times a day prn abdominal gas     tamsulosin (FLOMAX) 0.4 mg cap Take 1 capsule (0.4 mg total) by mouth daily after supper.     warfarin (COUMADIN/JANTOVEN) 1 MG tablet Take 1 tablet (1 mg total) by mouth daily. Adjust dose based on INR results as directed.     sertraline (ZOLOFT) 100 MG tablet Take 1 tablet (100 mg total) by mouth daily. (Patient taking differently: Take 100 mg by mouth daily.       )         Subjective:        Patient ID: Kody Johns is a 69 y.o. male.    Chief Complaint:  HPI  The following portions of the patient's history were reviewed and updated as appropriate: allergies, current medications, past family history, past medical history, past social history, past surgical history and problem list.  Mr Johns is a 69 years old male who underwent an AVR (aortic valve replacement) with 21 mm St Kirill Trifecta Bioprosthetic valve and a MVR (mitral valve replacement) with 25 mm St Kirill Epic Bioprosthetic valve on 04/24/2019 at [facility] done by Dr Jojo Vaughn MD and assisted by resident surgeon Dr Durga Meléndez MD.  Patient's postop hospital stay was complicated by ischemic stroke. He was eventually Trach and Peg and discharged to  [facility] on 05/20/2019. While at [facility], patient suffered a hemorrhagic stroke and hospitalized at [facility] from 05/13/2019 through 05/17/2019 at which time he was discharged back to [facility]. He was finally discharged to home on 06/07/2019. He has since had two ED visits with complaints of abdominal pain.  He presents today with his son and  for a CV surgery evaluation.  Patient is doing well from CV surgery standpoint. Incisions is heallung sounds clear and no sternal instability is appreciated.  He however still has residual visual changes, for which the patient is asked to follow up with neurology and possible eye specialist  Complaint of persistent dizziness, currently on Meclizine therapy  Patient is dejected and frustrated with progress and wondering if surgery was worth the pain  Will start antidepressant and have PCP follow up and manage  Zoloft 100 mg daily  Patient still has a Peg tube and is slowly beginning to eat, and advised to increase oral intake as tolerated . Hopefully the Peg tube will come out soon.  Will recommend that patient follow up with ENT for the dizziness, however he needs to continue to take Meclizine till seen  By ENT or Neurology  Will also recommend that patient sees an eye specialist for his worsening vision  Will not need any further CV surgery follow up, but advised to call with questions or concerns    Review of Systems   Constitution: Negative.   HENT: Positive for hearing loss.    Eyes: Positive for visual disturbance.   Cardiovascular: Negative.    Respiratory: Negative.    Hematologic/Lymphatic: Negative.    Skin: Negative.    Musculoskeletal: Negative.    Gastrointestinal: Negative.    Genitourinary: Negative.    Neurological: Negative.    Psychiatric/Behavioral: Negative.           Objective:     Physical Exam   Constitutional: He appears healthy. No distress.   HENT:   Nose: Nose normal.   Mouth/Throat: Dentition is  normal. Oropharynx is clear.   Eyes: Conjunctivae are normal.   Neck: Normal range of motion and thyroid normal. Neck supple.   Pulmonary/Chest: Effort normal and breath sounds normal. He has no wheezes. He has no rales. He exhibits no tenderness.   Abdominal: Soft. Bowel sounds are normal.   Musculoskeletal: Normal range of motion.   Neurological: He is alert and oriented to person, place, and time. He has normal motor skills and normal reflexes. He exhibits a cognitive deficit.   Skin: Skin is warm and dry.

## 2021-06-01 NOTE — TELEPHONE ENCOUNTER
INR result is 3.9  INR   Date Value Ref Range Status   09/17/2019 2.90 (!) 0.9 - 1.1 Final       Will the patient be seen, or did they already see, MD or CNP today? No    Most Recent Warfarin dose day/week  Sunday Monday Tuesday Wednesday Thursday Friday Saturday     1.5 1 1.5 1 1.5     Sunday Monday Tuesday Wednesday Thursday Friday Saturday   1.5 1            Has the patient missed any doses of Coumadin, Warfarin, Jantoven in the past 7 days? No    Has the patients medications changed since the last visit? No    Has the patient experienced any bleeding recently? No    Has the patient experienced any injuries or illness recently? No    Has the patient experienced any 'new' shortness of breath, severe headaches, or changes in vision recently? Yes vision has gotten worse    Has the patient had any changes in their diet, or alcohol consumption? No    Is the patient here today to prepare for any type of upcoming surgery, procedure, or for a cardioversion procedure? No    What phone number can we reach the patient at today? Paige [phone]

## 2021-06-01 NOTE — PROGRESS NOTES
"Incoming called:   Mark and pt returning called today. Mark speak English; assisted with goals follow up and appt scheduling.     Explained re-assessment/RECAM appt with [facility] nurse. Pt agreed per Mark. RECAM appt scheduled Monday, October 14, 2019 at 2:00PM with [facility] nurse;  request.     Pt request appt scheduling with PCP for \"chronic dizziness follow up and tube feeding cleaning concerns\" and seeking pcp advise per Mark.   Verified home nurse service for the tube feeding. Mark declined. Tube feeding placed by hospital team per Mark shared. Pt declined other symptoms besides the site is smelled per Mark.     Appt scheduled Friday, October 04, 2019 at 10:45AM with Dr. Alegria, PCP at [facility]. Refer pt and family member to connect with the hospital team or go the the hospital/ED team if tube feeding site is get worsening. Pt confirmed understand per Mark. End of call.     Plan:   Status: enrolled.  Follow up: 10/26/2019.            "

## 2021-06-01 NOTE — PROGRESS NOTES
Patient Outreach:  Reason: Goal follow up and reschedule RECAM/RE-Assessment appt per [facility] nurse.     Tele-language line  service is use for this call.  named: Pratima, ID#: [id number].    Attempt #3:  CHW call pt today regards to reason above. Spoke with Crow, pt's brother/primary care taker for this patient. Asked if pt is with him; request to speak with pt. Crow stated; [facility] isn't with me. Will relayed message for Gates to contact you.     CHW request Crow to relayed message for pt or pt's son who is the PCA person to contact CHW about reason above. Crow, brother is informed that CHW will not mail pt the unsuccessfully outreach letter today due to language barrier and due to not able to communicate with the patient directly.     No upcoming appt schedule with [facility]/PCP at this time per pt chart/appt desk reviewed.     9:41AM: CHW contact pt and Mark Sloantric, emergency  list in chart. Left message for the patient or Mark Roth to returning called by end of this week, 9/27/19. CHW phone number is provide. Waiting to hear from pt and family member.     Plan:   Status: enrolled.   Follow up: 10/28/2019-final patient outreach follow up (Disenrolled pt from [facility] service if not able to reach the pt).

## 2021-06-01 NOTE — TELEPHONE ENCOUNTER
INR result is 2.9  INR   Date Value Ref Range Status   09/03/2019 2.10 (!) 0.9 - 1.1 Final       Will the patient be seen, or did they already see, MD or CNP today? Yes surgery   F /U    Most Recent Warfarin dose day/week  Sunday Monday Tuesday Wednesday Thursday Friday Saturday     1.5 mg 1 mg 1.5 mg 1 mg 1.5 mg     Sunday Monday Tuesday Wednesday Thursday Friday Saturday   1.5 mg 1 mg            Has the patient missed any doses of Coumadin, Warfarin, Jantoven in the past 7 days? No    Has the patients medications changed since the last visit? No    Has the patient experienced any bleeding recently? No    Has the patient experienced any injuries or illness recently? No    Has the patient experienced any 'new' shortness of breath, severe headaches, or changes in vision recently? Yes mild headache and shortness of breath with dizziness    Has the patient had any changes in their diet, or alcohol consumption? No    Is the patient here today to prepare for any type of upcoming surgery, procedure, or for a cardioversion procedure? No    What phone number can we reach the patient at today? [phone] Esme.

## 2021-06-02 NOTE — TELEPHONE ENCOUNTER
ANTICOAGULATION  MANAGEMENT- Home Care/Care Facility Result    Assessment     Today's INR result of 4.4 is Supratherapeutic (goal INR of 2.0-3.0)        Warfarin taken as previously instructed    No new diet changes affecting INR    No new medication/supplements affecting INR    Continues to tolerate warfarin with no reported s/s of bleeding or thromboembolism     Previous INR was Supratherapeutic    Plan:     Spoke with nurse Esme discussed INR result and instructed:     Warfarin Dosing Instructions: hold today then change warfarin dose to 0.5 mg daily on tue/thu; and 1 mg daily rest of week  (7.7 % change)    Next INR to be drawn: one week with home care    Esme verbalizes understanding and agrees to warfarin dosing plan.   ?   Yasmin Ashley RN    Subjective/Objective:      Kody Johns, a 69 y.o. male is established on warfarin.     Home care/care facility RN's report of [facility] INR, recent warfarin dosing, diet changes, medication changes, and symptoms is documented below.    Additional findings: none    Anticoagulation Episode Summary     Current INR goal:   2.0-3.0   TTR:   64.9 %   Next INR check:   11/6/2019   INR from last check:   4.40! (10/30/2019)   Weekly max warfarin dose:      Target end date:      INR check location:      Preferred lab:      Send INR reminders to:   [facility]    Indications    A-fib (H) [I48.91]  Aortic valve disease  rheumatic [I06.9]  Mitral valve stenosis  unspecified etiology [I05.0]  Silent Stroke [I66.9]           Comments:            Anticoagulation Care Providers     Provider Role Specialty Phone number    Aristides Alegria MD Referring Family Medicine [phone]    Haja Ascencio MD  Cardiology [phone]

## 2021-06-02 NOTE — TELEPHONE ENCOUNTER
ANTICOAGULATION  MANAGEMENT- Home Care/Care Facility Result    Assessment     Today's INR result of 3.90 is Supratherapeutic (goal INR of 2.0-3.0)        Warfarin recently held as instructed which may be affecting INR    - held warfarin dose on 10/1/19, as instructed.    No new diet changes affecting INR    No new medication/supplements affecting INR    Continues to tolerate warfarin with no reported s/s of bleeding or thromboembolism     Previous INR was Supratherapeutic at 3.90 on 10/1/19.    Reported LEFT shoulder pain persists.  Pain level of 6  (0-10)    Blurry vision.  Reported he did f/u with his eye doctor.    Plan:     Spoke with Esme, from [facility] discussed INR result and instructed:    1.  Advised to f/u with MD if left shoulder pains persist.    Warfarin Dosing Instructions:   - today, advised to HOLD warfarin dose,   - then change warfarin dose to 1.5 mg daily on Tues/Fri; and 1 mg daily rest of week.   - (11.1 % change)    Next INR to be drawn:  One wk.  Scheduled on 10/15/19.    Education provided: importance of consistent vitamin K intake, target INR goal and significance of current INR result and importance of notifying clinic for changes in medications    Esme from [facility] verbalizes understanding and agrees to warfarin dosing plan.   ?   Sulema Almaraz RN    Subjective/Objective:      Kody Johns, a 69 y.o. male is established on warfarin.     Home care/care facility RN's report of [facility] INR, recent warfarin dosing, diet changes, medication changes, and symptoms is documented below.    Additional findings: verbally confirmed home dose with Esme, and updated on anticoagulation calendar    - patient reported persistent left shoulder pain.  Rate pain level at 6  (0-10).  Is not taking any pain meds.  He does have ES-Tylenol at home.    Anticoagulation Episode Summary     Current INR goal:   2.0-3.0   TTR:   72.4 %   Next INR check:   10/15/2019   INR from  last check:   3.90! (10/8/2019)   Weekly max warfarin dose:      Target end date:      INR check location:      Preferred lab:      Send INR reminders to:   [facility]    Indications    A-fib (H) [I48.91]  Aortic valve disease  rheumatic [I06.9]  Mitral valve stenosis  unspecified etiology [I05.0]  Silent Stroke [I66.9]           Comments:            Anticoagulation Care Providers     Provider Role Specialty Phone number    Aristides Alegria MD [facility] Family Medicine [phone]    Haja Ascencio MD  Cardiology [phone]

## 2021-06-02 NOTE — TELEPHONE ENCOUNTER
INR result is 3.3  INR   Date Value Ref Range Status   10/15/2019 3.20 (!) 0.9 - 1.1 Final       Will the patient be seen, or did they already see, MD or CNP today? No    Most Recent Warfarin dose day/week  Sunday Monday Tuesday Wednesday Thursday Friday Saturday 1 1 1 1 1 Sunday Monday Tuesday Wednesday Thursday Friday Saturday 1 1            Has the patient missed any doses of Coumadin, Warfarin, Jantoven in the past 7 days? No    Has the patients medications changed since the last visit? No    Has the patient experienced any bleeding recently? No    Has the patient experienced any injuries or illness recently? No    Has the patient experienced any 'new' shortness of breath, severe headaches, or changes in vision recently? Yes Sometimes shortness of breath    Has the patient had any changes in their diet, or alcohol consumption? No    Is the patient here today to prepare for any type of upcoming surgery, procedure, or for a cardioversion procedure? No    What phone number can we reach the patient at today? Esme [phone]

## 2021-06-02 NOTE — PROGRESS NOTES
[facility] Care Coordination Contact     CHW left message w/ member so to schedule Colon screening and Annual exam appts.      AIDEN Mendez  [facility]  [phone]

## 2021-06-02 NOTE — PROGRESS NOTES
Subjective: Patient comes in for evaluation patient's had a hemorrhagic CVA he has had a previous valve replacement with aortic and mitral valve.  He follows with Dr. Guzman from neurology    Patient has G-tube still getting some feeds daily his weight is down 3 pounds he does take oral as well.  We will leave the G-tube in    There is been a little irritation and slight drainage around the G-tube    We discussed better hygiene regarding that cleaning it twice a day and keeping the area clean and dry.    Patient's heart sounded regular.    Last CMP from August decreased protein, CBC was normal.    At the last visit at the cardiologist he was started on Zoloft 100 mg a day.  They did not fill that yet    He only weighs 96 pounds and is a 69-year-old I think we should start at 50 mg daily.  I do feel that the sertraline would be an appropriate medicine for him know for depression symptoms    Tobacco status: He  reports that he quit smoking about 5 years ago. His smoking use included cigarettes. He has a 50.00 pack-year smoking history. He has quit using smokeless tobacco.    Patient Active Problem List    Diagnosis Date Noted     S/P AVR 07/09/2019     H/O mitral valve replacement 07/09/2019     Metabolic encephalopathy 05/20/2019     Encephalopathy 05/17/2019     Atrial flutter with rapid ventricular response (H) 05/14/2019     Intracerebral bleed (H) 05/13/2019     Hemorrhagic stroke (H) 05/13/2019     Tracheostomy care (H)      Acute respiratory failure (H) 05/10/2019     Acute cardioembolic stroke (H)      Paralytic ileus (H) 05/02/2019     Small bowel obstruction (H)      Atrial fibrillation with RVR (H)      Hypernatremia      Thrombocytopenia (H)      Sepsis (H)      S/P MVR (mitral valve repair) 04/24/2019     Acute respiratory failure with hypoxemia (H) 04/24/2019     Anemia due to blood loss, acute 04/24/2019     Coagulopathy (H) 04/24/2019     Non-English speaking patient 04/24/2019     Respiratory failure  (H) 03/21/2019     A-fib (H) 09/14/2017     Heart failure with preserved ejection fraction (H) 09/14/2017     Moderate malnutrition (H) 08/31/2017     ALEENA (acute kidney injury) (H) 08/31/2017     Aortic valve disease, rheumatic 08/30/2017     Lightheadedness 08/30/2017     Atherosclerosis of native coronary artery of native heart without angina pectoris      Essential hypertension with goal blood pressure less than 130/85      Pure hypercholesterolemia      Dysuria 08/29/2017     Hypothyroidism, unspecified type      Mitral valve stenosis, unspecified etiology      Elevated LFTs 08/26/2016     Silent Stroke      Shoulder strain      Dysphagia      Hyperlipidemia      Rheumatic heart disease      Blurry vision      Hearing loss      Nonspecific reaction to tuberculin skin test without active tuberculosis        Current Outpatient Medications   Medication Sig Dispense Refill     acetaminophen (TYLENOL) 500 MG tablet Take 1 tablet (500 mg total) by mouth every 6 (six) hours as needed for pain. 20 tablet 0     amiodarone (PACERONE) 200 MG tablet 1 po bid 60 tablet 3     atorvastatin (LIPITOR) 10 MG tablet 1 po qd 30 tablet 6     baclofen (LIORESAL) 10 MG tablet TAKE 1 PILL BY MOUTH 2 TIMES EVERYDAY 360 tablet 0     ferrous sulfate 220 mg (44 mg iron)/5 mL solution TAKE 7 ML (308 MG TOTAL) BY MOUTH 2 (TWO) TIMES A DAY. 473 mL 3     furosemide (LASIX) 20 MG tablet Take 1 tablet (20 mg total) by mouth daily as needed. 30 tablet 0     gabapentin (NEURONTIN) 100 MG capsule Take 100 mg by mouth 2 (two) times a day.       levothyroxine (SYNTHROID, LEVOTHROID) 88 MCG tablet TAKE 1 PILL BY MOUTH EVERYDAY FOR THYROID 30 tablet 6     magnesium oxide (MAG-OX) 400 mg (241.3 mg magnesium) tablet 1 po qd 30 tablet 3     meclizine (ANTIVERT) 25 mg tablet 1 po two times a day prn dizziness 40 tablet 1     metoprolol succinate (TOPROL-XL) 25 MG Take 25 mg by mouth at bedtime.             omeprazole (PRILOSEC) 20 MG capsule TAKE 1 PILL BY  MOUTH EVERYDAY FOR STOMACH 90 capsule 2     polyethylene glycol (GLYCOLAX) 17 gram/dose powder 17 gm in 8 oz water daily 510 g 6     simethicone (MYLICON,GAS-X) 180 mg capsule 1 po two times a day prn abdominal gas 60 capsule 3     tamsulosin (FLOMAX) 0.4 mg cap Take 1 capsule (0.4 mg total) by mouth daily after supper. 30 capsule 5     warfarin (COUMADIN/JANTOVEN) 1 MG tablet Take 1 tablet (1 mg total) by mouth daily. Adjust dose based on INR results as directed. 90 tablet 1     sertraline (ZOLOFT) 50 MG tablet Take 1 tablet (50 mg total) by mouth daily. 30 tablet 2     No current facility-administered medications for this visit.        ROS:   10 point review of systems positive as outlined above otherwise negative    Objective:    /72 (Patient Site: Left Arm, Patient Position: Sitting, Cuff Size: Child)   Pulse 70   Resp 16   Wt (!) 96 lb (43.5 kg)   SpO2 96%   BMI 18.14 kg/m    Body mass index is 18.14 kg/m .    General appearance no acute distress    HEENT neck supple, oropharynx clear    Heart sounded regular has a history of A. fib.    Lungs were clear no rales or rhonchi, abdomen soft nontender G-tube in place some moistness and slight drainage no real infection no.    Instructed on cleaning the area.    Lower extremities without edema    INR 3.9 on 10/1/2019    Results for orders placed or performed in visit on 10/01/19   INR   Result Value Ref Range    INR 3.90 (!) 0.9 - 1.1       Assessment:  1. S/P AVR     2. H/O mitral valve replacement     3. Atrial fibrillation, unspecified type (H)     4. Major depressive disorder, remission status unspecified, unspecified whether recurrent  sertraline (ZOLOFT) 50 MG tablet     Status post AVR and MVR, history of atrial fibrillation    Elevated INR    Major depression start sertraline 50 mg a day    Decreased protein and malnutrition continue feeding tube but try to encourage p.o.    Recheck 3 weeks    Plan: As outlined above    This transcription uses  voice recognition software, which may contain typographical errors.

## 2021-06-02 NOTE — PROGRESS NOTES
Subjective: This patient comes in for follow-up evaluation.  This patient has had a previous cardiac surgery with atrial and mitral valve replacement.  Suffered a hemorrhagic CVA postoperatively.  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  He has had some generalized weakness and has had trouble gaining weight.  He does still have a G-tube in and gets feeding daily.    Because of his weakness and stroke I do feel he would benefit from a hospital bed to change positions and help him get up and out of the bed.    I did give him an order regarding that.    He was started on sertraline since last visit he is slightly better regarding the depression he is on 50 mg daily.    Patient has a history of atrial fibrillation he is on warfarin his INR had been checked yesterday little elevated 3.3 please see anticoagulation nurses notes    His heart sounded regular today rate in the 80 range.    I did review the ophthalmology notes.    No additional concerns or issues    Tobacco status: He  reports that he quit smoking about 5 years ago. His smoking use included cigarettes. He has a 50.00 pack-year smoking history. He has quit using smokeless tobacco.    Patient Active Problem List    Diagnosis Date Noted     Urinary incontinence without sensory awareness 10/14/2019     S/P AVR 07/09/2019     H/O mitral valve replacement 07/09/2019     Metabolic encephalopathy 05/20/2019     Encephalopathy 05/17/2019     Atrial flutter with rapid ventricular response (H) 05/14/2019     Intracerebral bleed (H) 05/13/2019     Hemorrhagic stroke (H) 05/13/2019     Tracheostomy care (H)      Acute respiratory failure (H) 05/10/2019     Acute cardioembolic stroke (H)      Paralytic ileus (H) 05/02/2019     Small bowel obstruction (H)      Atrial fibrillation with RVR (H)      Hypernatremia      Thrombocytopenia (H)      Sepsis (H)      S/P MVR (mitral valve repair)  04/24/2019     Acute respiratory failure with hypoxemia (H) 04/24/2019     Anemia due to blood loss, acute 04/24/2019     Coagulopathy (H) 04/24/2019     Non-English speaking patient 04/24/2019     Respiratory failure (H) 03/21/2019     A-fib (H) 09/14/2017     Heart failure with preserved ejection fraction (H) 09/14/2017     Moderate malnutrition (H) 08/31/2017     ALEENA (acute kidney injury) (H) 08/31/2017     Aortic valve disease, rheumatic 08/30/2017     Lightheadedness 08/30/2017     Atherosclerosis of native coronary artery of native heart without angina pectoris      Essential hypertension with goal blood pressure less than 130/85      Pure hypercholesterolemia      Dysuria 08/29/2017     Hypothyroidism, unspecified type      Mitral valve stenosis, unspecified etiology      Elevated LFTs 08/26/2016     Silent Stroke      Shoulder strain      Dysphagia      Hyperlipidemia      Rheumatic heart disease      Blurry vision      Hearing loss      Nonspecific reaction to tuberculin skin test without active tuberculosis        Current Outpatient Medications   Medication Sig Dispense Refill     acetaminophen (TYLENOL) 500 MG tablet Take 1 tablet (500 mg total) by mouth every 6 (six) hours as needed for pain. 20 tablet 0     amiodarone (PACERONE) 200 MG tablet 1 po bid 60 tablet 3     atorvastatin (LIPITOR) 10 MG tablet 1 po qd 30 tablet 6     baclofen (LIORESAL) 10 MG tablet TAKE 1 PILL BY MOUTH 2 TIMES EVERYDAY 360 tablet 0     ferrous sulfate 220 mg (44 mg iron)/5 mL solution TAKE 7 ML (308 MG TOTAL) BY MOUTH 2 (TWO) TIMES A DAY. 473 mL 3     furosemide (LASIX) 20 MG tablet Take 1 tablet (20 mg total) by mouth daily as needed. 30 tablet 0     gabapentin (NEURONTIN) 100 MG capsule 1 po three times a day for nerve pain 90 capsule 5     levothyroxine (SYNTHROID, LEVOTHROID) 88 MCG tablet TAKE 1 PILL BY MOUTH EVERYDAY FOR THYROID 30 tablet 6     magnesium oxide (MAG-OX) 400 mg (241.3 mg magnesium) tablet 1 po qd 30 tablet  3     meclizine (ANTIVERT) 25 mg tablet 1 po two times a day prn dizziness 40 tablet 1     metoprolol succinate (TOPROL-XL) 25 MG Take 25 mg by mouth at bedtime.             omeprazole (PRILOSEC) 20 MG capsule TAKE 1 PILL BY MOUTH EVERYDAY FOR STOMACH 90 capsule 2     polyethylene glycol (GLYCOLAX) 17 gram/dose powder 17 gm in 8 oz water daily 510 g 6     sertraline (ZOLOFT) 50 MG tablet Take 1 tablet (50 mg total) by mouth daily. 30 tablet 2     simethicone (MYLICON,GAS-X) 180 mg capsule 1 po two times a day prn abdominal gas 60 capsule 3     tamsulosin (FLOMAX) 0.4 mg cap Take 1 capsule (0.4 mg total) by mouth daily after supper. 30 capsule 5     warfarin (COUMADIN/JANTOVEN) 1 MG tablet Take 1 tablet (1 mg total) by mouth daily. Adjust dose based on INR results as directed. 90 tablet 1     No current facility-administered medications for this visit.        ROS: 10 point review of systems positive as outlined above otherwise negative    Patient son wondered about a waiver for citizenship.  I can have him see psychology regarding that CVA as well as history of    Objective:    /78 (Patient Site: Left Arm, Patient Position: Sitting, Cuff Size: Adult Regular)   Pulse 90   Temp 97.3  F (36.3  C) (Oral)   Resp 16   Wt (!) 98 lb (44.5 kg)   BMI 18.52 kg/m    Body mass index is 18.52 kg/m .    General appearance tired    BMI is at 18.5 his weight is actually up 2 pounds from previous.    INR as discussed    HEENT neck is supple oropharynx is clear    Heart sounded regular but he does have the valvular sounds, rate at 90.    Lungs were clear throughout no rales or rhonchi.    Abdomen is soft nontender no guarding or rebound she does have the G-tube if there is not significant drainage now.    Lower extremities without edema.  Skin was normal he denies any skin breakdown he is here with his son.    Results for orders placed or performed in visit on 10/22/19   INR   Result Value Ref Range    INR 3.30 (!) 0.9 - 1.1      *Note: Due to a large number of results and/or encounters for the requested time period, some results have not been displayed. A complete set of results can be found in Results Review.       Assessment:  1. Hemorrhagic stroke (H)  Ambulatory referral to Psychology    gabapentin (NEURONTIN) 100 MG capsule   2. Aortic valve disease, rheumatic     3. Mitral valve stenosis, unspecified etiology     4. Major depressive disorder, remission status unspecified, unspecified whether recurrent  Ambulatory referral to Psychology   5. Atrial fibrillation, unspecified type (H)     6. Moderate protein-calorie malnutrition (H)     7. Generalized muscle weakness       Status post stroke following aortic and mitral valve replacement.    Atrial fibrillation in a regular rhythm elevated INR as outlined.    Patient's had some ongoing neuropathy symptoms on the left side of his body.  He is been on gabapentin 100 mg 1 twice daily will increase that to 3 times daily.    Continue G-tube feedings regarding his malnutrition.    Hospital bed regarding his stroke and generalized weakness    Plan: As discussed above.  Plan to recheck again in 6 weeks    This transcription uses voice recognition software, which may contain typographical errors.

## 2021-06-02 NOTE — TELEPHONE ENCOUNTER
ANTICOAGULATION  MANAGEMENT- Home Care/Care Facility Result    Assessment     Today's INR result of 3.3 is Supratherapeutic (goal INR of 2.0-3.0)        Warfarin taken as previously instructed    No new diet changes affecting INR    No new medication/supplements affecting INR    Continues to tolerate warfarin with no reported s/s of bleeding or thromboembolism     Previous INR was Supratherapeutic    Plan:     Spoke with home care nurse Esme discussed INR result and instructed:     Warfarin Dosing Instructions: Change warfarin dose to 0.5 mg daily on Tue; and 1 mg daily rest of week  (7 % change)    Next INR to be drawn: 1 week.    Education provided: importance of taking warfarin as instructed    Esme verbalizes understanding and agrees to warfarin dosing plan.   ?   Baldo Marquez RN    Subjective/Objective:      Kody Johns, a 69 y.o. male is established on warfarin.     Home care/care facility RN's report of [facility] INR, recent warfarin dosing, diet changes, medication changes, and symptoms is documented below.    Additional findings: verbally confirmed home dose with Esme and updated on anticoagulation calendar    Anticoagulation Episode Summary     Current INR goal:   2.0-3.0   TTR:   67.7 %   Next INR check:   10/29/2019   INR from last check:   3.30! (10/22/2019)   Weekly max warfarin dose:      Target end date:      INR check location:      Preferred lab:      Send INR reminders to:   [facility]    Indications    A-fib (H) [I48.91]  Aortic valve disease  rheumatic [I06.9]  Mitral valve stenosis  unspecified etiology [I05.0]  Silent Stroke [I66.9]           Comments:            Anticoagulation Care Providers     Provider Role Specialty Phone number    Aristides Alegria MD Referring Family Medicine [phone]    Haja Ascencio MD  Cardiology [phone]

## 2021-06-02 NOTE — PROGRESS NOTES
[facility] Care Coordination Contact    Contacted [facility] today to follow up the Pull Ups that was order. [facility] just received a RX from PCP today. [facility] will note in their system that incontinence needs to deliver as priority to member.     Martín Short  Care Management Specialist  [facility]  [phone]

## 2021-06-02 NOTE — PROGRESS NOTES
[facility] Care Coordination Contact    The Pull Ups delivered to member on 10/16/19. Emailed to HEBER Short  Care Management Specialist  [facility]  [phone]

## 2021-06-02 NOTE — TELEPHONE ENCOUNTER
INR result is 3.9  INR   Date Value Ref Range Status   10/01/2019 3.90 (!) 0.9 - 1.1 Final       Will the patient be seen, or did they already see, MD or CNP today? No    Most Recent Warfarin dose day/week  Sunday Monday Tuesday Wednesday Thursday Friday Saturday     held 1 mg 1.5 mg 1 mg 1.5 mg     Sunday Monday Tuesday Wednesday Thursday Friday Saturday   1.5 mg 1 mg            Has the patient missed any doses of Coumadin, Warfarin, Jantoven in the past 7 days? No    Has the patients medications changed since the last visit? No    Has the patient experienced any bleeding recently? No    Has the patient experienced any injuries or illness recently? No    Has the patient experienced any 'new' shortness of breath, severe headaches, or changes in vision recently? Yes blurry vision getting worse and Left shoulder pain     Has the patient had any changes in their diet, or alcohol consumption? No    Is the patient here today to prepare for any type of upcoming surgery, procedure, or for a cardioversion procedure? No    What phone number can we reach the patient at today? [phone] Esme.

## 2021-06-02 NOTE — TELEPHONE ENCOUNTER
ANTICOAGULATION  MANAGEMENT- Home Care/Care Facility Result    Assessment     Today's INR result of 3.2 is Supratherapeutic (goal INR of 2.0-3.0)        Warfarin taken as previously instructed    No new diet changes affecting INR    No new medication/supplements affecting INR    Continues to tolerate warfarin with no reported s/s of bleeding or thromboembolism     Previous INR was Supratherapeutic    Plan:     Spoke with [facility] nurse discussed INR result and instructed:     Warfarin Dosing Instructions: Change warfarin dose to 1 mg daily.  (12.5 % change)    Next INR to be drawn: one week.    Education provided: importance of therapeutic range, importance of following up for INR monitoring at instructed interval and importance of taking warfarin as instructed    Esme verbalizes understanding and agrees to warfarin dosing plan.   ?   Crystal Portillo RN    Subjective/Objective:      Kody Johns, a 69 y.o. male is established on warfarin.     Home care/care facility RN's report of Gates INR, recent warfarin dosing, diet changes, medication changes, and symptoms is documented below.    Additional findings: verbally confirmed home dose with Esme and updated on anticoagulation calendar    Anticoagulation Episode Summary     Current INR goal:   2.0-3.0   TTR:   70.1 %   Next INR check:   10/22/2019   INR from last check:   3.20! (10/15/2019)   Weekly max warfarin dose:      Target end date:      INR check location:      Preferred lab:      Send INR reminders to:   [facility]    Indications    A-fib (H) [I48.91]  Aortic valve disease  rheumatic [I06.9]  Mitral valve stenosis  unspecified etiology [I05.0]  Silent Stroke [I66.9]           Comments:            Anticoagulation Care Providers     Provider Role Specialty Phone number    Aristides Alegria MD Referring Family Medicine [phone]    Haja Ascencio MD  Cardiology [phone]

## 2021-06-02 NOTE — PROGRESS NOTES
Patient Outreach:   Reason: Goals follow up.     Community Health Worker called the patient today regards to reason above. Spoke with Mark, family member. Asked to speak with the patient. Patient isn't available per Mark. Note: Consent to communicate signed by patient per chart reviewed.     Mark, family member shared:  -Patient currently working toward becoming a [place] citizenship. Referral is sent to the [facility] with Dr. Fowler for the waiver due to medical health problems and language barriers. Was told will reach out to me assisting appt scheduling. Waiting to hear from Dr. Fowler's office. Will keep you update.   -Kody is doing better. Still feeling tire and continue follow up with his doctor's.   -Will relayed this call with my brother. No concerns.     CHW request update regards to appt schedule with Dr. Fowler for the N-648 form. Encouraged family support by attending the pt's appt and coordinate care if need. May connect with CCC service and [facility] for resource need. Mark confirmed understand and no other questions.     Plan:   Status: enrolled.   Follow up: 12/03/2019

## 2021-06-02 NOTE — TELEPHONE ENCOUNTER
Who is calling:  [facility]   Reason for Call:  Esme is calling from [facility] in regard to patient INR today 10/29. The  was not available today and would be available tomorrow 10/30. [facility] would go back tomorrow to draw the lab. Please return the call if she needs a new order for tomorrow..    Okay to leave a detailed message: Yes

## 2021-06-02 NOTE — TELEPHONE ENCOUNTER
INR result is 4.4  INR   Date Value Ref Range Status   10/22/2019 3.30 (!) 0.9 - 1.1 Final       Will the patient be seen, or did they already see, MD or CNP today? No    Most Recent Warfarin dose day/week 10/29 patient took 1 mg instead of .5  Sunday Monday Tuesday Wednesday Thursday Friday Saturday      1 mg 1 mg 1 mg 1 mg     Sunday Monday Tuesday Wednesday Thursday Friday Saturday   1 mg 1 mg 1 mg           Has the patient missed any doses of Coumadin, Warfarin, Jantoven in the past 7 days? No    Has the patients medications changed since the last visit? No    Has the patient experienced any bleeding recently? No    Has the patient experienced any injuries or illness recently? No    Has the patient experienced any 'new' shortness of breath, severe headaches, or changes in vision recently? Yes headache today    Has the patient had any changes in their diet, or alcohol consumption? No    Is the patient here today to prepare for any type of upcoming surgery, procedure, or for a cardioversion procedure? No    What phone number can we reach the patient at today? Esme [phone].

## 2021-06-02 NOTE — TELEPHONE ENCOUNTER
INR result is 3.2  INR   Date Value Ref Range Status   10/08/2019 3.90 (!) 0.9 - 1.1 Final       Will the patient be seen, or did they already see, MD or CNP today? No    Most Recent Warfarin dose day/week  Sunday Monday Tuesday Wednesday Thursday Friday Saturday     Held 1 mg 1 mg 1.5 mg 1 mg     Sunday Monday Tuesday Wednesday Thursday Friday Saturday   1 mg 1 mg            Has the patient missed any doses of Coumadin, Warfarin, Jantoven in the past 7 days? No    Has the patients medications changed since the last visit? No    Has the patient experienced any bleeding recently? No    Has the patient experienced any injuries or illness recently? No    Has the patient experienced any 'new' shortness of breath, severe headaches, or changes in vision recently? No    Has the patient had any changes in their diet, or alcohol consumption? No    Is the patient here today to prepare for any type of upcoming surgery, procedure, or for a cardioversion procedure? No    What phone number can we reach the patient at today? Esme [phone].

## 2021-06-02 NOTE — PROGRESS NOTES
[facility] Care Coordination Contact    Order placed with [facility] (p: [phone]; f: [phone]) for 120 Pull Ups.  Order placed on 10/11/19. Access updated.  As required, authorization submitted to [facility].    Martín Short  Care Management Specialist  [facility]  [phone]

## 2021-06-02 NOTE — TELEPHONE ENCOUNTER
Called and spoke to Esme, patient has been out of town and is returning tonight.   Advised that INR check tomorrow is fine, until that time patient should continue current warfarin dose of   Outpatient Anticoagulation Plan Latest Ref Rng & Units 10/22/2019   ANTICOAG FULL INSTRUCTIONS  0.5 mg every Tue; 1 mg all other days

## 2021-06-03 NOTE — TELEPHONE ENCOUNTER
INR result is 2.7  INR   Date Value Ref Range Status   10/30/2019 4.40 (!) 0.9 - 1.1 Final       Will the patient be seen, or did they already see, MD or CNP today? No    Most Recent Warfarin dose day/week  Sunday Monday Tuesday Wednesday Thursday Friday Saturday     1 mg held .5 mg 1 mg 1 mg     Sunday Monday Tuesday Wednesday Thursday Friday Saturday    1 mg 1 mg            Has the patient missed any doses of Coumadin, Warfarin, Jantoven in the past 7 days? No    Has the patients medications changed since the last visit? Yes Metoprolol 2/X daily changed from 1/X daily 11/4 and  Amiodarone decreased from 2/X daily to 1/X daily 11/4     Has the patient experienced any bleeding recently? No    Has the patient experienced any injuries or illness recently? No    Has the patient experienced any 'new' shortness of breath, severe headaches, or changes in vision recently? No    Has the patient had any changes in their diet, or alcohol consumption? No    Is the patient here today to prepare for any type of upcoming surgery, procedure, or for a cardioversion procedure? No    What phone number can we reach the patient at today? [phone] Esme.

## 2021-06-03 NOTE — TELEPHONE ENCOUNTER
Reviewed strips. Recent medication adjustment of Amiodarone decrease and Metoprolol increased at OV with EMG on 11/4/19. Will reach out to patient to assess for symptoms.Call placed with the help of [facility]  ID #[id number]. Unable to reach patient directly but his family member/emergency contact Mark was with the patient all weekend and today.    Mark reports that on the weekend he was fine. One time on the weekend, Gates complained of shortness of breath. When questioned, Mark if the pateint complained of any dizziness or lightheadedness over the weekend, he denied. When discussed timing of lower heart rates, Mark reports that the 2320 and 0626 Friday to Saturday morning was while the patient was asleep.Mark reports today that the patient has been up and attended his appointments. He participated in his PT this morning at 10:00. Again, denied any complaints of dizziness or lightheadedness. Discussed with Mark to make sure pt stays well hydrated. Also, that if the patient has onging/ long episode of dizziness or lightheadedness, feel like he is going to fall or faint to present to an ER. Will forward to EMG for recommendations. Informed Mark that return call will be made with any recommendations. Verbalized understanding. NOEMI,RN

## 2021-06-03 NOTE — TELEPHONE ENCOUNTER
PT did SOC today and as part of our evaluation we do med rec and need to notify MD of severe interactions or worse.  If the list of medication interactions is known and reasonable, let us know within 24hrs that it is ok.  If there is a problem with the medications listed below please call our clinical coordinator at [phone].    Below is the listed med interactions:  zoloft and warfarin  pacerone and warfarin      Also,  PT [facility] is asking for verbal ok for PT: 2w3 to work on strengthening, gait/transfer training, and balance training.    OT evaluation for bathroom safety and ADs  You can respond to this inbasket with the ok or call and leave a message at [phone].  Thank you

## 2021-06-03 NOTE — TELEPHONE ENCOUNTER
INR result is 2.2  INR   Date Value Ref Range Status   11/25/2019 2.10 (!) 0.9 - 1.1 Final       Will the patient be seen, or did they already see, MD or CNP today? No    Most Recent Warfarin dose day/week  Sunday Monday Tuesday Wednesday Thursday Friday Saturday     .5 1 .5 1 1     Sunday Monday Tuesday Wednesday Thursday Friday Saturday   1 1            Has the patient missed any doses of Coumadin, Warfarin, Jantoven in the past 7 days? No    Has the patients medications changed since the last visit? No    Has the patient experienced any bleeding recently? No    Has the patient experienced any injuries or illness recently? No    Has the patient experienced any 'new' shortness of breath, severe headaches, or changes in vision recently? No    Has the patient had any changes in their diet, or alcohol consumption? No    Is the patient here today to prepare for any type of upcoming surgery, procedure, or for a cardioversion procedure? No    What phone number can we reach the patient at today?  Esme, Home Care -[phone]

## 2021-06-03 NOTE — TELEPHONE ENCOUNTER
ANTICOAGULATION  MANAGEMENT- Home Care/Care Facility Result    Assessment     Today's INR result of 2.7 is Therapeutic (goal INR of 2.0-3.0)        Warfarin taken as previously instructed    No new diet changes affecting INR    No interaction expected between Metoprolol and warfarin    Potential interaction between Amiodarone and warfarin which may affect subsequent INRs. Decreased yesterday. Will need frequent checks over the next 6 weeks.    Continues to tolerate warfarin with no reported s/s of bleeding or thromboembolism     Previous INR was Supratherapeutic    Plan:     Spoke with Esme discussed INR result and instructed:     Warfarin Dosing Instructions: Continue current warfarin dose .5 mg daily on Tues/Thurs; and 1 mg daily rest of week  (0 % change)    Next INR to be drawn: one week    Education provided: importance of therapeutic range    Esme verbalizes understanding and agrees to warfarin dosing plan.   ?   Swapna Phan RN    Subjective/Objective:      Kody Johns, a 69 y.o. male is established on warfarin.     Home care/care facility RN's report of Gates INR, recent warfarin dosing, diet changes, medication changes, and symptoms is documented below.    Additional findings: none    Anticoagulation Episode Summary     Current INR goal:   2.0-3.0   TTR:   63.2 %   Next INR check:   11/12/2019   INR from last check:   2.70 (11/5/2019)   Weekly max warfarin dose:      Target end date:      INR check location:      Preferred lab:      Send INR reminders to:   [facility]    Indications    A-fib (H) [I48.91]  Aortic valve disease  rheumatic [I06.9]  Mitral valve stenosis  unspecified etiology [I05.0]  Silent Stroke [I66.9]           Comments:            Anticoagulation Care Providers     Provider Role Specialty Phone number    Aristides Alegria MD Referring Family Medicine [phone]    Haja Ascencio MD  Cardiology [phone]

## 2021-06-03 NOTE — TELEPHONE ENCOUNTER
Patient is on 25 mg of metoprolol.    Please have him decrease to 1/2 tablet daily.    Also record this in the med list

## 2021-06-03 NOTE — TELEPHONE ENCOUNTER
With the help of  ID # [id number], connected with patients' brother Rameesh. Discussion of recommendations, agreeable to schedule labs and ECG. Informs writer that his brother not feeling well and missed his HM appt today and would like to rescheduled. Brother requests to have return call to schedule. Will send staff message to schedulers to arrange HM and labs/ ECG. AMY FRANKLIN

## 2021-06-03 NOTE — TELEPHONE ENCOUNTER
Medication Request  Medication name:   acetaminophen (TYLENOL) 500 MG tablet 20 tablet 0 7/4/2019     Sig - Route: Take 1 tablet (500 mg total) by mouth every 6 (six) hours as needed for pain. - Oral    Class: Print        Pharmacy Name and Location: [facility]  Reason for request: Fax request received from pharmacy.    Okay to leave a detailed message: yes

## 2021-06-03 NOTE — TELEPHONE ENCOUNTER
ANTICOAGULATION  MANAGEMENT- Home Care/Care Facility Result    Assessment     Today's INR result of 2.2 is Therapeutic (goal INR of 2.0-3.0)        Warfarin taken as previously instructed    No new diet changes affecting INR    No new medication/supplements affecting INR    Continues to tolerate warfarin with no reported s/s of bleeding or thromboembolism     Previous INR was Therapeutic    Plan:     Spoke with nurse Esme discussed INR result and instructed:     Warfarin Dosing Instructions: Continue current warfarin dose 0.5  mg daily on tue/thu; and 1 mg daily rest of week  (0 % change)    Next INR to be drawn: one week with home care visit      Esme verbalizes understanding and agrees to warfarin dosing plan.   ?   Yasmin Ashley RN    Subjective/Objective:      Kody Johns, a 69 y.o. male is established on warfarin.     Home care/care facility RN's report of Gates INR, recent warfarin dosing, diet changes, medication changes, and symptoms is documented below.    Additional findings: none    Anticoagulation Episode Summary     Current INR goal:   2.0-3.0   TTR:   64.6 %   Next INR check:   11/26/2019   INR from last check:   2.20 (11/19/2019)   Weekly max warfarin dose:      Target end date:      INR check location:      Preferred lab:      Send INR reminders to:   [facility]    Indications    A-fib (H) [I48.91]  Aortic valve disease  rheumatic [I06.9]  Mitral valve stenosis  unspecified etiology [I05.0]  Silent Stroke [I66.9]           Comments:            Anticoagulation Care Providers     Provider Role Specialty Phone number    Aristides Alegria MD Referring Family Medicine [phone]    Haja Ascencio MD  Cardiology [phone]

## 2021-06-03 NOTE — TELEPHONE ENCOUNTER
INR result is 2.2  INR   Date Value Ref Range Status   11/12/2019 2.60 (!) 0.9 - 1.1 Final       Will the patient be seen, or did they already see, MD or CNP today? No    Most Recent Warfarin dose day/week  Sunday Monday Tuesday Wednesday Thursday Friday Saturday     .5 1 .5 1 1     Sunday Monday Tuesday Wednesday Thursday Friday Saturday   1 1            Has the patient missed any doses of Coumadin, Warfarin, Jantoven in the past 7 days? No    Has the patients medications changed since the last visit? No    Has the patient experienced any bleeding recently? No    Has the patient experienced any injuries or illness recently? No    Has the patient experienced any 'new' shortness of breath, severe headaches, or changes in vision recently? No    Has the patient had any changes in their diet, or alcohol consumption? No    Is the patient here today to prepare for any type of upcoming surgery, procedure, or for a cardioversion procedure? No    What phone number can we reach the patient at today? home phone listed in demographics.

## 2021-06-03 NOTE — TELEPHONE ENCOUNTER
===View-only below this line===  ----- Message -----  From: Ashleigh Lang MD  Sent: 11/5/2019   1:00 PM CST  To: Guero Howell RN    In addition to the LFTs and TSh in 1 month, let's get an EKG as well to check his QTc.      ----- Message from Ashleigh Lang MD sent at 11/5/2019  1:00 PM CST -----  Elevated TSH and LFTs, I suspect related to amiodarone. I did decrease his amio dose in clinic yesterday. Can we arrange for a repeat LFT and TSH in 4 weeks?    Thanks, EG

## 2021-06-03 NOTE — TELEPHONE ENCOUNTER
----- Message from Rustam Jauregui CMA sent at 11/4/2019 11:57 AM CST -----    ----- Message -----  From: Aristides Alegria MD  Sent: 11/4/2019  11:49 AM CST  To: Aristides Alegria Care Team Pool    Please contact patient's care guide.  Ling Oconnor  ?does he qualify for home PT.  If so set this up and I will sign it  ----- Message -----  From: Ashleigh Lang MD  Sent: 11/4/2019  11:01 AM CST  To: Aristides Alegria MD    Hi, I saw Kody in cardiology clinic today. His brother is asking about home PT/OT. Would this be an option for them as Kody is not wanting to go to therapy outside of his home?    Thanks,    EG

## 2021-06-03 NOTE — TELEPHONE ENCOUNTER
Requesting new verbal order for PHYSICAL THERAPY start of care assessment to be completed on 11/17/19 per FAMILY'S REQUEST     This is outside of the original 48 hour referral, requiring a new verbal order.     Thank you

## 2021-06-03 NOTE — TELEPHONE ENCOUNTER
RN cannot approve Refill Request: Amiodarone    RN can NOT refill this medication PCP messaged that patient is overdue for Labs. Last office visit: 10/23/2019 Aristides Alegria MD Last Physical: 4/15/2019 Last MTM visit: Visit date not found Last visit same specialty: 10/23/2019 Aristides Alegria MD.  Next visit within 3 mo: Visit date not found  Next physical within 3 mo: Visit date not found    RN cannot approve Refill Request: Magnesium    RN can NOT refill this medication med is not covered by policy/route to provider. Last office visit: 10/23/2019 Aristides Alegria MD Last Physical: 4/15/2019 Last MTM visit: Visit date not found Last visit same specialty: 10/23/2019 Aristides Alegria MD.  Next visit within 3 mo: Visit date not found  Next physical within 3 mo: Visit date not found    Refill Approved: Levothyroxine    Rx renewed per Medication Renewal Policy. Medication was last renewed on 4/18/2019 with 6 refills.  Last office visit: 10/23/2019 with PCP Dr MELISA Berry, [facility] Triage/Med Refill 11/10/2019     Requested Prescriptions   Pending Prescriptions Disp Refills     magnesium oxide (MAG-OX) 400 mg (241.3 mg magnesium) tablet [Pharmacy Med Name: MAGNESIUM OXIDE 400 MG  (241.3 TAB] 30 tablet 3     Sig: TAKE 1 PILL BY MOUTH EVERYDAY       There is no refill protocol information for this order        levothyroxine (SYNTHROID, LEVOTHROID) 88 MCG tablet [Pharmacy Med Name: LEVOTHYROXINE 88 MCG TABLET 88 TAB] 30 tablet 6     Sig: TAKE 1 PILL BY MOUTH EVERYDAY FOR THYROID       Thyroid Hormones Protocol Passed - 11/10/2019 10:36 AM        Passed - Provider visit in past 12 months or next 3 months     Last office visit with prescriber/PCP: 10/23/2019 Aristides Alegria MD OR same dept: 10/23/2019 Aristides Alegria MD OR same specialty: 10/23/2019 Aristides Alegria MD  Last physical: 4/15/2019 Last MTM visit: Visit date not found   Next visit within 3 mo: Visit date not found  Next  physical within 3 mo: Visit date not found  Prescriber OR PCP: Aristides Alegria MD  Last diagnosis associated with med order: 1. Low magnesium level  - magnesium oxide (MAG-OX) 400 mg (241.3 mg magnesium) tablet [Pharmacy Med Name: MAGNESIUM OXIDE 400 MG  (241.3 TAB]; TAKE 1 PILL BY MOUTH EVERYDAY  Dispense: 30 tablet; Refill: 3    2. Hypothyroidism  - levothyroxine (SYNTHROID, LEVOTHROID) 88 MCG tablet [Pharmacy Med Name: LEVOTHYROXINE 88 MCG TABLET 88 TAB]; TAKE 1 PILL BY MOUTH EVERYDAY FOR THYROID  Dispense: 30 tablet; Refill: 6    3. Essential hypertension with goal blood pressure less than 130/85  - amiodarone (PACERONE) 200 MG tablet [Pharmacy Med Name: AMIODARONE  MG  TAB]; TAKE 1 PILL BY MOUTH 2 TIMES EVERYDAY  Dispense: 60 tablet; Refill: 3    If protocol passes may refill for 12 months if within 3 months of last provider visit (or a total of 15 months).             Passed - TSH on file in past 12 months for patient age 12 & older     TSH   Date Value Ref Range Status   11/04/2019 13.86 (H) 0.30 - 5.00 uIU/mL Final                   amiodarone (PACERONE) 200 MG tablet [Pharmacy Med Name: AMIODARONE  MG  TAB] 60 tablet 3     Sig: TAKE 1 PILL BY MOUTH 2 TIMES EVERYDAY       Refill Protocol for Amiodarone Failed - 11/10/2019 10:36 AM        Failed - Amiodarone level in last 3 months     No components found for: AMIODARO                Failed - Eye exam in last 3 months     Eye exam: No results found. However, due to the size of the patient record, not all encounters were searched. Please check Results Review for a complete set of results. No results found. However, due to the size of the patient record, not all encounters were searched. Please check Results Review for a complete set of results.  No exam data present If protocol doesn't pull in eye exam; should not prevent refill.          Failed - Chest x-ray in last 3 months     Chest x-ray: No results found. However, due to  the size of the patient record, not all encounters were searched. Please check Results Review for a complete set of results. No results found. However, due to the size of the patient record, not all encounters were searched. Please check Results Review for a complete set of results.          Passed - TSH in last 6 months     TSH   Date Value Ref Range Status   11/04/2019 13.86 (H) 0.30 - 5.00 uIU/mL Final                   Passed - LFT or AST or ALT in last 12 months     Albumin   Date Value Ref Range Status   11/04/2019 3.0 (L) 3.5 - 5.0 g/dL Final     Bilirubin, Total   Date Value Ref Range Status   11/04/2019 0.8 0.0 - 1.0 mg/dL Final     Bilirubin, Direct   Date Value Ref Range Status   11/04/2019 0.3 <=0.5 mg/dL Final     Alkaline Phosphatase   Date Value Ref Range Status   11/04/2019 96 45 - 120 U/L Final     AST   Date Value Ref Range Status   11/04/2019 149 (H) 0 - 40 U/L Final     ALT   Date Value Ref Range Status   11/04/2019 236 (H) 0 - 45 U/L Final     Protein, Total   Date Value Ref Range Status   11/04/2019 7.2 6.0 - 8.0 g/dL Final                Passed - PCP or prescribing provider visit in past 6 months     Last office visit with prescriber/PCP: 10/23/2019 OR same dept: 10/23/2019 Aristides Alegria MD OR same specialty: 10/23/2019 Aristides Alegria MD Last physical: Visit date not found  Last MTM visit: Visit date not found     Next appt within 3 mo: Visit date not found  Next physical within 3 mo: Visit date not found  Prescriber OR PCP: Arsitides Alegria MD  Last diagnosis associated with med order: 1. Low magnesium level  - magnesium oxide (MAG-OX) 400 mg (241.3 mg magnesium) tablet [Pharmacy Med Name: MAGNESIUM OXIDE 400 MG  (241.3 TAB]; TAKE 1 PILL BY MOUTH EVERYDAY  Dispense: 30 tablet; Refill: 3    2. Hypothyroidism  - levothyroxine (SYNTHROID, LEVOTHROID) 88 MCG tablet [Pharmacy Med Name: LEVOTHYROXINE 88 MCG TABLET 88 TAB]; TAKE 1 PILL BY MOUTH EVERYDAY FOR THYROID  Dispense: 30 tablet;  Refill: 6    3. Essential hypertension with goal blood pressure less than 130/85  - amiodarone (PACERONE) 200 MG tablet [Pharmacy Med Name: AMIODARONE  MG  TAB]; TAKE 1 PILL BY MOUTH 2 TIMES EVERYDAY  Dispense: 60 tablet; Refill: 3     If protocol passes may refill for 6 months if within 3 months of last provider visit (or a total of 9 months).          Passed - BMP in last 12 months     Sodium   Date Value Ref Range Status   08/22/2019 140 136 - 145 mmol/L Final     Potassium   Date Value Ref Range Status   08/22/2019 3.8 3.5 - 5.0 mmol/L Final     Chloride   Date Value Ref Range Status   08/22/2019 111 (H) 98 - 107 mmol/L Final     CO2   Date Value Ref Range Status   08/22/2019 25 22 - 31 mmol/L Final     BUN   Date Value Ref Range Status   08/22/2019 11 8 - 22 mg/dL Final     Creatinine   Date Value Ref Range Status   08/22/2019 0.84 0.70 - 1.30 mg/dL Final             Passed - CBC w/plts (HM2) in last 12 months     WBC   Date Value Ref Range Status   08/22/2019 4.1 4.0 - 11.0 thou/uL Final     Hemoglobin   Date Value Ref Range Status   08/22/2019 12.8 (L) 14.0 - 18.0 g/dL Final     Hematocrit   Date Value Ref Range Status   08/22/2019 39.3 (L) 40.0 - 54.0 % Final     Platelets   Date Value Ref Range Status   08/22/2019 128 (L) 140 - 440 thou/uL Final             Passed - ECG in last 12 months     ECG rhythm strip: No results found. However, due to the size of the patient record, not all encounters were searched. Please check Results Review for a complete set of results. ECG 12 lead MUSE:   Results for orders placed or performed in visit on 11/04/19   ECG Clinic - Today   Result Value Ref Range    SYSTOLIC BLOOD PRESSURE      DIASTOLIC BLOOD PRESSURE      VENTRICULAR RATE 94 BPM    ATRIAL RATE 94 BPM    P-R INTERVAL      QRS DURATION 106 ms    Q-T INTERVAL 454 ms    QTC CALCULATION (BEZET) 567 ms    P Axis      R AXIS 19 degrees    T AXIS 67 degrees    MUSE DIAGNOSIS       Atrial flutter with 2 to 1  block  Left ventricular hypertrophy with repolarization abnormality  Prolonged QT  Abnormal ECG  When compared with ECG of 22-AUG-2019 20:07,  ST now depressed in Inferior leads  ST more depressed Lateral leads  Nonspecific T wave abnormality now evident in Lateral leads  Confirmed by SHERIDAN ALMENDAREZ MD LOC:[facility] ([id number]) on 11/5/2019 1:58:23 PM      ECG 12 lead nursing unit:   Results for orders placed or performed during the hospital encounter of 08/22/19   ECG 12 lead nursing unit performed   Result Value Ref Range    SYSTOLIC BLOOD PRESSURE  mmHg    DIASTOLIC BLOOD PRESSURE  mmHg    VENTRICULAR RATE 65 BPM    ATRIAL RATE 150 BPM    P-R INTERVAL  ms    QRS DURATION 100 ms    Q-T INTERVAL 512 ms    QTC CALCULATION (BEZET) 532 ms    P Axis  degrees    R AXIS 0 degrees    T AXIS 33 degrees    MUSE DIAGNOSIS       Atrial flutter with variable A-V block  Nonspecific ST abnormality  Prolonged QT  Abnormal ECG  When compared with ECG of 04-JUL-2019 11:24,  Vent. rate has decreased BY  44 BPM  Incomplete right bundle branch block is no longer Present  Confirmed by KATHIA SOL MD LOC:[facility] ([id number]) on 8/23/2019 2:31:22 PM               Passed - Mg level in last 3 months     Magnesium   Date Value Ref Range Status   08/22/2019 1.9 1.8 - 2.6 mg/dL Final

## 2021-06-03 NOTE — TELEPHONE ENCOUNTER
INR result is 2.6  INR   Date Value Ref Range Status   11/05/2019 2.70 (!) 0.9 - 1.1 Final       Will the patient be seen, or did they already see, MD or CNP today? No    Most Recent Warfarin dose day/week  Sunday Monday Tuesday Wednesday Thursday Friday Saturday     .5 1 .5 1 1     Sunday Monday Tuesday Wednesday Thursday Friday Saturday   1 1              Has the patient missed any doses of Coumadin, Warfarin, Jantoven in the past 7 days? No    Has the patients medications changed since the last visit? No    Has the patient experienced any bleeding recently? No    Has the patient experienced any injuries or illness recently? No    Has the patient experienced any 'new' shortness of breath, severe headaches, or changes in vision recently? No    Has the patient had any changes in their diet, or alcohol consumption? No    Is the patient here today to prepare for any type of upcoming surgery, procedure, or for a cardioversion procedure? No    What phone number can we reach the patient at today? home phone listed in demographics.

## 2021-06-03 NOTE — TELEPHONE ENCOUNTER
ANTICOAGULATION  MANAGEMENT- Home Care/Care Facility Result    Assessment     Today's INR result of 2.2 is Therapeutic (goal INR of 2.0-3.0)        Warfarin taken as previously instructed    No new diet changes affecting INR    No new medication/supplements affecting INR    Continues to tolerate warfarin with no reported s/s of bleeding or thromboembolism     Previous INR was Therapeutic    Plan:     Spoke with home care nurse Esme discussed INR result and instructed:     Warfarin Dosing Instructions: Continue current warfarin dose    0.5 mg every Tue, Thu; 1 mg all other days         Next INR to be drawn: 2 weeks.    Education provided: importance of taking warfarin as instructed    Esme verbalizes understanding and agrees to warfarin dosing plan.   ?   Baldo Marquez RN    Subjective/Objective:      Kody Johns, a 69 y.o. male is established on warfarin.     Home care/care facility RN's report of [facility] INR, recent warfarin dosing, diet changes, medication changes, and symptoms is documented below.    Additional findings: verbally confirmed home dose with Esme and updated on anticoagulation calendar    Anticoagulation Episode Summary     Current INR goal:   2.0-3.0   TTR:   67.0 % (9.7 mo)   Next INR check:   12/17/2019   INR from last check:   2.20 (12/3/2019)   Weekly max warfarin dose:      Target end date:      INR check location:      Preferred lab:      Send INR reminders to:   [facility]    Indications    A-fib (H) [I48.91]  Aortic valve disease  rheumatic [I06.9]  Mitral valve stenosis  unspecified etiology [I05.0]  Silent Stroke [I66.9]           Comments:            Anticoagulation Care Providers     Provider Role Specialty Phone number    Aristides Alegria MD Referring Family Medicine [phone]    Haja Ascencio MD  Cardiology [phone]

## 2021-06-03 NOTE — PROGRESS NOTES
CHW spoke with Mark today, 11/20/19 due to voice message left in CHW VM.     Mark, family member add 1 new goal to get a hopsital bed for the patient per patient request per Mark. NOTE: Please see telephone encounter dated 11/12/19 for detail.     Pt is scheduled 11/27/19 at 9:15AM with Dr. Alegria, pcp at [facility]. Reminded Mark to informed the patient about the appt. Suggested pt and family to discuss with pcp in clinic for further advised. Mark confirmed understand and will do. Thank you.     Plan:  Status: enrolled.   Follow up: 12/20/2019.

## 2021-06-03 NOTE — TELEPHONE ENCOUNTER
----- Message from Mary Berumen, EPS sent at 11/18/2019  9:12 AM CST -----  Regarding: Marvin HUBER Notify  MD Notify for Dr. Lang patient on 11/15/10, 11/16/19 and 11/17/19 for HR 42 with 2nd Degree AVB Type II, EKG saved in [employer] folder, Thanks.

## 2021-06-03 NOTE — TELEPHONE ENCOUNTER
Spoke with Mark Roth-emergency contact/family member. Mark speak English. Pt isn't present; unable to conference call [facility] to find out coverage.   Refer family to help with connecting [facility] (pt medical insurance) and phone number find in the back of the patient medical insurance card to find out PT/OT coverage and eligibility. Mark request PCP to advised with placing a referral to physical therapy due to medical health problem and physical strength weakness.     Plan:  CHW consult with PCP 11/13/19 for further advised.

## 2021-06-03 NOTE — TELEPHONE ENCOUNTER
FYI - Status Update  Who is Calling: Home Care  Update: Caller stated that today she took patient's Blood pressure and that was 100/60 with a pulse of 48. Caller was wondering if patient needed to continue his medication of Metoprolol.   Okay to leave a detailed message?:  Yes   [phone]

## 2021-06-03 NOTE — PATIENT INSTRUCTIONS - HE
- Wear monitor for 2 weeks to see if we can stop any medications    - Go to therapy to try to get stronger    - Check your blood today    - Decrease amiodarone to 200mg once a day (it is currently twice a day)    - Increase metoprolol to 25mg twice a day

## 2021-06-03 NOTE — TELEPHONE ENCOUNTER
INR result is 2.1  INR   Date Value Ref Range Status   11/19/2019 2.20 (!) 0.9 - 1.1 Final       Will the patient be seen, or did they already see, MD or CNP today? No    Most Recent Warfarin dose day/week  Sunday Monday Tuesday Wednesday Thursday Friday Saturday    1 .5 1 1 .5 1     Sunday Monday Tuesday Wednesday Thursday Friday Saturday   1             Has the patient missed any doses of Coumadin, Warfarin, Jantoven in the past 7 days? No    Has the patients medications changed since the last visit? No    Has the patient experienced any bleeding recently? No    Has the patient experienced any injuries or illness recently? No    Has the patient experienced any 'new' shortness of breath, severe headaches, or changes in vision recently? No, has been having mild headaches.    Has the patient had any changes in their diet, or alcohol consumption? No    Is the patient here today to prepare for any type of upcoming surgery, procedure, or for a cardioversion procedure? No    What phone number can we reach the patient at today? [facility] [phone]

## 2021-06-03 NOTE — TELEPHONE ENCOUNTER
Request for Orders    Who s Requesting: Home Care Occupational Therapist    Orders being requested: OT 1w1,2w1, 1w1 for ADLs, equipment needs, UE function    Where to send Orders: Respond through Giovanni Miller OTR/L

## 2021-06-03 NOTE — TELEPHONE ENCOUNTER
Dr. Lang please review strips in life watch. HR bradycardic;  11/15/19 at 0954  HR 46  11/15/19 at 2320  HR 44  11/16/19 at 0626  HR 42  Please review rhythm and HR's. Please review note regarding conversation with care giver Mark. Any recommendations? NOEMI,RN

## 2021-06-03 NOTE — TELEPHONE ENCOUNTER
PT HE is asking for verbal ok for continued PT 2w2 to work on strengthening, gait/transfer training, and balance training.  You can respond to this inbasket with the ok or call and leave a message at [phone].  Thank you

## 2021-06-03 NOTE — TELEPHONE ENCOUNTER
11-20-19:  CHW returning Mark called. Talked with him about message left in CHW VM.     Mark confirmed set as new goal for the patient to get a hospital bed per pt request (per Mark). Set new goal with [facility].     Pt have upcoming appt scheduled 11/27/19 with PCP in [place]. Suggested pt and family to discuss with pcp in clinic for further advised. Mark confirmed understand and will do. Thank you.   End of called.

## 2021-06-03 NOTE — TELEPHONE ENCOUNTER
ANTICOAGULATION  MANAGEMENT- Home Care/Care Facility Result    Assessment     Today's INR result of 2.1 is Therapeutic (goal INR of 2.0-3.0)        Warfarin taken as previously instructed    No new diet changes affecting INR    No new medication/supplements affecting INR    Continues to tolerate warfarin with no reported s/s of bleeding or thromboembolism     Previous INR was Therapeutic    Plan:     Spoke with home care nurse Esme discussed INR result and instructed:     Warfarin Dosing Instructions: Continue current warfarin dose    0.5 mg every Tue, Thu; 1 mg all other days         Next INR to be drawn: 1 week.    Education provided: importance of taking warfarin as instructed    Esme verbalizes understanding and agrees to warfarin dosing plan.   ?   Baldo Marquez RN    Subjective/Objective:      Kody Johns, a 69 y.o. male is established on warfarin.     Home care/care facility RN's report of [facility] INR, recent warfarin dosing, diet changes, medication changes, and symptoms is documented below.    Additional findings: verbally confirmed home dose with Esme and updated on anticoagulation calendar    Anticoagulation Episode Summary     Current INR goal:   2.0-3.0   TTR:   64.6 % (9.7 mo)   Next INR check:   12/2/2019   INR from last check:   2.10 (11/25/2019)   Weekly max warfarin dose:      Target end date:      INR check location:      Preferred lab:      Send INR reminders to:   [facility]    Indications    A-fib (H) [I48.91]  Aortic valve disease  rheumatic [I06.9]  Mitral valve stenosis  unspecified etiology [I05.0]  Silent Stroke [I66.9]           Comments:            Anticoagulation Care Providers     Provider Role Specialty Phone number    Aristides Alegria MD Referring Family Medicine [phone]    Haja Ascencio MD  Electrophysiology [phone]

## 2021-06-04 NOTE — PROGRESS NOTES
"Patient Outreach:   Reason: goals follow up.     Community Health WorkerLing called pt today regarding reason above. Tele-language line  service is use for this called;  named: Cory; ID#: [id number] per verified.     Talked with patient's brother at phone number [phone]. Pt's brother refer CHW to contact \"patient and Mark\" at [phone]. There brother shared that pt do not live with him; Mark the one that helps him with everything. End called.     CHW and language line  called pt and Mark at phone number confirmed above; no answer; left message for the pt and legal guardian to returning called. CHW phone number is provide. End called.     Plan:  Status: enrolled.   Waiting to hear from the patient/family.   Follow up: next week, 1/02/2020.           "

## 2021-06-04 NOTE — TELEPHONE ENCOUNTER
Anticoagulation Annual Referral Renewal Review    Kody Johns's chart reviewed for annual renewal of referral to anticoagulation monitoring.        Criteria for anticoagulation nurse and/or pharmacist renewal met   Warfarin indication: Atrial Fibrillation and AVR and MVR Yes, per indication   Current with INR monitoring/compliant Yes Yes   Date of last office visit 10/23/19 Yes, had office visit within last year   Time in Therapeutic Range (TTR) 75 % Yes, TTR > 60%       Kody Johns met all criteria for anticoagulation management program initiated renewal.  New INR standing orders and anticoagulation referral renewal placed.      Jessica Garza RN  2:47 PM

## 2021-06-04 NOTE — TELEPHONE ENCOUNTER
RN cannot approve Refill Request    RN can NOT refill this medication TX'ed at discharge   . Last office visit: 10/23/2019 Aristides Alegria MD Last Physical: 4/15/2019 Last MTM visit: Visit date not found Last visit same specialty: 10/23/2019 Aristides Alegria MD.  Next visit within 3 mo: Visit date not found  Next physical within 3 mo: Visit date not found      Will Wilson, [facility] Connection Triage/Med Refill 12/13/2019    Requested Prescriptions   Pending Prescriptions Disp Refills     metoprolol succinate (TOPROL-XL) 25 MG [Pharmacy Med Name: METOPROLOL SUCCINATE ER 25 25 TAB] 90 tablet 3     Sig: TAKE 1 PILL BY MOUTH EVERYDAY FOR BLOOD PRESSURE       Beta-Blockers Refill Protocol Passed - 12/11/2019 10:15 AM        Passed - PCP or prescribing provider visit in past 12 months or next 3 months     Last office visit with prescriber/PCP: 10/23/2019 Aristides Alegria MD OR same dept: 10/23/2019 Aristides Alegria MD OR same specialty: 10/23/2019 Aristides Alegria MD  Last physical: 4/15/2019 Last MTM visit: Visit date not found   Next visit within 3 mo: Visit date not found  Next physical within 3 mo: Visit date not found  Prescriber OR PCP: Aristides Alegria MD  Last diagnosis associated with med order: 1. Hypertension  - metoprolol succinate (TOPROL-XL) 25 MG [Pharmacy Med Name: METOPROLOL SUCCINATE ER 25 25 TAB]; TAKE 1 PILL BY MOUTH EVERYDAY FOR BLOOD PRESSURE  Dispense: 90 tablet; Refill: 3    If protocol passes may refill for 12 months if within 3 months of last provider visit (or a total of 15 months).             Passed - Blood pressure filed in past 12 months     BP Readings from Last 1 Encounters:   11/17/19 120/68

## 2021-06-04 NOTE — TELEPHONE ENCOUNTER
Called and spoke with Mark whom provides care to the patient. Mark speaks English and takes care of the patient, medications, schedules and takes him to appointments.   Informed Mark of recommendations. Mark will remove/stop the medication today. Mark agrees to testing and states preference of  location for ECG and heart monitor. Plan of care:  -stop medication today  -ECG at  heart care 12/13  -HM  at [facility] 12/20    Staff message sent to schedulers to arrange. Mark gave verbal teach back and understanding of the above recommendations. Opportunity to ask questions and have them answered. Given writers' direct phone number to return call if questions or concerns. NOEMI,RN

## 2021-06-04 NOTE — TELEPHONE ENCOUNTER
ANTICOAGULATION  MANAGEMENT- Home Care/Care Facility Result    Assessment     Today's INR result of 1.7 is Subtherapeutic (goal INR of 2.0-3.0)        Warfarin taken as previously instructed    No new diet changes affecting INR    Pt has discontinued Amiodarone. Unsure of exact stop date.    Continues to tolerate warfarin with no reported s/s of bleeding or thromboembolism     Previous INR was Subtherapeutic    Plan:     Spoke with home care nurse Esme discussed INR result and instructed:     Warfarin Dosing Instructions: Take booster dose of 1.5 mg today then change warfarin dose to 1 mg daily (7.7 % change)    Next INR to be drawn: 1 week.    Education provided: importance of taking warfarin as instructed    Esme verbalizes understanding and agrees to warfarin dosing plan.   ?   Baldo Marquez RN    Subjective/Objective:      Kody Johns, a 69 y.o. male is established on warfarin.     Home care/care facility RN's report of [facility] INR, recent warfarin dosing, diet changes, medication changes, and symptoms is documented below.    Additional findings: verbally confirmed home dose with Esme and updated on anticoagulation calendar    Anticoagulation Episode Summary     Current INR goal:   2.0-3.0   TTR:   63.7 % (9.7 mo)   Next INR check:   12/30/2019   INR from last check:   1.70! (12/23/2019)   Weekly max warfarin dose:      Target end date:      INR check location:      Preferred lab:      Send INR reminders to:   [facility]    Indications    A-fib (H) [I48.91]  Aortic valve disease  rheumatic [I06.9]  Mitral valve stenosis  unspecified etiology [I05.0]  Silent Stroke [I66.9]           Comments:            Anticoagulation Care Providers     Provider Role Specialty Phone number    Aristides Alegria MD Referring Family Medicine [phone]    Haja Ascencio MD  Cardiology [phone]

## 2021-06-04 NOTE — PROGRESS NOTES
"Patient present to the clinic today for follow up after recent discontinuation of amiodarone 12/6/19.  Last dose of amiodarone was 12/5/19.    Patient states he is doing \"well\" but does note dizziness and blurry vision that have been going on for 6 months.  Patient will to continue to monitor.   Patient has Holter appointment 12/20/19.    EKG reveals sinus dysrhythmia  HR 46   ms  QRS 92 ms  QT/QTc  564/493 ms    Patient was instructed provider will review and if new orders are given a return call will be made. Patient verbalizes understanding and agrees with plan. Patient will follow up as planned. No further questions or concerns.    Dr. Lang,  Please review EKG.   Any further recommendations or instructions?    "

## 2021-06-04 NOTE — TELEPHONE ENCOUNTER
----- Message from Ashleigh Lang MD sent at 12/19/2019  1:35 PM CST -----  Hi, please have him decrease the metoprolol XL back down to 25mg once a day.    Thanks, EG    ----- Message -----  From: Natalie Michaels RN  Sent: 12/13/2019  12:25 PM CST  To: Ashleigh Lang MD

## 2021-06-04 NOTE — PROGRESS NOTES
"Patient Outreach:   1/03/2019: Called patient and Mark today. Mark answer the called. Relayed message from pcp clinical team below with Mark and patient.    Mark stated; \"I still driving and can't writer down [employer] phone number.\"     Offered to call him right back to  with phone number. Mark agreed.   Called and left message including [employer] phone number [phone] for the patient and Mark to follow up about the hospital bed prescription. CHW and PCP office phone number's provide. Done task.     Plan:   Status: enrolled.   Follow up: 02/04/2020.   "

## 2021-06-04 NOTE — TELEPHONE ENCOUNTER
===View-only below this line===  ----- Message -----  From: Ashleigh Lang MD  Sent: 12/23/2019   8:30 AM CST  To: Guero Howell RN    Have him stop the metoprolol all together. We need the LFTs and TSH to f/u on his abnormal labs from the last draw.    Thanks, EG

## 2021-06-04 NOTE — TELEPHONE ENCOUNTER
DOD, please advise.   
Please see okay for orders below  
Request for Orders    Who s Requesting: Home Care Occupational Therapist    Orders being requested: OT 2w1 for hand function with ADLs    Where to send Orders: Respond through Giovanni Miller, OTR/L      
Yes to this  
Yes

## 2021-06-04 NOTE — TELEPHONE ENCOUNTER
INR result is 1.7  INR   Date Value Ref Range Status   12/17/2019 1.70 (!) 0.9 - 1.1 Final       Will the patient be seen, or did they already see, MD or CNP today? No    Most Recent Warfarin dose day/week  Sunday Monday Tuesday Wednesday Thursday Friday Saturday    1 1 1 .5 1 1     Sunday Monday Tuesday Wednesday Thursday Friday Saturday   1             Has the patient missed any doses of Coumadin, Warfarin, Jantoven in the past 7 days? No    Has the patients medications changed since the last visit No    Has the patient experienced any bleeding recently? No    Has the patient experienced any injuries or illness recently? No    Has the patient experienced any 'new' shortness of breath, severe headaches, or changes in vision recently? No    Has the patient had any changes in their diet, or alcohol consumption? No    Is the patient here today to prepare for any type of upcoming surgery, procedure, or for a cardioversion procedure? No    What phone number can we reach the patient at today? home phone listed in demographics.

## 2021-06-04 NOTE — TELEPHONE ENCOUNTER
===View-only below this line===  ----- Message -----  From: Ashleigh Lang MD  Sent: 12/3/2019   3:12 PM CST  To: Guero Howell RN    Hi, please have him stop the amiodarone all together and come in for a 12 lead EKG in 1 week and a 24 hour holter in 2 weeks. You will need an .    Thanks, EG

## 2021-06-04 NOTE — PROGRESS NOTES
Ling,  Per Lukasz Alegria's assistant This order for a hospital bed was sent to [facility].

## 2021-06-04 NOTE — PROGRESS NOTES
===View-only below this line===  ----- Message -----  From: Adarsh Jaramillo NP  Sent: 12/27/2019   9:34 AM CST  To: Guero Howell RN, AMY Ramos,  Pt is scheduled to see me on 1/28/19. Since I am gone that following week, could you check with pt and see if he is willing to come see me around 1/22 or 1/23 or 1/24.     Let's do the repeat Holter 3-4 days before he sees me so I can review his Holter study report with him. Otherwise, I will order when I see him that day.  Thank you!  CY

## 2021-06-04 NOTE — TELEPHONE ENCOUNTER
ANTICOAGULATION  MANAGEMENT- Home Care/Care Facility Result    Assessment     Today's INR result of 1.7 is Subtherapeutic (goal INR of 2.0-3.0)        Warfarin taken as previously instructed    No new diet changes affecting INR    Interaction between Amiodarone and warfarin may be affecting INR. Per doctor's note on 12/5: to be DC'd on 12/6. Home care nurse states it has not been in his medication box but unsure what day the family took it out    Continues to tolerate warfarin with no reported s/s of bleeding or thromboembolism     Previous INR was Therapeutic    Plan:     Spoke with Yi (home care nurse) discussed INR result and instructed:     Warfarin Dosing Instructions: Change warfarin dose to .5  mg daily on Thurs; and 1 mg daily rest of week  (8 % change)    Next INR to be drawn: one week (will need weekly INRs x 6 weeks)    Education provided: potential interaction between warfarin and Amiodarone    Yi verbalizes understanding and agrees to warfarin dosing plan.   ?   Swapna Phan RN    Subjective/Objective:      Kody Johns, a 69 y.o. male is established on warfarin.     Home care/care facility RN's report of [facility] INR, recent warfarin dosing, diet changes, medication changes, and symptoms is documented below.    Additional findings: headache and blurred vision is not new. Patient has had ongoing issues for the past 6 months due to stroke deficits. Did recommend patient follow up with PCP if worsening    Anticoagulation Episode Summary     Current INR goal:   2.0-3.0   TTR:   65.5 % (9.7 mo)   Next INR check:   12/23/2019   INR from last check:   1.70! (12/17/2019)   Weekly max warfarin dose:      Target end date:      INR check location:      Preferred lab:      Send INR reminders to:   [facility]    Indications    A-fib (H) [I48.91]  Aortic valve disease  rheumatic [I06.9]  Mitral valve stenosis  unspecified etiology [I05.0]  Silent Stroke [I66.9]           Comments:             Anticoagulation Care Providers     Provider Role Specialty Phone number    Aristides Alegria MD Referring Family Medicine [phone]    Haja Ascencio MD  Cardiology [phone]

## 2021-06-04 NOTE — PROGRESS NOTES
"Patient Outreach:   Reason: goals follow up.     Community Health Worker called the pt today regards to goals follow up. Mark, family member/pca answer the called with pt present. Follow up goals; update action steps; pt completed 1 goal below; request completion. Mark shared info below. Refer patient, family, and pca's to connect with connect with the [facility] office regarding citizenship application status, and Dr. Fowler's office [phone] regards to the N-648 form. Mark confirmed understand and no other questions.     Mark shared:   -Kody is doing better; been follow up with the heart doctor; no concerns.    -Request f/u appt with PCP. Will seek pcp advised about Health Care Directive for Kody in clinic 1/20/2019 appt with pcp. (NOTE: pt appt scheduled 1/20/2020 at 9:00AM with Dr. Alegria per request).   -Kody live with Jim Johns, son and speak little English.   -Kody (patient) have 2 pca's- myself (Mark) and Jim Gurung. Jim- 8 hours pca daily; 2 hours daily; every day.   -PCA home agency: \"[place], [place].\"  -N-648 form/citizenship: N-648 form completed by [facility] (\"Dr. Fowler's office\") in the [place] office; citizenship application completed; submitted to [facility] office-waiting to hear for further advised.  -Legal Guardianship: looking to apply a legal guardianship for Kody and his wife. Kody's wife is mute. Connected with [facility] and legal guardianship resource request; waiting for the resource. Will connect with [facility] if assistance need.     Goal accomplishment:  Goal Statement- I want to learn more about medical waiver for citizenship  within 1-2 months. (Date goal set:  10/14/19); (Date goal completed: 1/03/2020)    Note/fyi:  -Dr. Fowler at the [facility] and [employer]. Reason: N-648 form. Phone: [phone].  -[facility] office at [phone] ([address], [address]. [place] [address]).     Note:   Message route to PCP " clinical team for further assistance regarding the prescription for the hospital bed per patient and Mark request.     Plan:   Status: enrolled.   Follow up: 2/04/2020.

## 2021-06-04 NOTE — TELEPHONE ENCOUNTER
INR result is   1.7  INR   Date Value Ref Range Status   12/03/2019 2.20 (!) 0.9 - 1.1 Final       Will the patient be seen, or did they already see, MD or CNP today? No    Most Recent Warfarin dose day/week  Sunday Monday Tuesday Wednesday Thursday Friday Saturday     .5 1 .5 1 1     Sunday Monday Tuesday Wednesday Thursday Friday Saturday   1 1            Has the patient missed any doses of Coumadin, Warfarin, Jantoven in the past 7 days? No    Has the patients medications changed since the last visit? Yes  Not taking AMIODIRONE anymore     Has the patient experienced any bleeding recently? No    Has the patient experienced any injuries or illness recently? No    Has the patient experienced any 'new' shortness of breath, severe headaches, or changes in vision recently? Yes  Headaches     Has the patient had any changes in their diet, or alcohol consumption? No    Is the patient here today to prepare for any type of upcoming surgery, procedure, or for a cardioversion procedure? No    What phone number can we reach the patient at today? home phone listed in demographics.

## 2021-06-04 NOTE — TELEPHONE ENCOUNTER
INR result is   INR   Date Value Ref Range Status   12/23/2019 1.70 (!) 0.9 - 1.1 Final     12/30/19                  2.0    Will the patient be seen, or did they already see, MD or CNP today? No    Most Recent Warfarin dose day/week  Sunday Monday Tuesday Wednesday Thursday Friday Saturday    1.5 mg 1 mg 1 mg 1 mg 1 mg 1 mg     Sunday Monday Tuesday Wednesday Thursday Friday Saturday   1 mg             Has the patient missed any doses of Coumadin, Warfarin, Jantoven in the past 7 days? No    Has the patients medications changed since the last visit? No    Has the patient experienced any bleeding recently? No    Has the patient experienced any injuries or illness recently? No    Has the patient experienced any 'new' shortness of breath, severe headaches, or changes in vision recently? No    Has the patient had any changes in their diet, or alcohol consumption? No    Is the patient here today to prepare for any type of upcoming surgery, procedure, or for a cardioversion procedure? No    What phone number can we reach the patient at today? [facility] unlimited  [phone].

## 2021-06-04 NOTE — TELEPHONE ENCOUNTER
Refill Approved   Sertraline Only    Rx renewed per Medication Renewal Policy. Medication was last renewed on 10/4/2019 for 30/2  Last OV 10/23/2019   RTC about 12/4/2019  Soo Ramirez, [facility] Triage/Med Refill 1/1/2020     Requested Prescriptions   Pending Prescriptions Disp Refills     sertraline (ZOLOFT) 50 MG tablet [Pharmacy Med Name: SERTRALINE HCL 50 MG TABLET 50 TAB] 30 tablet 2     Sig: TAKE 1 TABLET (50 MG TOTAL) BY MOUTH DAILY.       SSRI Refill Protocol  Passed - 12/30/2019  9:18 AM        Passed - PCP or prescribing provider visit in last year     Last office visit with prescriber/PCP: 10/23/2019 Aristides Alegria MD OR same dept: 10/23/2019 Aristides Alegria MD OR same specialty: 10/23/2019 Aristides Alegria MD  Last physical: 4/15/2019 Last MTM visit: Visit date not found   Next visit within 3 mo: Visit date not found  Next physical within 3 mo: Visit date not found  Prescriber OR PCP: Aristides Alegria MD  Last diagnosis associated with med order: 1. Major depressive disorder, remission status unspecified, unspecified whether recurrent  - sertraline (ZOLOFT) 50 MG tablet [Pharmacy Med Name: SERTRALINE HCL 50 MG TABLET 50 TAB]; Take 1 tablet (50 mg total) by mouth daily.  Dispense: 30 tablet; Refill: 2    2. Hypertension  - metoprolol succinate (TOPROL-XL) 25 MG [Pharmacy Med Name: METOPROLOL SUCCINATE ER 25 25 TAB]; TAKE 1 PILL BY MOUTH EVERYDAY FOR BLOOD PRESSURE  Dispense: 90 tablet; Refill: 3    If protocol passes may refill for 12 months if within 3 months of last provider visit (or a total of 15 months).             metoprolol succinate (TOPROL-XL) 25 MG [Pharmacy Med Name: METOPROLOL SUCCINATE ER 25 25 TAB] 90 tablet 3     Sig: TAKE 1 PILL BY MOUTH EVERYDAY FOR BLOOD PRESSURE       Beta-Blockers Refill Protocol Passed - 12/30/2019  9:19 AM        Passed - PCP or prescribing provider visit in past 12 months or next 3 months     Last office visit with prescriber/PCP: 10/23/2019 Airam  Aristides Falcon MD OR same dept: 10/23/2019 Aristides Alegria MD OR same specialty: 10/23/2019 Aristides Alegria MD  Last physical: 4/15/2019 Last MTM visit: Visit date not found   Next visit within 3 mo: Visit date not found  Next physical within 3 mo: Visit date not found  Prescriber OR PCP: Aristides Alegria MD  Last diagnosis associated with med order: 1. Major depressive disorder, remission status unspecified, unspecified whether recurrent  - sertraline (ZOLOFT) 50 MG tablet [Pharmacy Med Name: SERTRALINE HCL 50 MG TABLET 50 TAB]; Take 1 tablet (50 mg total) by mouth daily.  Dispense: 30 tablet; Refill: 2    2. Hypertension  - metoprolol succinate (TOPROL-XL) 25 MG [Pharmacy Med Name: METOPROLOL SUCCINATE ER 25 25 TAB]; TAKE 1 PILL BY MOUTH EVERYDAY FOR BLOOD PRESSURE  Dispense: 90 tablet; Refill: 3    If protocol passes may refill for 12 months if within 3 months of last provider visit (or a total of 15 months).             Passed - Blood pressure filed in past 12 months     BP Readings from Last 1 Encounters:   12/13/19 112/68

## 2021-06-04 NOTE — TELEPHONE ENCOUNTER
ANTICOAGULATION  MANAGEMENT- Home Care/Care Facility Result    Assessment     Today's INR result of 2.0 is Therapeutic (goal INR of 2.0-3.0)        Warfarin taken as previously instructed    No new diet changes affecting INR    No new medication/supplements affecting INR    Continues to tolerate warfarin with no reported s/s of bleeding or thromboembolism     Previous INR was Subtherapeutic    Plan:     Spoke with Home Care Nurse Esme discussed INR result and instructed:     Warfarin Dosing Instructions: Change warfarin dose to    1.5 mg every Mon; 1 mg all other days      (7 %  = 0 % change of total received this past week)    Next INR to be drawn: one week either 1/6 or 1/7    Education provided: importance of therapeutic range    Esme verbalizes understanding and agrees to warfarin dosing plan.   ?   Miriam Huggins RN    Subjective/Objective:      Kody Johns, a 69 y.o. male is established on warfarin.     Home care/care facility RN's report of [facility] INR, recent warfarin dosing, diet changes, medication changes, and symptoms is documented below.    Additional findings: none    Anticoagulation Episode Summary     Current INR goal:   2.0-3.0   TTR:   63.7 % (9.7 mo)   Next INR check:   1/6/2020   INR from last check:   2.00 (12/30/2019)   Weekly max warfarin dose:      Target end date:      INR check location:      Preferred lab:      Send INR reminders to:   [facility]    Indications    A-fib (H) [I48.91]  Aortic valve disease  rheumatic [I06.9]  Mitral valve stenosis  unspecified etiology [I05.0]  Silent Stroke [I66.9]           Comments:            Anticoagulation Care Providers     Provider Role Specialty Phone number    Aristides Alegria MD Referring Family Medicine [phone]    Haja Ascencio MD  Cardiology [phone]

## 2021-06-04 NOTE — TELEPHONE ENCOUNTER
Dr. Lang, review of his medications shows Toprol XL at 12.5 mg at bedtime, currently. Also, previously when patient was on Amiodarone, had orders to have hepatic profile and TSH checked due at beginning of this month which is still outstanding.    May I cancel the lab draws now that patient is off Amiodarone? Also any other recommendations regarding medications? Thank you CMM,RN

## 2021-06-04 NOTE — TELEPHONE ENCOUNTER
Called and spoke with the patients caregiver Mark . Recommendations reviewed. Will stop the medication tonight. Discussed with the caregiver to get the labs rechecked by the 1st of the year, and how to arrive at any outpatient lab without an appt at a [facility]. He understands and will take the patient to [facility]. Caregiver given opportunity to ask questions. NOEMI,RN

## 2021-06-05 NOTE — TELEPHONE ENCOUNTER
ANTICOAGULATION  MANAGEMENT- Home Care/Care Facility Result    Assessment     Today's INR result of 1.7 is Subtherapeutic (goal INR of 2.0-3.0)        Warfarin taken as previously instructed    No new diet changes affecting INR    No new medication/supplements affecting INR    Continues to tolerate warfarin with no reported s/s of bleeding or thromboembolism     Previous INR was Subtherapeutic    Plan:     Spoke with Home Care Nurse esme discussed INR result and instructed:     Warfarin Dosing Instructions: 1.5 mg booster dose today then change warfarin dose to    1.5 mg every Mon, Wed, Fri; 1 mg all other days     (6 % change)    Next INR to be drawn: one week    Education provided: importance of therapeutic range and target INR goal and significance of current INR result    Esme verbalizes understanding and agrees to warfarin dosing plan.   ?   Miriam Huggins RN    Subjective/Objective:      Kody Johns, a 70 y.o. male is established on warfarin.     Home care/care facility RN's report of [facility] INR, recent warfarin dosing, diet changes, medication changes, and symptoms is documented below.    Additional findings: Esme confirmed that no changes made that could have affected today's INR    Anticoagulation Episode Summary     Current INR goal:   2.0-3.0   TTR:   61.6 % (9.7 mo)   Next INR check:   2/11/2020   INR from last check:   1.70! (2/4/2020)   Weekly max warfarin dose:      Target end date:      INR check location:      Preferred lab:      Send INR reminders to:   [facility]    Indications    A-fib (H) [I48.91]  Aortic valve disease  rheumatic [I06.9]  Mitral valve stenosis  unspecified etiology [I05.0]  Silent Stroke [I66.9]           Comments:            Anticoagulation Care Providers     Provider Role Specialty Phone number    Aristides Alegria MD Referring Family Medicine [phone]    Haja Ascencio MD  Cardiology [phone]

## 2021-06-05 NOTE — TELEPHONE ENCOUNTER
INR result is 2.6  INR   Date Value Ref Range Status   12/30/2019 2.00 (!) 0.9 - 1.1 Final       Will the patient be seen, or did they already see, MD or CNP today? No    Most Recent Warfarin dose day/week  Sunday Monday Tuesday Wednesday Thursday Friday Saturday     1 1 1 1 1     Sunday Monday Tuesday Wednesday Thursday Friday Saturday   1 1.5            Has the patient missed any doses of Coumadin, Warfarin, Jantoven in the past 7 days? No    Has the patients medications changed since the last visit? No    Has the patient experienced any bleeding recently? No    Has the patient experienced any injuries or illness recently? Yes, runny nose and  Mild headache    Has the patient experienced any 'new' shortness of breath, severe headaches, or changes in vision recently? No    Has the patient had any changes in their diet, or alcohol consumption? No    Is the patient here today to prepare for any type of upcoming surgery, procedure, or for a cardioversion procedure? No    What phone number can we reach the patient at today? [phone] [facility]

## 2021-06-05 NOTE — TELEPHONE ENCOUNTER
ANTICOAGULATION  MANAGEMENT- Home Care/Care Facility Result    Assessment     Today's INR result of 1.7 is Subtherapeutic (goal INR of 2.0-3.0)        Less warfarin taken than instructed which may be affecting INR -missed a half tablet yesterday likely    No new diet changes affecting INR    No new medication/supplements affecting INR    Continues to tolerate warfarin with no reported s/s of bleeding or thromboembolism     Previous INR was Subtherapeutic    Plan:     Spoke with Paige nurse, discussed INR result and instructed:     Warfarin Dosing Instructions: Take 1.5 mg today only then change warfarin dose to 1.5 mg daily on Mon, Thu; and 1 mg daily rest of week  (6.7 % change)    Next INR to be drawn: 1 week    Education provided: target INR goal and significance of current INR result, importance of following up for INR monitoring at instructed interval and importance of taking warfarin as instructed    Paige verbalizes understanding and agrees to warfarin dosing plan.   ?   Luiza Brewer RN    Subjective/Objective:      Kody Johns, a 70 y.o. male is established on warfarin.     Home care/care facility RN's report of [facility] INR, recent warfarin dosing, diet changes, medication changes, and symptoms is documented below.    Additional findings: verbally confirmed home dose with Paige and updated on anticoagulation calendar    Anticoagulation Episode Summary     Current INR goal:   2.0-3.0   TTR:   64.0 % (9.7 mo)   Next INR check:   2/4/2020   INR from last check:   1.70! (1/28/2020)   Weekly max warfarin dose:      Target end date:      INR check location:      Preferred lab:      Send INR reminders to:   [facility]    Indications    A-fib (H) [I48.91]  Aortic valve disease  rheumatic [I06.9]  Mitral valve stenosis  unspecified etiology [I05.0]  Silent Stroke [I66.9]           Comments:            Anticoagulation Care Providers     Provider Role Specialty Phone number    Aristides Alegria MD  [facility] Medicine [phone]    Haja Ascencio MD  Cardiology [phone]

## 2021-06-05 NOTE — TELEPHONE ENCOUNTER
Medication Question or Clarification  Who is calling: Pharmacy  What medication are you calling about (include dose and sig)?:   ferrous sulfate 220 mg (44 mg iron)/5 mL solution 473 mL 3 9/25/2019  No   Sig: TAKE 7 ML (308 MG TOTAL) BY MOUTH 2 (TWO) TIMES A DAY.     Who prescribed the medication?: Aristides Alegria MD   What is your question/concern?: patient is requesting for this to be changed to tablets.  Requested Pharmacy: [facility] Okay to leave a detailed message?: Yes

## 2021-06-05 NOTE — TELEPHONE ENCOUNTER
Refill Approved    Rx renewed per Medication Renewal Policy. Medication was last renewed on 7/23/19.    Saniya Valdez, [facility] Connection Triage/Med Refill 1/7/2020     Requested Prescriptions   Pending Prescriptions Disp Refills     tamsulosin (FLOMAX) 0.4 mg cap [Pharmacy Med Name: TAMSULOSIN HCL 0.4 MG CAPS 0.4 CAP] 30 capsule 10     Sig: TAKE 1 CAPSULE (0.4 MG TOTAL) BY MOUTH DAILY AFTER SUPPER.       Alfuzosin/Tamsulosin/Silodosin Refill Protocol  Passed - 1/7/2020  6:23 PM        Passed - PCP or prescribing provider visit in past 12 months       Last office visit with prescriber/PCP: 10/23/2019 Aristides Alegria MD OR same dept: 10/23/2019 Aristides Alegria MD OR same specialty: 10/23/2019 Aristides Alegria MD  Last physical: 4/15/2019 Last MTM visit: Visit date not found   Next visit within 3 mo: Visit date not found  Next physical within 3 mo: Visit date not found  Prescriber OR PCP: Aristides Alegria MD  Last diagnosis associated with med order: 1. Benign prostatic hyperplasia with urinary obstruction  - tamsulosin (FLOMAX) 0.4 mg cap [Pharmacy Med Name: TAMSULOSIN HCL 0.4 MG CAPS 0.4 CAP]; Take 1 capsule (0.4 mg total) by mouth daily after supper.  Dispense: 30 capsule; Refill: 10    If protocol passes may refill for 12 months if within 3 months of last provider visit (or a total of 15 months).

## 2021-06-05 NOTE — TELEPHONE ENCOUNTER
ANTICOAGULATION  MANAGEMENT- Home Care/Care Facility Result    Assessment     Today's INR result of 1.7 is Subtherapeutic (goal INR of 2.0-3.0)        Warfarin taken as previously instructed    No new diet changes affecting INR    No new medication/supplements affecting INR    Continues to tolerate warfarin with no reported s/s of bleeding or thromboembolism     Previous INR was Therapeutic    Plan:     Spoke with Home Care Nurse Esme discussed INR result and instructed:     Warfarin Dosing Instructions: 1.5 mg booster dose today then continue current warfarin dose    1.5 mg every Mon; 1 mg all other days     (0 % change)    Next INR to be drawn: one week    Education provided: importance of therapeutic range and target INR goal and significance of current INR result    Esme verbalizes understanding and agrees to warfarin dosing plan.   ?   Miriam Huggins RN    Subjective/Objective:      Kody Johns, a 70 y.o. male is established on warfarin.     Home care/care facility RN's report of [facility] INR, recent warfarin dosing, diet changes, medication changes, and symptoms is documented below.    Additional findings: none    Anticoagulation Episode Summary     Current INR goal:   2.0-3.0   TTR:   66.3 % (9.7 mo)   Next INR check:   1/28/2020   INR from last check:   1.70! (1/21/2020)   Weekly max warfarin dose:      Target end date:      INR check location:      Preferred lab:      Send INR reminders to:   [facility]    Indications    A-fib (H) [I48.91]  Aortic valve disease  rheumatic [I06.9]  Mitral valve stenosis  unspecified etiology [I05.0]  Silent Stroke [I66.9]           Comments:            Anticoagulation Care Providers     Provider Role Specialty Phone number    Aristides Alegria MD [facility] [phone]    Haja Ascencio MD  Cardiology [phone]

## 2021-06-05 NOTE — PATIENT INSTRUCTIONS - HE
Kody Johns,    It was a pleasure to see you today at the [facility].     My recommendations after this visit include:    - Stop Furosemide      - Your lab result will be call with recommendations    - Follow up with Dr. Lang in May    - Schedule Holter study    - Please call [phone], if you have any questions or concerns    Adarsh Jaramillo, CNP

## 2021-06-05 NOTE — PROGRESS NOTES
Subjective: This patient comes in for follow-up.  He said the previous aortic and mitral valve replacement.  He had the CVA with left hemiparesis postop.    He needs a hospital bed he is here with his caregiver and they are working on that they will let me know if they need any further orders.    He has ongoing problems with balance issues also has congestive heart failure and needs ability to adjust the bad because of that.    He has not used the G-tube over the last month.  I was unable to remove this here this needs to be done with radiology and referral was given.    Patient has had some ongoing balance issues and weakness so I did give him referral for some additional physical therapy at home.  He is homebound.    Patient has been on magnesium oxide 400 mg 1 a day I did recheck a level.    Previous labs from 1/29/2020 show thyroid normal liver tests were hemolyzed I did have them recheck the liver tests today.    Patient previously had quite high liver tests in the 100-200 range with ALT 7250 on the AST    These have much improved please see below.    He is now off the Lasix.    He has ongoing protein calorie malnutrition his weight is at 105 encouraged to continue to try to increase protein and carbohydrates.    Left-sided weakness unchanged from previous.        Tobacco status: He  reports that he quit smoking about 5 years ago. His smoking use included cigarettes. He has a 50.00 pack-year smoking history. He has quit using smokeless tobacco.    Patient Active Problem List    Diagnosis Date Noted     Encounter for attention to gastrostomy (H) 02/09/2020     Urinary incontinence without sensory awareness 10/14/2019     S/P AVR 07/09/2019     H/O mitral valve replacement 07/09/2019     Metabolic encephalopathy 05/20/2019     Encephalopathy 05/17/2019     Atrial flutter with rapid ventricular response (H) 05/14/2019     Intracerebral bleed (H) 05/13/2019     Hemorrhagic stroke (H) 05/13/2019     Tracheostomy  care (H)      Acute respiratory failure (H) 05/10/2019     Acute cardioembolic stroke (H)      Paralytic ileus (H) 05/02/2019     Small bowel obstruction (H)      Atrial fibrillation with RVR (H)      Hypernatremia      Thrombocytopenia (H)      Sepsis (H)      S/P MVR (mitral valve repair) 04/24/2019     Acute respiratory failure with hypoxemia (H) 04/24/2019     Anemia due to blood loss, acute 04/24/2019     Coagulopathy (H) 04/24/2019     Non-English speaking patient 04/24/2019     Respiratory failure (H) 03/21/2019     A-fib (H) 09/14/2017     Heart failure with preserved ejection fraction (H) 09/14/2017     Moderate malnutrition (H) 08/31/2017     ALEENA (acute kidney injury) (H) 08/31/2017     Aortic valve disease, rheumatic 08/30/2017     Lightheadedness 08/30/2017     Atherosclerosis of native coronary artery of native heart without angina pectoris      Essential hypertension with goal blood pressure less than 130/85      Pure hypercholesterolemia      Dysuria 08/29/2017     Hypothyroidism, unspecified type      Mitral valve stenosis, unspecified etiology      Elevated LFTs 08/26/2016     Silent Stroke      Shoulder strain      Dysphagia      Hyperlipidemia      Rheumatic heart disease      Blurry vision      Hearing loss      Nonspecific reaction to tuberculin skin test without active tuberculosis        Current Outpatient Medications   Medication Sig Dispense Refill     acetaminophen (TYLENOL) 500 MG tablet Take 1 tablet (500 mg total) by mouth every 6 (six) hours as needed for pain. 60 tablet 2     atorvastatin (LIPITOR) 10 MG tablet 1 po qd 30 tablet 6     baclofen (LIORESAL) 10 MG tablet TAKE 1 PILL BY MOUTH 2 TIMES EVERYDAY 360 tablet 0     ferrous sulfate 325 (65 FE) MG tablet 1 p.o. twice daily 60 tablet 5     gabapentin (NEURONTIN) 100 MG capsule 1 po three times a day for nerve pain 90 capsule 5     levothyroxine (SYNTHROID, LEVOTHROID) 88 MCG tablet Take 1 tablet (88 mcg total) by mouth daily. For  thyroid. 90 tablet 3     magnesium oxide (MAG-OX) 400 mg (241.3 mg magnesium) tablet TAKE 1 PILL BY MOUTH EVERYDAY 30 tablet 3     meclizine (ANTIVERT) 25 mg tablet 1 po two times a day prn dizziness 40 tablet 1     omeprazole (PRILOSEC) 20 MG capsule TAKE 1 PILL BY MOUTH EVERYDAY FOR STOMACH 90 capsule 2     polyethylene glycol (GLYCOLAX) 17 gram/dose powder 17 gm in 8 oz water daily (Patient taking differently: 17 gm in 8 oz water daily as needed      ) 510 g 6     senna-docusate (PERICOLACE) 8.6-50 mg tablet Take 1 tablet by mouth daily.       sertraline (ZOLOFT) 50 MG tablet Take 1 tablet (50 mg total) by mouth daily. Overdue for return visit.  Call 24/7 30 tablet 2     simethicone (MYLICON,GAS-X) 180 mg capsule 1 po two times a day prn abdominal gas 60 capsule 3     tamsulosin (FLOMAX) 0.4 mg cap Take 1 capsule (0.4 mg total) by mouth daily after supper. 90 capsule 3     warfarin (COUMADIN/JANTOVEN) 1 MG tablet Take 1 tablet (1 mg total) by mouth daily. Adjust dose based on INR results as directed. 90 tablet 1     No current facility-administered medications for this visit.        ROS: 10 point review of systems positive as outlined above otherwise negative  Objective:    /80 (Patient Site: Right Arm, Patient Position: Sitting, Cuff Size: Adult Small)   Pulse (!) 54   Temp 97.8  F (36.6  C) (Oral)   Wt 105 lb (47.6 kg)   SpO2 97%   BMI 19.84 kg/m    Body mass index is 19.84 kg/m .      General appearance no acute distress    HEENT neck is supple no adenopathy oropharynx was clear skin without jaundice change    O2 sat 97% heart was regular rate in the 60 range she does have a aortic valve sounds status post mitral and aortic valve replacement.    Is got some left-sided weakness as before    Abdominal pain negative he does have the G-tube being unable to remove referred to radiology.    No significant edema.    Balance difficulties because of the CVA.  He is unable to get up and out of the chair  easily.  He does need help with this.  He has had some deconditioning I believe and will await physical therapy input    Labs today showed LFTs much improved with AST 42 ALT 58    Magnesium at the low end of normal at 1.8 continue on oral supplementation of that.        Results for orders placed or performed in visit on 02/06/20   Hepatic Profile   Result Value Ref Range    Bilirubin, Total 0.5 0.0 - 1.0 mg/dL    Bilirubin, Direct 0.2 <=0.5 mg/dL    Protein, Total 7.2 6.0 - 8.0 g/dL    Albumin 3.2 (L) 3.5 - 5.0 g/dL    Alkaline Phosphatase 84 45 - 120 U/L    AST 42 (H) 0 - 40 U/L    ALT 58 (H) 0 - 45 U/L   Magnesium   Result Value Ref Range    Magnesium 1.8 1.8 - 2.6 mg/dL     *Note: Due to a large number of results and/or encounters for the requested time period, some results have not been displayed. A complete set of results can be found in Results Review.       Assessment:  1. Cerebrovascular accident (CVA), unspecified mechanism (H)     2. Moderate protein-calorie malnutrition (H)     3. Chronic heart failure with preserved ejection fraction (H)     4. Paroxysmal atrial fibrillation (H)     5. S/P MVR (mitral valve repair)  Ambulatory referral to Home Health   6. S/P AVR  Ambulatory referral to Home Health   7. Elevated LFTs  Hepatic Profile   8. Hypomagnesemia  Magnesium   9. Physical deconditioning  Ambulatory referral to Home Health   10. Left hemiparesis (H)     11. Encounter for attention to gastrostomy (H)     12. Hypothyroidism, unspecified type       History of aortic and mitral valve replacement with postop CVA issues ongoing left hemiparesis and deconditioning please see above regarding physical therapy and hospital bed.    History of atrial fibrillation presently sounds regular    Hypo-magnesium continue on magnesium oxide 400 mg 1 a day    Elevated liver tests nearly back now down to normal.    Gastrostomy tube okay to remove referred to radiology    CHF compensated    History of hypothyroid on  therapeutic dose 88 mcg.    Plan: As outlined above follow-up with me in 3 months    This transcription uses voice recognition software, which may contain typographical errors.

## 2021-06-05 NOTE — TELEPHONE ENCOUNTER
INR result is 1.7  INR   Date Value Ref Range Status   01/28/2020 1.70 (!) 0.9 - 1.1 Final       Will the patient be seen, or did they already see, MD or CNP today? No    Most Recent Warfarin dose day/week  Sunday Monday Tuesday Wednesday Thursday Friday Saturday     1.5 1 1.5 1 1     Sunday Monday Tuesday Wednesday Thursday Friday Saturday   1 1.5            Has the patient missed any doses of Coumadin, Warfarin, Jantoven in the past 7 days? No    Has the patients medications changed since the last visit? No    Has the patient experienced any bleeding recently? No    Has the patient experienced any injuries or illness recently? No    Has the patient experienced any 'new' shortness of breath, severe headaches, or changes in vision recently? No    Has the patient had any changes in their diet, or alcohol consumption? No    Is the patient here today to prepare for any type of upcoming surgery, procedure, or for a cardioversion procedure? No    What phone number can we reach the patient at today? Esme [phone]

## 2021-06-05 NOTE — PROGRESS NOTES
Copied communication from a result:     ===View-only below this line===  ----- Message -----  From: Adarsh Jaramillo NP  Sent: 1/20/2020   8:06 AM CST  To: AMY Holder,  Now that Holter monitor is back, could have Holter Study arrange for pt sometime early this week. I am seeing him in clinic next Tuesday.    Thank you!  CY

## 2021-06-05 NOTE — TELEPHONE ENCOUNTER
Please contact the patient's family, they specifically asked for physical therapy for some deconditioning regarding his previous CVA.    I think he would benefit from this.  Please try to coordinate this and if I need to put in a new order let me know

## 2021-06-05 NOTE — TELEPHONE ENCOUNTER
INR result is    1.7  INR   Date Value Ref Range Status   01/21/2020 1.70 (!) 0.9 - 1.1 Final       Will the patient be seen, or did they already see, MD or CNP today? No    Most Recent Warfarin dose day/week  Sunday Monday Tuesday Wednesday Thursday Friday Saturday     1.5 1 1 1 1     Sunday Monday Tuesday Wednesday Thursday Friday Saturday   1 1.5            Has the patient missed any doses of Coumadin, Warfarin, Jantoven in the past 7 days? No    Has the patients medications changed since the last visit? No    Has the patient experienced any bleeding recently? No    Has the patient experienced any injuries or illness recently? No    Has the patient experienced any 'new' shortness of breath, severe headaches, or changes in vision recently? Yes headaches and dizziness is constant     Has the patient had any changes in their diet, or alcohol consumption? No    Is the patient here today to prepare for any type of upcoming surgery, procedure, or for a cardioversion procedure? No    What phone number can we reach the patient at today? home phone listed in demographics.

## 2021-06-05 NOTE — TELEPHONE ENCOUNTER
[facility] is unable to reach this patient to set up home Physical therapy.    The orders have  and will be discarded.    If patient  does call us back we will instruct them to contact the clinic for a new referral to be processed.    Thank you

## 2021-06-05 NOTE — TELEPHONE ENCOUNTER
ANTICOAGULATION  MANAGEMENT- Home Care/Care Facility Result    Assessment     Today's INR result of 2.6 is Therapeutic (goal INR of 2.0-3.0)        Warfarin taken as previously instructed    No new diet changes affecting INR    No new medication/supplements affecting INR    Continues to tolerate warfarin with no reported s/s of bleeding or thromboembolism     Previous INR was Therapeutic    Plan:     Spoke with home care nurse Karen discussed INR result and instructed:     Warfarin Dosing Instructions: Continue current warfarin dose 1.5 mg daily on Mon; and 1 mg daily rest of week  (0 % change)    Next INR to be drawn: two weeks    Education provided: importance of therapeutic range    Karen verbalizes understanding and agrees to warfarin dosing plan.   ?   Swapna Phan RN    Subjective/Objective:      Kody Johns, a 70 y.o. male is established on warfarin.     Home care/care facility RN's report of [facility] INR, recent warfarin dosing, diet changes, medication changes, and symptoms is documented below.    Additional findings: none    Anticoagulation Episode Summary     Current INR goal:   2.0-3.0   TTR:   66.5 % (9.7 mo)   Next INR check:   1/21/2020   INR from last check:   2.60 (1/7/2020)   Weekly max warfarin dose:      Target end date:      INR check location:      Preferred lab:      Send INR reminders to:   [facility]    Indications    A-fib (H) [I48.91]  Aortic valve disease  rheumatic [I06.9]  Mitral valve stenosis  unspecified etiology [I05.0]  Silent Stroke [I66.9]           Comments:            Anticoagulation Care Providers     Provider Role Specialty Phone number    Aristides Alegria MD [facility] Family Medicine [phone]    Haja Ascencio MD  Cardiology [phone]

## 2021-06-05 NOTE — TELEPHONE ENCOUNTER
INR result is   INR   Date Value Ref Range Status   01/07/2020 2.60 (!) 0.9 - 1.1 Final     1/21/20          1.7    Will the patient be seen, or did they already see, MD or CNP today? No    Most Recent Warfarin dose day/week  Sunday Monday Tuesday Wednesday Thursday Friday Saturday     1 mg 1 mg 1 mg 1 mg 1 mg     Sunday Monday Tuesday Wednesday Thursday Friday Saturday   1 mg 1.5 mg            Has the patient missed any doses of Coumadin, Warfarin, Jantoven in the past 7 days? No    Has the patients medications changed since the last visit? No    Has the patient experienced any bleeding recently? No    Has the patient experienced any injuries or illness recently? No    Has the patient experienced any 'new' shortness of breath, severe headaches, or changes in vision recently? No    Has the patient had any changes in their diet, or alcohol consumption? No    Is the patient here today to prepare for any type of upcoming surgery, procedure, or for a cardioversion procedure? No    What phone number can we reach the patient at today? [facility]  [phone].

## 2021-06-06 NOTE — TELEPHONE ENCOUNTER
RN cannot approve Refill Request    RN can NOT refill this medication med is not covered by policy/route to provider and Protocol failed and NO refill given.      Vicky Benton, Care Connection Triage/Med Refill 3/12/2020    Requested Prescriptions   Pending Prescriptions Disp Refills     warfarin ANTICOAGULANT (COUMADIN/JANTOVEN) 1 MG tablet [Pharmacy Med Name: WARFARIN SODIUM 1 MG TABLET 1 TAB] 90 tablet 1     Sig: TAKE 1 TABLET (1 MG TOTAL) BY MOUTH DAILY. ADJUST DOSE BASED ON INR RESULTS AS DIRECTED.       Warfarin Refill Protocol  Failed - 3/8/2020 10:40 AM        Failed -  Route to appropriate pool/provider     Last Anticoagulation Summary:   Anticoagulation Episode Summary     Current INR goal:   2.0-3.0   TTR:   53.4 % (9.7 mo)   Next INR check:   3/24/2020   INR from last check:   2.50 (3/10/2020)   Weekly max warfarin dose:      Target end date:      INR check location:      Preferred lab:      Send INR reminders to:   [facility]    Indications    A-fib (H) [I48.91]  Aortic valve disease  rheumatic [I06.9]  Mitral valve stenosis  unspecified etiology [I05.0]  Silent Stroke [I66.9]           Comments:            Anticoagulation Care Providers     Provider Role Specialty Phone number    Aristides Alegria MD Referring Family Medicine [phone]    AscencioHaja ca MD  Cardiology [phone]                Passed - Provider visit in last year     Last office visit with prescriber/PCP: 2/6/2020 Aristides Alegria MD OR same dept: 2/6/2020 Aristides Alegria MD OR same specialty: 2/6/2020 Aristides Alegria MD  Last physical: 4/15/2019 Last MTM visit: Visit date not found    Next appt within 3 mo: Visit date not found Next physical within 3 mo: Visit date not found  Prescriber OR PCP: Aristides Alegria MD  Last diagnosis associated with med order: 1. Atrial fibrillation, unspecified type (H)  - warfarin ANTICOAGULANT (COUMADIN/JANTOVEN) 1 MG tablet [Pharmacy Med Name: WARFARIN SODIUM 1 MG TABLET 1 TAB];  Take 1 tablet (1 mg total) by mouth daily. Adjust dose based on INR results as directed.  Dispense: 90 tablet; Refill: 1    2. Low magnesium level  - magnesium oxide (MAG-OX) 400 mg (241.3 mg magnesium) tablet [Pharmacy Med Name: MAGNESIUM OXIDE 400 MG  (241.3 TAB]; TAKE 1 PILL BY MOUTH EVERYDAY  Dispense: 30 tablet; Refill: 3    If protocol passes may refill for 6 months if within 3 months of last provider visit (or a total of 9 months).             magnesium oxide (MAG-OX) 400 mg (241.3 mg magnesium) tablet [Pharmacy Med Name: MAGNESIUM OXIDE 400 MG  (241.3 TAB] 30 tablet 3     Sig: TAKE 1 PILL BY MOUTH EVERYDAY       There is no refill protocol information for this order

## 2021-06-06 NOTE — TELEPHONE ENCOUNTER
Called and spoke with Daughter in [facility]. Message was given. No other questions. She understood the message.       Used  line to contact patient. : Vianca ID: [id number].

## 2021-06-06 NOTE — TELEPHONE ENCOUNTER
ANTICOAGULATION  MANAGEMENT- Home Care/Care Facility Result    Assessment     Today's INR result of 1.8 is Subtherapeutic (goal INR of 2.0-3.0)        Warfarin taken as previously instructed    No new diet changes affecting INR    No new medication/supplements affecting INR    Continues to tolerate warfarin with no reported s/s of bleeding or thromboembolism     Burry vision at times - per patient's brother  this is not new - patient was seen by MD about 3 months ago and encouraged to follow up with  eye MD again.    Previous INR was low Therapeutic    Plan:     Spoke with Home Care Nurse Esme discussed INR result and instructed:     Warfarin Dosing Instructions: Change warfarin dose to    1 mg every Sun, Thu; 1.5 mg all other days     (6 % change)    Next INR to be drawn: one week    Education provided: importance of therapeutic range and target INR goal and significance of current INR result    Esme verbalizes understanding and agrees to warfarin dosing plan.   ?   Miriam Huggins RN    Subjective/Objective:      oKdy Johns, a 70 y.o. male is established on warfarin.     Home care/care facility RN's report of [facility] INR, recent warfarin dosing, diet changes, medication changes, and symptoms is documented below.    Additional findings: see above    Anticoagulation Episode Summary     Current INR goal:   2.0-3.0   TTR:   54.4 % (9.7 mo)   Next INR check:   3/3/2020   INR from last check:   1.80! (2/25/2020)   Weekly max warfarin dose:      Target end date:      INR check location:      Preferred lab:      Send INR reminders to:   [facility]    Indications    A-fib (H) [I48.91]  Aortic valve disease  rheumatic [I06.9]  Mitral valve stenosis  unspecified etiology [I05.0]  Silent Stroke [I66.9]           Comments:            Anticoagulation Care Providers     Provider Role Specialty Phone number    Aristides Alegria MD Referring Family Medicine [phone]    Haja Ascencio MD  Cardiology [phone]

## 2021-06-06 NOTE — TELEPHONE ENCOUNTER
INR result is 2.0  INR   Date Value Ref Range Status   02/11/2020 1.90 (!) 0.9 - 1.1 Final       Will the patient be seen, or did they already see, MD or CNP today? No    Most Recent Warfarin dose day/week  Sunday Monday Tuesday Wednesday Thursday Friday Saturday     1 mg 1.5 mg 1 mg 1.5 mg 1.5 mg     Sunday Monday Tuesday Wednesday Thursday Friday Saturday   1 mg  1.5 mg            Has the patient missed any doses of Coumadin, Warfarin, Jantoven in the past 7 days? No    Has the patients medications changed since the last visit? No    Has the patient experienced any bleeding recently? No    Has the patient experienced any injuries or illness recently? No    Has the patient experienced any 'new' shortness of breath, severe headaches, or changes in vision recently? No    Has the patient had any changes in their diet, or alcohol consumption? No    Is the patient here today to prepare for any type of upcoming surgery, procedure, or for a cardioversion procedure? No    What phone number can we reach the patient at today? [phone] Esme.

## 2021-06-06 NOTE — TELEPHONE ENCOUNTER
I signed another order.    Please contact [facility] to let them know that we would like them to evaluate the patient.    Let them know the contact and the best time for him to be reached

## 2021-06-06 NOTE — TELEPHONE ENCOUNTER
Refill Approved    Rx renewed per Medication Renewal Policy. Medication was last renewed on 7/23/19 #30 R-6.    Last OV 2/6/20    Mary Barclay, [facility] Triage/Med Refill 2/13/2020     Requested Prescriptions   Pending Prescriptions Disp Refills     atorvastatin (LIPITOR) 10 MG tablet [Pharmacy Med Name: ATORVASTATIN 10 MG TABLET 10 TAB] 30 tablet 6     Sig: TAKE 1 PILL BY MOUTH EVERY DAY FOR CHOLESTEROL       Statins Refill Protocol (Hmg CoA Reductase Inhibitors) Passed - 2/9/2020 11:38 AM        Passed - PCP or prescribing provider visit in past 12 months      Last office visit with prescriber/PCP: 2/6/2020 Aristides Alegria MD OR same dept: 2/6/2020 Aristides Alegria MD OR same specialty: 2/6/2020 Aristides Alegria MD  Last physical: 4/15/2019 Last MTM visit: Visit date not found   Next visit within 3 mo: Visit date not found  Next physical within 3 mo: Visit date not found  Prescriber OR PCP: Aristides Alegria MD  Last diagnosis associated with med order: 1. Atherosclerosis of native coronary artery of native heart without angina pectoris  - atorvastatin (LIPITOR) 10 MG tablet [Pharmacy Med Name: ATORVASTATIN 10 MG TABLET 10 TAB]; TAKE 1 PILL BY MOUTH EVERY DAY FOR CHOLESTEROL  Dispense: 30 tablet; Refill: 6    2. Pure hypercholesterolemia  - atorvastatin (LIPITOR) 10 MG tablet [Pharmacy Med Name: ATORVASTATIN 10 MG TABLET 10 TAB]; TAKE 1 PILL BY MOUTH EVERY DAY FOR CHOLESTEROL  Dispense: 30 tablet; Refill: 6    If protocol passes may refill for 12 months if within 3 months of last provider visit (or a total of 15 months).

## 2021-06-06 NOTE — TELEPHONE ENCOUNTER
ANTICOAGULATION  MANAGEMENT- Home Care/Care Facility Result    Assessment     Today's INR result of 2.5 is Therapeutic (goal INR of 2.0-3.0)        Warfarin taken as previously instructed    No new diet changes affecting INR    No new medication/supplements affecting INR    Continues to tolerate warfarin with no reported s/s of bleeding or thromboembolism     Previous INR was Subtherapeutic    Plan:     Spoke with Esme (home care nurse) discussed INR result and instructed:     Warfarin Dosing Instructions: Continue current warfarin dose 1 mg daily on Sun/Thurs; and 1.5 mg daily rest of week  (0 % change)    Next INR to be drawn: one week    Education provided: importance of therapeutic range    Esme verbalizes understanding and agrees to warfarin dosing plan.   ?   Swapna Phna RN    Subjective/Objective:      Kody Johns, a 70 y.o. male is established on warfarin.     Home care/care facility RN's report of [facility] INR, recent warfarin dosing, diet changes, medication changes, and symptoms is documented below.    Additional findings: none      Anticoagulation Episode Summary     Current INR goal:   2.0-3.0   TTR:   53.7 % (9.7 mo)   Next INR check:   3/10/2020   INR from last check:   2.50 (3/3/2020)   Weekly max warfarin dose:      Target end date:      INR check location:      Preferred lab:      Send INR reminders to:   [facility]    Indications    A-fib (H) [I48.91]  Aortic valve disease  rheumatic [I06.9]  Mitral valve stenosis  unspecified etiology [I05.0]  Silent Stroke [I66.9]           Comments:            Anticoagulation Care Providers     Provider Role Specialty Phone number    Aristides Alegria MD Referring Family Medicine [phone]    Haja Ascencio MD  Cardiology [phone]

## 2021-06-06 NOTE — TELEPHONE ENCOUNTER
Lab Results   Component Value Date    INR 2.50 (!) 03/10/2020    INR 2.50 (!) 03/03/2020    INR 1.80 (!) 02/25/2020         Next INR advised: 3/24    Current warfarin dose per anticoagulation flowsheet:   Outpatient Anticoagulation Plan Latest Ref Rng & Units 3/10/2020   ANTICOAG FULL INSTRUCTIONS  1 mg every Sun, Thu; 1.5 mg all other days       Last health maintenance OV/physical exam: 2/6    Patient is up to date.  Warfarin refilled per protocol.

## 2021-06-06 NOTE — PROGRESS NOTES
Clinic Care Coordination Contact  [facility]/Voicemail       Clinical Data: Care Coordinator Outreach  Outreach attempted x 2.  Left message on patient's voicemail with call back information and requested return call.  Plan: Care Coordinator will send disenrollment letter with care coordinator contact information via mail. Care Coordinator will do no further outreaches at this time.

## 2021-06-06 NOTE — TELEPHONE ENCOUNTER
INR result is     1.8   INR   Date Value Ref Range Status   02/18/2020 2.00 (!) 0.9 - 1.1 Final       Will the patient be seen, or did they already see, MD or CNP today? No    Most Recent Warfarin dose day/week  Sunday Monday Tuesday Wednesday Thursday Friday Saturday     1 1.5 1 1.5 1.5     Sunday Monday Tuesday Wednesday Thursday Friday Saturday   1 1.5            Has the patient missed any doses of Coumadin, Warfarin, Jantoven in the past 7 days? No    Has the patients medications changed since the last visit? No    Has the patient experienced any bleeding recently? No    Has the patient experienced any injuries or illness recently? No    Has the patient experienced any 'new' shortness of breath, severe headaches, or changes in vision recently? Yes. Blurry visions at times    Has the patient had any changes in their diet, or alcohol consumption? No    Is the patient here today to prepare for any type of upcoming surgery, procedure, or for a cardioversion procedure? No    What phone number can we reach the patient at today? home phone listed in demographics.

## 2021-06-06 NOTE — TELEPHONE ENCOUNTER
ANTICOAGULATION  MANAGEMENT- Home Care/Care Facility Result    Assessment     Today's INR result of 2.0 is Therapeutic (goal INR of 2.0-3.0)        Warfarin taken as previously instructed    No new diet changes affecting INR    No new medication/supplements affecting INR    Continues to tolerate warfarin with no reported s/s of bleeding or thromboembolism     Previous INR was Subtherapeutic    Plan:     Spoke with Esme discussed INR result and instructed:     Warfarin Dosing Instructions: Continue current warfarin dose    1 mg every Sun, Tue, Thu; 1.5 mg all other days         Next INR to be drawn: 1 week    Education provided: target INR goal and significance of current INR result    Esme verbalizes understanding and agrees to warfarin dosing plan.   ?   Jessica Garza RN    Subjective/Objective:      Kody Johns, a 70 y.o. male is established on warfarin.     Home care/care facility RN's report of [facility] INR, recent warfarin dosing, diet changes, medication changes, and symptoms is documented below.    Additional findings: none    Anticoagulation Episode Summary     Current INR goal:   2.0-3.0   TTR:   56.8 % (9.7 mo)   Next INR check:   2/25/2020   INR from last check:   2.00 (2/18/2020)   Weekly max warfarin dose:      Target end date:      INR check location:      Preferred lab:      Send INR reminders to:   [facility]    Indications    A-fib (H) [I48.91]  Aortic valve disease  rheumatic [I06.9]  Mitral valve stenosis  unspecified etiology [I05.0]  Silent Stroke [I66.9]           Comments:            Anticoagulation Care Providers     Provider Role Specialty Phone number    Aristides Alegria MD Referring Family Medicine [phone]    Haja Ascencio MD  Cardiology [phone]

## 2021-06-06 NOTE — TELEPHONE ENCOUNTER
Pt's last OFV 2/6/2020. Medication was last sent on 7/16/19.     DOD: Dr. Howard, Medication refill requested. Please authorize medication if appropriate. Thank you.

## 2021-06-06 NOTE — TELEPHONE ENCOUNTER
INR result is  1.9  INR   Date Value Ref Range Status   02/04/2020 1.70 (!) 0.9 - 1.1 Final       Will the patient be seen, or did they already see, MD or CNP today? No    Most Recent Warfarin dose day/week  Sunday Monday Tuesday Wednesday Thursday Friday Saturday     1.5 1.5 1.02 1.5 1.0     Sunday Monday Tuesday Wednesday Thursday Friday Saturday   1.0 1.5            Has the patient missed any doses of Coumadin, Warfarin, Jantoven in the past 7 days? No    Has the patients medications changed since the last visit? No    Has the patient experienced any bleeding recently? No    Has the patient experienced any injuries or illness recently? No    Has the patient experienced any 'new' shortness of breath, severe headaches, or changes in vision recently? No    Has the patient had any changes in their diet, or alcohol consumption? No    Is the patient here today to prepare for any type of upcoming surgery, procedure, or for a cardioversion procedure? No    What phone number can we reach the patient at today? [facility] [phone]

## 2021-06-06 NOTE — TELEPHONE ENCOUNTER
----- Message from Aristides Alegria MD sent at 2/9/2020  1:51 PM CST -----  Please contact this patient and his family, let them know that the liver tests look much better.    Also the magnesium level is at the low end of normal so stay on the same magnesium 1 a day.    Follow-up with me in 3 months or earlier if needed

## 2021-06-06 NOTE — TELEPHONE ENCOUNTER
ANTICOAGULATION  MANAGEMENT- Home Care/Care Facility Result    Assessment     Today's INR result of 1.9 is Subtherapeutic (goal INR of 2.0-3.0)        Warfarin taken as previously instructed    No new diet changes affecting INR    No new medication/supplements affecting INR    Continues to tolerate warfarin with no reported s/s of bleeding or thromboembolism     Previous INR was Subtherapeutic    Plan:     Spoke with home care nurse Esme discussed INR result and instructed:     Warfarin Dosing Instructions: Change warfarin dose to 1 mg daily on Sun, Tue, Thur; and 1.5 mg daily rest of week  (5.9 % change)    Next INR to be drawn: 1 week.    Education provided: importance of taking warfarin as instructed    Esme verbalizes understanding and agrees to warfarin dosing plan.   ?   Baldo Marquez RN    Subjective/Objective:      Kody Johns, a 70 y.o. male is established on warfarin.     Home care/care facility RN's report of [facility] INR, recent warfarin dosing, diet changes, medication changes, and symptoms is documented below.    Additional findings: verbally confirmed home dose with Esme and updated on anticoagulation calendar    Anticoagulation Episode Summary     Current INR goal:   2.0-3.0   TTR:   59.2 % (9.7 mo)   Next INR check:   2/18/2020   INR from last check:   1.90! (2/11/2020)   Weekly max warfarin dose:      Target end date:      INR check location:      Preferred lab:      Send INR reminders to:   [facility]    Indications    A-fib (H) [I48.91]  Aortic valve disease  rheumatic [I06.9]  Mitral valve stenosis  unspecified etiology [I05.0]  Silent Stroke [I66.9]           Comments:            Anticoagulation Care Providers     Provider Role Specialty Phone number    Aristides Alegria MD Referring Family Medicine [phone]    Haja Ascencio MD  Cardiology [phone]

## 2021-06-06 NOTE — PROGRESS NOTES
GENERAL SURGICAL CONSULTATION    I was requested by Aristides Alegria MD to consult on this pt to evaluate them for G-tube removal    HPI:  This is a 70 y.o. male here today with  This patient comes in for follow-up.  He said the previous aortic and mitral valve replacement.  He had the CVA with left hemiparesis postop.     He needs a hospital bed he is here with his caregiver and they are working on that they will let me know if they need any further orders.     He has ongoing problems with balance issues also has congestive heart failure and needs ability to adjust the bad because of that.     He has not used the G-tube over the last month.  They attempted removal in his PCP office but the pt was then sent here for removal of the G-Tube.    Allergies:Patient has no known allergies.    Past Medical History:   Diagnosis Date     ALEENA (acute kidney injury) (H)      Anemia due to blood loss, acute 4/24/2019     Asthma      Atrial fibrillation (H)      Blurry vision      CHF (congestive heart failure) (H)      Chronic kidney disease      Coronary artery disease      Dysphagia     resolved     Dysuria      Hearing loss      Heart murmur      Heart murmur      Hyperlipidemia      Hypertension      Hypothyroidism      Mitral valve stenosis      Moderate malnutrition (H)      Palpitations      Rheumatic heart disease      Shortness of breath     exertion     Stroke (H)     Silent     Valvular disease        Past Surgical History:   Procedure Laterality Date     AORTIC VALVE REPLACEMENT N/A 4/24/2019    Procedure: AORTIC VALVE REPLACEMENT;  Surgeon: Jojo Vaughn MD;  Location: [facility] OR;  Service: Cardiovascular     CARDIAC CATHETERIZATION       CATARACT EXTRACTION Left 06/13/2016     CV CORONARY ANGIOGRAM N/A 11/30/2018    Procedure: Coronary Angiogram;  Surgeon: Jeff Deleon MD;  Location: [facility] Cath Lab;  Service: Cardiology     EYE SURGERY      cataract     PICC AND MIDLINE TEAM LINE  INSERTION  4/29/2019          [place] EGD PERCUTANEOUS PLACEMENT GASTROSTOMY TUBE N/A 5/8/2019    Procedure: CREATION, GASTROSTOMY, PERCUTANEOUS, ENDOSCOPIC;  Surgeon: Chandana Kang MD;  Location: [facility];  Service: General     [place] REPLACEMENT OF MITRAL VALVE N/A 4/24/2019    Procedure: MITRAL VALVE REPLACEMENT, ANESTHESIA, TAKEDOWN OF PERICARDIAL ADHESIONS AND REINFORCEMENT OF KLS PLATING SYSTEM TRANESOPHAGEAL ECHOCARDIOGRAM AND EPI AORTIC ULTRASOUND;  Surgeon: Jojo Vaughn MD;  Location: [facility] OR;  Service: Cardiovascular     TRACHEOSTOMY N/A 5/8/2019    Procedure: TRACHEOSTOMY;  Surgeon: Chandana Kang MD;  Location: [facility];  Service: General       CURRENT MEDS:  Current Outpatient Medications   Medication Sig Dispense Refill     acetaminophen (TYLENOL) 500 MG tablet Take 1 tablet (500 mg total) by mouth every 6 (six) hours as needed for pain. 60 tablet 2     atorvastatin (LIPITOR) 10 MG tablet 1 po qd 30 tablet 6     baclofen (LIORESAL) 10 MG tablet TAKE 1 PILL BY MOUTH 2 TIMES EVERYDAY 360 tablet 0     ferrous sulfate 325 (65 FE) MG tablet 1 p.o. twice daily 60 tablet 5     gabapentin (NEURONTIN) 100 MG capsule 1 po three times a day for nerve pain 90 capsule 5     levothyroxine (SYNTHROID, LEVOTHROID) 88 MCG tablet Take 1 tablet (88 mcg total) by mouth daily. For thyroid. 90 tablet 3     magnesium oxide (MAG-OX) 400 mg (241.3 mg magnesium) tablet TAKE 1 PILL BY MOUTH EVERYDAY 30 tablet 3     meclizine (ANTIVERT) 25 mg tablet 1 po two times a day prn dizziness 40 tablet 1     omeprazole (PRILOSEC) 20 MG capsule TAKE 1 PILL BY MOUTH EVERYDAY FOR STOMACH 90 capsule 2     polyethylene glycol (GLYCOLAX) 17 gram/dose powder 17 gm in 8 oz water daily (Patient taking differently: 17 gm in 8 oz water daily as needed      ) 510 g 6     senna-docusate (PERICOLACE) 8.6-50 mg tablet Take 1 tablet by mouth daily.       sertraline (ZOLOFT) 50 MG tablet Take 1 tablet (50 mg total) by  mouth daily. Overdue for return visit.  Call 24/7 30 tablet 2     simethicone (MYLICON,GAS-X) 180 mg capsule 1 po two times a day prn abdominal gas 60 capsule 3     tamsulosin (FLOMAX) 0.4 mg cap Take 1 capsule (0.4 mg total) by mouth daily after supper. 90 capsule 3     warfarin (COUMADIN/JANTOVEN) 1 MG tablet Take 1 tablet (1 mg total) by mouth daily. Adjust dose based on INR results as directed. 90 tablet 1     No current facility-administered medications for this visit.        Family History   Problem Relation Age of Onset     No Medical Problems Mother      No Medical Problems Father      Heart disease Neg Hx      Family history is not pertinent to this patients Chief Complaint.     reports that he quit smoking about 5 years ago. His smoking use included cigarettes. He has a 50.00 pack-year smoking history. He has quit using smokeless tobacco. He reports that he does not drink alcohol or use drugs.    Review of Systems -   10 point Review of systems is negative except for; as mentioned above in HPI and PMHx    /70 (Patient Site: Right Arm, Patient Position: Sitting, Cuff Size: Adult Regular)   Pulse 65   Wt 104 lb 4.8 oz (47.3 kg)   SpO2 97%   BMI 19.71 kg/m    Body mass index is 19.71 kg/m .    EXAM:  GENERAL: A thin male, left-sided weakness status post a stroke  HEENT: EOMI, Anicteric Sclera, Moist Mucous Membranes,  In Mouth the pt does not have redness or bleeding gums  CARDIOVASCULAR: RRR w/out murmur   CHEST/LUNG: Clear to Auscultation  ABDOMEN:  Non tender to palpation, +BS, PEG tube coming out the left upper quadrant of his abdomen  MUSCULOSKELETAL:  No deformities with good range of motion in all extremities  NEURO: He has left-sided weakness secondary to a stroke  HEME/LYMPH: No Cervical Adenopathy or tenderness.     IMAGES:  None    Assessment/Plan:  This is a 70-year-old gentleman that has a PEG tube in place that was put in after a complicated heart surgery with a stroke.  The patient  did not use this PEG tube for 2 months now and is supporting his nutritional needs well without it.  I will remove this tube in clinic today.    Procedure note:  I position the patient in an inclined position I then put gentle traction on the PEG tube and continue to apply pressure on the tube and tell it came out through the skin of the abdominal wall.  I evaluated the PEG tube can see the mushroom flange was entirely intact.  I then placed a gauze dressing and tape over the wound on the abdominal wall.  I instructed the patient he will likely see drainage from this site for 4 to 5 days.  If the drainage continues for 2 weeks or more he should come back for reevaluation.      Dong Ortega MD  [facility]  [phone]

## 2021-06-06 NOTE — TELEPHONE ENCOUNTER
PT did SOC today and as part of our evaluation we do med rec and need to notify MD of severe interactions or worse.    If the list of medication interactions is known and reasonable, let us know within 24hrs that it is ok.      If there is a problem with the medications listed below please call our clinical coordinator at [phone].    Below is the listed med interactions:  Severe interaction:  Sertraline and warfarin    Also, PT [facility] is asking for verbal ok for PT:2w3 to work on strengthening, gait/transfer training, and balance training.  You can respond to this inbasket with the ok or call and leave a message at [phone].  Thank you

## 2021-06-06 NOTE — TELEPHONE ENCOUNTER
INR result is   INR   Date Value Ref Range Status   03/03/2020 2.50 (!) 0.9 - 1.1 Final       3/10/20                    2.5    Will the patient be seen, or did they already see, MD or CNP today? No    Most Recent Warfarin dose day/week  Sunday Monday Tuesday Wednesday Thursday Friday Saturday     1.5 mg 1.5 mg 1 mg 1.5 mg 1.5 mg     Sunday Monday Tuesday Wednesday Thursday Friday Saturday   1 mg 1.5 mg            Has the patient missed any doses of Coumadin, Warfarin, Jantoven in the past 7 days? No    Has the patients medications changed since the last visit? No    Has the patient experienced any bleeding recently? No    Has the patient experienced any injuries or illness recently? No    Has the patient experienced any 'new' shortness of breath, severe headaches, or changes in vision recently? No    Has the patient had any changes in their diet, or alcohol consumption? No    Is the patient here today to prepare for any type of upcoming surgery, procedure, or for a cardioversion procedure? No    What phone number can we reach the patient at today?   [facility] [phone]

## 2021-06-06 NOTE — TELEPHONE ENCOUNTER
INR result is 2.5  INR   Date Value Ref Range Status   02/25/2020 1.80 (!) 0.9 - 1.1 Final       Will the patient be seen, or did they already see, MD or CNP today? No    Most Recent Warfarin dose day/week  Sunday Monday Tuesday Wednesday Thursday Friday Saturday     1.5 mg 1.5 mg 1 mg 1.5 mg 1.5 mg     Sunday Monday Tuesday Wednesday Thursday Friday Saturday   1 mg 1.5 mg            Has the patient missed any doses of Coumadin, Warfarin, Jantoven in the past 7 days? No    Has the patients medications changed since the last visit? No    Has the patient experienced any bleeding recently? No    Has the patient experienced any injuries or illness recently? No    Has the patient experienced any 'new' shortness of breath, severe headaches, or changes in vision recently? No    Has the patient had any changes in their diet, or alcohol consumption? No    Is the patient here today to prepare for any type of upcoming surgery, procedure, or for a cardioversion procedure? No    What phone number can we reach the patient at today? [phone] Esme.

## 2021-06-06 NOTE — TELEPHONE ENCOUNTER
Called and spoke with Mark on CTC form. Message was given. He stated he has been waiting for a call about home PT and did not get any calls. Best time to get the reach of Mark is after 10am. A new order will be needed Dr. Alegria.

## 2021-06-06 NOTE — PROGRESS NOTES
Scheduled Follow Up Call: Attempt 1 Community Health Worker called and left a message for the patient. If the patient is returning my call, please transfer the patient to [facility] at ext. [phone].

## 2021-06-07 NOTE — TELEPHONE ENCOUNTER
INR result is   2.2    Will the patient be seen, or did they already see, MD or CNP today? No    Most Recent Warfarin dose day/week  Sunday Monday Tuesday Wednesday Thursday Friday Saturday     1.5 1.5 1 1.5 1.5     Sunday Monday Tuesday Wednesday Thursday Friday Saturday   1 1.5            Has the patient missed any doses of Coumadin, Warfarin, Jantoven in the past 7 days? No    Has the patients medications changed since the last visit? No    Has the patient experienced any bleeding recently? No    Has the patient experienced any injuries or illness recently? No    Has the patient experienced any 'new' shortness of breath, severe headaches, or changes in vision recently? No    Has the patient had any changes in their diet, or alcohol consumption? No    Is the patient here today to prepare for any type of upcoming surgery, procedure, or for a cardioversion procedure? No    What phone number can we reach the patient at today? [phone].

## 2021-06-07 NOTE — TELEPHONE ENCOUNTER
INR result is 2.5  INR   Date Value Ref Range Status   04/07/2020 2.90 (!) 0.9 - 1.1 Final       Will the patient be seen, or did they already see, MD or CNP today? No    Most Recent Warfarin dose day/week  Sunday Monday Tuesday Wednesday Thursday Friday Saturday     1.5 1.5 1 1.5 1.5     Sunday Monday Tuesday Wednesday Thursday Friday Saturday   1 1.5            Has the patient missed any doses of Coumadin, Warfarin, Jantoven in the past 7 days? No    Has the patients medications changed since the last visit? No    Has the patient experienced any bleeding recently? No    Has the patient experienced any injuries or illness recently? No    Has the patient experienced any 'new' shortness of breath, severe headaches, or changes in vision recently? No    Has the patient had any changes in their diet, or alcohol consumption? No    Is the patient here today to prepare for any type of upcoming surgery, procedure, or for a cardioversion procedure? No    What phone number can we reach the patient at today? home phone listed in demographics.

## 2021-06-07 NOTE — TELEPHONE ENCOUNTER
ANTICOAGULATION  MANAGEMENT- Home Care/Care Facility Result    Assessment     Today's INR result of 2.5 is Therapeutic (goal INR of 2.0-3.0)        Warfarin taken as previously instructed    No new diet changes affecting INR    No new medication/supplements affecting INR    Continues to tolerate warfarin with no reported s/s of bleeding or thromboembolism     Previous INR was Therapeutic    Plan:     Spoke with Esme, home care nurse discussed INR result and instructed:     Warfarin Dosing Instructions: Continue current warfarin dose 1 mg daily on Sun, Thu; and 1.5 mg daily rest of week  (0 % change)    Next INR to be drawn: 2 weeks    Education provided: target INR goal and significance of current INR result, importance of following up for INR monitoring at instructed interval, importance of taking warfarin as instructed, importance of notifying clinic for changes in medications and importance of notifying clinic for diarrhea, nausea/vomiting, reduced intake and/or illness    Esme verbalizes understanding and agrees to warfarin dosing plan.   ?   Luiza Brewer RN    Subjective/Objective:      Kody Johns, a 70 y.o. male is established on warfarin.     Home care/care facility RN's report of [facility] INR, recent warfarin dosing, diet changes, medication changes, and symptoms is documented below.    Additional findings: none    Anticoagulation Episode Summary     Current INR goal:   2.0-3.0   TTR:   54.4 % (9.7 mo)   Next INR check:   5/5/2020   INR from last check:   2.50 (4/21/2020)   Weekly max warfarin dose:      Target end date:      INR check location:      Preferred lab:      Send INR reminders to:   [facility]    Indications    A-fib (H) [I48.91]  Aortic valve disease  rheumatic [I06.9]  Mitral valve stenosis  unspecified etiology [I05.0]  Silent Stroke [I66.9]           Comments:            Anticoagulation Care Providers     Provider Role Specialty Phone number    Aristides Alegria MD Referring  [facility] [phone]    Haja Ascencio MD  Cardiology [phone]

## 2021-06-07 NOTE — TELEPHONE ENCOUNTER
INR result is 2.8  INR   Date Value Ref Range Status   04/21/2020 2.50 (!) 0.9 - 1.1 Final       Will the patient be seen, or did they already see, MD or CNP today? No    Most Recent Warfarin dose day/week  Sunday Monday Tuesday Wednesday Thursday Friday Saturday     1.5 mg 1.5 mg 1 mg 1.5 mg 1.5 mg     Sunday Monday Tuesday Wednesday Thursday Friday Saturday   1 mg 1.5 mg            Has the patient missed any doses of Coumadin, Warfarin, Jantoven in the past 7 days? No    Has the patients medications changed since the last visit? No    Has the patient experienced any bleeding recently? No    Has the patient experienced any injuries or illness recently? No    Has the patient experienced any 'new' shortness of breath, severe headaches, or changes in vision recently? No    Has the patient had any changes in their diet, or alcohol consumption? No    Is the patient here today to prepare for any type of upcoming surgery, procedure, or for a cardioversion procedure? No    What phone number can we reach the patient at today? [phone] Esme.

## 2021-06-07 NOTE — TELEPHONE ENCOUNTER
Called and spoke with Kody Johns, Message was given, Kody Johns  understood, no further questions.

## 2021-06-07 NOTE — PROGRESS NOTES
[facility] Care Coordination Contact      [facility] Six-Month Telephone Assessment    6 month telephone assessment completed on 3/26/2020.    ER visits: No  Hospitalizations: No  TCU stays: No  Significant health status changes: Care Coordinator spoke to Kody's son, Mark, and he reports Kody's health is slowly improving.  Falls/Injuries: Yes: One fall about 4 months ago.  Kody tried to get out of bed in the middle of the night on his own and fell.  PCA sleeps right by Kody's bed and helped Kody up and he had no injury.  ADL/IADL changes: No  Changes in services: No    Caregiver Assessment follow up:  Care Coordinator called Kody's son, Mark, for his 6 month interim call.  Mark reports that Kody is eating more, appetite has increased and is gaining weight.  Kody's feeding tube was removed about 3 weeks ago because he is doing so well.  Kody was getting nutritional supplements via his tube but since his tube is removed they are no longer supplying him the nutritional supplements.  Mark said Kody really likes them and enjoys drinking them and asked if Kody could get more.  Care Coordinator let Mark know he will need to talk to Kody's doctor to get orders for that.  Kody continues to complain about tingling and pain in his body.  Mark reports Kody had one fall about 4 months ago when he was trying to get out of bed in the middle of the night alone.  The PCA sleeps very close to Kody and was right there.  PCA helped Kody up and Kody had no injury.  Kody has periods of anger and sadness due to his health conditions and what he cannot do anymore that he used to be able to do.  Family and PCA are very supportive to Kody.  Kody continues to get Home Care and SNV and has done his 12 PT sessions.  Family continues to do PT exercises with Kody daily.  Mark reported that they had talked to Kody's doctor about a hospital bed about 6 months ago and he still has not received a bed.  He followed up with the nurse at the  clinic but still hasn't heard anything.  Care Coordinator stated that I will look into this and see where things are at and will let Mark know.  Mark thanked Care Coordinator.  Mark reported no other questions, concerns or needs at this time.      Goals: See POC in chart for goal progress documentation.    Will see member in 6 months for an annual health risk assessment.   Encouraged member to call CC with any questions or concerns in the meantime.     Leilani Ignacio, [facility]  [phone]

## 2021-06-07 NOTE — TELEPHONE ENCOUNTER
INR result is 2.9  INR   Date Value Ref Range Status   03/24/2020 2.20 (!) 0.9 - 1.1 Final       Will the patient be seen, or did they already see, MD or CNP today? No    Most Recent Warfarin dose day/week  Sunday Monday Tuesday Wednesday Thursday Friday Saturday     1.5 1.5 1 1.5 1.5     Sunday Monday Tuesday Wednesday Thursday Friday Saturday   1 1.5            Has the patient missed any doses of Coumadin, Warfarin, Jantoven in the past 7 days? No    Has the patients medications changed since the last visit? No    Has the patient experienced any bleeding recently? No    Has the patient experienced any injuries or illness recently? Yes complaining of blood in his sputum- for at least a week    Has the patient experienced any 'new' shortness of breath, severe headaches, or changes in vision recently? No    Has the patient had any changes in their diet, or alcohol consumption? No    Is the patient here today to prepare for any type of upcoming surgery, procedure, or for a cardioversion procedure? No    What phone number can we reach the patient at today? home phone listed in demographics.

## 2021-06-07 NOTE — TELEPHONE ENCOUNTER
ANTICOAGULATION  MANAGEMENT- Home Care/Care Facility Result    Assessment     Today's INR result of 2.8 is Therapeutic (goal INR of 2.0-3.0)        Warfarin taken as previously instructed    No new diet changes affecting INR    No new medication/supplements affecting INR    Continues to tolerate warfarin with no reported s/s of bleeding or thromboembolism     Previous INR was Therapeutic    Plan:     Spoke with home care Esme discussed INR result and instructed:     Warfarin Dosing Instructions: Continue current warfarin dose 1 mg daily on sun/thu; and 1.5 mg daily rest of week  (0 % change)    Next INR to be drawn: two weeks with home care      Esme verbalizes understanding and agrees to warfarin dosing plan.   ?   Yasmin Ashley RN    Subjective/Objective:      Kody Johns, a 70 y.o. male is established on warfarin.     Home care/care facility RN's report of [facility] INR, recent warfarin dosing, diet changes, medication changes, and symptoms is documented below.    Additional findings: none    Anticoagulation Episode Summary     Current INR goal:   2.0-3.0   TTR:   56.7 % (10.2 mo)   Next INR check:   5/19/2020   INR from last check:   2.80 (5/5/2020)   Weekly max warfarin dose:      Target end date:      INR check location:      Preferred lab:      Send INR reminders to:   [facility]    Indications    A-fib (H) [I48.91]  Aortic valve disease  rheumatic [I06.9]  Mitral valve stenosis  unspecified etiology [I05.0]  Silent Stroke [I66.9]           Comments:            Anticoagulation Care Providers     Provider Role Specialty Phone number    Aristides Alegria MD Referring Family Medicine [phone]    Haja Ascencio MD  Cardiology [phone]

## 2021-06-07 NOTE — TELEPHONE ENCOUNTER
Please contact patient's family, let them know that the medication was filled for a month    He is due for a follow-up visit please schedule virtual visit in the next week or so

## 2021-06-08 NOTE — PROGRESS NOTES
[facility] Care Coordination Contact  [facility] Home Visit Assessment     Home visit for Health Risk Assessment with Kody Johns completed on 6/10/2020    Type of residence:: Private home - no stairs  Current living arrangement:: I live in a private home with family     Assessment completed with:: Patient    Current Care Plan  Member currently receiving the following home care services:     Member currently receiving the following community resources: PCA, SNV, Supplies/Equipment    Medication Review  Medication reconciliation completed in Epic: YES  Medication set-up & administration: RN set up weekly  Family caregiver administers medications  Medication Risk Assessment Medication (1 or more, place referral to MTM):  N/A: No risk factors identified  MTM Referral Placed: No: No risk factors idenified    Mental/Behavioral Health   Depression Screening:    PHQ-2 Total Score: 2       Mental health DX:: No        Falls Assessment:   Fallen 2 or more times in the past year?: Yes   Any fall with injury in the past year?: No    ADL/IADL Dependencies:   Dependent ADLs:: Bathing, Dressing, Eating, Grooming, Incontinence, Toileting, Transfers, Wheelchair-with assist, Positioning, Ambulation-walker, Ambulation-cane  Dependent IADLs:: Cleaning, Cooking, Laundry, Shopping, Meal Preparation, Medication Management, Money Management, Transportation, Incontinence    [facility] [employer] [employer] sponsored benefits: Shared information re: Silver Sneakers/gym memberships, ASA, Calcium +D.    PCA Assessment completed at visit: Yes Annual PCA assessment indicated 46 units per day of PCA. This is a decrease from the previous assessment.     Elderly Waiver Eligibility: Yes, but member declines EW service; will not open to EW    Care Plan & Recommendations: Annual Health Risk Assessment and PCA reassessment visit completed via the phone due to COVID-19 restrictions.  Care Coordinator spoke with Mark BARKER  responsible Party during assessment.  Mark shared that Kody continues to be dependent with everything (all ADLs and IADLs).  He reports that Kody is very weak on his left side due to the stroke so he cannot walk, stand up, transfer, get dressed, feed himself, etc without at least a 1 person assist.  Kody no longer has his feeding tube which is good but Mark reports that Kody has a poor appetite.  Kody can only eat soft foods or liquids due to choking risk.  Mark reports that Kody used to get a nutritional drink when he had the feeding tube but Mark said Kody no longer qualifies for the drink since the feeding tube is out.  Family is now purchasing the nutritional drink for Kody.  Care Coordinator suggested Mark talk with Kody's PCP about this as insurance will cover a nutritional supplemental drink if PCP signs off.  Mark will do.  Mark also shared that bathing Kody can be difficult as he cannot stand up for long periods of time even with someone holding him up.  Care Coordinator asked if Kody had a shower chair and Mark stated no.  Care Coordinator will order a Shower Chair for Kody.  Kody has had one fall when trying to get off his bed on his own,  But no injury reported.  Kody has had two ER visits in the last year due to abdominal pain.  Kody has a nurse that comes out weekly to set up his medications and check his vitals.  Kody was doing PT but Mark reports this is done now.  No further questions, concerns or needs at this time.    See [facility] for detailed assessment information.    Follow-Up Plan: Member informed of future contact, plan to f/u with member with a 6 month telephone assessment.  Contact information shared with member and family, encouraged member to call with any questions or concerns at any time.    [place] care continuum providers: Please refer to Health Care Home on the Epic Problem List to view this patient's [facility] Care Plan Summary.    Leilani Ignacio, MISSAEL Hendrickson  [facility]  [phone]

## 2021-06-08 NOTE — TELEPHONE ENCOUNTER
INR result is 2.5  INR   Date Value Ref Range Status   05/05/2020 2.80 (!) 0.9 - 1.1 Final       Will the patient be seen, or did they already see, MD or CNP today? No    Most Recent Warfarin dose day/week  Sunday Monday Tuesday Wednesday Thursday Friday Saturday     1.5 1.5 1 1.5 1.5     Sunday Monday Tuesday Wednesday Thursday Friday Saturday   1 1.5            Has the patient missed any doses of Coumadin, Warfarin, Jantoven in the past 7 days? No    Has the patients medications changed since the last visit? No    Has the patient experienced any bleeding recently? No    Has the patient experienced any injuries or illness recently? No    Has the patient experienced any 'new' shortness of breath, severe headaches, or changes in vision recently? Yes headache and dizzy    Has the patient had any changes in their diet, or alcohol consumption? No    Is the patient here today to prepare for any type of upcoming surgery, procedure, or for a cardioversion procedure? No    What phone number can we reach the patient at today? Esme [phone]

## 2021-06-08 NOTE — PROGRESS NOTES
Audiology Report:      History:  Kody Johns is seen today for comprehensive hearing evaluation. He was accompanied by a family member and an  today. He wears a right Phonak Mirlande Q50 SP BTE hearing aid that was fit at this clinic on 5/21/14. He has a history of severe to profound sensorineural hearing loss in both ears with his right ear being much better than his left ear. He reports today that his hearing aid may be too loud and he is having some trouble understanding speech. He reportedly experiences intermittent tinnitus in his right ear. He reports that he experiences some dizziness. Kody reports a positive family history of hearing loss as his brother and niece are deaf. He denies otalgia, otorrhea, and history of noise exposure.      Results:     Left Ear Right Ear   Otoscopy clear canals clear canals   Pure Tone Audiometry moderately severe sloping to profound sensorineural hearing loss   moderately severe sloping to profound sensorineural hearing loss   Word Recognition very poor as completed through an  excellent as completed through an    Tympanometry normal (Type A)  normal (Type A)     Transducer: Insert earphones    Reliability was good  and there was good  SRT to PTA agreement.  Kody hears 10-30 dB better in his right ear. His hearing was significantly better today in both ears when compared to his hearing test from 2/11/14.     Hearing Aid Check: A listening check revealed the right hearing aid had a good sound quality. The microphone covers were changed on the hearing aid. The earmold tubing was changed. The hearing aid was reprogrammed using real-ear measures. Good audibility was noted for NAL-NL2 targets. Significant volume adjustments were made to meet NAL-NL2 targets. Kody was satisfied with the volume adjustments made today.    Plan:  Results are discussed in detail.  He should return for retesting in 6 months to recheck hearing since there were significant  improvements in hearing.     Please see audiogram under  media  and  audiogram  in the patient s chart.     Dalia Estrada., CCC-A  [place] Licensed Audiologist #[id number]

## 2021-06-08 NOTE — TELEPHONE ENCOUNTER
ANTICOAGULATION  MANAGEMENT- Home Care/Care Facility Result    Assessment     Today's INR result of 2.5 is Therapeutic (goal INR of 2.0-3.0)        Warfarin taken as previously instructed    No new diet changes affecting INR    No new medication/supplements affecting INR    Continues to tolerate warfarin with no reported s/s of bleeding or thromboembolism     Previous INR was Therapeutic     Home Care visits every 2 weeks.    Plan:     Spoke with Home Care Nurse Esme discussed INR result and instructed:     Warfarin Dosing Instructions: Continue current warfarin dose    1 mg every Sun, Thu; 1.5 mg all other days     (0 % change)    Next INR to be drawn: 2 weeks    Education provided: importance of therapeutic range    Esme verbalizes understanding and agrees to warfarin dosing plan.   ?   Miriam Huggins RN    Subjective/Objective:      Kody Johns, a 70 y.o. male is established on warfarin.     Home care/care facility RN's report of [facility] INR, recent warfarin dosing, diet changes, medication changes, and symptoms is documented below.    Additional findings: none    Anticoagulation Episode Summary     Current INR goal:   2.0-3.0   TTR:   60.4 % (11.1 mo)   Next INR check:   6/16/2020   INR from last check:   2.50 (6/2/2020)   Weekly max warfarin dose:      Target end date:      INR check location:      Preferred lab:      Send INR reminders to:   [facility]    Indications    A-fib (H) [I48.91]  Aortic valve disease  rheumatic [I06.9]  Mitral valve stenosis  unspecified etiology [I05.0]  Silent Stroke [I66.9]           Comments:            Anticoagulation Care Providers     Provider Role Specialty Phone number    Aristides Alegria MD Referring Family Medicine [phone]    Haja Ascencio MD  Cardiology [phone]

## 2021-06-08 NOTE — PROGRESS NOTES
[facility] Care Coordination Contact    Received after visit chart from care coordinator.  Completed following tasks:    Mailed copy of care plan to client.     [facility]:  Emailed completed PCA assessment to [facility].  Faxed copy of PCA assessment to [facility] Agency and mailed copy to member.  Faxed MD Communication to PCP.     Mailed: POC Sig, PCA Sig, and HECTOR to member with an enclosed return envelope to RP: [id number] [place] [address]     Martín Short  Care Management Specialist  [facility]  [phone]

## 2021-06-08 NOTE — TELEPHONE ENCOUNTER
INR result is        2.1  INR   Date Value Ref Range Status   06/02/2020 2.50 (!) 0.9 - 1.1 Final       Will the patient be seen, or did they already see, MD or CNP today? No    Most Recent Warfarin dose day/week  Sunday Monday Tuesday Wednesday Thursday Friday Saturday       1 1.5 1.5     Sunday Monday Tuesday Wednesday Thursday Friday Saturday   1 1.5            Has the patient missed any doses of Coumadin, Warfarin, Jantoven in the past 7 days? No    Has the patients medications changed since the last visit? No    Has the patient experienced any bleeding recently? No    Has the patient experienced any injuries or illness recently? No    Has the patient experienced any 'new' shortness of breath, severe headaches, or changes in vision recently? No    Has the patient had any changes in their diet, or alcohol consumption? No    Is the patient here today to prepare for any type of upcoming surgery, procedure, or for a cardioversion procedure? No    What phone number can we reach the patient at today? home phone listed in demographics.

## 2021-06-08 NOTE — TELEPHONE ENCOUNTER
INR result is   INR   Date Value Ref Range Status   05/19/2020 2.50 (!) 0.9 - 1.1 Final     6/02/20                  2.5    Will the patient be seen, or did they already see, MD or CNP today? No    Most Recent Warfarin dose day/week  Sunday Monday Tuesday Wednesday Thursday Friday Saturday      1.5 mg  1.5 mg 1 mg  1.5 mg  1.5 mg     Sunday Monday Tuesday Wednesday Thursday Friday Saturday   1 mg  1.5 mg            Has the patient missed any doses of Coumadin, Warfarin, Jantoven in the past 7 days? No    Has the patients medications changed since the last visit? No    Has the patient experienced any bleeding recently? No    Has the patient experienced any injuries or illness recently? No    Has the patient experienced any 'new' shortness of breath, severe headaches, or changes in vision recently? No    Has the patient had any changes in their diet, or alcohol consumption? No    Is the patient here today to prepare for any type of upcoming surgery, procedure, or for a cardioversion procedure? No    What phone number can we reach the patient at today? [facility]  [phone].

## 2021-06-08 NOTE — PROGRESS NOTES
Clinic Care Coordination Medication Education FOLLOW UP Visit    Patient presents for:  Compliance monitoring, Medication reconciliation and check accuracy of med box fill    Language: No  neede. Pt not present.    Communication: Illiterate    Accompanied by: unaccompanied    Patient Living Situation:  Dependent with family    Primary Care Provider:  Aristides Alegira MD    Barriers:  Financial, Language, Cultural and Memory deficits    Medication List (see cited below): Patient presents with all medications EXCEPT albuterol inhaler, aspirin, flonase nasal spray, tramadol    Current Outpatient Prescriptions   Medication Sig     acetaminophen (TYLENOL EXTRA STRENGTH) 500 MG tablet 1-2 po q 6-8 hrs as needed for pain     albuterol (VENTOLIN HFA) 90 mcg/actuation inhaler INHALE 1 TO 2 PUFFS BY MOUTH EVERY 6 HOURS AS NEEDED AS DIRECTED     aspirin 325 MG EC tablet TAKE 1 TABLET BY MOUTH EVERY DAY     baclofen (LIORESAL) 10 MG tablet TAKE 1 TABLET BY MOUTH TWICE DAILY     bromfenac (PROLENSA) 0.07 % Drop Administer 1 drop into the left eye daily.     clopidogrel (PLAVIX) 75 mg tablet TAKE 1 TABLET BY MOUTH EVERY DAY     fluticasone (FLONASE) 50 mcg/actuation nasal spray 2 sprays into each nostril daily.     levothyroxine (SYNTHROID) 88 MCG tablet Take 1 tablet (88 mcg total) by mouth daily.     metoprolol succinate (TOPROL-XL) 25 MG TAKE 1 TABLET BY MOUTH EVERY DAY     nasal dilator (BREATHE RIGHT) Strp strip Apply qhs     omeprazole (PRILOSEC) 20 MG capsule TAKE 1 CAPSULE BY MOUTH EVERY DAY     pravastatin (PRAVACHOL) 80 MG tablet Take 1 tablet (80 mg total) by mouth daily.     spironolactone (ALDACTONE) 25 MG tablet TAKE 1 TABLET BY MOUTH EVERY DAY     traMADol (ULTRAM) 50 mg tablet TAKE 1 TABLET BY MOUTH TWICE DAILY AS NEEDED FOR PAIN       Equipment: Basic pill box- twice daily dosing, Color Dot Indentification System and uses 1 med box    Pill Box was prior to Medication Education Visit by pt's brother, Crow,  prior to visit.    Medication Set Up: Dependent  Medication Administration:  Dependent    Compliance: Last MEV visit was 12/7/17. Pt not present at this visit. Pt's spouse, Jesus Johns, seen today and brought his med box. Pt's brother, Crow, filled 5/7 days of pt's med box. Aspirin and tramadol were missing. Needed refills. Crow put ibuprofen in pt's med box once daily and 1 tab acetaminophen in daily, probably to make up for lack of tramadol.   Jesus reported spouse has allergies or a cold. Hasn't received flonase nasal spray yet, as ordered by Dr Alegria.  Ordered refills of albuterol inhaler, aspirin, baclofen, clopidogrel, flonase nasal spray, metoprolol, omeprazole, spironolactone and tramadol.    Future Appointments  Date Time Provider Department Center   2/23/2017 11:00 AM Miriam Whitley RN [facility]       Action Plan  RN Will:  See pt on 2/23/17 for MEV appointment. Will assess accuracy of pill box set up.    Care Guide Will:  Care Guide Delegation  Due Date: 2/17/17 for MEV visit on 2/23/17 at 11:00 am.  Call the patient to remind them of their next MEV  Remind the patient to bring their pill box, all medications in bottles and any refills they need  Arrange transportation    Nursing Notes:  Nothing further to add at this time, please see documentation within visit notes

## 2021-06-08 NOTE — PROGRESS NOTES
"Kody Johns is a 70 y.o. male who is being evaluated via a billable video visit.      The patient has been notified of following:     \"This video visit will be conducted via a call between you and your physician/provider. We have found that certain health care needs can be provided without the need for an in-person physical exam.  This service lets us provide the care you need with a video conversation.  If a prescription is necessary we can send it directly to your pharmacy.  If lab work is needed we can place an order for that and you can then stop by our lab to have the test done at a later time.    Video visits are billed at different rates depending on your insurance coverage. Please reach out to your insurance provider with any questions.    If during the course of the call the physician/provider feels a video visit is not appropriate, you will not be charged for this service.\"    Patient has given verbal consent to a Video visit? Yes    Patient would like the video invitation sent by: Text to cell phone: [phone]     Will anyone else be joining your video visit? No        Video Start Time: 11:25 am    Additional provider notes:   Subjective: Video visit due to covid 19 pandemic    Son present as well    Hx of mvr and avr  Has chf   seems compensated    Afib hx as well  Denies palpitations or rapen heart beat  INR  2.8  On 5/5/20    Labs from 2/2020 ast 42  Alt 58  Mag 1.8       Apetite better   Weight stable    Gets INR's at home    Previous cva  Has residual  Left hemiparesis   nochanges  stil  With tingling   Weakness the same   Hx hypothyroid   On replacement    No depression issues    Denies covid 19 sx       Tobacco status: He  reports that he quit smoking about 5 years ago. His smoking use included cigarettes. He has a 50.00 pack-year smoking history. He has quit using smokeless tobacco.    Patient Active Problem List    Diagnosis Date Noted     Encounter for attention to gastrostomy (H) 02/09/2020 "     Urinary incontinence without sensory awareness 10/14/2019     S/P AVR 07/09/2019     H/O mitral valve replacement 07/09/2019     Metabolic encephalopathy 05/20/2019     Encephalopathy 05/17/2019     Atrial flutter with rapid ventricular response (H) 05/14/2019     Intracerebral bleed (H) 05/13/2019     Hemorrhagic stroke (H) 05/13/2019     Tracheostomy care (H)      Acute respiratory failure (H) 05/10/2019     Acute cardioembolic stroke (H)      Paralytic ileus (H) 05/02/2019     Small bowel obstruction (H)      Atrial fibrillation with RVR (H)      Hypernatremia      Thrombocytopenia (H)      Sepsis (H)      S/P MVR (mitral valve repair) 04/24/2019     Acute respiratory failure with hypoxemia (H) 04/24/2019     Anemia due to blood loss, acute 04/24/2019     Coagulopathy (H) 04/24/2019     Non-English speaking patient 04/24/2019     Respiratory failure (H) 03/21/2019     A-fib (H) 09/14/2017     Heart failure with preserved ejection fraction (H) 09/14/2017     Moderate malnutrition (H) 08/31/2017     ALEENA (acute kidney injury) (H) 08/31/2017     Aortic valve disease, rheumatic 08/30/2017     Lightheadedness 08/30/2017     Atherosclerosis of native coronary artery of native heart without angina pectoris      Essential hypertension with goal blood pressure less than 130/85      Pure hypercholesterolemia      Dysuria 08/29/2017     Hypothyroidism, unspecified type      Mitral valve stenosis, unspecified etiology      Elevated LFTs 08/26/2016     Silent Stroke      Shoulder strain      Dysphagia      Hyperlipidemia      Rheumatic heart disease      Blurry vision      Hearing loss      Nonspecific reaction to tuberculin skin test without active tuberculosis        Current Outpatient Medications   Medication Sig Dispense Refill     acetaminophen (TYLENOL) 500 MG tablet Take 1 tablet (500 mg total) by mouth every 6 (six) hours as needed for pain. 60 tablet 2     atorvastatin (LIPITOR) 10 MG tablet TAKE 1 PILL BY MOUTH  EVERY DAY FOR CHOLESTEROL 90 tablet 3     baclofen (LIORESAL) 10 MG tablet TAKE 1 PILL BY MOUTH 2 TIMES EVERYDAY 360 tablet 0     ferrous sulfate 325 (65 FE) MG tablet 1 p.o. twice daily 60 tablet 5     gabapentin (NEURONTIN) 100 MG capsule TAKE 1 PILL BY MOUTH 3 TIMES DAILY FOR NERVE PAIN 90 capsule 0     levothyroxine (SYNTHROID, LEVOTHROID) 88 MCG tablet Take 1 tablet (88 mcg total) by mouth daily. For thyroid. 90 tablet 3     magnesium oxide (MAG-OX) 400 mg (241.3 mg magnesium) tablet TAKE 1 PILL BY MOUTH EVERYDAY 30 tablet 3     meclizine (ANTIVERT) 25 mg tablet 1 po two times a day prn dizziness 40 tablet 1     omeprazole (PRILOSEC) 20 MG capsule TAKE 1 PILL BY MOUTH EVERYDAY FOR STOMACH 90 capsule 0     polyethylene glycol (GLYCOLAX) 17 gram/dose powder 17 gm in 8 oz water daily (Patient taking differently: 17 gm in 8 oz water daily as needed      ) 510 g 6     senna-docusate (PERICOLACE) 8.6-50 mg tablet Take 1 tablet by mouth daily.       sertraline (ZOLOFT) 50 MG tablet TAKE 1 TABLET (50 MG TOTAL) BY MOUTH DAILY. **OVERDUE FOR RETURN VISIT. CALL 24/7** 30 tablet 0     simethicone (MYLICON,GAS-X) 180 mg capsule 1 po two times a day prn abdominal gas 60 capsule 3     tamsulosin (FLOMAX) 0.4 mg cap Take 1 capsule (0.4 mg total) by mouth daily after supper. 90 capsule 3     warfarin ANTICOAGULANT (COUMADIN/JANTOVEN) 1 MG tablet Take 1 to 1.5mg (1 or 1.5 tabs) by mouth daily OR as directed.  Adjust dose based on INR. 125 tablet 1     No current facility-administered medications for this visit.        ROS:   10 pt ROS positive other than as outlined above    Objective:    There were no vitals taken for this visit.  There is no height or weight on file to calculate BMI.    Gen appearance  No acute distress    HEENT  No icterus   No neck nodes   Neck supple    Breathing  Non labored   No wheezing per patient    Denies palpitations    Abd non tender    Ext w/o edema       Skin w/o rash    Left side weaker as  before    Results for orders placed or performed in visit on 05/05/20   INR   Result Value Ref Range    INR 2.80 (!) 0.9 - 1.1     *Note: Due to a large number of results and/or encounters for the requested time period, some results have not been displayed. A complete set of results can be found in Results Review.       Assessment:  1. S/P MVR (mitral valve repair)     2. S/P AVR     3. Elevated LFTs     4. Left hemiparesis (H)     5. Hypomagnesemia     6. Chronic heart failure with preserved ejection fraction (H)     7. Hypothyroidism, unspecified type       Stable after valve replacements and chf compensated    Hx afib  Seems stable  inr is therapeutic    Hypothyroid   On replacement    Plan:  F/u  6 weeks   Then check lab only after that   Lipid tsh lft etc    This transcription uses voice recognition software, which may contain typographical errors.      Video-Visit Details    Type of service:  Video Visit    Video End Time (time video stopped): 11:36 am  Originating Location (pt. Location): Home    Distant Location (provider location):  [facility]     Platform used for Video Visit: Jazmin Alegria MD

## 2021-06-08 NOTE — TELEPHONE ENCOUNTER
ANTICOAGULATION  MANAGEMENT- Home Care/Care Facility Result    Assessment     Today's INR result of 2.1 is Therapeutic (goal INR of 2.0-3.0)        Warfarin taken as previously instructed    No new diet changes affecting INR    No new medication/supplements affecting INR    Continues to tolerate warfarin with no reported s/s of bleeding or thromboembolism     Previous INR was Therapeutic    Plan:     Spoke with home care nurse Esme discussed INR result and instructed:     Warfarin Dosing Instructions: Continue current warfarin dose    1 mg every Sun, Thu; 1.5 mg all other days       Next INR to be drawn: 2 weeks.     Education provided: importance of taking warfarin as instructed    Esme verbalizes understanding and agrees to warfarin dosing plan.   ?   Baldo Marquez RN    Subjective/Objective:      Kody Johns, a 70 y.o. male is established on warfarin.     Home care/care facility RN's report of [facility] INR, recent warfarin dosing, diet changes, medication changes, and symptoms is documented below.    Additional findings: verbally confirmed home dose with Esme and updated on anticoagulation calendar    Anticoagulation Episode Summary     Current INR goal:   2.0-3.0   TTR:   61.9 % (11.6 mo)   Next INR check:   6/30/2020   INR from last check:   2.10 (6/16/2020)   Weekly max warfarin dose:      Target end date:      INR check location:      Preferred lab:      Send INR reminders to:   [facility]    Indications    A-fib (H) [I48.91]  Aortic valve disease  rheumatic [I06.9]  Mitral valve stenosis  unspecified etiology [I05.0]  Silent Stroke [I66.9]           Comments:            Anticoagulation Care Providers     Provider Role Specialty Phone number    Aristides Alegria MD Referring Family Medicine [phone]    Haja Ascencio MD  Cardiology [phone]

## 2021-06-09 NOTE — PROGRESS NOTES
Care Guide delegation per CCC RN on 2/23/17:  Patient next CCC MEV f/u appt on 3/23/17 at 8:00 AM with MILES Pineda RN at [facility].   Pt needs help with arrange transportation  Remind pt to bring pills boxes, meds bottles, and any refills need.     Transportation set up:  Patient Rehana ID: [id number]  -appt date: 3/23/17  -appt time: 8:00 AM   - from: pt home address  -site to: [facility]  - time between: 7:00 AM-7:30 AM.   -round trip: yes  -number of passenger: 2 (patient and pca, Crow)  -Transportation service: [employer] transportation   -Confirmed with:  Rehana Bansal [facility] representative on 3/15/17    Plan:  Will remind pt OR pt's prefer  for the appt and transportation info.

## 2021-06-09 NOTE — TELEPHONE ENCOUNTER
ANTICOAGULATION  MANAGEMENT    Assessment     Today's INR result of 2.1 is Therapeutic (goal INR of 2.0-3.0)        Warfarin taken as previously instructed    No new diet changes affecting INR    No new medication/supplements affecting INR    Continues to tolerate warfarin with no reported s/s of bleeding or thromboembolism     Previous INR was Therapeutic    Plan:     Spoke with [facility] regarding INR result and instructed:     Warfarin Dosing Instructions:  Continue current warfarin dose 1 mg daily on Sundays and Thursdays; and 1.5 mg daily rest of week  (0 % change)    Instructed patient to follow up no later than: 2 weeks     Education provided: importance of taking warfarin as instructed    Esme verbalizes understanding and agrees to warfarin dosing plan.    Instructed to call the [facility] for any changes, questions or concerns. (#[phone])   ?   Divina Dangelo RN    Subjective/Objective:      Kody Johns, a 70 y.o. male is on warfarin.     Kody reports:     Home warfarin dose: verbally confirmed home dose with Esme  and updated on anticoagulation calendar     Missed doses: No     Medication changes:  No     S/S of bleeding or thromboembolism:  No     New Injury or illness:  No     Changes in diet or alcohol consumption:  No     Upcoming surgery, procedure or cardioversion:  No    Anticoagulation Episode Summary     Current INR goal:   2.0-3.0   TTR:   64.0 % (1 y)   Next INR check:   7/28/2020   INR from last check:   2.10 (7/14/2020)   Weekly max warfarin dose:      Target end date:      INR check location:      Preferred lab:      Send INR reminders to:   [facility]    Indications    A-fib (H) [I48.91]  Aortic valve disease  rheumatic [I06.9]  Mitral valve stenosis  unspecified etiology [I05.0]  Silent Stroke [I66.9]           Comments:            Anticoagulation Care Providers     Provider Role Specialty Phone number    Aristides Alegria MD Referring Family Medicine  [phone]    Haja Ascencio MD  Cardiology [phone]

## 2021-06-09 NOTE — PROGRESS NOTES
Patient outreach follow up:  3/9/17: Met pt and Crow, pca/brother today after the [facility] MEV appt with [facility] RN. Mark,  is gone. Crow interpret. Went over goal and action steps with pt. Patient request to completed a goal (listed below) and set a new goal. Verified next outreach follow up with patient. Patient request to keep outreach as it is, every three months. Pt states; he is comfortable and knowing to reach out for help.   Patient reported:  -appetite: good  -sleep: normal; have about 7 hours of sleep a night.   -medication(s): currently follow up with [facility] RN at [facility] for meds set up and pcp for meds concern.   -physical exercise/activities: walk and stretch two the three times a week; walk outside the block/house for 15-20 minutes each time. Will walk at the park or lake with pca and family in the summer time.   -any concern: finger pain and swollen. Pt is scheduled with Karley Thurston today at 2:20 PM.   -any request today: none.     Goal accomplished:  Goal: I and my personal  will informed my Clinic Care Guide in 4-5 business days prior to my scheduled apppointment date to set up transportation service for me to medical appointment when needed. (Goal set up date: 2016)  Action steps:  1. Patient: I am comfortable calling Elle, my two prefer interpreters to contact my Clinic Care Guide to set up transportation service for me going to my appointments. I was informed to contact my Clinic Care Guide one week prior to my appointment scheduled date. By doing this, will help reducing the amount of transportation problem and to ensure  is confirmed. (updated: 3/9/17)     2. Crow, PCA/brother: I was educate by the patient's Clinic Care Guide today that if I want to leave a voice message for the Care Guide to call back, I will have to includes the patient first name, last name,  and a good telephone number. I had the Clinic Care Guide business card with the new  phone number. (updated: 3/9/17)     3. Patient: I am comfortable to reach out for help and coordinating my appointment. I don't speak English but I am happy that I have my Care Guide, PCA, CCC RN and CCC FRG to help me whenever I needs. I would like to completed this goal today. I have my Clinic Care Guide business card. I was educate to leave my first name, last name,  and a good telephone number for her to call me. I will use this service to contact my Care Guide and pcp office. (updated: 3/9/17)    New Goal:  Goal: I would like increased walking exercise to 3-4 three times a week preventing myself from having heart problem in the next one year. (Goal setting date: 2017)    Future Appointment(s):  -as of 3/9/17, patient have no follow up appt scheduled within [facility] or ccc team.     Plan:  Outreach frequency: every three months.

## 2021-06-09 NOTE — PROGRESS NOTES
Subjective:      Kody Johns is a 67 y.o. male who presents for evaluation of right middle finger swelling.  He has had problems with his finger for 4 days.  The end of his right middle finger has been swollen, tender, and painful.  The tip of the finger has also has felt mildly warm.  It is staying the same over the past few days, possibly swelling is getting a little worse.  No known injuries to the finger.  He has never had this happen before.  No history of gout.    Patient Active Problem List   Diagnosis     Rheumatic Heart Disease     Blurry vision     Hypothyroidism     Coronary Artery Disease     Hearing loss     Latent Tuberculosis     Dysphagia     Hyperlipidemia     Silent Stroke     Shoulder strain     Hypertension     Elevated LFTs       Current Outpatient Prescriptions:      acetaminophen (TYLENOL EXTRA STRENGTH) 500 MG tablet, 1-2 po q 6-8 hrs as needed for pain, Disp: 100 tablet, Rfl: 2     aspirin 325 MG EC tablet, TAKE 1 TABLET BY MOUTH EVERY DAY, Disp: 100 tablet, Rfl: 3     baclofen (LIORESAL) 10 MG tablet, TAKE 1 TABLET BY MOUTH TWICE DAILY, Disp: 60 tablet, Rfl: 3     bromfenac (PROLENSA) 0.07 % Drop, Administer 1 drop into the left eye daily., Disp: , Rfl:      clopidogrel (PLAVIX) 75 mg tablet, TAKE 1 TABLET BY MOUTH EVERY DAY, Disp: 90 tablet, Rfl: 1     fluticasone (FLONASE) 50 mcg/actuation nasal spray, 2 sprays into each nostril daily., Disp: 10 g, Rfl: 3     ibuprofen (ADVIL,MOTRIN) 200 MG tablet, Take 200 mg by mouth every 6 (six) hours as needed., Disp: , Rfl:      levothyroxine (SYNTHROID) 88 MCG tablet, Take 1 tablet (88 mcg total) by mouth daily., Disp: 30 tablet, Rfl: 11     metoprolol succinate (TOPROL-XL) 25 MG, TAKE 1 TABLET BY MOUTH EVERY DAY, Disp: 90 tablet, Rfl: 1     nasal dilator (BREATHE RIGHT) Strp strip, Apply qhs, Disp: 30 strip, Rfl: 3     omeprazole (PRILOSEC) 20 MG capsule, TAKE 1 CAPSULE BY MOUTH EVERY DAY, Disp: 90 capsule, Rfl: 2     pravastatin (PRAVACHOL) 80  MG tablet, Take 1 tablet (80 mg total) by mouth daily., Disp: 90 tablet, Rfl: 3     spironolactone (ALDACTONE) 25 MG tablet, TAKE 1 TABLET BY MOUTH EVERY DAY, Disp: 30 tablet, Rfl: 3     traMADol (ULTRAM) 50 mg tablet, TAKE 1 TABLET BY MOUTH TWICE DAILY AS NEEDED FOR PAIN, Disp: 30 tablet, Rfl: 0     VENTOLIN HFA 90 mcg/actuation inhaler, INHALE 1 TO 2 PUFFS BY MOUTH EVERY 6 HOURS AS NEEDED, Disp: 18 g, Rfl: 1     cephalexin (KEFLEX) 500 MG capsule, Take 1 capsule (500 mg total) by mouth 3 (three) times a day for 10 days., Disp: 30 capsule, Rfl: 0     Objective:     No Known Allergies  Vitals:    03/09/17 1449   BP: 122/66   Pulse: 70   Resp: 18   SpO2: 94%     Body mass index is 23.2 kg/(m^2).    Vitals reviewed as above.  General: Patient is alert and oriented x 3, in no apparent distress  Cardiac: Regular rate and rhythm, no murmurs  Pulmonary: lungs clear to ausculation, no crackles, rales, rhonchi, or wheezing  Musculoskeletal: Right distal middle finger is moderately edematous today, minimal erythema, painful to palpation, normal capillary refill, he can flex the finger at the DIP joint, no pain with palpation along the proximal finger, no other fingers affected, normal radial pulse in the right hand    I personally reviewed finger x-ray.  XR FINGER RIGHT 2 OR MORE VWS3/9/2017 3:24 PM  INDICATION: Pain in right finger. COMPARISON: None.  FINDINGS: No fracture or dislocation. Mild narrowing and osteophyte formation in the interphalangeal joints. There is also mild narrowing and osteophyte   formation at the metacarpophalangeal joint of the right long finger. There is some significant cystic change involving the long metacarpal head.  This report was electronically interpreted by: Dr. Glenn Wakefield MD ON 03/09/2017 at 15:56    Assessment and Plan:     1. Right third finger distal edema.  No known injury.  Differential includes most likely cellulitis versus possible gout.  I will treat for cellulitis today, but  "I want patient to follow-up if symptoms do not improve.  I also recommended soaking in warm soapy water for the next few days, elevation.  I reviewed reasons to contact clinic for worsening or spreading infection/symptoms.  Given x-ray finding of \"significant cystic change involving the long metacarpal head, \" referral made to [facility] for follow-up.    This dictation uses voice recognition software, which may contain typographical errors.    "

## 2021-06-09 NOTE — TELEPHONE ENCOUNTER
Please contact this patient/patient's family.    Patient needs to be seen for follow-up virtual visit with me, please schedule video if possible    Please let them know that I did refill 30 days of the sertraline
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
## 2021-06-09 NOTE — TELEPHONE ENCOUNTER
ANTICOAGULATION  MANAGEMENT- Home Care/Care Facility Result    Assessment     Today's INR result of 2.5 is Therapeutic (goal INR of 2.0-3.0)        Warfarin taken as previously instructed    No new diet changes affecting INR    No new medication/supplements affecting INR    Continues to tolerate warfarin with no reported s/s of bleeding or thromboembolism     Previous INR was Therapeutic    Plan:     Spoke with Home Care Nurse Esme discussed INR result and instructed:     Warfarin Dosing Instructions: Continue current warfarin dose    1 mg every Sun, Thu; 1.5 mg all other days      (0 % change)    Next INR to be drawn: 2 weeks    Education provided: importance of therapeutic range    Esme verbalizes understanding and agrees to warfarin dosing plan.   ?   Miriam Huggins RN    Subjective/Objective:      Kody Johns, a 70 y.o. male is established on warfarin.     Home care/care facility RN's report of [facility] INR, recent warfarin dosing, diet changes, medication changes, and symptoms is documented below.    Additional findings: Numbness and tingling is not new onset.    Anticoagulation Episode Summary     Current INR goal:   2.0-3.0   TTR:   63.4 % (1 y)   Next INR check:   7/14/2020   INR from last check:   2.50 (6/30/2020)   Weekly max warfarin dose:      Target end date:      INR check location:      Preferred lab:      Send INR reminders to:   [facility]    Indications    A-fib (H) [I48.91]  Aortic valve disease  rheumatic [I06.9]  Mitral valve stenosis  unspecified etiology [I05.0]  Silent Stroke [I66.9]           Comments:            Anticoagulation Care Providers     Provider Role Specialty Phone number    Aristides Alegria MD Referring Family Medicine [phone]    Haja Ascencio MD  Cardiology [phone]

## 2021-06-09 NOTE — PROGRESS NOTES
To Specialty Schedulers: Was this referral taken care of already?     Danielle Amaya, TIFFANIE/CMT  HE [facility]

## 2021-06-09 NOTE — PROGRESS NOTES
Clinic Care Coordination Medication Education FOLLOW UP Visit    Patient presents for:  Medication education, Compliance monitoring and Medication reconciliation    Language: [facility]  :  Mark from [facility]    Communication: Illiterate    Accompanied by: sister-in-law    Patient Living Situation:  Dependent with family    Primary Care Provider:  Aristides Alegria MD    Barriers:  Financial, Language and Cultural    Medication List (see cited below): Patient presents with all ordered medications    Current Outpatient Prescriptions   Medication Sig     acetaminophen (TYLENOL EXTRA STRENGTH) 500 MG tablet 1-2 po q 6-8 hrs as needed for pain     aspirin 325 MG EC tablet TAKE 1 TABLET BY MOUTH EVERY DAY     baclofen (LIORESAL) 10 MG tablet TAKE 1 TABLET BY MOUTH TWICE DAILY     clopidogrel (PLAVIX) 75 mg tablet TAKE 1 TABLET BY MOUTH EVERY DAY     fluticasone (FLONASE) 50 mcg/actuation nasal spray 2 sprays into each nostril daily.     ibuprofen (ADVIL,MOTRIN) 200 MG tablet Take 200 mg by mouth every 6 (six) hours as needed.     levothyroxine (SYNTHROID) 88 MCG tablet Take 1 tablet (88 mcg total) by mouth daily.     metoprolol succinate (TOPROL-XL) 25 MG TAKE 1 TABLET BY MOUTH EVERY DAY     omeprazole (PRILOSEC) 20 MG capsule TAKE 1 CAPSULE BY MOUTH EVERY DAY     pravastatin (PRAVACHOL) 80 MG tablet Take 1 tablet (80 mg total) by mouth daily.     spironolactone (ALDACTONE) 25 MG tablet TAKE 1 TABLET BY MOUTH EVERY DAY     traMADol (ULTRAM) 50 mg tablet TAKE 1 TABLET BY MOUTH TWICE DAILY AS NEEDED FOR PAIN     bromfenac (PROLENSA) 0.07 % Drop Administer 1 drop into the left eye daily.     nasal dilator (BREATHE RIGHT) Strp strip Apply hospitals     VENTOLIN HFA 90 mcg/actuation inhaler INHALE 1 TO 2 PUFFS BY MOUTH EVERY 6 HOURS AS NEEDED       Equipment: Basic pill box- twice daily dosing, Color Dot Indentification System and uses 1 med box    Pill Box was prior to Medication Education Visit by Pt's brother,  Crow    Medication Set Up: Dependent  Medication Administration:  Dependent    Compliance: Last MEV visit was 2/7/17. Pt accompanied by sister-in-law. Her spouse, Crow, filled 5 days in med box. I slot had 2 baclofen, instead of 1 baclofen. Otherwise pills set up correctly. I filled the other 2 days.  Ordered refills of aspirin, baclofen, clopidogrel, ibuprofen, levothyroxine, metoprolol, omeprazole, pravastatin, spironolactone, and tramadol (need PCP OK).    Future Appointments  Date Time Provider Department Center   3/9/2017 11:00 AM Miriam Whitley RN [facility]       Action Plan  RN Will:  See pt in 2 weeks for MEV visit. Pt's brother, who fills med box has been ill and requested the nurse to help fill pt's med box and check his accuracy.    Care Guide Will:  Care Guide Delegation  Due Date: 3/8/17 for MEV visit on 3/9/17 at 11:00 am  Call the patient to remind them of their next MEV  Remind the patient to bring their pill box, all medications in bottles and any refills they need  Arrange transportation      Nursing Notes:  Pt reported left knee pain and a catch in knee, at times. Chronic pain. Declined to make appointment to see PCP at this time.

## 2021-06-09 NOTE — TELEPHONE ENCOUNTER
INR result is 2.1  INR   Date Value Ref Range Status   06/30/2020 2.50 (!) 0.9 - 1.1 Final       Will the patient be seen, or did they already see, MD or CNP today? No    Most Recent Warfarin dose day/week  Sunday Monday Tuesday Wednesday Thursday Friday Saturday     1.5 1.5 1 1.5 1.5     Sunday Monday Tuesday Wednesday Thursday Friday Saturday   1 1.5            Has the patient missed any doses of Coumadin, Warfarin, Jantoven in the past 7 days? No    Has the patients medications changed since the last visit? No    Has the patient experienced any bleeding recently? No    Has the patient experienced any injuries or illness recently? No    Has the patient experienced any 'new' shortness of breath, severe headaches, or changes in vision recently? No    Has the patient had any changes in their diet, or alcohol consumption? No    Is the patient here today to prepare for any type of upcoming surgery, procedure, or for a cardioversion procedure? No    What phone number can we reach the patient at today? home phone listed in demographics.

## 2021-06-09 NOTE — TELEPHONE ENCOUNTER
INR result is   2.5    Will the patient be seen, or did they already see, MD or CNP today? No    Most Recent Warfarin dose day/week  Sunday Monday Tuesday Wednesday Thursday Friday Saturday     1.5 1.5 1 1.5 1.5     Sunday Monday Tuesday Wednesday Thursday Friday Saturday   1 1.5            Has the patient missed any doses of Coumadin, Warfarin, Jantoven in the past 7 days? No    Has the patients medications changed since the last visit? No    Has the patient experienced any bleeding recently? No    Has the patient experienced any injuries or illness recently? No    Has the patient experienced any 'new' shortness of breath, severe headaches, or changes in vision recently? No, but numbness and tingling for awhile in the hands and legs.     Has the patient had any changes in their diet, or alcohol consumption? No    Is the patient here today to prepare for any type of upcoming surgery, procedure, or for a cardioversion procedure? No    What phone number can we reach the patient at today? [phone]

## 2021-06-09 NOTE — PROGRESS NOTES
Patient outreach follow up:  03/6/17: Care Guide completed set up patient's ride to upcoming appt with [facility] nurse. Transportation service/info is documented under telephone encounter dated 2/23/17.   Patient have a upcoming appt on 03/9/17 at 11:00 AM with [facility] Nurse at [facility]. Will meet with pt in clinic.
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
## 2021-06-10 NOTE — PROGRESS NOTES
"Kody Johns is a 70 y.o. male who is being evaluated via a billable video visit.      The patient has been notified of following:     \"This video visit will be conducted via a call between you and your physician/provider. We have found that certain health care needs can be provided without the need for an in-person physical exam.  This service lets us provide the care you need with a video conversation.  If a prescription is necessary we can send it directly to your pharmacy.  If lab work is needed we can place an order for that and you can then stop by our lab to have the test done at a later time.    Video visits are billed at different rates depending on your insurance coverage. Please reach out to your insurance provider with any questions.    If during the course of the call the physician/provider feels a video visit is not appropriate, you will not be charged for this service.\"    Patient has given verbal consent to a Video visit? Yes  How would you like to obtain your AVS? AVS Preference: Mail a copy.  If dropped by the video visit, the video invitation should be sent to: Text to cell phone: [phone]   Will anyone else be joining your video visit? No        Video Start Time: 11:30 am    Additional provider notes:     Subjective: This patient had a virtual visit, video, due to the coronavirus pandemic.    This patient has past history of CVA with left hemiparesis.  This was following his mitral and aortic valve replacement.    He still has some ongoing left-sided tingling and numbness.    His breathing is been stable he has CHF with preserved ejection fraction    20 is more active he tends to get a little short of breath he says this is about where he is been in the past.    He has a history of hyperglycemia working to recheck labs he will get lipid TSH LFTs and A1c.    We will check blood pressure at that time as well    His son is Mark phone number [phone].  We will contact him with the " results.    I reviewed medication patient continues on levothyroxine 88 mcg daily.  Atorvastatin 10 mg a day tamsulosin baclofen and gabapentin and tamsulosin.    He takes 100 mg of the gabapentin 3 times a day.  Consider increasing this although he seems to be tolerating it fairly well at this level.    No additional concerns or issues no COVID-19 symptoms or exposures    Tobacco status: He  reports that he quit smoking about 5 years ago. His smoking use included cigarettes. He has a 50.00 pack-year smoking history. He has quit using smokeless tobacco.    Patient Active Problem List    Diagnosis Date Noted     Encounter for attention to gastrostomy (H) 02/09/2020     Urinary incontinence without sensory awareness 10/14/2019     S/P AVR 07/09/2019     H/O mitral valve replacement 07/09/2019     Metabolic encephalopathy 05/20/2019     Encephalopathy 05/17/2019     Atrial flutter with rapid ventricular response (H) 05/14/2019     Intracerebral bleed (H) 05/13/2019     Hemorrhagic stroke (H) 05/13/2019     Tracheostomy care (H)      Acute respiratory failure (H) 05/10/2019     Acute cardioembolic stroke (H)      Paralytic ileus (H) 05/02/2019     Small bowel obstruction (H)      Atrial fibrillation with RVR (H)      Hypernatremia      Thrombocytopenia (H)      Sepsis (H)      S/P MVR (mitral valve repair) 04/24/2019     Acute respiratory failure with hypoxemia (H) 04/24/2019     Anemia due to blood loss, acute 04/24/2019     Coagulopathy (H) 04/24/2019     Non-English speaking patient 04/24/2019     Respiratory failure (H) 03/21/2019     A-fib (H) 09/14/2017     Heart failure with preserved ejection fraction (H) 09/14/2017     Moderate malnutrition (H) 08/31/2017     ALEENA (acute kidney injury) (H) 08/31/2017     Aortic valve disease, rheumatic 08/30/2017     Lightheadedness 08/30/2017     Atherosclerosis of native coronary artery of native heart without angina pectoris      Essential hypertension with goal blood  pressure less than 130/85      Pure hypercholesterolemia      Dysuria 08/29/2017     Hypothyroidism, unspecified type      Mitral valve stenosis, unspecified etiology      Elevated LFTs 08/26/2016     Silent Stroke      Shoulder strain      Dysphagia      Hyperlipidemia      Rheumatic heart disease      Blurry vision      Hearing loss      Nonspecific reaction to tuberculin skin test without active tuberculosis        Current Outpatient Medications   Medication Sig Dispense Refill     acetaminophen (TYLENOL) 500 MG tablet Take 1 tablet (500 mg total) by mouth every 6 (six) hours as needed for pain. 60 tablet 2     atorvastatin (LIPITOR) 10 MG tablet TAKE 1 PILL BY MOUTH EVERY DAY FOR CHOLESTEROL 90 tablet 3     baclofen (LIORESAL) 10 MG tablet TAKE 1 PILL BY MOUTH 2 TIMES EVERYDAY 360 tablet 0     ferrous sulfate 325 (65 FE) MG tablet TAKE 1 PILL BY MOUTH TWO TIMES A DAY FOR IRON 60 tablet 0     gabapentin (NEURONTIN) 100 MG capsule TAKE 1 PILL BY MOUTH 3 TIMES DAILY FOR NERVE PAIN 90 capsule 0     levothyroxine (SYNTHROID, LEVOTHROID) 88 MCG tablet Take 1 tablet (88 mcg total) by mouth daily. For thyroid. 90 tablet 3     magnesium oxide (MAG-OX) 400 mg (241.3 mg magnesium) tablet TAKE 1 PILL BY MOUTH EVERYDAY 30 tablet 3     meclizine (ANTIVERT) 25 mg tablet 1 po two times a day prn dizziness 40 tablet 1     omeprazole (PRILOSEC) 20 MG capsule TAKE 1 PILL BY MOUTH EVERYDAY FOR STOMACH 30 capsule 0     polyethylene glycol (GLYCOLAX) 17 gram/dose powder 17 gm in 8 oz water daily (Patient taking differently: 17 gm in 8 oz water daily as needed      ) 510 g 6     senna-docusate (PERICOLACE) 8.6-50 mg tablet Take 1 tablet by mouth daily.       sertraline (ZOLOFT) 50 MG tablet TAKE 1 TABLET (50 MG TOTAL) BY MOUTH DAILY. **OVERDUE FOR RETURN VISIT. CALL 24/7** 30 tablet 0     simethicone (MYLICON,GAS-X) 180 mg capsule 1 po two times a day prn abdominal gas 60 capsule 3     tamsulosin (FLOMAX) 0.4 mg cap Take 1 capsule  (0.4 mg total) by mouth daily after supper. 90 capsule 3     warfarin ANTICOAGULANT (COUMADIN/JANTOVEN) 1 MG tablet Take 1 to 1.5mg (1 or 1.5 tabs) by mouth daily OR as directed.  Adjust dose based on INR. 125 tablet 1     No current facility-administered medications for this visit.        ROS:   10 point review of systems positive as discussed above otherwise negative    Objective:    There were no vitals taken for this visit.  There is no height or weight on file to calculate BMI.      General appearance no acute distress    He was present along with his son.    HEENT neck is supple oropharynx is clear no scleral icterus    Lungs: Nonlabored breathing no wheezing no coughing.    Heart: Denies any palpitations or rapid heart rate.    Abdomen denies any abdominal pain    Left side upper and lower extremity with weakness and tingling.    Last INR 2.2 on 7/28/2020.    Labs upcoming lipid TSH LFTs A1c    Results for orders placed or performed in visit on 07/28/20   INR   Result Value Ref Range    INR 2.20 (!) 0.9 - 1.1     *Note: Due to a large number of results and/or encounters for the requested time period, some results have not been displayed. A complete set of results can be found in Results Review.       Assessment:  1. Atrial fibrillation with RVR (H)     2. Chronic heart failure with preserved ejection fraction (H)     3. S/P MVR (mitral valve repair)     4. S/P AVR     5. Elevated LFTs  Hepatic Profile   6. Left hemiparesis (H)     7. Hypothyroidism, unspecified type  Thyroid Stimulating Hormone (TSH)   8. Hyperglycemia  Glycosylated Hemoglobin A1c   9. Mixed hyperlipidemia  Lipid Cascade FASTING     Atrial fibrillation history, on warfarin, rate seems to be controlled.    Status post mitral and aortic valve replacement with CHF preserved ejection fraction    Previous CVA with ongoing left hemiparesis unchanged    Hypothyroid on levothyroxine 88 mcg a day.    Hyperlipidemia, patient continues on atorvastatin  will check LFT and lipid.    Hyperglycemia await A1c    Plan:  As discussed above, will contact patient's son with the results please see above phone number    This transcription uses voice recognition software, which may contain typographical errors.      Video-Visit Details    Type of service:  Video Visit    Video End Time (time video stopped): 11:41 AM  Originating Location (pt. Location): Home    Distant Location (provider location):  [facility] FAM[facility] MED[facility]/OB     Platform used for Video Visit: Eugenie Alegria MD

## 2021-06-10 NOTE — TELEPHONE ENCOUNTER
Who is calling:  Home   Reason for Call:  Is at the patient home and needs to know his INR result and and dosing information  Date of last appointment with primary care: na  Okay to leave a detailed message: Yes

## 2021-06-10 NOTE — TELEPHONE ENCOUNTER
INR result is 2.3  INR   Date Value Ref Range Status   07/28/2020 2.20 (!) 0.9 - 1.1 Final       Will the patient be seen, or did they already see, MD or CNP today? No    Most Recent Warfarin dose day/week  Sunday Monday Tuesday Wednesday Thursday Friday Saturday     1.5 mg 1.5 mg 1 mg 1.5 mg 1.5 mg     Sunday Monday Tuesday Wednesday Thursday Friday Saturday   1 mg 1.5 mg            Has the patient missed any doses of Coumadin, Warfarin, Jantoven in the past 7 days? No    Has the patients medications changed since the last visit? No    Has the patient experienced any bleeding recently? No    Has the patient experienced any injuries or illness recently? No    Has the patient experienced any 'new' shortness of breath, severe headaches, or changes in vision recently? No    Has the patient had any changes in their diet, or alcohol consumption? No    Is the patient here today to prepare for any type of upcoming surgery, procedure, or for a cardioversion procedure? No    What phone number can we reach the patient at today? [phone] Esme.

## 2021-06-10 NOTE — PROGRESS NOTES
Assessment:  The patient is here with the wife of his PCA and his  today to check that the pill box has been set up correctly by the PCA. There are multiple medications missing including ASA, Flonase, Advil, Toprol, Aldactone, and tramadol. The PCA has been unsuccessful in getting refills independently despite multiple teaching sessions with [facility] RNs. The pill box was set up correctly with the medications he had available.     Plan:  1) I will have the PCA return next week and recommend that we switch to [facility] so that he can get auto-refills and delivery for all medications.   2) I will request refills on all missing medications today. Instructions were given to the wife of the PCA that he will need to add these to the med box today. They were instructed to call the clinic with questions or concerns.   3) Follow up was scheduled for next Friday for MEV    No Care Guide Delegation today.

## 2021-06-10 NOTE — PROGRESS NOTES
Incoming call:  5/11/17 at 11:00 AM: Clinic care guide received a incoming call from Mark, pt prefer .    Mark reported;  -Patient called Mark late last night that as patient is trying to get up to use the bathroom, he fall off the bed to the floor and injured his back and shoulder. Pt did not specify which shoulder but see no bruise or blood. Patient have no vision change, SOB or chest pain. Patient wants to be seen due to back and shoulder pain.     Pt is schedule today at 2:00 PM with Dr. Haney at [facility].     Verified transportation service to today appt with Mark. Gates's family or cousin will bring him to the appt today. Mark is informed to notified pt that clinic care guide cannot set up ride for today appt due to it's too soon and is not a emergency to call ambulance. Mark confirmed.

## 2021-06-10 NOTE — TELEPHONE ENCOUNTER
ANTICOAGULATION  MANAGEMENT- Home Care/Care Facility Result    Assessment     Today's INR result of 2.20 is Therapeutic (goal INR of 2.0-3.0)        Warfarin taken as previously instructed    No new diet changes affecting INR    No new medication/supplements affecting INR    Continues to tolerate warfarin with no reported s/s of bleeding or thromboembolism     Previous INR was Therapeutic at 2.10 on 7/14/20.    Plan:     Spoke with AMY Victoria from [employer] discussed INR result and instructed:     Warfarin Dosing Instructions:   -  Continue current warfarin dose 1 mg daily on Sun/Thurs; and 1.5 mg daily rest of week.    Next INR to be drawn:  2 wks.  Scheduled on 8/11/20.    Education provided: importance of consistent vitamin K intake and target INR goal and significance of current INR result    AMY Victoria verbalizes understanding and agrees to warfarin dosing plan.   ?   Sulema Almaraz RN    Subjective/Objective:      Kody Johns, a 70 y.o. male is established on warfarin.     Home care/care facility RN's report of [facility] INR, recent warfarin dosing, diet changes, medication changes, and symptoms is documented below.    Additional findings: none    Anticoagulation Episode Summary     Current INR goal:   2.0-3.0   TTR:   64.0 % (1 y)   Next INR check:   8/11/2020   INR from last check:   2.20 (7/28/2020)   Weekly max warfarin dose:      Target end date:      INR check location:      Preferred lab:      Send INR reminders to:   [facility]    Indications    A-fib (H) [I48.91]  Aortic valve disease  rheumatic [I06.9]  Mitral valve stenosis  unspecified etiology [I05.0]  Silent Stroke [I66.9]           Comments:            Anticoagulation Care Providers     Provider Role Specialty Phone number    Aristides Alegria MD Referring Family Medicine [phone]    Haja Ascencio MD  Cardiology [phone]

## 2021-06-10 NOTE — TELEPHONE ENCOUNTER
Called and spoke with Gates WARD Andreas Zhu, consent to communicate on file. Message was given. Scheduled 3 month follow up      ----- Message from Aristides Alegria MD sent at 8/28/2020 10:14 AM CDT -----  Please contact this patient's son,Mark, at [phone].    Patient's labs show that the thyroid was off he needs to increase his thyroid medication from 88 mcg daily up to 100 mcg daily.  I sent a new prescription to the pharmacy.    The cholesterol levels look good stay on same atorvastatin the bad cholesterol was only 81.  In addition the liver tests look normal now which is great news.    The diabetes check was in a good range the A1c was at 5.6, no evidence of diabetes.    Please schedule a follow-up virtual visit with me for 3 months from now following that we will recheck the A1c and his thyroid again

## 2021-06-10 NOTE — PROGRESS NOTES
Assessment:  The patient is here with the wife of his PCA and his  today to check that the pill box has been set up correctly by the PCA. There are multiple medications missing including ASA, Flonase, Advil, Toprol, Aldactone, and tramadol. The PCA has been unsuccessful in getting refills independently despite multiple teaching sessions with [facility] RNs. The pill box was set up correctly with the medications he had available.     Plan:  1) I will have the PCA return next week and recommend that we switch to [facility] so that he can get auto-refills and delivery for all medications.   2) I will request refills on all missing medications today. Instructions were given to the wife of the PCA that he will need to add these to the med box today. They were instructed to call the clinic with questions or concerns.   3) Follow up was scheduled for next Friday for MEV    No Care Guide Delegation today.

## 2021-06-10 NOTE — TELEPHONE ENCOUNTER
ANTICOAGULATION  MANAGEMENT    Assessment     Today's INR result of 3.7 is Supratherapeutic (goal INR of 2.0-3.0)        Warfarin taken as previously instructed    No new diet changes affecting INR    No new medication/supplements affecting INR    Continues to tolerate warfarin with no reported s/s of bleeding or thromboembolism     Previous INR was Therapeutic X 12     Plan:     Spoke on phone with home care nurse Esme  regarding INR result and instructed:     Warfarin Dosing Instructions:  Hold warfarin tonight,  then change warfarin dose to 1 mg daily on Sundays, Tuesdays and Thursdays; and 1.5 mg daily rest of week  (5.3 % change)    Instructed patient to follow up no later than: 9/1/2020 (1-2 weeks)       Education provided: importance of taking warfarin as instructed    Esme verbalizes understanding and agrees to warfarin dosing plan.    Instructed to call the [facility] for any changes, questions or concerns. (#[phone])   ?   Divina Dangelo RN    Subjective/Objective:      Kody HOPPER Andreas, a 70 y.o. male is on warfarin.     Kody reports:     Home warfarin dose: verbally confirmed home dose with Esme  and updated on anticoagulation calendar     Missed doses: No     Medication changes:  No     S/S of bleeding or thromboembolism:  No     New Injury or illness:  No     Changes in diet or alcohol consumption:  No     Upcoming surgery, procedure or cardioversion:  No    Anticoagulation Episode Summary     Current INR goal:   2.0-3.0   TTR:   67.9 % (1 y)   Next INR check:   9/1/2020   INR from last check:   3.70! (8/25/2020)   Weekly max warfarin dose:      Target end date:      INR check location:      Preferred lab:      Send INR reminders to:   [facility]    Indications    A-fib (H) [I48.91]  Aortic valve disease  rheumatic [I06.9]  Mitral valve stenosis  unspecified etiology [I05.0]  Silent Stroke [I66.9]           Comments:            Anticoagulation Care Providers     Provider Role Specialty  Phone number    Aristides Alegria MD [facility] Family Medicine [phone]    Haja Ascencio MD  Cardiology [phone]

## 2021-06-10 NOTE — PROGRESS NOTES
"5/5/17 at 2:05 PM: Mark, patient prefer  present in clinic and request to speak with [facility] Care Guide. Clinic care guide met with Mark.     Mark states;   \"Patient have no transportation to [facility] MEV appt with Nakita [facility] RN today for the 2:00 PM appt. I had request the [facility] nurse at last visit that patient needs transportation set up.\"    Mark is informed of the following:  -Last talking with  and patient was 4/26/17. Both  and pt was notified to contact [facility] care guide one week prior to any medical appt to set up ride/transportationt to appt. Pt, PCA or  did not contact [facility] care guide or make the request to set up transportation to today's appt with [facility] nurse at 2:00 PM.   - is educated that [facility] Care Guide is no longer doing set up transportation for patient but willing to teach patient and PCA how to set up ride. Both pt and  is informed of this as last week too.   -Mark have been able to contact care guide for this patient before. Verified care guide direct office number with  for future appt need and service for pt.   -Mark is educated that [facility] care guide have been set up transportation/ride for this patient and patient's wife, Jesus just for the time that [place]  [place]. Care Guide have done this to ensure patient and his wife will have no lapse of medication management and pills boxes set up in a timely manner.   -Mark is informed to not take this as personally. This is part of Care Guide role where care guide is not supposed to set up transportation for patient. Instead, we, Care Guide set up goal(s) and teach patient or family member includes PCA to be capable of knowing their resource.     Mark have no questions. "   _______________________________________________________________________________________________________________________________________________________________________________________________________________________    5/5/17 at 2:20 PM: Clinic Care Guide have informed MILES Mace RN that patient will be no show to the appt with her today at 2:00 PM due to no transportation. Coordinates with [facility] RN for advise in regard to a home skilled nurse referral or not. [facility] RN will contact pcp for advise.

## 2021-06-10 NOTE — PROGRESS NOTES
MEV appt reminder:  4/26/17: Clinic care guide call pt prefer Mark and did a conference call to pt. Pt is reminded for his f/u MEV appt with MILES Mace RN tomorrow 4/27/17 at 3:00 PM at [facility] [place] [place]. Pt is informed, no ride set up as he or pca did not contact clinic care guide to coordinate ride for pt; Clinic Care guide was gone last week (vacation). Pt is informed to bring all meds bottles, pills boxes and glucose monitor to appt. Per pt; will have family member to drop off pt to the appt. Pt and Mark  is informed to contact clinic care guide at least one week prior to pt appt date to set up ride. Explained [facility] care guide policy and role of dutie to pt. Pt is informed that care guides do not set up transportation for pt to medical appt. This clinic care guide have done transportation set up for this pt in the past due to pt health status. In order for care guide to set up ride for pt to medical appt, pt or PCA will have to set a goal that one of them would like to learn how to arrange transportation for pt to medical appt. Clinic care guide is willing to teach pt and pca.     Patient will think about it and will get back with care guide.

## 2021-06-10 NOTE — TELEPHONE ENCOUNTER
ANTICOAGULATION  MANAGEMENT- Home Care/Care Facility Result    Assessment     Today's INR result of 2.30 is Therapeutic (goal INR of 2.0-3.0)        Warfarin taken as previously instructed    No new diet changes affecting INR    No new medication/supplements affecting INR    Continues to tolerate warfarin with no reported s/s of bleeding or thromboembolism     Previous INR was Therapeutic at 2.20 on 7/28/20.    Plan:     Spoke with AMY Victoria with [facility] discussed INR result and instructed:     Warfarin Dosing Instructions:   - Continue current warfarin dose 1 mg daily on Sun/Tues; and 1.5 mg daily rest of week.    Next INR to be drawn:  2 wks.  Scheduled on 8/25/20.    Education provided: importance of consistent vitamin K intake, target INR goal and significance of current INR result and importance of notifying clinic for changes in medications    AMY Victoria verbalizes understanding and agrees to warfarin dosing plan.   ?   Sulema Almaraz RN    Subjective/Objective:      Kody Johns, a 70 y.o. male is established on warfarin.     Home care/care facility RN's report of [facility] INR, recent warfarin dosing, diet changes, medication changes, and symptoms is documented below.    Additional findings: none    Anticoagulation Episode Summary     Current INR goal:   2.0-3.0   TTR:   66.0 % (1 y)   Next INR check:   8/25/2020   INR from last check:   2.30 (8/11/2020)   Weekly max warfarin dose:      Target end date:      INR check location:      Preferred lab:      Send INR reminders to:   [facility]    Indications    A-fib (H) [I48.91]  Aortic valve disease  rheumatic [I06.9]  Mitral valve stenosis  unspecified etiology [I05.0]  Silent Stroke [I66.9]           Comments:            Anticoagulation Care Providers     Provider Role Specialty Phone number    Aristides Alegria MD Referring Family Medicine [phone]    Haja Ascencio MD  Cardiology [phone]

## 2021-06-10 NOTE — TELEPHONE ENCOUNTER
Please contact patient's family.  He was a no-show on 7/20/2020.    Please reschedule virtual visit with me    Let him know that I did refill 1 month supply of the iron

## 2021-06-10 NOTE — PROGRESS NOTES
Subjective:  67 y.o. male with concerns fell of bed 5/7.  Fell about 3 feet.  Not sure how landed.  Increased pain in right shoulder and back since then.    Pain meds now include:  Iburprofen  Acetaminophen  Tramadol  Baclofen.    Increased dose of apap in response.    Objective:  /70 (Patient Site: Left Arm, Patient Position: Sitting, Cuff Size: Adult Regular)  Pulse 64  Temp 97.4  F (36.3  C) (Oral)   Wt 110 lb (49.9 kg) Comment: with jacket and shoes  BMI 22.99 kg/m2  GENERAL: alert, not distressed  CHEST: clear, no rales, rhonchi, or wheezes  CARDIAC: regular without murmur  Back: some muscular tenderness in right side.  CVA non tender  Flank non tender.  SLR negative.    Right Shoulder Exam:  No tenderness to palpation.  Arc: normal.  Hawkin's: negative  Neer's: negative  Push off: negative  Empty can: negative  Cross arm impingement: negative  Sulcus: negative  Apprehension:  negative    Distal capillary refill, pulses, and motor and sensory function intact and normal.    Assessment and Plan:   Acute on Chronic back and right shoulder pain.  Discussed brief increase in IBU and APAP doses.  After one week, if pain persists, follow up with PMD.    This visit was conducted with the aid of a professional .

## 2021-06-10 NOTE — TELEPHONE ENCOUNTER
INR result is 2.2  INR   Date Value Ref Range Status   07/14/2020 2.10 (!) 0.9 - 1.1 Final       Will the patient be seen, or did they already see, MD or CNP today? No    Most Recent Warfarin dose day/week  Sunday Monday Tuesday Wednesday Thursday Friday Saturday     1.5 mg 1.5 mg 1 mg 1.5 mg 1.5 mg     Sunday Monday Tuesday Wednesday Thursday Friday Saturday   1 mg 1.5 mg            Has the patient missed any doses of Coumadin, Warfarin, Jantoven in the past 7 days? No    Has the patients medications changed since the last visit? No    Has the patient experienced any bleeding recently? No    Has the patient experienced any injuries or illness recently? No    Has the patient experienced any 'new' shortness of breath, severe headaches, or changes in vision recently? No    Has the patient had any changes in their diet, or alcohol consumption? No    Is the patient here today to prepare for any type of upcoming surgery, procedure, or for a cardioversion procedure? No    What phone number can we reach the patient at today? [phone] Esme.

## 2021-06-10 NOTE — TELEPHONE ENCOUNTER
RN cannot approve Refill Request    RN can NOT refill this medication med is not covered by policy/route to provider. Last office visit: 2/6/2020 Aristides Alegria MD Last Physical: 4/15/2019 Last MTM visit: Visit date not found Last visit same specialty: 2/6/2020 Aristides Alegria MD.  Next visit within 3 mo: Visit date not found  Next physical within 3 mo: Visit date not found      Vicky Benton, [facility] Triage/Med Refill 8/28/2020    Requested Prescriptions   Pending Prescriptions Disp Refills     baclofen (LIORESAL) 10 MG tablet 360 tablet 0       There is no refill protocol information for this order

## 2021-06-11 NOTE — TELEPHONE ENCOUNTER
INR result is 3.2  INR   Date Value Ref Range Status   09/01/2020 2.50 (!) 0.9 - 1.1 Final       Will the patient be seen, or did they already see, MD or CNP today? No    Most Recent Warfarin dose day/week  Sunday Monday Tuesday Wednesday Thursday Friday Saturday     1 1.5 1 1.5 1.5     Sunday Monday Tuesday Wednesday Thursday Friday Saturday   1 1.5            Has the patient missed any doses of Coumadin, Warfarin, Jantoven in the past 7 days? No    Has the patients medications changed since the last visit? No    Has the patient experienced any bleeding recently? No    Has the patient experienced any injuries or illness recently? No    Has the patient experienced any 'new' shortness of breath, severe headaches, or changes in vision recently? No    Has the patient had any changes in their diet, or alcohol consumption? No    Is the patient here today to prepare for any type of upcoming surgery, procedure, or for a cardioversion procedure? No    What phone number can we reach the patient at today? home phone listed in demographics.

## 2021-06-11 NOTE — TELEPHONE ENCOUNTER
ANTICOAGULATION  MANAGEMENT    Assessment     Today's INR result of 2.5 is Therapeutic (goal INR of 2.0-3.0)        Warfarin taken as previously instructed    No new diet changes affecting INR    No new medication/supplements affecting INR    Continues to tolerate warfarin with no reported s/s of bleeding or thromboembolism     Previous INR was Supratherapeutic    Plan:     Spoke on phone with home care nurse Esme  regarding INR result and instructed:     Warfarin Dosing Instructions:  Continue current warfarin dose 1 mg daily on Sundays, Tuesdays and Thursdays; and 1.5 mg daily rest of week  (0 % change)    Instructed patient to follow up no later than: 2 weeks     Education provided: importance of taking warfarin as instructed and importance of notifying clinic for changes in medications    Esme verbalizes understanding and agrees to warfarin dosing plan.    Instructed to call the [facility] for any changes, questions or concerns. (#[phone])   ?   Divina Dangelo RN    Subjective/Objective:      Kody WARD Johns, a 70 y.o. male is on warfarin.     Kody reports:     Home warfarin dose: verbally confirmed home dose with Esme  and updated on anticoagulation calendar     Missed doses: No     Medication changes:  No     S/S of bleeding or thromboembolism:  No     New Injury or illness:  No     Changes in diet or alcohol consumption:  No     Upcoming surgery, procedure or cardioversion:  No    Anticoagulation Episode Summary     Current INR goal:   2.0-3.0   TTR:   66.9 % (1 y)   Next INR check:   9/15/2020   INR from last check:   2.50 (9/1/2020)   Weekly max warfarin dose:      Target end date:      INR check location:      Preferred lab:      Send INR reminders to:   [facility]    Indications    A-fib (H) [I48.91]  Aortic valve disease  rheumatic [I06.9]  Mitral valve stenosis  unspecified etiology [I05.0]  Silent Stroke [I66.9]           Comments:            Anticoagulation Care Providers     Provider  Role Specialty Phone number    Aristides Alegria MD Referring Family Medicine [phone]    Haja Ascencio MD  Cardiology [phone]

## 2021-06-11 NOTE — TELEPHONE ENCOUNTER
ANTICOAGULATION  MANAGEMENT- Home Care/Care Facility Result    Assessment     Today's INR result of 3.6 is Supratherapeutic (goal INR of 2.0-3.0)        Warfarin taken as previously instructed    No new diet changes affecting INR    No new medication/supplements affecting INR    Continues to tolerate warfarin with no reported s/s of bleeding or thromboembolism     Previous INR was Supratherapeutic    Plan:     Spoke with home care nurse Esme discussed INR result and instructed:     Warfarin Dosing Instructions: Change warfarin dose to 1.5 mg daily on Fri; and 1 mg daily rest of week  (11.8 % change)    Next INR to be drawn: 2 weeks.    Education provided: importance of following up for INR monitoring at instructed interval and importance of taking warfarin as instructed    Esme verbalizes understanding and agrees to warfarin dosing plan.   ?   Baldo Marquez RN    Subjective/Objective:      Kody Johns, a 70 y.o. male is established on warfarin.     Home care/care facility RN's report of [facility] INR, recent warfarin dosing, diet changes, medication changes, and symptoms is documented below.    Additional findings: verbally confirmed home dose with Esme and updated on anticoagulation calendar    Anticoagulation Episode Summary     Current INR goal:   2.0-3.0   TTR:   65.2 % (1 y)   Next INR check:   10/12/2020   INR from last check:   3.60! (9/28/2020)   Weekly max warfarin dose:      Target end date:      INR check location:      Preferred lab:      Send INR reminders to:   [facility]    Indications    A-fib (H) [I48.91]  Aortic valve disease  rheumatic [I06.9]  Mitral valve stenosis  unspecified etiology [I05.0]  Silent Stroke [I66.9]           Comments:            Anticoagulation Care Providers     Provider Role Specialty Phone number    Aristides Alegria MD Referring Family Medicine [phone]    Haja Ascencio MD  Cardiology [phone]

## 2021-06-11 NOTE — TELEPHONE ENCOUNTER
ANTICOAGULATION  MANAGEMENT- Home Care/Care Facility Result    Assessment     Today's INR result of 3.2 is Supratherapeutic (goal INR of 2.0-3.0)        Warfarin taken as previously instructed    No new diet changes affecting INR    No new medication/supplements affecting INR    Continues to tolerate warfarin with no reported s/s of bleeding or thromboembolism     Previous INR was Therapeutic    Plan:     Spoke with Home Care Nurse Esme discussed INR result and instructed:     Warfarin Dosing Instructions: Change warfarin dose to    1.5 mg every Mon, Wed, Fri; 1 mg all other days     (6 % change)    Next INR to be drawn: 2 weeks.    Education provided: importance of therapeutic range    Esme verbalizes understanding and agrees to warfarin dosing plan.   ?   Miriam Huggins RN    Subjective/Objective:      Kody Johns, a 70 y.o. male is established on warfarin.     Home care/care facility RN's report of [facility] INR, recent warfarin dosing, diet changes, medication changes, and symptoms is documented below.    Additional findings: none    Anticoagulation Episode Summary     Current INR goal:   2.0-3.0   TTR:   65.8 % (1 y)   Next INR check:   9/29/2020   INR from last check:   3.20! (9/15/2020)   Weekly max warfarin dose:      Target end date:      INR check location:      Preferred lab:      Send INR reminders to:   [facility]    Indications    A-fib (H) [I48.91]  Aortic valve disease  rheumatic [I06.9]  Mitral valve stenosis  unspecified etiology [I05.0]  Silent Stroke [I66.9]           Comments:            Anticoagulation Care Providers     Provider Role Specialty Phone number    Aristides Alegria MD Referring Family Medicine [phone]    Haja Ascencio MD  Cardiology [phone]

## 2021-06-11 NOTE — TELEPHONE ENCOUNTER
INR result is 3.6  INR   Date Value Ref Range Status   09/15/2020 3.20 (!) 0.9 - 1.1 Final       Will the patient be seen, or did they already see, MD or CNP today? No    Most Recent Warfarin dose day/week  Sunday Monday Tuesday Wednesday Thursday Friday Saturday    1.5 mg 1 mg 1.5 mg 1 mg 1.5 mg 1 mg     Sunday Monday Tuesday Wednesday Thursday Friday Saturday   1 mg             Has the patient missed any doses of Coumadin, Warfarin, Jantoven in the past 7 days? No    Has the patients medications changed since the last visit? No    Has the patient experienced any bleeding recently? No    Has the patient experienced any injuries or illness recently? No    Has the patient experienced any 'new' shortness of breath, severe headaches, or changes in vision recently? No    Has the patient had any changes in their diet, or alcohol consumption? No    Is the patient here today to prepare for any type of upcoming surgery, procedure, or for a cardioversion procedure? No    What phone number can we reach the patient at today? [phone] Esme.

## 2021-06-11 NOTE — TELEPHONE ENCOUNTER
INR result is 2.5  INR   Date Value Ref Range Status   08/25/2020 3.70 (H) 0.90 - 1.10 Final       Will the patient be seen, or did they already see, MD or CNP today? No    Most Recent Warfarin dose day/week  Sunday Monday Tuesday Wednesday Thursday Friday Saturday     held 1.5 1 1.5 1.5     Sunday Monday Tuesday Wednesday Thursday Friday Saturday   1 1.5            Has the patient missed any doses of Coumadin, Warfarin, Jantoven in the past 7 days? No    Has the patients medications changed since the last visit? No    Has the patient experienced any bleeding recently? No    Has the patient experienced any injuries or illness recently? No    Has the patient experienced any 'new' shortness of breath, severe headaches, or changes in vision recently? No    Has the patient had any changes in their diet, or alcohol consumption? No    Is the patient here today to prepare for any type of upcoming surgery, procedure, or for a cardioversion procedure? No    What phone number can we reach the patient at today? home phone listed in demographics.

## 2021-06-12 NOTE — PROGRESS NOTES
Subjective: This patient comes in for follow-up was hospitalized 820 9/17 through 8/31/17.  At [facility].  Patient was found to have atrial fibrillation with rapid ventricular response.  He has underlying valvular heart disease and may need mitral valve replacement.    Patient was found to have acute on chronic CHF.  He continued on medicines including pravastatin, levothyroxine, Aldactone, baclofen, Plavix, omeprazole.  He had his metoprolol increased from 50 mg once a day up to twice a day.  Also was started on Lasix 20 mg twice a day.    Also was treated with Eliquis.    Insurance did not cover the Eliquis.  I saw him today went through options and started him on warfarin, get a baseline INR of 1.2.  I will see him back on Monday he will be on warfarin 5 mg 1 a day.    I spoke with the home nurse regarding some med changes please see below also regarding warfarin.    Blood pressure was low at home at 90/60 here 82/60 he complains of dizziness.  His heart sounded regular rate was at 60.    Patient has been on the Aldactone 25 mg daily I did check BMP, CBC looked okay please see below.    He has had an echocardiogram which showed moderate to severe mitral regurg also moderate aortic regurg.    Also had MRI/MRA of the brain there is no aneurysm no significant abnormalities or infarcts.            Tobacco status: He  reports that he quit smoking about 3 years ago. His smoking use included Cigarettes. He has a 50.00 pack-year smoking history. He has quit using smokeless tobacco.    Patient Active Problem List    Diagnosis Date Noted     Moderate malnutrition 08/31/2017     ALEENA (acute kidney injury) 08/31/2017     Aortic valve disease, rheumatic 08/30/2017     Lightheadedness 08/30/2017     Atherosclerosis of native coronary artery of native heart without angina pectoris      Essential hypertension with goal blood pressure less than 130/85      Pure hypercholesterolemia      Acute CHF (congestive heart  failure) 08/29/2017     Atrial fibrillation with RVR 08/29/2017     Acute diastolic CHF (congestive heart failure) 08/29/2017     Dysuria 08/29/2017     Hypothyroidism, unspecified type      Mitral valve stenosis, unspecified etiology      Elevated LFTs 08/26/2016     Essential hypertension 07/20/2015     Silent Stroke      Shoulder strain      Dysphagia      Hyperlipidemia      Rheumatic Heart Disease      Blurry vision      Coronary Artery Disease      Hearing loss      Nonspecific reaction to 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 Every 6-8 hours as needed. No more than 4,000MG of acetaminophen daily.       albuterol (PROAIR HFA;PROVENTIL HFA;VENTOLIN HFA) 90 mcg/actuation inhaler Inhale 1-2 puffs every 6 (six) hours as needed.       baclofen (LIORESAL) 10 MG tablet Take 10 mg by mouth 2 (two) times a day.       clopidogrel (PLAVIX) 75 mg tablet Take 75 mg by mouth daily.       furosemide (LASIX) 20 MG tablet Take 1 tablet (20 mg total) by mouth 2 (two) times a day at 9am and 6pm. 60 tablet 3     levothyroxine (SYNTHROID, LEVOTHROID) 88 MCG tablet TAKE 1 TABLET BY MOUTH EVERY DAY 30 tablet 9     metoprolol tartrate (LOPRESSOR) 50 MG tablet Take 1 tablet (50 mg total) by mouth 2 (two) times a day. 60 tablet 3     omeprazole (PRILOSEC) 20 MG capsule Take 20 mg by mouth daily before breakfast.       pravastatin (PRAVACHOL) 80 MG tablet Take 80 mg by mouth at bedtime.       spironolactone (ALDACTONE) 25 MG tablet Take 25 mg by mouth daily.       bromfenac (PROLENSA) 0.07 % Drop Administer 1 drop into the left eye daily.       fluticasone (FLONASE) 50 mcg/actuation nasal spray 2 sprays into each nostril daily. 10 g 3     nasal dilator (BREATHE RIGHT) Strp strip Apply qhs 30 strip 3     polyethylene glycol (MIRALAX) 17 gram packet  Take 17 g by mouth daily as needed.        traMADol (ULTRAM) 50 mg tablet Take 50 mg by mouth 2 (two) times a day as needed for pain.       UNABLE TO FIND Unknown topical medication for itching       UNABLE TO FIND Unknown topical medication for pain       warfarin (COUMADIN) 5 MG tablet 1 po qd initially then Adjust dose based on INR results as directed by coumadin nurse 30 tablet 1     No current facility-administered medications for this visit.        ROS:   10 point review of systems negative other than as outlined above    Objective:    BP (!) 82/60 (Patient Site: Left Arm, Patient Position: Sitting, Cuff Size: Adult Regular)  Pulse (!) 59  Temp 97.2  F (36.2  C) (Oral)   Resp 20  Ht 5' (1.524 m)  Wt 104 lb (47.2 kg)  SpO2 97% Comment: at rest with room air  BMI 20.31 kg/m2  Body mass index is 20.31 kg/(m^2).      General appearance tired    Vital signs showed low blood pressures heart sounded regular rate at 60 with occasional dropped beats.    Lungs were clear throughout no rales or rhonchi, heart as outlined above sounded regular    Extremities without edema    Abdomen soft nontender, no masses    No focal weakness or numbness.    Joints without redness warmth or swelling.    Lab work from hospital showed BUN 31 creatinine 1.33 I did recheck BMP today    INR today 1.2 CBC normal,    Results for orders placed or performed in visit on 09/06/17   INR   Result Value Ref Range    INR 1.20 (H) 0.90 - 1.10   HM2(CBC w/o Differential)   Result Value Ref Range    WBC 7.3 4.0 - 11.0 thou/uL    RBC 5.63 4.40 - 6.20 mill/uL    Hemoglobin 17.0 14.0 - 18.0 g/dL    Hematocrit 52.8 40.0 - 54.0 %    MCV 94 80 - 100 fL    MCH 30.2 27.0 - 34.0 pg    MCHC 32.2 32.0 - 36.0 g/dL    RDW 10.9 (L) 11.0 - 14.5 %    Platelets 219 140 - 440 thou/uL    MPV 7.6 7.0 - 10.0 fL       Assessment:  1. Hospital discharge follow-up     2. A-fib  INR    warfarin (COUMADIN) 5 MG tablet   3. CHF (congestive heart failure)  Basic Metabolic  Panel   4. Mitral valve stenosis, unspecified etiology     5. Hypotension  HM2(CBC w/o Differential)     Hospital discharge follow-up.    Atrial fibrillation with rapid ventricular response now with rate control but hypotensive.  Will decrease metoprolol to 50 mg half tablet twice a day.  Also decrease Lasix to 20 mg once a day.    Insurance does not cover Eliquis I started on warfarin today he will be on 5 mg 1 day recheck INR with follow-up visit on Monday a.m.    Mitral valve disease with moderate to severe mitral regurgitation.    CHF presently compensated, may be too dry decrease Lasix as outlined stand spironolactone, BMP pending    Plan: As discussed above discuss the findings with the patient's brother who is the caregiver.  I also spoke with the home nurse regarding the med changes.    He has a follow-up appointment next week with cardiology in mid week I will see him on Monday the 11th    This transcription uses voice recognition software, which may contain typographical errors.

## 2021-06-12 NOTE — TELEPHONE ENCOUNTER
INR result is  10/13/20      2.2  INR   Date Value Ref Range Status   09/28/2020 3.60 (!) 0.9 - 1.1 Final       Will the patient be seen, or did they already see, MD or CNP today? No    Most Recent Warfarin dose day/week  Sunday Monday Tuesday Wednesday Thursday Friday Saturday      1  mg 1  mg 1.5 mg 1  mg     Sunday Monday Tuesday Wednesday Thursday Friday Saturday   1  mg 1  mg            Has the patient missed any doses of Coumadin, Warfarin, Jantoven in the past 7 days? No    Has the patients medications changed since the last visit? No    Has the patient experienced any bleeding recently? No    Has the patient experienced any injuries or illness recently? No    Has the patient experienced any 'new' shortness of breath, severe headaches, or changes in vision recently? No    Has the patient had any changes in their diet, or alcohol consumption? No    Is the patient here today to prepare for any type of upcoming surgery, procedure, or for a cardioversion procedure? No    What phone number can we reach the patient at today? Paige = [employer]   [phone].

## 2021-06-12 NOTE — TELEPHONE ENCOUNTER
ANTICOAGULATION  MANAGEMENT- Home Care/Care Facility Result    Assessment     Today's INR result of 2.2 is Therapeutic (goal INR of 2.0-3.0)        Warfarin taken as previously instructed    No new diet changes affecting INR    No new medication/supplements affecting INR    Continues to tolerate warfarin with no reported s/s of bleeding or thromboembolism     Previous INR was Supratherapeutic at 3.60 on 9/28/20.    Plan:     Spoke with AMY Gibson with [facility] discussed INR result and instructed:     Warfarin Dosing Instructions:   - Continue current warfarin dose 1.5 mg daily on Fridays; and 1 mg daily rest of week.    Next INR to be drawn:  2 wks.  Scheduled on 10/27/20.    Education provided: target INR goal and significance of current INR result    AMY Gibson verbalizes understanding and agrees to warfarin dosing plan.   ?   Sulema Almaraz RN    Subjective/Objective:      Kody Johns, a 70 y.o. male is established on warfarin.     Home care/care facility RN's report of [facility] INR, recent warfarin dosing, diet changes, medication changes, and symptoms is documented below.    Additional findings: none    Anticoagulation Episode Summary     Current INR goal:  2.0-3.0   TTR:  67.5 % (1 y)   Next INR check:  10/27/2020   INR from last check:  2.20 (10/13/2020)   Weekly max warfarin dose:     Target end date:     INR check location:     Preferred lab:     Send INR reminders to:  [facility]    Indications    A-fib (H) [I48.91]  Aortic valve disease  rheumatic [I06.9]  Mitral valve stenosis  unspecified etiology [I05.0]  Silent Stroke [I66.9]           Comments:           Anticoagulation Care Providers     Provider Role Specialty Phone number    Aristides Alegria MD Referring Family Medicine [phone]    Haja Ascencio MD  Cardiology [phone]

## 2021-06-12 NOTE — TELEPHONE ENCOUNTER
ANTICOAGULATION  MANAGEMENT- Home Care/Care Facility Result    Assessment     Today's INR result of 2.5 is Therapeutic (goal INR of 2.0-3.0)        Warfarin taken as previously instructed    No new diet changes affecting INR    No new medication/supplements affecting INR    Continues to tolerate warfarin with no reported s/s of bleeding or thromboembolism     Previous INR was Therapeutic    Plan:     Spoke with home care nurse Esme discussed INR result and instructed:     Warfarin Dosing Instructions: Continue current warfarin dose 1.5 mg daily on Fri; and 1 mg daily rest of week  (0 % change)    Next INR to be drawn: 4 weeks.    Education provided: importance of following up for INR monitoring at instructed interval and importance of taking warfarin as instructed    Esme verbalizes understanding and agrees to warfarin dosing plan.   ?   Baldo Marquez RN    Subjective/Objective:      Kody Johns, a 70 y.o. male is established on warfarin.     Home care/care facility RN's report of [facility] INR, recent warfarin dosing, diet changes, medication changes, and symptoms is documented below.    Additional findings: verbally confirmed home dose with Esme and updated on anticoagulation calendar    Anticoagulation Episode Summary     Current INR goal:  2.0-3.0   TTR:  71.4 % (1 y)   Next INR check:  11/24/2020   INR from last check:  2.50 (10/27/2020)   Weekly max warfarin dose:     Target end date:     INR check location:     Preferred lab:     Send INR reminders to:  [facility]    Indications    A-fib (H) [I48.91]  Aortic valve disease  rheumatic [I06.9]  Mitral valve stenosis  unspecified etiology [I05.0]  Silent Stroke [I66.9]           Comments:           Anticoagulation Care Providers     Provider Role Specialty Phone number    Aristides Alegria MD Referring Family Medicine [phone]    Haja Ascencio MD  Cardiology [phone]

## 2021-06-12 NOTE — TELEPHONE ENCOUNTER
INR result is     2.5  INR   Date Value Ref Range Status   10/13/2020 2.20 (!) 0.9 - 1.1 Final       Will the patient be seen, or did they already see, MD or CNP today? No    Most Recent Warfarin dose day/week  Sunday Monday Tuesday Wednesday Thursday Friday Saturday     1 1 1 1.5 1     Sunday Monday Tuesday Wednesday Thursday Friday Saturday   1 1            Has the patient missed any doses of Coumadin, Warfarin, Jantoven in the past 7 days? No    Has the patients medications changed since the last visit? No    Has the patient experienced any bleeding recently? No    Has the patient experienced any injuries or illness recently? No    Has the patient experienced any 'new' shortness of breath, severe headaches, or changes in vision recently? No    Has the patient had any changes in their diet, or alcohol consumption? No    Is the patient here today to prepare for any type of upcoming surgery, procedure, or for a cardioversion procedure? No    What phone number can we reach the patient at today? home phone listed in demographics.

## 2021-06-12 NOTE — PROGRESS NOTES
Thank you for asking the [facility] Heart Care team to see Kody Johns      Assessment/Plan:     Patient is acutely sick today with tachycardia (EKG rate 136 and looks initially like 2:1 flutter, then becomes variable block), tachypnea, firm abdomen, and JVD elevation. Lungs are clear and there is no peripheral edema. He has warm feet and hands suggesting. I suspect he has an infectious process that has resulted in the tachycardia.     I have spoken with the emergency room doctor here at [facility] and we will transfer him there for further evaluation and treatment. I suspect that he will be admitted.    Thank you         Current History:   Kody Johns is a 67 y.o. with who was a refugee in [place] was diagnosed with what sounds like heart failure, possibly even endocarditis and was started on multiple medications and was told that he needed urgent surgery for his heart valves and was emergently [place] the [place] in [place] 2014. I saw him in clinic shortly after he arrived here in the [place] And he obtain both a surface and a transesophageal echocardiograms which showed mild to moderate aortic insufficiency and mild to moderate mitral stenosis. Due to complaints of chest pain, we went ahead and did right and left heart catheterization on the patient and he was found just to have mild coronary artery disease and a mitral valve gradient of 7-8 mmHg with simultaneous pulmonary capillary wedge pressure and LVEDP. His mean PA pressure and wedge pressure were normal to low. He adamantly denies any history of strokes. He did undergo a CT scan of his head given hearing deficiency and it was concerning for to possible foci of previous strokes as well as some small vessel ischemia. The patient does not know why he is on Plavix and a high dose aspirin. He was started on both those medications in [facility] prior to transferring [place] the [place].      He returns for annual f/u today noting 2 weeks of fever, diffuse body aches,  dysuria and frequent nocturia, loss of appetite, constipation and shortness of breath at night. There is no real increase or change in a chronic cough. He has felt weak and as though his heart has been racing for the past     Past Medical History:     Past Medical History:   Diagnosis Date     Palpitations        Past Surgical History:     Past Surgical History:   Procedure Laterality Date     CATARACT EXTRACTION Left 06/13/2016       Family History:     Family History   Problem Relation Age of Onset     No Medical Problems Mother      No Medical Problems Father      Heart disease Neg Hx        Social History:    reports that he has quit smoking. He has never used smokeless tobacco. He reports that he does not drink alcohol.    Meds:     Current Outpatient Prescriptions   Medication Sig     aspirin 325 MG EC tablet TAKE 1 TABLET BY MOUTH EVERY DAY     baclofen (LIORESAL) 10 MG tablet TAKE 1 TABLET BY MOUTH TWICE DAILY     clopidogrel (PLAVIX) 75 mg tablet TAKE 1 TABLET BY MOUTH EVERY DAY     fluticasone (FLONASE) 50 mcg/actuation nasal spray 2 sprays into each nostril daily.     ibuprofen (ADVIL,MOTRIN) 200 MG tablet Take 200 mg by mouth every 6 (six) hours as needed.     levothyroxine (SYNTHROID) 88 MCG tablet Take 1 tablet (88 mcg total) by mouth daily.     metoprolol succinate (TOPROL-XL) 25 MG TAKE 1 TABLET BY MOUTH EVERY DAY     nasal dilator (BREATHE RIGHT) Strp strip Apply qhs     omeprazole (PRILOSEC) 20 MG capsule TAKE 1 CAPSULE BY MOUTH EVERY DAY     PAIN RELIEVER EXTRA STRENGTH 500 mg tablet TAKE 1 TO 2 TABLETS BY MOUTH EVERY 6 TO 8 HOURS AS NEEDED FOR PAIN NO MORE THAN 4,000MG OF ACETAMINOPHEN DAILY     polyethylene glycol (GLYCOLAX) 17 gram/dose powder 17 gm in 8 oz water daily     pravastatin (PRAVACHOL) 80 MG tablet TAKE 1 TABLET BY MOUTH DAILY     spironolactone (ALDACTONE) 25 MG tablet TAKE 1 TABLET BY MOUTH EVERY DAY     traMADol (ULTRAM) 50 mg tablet TAKE 1 TABLET BY MOUTH TWICE DAILY AS NEEDED  "FOR PAIN     VENTOLIN HFA 90 mcg/actuation inhaler INHALE 1 TO 2 PUFFS BY MOUTH EVERY 6 HOURS AS NEEDED     bromfenac (PROLENSA) 0.07 % Drop Administer 1 drop into the left eye daily.       Allergies:   Review of patient's allergies indicates no known allergies.    Review of Systems:   Review of Systems:   General: WNL  Eyes: WNL  Ears/Nose/Throat: WNL  Lungs: WNL  Heart: WNL  Stomach: WNL  Bladder: WNL  Muscle/Joints: WNL  Skin: WNL  Nervous System: WNL  Mental Health: WNL     Blood: WNL       Objective:      Physical Exam  @LASTENCWT:3@  4' 10\" (1.473 m)  @BMI:3@  /90 (Patient Site: Left Arm, Patient Position: Sitting, Cuff Size: Adult Regular)  Pulse (!) 108  Resp 16  Ht 4' 10\" (1.473 m)  Wt 110 lb (49.9 kg)  BMI 22.99 kg/m2    General Appearance:   Alert, cooperative and in no acute distress.   HEENT:  No scleral icterus; the mucous membranes were pink and moist.   Neck: JVP +. No thyromegaly. + HJR   Chest: The spine was straight. The chest was symmetric.   Lungs:   tachypneic at rest; the lungs are clear to auscultation.   Cardiovascular:   S1 and S2 tachycardia. No clicks or rubs. No carotid bruits noted. Right DP, PT, and radial pulses 2+. Left DP, PT, and radial pulses 2+.   Abdomen:  RLQ tenderness, abdomen firm but not hard, diminshed suzi sounds   Extremities: No cyanosis, clubbing, or edema.   Skin: No xanthelasma.   Neurologic: Mood and affect are appropriate.         Lab Review   Lab Results   Component Value Date     07/20/2017     12/21/2016     08/26/2016    K 4.0 07/20/2017    K 4.4 12/21/2016    K 4.3 08/26/2016     07/20/2017     12/21/2016     08/26/2016    CO2 23 07/20/2017    CO2 25 12/21/2016    CO2 28 08/26/2016    BUN 19 07/20/2017    BUN 13 12/21/2016    BUN 17 08/26/2016    CREATININE 0.89 07/20/2017    CREATININE 0.88 12/21/2016    CREATININE 1.00 08/26/2016    CALCIUM 9.0 07/20/2017    CALCIUM 8.9 12/21/2016    CALCIUM 9.2 08/26/2016 "     Lab Results   Component Value Date    WBC 6.4 07/20/2017    WBC 5.9 08/26/2016    WBC 6.4 04/14/2014    HGB 16.1 07/20/2017    HGB 16.4 08/26/2016    HGB 16.0 04/14/2014    HCT 47.0 07/20/2017    HCT 47.1 08/26/2016    HCT 46.2 04/14/2014    MCV 93 07/20/2017    MCV 92 08/26/2016    MCV 88 04/14/2014     07/20/2017     (L) 08/26/2016     04/14/2014     Lab Results   Component Value Date    CHOL 185 07/20/2017    CHOL 147 02/10/2016    CHOL 154 04/02/2014    TRIG 83 07/20/2017    TRIG 121 02/10/2016    TRIG 103 04/02/2014    HDL 34 (L) 07/20/2017    HDL 33 (L) 02/10/2016    HDL 34 (L) 04/02/2014    LDLDIRECT 111 10/13/2016    LDLDIRECT 94 11/24/2014    LDLDIRECT 101 05/08/2014     Lab Results   Component Value Date     (H) 04/02/2014         Ashleigh Lang M.D.

## 2021-06-12 NOTE — PROGRESS NOTES
Audiology Report:       History:  Kody Johns is seen today for comprehensive hearing evaluation. He was accompanied by a family member and an  today. He wears a right Phonak Mirlande Q50 SP BTE hearing aid that was fit at this clinic on 5/21/14. He has a history of moderately severe to profound sensorineural hearing loss in both ears with his right ear being better than his left ear. Kody last had his hearing tested on 1/31/17. Results on that day showed improved thresholds in both ears. Programming adjustments were made to the right hearing aid on that day. Kody reports that he is not hearing as well with his hearing aid since the adjustments were made. He reportedly experiences intermittent tinnitus in his left ear. He states that he experiences some dizziness. He denies otalgia and otorrhea.      Results:      Left Ear Right Ear   Otoscopy clear canals clear canals   Pure Tone Audiometry moderately severe sloping to profound sensorineural hearing loss moderately severe sloping to profound sensorineural hearing loss   Word Recognition very poor as completed through an  good as completed through an    Tympanometry normal (Type A)  normal (Type A)      Transducer: Insert earphones     Reliability was good and there was good  SRT to PTA agreement. Bone conduction was not performed today. These results were consistent with those found on 1/31/17.      Hearing Aid Check: A listening check revealed the right hearing aid had a good sound quality. The microphone covers were changed on the hearing aid. The earmold tubing was changed. The hearing aid was reprogrammed with previous settings from 5/17/16. Kody reports he is able to hear better after the adjustments are made. He asks about getting a hearing aid for his left ear, but he is told that he is not a good candidate for a hearing aid in that ear since his word understanding is so poor. Kody is provided 18 size 13 batteries for his hearing  aid today.     Plan:  Results are discussed in detail.  He should return for retesting annually. He should return for hearing aid checks as needed.      Please see audiogram under  media  and  audiogram  in the patient s chart.      Flex Estrada, CCC-A  [place] Licensed Audiologist #[id number]

## 2021-06-12 NOTE — PROGRESS NOTES
Note:  Patient have a appt with pcp today, 9/6/17 at 4:00 PM at [facility].   [facility] is schedule off at 3:45 PM. Unable to meet patient during appt time.     Reviewed patient office visit notes on 9/6/17. Per pcp notes; pt was found to have acute on chronic CHF; started him on warfarin, get a baseline INR of 1.2. Pcp will see pt on Monday, he will be on warfarin 5 mg a day. Quit smoking about 3 years ago. (please see pcp notes for more info).     9/7/17: Patient Outreach:  Call pt and no answer.   Call the pt emergency  (listed on the pt chart) and talked with Marisol.   Asked to speak with patient.   Patient is not with him at the moment.   Marisol is informed to notified the pt that [facility] call. Pt was in the clinic on 9/6/17 with his pcp but [facility] is schedule off prior to the pt appt at 4pm.   Did questioned how is the pt doing per family member or Marisol's observation.     Marisol shared:  -Pt currently have a home skilled nurse to see him.   -Last appt was today, 9/7/17 at 10:15 AM with [facility] home care service nurse.   -pt have been doing good after the hospital per observation. Pt was in the hospital for heart problem.   -pt have been complains of dizziness and room spin; sometimes.   -walk, walk up the stair, and stand up trigger SOB and increase tiredness.    -Crow, the patient brother is still the PCA for pt.   -pt is home with family around all day.     [facility] direct office phone number is given to the pt emergency .  The emergency  is informed to encouraged pt or his PCA/brother for the following:  -encouraged pca to stand by assist when pt is up walking outside. Make sure someone or family member is with pt.   -keep [facility] or [facility] Team update for PCA service like hour change, and home skilled nurse service like when service is stop/discontinue.  -encouraged to reach out to home skilled nurse directly if pt need to reschedule appt with [facility] home care nurse, or if  pt drop his med/pill box and need to be see sooner.   -contact [facility] for appt need, resource and questions. May contact [facility] if need help call [facility] home [facility].   -if condition is worsen, or any sign of SOB, chest pain, facial or lip color change, fall incident, and vision change suddenly then encourage pt and family member to contact 911 emergency line for him ASAP. Refer family member and patient to contact the  Triage nurse line at [phone] for any health situation like non-emergency and need advised whether to call 911 or being see by pcp.       Marisol confirmed and have no questions.

## 2021-06-12 NOTE — PROGRESS NOTES
Subjective: This patient comes in for follow-up.  He needed recheck on some labs he has hypothyroid, hyperlipidemia, hypertension.    Patient has had some pain in through the left flank area.  In discussing it sounds like he has some constipation and x-ray was obtained showing some increased stool in the bowel otherwise normal x-ray.    Patient's had some pain in through the left shoulder he had x-rays back in 2014    He has full range of motion some pain through the anterior glenohumeral joint.  He is using Tylenol only.  I did give him some tramadol to use for more severe pain do range of motion exercises follow-up if not improving.    Labs showed lipid CMP T4 TSH and CBC today all looked within normal limits.    Patient denies any fever chills.  No temperature    Tobacco status: He  reports that he has never smoked. He has never used smokeless tobacco.    Patient Active Problem List    Diagnosis Date Noted     Elevated LFTs 08/26/2016     Hypertension 07/20/2015     Silent Stroke      Shoulder strain      Dysphagia      Hyperlipidemia      Rheumatic Heart Disease      Blurry vision      Hypothyroidism      Coronary Artery Disease      Hearing loss      Latent Tuberculosis        Current Outpatient Prescriptions   Medication Sig Dispense Refill     aspirin 325 MG EC tablet TAKE 1 TABLET BY MOUTH EVERY  tablet 3     baclofen (LIORESAL) 10 MG tablet TAKE 1 TABLET BY MOUTH TWICE DAILY 60 tablet 1     bromfenac (PROLENSA) 0.07 % Drop Administer 1 drop into the left eye daily.       clopidogrel (PLAVIX) 75 mg tablet TAKE 1 TABLET BY MOUTH EVERY DAY 90 tablet 1     fluticasone (FLONASE) 50 mcg/actuation nasal spray 2 sprays into each nostril daily. 10 g 3     ibuprofen (ADVIL,MOTRIN) 200 MG tablet Take 200 mg by mouth every 6 (six) hours as needed.       levothyroxine (SYNTHROID) 88 MCG tablet Take 1 tablet (88 mcg total) by mouth daily. 30 tablet 11     metoprolol succinate (TOPROL-XL) 25 MG TAKE 1 TABLET BY  "MOUTH EVERY DAY 90 tablet 0     nasal dilator (BREATHE RIGHT) Strp strip Apply qhs 30 strip 3     omeprazole (PRILOSEC) 20 MG capsule TAKE 1 CAPSULE BY MOUTH EVERY DAY 90 capsule 2     PAIN RELIEVER EXTRA STRENGTH 500 mg tablet TAKE 1 TO 2 TABLETS BY MOUTH EVERY 6 TO 8 HOURS AS NEEDED FOR PAIN NO MORE THAN 4,000MG OF ACETAMINOPHEN DAILY 100 tablet 3     pravastatin (PRAVACHOL) 80 MG tablet Take 1 tablet (80 mg total) by mouth daily. 90 tablet 3     spironolactone (ALDACTONE) 25 MG tablet TAKE 1 TABLET BY MOUTH EVERY DAY 30 tablet 5     traMADol (ULTRAM) 50 mg tablet TAKE 1 TABLET BY MOUTH TWICE DAILY AS NEEDED FOR PAIN 30 tablet 0     VENTOLIN HFA 90 mcg/actuation inhaler INHALE 1 TO 2 PUFFS BY MOUTH EVERY 6 HOURS AS NEEDED 18 g 1     polyethylene glycol (GLYCOLAX) 17 gram/dose powder 17 gm in 8 oz water daily 510 g 6     No current facility-administered medications for this visit.        ROS: 10 point review of systems negative other than as outlined above, additionally he has had some decreased hearing he needed to see the audiologist to check regarding hearing aids    Objective:    /72 (Patient Site: Left Arm, Patient Position: Sitting, Cuff Size: Adult Regular)  Pulse 72  Temp 97.7  F (36.5  C) (Oral)   Resp 20  Ht 4' 10\" (1.473 m)  Wt 108 lb (49 kg)  BMI 22.57 kg/m2  Body mass index is 22.57 kg/(m^2).      General appearance no acute distress    Vital signs were stable afebrile    HEENT neck is negative oropharynx is clear pupils react normally    Lungs are clear no rales rhonchi heart regular S1-S2 no murmur    Abdomen nontender but does have some left flank pain.  No rashes there is been no blood in urine.    Left shoulder with some slight tenderness anterior glenohumeral joint good range of motion no swelling.    Extremities without edema no rashes.    Blood work looked stable continue same meds he is on the pravastatin 80 mg a day    Results for orders placed or performed in visit on 07/20/17 "   Comprehensive Metabolic Panel   Result Value Ref Range    Sodium 140 136 - 145 mmol/L    Potassium 4.0 3.5 - 5.0 mmol/L    Chloride 107 98 - 107 mmol/L    CO2 23 22 - 31 mmol/L    Anion Gap, Calculation 10 5 - 18 mmol/L    Glucose 85 70 - 125 mg/dL    BUN 19 8 - 22 mg/dL    Creatinine 0.89 0.70 - 1.30 mg/dL    GFR MDRD Af Amer >60 >60 mL/min/1.73m2    GFR MDRD Non Af Amer >60 >60 mL/min/1.73m2    Bilirubin, Total 0.8 0.0 - 1.0 mg/dL    Calcium 9.0 8.5 - 10.5 mg/dL    Protein, Total 7.7 6.0 - 8.0 g/dL    Albumin 3.4 (L) 3.5 - 5.0 g/dL    Alkaline Phosphatase 61 45 - 120 U/L    AST 21 0 - 40 U/L    ALT 12 0 - 45 U/L   Lipid Cascade RANDOM   Result Value Ref Range    Cholesterol 185 <=199 mg/dL    Triglycerides 83 <=149 mg/dL    HDL Cholesterol 34 (L) >=40 mg/dL    LDL Calculated 134 (H) <=129 mg/dL    Patient Fasting > 8hrs? Yes    Thyroid Stimulating Hormone (TSH)   Result Value Ref Range    TSH 1.77 0.30 - 5.00 uIU/mL   T4, Total   Result Value Ref Range    T4, Total 7.4 4.5 - 13.0 ug/dL   HM1 (CBC with Diff)   Result Value Ref Range    WBC 6.4 4.0 - 11.0 thou/uL    RBC 5.07 4.40 - 6.20 mill/uL    Hemoglobin 16.1 14.0 - 18.0 g/dL    Hematocrit 47.0 40.0 - 54.0 %    MCV 93 80 - 100 fL    MCH 31.8 27.0 - 34.0 pg    MCHC 34.3 32.0 - 36.0 g/dL    RDW 11.5 11.0 - 14.5 %    Platelets 161 140 - 440 thou/uL    MPV 7.6 7.0 - 10.0 fL    Neutrophils % 60 50 - 70 %    Lymphocytes % 24 20 - 40 %    Monocytes % 12 (H) 2 - 10 %    Eosinophils % 4 0 - 6 %    Basophils % 1 0 - 2 %    Neutrophils Absolute 3.9 2.0 - 7.7 thou/uL    Lymphocytes Absolute 1.5 0.8 - 4.4 thou/uL    Monocytes Absolute 0.7 0.0 - 0.9 thou/uL    Eosinophils Absolute 0.2 0.0 - 0.4 thou/uL    Basophils Absolute 0.0 0.0 - 0.2 thou/uL       Assessment:  1. Hypothyroidism  Thyroid Stimulating Hormone (TSH)    T4, Total   2. Hypertension  Comprehensive Metabolic Panel   3. Hyperlipidemia  Comprehensive Metabolic Panel    Lipid Cascade RANDOM   4. Shoulder pain,  left  traMADol (ULTRAM) 50 mg tablet   5. Left flank pain  XR Abdomen Flat and Upright    HM1(CBC and Differential)    HM1 (CBC with Diff)    polyethylene glycol (GLYCOLAX) 17 gram/dose powder   6. Hearing loss  Ambulatory referral to Audiology     Hypothyroid therapeutic dose continue the same    Hyperlipidemia fairly well controlled stay on 80 mg of pravastatin    Hypertension controlled normal renal function    Left shoulder pain range of motion use Tylenol for more severe pain can use tramadol.    Hearing loss please see above    Left flank pain seems to be more likely from constipation.  Increase liquids water fruits and vegetables    Go on MiraLAX    Follow-up if not improved    Plan: I sent a letter with the results per their request, recheck in 6 months no change in meds    This transcription uses voice recognition software, which may contain typographical errors.

## 2021-06-12 NOTE — PROGRESS NOTES
Subjective: This patient comes in for follow-up he has history of atrial fibrillation with a rapid ventricular response his heart sounded irregularly irregular here today for rate was in the 60 range she is on metoprolol 50 mg a half tablet twice a day blood pressure still little on the low side at 82/60 but he denies any dizziness now he is breathing better his lungs sounded clear    He is on spironolactone 25 mg a day and also on a lower dose of Lasix at 20 mg a day since 9/6/2017 please see BMP report from that visit with BUN 30 creatinine 1.4 GFR 51.    Patient has mitral valve disease.    He is on warfarin he has been on 5 mg a day he did take it today unfortunately his INR was elevated as outlined below at 5.7.  He will hold off on the warfarin Tuesday Wednesday Thursday I will recheck it again on Thursday, 9/14/2017.  He does have follow-up with cardiology later that day.    Immunizations were updated with PCV 13 as well as flu shot today    Tobacco status: He  reports that he quit smoking about 3 years ago. His smoking use included Cigarettes. He has a 50.00 pack-year smoking history. He has quit using smokeless tobacco.    Patient Active Problem List    Diagnosis Date Noted     Moderate malnutrition 08/31/2017     ALEENA (acute kidney injury) 08/31/2017     Aortic valve disease, rheumatic 08/30/2017     Lightheadedness 08/30/2017     Atherosclerosis of native coronary artery of native heart without angina pectoris      Essential hypertension with goal blood pressure less than 130/85      Pure hypercholesterolemia      Acute CHF (congestive heart failure) 08/29/2017     Atrial fibrillation with RVR 08/29/2017     Acute diastolic CHF (congestive heart failure) 08/29/2017     Dysuria 08/29/2017     Hypothyroidism, unspecified type      Mitral valve stenosis, unspecified etiology      Elevated LFTs 08/26/2016     Essential hypertension 07/20/2015     Silent Stroke      Shoulder strain      Dysphagia       Hyperlipidemia      Rheumatic Heart Disease      Blurry vision      Coronary Artery Disease      Hearing loss      Nonspecific reaction to 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 Every 6-8 hours as needed. No more than 4,000MG of acetaminophen daily.       albuterol (PROAIR HFA;PROVENTIL HFA;VENTOLIN HFA) 90 mcg/actuation inhaler Inhale 1-2 puffs every 6 (six) hours as needed.       baclofen (LIORESAL) 10 MG tablet Take 10 mg by mouth 2 (two) times a day.       clopidogrel (PLAVIX) 75 mg tablet Take 75 mg by mouth daily.       fluticasone (FLONASE) 50 mcg/actuation nasal spray 2 sprays into each nostril daily. 10 g 3     furosemide (LASIX) 20 MG tablet Take 1 tablet (20 mg total) by mouth daily. 30 tablet 3     levothyroxine (SYNTHROID, LEVOTHROID) 88 MCG tablet TAKE 1 TABLET BY MOUTH EVERY DAY 30 tablet 9     metoprolol tartrate (LOPRESSOR) 50 MG tablet Take 0.5 tablets (25 mg total) by mouth 2 (two) times a day. 30 tablet 3     nasal dilator (BREATHE RIGHT) Strp strip Apply qhs 30 strip 3     omeprazole (PRILOSEC) 20 MG capsule Take 20 mg by mouth daily before breakfast.       polyethylene glycol (MIRALAX) 17 gram packet Take 17 g by mouth daily as needed.        pravastatin (PRAVACHOL) 80 MG tablet Take 80 mg by mouth at bedtime.       spironolactone (ALDACTONE) 25 MG tablet Take 25 mg by mouth daily.       traMADol (ULTRAM) 50 mg tablet Take 50 mg by mouth 2 (two) times a day as needed for pain.       UNABLE TO FIND Unknown topical medication for itching       UNABLE TO FIND Unknown topical medication for pain       warfarin (COUMADIN) 5 MG tablet 1 po qd initially then Adjust dose based on INR results as directed by coumadin nurse 30 tablet 1     No current facility-administered medications for this visit.        ROS:   10  point review of systems positive as outlined otherwise negative    Objective:    BP (!) 82/60 (Patient Site: Left Arm, Patient Position: Sitting, Cuff Size: Adult Regular)  Pulse 64  Resp 20  Wt 108 lb (49 kg)  BMI 21.09 kg/m2  Body mass index is 21.09 kg/(m^2).      General appearance no acute distress.    He is breathing easily    Lungs sounded clear no rales or rhonchi heart was irregularly irregular as before    Abdomen nontender    Extremities without edema skin was normal.    Previous BMP noted with GFR 51.    INR today elevated as below    Results for orders placed or performed in visit on 09/11/17   INR   Result Value Ref Range    INR 5.76 (HH) 0.90 - 1.10       Assessment:  1. Atrial fibrillation with RVR  metoprolol tartrate (LOPRESSOR) 50 MG tablet    furosemide (LASIX) 20 MG tablet    INR   2. Need for pneumococcal vaccine  Pneumococcal conjugate vaccine 13-valent   3. Health care maintenance  Influenza High Dose, Seasonal 65+ yrs   4. CHF (congestive heart failure)     5. Mitral valve stenosis, unspecified etiology       A. fib now rate controlled but INR elevated to high hold warfarin for 3 days recheck INR    Health maintenance with PCV 13 and influenza today    Congestive heart failure compensated    Mitral valve disease    Plan: As above recheck in 3 days with INR hold on warfarin for now    This transcription uses voice recognition software, which may contain typographical errors.

## 2021-06-13 NOTE — PROGRESS NOTES
OFFICE VISIT - FAMILY MEDICINE     ASSESSMENT AND PLAN     1. Anemia, unspecified type  HM2(CBC w/o Differential)   2. Atherosclerosis of native coronary artery of native heart without angina pectoris  atorvastatin (LIPITOR) 10 MG tablet    Comprehensive Metabolic Panel    Iron and Transferrin Iron Binding Capacity   3. Pure hypercholesterolemia  atorvastatin (LIPITOR) 10 MG tablet   4. Iron deficiency anemia, unspecified iron deficiency anemia type  ferrous sulfate 325 (65 FE) MG tablet    Ferritin   5. Hypothyroidism, unspecified type  levothyroxine (SYNTHROID) 100 MCG tablet    T4, Total    Thyroid Stimulating Hormone (TSH)   6. Atrial fibrillation, unspecified type (H)     7. Hemorrhagic stroke (H)  baclofen (LIORESAL) 10 MG tablet   8. Chronic heart failure with preserved ejection fraction (H)  [facility] BNP Outpatient (NTBNP)    Miscellaneous DME (fill in details in comments)   9. Encounter for medication adjustment  Ambulatory referral to Medication Management   10. Hyperglycemia  Glycosylated Hemoglobin A1c   Patient with multiple chronic medical issues, he would like to get his flu shot today, he does have a history of stroke, coronary artery disease, iron deficiency anemia, hypothyroidism, atrial fibrillation, overall compliant with treatment, trazodone seeing an in-house pharmacist to see if medication could be simplified, we also doing some blood work today, further recommendation will be based on the result.    CHIEF COMPLAINT   Medication Refill    HPI   Kody Johns is a 70 y.o. male.     Patient is here today for med check, and would like to get a flu vaccine.  Last office visit with Dr. Alegria was November 18, overall has been stable, no worsening symptoms, has been compliant with medication, patient son is here with him today.  He does have a nurse coming at home to set up his medication, patient son would like him to have a simplified medication treatment, is interested in seeing a in-house  "pharmacist.  Does have a history of stroke with left side weakness, is on chronic anticoagulation, no recent bleeding.    Review of Systems As per HPI, otherwise negative.    OBJECTIVE   BP (!) 137/98 (Patient Site: Right Arm, Patient Position: Sitting, Cuff Size: Adult Regular)   Pulse (!) 112   Temp 98.3  F (36.8  C) (Temporal)   Resp 18   Ht 4' 10\" (1.473 m)   Wt 102 lb (46.3 kg)   BMI 21.32 kg/m    Physical Exam   Constitutional: He is oriented to person, place, and time. He appears well-developed and well-nourished.   On a wheelchair.   HENT:   Head: Normocephalic and atraumatic.   Neck: Normal range of motion. Neck supple. No JVD present. No tracheal deviation present. No thyromegaly present.   Cardiovascular: Normal rate, regular rhythm, normal heart sounds and intact distal pulses. Exam reveals no gallop and no friction rub.   No murmur heard.  Pulmonary/Chest: Effort normal and breath sounds normal. No respiratory distress. He has no wheezes. He has no rales.   Musculoskeletal:         General: No tenderness or edema.   Lymphadenopathy:     He has no cervical adenopathy.   Neurological: He is alert and oriented to person, place, and time. Coordination normal.   Chronic left-sided weakness.   Psychiatric: He has a normal mood and affect. Judgment and thought content normal.       PFSH     Family History   Problem Relation Age of Onset     No Medical Problems Mother      No Medical Problems Father      Heart disease Neg Hx      Social History     Socioeconomic History     Marital status:      Spouse name: Jesus Johns     Number of children: 4     Years of education: Not on file     Highest education level: Not on file   Occupational History     Not on file   Social Needs     Financial resource strain: Not on file     Food insecurity     Worry: Not on file     Inability: Not on file     Transportation needs     Medical: Not on file     Non-medical: Not on file   Tobacco Use     Smoking status: " Former Smoker     Packs/day: 1.00     Years: 50.00     Pack years: 50.00     Types: Cigarettes     Quit date: 2014     Years since quittin.2     Smokeless tobacco: Former User     Tobacco comment: No Betel nut   Substance and Sexual Activity     Alcohol use: No     Comment: Quit     Drug use: No     Sexual activity: Not on file   Lifestyle     Physical activity     Days per week: Not on file     Minutes per session: Not on file     Stress: Not on file   Relationships     Social connections     Talks on phone: Not on file     Gets together: Not on file     Attends Anabaptism service: Not on file     Active member of club or organization: Not on file     Attends meetings of clubs or organizations: Not on file     Relationship status: Not on file     Intimate partner violence     Fear of current or ex partner: Not on file     Emotionally abused: Not on file     Physically abused: Not on file     Forced sexual activity: Not on file   Other Topics Concern     Not on file   Social History Narrative    Refugee, born in [facility].     Relevant history was reviewed with the patient today, unless noted in HPI, nothing is pertinent for this visit.  [facility]     Patient Active Problem List    Diagnosis Date Noted     Encounter for attention to gastrostomy (H) 2020     Urinary incontinence without sensory awareness 10/14/2019     S/P AVR 2019     H/O mitral valve replacement 2019     Metabolic encephalopathy 2019     Encephalopathy 2019     Atrial flutter with rapid ventricular response (H) 2019     Intracerebral bleed (H) 2019     Hemorrhagic stroke (H) 2019     Tracheostomy care (H)      Acute respiratory failure (H) 05/10/2019     Acute cardioembolic stroke (H)      Paralytic ileus (H) 2019     Small bowel obstruction (H)      Atrial fibrillation with RVR (H)      Hypernatremia      Thrombocytopenia (H)      Sepsis (H)      Overview Note:     IMO Update       S/P MVR  (mitral valve repair) 04/24/2019     Acute respiratory failure with hypoxemia (H) 04/24/2019     Anemia due to blood loss, acute 04/24/2019     Coagulopathy (H) 04/24/2019     Non-English speaking patient 04/24/2019     Overview Note:     Spanish speaking       Respiratory failure (H) 03/21/2019     Overview Note:     Added automatically from request for surgery [id number]       A-fib (H) 09/14/2017     Overview Note:     LINDA not surgically ligated due to presence of dense adhesions.       Heart failure with preserved ejection fraction (H) 09/14/2017     Moderate malnutrition (H) 08/31/2017     ALEENA (acute kidney injury) (H) 08/31/2017     Aortic valve disease, rheumatic 08/30/2017     Lightheadedness 08/30/2017     Atherosclerosis of native coronary artery of native heart without angina pectoris      Essential hypertension with goal blood pressure less than 130/85      Pure hypercholesterolemia      Dysuria 08/29/2017     Hypothyroidism, unspecified type      Mitral valve stenosis, unspecified etiology      Elevated LFTs 08/26/2016     Silent Stroke      Shoulder strain      Overview Note:     left       Dysphagia      Hyperlipidemia      Rheumatic heart disease      Blurry vision      Hearing loss      Nonspecific reaction to tuberculin skin test without active tuberculosis      Past Surgical History:   Procedure Laterality Date     AORTIC VALVE REPLACEMENT N/A 4/24/2019    Procedure: AORTIC VALVE REPLACEMENT;  Surgeon: Jojo Vaughn MD;  Location: [facility] OR;  Service: Cardiovascular     CARDIAC CATHETERIZATION       CATARACT EXTRACTION Left 06/13/2016     CV CORONARY ANGIOGRAM N/A 11/30/2018    Procedure: Coronary Angiogram;  Surgeon: Jeff Deleon MD;  Location: [facility] Cath Lab;  Service: Cardiology     EYE SURGERY      cataract     PICC AND MIDLINE TEAM LINE INSERTION  4/29/2019          [place] EGD PERCUTANEOUS PLACEMENT GASTROSTOMY TUBE N/A 5/8/2019    Procedure: CREATION, GASTROSTOMY,  PERCUTANEOUS, ENDOSCOPIC;  Surgeon: Chandana Kang MD;  Location: [facility];  Service: General     CT REPLACEMENT OF MITRAL VALVE N/A 4/24/2019    Procedure: MITRAL VALVE REPLACEMENT, ANESTHESIA, TAKEDOWN OF PERICARDIAL ADHESIONS AND REINFORCEMENT OF KLS PLATING SYSTEM TRANESOPHAGEAL ECHOCARDIOGRAM AND EPI AORTIC ULTRASOUND;  Surgeon: Jojo Vaughn MD;  Location: [facility];  Service: Cardiovascular     TRACHEOSTOMY N/A 5/8/2019    Procedure: TRACHEOSTOMY;  Surgeon: Chandana Kang MD;  Location: [facility];  Service: General       RESULTS/CONSULTS (Lab/Rad)     Recent Results (from the past 168 hour(s))   INR   Result Value Ref Range    INR 1.80 (!) 0.9 - 1.1   HM2(CBC w/o Differential)   Result Value Ref Range    WBC 4.5 4.0 - 11.0 thou/uL    RBC 4.51 4.40 - 6.20 mill/uL    Hemoglobin 14.3 14.0 - 18.0 g/dL    Hematocrit 42.9 40.0 - 54.0 %    MCV 95 80 - 100 fL    MCH 31.8 27.0 - 34.0 pg    MCHC 33.4 32.0 - 36.0 g/dL    RDW 11.8 11.0 - 14.5 %    Platelets 61 (L) 140 - 440 thou/uL    MPV 8.6 7.0 - 10.0 fL   Comprehensive Metabolic Panel   Result Value Ref Range    Sodium 142 136 - 145 mmol/L    Potassium 3.7 3.5 - 5.0 mmol/L    Chloride 109 (H) 98 - 107 mmol/L    CO2 23 22 - 31 mmol/L    Anion Gap, Calculation 10 5 - 18 mmol/L    Glucose 117 70 - 125 mg/dL    BUN 21 8 - 22 mg/dL    Creatinine 0.92 0.70 - 1.30 mg/dL    GFR MDRD Af Amer >60 >60 mL/min/1.73m2    GFR MDRD Non Af Amer >60 >60 mL/min/1.73m2    Bilirubin, Total 1.2 (H) 0.0 - 1.0 mg/dL    Calcium 8.4 (L) 8.5 - 10.5 mg/dL    Protein, Total 7.3 6.0 - 8.0 g/dL    Albumin 3.3 (L) 3.5 - 5.0 g/dL    Alkaline Phosphatase 102 45 - 120 U/L    AST 24 0 - 40 U/L    ALT 22 0 - 45 U/L   Iron and Transferrin Iron Binding Capacity   Result Value Ref Range    Iron 81 42 - 175 ug/dL    Transferrin 205 (L) 212 - 360 mg/dL    Transferrin Saturation, Calculated 32 20 - 50 %    Transferrin IBC, Calculated 256 (L) 313 - 563 ug/dL   Ferritin    Result Value Ref Range    Ferritin 172 27 - 300 ng/mL   T4, Total   Result Value Ref Range    T4, Total 7.7 4.5 - 13.0 ug/dL   Thyroid Stimulating Hormone (TSH)   Result Value Ref Range    TSH 2.11 0.30 - 5.00 uIU/mL   Glycosylated Hemoglobin A1c   Result Value Ref Range    Hemoglobin A1c 5.8 (H) <=5.6 %     No results found.  MEDICATIONS     Current Outpatient Medications on File Prior to Visit   Medication Sig Dispense Refill     acetaminophen (TYLENOL) 500 MG tablet Take 1 tablet (500 mg total) by mouth every 6 (six) hours as needed for pain. 60 tablet 2     amiodarone (PACERONE) 200 MG tablet Take 200 mg by mouth daily.       gabapentin (NEURONTIN) 100 MG capsule 1 po in am  1 po in afternoon and 2 po qhs 120 capsule 3     magnesium oxide (MAG-OX) 400 mg (241.3 mg magnesium) tablet TAKE 1 PILL BY MOUTH EVERYDAY 30 tablet 2     omeprazole (PRILOSEC) 20 MG capsule TAKE 1 PILL EVERY DAY FOR STOMACH. 30 capsule 1     senna-docusate (PERICOLACE) 8.6-50 mg tablet Take 1 tablet by mouth daily.       sertraline (ZOLOFT) 50 MG tablet TAKE 1 PILL BY MOUTH DAILY FOR DEPRESSION 30 tablet 1     simethicone (MYLICON,GAS-X) 180 mg capsule 1 po two times a day prn abdominal gas 60 capsule 3     tamsulosin (FLOMAX) 0.4 mg cap Take 1 capsule (0.4 mg total) by mouth daily after supper. 90 capsule 3     warfarin ANTICOAGULANT (COUMADIN/JANTOVEN) 1 MG tablet TAKE 1 TO 1.5MG (1 OR 1.5 TABS) BY MOUTH DAILY OR AS DIRECTED. ADJUST DOSE BASED ON INR. 45 tablet 2     meclizine (ANTIVERT) 25 mg tablet 1 po two times a day prn dizziness 40 tablet 1     polyethylene glycol (GLYCOLAX) 17 gram/dose powder 17 gm in 8 oz water daily (Patient taking differently: 17 gm in 8 oz water daily as needed      ) 510 g 6     No current facility-administered medications on file prior to visit.        HEALTH MAINTENANCE / SCREENING   PHQ-2 Total Score: 0 (8/4/2020 11:24 AM)  , PHQ-9 Total Score: 0 (8/4/2020 11:24 AM)  ,No data recorded  Immunization  History   Administered Date(s) Administered     Hep B, Adult 01/20/2014     Hep B, Peds or Adolescent 04/14/2015     Hep B, historic 10/21/2013, 12/31/2013     Influenza high dose,seasonal,PF, 65+ yrs 08/31/2016, 09/11/2017, 11/05/2018     Influenza, inj, historic,unspecified 12/31/2013, 01/20/2014, 09/22/2014     Influenza,quad,high Dose,PF, 65yr + 12/08/2020     Influenza,seasonal quad, PF 12/31/2013, 09/22/2014     Influenza,seasonal,quad inj =/> 6months 11/04/2015     Pneumo Conj 13-V (2010&after) 09/11/2017     Td, Adult, Absorbed 09/12/2012     Td, adult adsorbed, PF 04/14/2015     Tdap 01/20/2014     Varicella 04/14/2015     Health Maintenance   Topic     DEPRESSION ACTION PLAN      HEART FAILURE ACTION PLAN      ADVANCE CARE PLANNING      COLORECTAL CANCER SCREENING      ZOSTER VACCINES (1 of 2)     MEDICARE ANNUAL WELLNESS VISIT      Pneumococcal Vaccine: 65+ Years (2 of 2 - PPSV23)     BMP      FALL RISK ASSESSMENT      LIPID      ALT      CBC      TD 18+ HE      TSH      HEPATITIS C SCREENING      INFLUENZA VACCINE RULE BASED      Pneumococcal Vaccine: Pediatrics (0 to 5 Years) and At-Risk Patients (6 to 64 Years)      HEPATITIS B VACCINES        Murali Sosa MD  Family Medicine, [facility]     This note was dictated using a voice recognition software.  Any grammatical or context distortion are unintentional and inherent to the software.

## 2021-06-13 NOTE — TELEPHONE ENCOUNTER
ANTICOAGULATION  MANAGEMENT- Home Care/Care Facility Result    Assessment     Today's INR result of 2.1 is Therapeutic (goal INR of 2.0-3.0)        Warfarin taken as previously instructed    No new diet changes affecting INR    No new medication/supplements affecting INR    Continues to tolerate warfarin with no reported s/s of bleeding or thromboembolism     Previous INR was Subtherapeutic    Plan:     Spoke with Paige home care nurse discussed INR result and instructed:     Warfarin Dosing Instructions: Continue current warfarin dose 1 mg daily on Mondays, Wednesdays and Saturdays; and 1.5 mg daily rest of week  (0 % change)    Next INR to be drawn: two weeks    Education provided: importance of therapeutic range and importance of following up for INR monitoring at instructed interval    Paige verbalizes understanding and agrees to warfarin dosing plan.   ?   Crystal Portillo RN    Subjective/Objective:      Kody Johns, a 70 y.o. male is established on warfarin.     Home care/care facility RN's report of [facility] INR, recent warfarin dosing, diet changes, medication changes, and symptoms is documented below.    Additional findings: verbally confirmed home dose with Paige and updated on anticoagulation calendar    Anticoagulation Episode Summary     Current INR goal:  2.0-3.0   TTR:  68.6 % (1 y)   Next INR check:  1/5/2021   INR from last check:  2.10 (12/22/2020)   Weekly max warfarin dose:     Target end date:     INR check location:     Preferred lab:     Send INR reminders to:  [facility]    Indications    A-fib (H) [I48.91]  Aortic valve disease  rheumatic [I06.9]  Mitral valve stenosis  unspecified etiology [I05.0]  Silent Stroke [I66.9]           Comments:           Anticoagulation Care Providers     Provider Role Specialty Phone number    Aristides Alegria MD Referring Family Medicine [phone]    Haja Ascencio MD  Cardiology [phone]

## 2021-06-13 NOTE — TELEPHONE ENCOUNTER
Pt had a virtual visit with Dr. Alegria today and per Dr. Alegria asked that the pt come in this week for a lab visit and a nurse visit for a flu shot.     I left message to call back #1. Ok to schedule upon returned call.

## 2021-06-13 NOTE — TELEPHONE ENCOUNTER
Requested Prescriptions     Pending Prescriptions Disp Refills     warfarin ANTICOAGULANT (COUMADIN/JANTOVEN) 1 MG tablet [Pharmacy Med Name: WARFARIN SODIUM 1 MG TABS 1 Tablet] 45 tablet 2     Sig: TAKE 1 TO 1.5MG (1 OR 1.5 TABS) BY MOUTH DAILY OR AS DIRECTED. ADJUST DOSE BASED ON INR.     tamsulosin (FLOMAX) 0.4 mg cap [Pharmacy Med Name: TAMSULOSIN HCL 0.4 MG CAPS 0.4 Capsule] 30 capsule 3     Sig: Take 1 capsule (0.4 mg total) by mouth daily after supper.

## 2021-06-13 NOTE — PROGRESS NOTES
Anticoagulation Annual Referral Renewal Review    Kody Johns's chart reviewed for annual renewal of referral to anticoagulation monitoring.        Criteria for anticoagulation nurse and/or pharmacist renewal met   Warfarin indication: Atrial Fibrillation and aortic valve disease, mitral valve stenosis Yes, per indication   Current with INR monitoring/compliant Yes Yes   Date of last office visit 11/18/20 Yes, had office visit within last year   Time in Therapeutic Range (TTR) 71 % Yes, TTR > 60%       Kody Johns met all criteria for anticoagulation management program initiated renewal.  New INR standing orders and anticoagulation referral renewal placed.      Jessica Garza RN  5:30 PM

## 2021-06-13 NOTE — PROGRESS NOTES
Patient, pts son and Sinhala  were seen in clinic for HF education.  Reviewed signs and sx of heart failure, reiterated when to call clinic - reviewed HF hotline # [phone] and after hours call # [phone].  Discussed not adding salt to food. Pts son and daughter in law cook pts meals at home. Meals maily consist of rice, fresh lentils, vegetables and fruits. Advised not to eat out at restaurants.   Patient and pts son verbalized understanding of HF discussion.  Plan for f/u with continued HF education reviewed when appropriate.  will continue to reinforce HF management education.

## 2021-06-13 NOTE — PROGRESS NOTES
Subjective:   This patient comes in for follow-up.  He is a 67-year-old male with atrial fibrillation he had some congestive heart failure.    His heart rate has been controlled the nurse wrote 48 for a pulse when I listened he was in the 60-70 range he sounded fairly regular for little while but then became more irregular did get an EKG and it did show evidence of the persisting atrial fibrillation/flutter.    He is not short of breath.    His medications have not changed please see below pressure at 108/78 he is not dizzy anymore we had backed off on some medications in the last couple visits.    His insurance did not cover Eliquis and is on warfarin.    He had taken 5 mg tablets 1 a day last Thursday Friday Saturday Sunday and actually took it on Monday his INR was elevated at 5.76 he has been off the warfarin Tuesday Wednesday and today.  INR was rechecked here was elevated in the 5 range this was sent to [facility] for verification.  He will hold off on warfarin today.  He will get rechecked tomorrow with an INR and the anticoagulation nurses will be following him.    He was here today with his brother and , 's name is Marisol phone number [phone].    The patient also has a follow-up with cardiology later today we will discuss cardioversion    Tobacco status: He  reports that he quit smoking about 3 years ago. His smoking use included Cigarettes. He has a 50.00 pack-year smoking history. He has quit using smokeless tobacco.    Patient Active Problem List    Diagnosis Date Noted     Moderate malnutrition 08/31/2017     ALEENA (acute kidney injury) 08/31/2017     Aortic valve disease, rheumatic 08/30/2017     Lightheadedness 08/30/2017     Atherosclerosis of native coronary artery of native heart without angina pectoris      Essential hypertension with goal blood pressure less than 130/85      Pure hypercholesterolemia      Acute CHF (congestive heart failure) 08/29/2017     Atrial fibrillation  with RVR 08/29/2017     Acute diastolic CHF (congestive heart failure) 08/29/2017     Dysuria 08/29/2017     Hypothyroidism, unspecified type      Mitral valve stenosis, unspecified etiology      Elevated LFTs 08/26/2016     Essential hypertension 07/20/2015     Silent Stroke      Shoulder strain      Dysphagia      Hyperlipidemia      Rheumatic Heart Disease      Blurry vision      Coronary Artery Disease      Hearing loss      Nonspecific reaction to 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 Every 6-8 hours as needed. No more than 4,000MG of acetaminophen daily.       albuterol (PROAIR HFA;PROVENTIL HFA;VENTOLIN HFA) 90 mcg/actuation inhaler Inhale 1-2 puffs every 6 (six) hours as needed.       baclofen (LIORESAL) 10 MG tablet Take 1 tablet (10 mg total) by mouth 2 (two) times a day. 60 tablet 0     clopidogrel (PLAVIX) 75 mg tablet Take 75 mg by mouth daily.       fluticasone (FLONASE) 50 mcg/actuation nasal spray 2 sprays into each nostril daily. 10 g 3     furosemide (LASIX) 20 MG tablet Take 1 tablet (20 mg total) by mouth daily. 30 tablet 3     levothyroxine (SYNTHROID, LEVOTHROID) 88 MCG tablet TAKE 1 TABLET BY MOUTH EVERY DAY 30 tablet 9     metoprolol tartrate (LOPRESSOR) 50 MG tablet Take 0.5 tablets (25 mg total) by mouth 2 (two) times a day. 30 tablet 3     nasal dilator (BREATHE RIGHT) Strp strip Apply qhs 30 strip 3     omeprazole (PRILOSEC) 20 MG capsule Take 20 mg by mouth daily before breakfast.       polyethylene glycol (MIRALAX) 17 gram packet Take 17 g by mouth daily as needed.        pravastatin (PRAVACHOL) 80 MG tablet Take 80 mg by mouth at bedtime.       spironolactone (ALDACTONE) 25 MG tablet Take 25 mg by mouth daily.       traMADol (ULTRAM) 50 mg tablet Take 50 mg by mouth 2 (two) times a day as needed  for pain.       UNABLE TO FIND Unknown topical medication for itching       UNABLE TO FIND Unknown topical medication for pain       warfarin (COUMADIN) 5 MG tablet 1 po qd initially then Adjust dose based on INR results as directed by coumadin nurse 30 tablet 1     No current facility-administered medications for this visit.        ROS:   Review of systems negative other than as outlined above clinically he feels like he is doing pretty well    Objective:    /78 (Patient Site: Right Arm, Patient Position: Sitting, Cuff Size: Adult Large)  Pulse (!) 48  Temp 97.5  F (36.4  C) (Oral)   Resp 20  Ht 5' (1.524 m)  Wt 108 lb 8 oz (49.2 kg)  SpO2 98% Comment: at rest with room air  BMI 21.19 kg/m2  Body mass index is 21.19 kg/(m^2).      General appearance no acute distress    Vital signs are stable when I listened his heart rate was in the 70-90 range.    EKG reviewed and noted below.    INR elevated around 5 at the clinic here will send that in for verification.  He will continue to hold off on the warfarin.    Lungs sounded clear throughout no rales or rhonchi heart had regular rhythm when I listened it was in the 80-90 range    Extremities without edema    Results for orders placed or performed in visit on 09/14/17   Electrocardiogram Perform - Clinic   Result Value Ref Range    SYSTOLIC BLOOD PRESSURE  mmHg    DIASTOLIC BLOOD PRESSURE  mmHg    VENTRICULAR RATE 91 BPM    ATRIAL RATE 242 BPM    P-R INTERVAL  ms    QRS DURATION 102 ms    Q-T INTERVAL 386 ms    QTC CALCULATION (BEZET) 474 ms    P Axis -20 degrees    R AXIS 38 degrees    T AXIS 23 degrees    MUSE DIAGNOSIS       Atrial flutter with variable A-V block  Voltage criteria for left ventricular hypertrophy  Nonspecific ST abnormality  Abnormal ECG  When compared with ECG of 29-AUG-2017 12:31,  ST no longer depressed in Anterior leads  Nonspecific T wave abnormality, improved in Inferior leads  T wave inversion no longer evident in Lateral leads          Assessment:  1. A-fib  Electrocardiogram Perform - Clinic    INR   2. CHF (congestive heart failure)       Persistent A. fib/flutter    Elevated INR    CHF compensated    No further hypo-tension.    Plan: As outlined above he will not be followed by the anticoagulation nurses goal for INR is 2-3    Will await cardiology recommendations.  Regarding conversion.    He will come in for lab only INR tomorrow    This transcription uses voice recognition software, which may contain typographical errors.

## 2021-06-13 NOTE — TELEPHONE ENCOUNTER
INR result is 2.1  INR   Date Value Ref Range Status   12/08/2020 1.80 (!) 0.9 - 1.1 Final       Will the patient be seen, or did they already see, MD or CNP today? No    Most Recent Warfarin dose day/week  Sunday Monday Tuesday Wednesday Thursday Friday Saturday     unk unk unk unk unk     Sunday Monday Tuesday Wednesday Thursday Friday Saturday   unk unk          RN doesn't know how to look in their system for info and patient lost info and doesn't remember...    Has the patient missed any doses of Coumadin, Warfarin, Jantoven in the past 7 days? No    Has the patients medications changed since the last visit? No    Has the patient experienced any bleeding recently? No    Has the patient experienced any injuries or illness recently? No    Has the patient experienced any 'new' shortness of breath, severe headaches, or changes in vision recently? No    Has the patient had any changes in their diet, or alcohol consumption? No    Is the patient here today to prepare for any type of upcoming surgery, procedure, or for a cardioversion procedure? No    What phone number can we reach the patient at today? Esme

## 2021-06-13 NOTE — PROGRESS NOTES
"Kody Johns is a 70 y.o. male who is being evaluated via a billable video visit.      The patient has been notified of following:     \"This video visit will be conducted via a call between you and your physician/provider. We have found that certain health care needs can be provided without the need for an in-person physical exam.  This service lets us provide the care you need with a video conversation.  If a prescription is necessary we can send it directly to your pharmacy.  If lab work is needed we can place an order for that and you can then stop by our lab to have the test done at a later time.    Video visits are billed at different rates depending on your insurance coverage. Please reach out to your insurance provider with any questions.    If during the course of the call the physician/provider feels a video visit is not appropriate, you will not be charged for this service.\"    Patient has given verbal consent to a Video visit? Yes  How would you like to obtain your AVS? AVS Preference: Mail a copy.  If dropped by the video visit, the video invitation should be sent to: Other e-mail: [phone]   Will anyone else be joining your video visit? No        Video Start Time: 1:39 pm    Additional provider notes:   Subjective: This patient had a virtual visit, video, due to the coronavirus pandemic.    This patient had previous CVA hemorrhagic infarct back in May 2019.  This was right posterior circulation has had some left hemiparesis since that time.  This was following his mitral valve and aortic valve replacement.    Also has atrial fibrillation    He continues on warfarin.  Last INR was 2.5 on October 27.    Patient has had some ongoing numbness and a little bit of pain on the left side.  He is on gabapentin 100 mg 3 times a day.  His symptoms are worse at night so we will increase him to 100 mg in the morning 100 mg in the afternoon and take 200 mg at bedtime.    He also has baclofen twice daily.  That " seems to be controlling his spasticity.    Last visit TSH was elevated at 7.88, his levothyroxine was increased from 88 mcg up to 100 mcg.    Last A1c regarding hyperglycemia was 5.6    We will recheck T4 TSH and an A1c    He needs a flu shot also.    Otherwise no additional concerns    His depression symptoms have improved since he has been on the sertraline is on 50 mg daily.  We will continue on that    Patient was present for the visit as well as his son please see below    Tobacco status: He  reports that he quit smoking about 6 years ago. His smoking use included cigarettes. He has a 50.00 pack-year smoking history. He has quit using smokeless tobacco.    Patient Active Problem List    Diagnosis Date Noted     Encounter for attention to gastrostomy (H) 02/09/2020     Urinary incontinence without sensory awareness 10/14/2019     S/P AVR 07/09/2019     H/O mitral valve replacement 07/09/2019     Metabolic encephalopathy 05/20/2019     Encephalopathy 05/17/2019     Atrial flutter with rapid ventricular response (H) 05/14/2019     Intracerebral bleed (H) 05/13/2019     Hemorrhagic stroke (H) 05/13/2019     Tracheostomy care (H)      Acute respiratory failure (H) 05/10/2019     Acute cardioembolic stroke (H)      Paralytic ileus (H) 05/02/2019     Small bowel obstruction (H)      Atrial fibrillation with RVR (H)      Hypernatremia      Thrombocytopenia (H)      Sepsis (H)      S/P MVR (mitral valve repair) 04/24/2019     Acute respiratory failure with hypoxemia (H) 04/24/2019     Anemia due to blood loss, acute 04/24/2019     Coagulopathy (H) 04/24/2019     Non-English speaking patient 04/24/2019     Respiratory failure (H) 03/21/2019     A-fib (H) 09/14/2017     Heart failure with preserved ejection fraction (H) 09/14/2017     Moderate malnutrition (H) 08/31/2017     ALEENA (acute kidney injury) (H) 08/31/2017     Aortic valve disease, rheumatic 08/30/2017     Lightheadedness 08/30/2017     Atherosclerosis of native  coronary artery of native heart without angina pectoris      Essential hypertension with goal blood pressure less than 130/85      Pure hypercholesterolemia      Dysuria 08/29/2017     Hypothyroidism, unspecified type      Mitral valve stenosis, unspecified etiology      Elevated LFTs 08/26/2016     Silent Stroke      Shoulder strain      Dysphagia      Hyperlipidemia      Rheumatic heart disease      Blurry vision      Hearing loss      Nonspecific reaction to tuberculin skin test without active tuberculosis        Current Outpatient Medications   Medication Sig Dispense Refill     acetaminophen (TYLENOL) 500 MG tablet Take 1 tablet (500 mg total) by mouth every 6 (six) hours as needed for pain. 60 tablet 2     atorvastatin (LIPITOR) 10 MG tablet TAKE 1 PILL BY MOUTH EVERY DAY FOR CHOLESTEROL 90 tablet 3     baclofen (LIORESAL) 10 MG tablet TAKE 1 PILL BY MOUTH 2 TIMES EVERYDAY 180 tablet 0     ferrous sulfate 325 (65 FE) MG tablet TAKE 1 PILL BY MOUTH TWO TIMES A DAY 60 tablet 1     gabapentin (NEURONTIN) 100 MG capsule 1 po in am  1 po in afternoon and 2 po qhs 120 capsule 3     levothyroxine (SYNTHROID) 100 MCG tablet Take 1 tablet (100 mcg total) by mouth daily. 30 tablet 5     meclizine (ANTIVERT) 25 mg tablet 1 po two times a day prn dizziness 40 tablet 1     omeprazole (PRILOSEC) 20 MG capsule 1 p.o. daily 30 capsule 1     polyethylene glycol (GLYCOLAX) 17 gram/dose powder 17 gm in 8 oz water daily (Patient taking differently: 17 gm in 8 oz water daily as needed      ) 510 g 6     senna-docusate (PERICOLACE) 8.6-50 mg tablet Take 1 tablet by mouth daily.       sertraline (ZOLOFT) 50 MG tablet 1 p.o. daily 30 tablet 1     simethicone (MYLICON,GAS-X) 180 mg capsule 1 po two times a day prn abdominal gas 60 capsule 3     warfarin ANTICOAGULANT (COUMADIN/JANTOVEN) 1 MG tablet TAKE 1 TO 1.5MG (1 OR 1.5 TABS) BY MOUTH DAILY OR AS DIRECTED. ADJUST DOSE BASED ON INR. 45 tablet 2     magnesium oxide (MAG-OX) 400 mg  (241.3 mg magnesium) tablet TAKE 1 PILL BY MOUTH EVERYDAY 30 tablet 2     tamsulosin (FLOMAX) 0.4 mg cap Take 1 capsule (0.4 mg total) by mouth daily after supper. 90 capsule 3     No current facility-administered medications for this visit.        ROS:   10 point review of systems positive as outlined above otherwise negative    Objective:    There were no vitals taken for this visit.  There is no height or weight on file to calculate BMI.      General appearance: No acute distress    HEENT no nasal congestion no sore throat no cough    No COVID-19 exposure or symptoms.    Lungs: Nonlabored breathing no wheezing.    Sometimes at night he gets a little short of breath his son states question anxiety he really does not have any orthopnea he was laying on the couch without problem when I saw him with a virtual visit today    Heart: No palpitations he does have a history of atrial fibrillation.    Skin is normal no rashes    Left-sided weakness and tingling and numbness both in the leg and in the distal arm and hand on the left side.        Results for orders placed or performed in visit on 10/27/20   INR   Result Value Ref Range    INR 2.50 (!) 0.9 - 1.1     *Note: Due to a large number of results and/or encounters for the requested time period, some results have not been displayed. A complete set of results can be found in Results Review.       Assessment:  1. Left hemiparesis (H)  gabapentin (NEURONTIN) 100 MG capsule   2. Atrial fibrillation, unspecified type (H)     3. S/P MVR (mitral valve repair)     4. S/P AVR     5. Atrial fibrillation with RVR (H)     6. Hypothyroidism, unspecified type  T4, Total    Thyroid Stimulating Hormone (TSH)   7. Hemorrhagic stroke (H)     8. Hyperglycemia  Glycosylated Hemoglobin A1c   9. Major depressive disorder, remission status unspecified, unspecified whether recurrent       Left melissa-paracysts with some neuropathic pain and numbness from the stroke will increase gabapentin as  outlined 1 in the morning 1 in the afternoon 2 at bedtime (100 mg tablets).    Status post mitral and aortic valve replacement.    Atrial fibrillation therapeutic INR    Hypothyroid recheck now that he is on 100 mcg daily.  Check T4 TSH    Hyperglycemia recheck A1c.    Major depression controlled on sertraline.    Plan: As outlined above plan to recheck in January    Contact patient's son, Mark phone #[phone]    When patient comes in he will get flu shot as well    This transcription uses voice recognition software, which may contain typographical errors.      Video-Visit Details    Type of service:  Video Visit    Video End Time (time video stopped): 1:52 PM  Originating Location (pt. Location): Home    Distant Location (provider location):  [facility]     Platform used for Video Visit: Eugenie Alegria MD

## 2021-06-13 NOTE — TELEPHONE ENCOUNTER
INR result is 1.8  INR   Date Value Ref Range Status   11/24/2020 1.80 (!) 0.9 - 1.1 Final       Will the patient be seen, or did they already see, MD or CNP today? No    Most Recent Warfarin dose day/week  Sunday Monday Tuesday Wednesday Thursday Friday Saturday     1.5 1 1 1.5 1     Sunday Monday Tuesday Wednesday Thursday Friday Saturday   1 1            Has the patient missed any doses of Coumadin, Warfarin, Jantoven in the past 7 days? No    Has the patients medications changed since the last visit? No    Has the patient experienced any bleeding recently? No    Has the patient experienced any injuries or illness recently? No    Has the patient experienced any 'new' shortness of breath, severe headaches, or changes in vision recently? No    Has the patient had any changes in their diet, or alcohol consumption? No    Is the patient here today to prepare for any type of upcoming surgery, procedure, or for a cardioversion procedure? No    What phone number can we reach the patient at today? Esme

## 2021-06-13 NOTE — TELEPHONE ENCOUNTER
INR result is         1.8  INR   Date Value Ref Range Status   10/27/2020 2.50 (!) 0.9 - 1.1 Final       Will the patient be seen, or did they already see, MD or CNP today? No    Most Recent Warfarin dose day/week  Sunday Monday Tuesday Wednesday Thursday Friday Saturday        1.5 1     Sunday Monday Tuesday Wednesday Thursday Friday Saturday   1 1            Has the patient missed any doses of Coumadin, Warfarin, Jantoven in the past 7 days? No    Has the patients medications changed since the last visit? No    Has the patient experienced any bleeding recently? No    Has the patient experienced any injuries or illness recently? No    Has the patient experienced any 'new' shortness of breath, severe headaches, or changes in vision recently? No    Has the patient had any changes in their diet, or alcohol consumption? No    Is the patient here today to prepare for any type of upcoming surgery, procedure, or for a cardioversion procedure? No    What phone number can we reach the patient at today? home phone listed in demographics.

## 2021-06-13 NOTE — PROGRESS NOTES
Valve Clinic - Rheumatic MR/MS and Moderate AS  (See consult noted from Dr. Manriquez)    Referring provider: Dr. Giordano    Preliminary STS Score: 2.74%    PMH: rheumatic heart disease, Afib, Htn, latent TB, CHF  ?CVA (small vessel dz found on MRI)    Patient is [place] and is here with wife and Yi . Wife and  are using an informal Yi sign language to communicate. Wife is deaf. Patient is a poor historian and forgetful.       TTE date 8/29/18  EF 55 %  Severe MR  MV mean gradient 6  AV mean gradient: 12 mmHg  AV Peak Jose: 2.6 m/sec  AV area: 0.9 cm2  AV DIM IND VTI: 0.3  LV SVi: 27.9 ml/m2    Aortic Valve  The valve is trileaflet but malformed. Fused left and right coronary cusps. Mild stenosis. Moderate regurgitation.   Mitral Valve  The following structural abnormalities were observed: rheumatic valve disease. Moderate to severe regurgitation.   Tricuspid Valve  Normal valve structure. Mild tricuspid valve regurgitation. No pulmonary hypertension present.   Pulmonic Valve  The pulmonic valve was not well visualized.         Plan: return to clinic for CT surgery consult for possible surgical AVR/MVR.  CT surgery scheduler will call the patient to make that arrangement.       Current Outpatient Prescriptions   Medication Sig Note     acetaminophen (TYLENOL) 500 MG tablet Take 500-1,000 mg by mouth every 6 (six) hours as needed for pain. Every 6-8 hours as needed. No more than 4,000MG of acetaminophen daily.      albuterol (PROAIR HFA;PROVENTIL HFA;VENTOLIN HFA) 90 mcg/actuation inhaler Inhale 1-2 puffs every 6 (six) hours as needed.      baclofen (LIORESAL) 10 MG tablet Take 1 tablet (10 mg total) by mouth 2 (two) times a day.      bromfenac (PROLENSA) 0.07 % Drop 1 drop daily.      clopidogrel (PLAVIX) 75 mg tablet Take 75 mg by mouth daily.      fluticasone (FLONASE) 50 mcg/actuation nasal spray 2 sprays into each nostril daily. 8/29/2017: 8/29/17: pt's brother states that he ran out of this  medication a few days ago but is still supposed to be taking it.      furosemide (LASIX) 20 MG tablet Take 1 tablet (20 mg total) by mouth daily.      levothyroxine (SYNTHROID, LEVOTHROID) 88 MCG tablet TAKE 1 TABLET BY MOUTH EVERY DAY      metoprolol succinate (TOPROL-XL) 25 MG TAKE 1 TABLET BY MOUTH EVERY DAY      metoprolol tartrate (LOPRESSOR) 50 MG tablet Take 0.5 tablets (25 mg total) by mouth 2 (two) times a day.      nasal dilator (BREATHE RIGHT) Strp strip Apply qhs      omeprazole (PRILOSEC) 20 MG capsule Take 20 mg by mouth daily before breakfast.      polyethylene glycol (MIRALAX) 17 gram packet Take 17 g by mouth daily as needed.       pravastatin (PRAVACHOL) 80 MG tablet Take 80 mg by mouth at bedtime.      spironolactone (ALDACTONE) 25 MG tablet Take 25 mg by mouth daily.      traMADol (ULTRAM) 50 mg tablet Take 50 mg by mouth 2 (two) times a day as needed for pain.      UNABLE TO FIND Unknown topical medication for itching      UNABLE TO FIND Unknown topical medication for pain      warfarin (COUMADIN) 5 MG tablet 1 po qd initially then Adjust dose based on INR results as directed by coumadin nurse          Bobo Smith RN  [facility]  Valve Clinic Coordinator  Phone: [phone]  Fax: [phone]

## 2021-06-13 NOTE — PROGRESS NOTES
INR with home health. 2.3 will increase dose to get to range of 2.5 to 3.5. Will retest in one week. 1 mg tue and sat and 2 mg all other days. Home health will set up meds and retest in one week.

## 2021-06-13 NOTE — PROGRESS NOTES
[facility] Heart Care Note    Assessment / Plan:    Mr Johns is known to have rheumatic aortic and mitral valve disease for which he is now becoming increasingly symptomatic with a declining functional capacity and progressive dyspnea on exertion.    His most recent echocardiogram shows moderate to severe mitral regurgitation with mild to moderate mitral stenosis, moderate aortic regurgitation and moderate aortic stenosis.    Most of his symptoms are likely secondary to his significant mitral regurgitation, although the combination of his other valve pathologies is likely contributing as well.    He was referred for consideration of possible transcatheter mitral valve repair with a mitral clip.  Given the presence of his mitral stenosis, he would not be a good candidate for a mitral clip.  However given his progressive symptoms, it would be reasonable for a surgical evaluation for mitral valve replacement, and potential concomitant aortic valve replacement.  This will be reviewed with his primary cardiologist Dr. Lang as well as our surgical team.    In the interim, he has been asked to continue his current medical regimen, as he is relatively euvolemic today.  He knows to call if there is any change in his condition or worsening symptoms.      Thank you for the opportunity to participate in the care of Kody Johns. Please do not hesitate to call with any questions or concerns regarding his cardiovascular status.    ______________________________________________________________________    Subjective:    It was a pleasure to see Kody Johns at the [facility] Heart [facility] at the request of Dr Giordano for evaluation of treatment options for rheumatic valve disease.     Kody Johns is a 67 y.o. [place] male who, it appears moved emergently to [place] in December 2014 after he was diagnosed with congestive heart failure and severe valvular disease as a refugee in [facility].  He was seen by   Rickey and after a thorough evaluation was found to have rheumatic mitral stenosis as well as aortic stenosis, both felt to be mild to moderate with concomitant mild to moderate aortic and mitral regurgitation.     He also underwent a coronary angiogram at that time that showed no significant coronary artery disease.    On catheterization, the gradient across his mitral valve was 7-8 mmHg, with no evidence of pulmonary hypertension.    Since then, it appears that he has had a gradual but steady decline in his functional capacity with increasing shortness of breath.  He presented with congestive heart failure in August 2017, requiring hospitalization.  During that time he was found to be in atrial fibrillation which likely precipitated his congestive heart failure.  He was started on warfarin and his metoprolol was increased from 25 daily to 50 mg daily his oral rate control regimen of metoprolol was increased as well.      He also had a transthoracic echocardiogram performed which showed progression in his mitral valve disease to moderate to severe regurgitation with concomitant moderate mitral stenosis, mean gradient of 6 mmHg.  He also had moderate aortic regurgitation, and at least mild to moderate aortic stenosis.  The mean gradient across his aortic valve was only 12 mmHg; however his indexed stroke volume was only 28 mL/m  which could explain a relatively low gradient in the presence of aortic stenosis and moderate aortic stenosis would also be supported by his his dimensionless index of 0.33.    He was seen in the hospital by Dr. Giordano, and referred to the valve clinic for further evaluation.   ______________________________________________________________________    Problem List:  Patient Active Problem List   Diagnosis     Rheumatic Heart Disease     Blurry vision     Coronary Artery Disease     Hearing loss     Nonspecific reaction to tuberculin skin test without active tuberculosis     Dysphagia      Hyperlipidemia     Silent Stroke     Shoulder strain     Essential hypertension     Elevated LFTs     Dysuria     Hypothyroidism, unspecified type     Mitral valve stenosis, unspecified etiology     Aortic valve disease, rheumatic     Atherosclerosis of native coronary artery of native heart without angina pectoris     Essential hypertension with goal blood pressure less than 130/85     Pure hypercholesterolemia     Lightheadedness     Moderate malnutrition     ALEENA (acute kidney injury)     A-fib     Heart failure with preserved ejection fraction       Medical History:  Past Medical History:   Diagnosis Date     Palpitations        Surgical History:  Past Surgical History:   Procedure Laterality Date     CATARACT EXTRACTION Left 06/13/2016       Social History:  Social History     Social History     Marital status:      Spouse name: Jesus Johns     Number of children: 4     Years of education: N/A     Occupational History     Not on file.     Social History Main Topics     Smoking status: Former Smoker     Packs/day: 1.00     Years: 50.00     Types: Cigarettes     Quit date: 8/29/2014     Smokeless tobacco: Former User      Comment: No Betel nut     Alcohol use No      Comment: Quit     Drug use: Yes     Sexual activity: Not on file     Other Topics Concern     Not on file     Social History Narrative    Refugee, born in [facility].       Review of Systems: 12 organ system review done and negative except as noted in the HPI.    Family History:  Family History   Problem Relation Age of Onset     No Medical Problems Mother      No Medical Problems Father      Heart disease Neg Hx          Allergies:  No Known Allergies    Medications:  Current Outpatient Prescriptions   Medication Sig Dispense Refill     acetaminophen (TYLENOL) 500 MG tablet Take 500-1,000 mg by mouth every 6 (six) hours as needed for pain. Every 6-8 hours as needed. No more than 4,000MG of acetaminophen daily.       albuterol (PROAIR HFA;PROVENTIL  HFA;VENTOLIN HFA) 90 mcg/actuation inhaler Inhale 1-2 puffs every 6 (six) hours as needed.       baclofen (LIORESAL) 10 MG tablet Take 1 tablet (10 mg total) by mouth 2 (two) times a day. 60 tablet 0     bromfenac (PROLENSA) 0.07 % Drop 1 drop daily.       clopidogrel (PLAVIX) 75 mg tablet Take 75 mg by mouth daily.       fluticasone (FLONASE) 50 mcg/actuation nasal spray 2 sprays into each nostril daily. 10 g 3     furosemide (LASIX) 20 MG tablet Take 1 tablet (20 mg total) by mouth daily. 30 tablet 3     levothyroxine (SYNTHROID, LEVOTHROID) 88 MCG tablet TAKE 1 TABLET BY MOUTH EVERY DAY 30 tablet 9     metoprolol succinate (TOPROL-XL) 25 MG TAKE 1 TABLET BY MOUTH EVERY DAY 90 tablet 0     metoprolol tartrate (LOPRESSOR) 50 MG tablet Take 0.5 tablets (25 mg total) by mouth 2 (two) times a day. 30 tablet 3     nasal dilator (BREATHE RIGHT) Strp strip Apply qhs 30 strip 3     omeprazole (PRILOSEC) 20 MG capsule Take 20 mg by mouth daily before breakfast.       polyethylene glycol (MIRALAX) 17 gram packet Take 17 g by mouth daily as needed.        pravastatin (PRAVACHOL) 80 MG tablet Take 80 mg by mouth at bedtime.       spironolactone (ALDACTONE) 25 MG tablet Take 25 mg by mouth daily.       traMADol (ULTRAM) 50 mg tablet Take 50 mg by mouth 2 (two) times a day as needed for pain.       UNABLE TO FIND Unknown topical medication for itching       UNABLE TO FIND Unknown topical medication for pain       warfarin (COUMADIN) 5 MG tablet 1 po qd initially then Adjust dose based on INR results as directed by coumadin nurse 30 tablet 1     No current facility-administered medications for this visit.        Objective:   Vital signs:  /62 (Patient Site: Right Arm, Patient Position: Sitting, Cuff Size: Adult Regular)  Pulse 71  Resp 16  Ht 5' (1.524 m)  Wt 107 lb (48.5 kg)  BMI 20.9 kg/m2      Physical Exam:    GENERAL APPEARANCE: Alert, cooperative and in no acute distress.  HEENT: No scleral icterus. No  Xanthelasma. Oral mucuos membranes pink and moist.  NECK: No JVD. Thyroid not visualized  CHEST: clear to auscultation  CARDIOVASCULAR: S1, S2 regular. Soft HSM.   PULSES: Radial and posterior tibial pulses are intact and symmetric.   ABDOMEN: Nontender. BS+.   EXTREMITIES: No cyanosis, clubbing or edema.  SKIN: Warm, well perfused  NEURO: Grossly nonfocal    Lab Results:  LIPIDS:  Lab Results   Component Value Date    CHOL 185 07/20/2017    CHOL 147 02/10/2016    CHOL 154 04/02/2014     Lab Results   Component Value Date    HDL 34 (L) 07/20/2017    HDL 33 (L) 02/10/2016    HDL 34 (L) 04/02/2014     Lab Results   Component Value Date    LDLCALC 134 (H) 07/20/2017    LDLCALC 90 02/10/2016    LDLCALC 99 04/02/2014     Lab Results   Component Value Date    TRIG 83 07/20/2017    TRIG 121 02/10/2016    TRIG 103 04/02/2014     No components found for: CHOLHDL    BMP:  Lab Results   Component Value Date    CREATININE 0.88 09/14/2017    BUN 18 09/14/2017     09/14/2017    K 4.5 09/14/2017     (H) 09/14/2017    CO2 23 09/14/2017         ECG and Cath films independently reviewed      PAM NARAYANAN MD  Formerly [facility]

## 2021-06-14 NOTE — TELEPHONE ENCOUNTER
ANTICOAGULATION  MANAGEMENT- Home Care/Care Facility Result    Assessment     Today's INR result of 2.1 is Therapeutic (goal INR of 2.0-3.0)        Warfarin taken as previously instructed    No new diet changes affecting INR    No new medication/supplements affecting INR    Continues to tolerate warfarin with no reported s/s of bleeding or thromboembolism     Previous INR was Subtherapeutic    Plan:     Spoke with home care nurse Leighann discussed INR result and instructed:     Warfarin Dosing Instructions: Continue current warfarin dose 1 mg daily on mon/fri; and 1.5 mg daily rest of week  (0 % change)    Next INR to be drawn: two weeks with home care     verbalizes understanding and agrees to warfarin dosing plan.   ?   Yasmin Ashley RN    Subjective/Objective:      Kody Johns, a 71 y.o. male is established on warfarin.     Home care/care facility RN's report of [facility] INR, recent warfarin dosing, diet changes, medication changes, and symptoms is documented below.    Additional findings: none    Anticoagulation Episode Summary     Current INR goal:  2.0-3.0   TTR:  66.4 % (1 y)   Next INR check:  2/2/2021   INR from last check:  2.10 (1/19/2021)   Weekly max warfarin dose:     Target end date:     INR check location:     Preferred lab:     Send INR reminders to:  [facility]    Indications    A-fib (H) [I48.91]  Aortic valve disease  rheumatic [I06.9]  Mitral valve stenosis  unspecified etiology [I05.0]  Silent Stroke [I66.9]           Comments:           Anticoagulation Care Providers     Provider Role Specialty Phone number    Aristides Alegria MD Referring Family Medicine [phone]    Haja Ascencio MD  Cardiology [phone]

## 2021-06-14 NOTE — PROGRESS NOTES
INR 2.1 will dose Warfarin at 1 mg T, thur and Sat and 2 mg all other days. Will turn pt back to Dr. Alegria team. Home care aware. After talking with pt and discussing history of greens/salads and medication change. Pt will  continue  with current diet and dosing of Warfarin.  Continue with moderation of Vit K and green leafy vegetables. Cautioned to call with increase bruising or bleeding. Reminded to call with medication change especially antibiotic. Call with any questions or concerns or any up coming procedures. Cautioned about using Herbal medication.

## 2021-06-14 NOTE — PROGRESS NOTES
Pt has been followed for CV ordered by Dr Giordano, 10/23  INR's issue not getting to  therapeutic level, and needed to start over,  Pt was seen in clinic today by  Dr Lang and pt is in NSR  CV is cancelled and  notified

## 2021-06-14 NOTE — PROGRESS NOTES
INR 3.1 Continue current management dosing of Warfarin. Continue  diet of moderate Vitamin K intake. Discussed with pt the need to call with questions or concerns or any change in medication especially herbal medication or OTC. Call with increased bleeding or bruising or any upcoming procedures.  Retest in one week.   Spoke with home health.

## 2021-06-14 NOTE — PROGRESS NOTES
Patient Outreach follow up:  Call pt. Spoke with Crow, brother/pca and patient.   Offered to use language line  and patient declined. Per pt; brother speak English and comfortable for him to . Pt declined language line  use.     Checked with pt for how he is doing and most need at this time.   Updated goal.   Checked with pt for his no show appt on 12/1/17 with Dr. Delaney at [facility]. Per patient; have another appt to go. Also forgot about the appt.  Pt is encouraged to reschedule appt with Dr. Delaney and take good care of himself by going to his appt consistency. Pt confirmed and will have Derrell Grier or umu Flores prefer 's to help with scheduling appt.   Care guide offered to help with reschedule appt with Dr. Delaney. Pt declined.     Patient shared per Crow:  -currently have home skilled nursing services to set up pill box and med management every week.  -have pca services to help with personal need, cooking, and remind pt to take his med daily.     Pt and PCA is informed, pt have no f/u appt with pcp. Pt confirmed prefer the two prefer 's to contact care guide or [facility] to schedule the appt. Pt will contact the two interpreters.     Pt and pca have no questions. Confirmed understand. Will contact care guide if does have questions.

## 2021-06-14 NOTE — TELEPHONE ENCOUNTER
INR result is 1.9  INR   Date Value Ref Range Status   01/19/2021 2.10 (!) 0.9 - 1.1 Final       Will the patient be seen, or did they already see, MD or CNP today? No    Most Recent Warfarin dose day/week  Sunday Monday Tuesday Wednesday Thursday Friday Saturday     1.5 mg 1.5 mg 1.5 mg 1 mg 1.5 mg     Sunday Monday Tuesday Wednesday Thursday Friday Saturday   1.5 mg 1 mg            Has the patient missed any doses of Coumadin, Warfarin, Jantoven in the past 7 days? No But may be taking them 1 day off    Has the patients medications changed since the last visit? No    Has the patient experienced any bleeding recently? No    Has the patient experienced any injuries or illness recently? No    Has the patient experienced any 'new' shortness of breath, severe headaches, or changes in vision recently? No    Has the patient had any changes in their diet, or alcohol consumption? No    Is the patient here today to prepare for any type of upcoming surgery, procedure, or for a cardioversion procedure? No    What phone number can we reach the patient at today? [phone] Esme.

## 2021-06-14 NOTE — PROGRESS NOTES
Assessment:     1. A-fib       Kody is Pre-Cardioversion? yes  Past Medical History:   Diagnosis Date     Palpitations        Plan:     Anticoagulation Episode Summary     Current INR goal 2.5-3.5   Next INR check 11/28/2017   INR from last check 4.50! (11/21/2017)   Weekly max dose    Target end date    INR check location    Preferred lab    Send INR reminders to ANTICOAGULATION POOL A (WBY,WBE,MID,RSC)    Indications   A-fib [I48.91]           Comments          Anticoagulation Care Providers     Provider Role Specialty Phone number    Aristides Alegria MD Referring Family Medicine [phone]    Haja Ascencio MD  Cardiology [phone]          Discussed the following:  Per home care nurse - no changes to her knowledge  Kody displays a  good warfarin knowledge base, through home care nurse  Shonda Deleon        Subjective/Objective:      ANTICOAGULATION MANAGEMENT    Kody Johns is new to warfarin  INR Therapeutic Range: 2.5-3.5-    Aristides Alegria MD

## 2021-06-14 NOTE — TELEPHONE ENCOUNTER
INR result is           2.1  INR   Date Value Ref Range Status   01/05/2021 1.80 (!) 0.9 - 1.1 Final       Will the patient be seen, or did they already see, MD or CNP today? No    Most Recent Warfarin dose day/week  Sunday Monday Tuesday Wednesday Thursday Friday Saturday     1.5 1.5 1.5 1 1.5     Sunday Monday Tuesday Wednesday Thursday Friday Saturday   1.5 1            Has the patient missed any doses of Coumadin, Warfarin, Jantoven in the past 7 days? No    Has the patients medications changed since the last visit? No    Has the patient experienced any bleeding recently? No    Has the patient experienced any injuries or illness recently? No    Has the patient experienced any 'new' shortness of breath, severe headaches, or changes in vision recently? No    Has the patient had any changes in their diet, or alcohol consumption? No    Is the patient here today to prepare for any type of upcoming surgery, procedure, or for a cardioversion procedure? No    What phone number can we reach the patient at today? home phone listed in demographics.

## 2021-06-14 NOTE — TELEPHONE ENCOUNTER
Requested Prescriptions     Pending Prescriptions Disp Refills     magnesium oxide (MAG-OX) 400 mg (241.3 mg magnesium) tablet [Pharmacy Med Name: MAGNESIUM OXIDE 400 MG  (241.3 Tablet] 30 tablet 2     Sig: TAKE 1 PILL BY MOUTH EVERYDAY

## 2021-06-14 NOTE — PROGRESS NOTES
Thank you for asking the [facility] team to see Kody Johns.      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 Will refer to CT surgery for surgical evaluation. Continue current medications.     Atrial flutter - in normal sinus rhythm on EKG today. Continue coumadin and carvedilol.    Mild CAD - on pravastastin 80mg daily.   last fall, repeat today.     F/U 6 months       Current History:   Kody Johns is a 67 y.o. who was a refugee in [facility] was diagnosed with what sounds like heart failure, possibly even endocarditis and was started on multiple medications and was told that he needed urgent surgery for his heart valves and was emergently moved [place] the [place] in [place] 2014. I saw him in clinic shortly after he arrived [place] And he obtain both a surface and a transesophageal echocardiograms which showed mild to moderate aortic insufficiency and mild to moderate mitral stenosis. Due to complaints of chest pain, we went ahead and did right and left heart catheterization on the patient and he was found just to have mild coronary artery disease and a mitral valve gradient of 7-8 mmHg with simultaneous pulmonary capillary wedge pressure and LVEDP. His mean PA pressure and wedge pressure were normal to low. He adamantly denies any history of strokes. He did undergo a CT scan of his head given hearing deficiency and it was concerning for to possible foci of previous strokes as well as some small vessel ischemia. The patient does not know why he is on Plavix and a high dose aspirin. He was started on both those medications in [facility] prior to transferring [place] the [place]. This summer he was admitted with atrial fibrillation with RVR and acute heart failure secondary to mitral regurgitation. Echo on August 29, 2017 showed ejection fraction of 55%, moderate to severe  mitral regurgitation (rheumatic heart disease), moderate aortic regurgitation, and mild aortic stenosis. He was seen by Dr. Manriquez who thought he was not a candidate for percutaneous valve replacement. He has not seen CT surgery yet. The plan has been for a cardioversion but he has not had a consistently therapeutic INR to allow this.     He returns for f/u today noting that his dyspnea is better but still occurs at about 5-10 minutes of ambulation. There is no recent chest pain. No PND or orthopnea.      Past Medical History:     Past Medical History:   Diagnosis Date     Palpitations        Past Surgical History:     Past Surgical History:   Procedure Laterality Date     CATARACT EXTRACTION Left 06/13/2016       Family History:     Family History   Problem Relation Age of Onset     No Medical Problems Mother      No Medical Problems Father      Heart disease Neg Hx        Social History:    reports that he quit smoking about 3 years ago. His smoking use included Cigarettes. He has a 50.00 pack-year smoking history. He has quit using smokeless tobacco. He reports that he uses illicit drugs. He reports that he does not drink alcohol.    Meds:     Current Outpatient Prescriptions   Medication Sig Note     acetaminophen (TYLENOL) 500 MG tablet Take 500-1,000 mg by mouth every 6 (six) hours as needed for pain. Every 6-8 hours as needed. No more than 4,000MG of acetaminophen daily.      albuterol (PROAIR HFA;PROVENTIL HFA;VENTOLIN HFA) 90 mcg/actuation inhaler Inhale 1-2 puffs every 6 (six) hours as needed.      baclofen (LIORESAL) 10 MG tablet TAKE 1 TABLET BY MOUTH TWICE DAILY      bromfenac (PROLENSA) 0.07 % Drop 1 drop daily.      clopidogrel (PLAVIX) 75 mg tablet Take 75 mg by mouth daily.      clopidogrel (PLAVIX) 75 mg tablet TAKE 1 TABLET BY MOUTH EVERY DAY      fluticasone (FLONASE) 50 mcg/actuation nasal spray 2 sprays into each nostril daily. 8/29/2017: 8/29/17: pt's brother states that he ran out of this  "medication a few days ago but is still supposed to be taking it.      furosemide (LASIX) 20 MG tablet Take 1 tablet (20 mg total) by mouth daily.      levothyroxine (SYNTHROID, LEVOTHROID) 88 MCG tablet TAKE 1 TABLET BY MOUTH EVERY DAY      metoprolol succinate (TOPROL-XL) 25 MG TAKE 1 TABLET BY MOUTH EVERY DAY      metoprolol tartrate (LOPRESSOR) 50 MG tablet Take 0.5 tablets (25 mg total) by mouth 2 (two) times a day.      nasal dilator (BREATHE RIGHT) Strp strip Apply qhs      omeprazole (PRILOSEC) 20 MG capsule Take 20 mg by mouth daily before breakfast.      polyethylene glycol (MIRALAX) 17 gram packet Take 17 g by mouth daily as needed.       pravastatin (PRAVACHOL) 80 MG tablet Take 80 mg by mouth at bedtime.      spironolactone (ALDACTONE) 25 MG tablet Take 25 mg by mouth daily.      spironolactone (ALDACTONE) 25 MG tablet TAKE 1 TABLET BY MOUTH EVERY DAY      traMADol (ULTRAM) 50 mg tablet Take 50 mg by mouth 2 (two) times a day as needed for pain.      UNABLE TO FIND Unknown topical medication for itching      UNABLE TO FIND Unknown topical medication for pain      warfarin (COUMADIN) 1 MG tablet Take 1 to 2 tablets (1 to 2 mg) by mouth daily as instructed. Adjust dose based on INR results as directed.      warfarin (COUMADIN) 5 MG tablet 1 po qd initially then Adjust dose based on INR results as directed by coumadin nurse        Allergies:   Review of patient's allergies indicates no known allergies.    Review of Systems:   Review of Systems:   General: WNL  Eyes: Visual Distubance  Ears/Nose/Throat: Hearing Loss  Lungs: Cough, Snoring  Heart: WNL  Stomach: Constipation, Nausea  Bladder: WNL  Muscle/Joints: Joint Pain, Muscle Weakness, Muscle Pain  Skin: WNL  Nervous System: Dizziness, Loss of Balance  Mental Health: Anxiety     Blood: WNL       Objective:      Physical Exam  @LASTENCWT:3@  4' 10.5\" (1.486 m)  @BMI:3@  /78 (Patient Site: Right Arm, Patient Position: Sitting, Cuff Size: Adult Small) " " Pulse 72  Resp 20  Ht 4' 10.5\" (1.486 m) Comment: shoes on  Wt 110 lb (49.9 kg) Comment: shoes on  BMI 22.6 kg/m2    General Appearance:   Alert, cooperative and in no acute distress.   HEENT:  No scleral icterus; the mucous membranes were pink and moist.   Neck: JVP flat. No thyromegaly. No HJR   Chest: The spine was straight. The chest was symmetric.   Lungs:   Respirations unlabored; the lungs are clear to auscultation.   Cardiovascular:   S1 and S2 normal. 2/6 early systolic murmur at the upper sternal borders, 2/6 blowing end diastolic murmur at lower sternal border and apex. Accentuated S1. No clicks or rubs. No carotid bruits noted. Right DP, PT, and radial pulses 2+. Left DP, PT, and radial pulses 2+.   Abdomen:  No organomegaly, masses, bruits, or tenderness. Bowels sounds are present   Extremities: No cyanosis, clubbing, or edema.   Skin: No xanthelasma.   Neurologic: Mood and affect are appropriate.         Lab Review   Lab Results   Component Value Date     09/14/2017     09/06/2017     08/31/2017    K 4.5 09/14/2017    K 4.2 09/06/2017    K 3.9 08/31/2017     (H) 09/14/2017     09/06/2017     08/31/2017    CO2 23 09/14/2017    CO2 25 09/06/2017    CO2 25 08/31/2017    BUN 18 09/14/2017    BUN 30 (H) 09/06/2017    BUN 31 (H) 08/31/2017    CREATININE 0.88 09/14/2017    CREATININE 1.40 (H) 09/06/2017    CREATININE 1.33 (H) 08/31/2017    CALCIUM 9.3 09/14/2017    CALCIUM 9.5 09/06/2017    CALCIUM 9.5 08/31/2017     Lab Results   Component Value Date    WBC 7.3 09/06/2017    WBC 10.6 08/29/2017    WBC 6.4 07/20/2017    HGB 17.0 09/06/2017    HGB 16.8 08/29/2017    HGB 16.1 07/20/2017    HCT 52.8 09/06/2017    HCT 47.9 08/29/2017    HCT 47.0 07/20/2017    MCV 94 09/06/2017    MCV 90 08/29/2017    MCV 93 07/20/2017     09/06/2017     08/29/2017     07/20/2017     Lab Results   Component Value Date    CHOL 185 07/20/2017    CHOL 147 02/10/2016    " CHOL 154 04/02/2014    TRIG 83 07/20/2017    TRIG 121 02/10/2016    TRIG 103 04/02/2014    HDL 34 (L) 07/20/2017    HDL 33 (L) 02/10/2016    HDL 34 (L) 04/02/2014    LDLDIRECT 111 10/13/2016    LDLDIRECT 94 11/24/2014    LDLDIRECT 101 05/08/2014     Lab Results   Component Value Date     (H) 08/29/2017     (H) 04/02/2014         Ashleigh Lang M.D.

## 2021-06-14 NOTE — PROGRESS NOTES
INR 1.6 will increase dose to 2 mg daily and retest in one week. CV will be moved out due to low INR. Pt denies missing dose of Warfarin. Home health will set up pills. After talking with pt and discussing history of greens/salads and medication change. Pt will  continue  with current diet and dosing of Warfarin.  Continue with moderation of Vit K and green leafy vegetables. Cautioned to call with increase bruising or bleeding. Reminded to call with medication change especially antibiotic. Call with any questions or concerns or any up coming procedures. Cautioned about using Herbal medication.

## 2021-06-14 NOTE — TELEPHONE ENCOUNTER
INR result is   1.8  INR   Date Value Ref Range Status   12/22/2020 2.10 (!) 0.9 - 1.1 Final       Will the patient be seen, or did they already see, MD or CNP today? No    Most Recent Warfarin dose day/week  Sunday Monday Tuesday Wednesday Thursday Friday Saturday    1 mg 1.5 mg 1 mg 1.5 mg 1.5 mg 1 mg     Sunday Monday Tuesday Wednesday Thursday Friday Saturday   1.5 mg 1 mg            Has the patient missed any doses of Coumadin, Warfarin, Jantoven in the past 7 days? No    Has the patients medications changed since the last visit? No    Has the patient experienced any bleeding recently? No    Has the patient experienced any injuries or illness recently? No    Has the patient experienced any 'new' shortness of breath, severe headaches, or changes in vision recently? No    Has the patient had any changes in their diet, or alcohol consumption? No    Is the patient here today to prepare for any type of upcoming surgery, procedure, or for a cardioversion procedure? No    What phone number can we reach the patient at today? [facility]  . [phone]

## 2021-06-15 NOTE — TELEPHONE ENCOUNTER
NEDA - Status Update  Who is Calling: Home Health Nurse Shar - [facility]  [phone]  Update: She has an order to draw INR for patient for today, however her  is not available today. Would it be OK to do patient's INR tomorrow ??  Okay to leave a detailed message?:  Yes

## 2021-06-15 NOTE — PROGRESS NOTES
ANTICOAGULATION  MANAGEMENT: Discharge Review    Kody Johns chart reviewed for anticoagulation continuity of care    Hospital admission on  2/5 to 2/9 for afib with RVR, hypoxic respiratory failure.    Discharge disposition: Home with resumption of Home Care    INR Results:       Recent labs: (last 7 days)     02/05/21  1957 02/06/21  0614 02/07/21  0556 02/08/21  0523 02/09/21  0506 02/10/21   INR 2.24* 2.15* 2.06* 2.68* 2.81* 2.60*       Warfarin inpatient management: less warfarin administered than maintenance regimen    Warfarin discharge instructions: home regimen continued     Medication Changes Affecting Anticoagulation: No    Additional Factors Affecting Anticoagulation: No    Plan     No adjustment to anticoagulation plan needed      Recommended follow up is scheduled, INR was done today through home care and next INR is scheduled         Jessica Garza RN

## 2021-06-15 NOTE — TELEPHONE ENCOUNTER
ANTICOAGULATION  MANAGEMENT- Home Care/Care Facility Result    Assessment     Today's INR result of 2.3 is Therapeutic (goal INR of 2.0-3.0)        Warfarin taken as previously instructed    No new diet changes affecting INR    No new medication/supplements affecting INR    Continues to tolerate warfarin with no reported s/s of bleeding or thromboembolism     Previous INR was Therapeutic    Plan:     Spoke with Shar discussed INR result and instructed:     Warfarin Dosing Instructions: Continue current warfarin dose 1 mg daily on Mondays; and 1.5 mg daily rest of week  (0 % change)    Next INR to be drawn: one week.    Education provided: importance of therapeutic range, importance of following up for INR monitoring at instructed interval and importance of taking warfarin as instructed    Shar verbalizes understanding and agrees to warfarin dosing plan.   ?   Crystal Portillo RN    Subjective/Objective:      Kody Johns, a 71 y.o. male is established on warfarin.     Home care/care facility RN's report of [facility] INR, recent warfarin dosing, diet changes, medication changes, and symptoms is documented below.    Additional findings: verbally confirmed home dose with Shar and updated on anticoagulation calendar    Anticoagulation Episode Summary     Current INR goal:  2.0-3.0   TTR:  73.8 % (1 y)   Next INR check:  3/3/2021   INR from last check:  2.30 (2/24/2021)   Weekly max warfarin dose:     Target end date:     INR check location:     Preferred lab:     Send INR reminders to:  [facility]    Indications    A-fib (H) [I48.91]  Aortic valve disease  rheumatic [I06.9]  Mitral valve stenosis  unspecified etiology [I05.0]  Silent Stroke [I66.9]           Comments:           Anticoagulation Care Providers     Provider Role Specialty Phone number    Aristides Alegria MD Referring Family Medicine [phone]    Haja Ascencio MD  Cardiology [phone]

## 2021-06-15 NOTE — TELEPHONE ENCOUNTER
INR result is 1.9  INR   Date Value Ref Range Status   03/02/2021 4.00 (!) 0.90 - 1.10 Final       Will the patient be seen, or did they already see, MD or CNP today? No    Most Recent Warfarin dose day/week  Sunday Monday Tuesday Wednesday Thursday Friday Saturday    1.5 mg 1.5 mg 1.5 mg 1.5 mg 1 mg 1.5 mg     Sunday Monday Tuesday Wednesday Thursday Friday Saturday   1.5 mg             Has the patient missed any doses of Coumadin, Warfarin, Jantoven in the past 7 days? No    Has the patients medications changed since the last visit? No    Has the patient experienced any bleeding recently? No    Has the patient experienced any injuries or illness recently? No    Has the patient experienced any 'new' shortness of breath, severe headaches, or changes in vision recently? No    Has the patient had any changes in their diet, or alcohol consumption? No    Is the patient here today to prepare for any type of upcoming surgery, procedure, or for a cardioversion procedure? No    What phone number can we reach the patient at today? [phone] Esme.

## 2021-06-15 NOTE — TELEPHONE ENCOUNTER
"Hi Dr. Alegria,    My name is Leilani Ignacio and I am the [facility] Care Coordinator for Kody Johns.  I spoke with his family member, Mark, today for a check in call and Mark shared that Kody could really use a hsopital bed.  Mark reports he thought one was going to be ordered but never heard anything since.  He stated \"it was a long time ago.\"  I am requesting an order for a hospital bed for Kody and I will go forward with placing the order if approved by you.  Please let me know if you have any questions.  Thank you!    Leilani Ignacio, [facility]  [phone]  "

## 2021-06-15 NOTE — TELEPHONE ENCOUNTER
ANTICOAGULATION  MANAGEMENT- Home Care/Care Facility Result    Assessment     Today's INR result of 2.6 is Therapeutic (goal INR of 2.0-3.0)        Warfarin taken as previously instructed calendar adjusted for hospital dosing    No new diet changes affecting INR    No new medication/supplements affecting INR    Continues to tolerate warfarin with no reported s/s of bleeding or thromboembolism     Previous INR was Therapeutic    Plan:     Spoke with home care Esme discussed INR result and instructed:     Warfarin Dosing Instructions: Continue current warfarin dose 1 mg daily on mon; and 1.5 mg daily rest of week  (0 % change)    Next INR to be drawn: tue 2/16 with home care visit    Esme verbalizes understanding and agrees to warfarin dosing plan.   ?   Yasmin Ashley RN    Subjective/Objective:      Kody Johns, a 71 y.o. male is established on warfarin.     Home care/care facility RN's report of [facility] INR, recent warfarin dosing, diet changes, medication changes, and symptoms is documented below.    Additional findings: none    Anticoagulation Episode Summary     Current INR goal:  2.0-3.0   TTR:  69.9 % (1 y)   Next INR check:  2/16/2021   INR from last check:  2.60 (2/10/2021)   Weekly max warfarin dose:     Target end date:     INR check location:     Preferred lab:     Send INR reminders to:  [facility]    Indications    A-fib (H) [I48.91]  Aortic valve disease  rheumatic [I06.9]  Mitral valve stenosis  unspecified etiology [I05.0]  Silent Stroke [I66.9]           Comments:           Anticoagulation Care Providers     Provider Role Specialty Phone number    Aristides Alegria MD Referring Family Medicine [phone]    Haja Ascencio MD  Cardiology [phone]

## 2021-06-15 NOTE — PROGRESS NOTES
Noted significant drop in platelets to 45.  Patient did not have any bleeding.    Family will be contacted., notified to stop amiodarone (patient is not in sinus rhythm on the medication) which can cause thrombocytopenia    LDH and peripheral smear added on-urgently rule out TTP which seems unlikely given the fact that renal function is preserved  Further plans to follow.  My suspicion is this is related to the drug and will plan on rechecking platelets 1 to 2 weeks after cessation.  We will also confirm that there has been no bleeding on family contact.

## 2021-06-15 NOTE — TELEPHONE ENCOUNTER
Called and spoke to pt and brother. Both understood and will go to [facility] around 4pm. Dr. corey will call and let hospital know    ----- Message from Maynor Corey MD sent at 2/5/2021  1:39 PM CST -----  Potassium just came back moderately low.  This could be contributing to his heart arrhythmia-patient has previous history of atrial flutter, was in normal rhythm for a while, now back in atrial flutter  That plus his mysterious platelet problem plus some shortness of breath (which may be chronic) makes me think he should go to the emergency room to have this all sorted out.  He should still stop his amiodarone

## 2021-06-15 NOTE — TELEPHONE ENCOUNTER
Refill Approved    Rx renewed per Medication Renewal Policy. Medication was last renewed on 12/8/20.    Adama Victor, [facility] Triage/Med Refill 3/11/2021     Requested Prescriptions   Pending Prescriptions Disp Refills     ferrous sulfate 325 (65 FE) MG tablet [Pharmacy Med Name: FERROUS SULFATE 325 MG  (65 FE) Tablet] 60 tablet 1     Sig: TAKE 1 PILL BY MOUTH TWO TIMES A DAY FOR IRON       Iron Supplements Refill Protocol Passed - 3/10/2021  9:16 AM        Passed - PCP or prescribing provider visit in past 12 months       Last office visit with prescriber/PCP: 12/8/2020 Murali Sosa MD OR same dept: 2/4/2021 Maynor Roberson MD OR same specialty: 2/4/2021 Maynor Roberson MD  Last physical: Visit date not found Last MTM visit: Visit date not found   Next visit within 3 mo: Visit date not found  Next physical within 3 mo: Visit date not found  Prescriber OR PCP: Murali Sosa MD  Last diagnosis associated with med order: 1. Iron deficiency anemia, unspecified iron deficiency anemia type  - ferrous sulfate 325 (65 FE) MG tablet [Pharmacy Med Name: FERROUS SULFATE 325 MG  (65 FE) Tablet]; TAKE 1 PILL BY MOUTH TWO TIMES A DAY FOR IRON  Dispense: 60 tablet; Refill: 1    If protocol passes may refill for 12 months if within 3 months of last provider visit (or a total of 15 months).             Passed - Hemoglobin or Hematocrit in last 12 months     Hemoglobin   Date Value Ref Range Status   02/22/2021 14.3 14.0 - 18.0 g/dL Final     Hematocrit   Date Value Ref Range Status   02/22/2021 43.5 40.0 - 54.0 % Final

## 2021-06-15 NOTE — PROGRESS NOTES
TRANSITIONS OF CARE (EUGENIE) LOG   EUGENIE tasks should be completed by the CC within one (1) business day of notification of each transition. Follow up contact with member is required after return to their usual care setting.  Note:  If CC finds out about the transitions fifteen (15) days or more after the member has returned to their usual care setting, no EUGENIE log is needed. However, the CC should check in with the member to discuss the transition process, any changes needed to the care plan and document it in a case note.    Member Name:  Kody Johns MCO Name:  [employer] Member ID#: [id number]   Product: [employer] Care Coordinator Contact:  MISSAEL Lord Agency/County/Care System: [facility]   Transition Communication Actions from Care Management Contact   Transition #1   Notification Date: 2/9/2021 Transition Date:   2/5/2021 Transition From: Home     Is this the member s usual care setting?               yes Transition To: Hospital, [facility]   Transition Type:  Unplanned  Reason for Admission/Comments:       Shared CC contact info, care plan/services with receiving setting--Date completed: 2/5/2021  Care Coordinator did not hear of this admission until discharge so no Transition Hand In or Hops[facility]  contact was needed.  Reviewed and update care plan as needed.  Notified community service providers and placed services RN on hold as needed.  Transition log initiated.   PCP notified of hospitalization via EMR.      Notified PCP of transition--Date completed:  2/5/2021     via  EMR   Transition #2   Transition #3  (if applicable)   Notification Date: 2/9/2021         Transition To:  Home  Transition Date: 2/9/2021     Transition Type:    Planned  Notified PCP -- Date completed: 2/9/2021              Shared CC contact info, care plan/services with receiving setting or, if applicable, home care agency--Date completed:  2/9/2021  *Complete additional tasks below, if this  transition is a return to usual care setting.      Comments:  Care Coordinator called Kody's son, Mark, and left a voicemail to check in to see how Kody is doing now that he is home.  Care Coordinator received a voicemail from Kody's home care nurse stating Kody is home and they will be resuming services with no changes.         *Complete tasks below when the member is discharging TO their usual care setting within one (1) business day of notification.  For situations where the Care Coordinator is notified of the discharge prior to the date of discharge, the Care Coordinator must follow up with the member or designated representative to confirm that discharge actually occurred and discuss required EUGENIE tasks as outlined in the EUGENIE Instructions.  (This includes situations where it may be a  new  usual care setting for the member. (i.e., a community member who decides upon permanent nursing home placement following hospitalization and rehab).    Date completed: 2/9/2021  Communicated with member or their designated representative about the following:  care transition process; about changes to the member s health status; plan of care updates; education about transitions and how to prevent unplanned transitions/readmissions  Four Pillars for Optimal Transition:    Check  Yes  - if the member, family member and/or SNF/facility staff manages the following:    If  No  provide explanation in the comments section.          [x]  Yes     []  No     Does the member have a follow-up appointment scheduled with primary care or specialist? (Mental health hospitalizations--the appt. should be w/in 7 days)   [x]  Yes     []  No     Can the member manage their medications or is there a system in place to manage medications (e.g. home care set-up)?         [x]  Yes     []  No     Can the member verbalize warning signs and symptoms to watch for and how to respond?         [x]  Yes     []  No     Does the member use a Personal Health Care  Record?  Check  Yes  if visit summary, discharge summary, and/or healthcare summary are being used as a PHR.                                                                                                                                                                                    [x] Yes      [] No      Have you updated the member s care plan?  If  No  provide explanation in comments.   Comments:  Updated POC to note ER/hospital stay     Leilani Ignacio EFE  [facility]  [phone]

## 2021-06-15 NOTE — TELEPHONE ENCOUNTER
INR result is          2.3  INR   Date Value Ref Range Status   02/16/2021 2.60 (!) 0.90 - 1.10 Final       Will the patient be seen, or did they already see, MD or CNP today? No    Most Recent Warfarin dose day/week  Sunday Monday Tuesday Wednesday Thursday Friday Saturday      1.5 1.5 1.5 1.5     Sunday Monday Tuesday Wednesday Thursday Friday Saturday   1.5 1 1.5           Has the patient missed any doses of Coumadin, Warfarin, Jantoven in the past 7 days? No    Has the patients medications changed since the last visit? No    Has the patient experienced any bleeding recently? No    Has the patient experienced any injuries or illness recently? No    Has the patient experienced any 'new' shortness of breath, severe headaches, or changes in vision recently? No    Has the patient had any changes in their diet, or alcohol consumption? No    Is the patient here today to prepare for any type of upcoming surgery, procedure, or for a cardioversion procedure? No    What phone number can we reach the patient at today? home phone listed in demographics.

## 2021-06-15 NOTE — TELEPHONE ENCOUNTER
INR result is  4.0  INR   Date Value Ref Range Status   02/24/2021 2.30 (!) 0.90 - 1.10 Final       Will the patient be seen, or did they already see, MD or CNP today? No    Most Recent Warfarin dose day/week  Sunday Monday Tuesday Wednesday Thursday Friday Saturday     1.5 1.5 1.5 1.5 1.5     Sunday Monday Tuesday Wednesday Thursday Friday Saturday   1.5 1            Has the patient missed any doses of Coumadin, Warfarin, Jantoven in the past 7 days? No    Has the patients medications changed since the last visit? No    Has the patient experienced any bleeding recently? No    Has the patient experienced any injuries or illness recently? No    Has the patient experienced any 'new' shortness of breath, severe headaches, or changes in vision recently? No    Has the patient had any changes in their diet, or alcohol consumption? No    Is the patient here today to prepare for any type of upcoming surgery, procedure, or for a cardioversion procedure? No    What phone number can we reach the patient at today? Esme

## 2021-06-15 NOTE — PROGRESS NOTES
Kody Johns is a 71 y.o. male who is being evaluated via a billable telephone visit.      What phone number would you like to be contacted at? [phone]   How would you like to obtain your AVS? AVS Preference: Realt.     Use  line to contact :Chris ID:[id number]      Assessment & Plan     Kody was seen today for hospital visit follow up.    Diagnoses and all orders for this visit:    Hospital discharge follow-up  -     Ambulatory referral to Cardiology    Atrial flutter with rapid ventricular response (H)  -     metoprolol tartrate (LOPRESSOR) 100 MG tablet; Take 1 tablet (100 mg total) by mouth 2 (two) times a day.  -     Ambulatory referral to Cardiology    S/P AVR (aortic valve replacement)  -     Ambulatory referral to Cardiology    S/P MVR (mitral valve replacement)  -     Ambulatory referral to Cardiology    Hypothyroidism, unspecified type    Thrombocytopenia (H)  -     HM2(CBC w/o Differential); Future    Chronic systolic congestive heart failure (H)  Comments:  EF 36%  2/2021  Orders:  -     Basic Metabolic Panel; Future  -     Ambulatory referral to Cardiology    Acute on chronic congestive heart failure, unspecified heart failure type (H)  -     furosemide (LASIX) 20 MG tablet; Take 1 tablet (20 mg total) by mouth daily.    Benign essential hypertension  -     furosemide (LASIX) 20 MG tablet; Take 1 tablet (20 mg total) by mouth daily.          Hospital discharge follow-up for atrial flutter with rapid ventricular response.  Status post  mitral valve replacement status post aortic valve replacement.  Continues on warfarin    Congestive heart failure acute on chronic now with decreased ejection fraction at 36%.  Med changes as outlined    Needs to follow-up with cardiology.    Previous CVA no changes on scan no changes on neurologic exam.    Hypothyroid on therapeutic dose.    Hypertension has been stable.    Thrombocytopenia, will recheck CBC    As outlined above labs in 1 week  follow-up with Dr. Alegria in 2 weeks we will also check BNP.  This was elevated at 1319 on 2/5/2021    Referral to cardiology    Pickup the metoprolol and Lasix today.        I reviewed the hospital admission history and physical as well as discharge summary, as well as cardiology consult from this hospital stay.  Review of external notes as documented in note  Review of the result(s) of each unique test - Review of echocardiogram, review of CT scan of the head, review of labs from hospital visit including platelets at 73,000  Assessment requiring an independent historian(s) - family - Brother  Diagnosis or treatment significantly limited by social determinants of health - Non-English-speaking immigrant, low socioeconomic status.      33 minutes spent on the date of the encounter doing chart review, history and exam, documentation and further activities as noted above  [id number]}     Return in about 2 weeks (around 2/25/2021) for Recheck.    Aristides Alegria MD  [facility] Andreas is 71 y.o. and presents today for the following health issues   HPI   This patient had a virtual visit, telephone, due to the coronavirus pandemic.    This was a hospital discharge follow-up.    Patient was hospitalized at [facility] from 2/5/2021 through 2/9/2021.    Patient has history of atrial flutter with rapid ventricular response.  He continues on warfarin, INR yesterday was 2.6 he is followed by anticoagulation nurses.    Patient had change in medication is no longer on amiodarone    His metoprolol was changed to metoprolol tartrate 100 mg 1 twice a day and also furosemide 20 mg 1 a day.  They have not got the medication delivered yet from the pharmacy.  I sent it to the pharmacy again and told him to go get it today and get on that.    Patient had evidence on the CT scan of previous infarcts but no acute changes.    He had an echocardiogram which showed the mitral valve replacement  and aortic valve replacement.  His ejection fraction is down to 36%.    Patient has low platelets of 73,000 this would get rechecked.  His renal function has been fairly stable    He continues on levothyroxine 88 mcg a day and TSH was therapeutic.    He is not scheduled for a follow-up with cardiology he needs to do that I put in a referral    Patient will come in next week check CBC and BMP we will also get vital signs with weight blood pressure O2 sat and pulse.    Independent historian, brother was present for most of the discussion regarding the hospital stay and how the patient is doing.    He is doing better has less shortness of breath and dizziness.  Does get winded still with activity.    I stressed the importance of picking up the new medications, metoprolol and Lasix as discussed above.    No COVID-19 symptoms or exposure  Review of Systems  10 point review of systems positive as outlined above otherwise negative.    There is been no bleeding issues      Objective       Vitals:  No vitals were obtained today due to virtual visit.    Physical Exam  General appearance no acute distress.    HEENT no nasal congestion or sore throat    Lungs: Does not have any labored breathing at rest.    Heart: Denies palpitations or rapid heart rate.  Heart rate was 88 with a blood pressure 94/66 on 2/9/2021 O2 sat 99% weight was 93 pounds.    Extremities without significant edema.    History of a flutter A. fib.    Skin without rash.    Some generalized weakness from previous CVAs.    Patient will come in for labs as outlined check CBC and BMP.  He had an INR yesterday.  Please see above        Phone call duration: 22 minutes

## 2021-06-15 NOTE — TELEPHONE ENCOUNTER
Pt's brother Mark calling, wondering which hospital to take pt to. See previous messages.    Advised Mark he can bring pt to [place].     Mark verbalizes understanding and agrees to plan.     Additional Information    [1] Other NON-URGENT information for PCP AND [2] does not require PCP response    Protocols used: PCP CALL - NO TRIAGE-A-

## 2021-06-15 NOTE — TELEPHONE ENCOUNTER
ANTICOAGULATION  MANAGEMENT- Home Care/Care Facility Result    Assessment     Today's INR result of 2.6 is Therapeutic (goal INR of 2.0-3.0)        Warfarin taken as previously instructed    No new diet changes affecting INR    No new medication/supplements affecting INR    Continues to tolerate warfarin with no reported s/s of bleeding or thromboembolism     Previous INR was Therapeutic    Plan:     Spoke with nurse Esme discussed INR result and instructed:     Warfarin Dosing Instructions: Continue current warfarin dose 1 mg daily on mon; and 1.5 mg daily rest of week  (0 % change)    Next INR to be drawn: one week        Esme verbalizes understanding and agrees to warfarin dosing plan.   ?   Yasmin Ashley RN    Subjective/Objective:      Kody Johns, a 71 y.o. male is established on warfarin.     Home care/care facility RN's report of [facility] INR, recent warfarin dosing, diet changes, medication changes, and symptoms is documented below.    Additional findings: none    Anticoagulation Episode Summary     Current INR goal:  2.0-3.0   TTR:  71.6 % (1 y)   Next INR check:  2/23/2021   INR from last check:  2.60 (2/16/2021)   Weekly max warfarin dose:     Target end date:     INR check location:     Preferred lab:     Send INR reminders to:  [facility]    Indications    A-fib (H) [I48.91]  Aortic valve disease  rheumatic [I06.9]  Mitral valve stenosis  unspecified etiology [I05.0]  Silent Stroke [I66.9]           Comments:           Anticoagulation Care Providers     Provider Role Specialty Phone number    Aristides Alegria MD Referring Family Medicine [phone]    Haja Ascencio MD  Cardiology [phone]

## 2021-06-15 NOTE — TELEPHONE ENCOUNTER
INR result is    2.6  INR   Date Value Ref Range Status   02/09/2021 2.81 (H) 0.90 - 1.10 Final       Will the patient be seen, or did they already see, MD or CNP today? No    Most Recent Warfarin dose day/week  Sunday Monday Tuesday Wednesday Thursday Friday Saturday Sunday Monday Tuesday Wednesday Thursday Friday Saturday              Dosing NA -  Hospitalized 2/05/21, discharged from hospital 2/09/21    Has the patient missed any doses of Coumadin, Warfarin, Jantoven in the past 7 days? Unsure  Has the patients medications changed since the last visit? Yes     Start Furosemide 20 mg  Changed Metoprol but question nurse will call clinic  Stop Amiodaron (but patient stopped that a long time ago)      Has the patient experienced any bleeding recently? No    Has the patient experienced any injuries or illness recently? Yes hospitalized for AFIB    Has the patient experienced any 'new' shortness of breath, severe headaches, or changes in vision recently? No    Has the patient had any changes in their diet, or alcohol consumption? No    Is the patient here today to prepare for any type of upcoming surgery, procedure, or for a cardioversion procedure? No

## 2021-06-15 NOTE — TELEPHONE ENCOUNTER
Who is calling:  Esme  Reason for Call:  Nurse calling regarding patients re-certification that is due today. She needs an order to do his INR tomorrow.  Date of last appointment with primary care: 2/25/2021  Okay to leave a detailed message: Yes

## 2021-06-15 NOTE — TELEPHONE ENCOUNTER
Refill Approved    Rx renewed per Medication Renewal Policy. Medication was last renewed on 11/18/20, 1/12/21.    Adama Victor, [facility] Triage/Med Refill 3/11/2021     Requested Prescriptions   Pending Prescriptions Disp Refills     omeprazole (PRILOSEC) 20 MG capsule [Pharmacy Med Name: OMEPRAZOLE DR 20 MG CAPSULE 20 Capsule] 30 capsule 1     Sig: TAKE 1 PILL BY MOUTH EVERY DAY FOR STOMACH.       GI Medications Refill Protocol Passed - 3/10/2021  9:16 AM        Passed - PCP or prescribing provider visit in last 12 or next 3 months.     Last office visit with prescriber/PCP: 2/6/2020 Aristides Alegria MD OR same dept: 2/4/2021 Maynor Roberson MD OR same specialty: 2/4/2021 Maynor Roberson MD  Last physical: 4/15/2019 Last MTM visit: Visit date not found   Next visit within 3 mo: Visit date not found  Next physical within 3 mo: Visit date not found  Prescriber OR PCP: Aristides Alegria MD  Last diagnosis associated with med order: 1. Dysphagia  - omeprazole (PRILOSEC) 20 MG capsule [Pharmacy Med Name: OMEPRAZOLE DR 20 MG CAPSULE 20 Capsule]; TAKE 1 PILL BY MOUTH EVERY DAY FOR STOMACH.  Dispense: 30 capsule; Refill: 1    2. Major depressive disorder, remission status unspecified, unspecified whether recurrent  - sertraline (ZOLOFT) 50 MG tablet [Pharmacy Med Name: SERTRALINE HCL 50 MG TABLET 50 Tablet]; TAKE 1 PILL BY MOUTH DAILY FOR DEPRESSION  Dispense: 30 tablet; Refill: 1    3. Left hemiparesis (H)  - gabapentin (NEURONTIN) 100 MG capsule [Pharmacy Med Name: GABAPENTIN 100 MG CAPSULE 100 Capsule]; TAKE 1 PILL BY MOUTH IN THE MORNING, 1 PILL IN THE AFTERNOON, AND 2 PILLS EVERY BEDTIME.  Dispense: 120 capsule; Refill: 3    If protocol passes may refill for 12 months if within 3 months of last provider visit (or a total of 15 months).                sertraline (ZOLOFT) 50 MG tablet [Pharmacy Med Name: SERTRALINE HCL 50 MG TABLET 50 Tablet] 30 tablet 1     Sig: TAKE 1 PILL BY MOUTH DAILY FOR  DEPRESSION       [facility] Refill Protocol  Passed - 3/10/2021  9:16 AM        Passed - PCP or prescribing provider visit in last year     Last office visit with prescriber/PCP: 2/6/2020 Aristides Alegria MD OR same dept: 2/4/2021 Maynor Roberson MD OR same specialty: 2/4/2021 Maynor Roberson MD  Last physical: 4/15/2019 Last MTM visit: Visit date not found   Next visit within 3 mo: Visit date not found  Next physical within 3 mo: Visit date not found  Prescriber OR PCP: Aristides Alegria MD  Last diagnosis associated with med order: 1. Dysphagia  - omeprazole (PRILOSEC) 20 MG capsule [Pharmacy Med Name: OMEPRAZOLE DR 20 MG CAPSULE 20 Capsule]; TAKE 1 PILL BY MOUTH EVERY DAY FOR STOMACH.  Dispense: 30 capsule; Refill: 1    2. Major depressive disorder, remission status unspecified, unspecified whether recurrent  - sertraline (ZOLOFT) 50 MG tablet [Pharmacy Med Name: SERTRALINE HCL 50 MG TABLET 50 Tablet]; TAKE 1 PILL BY MOUTH DAILY FOR DEPRESSION  Dispense: 30 tablet; Refill: 1    3. Left hemiparesis (H)  - gabapentin (NEURONTIN) 100 MG capsule [Pharmacy Med Name: GABAPENTIN 100 MG CAPSULE 100 Capsule]; TAKE 1 PILL BY MOUTH IN THE MORNING, 1 PILL IN THE AFTERNOON, AND 2 PILLS EVERY BEDTIME.  Dispense: 120 capsule; Refill: 3    If protocol passes may refill for 12 months if within 3 months of last provider visit (or a total of 15 months).                gabapentin (NEURONTIN) 100 MG capsule [Pharmacy Med Name: GABAPENTIN 100 MG CAPSULE 100 Capsule] 120 capsule 3     Sig: TAKE 1 PILL BY MOUTH IN THE MORNING, 1 PILL IN THE AFTERNOON, AND 2 PILLS EVERY BEDTIME.       Gabapentin/Levetiracetam/Tiagabine Refill Protocol  Passed - 3/10/2021  9:16 AM        Passed - PCP or prescribing provider visit in past 12 months or next 3 months     Last office visit with prescriber/PCP: 2/6/2020 Aristides Alegria MD OR same dept: 2/4/2021 Maynor Roberson MD OR same specialty: 2/4/2021 Maynor Roberson MD  Last  physical: 4/15/2019 Last MTM visit: Visit date not found   Next visit within 3 mo: Visit date not found  Next physical within 3 mo: Visit date not found  Prescriber OR PCP: Aristides Alegria MD  Last diagnosis associated with med order: 1. Dysphagia  - omeprazole (PRILOSEC) 20 MG capsule [Pharmacy Med Name: OMEPRAZOLE DR 20 MG CAPSULE 20 Capsule]; TAKE 1 PILL BY MOUTH EVERY DAY FOR STOMACH.  Dispense: 30 capsule; Refill: 1    2. Major depressive disorder, remission status unspecified, unspecified whether recurrent  - sertraline (ZOLOFT) 50 MG tablet [Pharmacy Med Name: SERTRALINE HCL 50 MG TABLET 50 Tablet]; TAKE 1 PILL BY MOUTH DAILY FOR DEPRESSION  Dispense: 30 tablet; Refill: 1    3. Left hemiparesis (H)  - gabapentin (NEURONTIN) 100 MG capsule [Pharmacy Med Name: GABAPENTIN 100 MG CAPSULE 100 Capsule]; TAKE 1 PILL BY MOUTH IN THE MORNING, 1 PILL IN THE AFTERNOON, AND 2 PILLS EVERY BEDTIME.  Dispense: 120 capsule; Refill: 3    If protocol passes may refill for 12 months if within 3 months of last provider visit (or a total of 15 months).

## 2021-06-15 NOTE — PROGRESS NOTES
[facility] Care Coordination      [facility] Six-Month Telephone Assessment    6 month telephone assessment completed on 2/26/21.    ER visits: Yes -  [facility]  Hospitalizations: Yes -  [facility]  TCU stays: No  Significant health status changes: None  Falls/Injuries: No  ADL/IADL changes: No  Changes in services: No    Caregiver Assessment follow up:  Son, Mark, reports he is doing OK now.  He has been following up with his PCP.  Reports a few weeks ago he was not feeling well and went to the ER and was hospitalized for 5 days.  His heart rate and BP was too slow.  Now that he is home he is still feeling weak but better than before.  Mark stated that Kody could really benefit from a hospital bed.  He stated the PCP is on board with this but stated he's not sure what happened with getting it order.  Care Coordinator will request a new order for a hospital bed and then submit to [facility] if approved by PCP. Mark reports Kody's mental health is stable.  He reports it goes up and down but overall stable.  No other questions, or concerns at this time.          Goals: See POC in chart for goal progress documentation.    Will see member in 6 months for an annual health risk assessment.   Encouraged member to call CC with any questions or concerns in the meantime.

## 2021-06-15 NOTE — PROGRESS NOTES
Assessment/ Plan  1. Tachycardia  Recurrence of atrial flutter flutter, variable conduction 2-1/3-1  On amiodarone, will check level and have him see cardiology.  Start metoprolol XL 25 mg p.o. daily.  - Electrocardiogram Perform - Clinic  - Ambulatory referral to Cardiology - [facility]  - HM2(CBC w/o Differential)  - Basic Metabolic Panel  - metoprolol succinate (TOPROL XL) 25 MG; Take 1 tablet (25 mg total) by mouth daily.  Dispense: 30 tablet; Refill: 11  - Amiodarone (Cordarone )And Metabolite    2. Rheumatic heart disease  Mitral valve replacement, on warfarin.  - BNP(B-type Natriuretic Peptide)    3. Shortness of breath  No overt congestive heart failure on exam today, suspect this is the underlying problem related to increased heart rate.  4.  Hypertension  With hypotension reported by home health nurse.  Significance/cause of this is unclear.  Again, add metoprolol.  Body mass index is 21.74 kg/m .    Subjective  CC:  Chief Complaint   Patient presents with     Hypotension     HPI:  71-year-old male, history of valvular  heart disease due to rheumatic fever, mitral valve replacement in 2019 complicated by hemorrhagic stroke and left hemiparesis, atrial fibrillation/atrial flutter, diastolic CHF, atrial flutter, atherosclerosis, status post mitral valve repair, also hyperlipidemia, hypothyroidism,     Patient complains about shortness of breath, vision worsening left-sided numbness.  According to nephew who brings him in, these are apparently stable problems that have been present ever since his valve replacement surgery/CVA  Agrees that his fatigue may be slightly worse lately.    Other issue is that his home health nurse reported to his nephew that he had low blood pressure on last check.  I am unable to find any thing about this on the chart.  Blood pressure is high upon arrival here today.    Current medications include amiodarone 200 mg a day, levothyroxine 100, baclofen 10 mg twice daily, Flomax 0.4  daily, atorvastatin 10, ferrous sulfate, gabapentin 100 in the morning 200 at night, magnesium oxide, omeprazole, sertraline      Patient Active Problem List   Diagnosis     Rheumatic heart disease     Blurry vision     Hearing loss     Nonspecific reaction to tuberculin skin test without active tuberculosis     Dysphagia     Hyperlipidemia     Silent Stroke     Shoulder strain     Elevated LFTs     Dysuria     Hypothyroidism, unspecified type     Mitral valve stenosis, unspecified etiology     Aortic valve disease, rheumatic     Atherosclerosis of native coronary artery of native heart without angina pectoris     Essential hypertension with goal blood pressure less than 130/85     Pure hypercholesterolemia     Lightheadedness     Moderate malnutrition (H)     ALEENA (acute kidney injury) (H)     A-fib (H)     Heart failure with preserved ejection fraction (H)     S/P MVR (mitral valve repair)     Acute respiratory failure with hypoxemia (H)     Anemia due to blood loss, acute     Coagulopathy (H)     Non-English speaking patient     Thrombocytopenia (H)     Sepsis (H)     Atrial fibrillation with RVR (H)     Hypernatremia     Paralytic ileus (H)     Small bowel obstruction (H)     Acute cardioembolic stroke (H)     Respiratory failure (H)     Acute respiratory failure (H)     Tracheostomy care (H)     Intracerebral bleed (H)     Hemorrhagic stroke (H)     Atrial flutter with rapid ventricular response (H)     Encephalopathy     Metabolic encephalopathy     S/P AVR     H/O mitral valve replacement     Urinary incontinence without sensory awareness     Encounter for attention to gastrostomy (H)     Current medications reviewed as follows:  Current Outpatient Medications on File Prior to Visit   Medication Sig     atorvastatin (LIPITOR) 10 MG tablet TAKE 1 PILL BY MOUTH EVERY DAY FOR CHOLESTEROL     baclofen (LIORESAL) 10 MG tablet Take 1 tablet (10 mg total) by mouth 2 (two) times a day.     ferrous sulfate 325 (65 FE)  MG tablet TAKE 1 PILL BY MOUTH TWO TIMES A DAY FOR IRON     gabapentin (NEURONTIN) 100 MG capsule 1 po in am  1 po in afternoon and 2 po qhs     levothyroxine (SYNTHROID) 100 MCG tablet Take 1 tablet (100 mcg total) by mouth daily.     magnesium oxide (MAG-OX) 400 mg (241.3 mg magnesium) tablet TAKE 1 PILL BY MOUTH EVERYDAY     omeprazole (PRILOSEC) 20 MG capsule TAKE 1 PILL BY MOUTH EVERY DAY FOR STOMACH.     polyethylene glycol (GLYCOLAX) 17 gram/dose powder 17 gm in 8 oz water daily (Patient taking differently: 17 gm in 8 oz water daily as needed      )     sertraline (ZOLOFT) 50 MG tablet TAKE 1 PILL BY MOUTH DAILY FOR DEPRESSION     simethicone (MYLICON,GAS-X) 180 mg capsule 1 po two times a day prn abdominal gas     tamsulosin (FLOMAX) 0.4 mg cap TAKE 1 CAPSULE (0.4 MG TOTAL) BY MOUTH DAILY AFTER SUPPER.     warfarin ANTICOAGULANT (COUMADIN/JANTOVEN) 1 MG tablet Take 1.5 tablets (1.5 mg) by mouth daily as directed.  Adjust dose based on INR.     acetaminophen (TYLENOL) 500 MG tablet Take 1 tablet (500 mg total) by mouth every 6 (six) hours as needed for pain.     amiodarone (PACERONE) 200 MG tablet Take 200 mg by mouth daily.     meclizine (ANTIVERT) 25 mg tablet 1 po two times a day prn dizziness     senna-docusate (PERICOLACE) 8.6-50 mg tablet Take 1 tablet by mouth daily.     No current facility-administered medications on file prior to visit.      Social History     Tobacco Use   Smoking Status Former Smoker     Packs/day: 1.00     Years: 50.00     Pack years: 50.00     Types: Cigarettes     Quit date: 2014     Years since quittin.4   Smokeless Tobacco Former User   Tobacco Comment    No Betel nut     Social History     Social History Narrative    Refugee, born in [facility].     Patient Care Team:  Aristides Alegria MD as PCP - General Marisol Savage as   [facility]  Has PCA services-name unknown  Mateus Alexander Laura, SW as Lead Care Coordinator (Primary  Care - CC)  Aristides Alegria MD as Assigned PCP  Ashleigh Lang MD as Assigned Heart and Vascular Provider  Dong Ortega MD as Assigned Surgical Provider  ROS  As above      Objective  Physical Exam  Vitals:    02/04/21 1148   BP: (!) 162/98   Patient Site: Left Arm   Patient Position: Sitting   Cuff Size: Adult Small   Pulse: (!) 116   Resp: 12   Temp: 98  F (36.7  C)   TempSrc: Temporal   Weight: 104 lb (47.2 kg)     71-year-old, no acute distress, chronically ill-appearing.  No noted elevated JVP.  Heart rate irregular and rapid.  No audible murmur.  Chest clear to auscultation  No lower extremity edema.  Pulse ox is 96%    Diagnostics  Results for orders placed or performed in visit on 02/04/21   Electrocardiogram Perform - Clinic   Result Value Ref Range    SYSTOLIC BLOOD PRESSURE      DIASTOLIC BLOOD PRESSURE      VENTRICULAR RATE 98 BPM    ATRIAL RATE 242 BPM    P-R INTERVAL      QRS DURATION 96 ms    Q-T INTERVAL 392 ms    QTC CALCULATION (BEZET) 500 ms    P Axis      R AXIS 35 degrees    T AXIS 236 degrees    MUSE DIAGNOSIS       Atrial flutter with variable A-V block  Voltage criteria for left ventricular hypertrophy  Marked ST abnormality, possible lateral subendocardial injury  Prolonged QT  Abnormal ECG  When compared with ECG of 13-DEC-2019 11:48,  Significant changes have occurred       *Note: Due to a large number of results and/or encounters for the requested time period, some results have not been displayed. A complete set of results can be found in Results Review.   Personally reviewed and agree    Labs including amiodarone level, BMP, BNP, hemogram pending  Please note: Voice recognition software was used in this dictation.  It may therefore contain typographical errors.      Wt Readings from Last 3 Encounters:   02/04/21 104 lb (47.2 kg)   12/08/20 102 lb (46.3 kg)   02/10/20 104 lb 4.8 oz (47.3 kg)

## 2021-06-15 NOTE — TELEPHONE ENCOUNTER
INR result is 2.6  INR   Date Value Ref Range Status   02/10/2021 2.60 (!) 0.9 - 1.1 Final       Will the patient be seen, or did they already see, MD or CNP today? No    Most Recent Warfarin dose day/week  Sunday Monday Tuesday Wednesday Thursday Friday Saturday     1.5 1.5 1.5 1.5 1.5     Sunday Monday Tuesday Wednesday Thursday Friday Saturday   1.5 1            Has the patient missed any doses of Coumadin, Warfarin, Jantoven in the past 7 days? No    Has the patients medications changed since the last visit? Yes started Lasix 20 mg 1x daily, metoprolol (needs clarification).     Has the patient experienced any bleeding recently? No    Has the patient experienced any injuries or illness recently? No    Has the patient experienced any 'new' shortness of breath, severe headaches, or changes in vision recently? No    Has the patient had any changes in their diet, or alcohol consumption? No    Is the patient here today to prepare for any type of upcoming surgery, procedure, or for a cardioversion procedure? No    What phone number can we reach the patient at today? Esme

## 2021-06-15 NOTE — TELEPHONE ENCOUNTER
Left message #1 at [phone]. Postponing task out to a week and will try again. If patient returns call back, please help patient schedule an appointment per message below. Thanks!

## 2021-06-15 NOTE — PROGRESS NOTES
Medication Therapy Management (MTM) Encounter                                                          Called with  for Transitions of Care encounter. No answer. Pt did have a visit with PCP earlier today.   LVM with call back number if pt would like to reschedule.     Sriram Garcia, AlesiaD  Medication Therapy Management (MTM) Pharmacist  [facility]

## 2021-06-15 NOTE — TELEPHONE ENCOUNTER
Patient will need an additional visit, video virtual visit with his son present.    To get a hospital bed, he needs to meet specific criteria like we need to discuss and document.    Please schedule this

## 2021-06-15 NOTE — PROGRESS NOTES
Kody Johns is a 71 y.o. male who is being evaluated via a billable video visit.      How would you like to obtain your AVS? Mail a copy.  If dropped from the video visit, the video invitation should be resent by: Text to cell phone: [phone]   Will anyone else be joining your video visit? No    Video Start Time: 11:57 am    Assessment & Plan     Kody was seen today for follow-up.    Diagnoses and all orders for this visit:    Chronic systolic congestive heart failure (H)    Moderate major depression (H)    Left hemiparesis (H)    S/P MVR (mitral valve repair)    S/P AVR (aortic valve replacement)    Atrial flutter with rapid ventricular response (H)  -     metoprolol tartrate (LOPRESSOR) 100 MG tablet; Take 1 tablet (100 mg total) by mouth 2 (two) times a day.    Thrombocytopenia (H)    Acute on chronic congestive heart failure, unspecified heart failure type (H)  -     furosemide (LASIX) 20 MG tablet; Take 1 tablet (20 mg total) by mouth daily.    Benign essential hypertension  -     furosemide (LASIX) 20 MG tablet; Take 1 tablet (20 mg total) by mouth daily.          Patient has chronic CHF.  Seems to be better compensated now stay on same meds follow-up with cardiology.    Status post mitral and aortic valve replacements as well as history of atrial fib/flutter stay on warfarin therapeutic INR.    Heart rate is controlled was at 68 on 2/22/2021.    Previous CVA with left hemiparesis minimal residual stable    Depression controlled stay on same medication.    Thrombocytopenia unchanged    Follow-up face-to-face visit with me in 2 months        Review of previous cardiology notes.  Patient has upcoming cardiology appointment 3/4/2021 just  Review of external notes as documented in note  Review of the result(s) of each unique test - Lab work from 2/22/2021 including the INR as well as BNP BMP and CBC  Assessment requiring an independent historian(s) - family - His son was the historian  Diagnosis or treatment  significantly limited by social determinants of health - Low socioeconomic status, non-English-speaking immigrant     20 minutes spent on the date of the encounter doing chart review, history and exam, documentation and further activities as noted above  :[id number]}     Return in about 2 months (around 4/25/2021) for Recheck.    Aristides Alegria MD  [facility]    Baldemar Metzger is 71 y.o. and presents today for the following health issues   HPI   This patient had a virtual visit, video, due to the coronavirus pandemic.    Patient was evaluated back on 2/11/2021 for his atrial flutter CHF he has had atrial and mitral valve replaced.    I reviewed medicine he is been on the furosemide 20 mg a day and metoprolol 100 mg 1 twice a day.  Blood pressure was 131/88 pulse was 68 back on 2/22/2021.    He came in for labs and    His BMP was normal his INR 2.3 the BNP was down from around 1300 down to 645.    Labs stay on the furosemide 20 mg a day    Patient CBC showed normal hemoglobin and white count platelets are chronically low.  Was actually up to 80,000.  Platelets were 61,000 in December and early February 73,000.    He is feeling better breathing better no chest pressure.    No swelling in the legs.    Patient has major depression he is on medication has been stable his son feels    His son was independent historian.    No COVID-19 symptoms or exposure we did discuss getting COVID-19 vaccination as soon as he can get it.    He had the previous CVA following his valve replacements he has some left hemiparesis although that is fairly minimal now.      Review of Systems  10 point review of systems positive as outlined above otherwise negative      Objective       Vitals:  No vitals were obtained today due to virtual visit.    Physical Exam  General appearance no acute distress    HEENT no nasal congestion or sore throat    No scleral icterus    Lungs: Nonlabored breathing no  wheezing    Skin without bruising there is been no history of bleeding    Heart: No palpitations or rapid heart rate    Peripheral edema negative.        Video-Visit Details    Type of service:  Video Visit    Video End Time (time video stopped):  12:08 PM  Originating Location (pt. Location): Home    Distant Location (provider location):  [facility]     Platform used for Video Visit: [employer]

## 2021-06-16 NOTE — TELEPHONE ENCOUNTER
Mitchell Qureshi wanted to know where should we fax the note for hospital bed he told me to f/u with you .

## 2021-06-16 NOTE — TELEPHONE ENCOUNTER
INR result is 2.1  INR   Date Value Ref Range Status   04/06/2021 1.70 (!) 0.90 - 1.10 Final       Will the patient be seen, or did they already see, MD or CNP today? No    Most Recent Warfarin dose day/week  Sunday Monday Tuesday Wednesday Thursday Friday Saturday     1.5 1.5 1.5 1 1.5     Sunday Monday Tuesday Wednesday Thursday Friday Saturday   1.5 1.5            Has the patient missed any doses of Coumadin, Warfarin, Jantoven in the past 7 days? No    Has the patients medications changed since the last visit? No    Has the patient experienced any bleeding recently? No    Has the patient experienced any injuries or illness recently? No    Has the patient experienced any 'new' shortness of breath, severe headaches, or changes in vision recently? No    Has the patient had any changes in their diet, or alcohol consumption? No    Is the patient here today to prepare for any type of upcoming surgery, procedure, or for a cardioversion procedure? No    What phone number can we reach the patient at today? home phone listed in demographics.

## 2021-06-16 NOTE — TELEPHONE ENCOUNTER
Message left for home care nurse with the okay to check INR tomorrow 3/17/21 at his home care visit.

## 2021-06-16 NOTE — TELEPHONE ENCOUNTER
INR result is 1.7  INR   Date Value Ref Range Status   03/30/2021 2.20 (!) 0.90 - 1.10 Final       Will the patient be seen, or did they already see, MD or CNP today? No    Most Recent Warfarin dose day/week  Sunday Monday Tuesday Wednesday Thursday Friday Saturday     1 1.5 1.5 1 1.5     Sunday Monday Tuesday Wednesday Thursday Friday Saturday   1.5 1.5            Has the patient missed any doses of Coumadin, Warfarin, Jantoven in the past 7 days? No    Has the patients medications changed since the last visit? No    Has the patient experienced any bleeding recently? No    Has the patient experienced any injuries or illness recently? No    Has the patient experienced any 'new' shortness of breath, severe headaches, or changes in vision recently? No    Has the patient had any changes in their diet, or alcohol consumption? No    Is the patient here today to prepare for any type of upcoming surgery, procedure, or for a cardioversion procedure? No    What phone number can we reach the patient at today? home phone listed in demographics.

## 2021-06-16 NOTE — TELEPHONE ENCOUNTER
"Telephone Encounter by Ling Oconnor CHW at 11/20/2019 11:35 AM     Author: Ling Oconnor CHW Service: -- Author Type: Community Health Worker    Filed: 11/20/2019 11:41 AM Encounter Date: 11/12/2019 Status: Signed    : Ling Oconnor CHW (Community Health Worker)       Aristides Alegria MD Xiong, Mai B, CHW             I put an order in for home PT      Per pt chart reviewed; PT started with [facility].     Note:   Mark, family member left a message in CHW/writer phone for this patient. Per message; \"would like to follow up about the hospital bed for Kody Johns. Did talked about the hospital bed with the doctor about 1-2 months ago. Needs help with this. Thank you.\"     Plan:   Returning called.          "

## 2021-06-16 NOTE — PROGRESS NOTES
Thank you for asking the [facility] Heart Care team to see Kody Johns.      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     Mixed valve disease - repeat echo now that he is out of atrial fibrillation and see if things look better. Will have him evaluated by CT surgery. Not a percutaneous valve candidate.     Euvolemic - continue toprol XL, spironolactone and furosemide. Unclear why he is not on an ACE-I.       Atrial flutter/fibrillation - in normal sinus rhythm. Continue coumadin and toprol XL.     Mild CAD - on pravastastin 80mg daily.   this summer. Will have him switch to atorvastatin. No need to be on clopidogrel, will stop this.     F/U 6 months         Current History:   Kody Johns is a 68 y.o. who was a refugee in [facility] was diagnosed with what sounds like heart failure, possibly even endocarditis and was started on multiple medications and was told that he needed urgent surgery for his heart valves and was emergently [place] the [place] in [place]. I saw him in clinic shortly after he arrived [place] in [place] And he obtain both a surface and a transesophageal echocardiograms which showed mild to moderate aortic insufficiency and mild to moderate mitral stenosis. Due to complaints of chest pain, we went ahead and did right and left heart catheterization on the patient and he was found just to have mild coronary artery disease and a mitral valve gradient of 7-8 mmHg with simultaneous pulmonary capillary wedge pressure and LVEDP. His mean PA pressure and wedge pressure were normal to low. He adamantly denies any history of strokes. He did undergo a CT scan of his head given hearing deficiency and it was concerning for to possible foci of previous strokes as well as some small vessel ischemia. The patient does not know why he is on Plavix and a high dose aspirin. He was  "started on both those medications in [facility] prior to transferring [place] the [place]. This summer he was admitted with atrial fibrillation with RVR and acute heart failure secondary to mitral regurgitation. Echo on August 29, 2017 showed ejection fraction of 55%, moderate to severe mitral regurgitation (rheumatic heart disease), moderate aortic regurgitation, and mild aortic stenosis. He was seen by Dr. Manriquez who thought he was not a candidate for percutaneous valve replacement. He has not seen CT surgery yet. He has been in normal sinus rhythm since November.     He returns for f/u today noting that he is feeling much better since being in normal rhythm. His dyspnea is minimal and he denies palpitations for \"a long time\". There is atypical lateral chest wall pain that has also improved. He denies edema, early satiety, PND or orthopnea.    Past Medical History:     Past Medical History:   Diagnosis Date     Palpitations        Past Surgical History:     Past Surgical History:   Procedure Laterality Date     CATARACT EXTRACTION Left 06/13/2016       Family History:     Family History   Problem Relation Age of Onset     No Medical Problems Mother      No Medical Problems Father      Heart disease Neg Hx        Social History:    reports that he quit smoking about 3 years ago. His smoking use included Cigarettes. He has a 50.00 pack-year smoking history. He has quit using smokeless tobacco. He reports that he uses illicit drugs. He reports that he does not drink alcohol.    Meds:     Current Outpatient Prescriptions   Medication Sig Note     acetaminophen (TYLENOL) 500 MG tablet Take 500-1,000 mg by mouth every 6 (six) hours as needed for pain. Every 6-8 hours as needed. No more than 4,000MG of acetaminophen daily.      albuterol (PROAIR HFA;PROVENTIL HFA;VENTOLIN HFA) 90 mcg/actuation inhaler Inhale 1-2 puffs every 6 (six) hours as needed.      baclofen (LIORESAL) 10 MG tablet TAKE 1 TABLET BY MOUTH TWICE DAILY      bromfenac " "(PROLENSA) 0.07 % Drop 1 drop daily.      fluticasone (FLONASE) 50 mcg/actuation nasal spray 2 sprays into each nostril daily. 8/29/2017: 8/29/17: pt's brother states that he ran out of this medication a few days ago but is still supposed to be taking it.      furosemide (LASIX) 20 MG tablet Take 1 tablet (20 mg total) by mouth daily.      levothyroxine (SYNTHROID, LEVOTHROID) 88 MCG tablet TAKE 1 TABLET BY MOUTH EVERY DAY      metoprolol succinate (TOPROL-XL) 25 MG TAKE 1 TABLET BY MOUTH EVERY DAY      nasal dilator (BREATHE RIGHT) Strp strip Apply qhs      polyethylene glycol (MIRALAX) 17 gram packet Take 17 g by mouth daily as needed.       pravastatin (PRAVACHOL) 80 MG tablet Take 80 mg by mouth at bedtime.      spironolactone (ALDACTONE) 25 MG tablet Take 25 mg by mouth daily.      traMADol (ULTRAM) 50 mg tablet Take 50 mg by mouth 2 (two) times a day as needed for pain.      warfarin (COUMADIN) 1 MG tablet TAKE1 TO 2 TABLETS BY MOUTH DAILY AS DIRECTED      warfarin (COUMADIN) 5 MG tablet 1 po qd initially then Adjust dose based on INR results as directed by coumadin nurse      clopidogrel (PLAVIX) 75 mg tablet Take 75 mg by mouth daily.      omeprazole (PRILOSEC) 20 MG capsule Take 20 mg by mouth daily before breakfast.        Allergies:   Review of patient's allergies indicates no known allergies.    Review of Systems:   Review of Systems:   General: WNL  Eyes: WNL  Ears/Nose/Throat: Hearing Loss  Lungs: WNL  Heart: Arm Pain, Shortness of Breath with activity  Stomach: WNL  Bladder: Frequent Urination at Night  Muscle/Joints: WNL  Skin: WNL  Nervous System: WNL  Mental Health: WNL     Blood: WNL       Objective:      Physical Exam  @LASTENCWT:3@  4' 10.5\" (1.486 m)  @BMI:3@  /80 (Patient Site: Left Arm, Patient Position: Sitting, Cuff Size: Adult Regular)  Pulse 65  Resp 16  Ht 4' 10.5\" (1.486 m)  Wt 113 lb 4.8 oz (51.4 kg)  BMI 23.28 kg/m2    General Appearance:   Alert, cooperative and in no " acute distress.   HEENT:  No scleral icterus; the mucous membranes were pink and moist.   Neck: JVP flat. No thyromegaly. No HJR   Chest: The spine was straight. The chest was symmetric.   Lungs:   Respirations unlabored; the lungs are clear to auscultation.   Cardiovascular:   S1 and S2 normal and without murmur. No clicks or rubs. No carotid bruits noted. Right DP, PT, and radial pulses 2+. Left DP, PT, and radial pulses 2+.   Abdomen:  No organomegaly, masses, bruits, or tenderness. Bowels sounds are present   Extremities: No cyanosis, clubbing, or edema.   Skin: No xanthelasma.   Neurologic: Mood and affect are appropriate.         Lab Review   Lab Results   Component Value Date     09/14/2017     09/06/2017     08/31/2017    K 4.5 09/14/2017    K 4.2 09/06/2017    K 3.9 08/31/2017     (H) 09/14/2017     09/06/2017     08/31/2017    CO2 23 09/14/2017    CO2 25 09/06/2017    CO2 25 08/31/2017    BUN 18 09/14/2017    BUN 30 (H) 09/06/2017    BUN 31 (H) 08/31/2017    CREATININE 0.88 09/14/2017    CREATININE 1.40 (H) 09/06/2017    CREATININE 1.33 (H) 08/31/2017    CALCIUM 9.3 09/14/2017    CALCIUM 9.5 09/06/2017    CALCIUM 9.5 08/31/2017     Lab Results   Component Value Date    WBC 7.3 09/06/2017    WBC 10.6 08/29/2017    WBC 6.4 07/20/2017    HGB 17.0 09/06/2017    HGB 16.8 08/29/2017    HGB 16.1 07/20/2017    HCT 52.8 09/06/2017    HCT 47.9 08/29/2017    HCT 47.0 07/20/2017    MCV 94 09/06/2017    MCV 90 08/29/2017    MCV 93 07/20/2017     09/06/2017     08/29/2017     07/20/2017     Lab Results   Component Value Date    CHOL 185 07/20/2017    CHOL 147 02/10/2016    CHOL 154 04/02/2014    TRIG 83 07/20/2017    TRIG 121 02/10/2016    TRIG 103 04/02/2014    HDL 34 (L) 07/20/2017    HDL 33 (L) 02/10/2016    HDL 34 (L) 04/02/2014    LDLDIRECT 111 10/13/2016    LDLDIRECT 94 11/24/2014    LDLDIRECT 101 05/08/2014     Lab Results   Component Value Date      (H) 08/29/2017     (H) 04/02/2014         Ashleigh Lang M.D.

## 2021-06-16 NOTE — TELEPHONE ENCOUNTER
ANTICOAGULATION  MANAGEMENT- Home Care/Care Facility Result    Assessment     Today's INR result of 2.5 is Therapeutic (goal INR of 2.0-3.0)        Warfarin taken as previously instructed    No new diet changes affecting INR    No new medication/supplements affecting INR    Continues to tolerate warfarin with no reported s/s of bleeding or thromboembolism     Previous INR was Subtherapeutic    Plan:     Spoke with Esme discussed INR result and instructed:     Warfarin Dosing Instructions: Continue current warfarin dose 1 mg daily on Tuesdays and Fridays; and 1.5 mg daily rest of week  (0 % change)    Next INR to be drawn: one week    Education provided: importance of therapeutic range, target INR goal and significance of current INR result and importance of following up for INR monitoring at instructed interval    Esme verbalizes understanding and agrees to warfarin dosing plan.   ?   Crystal Portillo RN    Subjective/Objective:      Kody Johns, a 71 y.o. male is established on warfarin.     Home care/care facility RN's report of [facility] INR, recent warfarin dosing, diet changes, medication changes, and symptoms is documented below.    Additional findings: template incorrect; verbally confirmed home dose with Esme and updated on anticoagulation calendar    Anticoagulation Episode Summary     Current INR goal:  2.0-3.0   TTR:  72.1 % (1 y)   Next INR check:  3/24/2021   INR from last check:  2.50 (3/17/2021)   Weekly max warfarin dose:     Target end date:     INR check location:     Preferred lab:     Send INR reminders to:  [facility]    Indications    A-fib (H) [I48.91]  Aortic valve disease  rheumatic [I06.9]  Mitral valve stenosis  unspecified etiology [I05.0]  Silent Stroke [I66.9]           Comments:           Anticoagulation Care Providers     Provider Role Specialty Phone number    Aristides Alegria MD Referring Family Medicine [phone]    Haja Ascencio MD  Cardiology [phone]

## 2021-06-16 NOTE — PROGRESS NOTES
[facility] Care Coordination    Care Coordinator received a fax from PCP with letter and orders for a hospital bed for [facility].    Care Coordinator e-mailed hospital bed orders to [facility] to request a hospital bed.    MISSAEL Lord  [facility]  [phone]

## 2021-06-16 NOTE — TELEPHONE ENCOUNTER
RN cannot approve Refill Request    RN can NOT refill this medication med is not covered by policy/route to provider. Last office visit: 2/6/2020 Aristides Alegria MD Last Physical: 4/15/2019 Last MTM visit: Visit date not found Last visit same specialty: 2/4/2021 Maynor Roberson MD.  Next visit within 3 mo: Visit date not found  Next physical within 3 mo: Visit date not found      Mojgan Lopez, [facility] Connection Triage/Med Refill 4/8/2021    Requested Prescriptions   Pending Prescriptions Disp Refills     magnesium oxide (MAG-OX) 400 mg (241.3 mg magnesium) tablet [Pharmacy Med Name: MAGNESIUM OXIDE 400 MG  Tablet] 30 tablet 2     Sig: TAKE 1 PILL BY MOUTH EVERYDAY       There is no refill protocol information for this order

## 2021-06-16 NOTE — TELEPHONE ENCOUNTER
INR result is        1.1  INR   Date Value Ref Range Status   04/13/2021 2.10 (!) 0.90 - 1.10 Final       Will the patient be seen, or did they already see, MD or CNP today? No    Most Recent Warfarin dose day/week  Sunday Monday Tuesday Wednesday Thursday Friday Saturday     1.5 1.5 1.5 1 1.5     Sunday Monday Tuesday Wednesday Thursday Friday Saturday   1.5 1.5            Has the patient missed any doses of Coumadin, Warfarin, Jantoven in the past 7 days? No    Has the patients medications changed since the last visit? No    Has the patient experienced any bleeding recently? No    Has the patient experienced any injuries or illness recently? No    Has the patient experienced any 'new' shortness of breath, severe headaches, or changes in vision recently? No    Has the patient had any changes in their diet, or alcohol consumption? No    Is the patient here today to prepare for any type of upcoming surgery, procedure, or for a cardioversion procedure? No    What phone number can we reach the patient at today? home phone listed in demographics.

## 2021-06-16 NOTE — PROGRESS NOTES
Patient outreach follow up:   Attempt 1: Care Guide called patient and Crow, pca/brother and no answer.  If this patient is returning my call, please transfer to [phone].    Note/comment:  Per patient chart reviewed, pt have enroll in [facility] ([facility]) service in 04/02/2014. Pt is due for a RECAM appt with [facility] RN per [facility] standard policy.     Future appointment:   3/27/18 at 8:45AM with Dr. Alegria, pcp at [facility].     Plan:  See pt in clinic on 3/27/18. Follow up goal, and discuss RECAM.

## 2021-06-16 NOTE — TELEPHONE ENCOUNTER
ANTICOAGULATION  MANAGEMENT- Home Care/Care Facility Result    Assessment     Today's INR result of 2.2 is Therapeutic (goal INR of 2.0-3.0)        Warfarin taken as previously instructed    No new diet changes affecting INR    No new medication/supplements affecting INR    Continues to tolerate warfarin with no reported s/s of bleeding or thromboembolism     Previous INR was Subtherapeutic    Plan:     Spoke with Esme, home care nurse discussed INR result and instructed:     Warfarin Dosing Instructions: Continue current warfarin dose 1 mg daily on Tue, Fri; and 1.5 mg daily rest of week  (0 % change)    Next INR to be drawn: 1 week    Education provided: target INR goal and significance of current INR result    Esme verbalizes understanding and agrees to warfarin dosing plan.   ?   Luiza Brewer RN    Subjective/Objective:      Kody Johns, a 71 y.o. male is established on warfarin.     Home care/care facility RN's report of [facility] INR, recent warfarin dosing, diet changes, medication changes, and symptoms is documented below.    Additional findings: none    Anticoagulation Episode Summary     Current INR goal:  2.0-3.0   TTR:  70.6 % (1 y)   Next INR check:  4/6/2021   INR from last check:  2.20 (3/30/2021)   Weekly max warfarin dose:     Target end date:     INR check location:     Preferred lab:     Send INR reminders to:  [facility]    Indications    A-fib (H) [I48.91]  Aortic valve disease  rheumatic [I06.9]  Mitral valve stenosis  unspecified etiology [I05.0]  Silent Stroke [I66.9]           Comments:           Anticoagulation Care Providers     Provider Role Specialty Phone number    Aristides Alegria MD Referring Family Medicine [phone]    Haja Ascencio MD  Cardiology [phone]

## 2021-06-16 NOTE — TELEPHONE ENCOUNTER
Telephone Encounter by Jessica Garza RN at 3/10/2020  1:06 PM     Author: Jessica Garza RN Service: -- Author Type: Registered Nurse    Filed: 3/10/2020  1:07 PM Encounter Date: 3/10/2020 Status: Attested    : Jessica Garza RN (Registered Nurse) Cosigner: Alison Kirk MD at 3/10/2020  5:05 PM    Attestation signed by Alison Kirk MD at 3/10/2020  5:05 PM    agree                ANTICOAGULATION  MANAGEMENT- Home Care/Care Facility Result    Assessment     Today's INR result of 2.5 is Therapeutic (goal INR of 2.0-3.0)        Warfarin taken as previously instructed    No new diet changes affecting INR    No new medication/supplements affecting INR    Continues to tolerate warfarin with no reported s/s of bleeding or thromboembolism     Previous INR was Therapeutic    Plan:     Spoke with Esme REYES discussed INR result and instructed:     Warfarin Dosing Instructions: Continue current warfarin dose    1 mg every Sun, Thu; 1.5 mg all other days         Next INR to be drawn: 2 weeks    Education provided: target INR goal and significance of current INR result    Esme verbalizes understanding and agrees to warfarin dosing plan.   ?   Jessica Garza RN    Subjective/Objective:      Kody Johns, a 70 y.o. male is established on warfarin.     Home care/care facility RN's report of [facility] INR, recent warfarin dosing, diet changes, medication changes, and symptoms is documented below.    Additional findings: none    Anticoagulation Episode Summary     Current INR goal:   2.0-3.0   TTR:   53.7 % (9.7 mo)   Next INR check:   3/24/2020   INR from last check:   2.50 (3/10/2020)   Weekly max warfarin dose:      Target end date:      INR check location:      Preferred lab:      Send INR reminders to:   [facility]    Indications    A-fib (H) [I48.91]  Aortic valve disease  rheumatic [I06.9]  Mitral valve stenosis  unspecified etiology [I05.0]  Silent Stroke  [I66.9]           Comments:            Anticoagulation Care Providers     Provider Role Specialty Phone number    Aristides Alegria MD [facility] Family Medicine [phone]    Haja Ascencio MD  Cardiology [phone]

## 2021-06-16 NOTE — TELEPHONE ENCOUNTER
INR result is 2.5  INR   Date Value Ref Range Status   03/08/2021 1.90 (!) 0.90 - 1.10 Final       Will the patient be seen, or did they already see, MD or CNP today? No    Most Recent Warfarin dose day/week  Sunday Monday Tuesday Wednesday Thursday Friday Saturday      1.5 1.5 1.5 1.5     Sunday Monday Tuesday Wednesday Thursday Friday Saturday   1.5 1 1.5           Has the patient missed any doses of Coumadin, Warfarin, Jantoven in the past 7 days? No    Has the patients medications changed since the last visit? No    Has the patient experienced any bleeding recently? No    Has the patient experienced any injuries or illness recently? No    Has the patient experienced any 'new' shortness of breath, severe headaches, or changes in vision recently? No    Has the patient had any changes in their diet, or alcohol consumption? No    Is the patient here today to prepare for any type of upcoming surgery, procedure, or for a cardioversion procedure? No    What phone number can we reach the patient at today? home phone listed in demographics.

## 2021-06-16 NOTE — TELEPHONE ENCOUNTER
INR result is 1.8  INR   Date Value Ref Range Status   03/17/2021 2.50 (!) 0.90 - 1.10 Final       Will the patient be seen, or did they already see, MD or CNP today? No    Most Recent Warfarin dose day/week  Sunday Monday Tuesday Wednesday Thursday Friday Saturday     1.5 1.5 1.5 1.0 1.5     Sunday Monday Tuesday Wednesday Thursday Friday Saturday   1.5 1.5            Has the patient missed any doses of Coumadin, Warfarin, Jantoven in the past 7 days? No    Has the patients medications changed since the last visit? No    Has the patient experienced any bleeding recently? No    Has the patient experienced any injuries or illness recently? No    Has the patient experienced any 'new' shortness of breath, severe headaches, or changes in vision recently? No    Has the patient had any changes in their diet, or alcohol consumption? No    Is the patient here today to prepare for any type of upcoming surgery, procedure, or for a cardioversion procedure? No    What phone number can we reach the patient at today?  [facility] [phone]

## 2021-06-16 NOTE — PROGRESS NOTES
"Kody Johns is a 71 y.o. male who is being evaluated via a billable video visit.      How would you like to obtain your AVS? MyChart.  If dropped from the video visit, the video invitation should be resent by: Text to cell phone: [phone]  Will anyone else be joining your video visit? No      Video Start Time: 11:20 am    Assessment & Plan     Kody was seen today for follow-up.    Diagnoses and all orders for this visit:    Chronic systolic congestive heart failure (H)  -     Ambulatory referral to Cardiology - [facility]    Hypothyroidism, unspecified type    Atrial fibrillation, unspecified type (H)    S/P MVR (mitral valve repair)  -     Ambulatory referral to Cardiology - [facility]    S/P AVR (aortic valve replacement)  -     Ambulatory referral to Cardiology - [facility]    Atrial flutter with rapid ventricular response (H)  -     Ambulatory referral to Cardiology - [facility]    Left hemiparesis (H)    Thrombocytopenia (H)        Patient has worsening symptoms regarding his CHF needs to see cardiology.  Continue same meds for now    Left hemiparesis ongoing weakness on the left side.    Need for hospital bed order dictated under \"letters \".    Atrial flutter history on warfarin INR 2.5    Hypothyroid continue same replacement.    Status post aortic and mitral valve.  No significant leakage noted on echocardiogram    Face-to-face visit with me in 2 months, can check labs then          I reviewed the cardiology consult from the hospital stay in February  Review of external notes as documented in note  Review of the result(s) of each unique test - I reviewed the echocardiogram from 2-7 21  Assessment requiring an independent historian(s) - family - Son  Diagnosis or treatment significantly limited by social determinants of health - Low socioeconomic status non-English-speaking immigrant      40 minutes spent on the date of the encounter doing chart review, history and exam, documentation and further activities " "as noted above  [id number]}     Return in about 2 months (around 5/17/2021) for Recheck.    Aristides Alegria MD  [facility]    Subjective   Kody Johns is 71 y.o. and presents today for the following health issues   HPI   This patient had a virtual visit, video, due to the coronavirus pandemic.  He was present as well as his son who spoke English.    This patient has ongoing difficulty secondary to his cardiomyopathy is a 36% ejection fraction.  I reviewed the echocardiogram from 2/7/2021.  This is decreased from 2019 he is status post mitral and aortic valve replacement    He continues on warfarin for A. fib/flutter his INR today was 2.5    He also has a history of CVA with left hemiparesis    This patient has difficulty getting in and out of bed he has the need for frequent position changes because of his stroke.  To prevent skin breakdown.    Also because of his orthopnea he needs the head of the bed elevated.    Also because of peripheral edema needs the legs elevation adjusted as well    I dictated an order for a hospital bed please see this under \"letters \".    Patient needs to follow-up with cardiology.  Apparently had not been able to make the last visit    I put in another referral for him to see the cardiologist, Dr. Hairston.    Patient will continue on same meds his BNP was 645 on 2/22/2021 previously 1319    Platelets were at 80,000 previously 75,000.  Renal function was normal.    Patient has not had a COVID-19 shot I stressed the importance of getting that with his son.    No new changes.  He really does not do a lot he gets around the house minimally.    I am not sure if there is anything further cardiology can do but I think he needs to follow-up there    I will see him for a face-to-face visit in May as well      Review of Systems  10 point review of systems positive as outlined above otherwise negative      Objective       Vitals:  No vitals were obtained today due to virtual " visit.    Physical Exam  General appearance tired laying down    HEENT no neck pain no nasal congestion or sore throat    Lungs: At rest nonlabored breathing.    Heart: He denies any palpitations or rapid heart rate    INR today 2.5    Abdomen nontender    Extremities with trace to 1+ edema bilaterally.    Skin without open area presently.    Neurologically left-sided arm and leg weakness as before            Video-Visit Details    Type of service:  Video Visit    Video End Time (time video stopped): 11:48 AM  Originating Location (pt. Location): Home    Distant Location (provider location):  [facility]     Platform used for Video Visit: Jazmin

## 2021-06-16 NOTE — TELEPHONE ENCOUNTER
RN cannot approve Refill Request    RN can NOT refill this medication med is not covered by policy/route to provider. Last office visit: 2/6/2020 Aristides Alegria MD Last Physical: 4/15/2019 Last MTM visit: Visit date not found Last visit same specialty: 2/4/2021 Maynor Roberson MD.  Next visit within 3 mo: Visit date not found  Next physical within 3 mo: Visit date not found      Mojgan Lopez, [facility] [place] Connection Triage/Med Refill 4/6/2021    Requested Prescriptions   Pending Prescriptions Disp Refills     magnesium oxide (MAG-OX) 400 mg (241.3 mg magnesium) tablet [Pharmacy Med Name: MAGNESIUM OXIDE 400 MG  Tablet] 30 tablet 3     Sig: TAKE 1 PILL BY MOUTH EVERYDAY       There is no refill protocol information for this order

## 2021-06-16 NOTE — TELEPHONE ENCOUNTER
ANTICOAGULATION  MANAGEMENT- Home Care/Care Facility Result    Assessment     Today's INR result of 2.1 is Therapeutic (goal INR of 2.0-3.0)        Warfarin taken as previously instructed    No new diet changes affecting INR    No new medication/supplements affecting INR    Continues to tolerate warfarin with no reported s/s of bleeding or thromboembolism     Previous INR was Subtherapeutic    Plan:     Spoke with Esme REYES discussed INR result and instructed:     Warfarin Dosing Instructions: Continue current warfarin dose 1 mg daily on Fri; and 1.5 mg daily rest of week      Next INR to be drawn: 1 week    Education provided: target INR goal and significance of current INR result    Esme verbalizes understanding and agrees to warfarin dosing plan.   ?   Jessica Garza RN    Subjective/Objective:      Kody Johns, a 71 y.o. male is established on warfarin.     Home care/care facility RN's report of [facility] INR, recent warfarin dosing, diet changes, medication changes, and symptoms is documented below.    Additional findings: none    Anticoagulation Episode Summary     Current INR goal:  2.0-3.0   TTR:  68.0 % (1 y)   Next INR check:  4/20/2021   INR from last check:  2.10 (4/13/2021)   Weekly max warfarin dose:     Target end date:     INR check location:     Preferred lab:     Send INR reminders to:  [facility]    Indications    A-fib (H) [I48.91]  Aortic valve disease  rheumatic [I06.9]  Mitral valve stenosis  unspecified etiology [I05.0]  Silent Stroke [I66.9]           Comments:           Anticoagulation Care Providers     Provider Role Specialty Phone number    Aristides Alegria MD Referring Family Medicine [phone]    Haja Ascencio MD  Cardiology [phone]

## 2021-06-16 NOTE — PATIENT INSTRUCTIONS - HE
Kody Johns,    It was a pleasure to see you today at the [facility].     My recommendations after this visit include:    Continue current medications    Wear Holter monitor for 24 hours to evaluate heart rate and rhythm    Follow up with Dr. Lang in 6-8 weeks    Mojgan Kathleen, CNP  [facility], Electrophysiology  [phone]  EP nurses [phone]

## 2021-06-16 NOTE — TELEPHONE ENCOUNTER
INR result is             2.2  INR   Date Value Ref Range Status   03/23/2021 1.80 (!) 0.90 - 1.10 Final       Will the patient be seen, or did they already see, MD or CNP today? No    Most Recent Warfarin dose day/week  Sunday Monday Tuesday Wednesday Thursday Friday Saturday     1.5 1.5 1.5 1 1.5     Sunday Monday Tuesday Wednesday Thursday Friday Saturday   1.5 1.5            Has the patient missed any doses of Coumadin, Warfarin, Jantoven in the past 7 days? No    Has the patients medications changed since the last visit? No    Has the patient experienced any bleeding recently? No    Has the patient experienced any injuries or illness recently? No    Has the patient experienced any 'new' shortness of breath, severe headaches, or changes in vision recently? No    Has the patient had any changes in their diet, or alcohol consumption? No    Is the patient here today to prepare for any type of upcoming surgery, procedure, or for a cardioversion procedure? No    What phone number can we reach the patient at today? home phone listed in demographics.

## 2021-06-16 NOTE — TELEPHONE ENCOUNTER
Refill Approved    Rx renewed per Medication Renewal Policy. Medication was last renewed on 12/16/20.    Adama Victor, [facility] Connection Triage/Med Refill 3/31/2021     Requested Prescriptions   Pending Prescriptions Disp Refills     tamsulosin (FLOMAX) 0.4 mg cap [Pharmacy Med Name: TAMSULOSIN HCL 0.4 MG CAPS 0.4 Capsule] 30 capsule 0     Sig: TAKE 1 CAPSULE (0.4 MG TOTAL) BY MOUTH DAILY AFTER SUPPER.       Alfuzosin/Tamsulosin/Silodosin Refill Protocol  Passed - 3/30/2021  9:52 AM        Passed - PCP or prescribing provider visit in past 12 months       Last office visit with prescriber/PCP: Visit date not found OR same dept: 2/4/2021 Maynor Roberson MD OR same specialty: 2/4/2021 Maynor Roberson MD  Last physical: Visit date not found Last MTM visit: Visit date not found   Next visit within 3 mo: Visit date not found  Next physical within 3 mo: Visit date not found  Prescriber OR PCP: Alison Kirk MD  Last diagnosis associated with med order: 1. Benign prostatic hyperplasia with urinary obstruction  - tamsulosin (FLOMAX) 0.4 mg cap [Pharmacy Med Name: TAMSULOSIN HCL 0.4 MG CAPS 0.4 Capsule]; TAKE 1 CAPSULE (0.4 MG TOTAL) BY MOUTH DAILY AFTER SUPPER.  Dispense: 30 capsule; Refill: 0    If protocol passes may refill for 12 months if within 3 months of last provider visit (or a total of 15 months).

## 2021-06-16 NOTE — TELEPHONE ENCOUNTER
Telephone Encounter by Yasmin Ashley RN at 11/12/2019 10:07 AM     Author: Yasmin Ashley RN Service: -- Author Type: Registered Nurse    Filed: 11/12/2019 10:11 AM Encounter Date: 11/12/2019 Status: Attested    : Yasmin Ashley RN (Registered Nurse) Cosigner: Leslie Bowie MD at 11/12/2019 10:18 AM    Attestation signed by Leslie Bowie MD at 11/12/2019 10:18 AM    agree                ANTICOAGULATION  MANAGEMENT- Home Care/Care Facility Result    Assessment     Today's INR result of 2.6 is Therapeutic (goal INR of 2.0-3.0)        Warfarin taken as previously instructed    No new diet changes affecting INR    No new medication/supplements affecting INR    Continues to tolerate warfarin with no reported s/s of bleeding or thromboembolism     Previous INR was Therapeutic    Plan:     Spoke with nurse Esme discussed INR result and instructed:     Warfarin Dosing Instructions: Continue current warfarin dose 0.5  mg daily on tue/thu; and 1 mg daily rest of week  (0 % change)    Next INR to be drawn: one week with home care visit      Esme verbalizes understanding and agrees to warfarin dosing plan.   ?   Yasmin Ashley RN    Subjective/Objective:      Kody Johns, a 69 y.o. male is established on warfarin.     Home care/care facility RN's report of [facility] INR, recent warfarin dosing, diet changes, medication changes, and symptoms is documented below.    Additional findings: none    Anticoagulation Episode Summary     Current INR goal:   2.0-3.0   TTR:   63.9 %   Next INR check:   11/19/2019   INR from last check:   2.60 (11/12/2019)   Weekly max warfarin dose:      Target end date:      INR check location:      Preferred lab:      Send INR reminders to:   [facility]    Indications    A-fib (H) [I48.91]  Aortic valve disease  rheumatic [I06.9]  Mitral valve stenosis  unspecified etiology [I05.0]  Silent Stroke [I66.9]           Comments:            Anticoagulation Care Providers      Provider Role Specialty Phone number    Aristides Alegria MD Referring Family Medicine [phone]    Haja Ascencio MD  Cardiology [phone]

## 2021-06-16 NOTE — TELEPHONE ENCOUNTER
Telephone Encounter by Baldo Marquez RN at 3/24/2020 10:22 AM     Author: Baldo Marquez RN Service: -- Author Type: RN, Care Manager    Filed: 3/24/2020 10:38 AM Encounter Date: 3/24/2020 Status: Attested    : Baldo Marquez RN (RN, Care Manager) Cosigner: Aristides Alegria MD at 3/24/2020 10:56 AM    Attestation signed by Aristides Alegria MD at 3/24/2020 10:56 AM    agree                ANTICOAGULATION  MANAGEMENT- Home Care/Care Facility Result    Assessment     Today's INR result of 2.2 is Therapeutic (goal INR of 2.0-3.0)        Warfarin taken as previously instructed    No new diet changes affecting INR    No new medication/supplements affecting INR    Continues to tolerate warfarin with no reported s/s of bleeding or thromboembolism     Previous INR was Therapeutic    Plan:     Spoke with home care nurse Esme discussed INR result and instructed:     Warfarin Dosing Instructions: Continue current warfarin dose    1 mg every Sun, Thu; 1.5 mg all other days         Next INR to be drawn: 2 weeks.     Education provided: importance of taking warfarin as instructed    Esme verbalizes understanding and agrees to warfarin dosing plan.   ?   Baldo Marquez RN    Subjective/Objective:      Kody Johns, a 70 y.o. male is established on warfarin.     Home care/care facility RN's report of [facility] INR, recent warfarin dosing, diet changes, medication changes, and symptoms is documented below.    Additional findings: verbally confirmed home dose with Esme and updated on anticoagulation calendar    Anticoagulation Episode Summary     Current INR goal:   2.0-3.0   TTR:   53.7 % (9.7 mo)   Next INR check:   4/7/2020   INR from last check:   2.20 (3/24/2020)   Weekly max warfarin dose:      Target end date:      INR check location:      Preferred lab:      Send INR reminders to:   [facility]    Indications    A-fib (H) [I48.91]  Aortic valve disease  rheumatic [I06.9]  Mitral valve  stenosis  unspecified etiology [I05.0]  Silent Stroke [I66.9]           Comments:            Anticoagulation Care Providers     Provider Role Specialty Phone number    Aristides Alegria MD [facility] Family Medicine [phone]    Haja Ascencio MD  Cardiology [phone]

## 2021-06-17 NOTE — TELEPHONE ENCOUNTER
Telephone Encounter by Vicky Benton, RN at 2/2/2021 12:03 PM     Author: Vicky Benton, RN Service: -- Author Type: Registered Nurse    Filed: 2/2/2021 12:03 PM Encounter Date: 2/2/2021 Status: Signed    : Vicky Benton, RN (Registered Nurse)       RN cannot approve Refill Request    RN can NOT refill this medication Protocol failed and NO refill given. Last office visit: Visit date not found Last Physical: Visit date not found Last MTM visit: Visit date not found Last visit same specialty: 12/8/2020 Murali Sosa MD.  Next visit within 3 mo: Visit date not found  Next physical within 3 mo: Visit date not found      [facility] Connection Triage/Med Refill 2/2/2021    Requested Prescriptions   Pending Prescriptions Disp Refills   ? warfarin ANTICOAGULANT (COUMADIN/JANTOVEN) 1 MG tablet [Pharmacy Med Name: WARFARIN SODIUM 1 MG TABS 1 Tablet] 45 tablet 0     Sig: TAKE 1 TO 1.5MG (1 OR 1.5 TABS) BY MOUTH DAILY OR AS DIRECTED. ADJUST DOSE BASED ON INR.       Warfarin Refill Protocol  Failed - 2/2/2021  9:59 AM        Failed -  Route to appropriate pool/provider     Last Anticoagulation Summary:   Anticoagulation Episode Summary     Current INR goal:  2.0-3.0   TTR:  68.4 % (1 y)   Next INR check:  2/9/2021   INR from last check:  1.90 (2/2/2021)   Weekly max warfarin dose:     Target end date:     INR check location:     Preferred lab:     Send INR reminders to:  [facility]    Indications    A-fib (H) [I48.91]  Aortic valve disease  rheumatic [I06.9]  Mitral valve stenosis  unspecified etiology [I05.0]  Silent Stroke [I66.9]           Comments:           Anticoagulation Care Providers     Provider Role Specialty Phone number    Aristides Alegria MD Referring Family Medicine [phone]    Haja Ascencio MD  Cardiology [phone]                Passed - Provider visit in last year     Last office visit with prescriber/PCP: Visit date not found OR same dept: 12/8/2020 Ian  Murali WOLFF MD OR same specialty: 12/8/2020 Murali Sosa MD  Last physical: Visit date not found Last MTM visit: Visit date not found    Next appt within 3 mo: Visit date not found Next physical within 3 mo: Visit date not found  Prescriber OR PCP: Alison Kirk MD  Last diagnosis associated with med order: 1. Atrial fibrillation, unspecified type (H)  - warfarin ANTICOAGULANT (COUMADIN/JANTOVEN) 1 MG tablet [Pharmacy Med Name: WARFARIN SODIUM 1 MG TABS 1 Tablet]; TAKE 1 TO 1.5MG (1 OR 1.5 TABS) BY MOUTH DAILY OR AS DIRECTED. ADJUST DOSE BASED ON INR.  Dispense: 45 tablet; Refill: 0    If protocol passes may refill for 6 months if within 3 months of last provider visit (or a total of 9 months).

## 2021-06-17 NOTE — TELEPHONE ENCOUNTER
INR result is  1.7   INR   Date Value Ref Range Status   04/20/2021 1.10 0.90 - 1.10 Final       Will the patient be seen, or did they already see, MD or CNP today? No    Most Recent Warfarin dose day/week  Sunday Monday Tuesday Wednesday Thursday Friday Saturday     3 1.5 1.5 1 1.5     Sunday Monday Tuesday Wednesday Thursday Friday Saturday   1.5 1.5            Has the patient missed any doses of Coumadin, Warfarin, Jantoven in the past 7 days? No    Has the patients medications changed since the last visit? No    Has the patient experienced any bleeding recently? No    Has the patient experienced any injuries or illness recently? No    Has the patient experienced any 'new' shortness of breath, severe headaches, or changes in vision recently? No    Has the patient had any changes in their diet, or alcohol consumption? No    Is the patient here today to prepare for any type of upcoming surgery, procedure, or for a cardioversion procedure? No    What phone number can we reach the patient at today? home phone listed in demographics.

## 2021-06-17 NOTE — TELEPHONE ENCOUNTER
Telephone Encounter by Crystal Portillo RN at 3/23/2021  9:39 AM     Author: Crystal Portillo RN Service: -- Author Type: Registered Nurse    Filed: 3/23/2021  9:40 AM Encounter Date: 3/23/2021 Status: Signed    : Crystal Portillo RN (Registered Nurse)       ANTICOAGULATION  MANAGEMENT- Home Care/Care Facility Result    Assessment     Today's INR result of 1.8 is Subtherapeutic (goal INR of 2.0-3.0)        Warfarin taken as previously instructed    No new diet changes affecting INR    No new medication/supplements affecting INR    Continues to tolerate warfarin with no reported s/s of bleeding or thromboembolism     Previous INR was Therapeutic    Plan:     Spoke with Esme discussed INR result and instructed:     Warfarin Dosing Instructions: Take 1.5 mg today then continue current warfarin dose 1 mg daily on Tuesdays and Fridays; and 1.5 mg daily rest of week  (0 % change)    Next INR to be drawn: one week    Education provided: importance of therapeutic range and target INR goal and significance of current INR result    Esme verbalizes understanding and agrees to warfarin dosing plan.   ?   Crystal Portillo RN    Subjective/Objective:      Kody Johns, a 71 y.o. male is established on warfarin.     Home care/care facility RN's report of [facility] INR, recent warfarin dosing, diet changes, medication changes, and symptoms is documented below.    Additional findings: template incorrect; verbally confirmed home dose with Esme and updated on anticoagulation calendar    Anticoagulation Episode Summary     Current INR goal:  2.0-3.0   TTR:  71.6 % (1 y)   Next INR check:  3/30/2021   INR from last check:  1.80 (3/23/2021)   Weekly max warfarin dose:     Target end date:     INR check location:     Preferred lab:     Send INR remind[facility] to:  [facility]    Indications    A-fib (H) [I48.91]  Aortic valve disease  rheumatic [I06.9]  Mitral valve stenosis  unspecified etiology  [I05.0]  Silent Stroke [I66.9]           Comments:           Anticoagulation Care Providers     Provider Role Specialty Phone number    Aristides Alegria MD [facility] Family Medicine [phone]    Haja Ascencio MD  Cardiology [phone]

## 2021-06-17 NOTE — PROGRESS NOTES
Care Guide called patient and Crow, pca/brother in law who speak little English and no answer. LM for Crow to return call.  If this patient is returning my call, please transfer to [phone].    No upcoming appt within [facility] per pt chart reviewed.

## 2021-06-17 NOTE — TELEPHONE ENCOUNTER
INR result is 1.4  INR   Date Value Ref Range Status   05/11/2021 1.20 (!) 0.90 - 1.10 Final       Will the patient be seen, or did they already see, MD or CNP today? No    Most Recent Warfarin dose day/week  Sunday Monday Tuesday Wednesday Thursday Friday Saturday     3 mg 1.5 mg 1.5 mg 1.5 mg 1.5 mg     Sunday Monday Tuesday Wednesday Thursday Friday Saturday   missed 1.5 mg            Has the patient missed any doses of Coumadin, Warfarin, Jantoven in the past 7 days? Yes 5/16    Has the patients medications changed since the last visit? No    Has the patient experienced any bleeding recently? No    Has the patient experienced any injuries or illness recently? No    Has the patient experienced any 'new' shortness of breath, severe headaches, or changes in vision recently? No    Has the patient had any changes in their diet, or alcohol consumption? No    Is the patient here today to prepare for any type of upcoming surgery, procedure, or for a cardioversion procedure? No    What phone number can we reach the patient at today? [phone] Esme.

## 2021-06-17 NOTE — TELEPHONE ENCOUNTER
RN cannot approve Refill Request    RN can NOT refill this medication med is not covered by policy/route to provider. Last office visit: 12/8/2020 Murali Sosa MD Last Physical: Visit date not found Last MTM visit: Visit date not found Last visit same specialty: 2/4/2021 Maynor Roberson MD.  Next visit within 3 mo: Visit date not found  Next physical within 3 mo: Visit date not found      Candida Frances, [facility] Connection Triage/Med Refill 5/9/2021    Requested Prescriptions   Pending Prescriptions Disp Refills     baclofen (LIORESAL) 10 MG tablet [Pharmacy Med Name: BACLOFEN 10 MG TABLET 10 Tablet] 180 tablet 0     Sig: TAKE 1 TABLET (10 MG TOTAL) BY MOUTH 2 (TWO) TIMES A DAY.       There is no refill protocol information for this order
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
## 2021-06-17 NOTE — TELEPHONE ENCOUNTER
Telephone Encounter by Yasmin Ashley RN at 2/2/2021  9:50 AM     Author: Yasmin Ashley RN Service: -- Author Type: Registered Nurse    Filed: 2/2/2021  9:59 AM Encounter Date: 2/2/2021 Status: Signed    : Yasmin Ashley RN (Registered Nurse)       ANTICOAGULATION  MANAGEMENT- Home Care/Care Facility Result    Assessment     Today's INR result of 1.9 is Subtherapeutic (goal INR of 2.0-3.0)        Warfarin taken as previously instructed although days were mixed up    No new diet changes affecting INR    No new medication/supplements affecting INR    Continues to tolerate warfarin with no reported s/s of bleeding or thromboembolism     Previous INR was Therapeutic    Plan:     Spoke with nurse Esme discussed INR result and instructed:     Warfarin Dosing Instructions: Change warfarin dose to 1.5 mg daily (10% change)    Next INR to be drawn: one week    Esme verbalizes understanding and agrees to warfarin dosing plan.   ?   Yasmin Ashley RN    Subjective/Objective:      Kody Johns, a 71 y.o. male is established on warfarin.     Home care/care facility RN's report of [facility] INR, recent warfarin dosing, diet changes, medication changes, and symptoms is documented below.    Additional findings: none    Anticoagulation Episode Summary     Current INR goal:  2.0-3.0   TTR:  68.4 % (1 y)   Next INR check:  2/9/2021   INR from last check:  1.90 (2/2/2021)   Weekly max warfarin dose:     Target end date:     INR check location:     Preferred lab:     Send INR reminders to:  [facility]    Indications    A-fib (H) [I48.91]  Aortic valve disease  rheumatic [I06.9]  Mitral valve stenosis  unspecified etiology [I05.0]  Silent Stroke [I66.9]           Comments:           Anticoagulation Care Providers     Provider Role Specialty Phone number    Aristides Alegria MD Referring Family Medicine [phone]    Haja Ascencio MD  Cardiology [phone]

## 2021-06-17 NOTE — TELEPHONE ENCOUNTER
INR result is    1.7  INR   Date Value Ref Range Status   04/27/2021 1.70 (!) 0.90 - 1.10 Final       Will the patient be seen, or did they already see, MD or CNP today? No    Most Recent Warfarin dose day/week  Sunday Monday Tuesday Wednesday Thursday Friday Saturday     1.5 mg 1.5 mg 1.5 mg 1 mg 1.5 mg     Sunday Monday Tuesday Wednesday Thursday Friday Saturday   1.5 mg 1.5 mg            Has the patient missed any doses of Coumadin, Warfarin, Jantoven in the past 7 days? No    Has the patients medications changed since the last visit? No    Has the patient experienced any bleeding recently? No    Has the patient experienced any injuries or illness recently? No    Has the patient experienced any 'new' shortness of breath, severe headaches, or changes in vision recently? No    Has the patient had any changes in their diet, or alcohol consumption? No    Is the patient here today to prepare for any type of upcoming surgery, procedure, or for a cardioversion procedure? No    What phone number can we reach the patient at today? [place]  [phone].

## 2021-06-17 NOTE — PROGRESS NOTES
Subjective: This patient comes in for discussion regarding his tooth extraction.  He is scheduled this Saturday, 5/12/2018 for a number of teeth pulled from his upper gumline.  Done on 6/2/18 he will have the teeth from his lower pulled.    I reviewed his medication and he has been on Plavix and warfarin his INR was 2.3 on 5/7/2018.    I did review consultations from Dr. Flores breath his cardiologist and consultation with Dr. Vaughn.  The patient has valvular heart disease and will have upcoming valve surgery but needed his teeth work done first.    Patient also has atrial fibrillation and takes warfarin.  The Plavix he has been on for quite a long time    The cardiologist feel that he does not have significant coronary artery disease and does not need to be on Plavix.    After long discussion and consultation with the patient's home care nurse he still has that in his med box they will be able to get out till tomorrow to make any change on that.    I have DC'd his Plavix (clopidogrel), and removed it from his med list.  I put in ask on the bottle and with contacted the home nurse to remove it from his pillbox.    We also canceled his appointment for 5/12/2018 so his first visit there will be 6-18.  I will see him on 5/29/2018 for recheck please see below.    Regarding warfarin and talking to community dental care, Dr. Chucho Monreal, [phone], INR acceptable is between 2 and 3.  I think would make sense to keep it down around 2.    His INR today was 2.0.    Let the INR nurses know that we did like to keep it down around 2 until he gets his teeth pulled.    Patient otherwise denies additional problems or issues  Tobacco status: He  reports that he quit smoking about 3 years ago. His smoking use included Cigarettes. He has a 50.00 pack-year smoking history. He has quit using smokeless tobacco.    Patient Active Problem List    Diagnosis Date Noted     A-fib 09/14/2017     Heart failure with preserved ejection  fraction 09/14/2017     Moderate malnutrition 08/31/2017     ALEENA (acute kidney injury) 08/31/2017     Aortic valve disease, rheumatic 08/30/2017     Lightheadedness 08/30/2017     Atherosclerosis of native coronary artery of native heart without angina pectoris      Essential hypertension with goal blood pressure less than 130/85      Pure hypercholesterolemia      Dysuria 08/29/2017     Hypothyroidism, unspecified type      Mitral valve stenosis, unspecified etiology      Elevated LFTs 08/26/2016     Silent Stroke      Shoulder strain      Dysphagia      Hyperlipidemia      Blurry vision      Hearing loss      Nonspecific reaction to 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 Every 6-8 hours as needed. No more than 4,000MG of acetaminophen daily.       albuterol (PROAIR HFA;PROVENTIL HFA;VENTOLIN HFA) 90 mcg/actuation inhaler Inhale 1-2 puffs every 6 (six) hours as needed.       atorvastatin (LIPITOR) 40 MG tablet Take 1 tablet (40 mg total) by mouth at bedtime. 30 tablet 11     baclofen (LIORESAL) 10 MG tablet Take 1 tablet (10 mg total) by mouth 2 (two) times a day. 60 tablet 5     bromfenac (PROLENSA) 0.07 % Drop 1 drop daily.       fluticasone (FLONASE) 50 mcg/actuation nasal spray 2 sprays into each nostril daily. 10 g 3     furosemide (LASIX) 20 MG tablet Take 1 tablet (20 mg total) by mouth daily. 30 tablet 3     levothyroxine (SYNTHROID, LEVOTHROID) 88 MCG tablet Take 1 tablet (88 mcg total) by mouth daily. 30 tablet 9     metoprolol succinate (TOPROL-XL) 25 MG Take 1 tablet (25 mg total) by mouth daily. 90 tablet 1     nasal dilator (BREATHE RIGHT) Strp strip Apply qhs 30 strip 3     omeprazole (PRILOSEC) 20 MG capsule Take 20 mg by mouth daily before breakfast.       polyethylene glycol (MIRALAX) 17 gram packet Take 17 g  "by mouth daily as needed.        spironolactone (ALDACTONE) 25 MG tablet Take 25 mg by mouth daily.       traMADol (ULTRAM) 50 mg tablet Take 50 mg by mouth 2 (two) times a day as needed for pain.       warfarin (COUMADIN) 1 MG tablet TAKE 1-2 TABLETS BY MOUTH DAILY AS DIRECTED 90 tablet 1     warfarin (COUMADIN) 5 MG tablet 1 po qd initially then Adjust dose based on INR results as directed by coumadin nurse 30 tablet 1     No current facility-administered medications for this visit.        ROS:   10 point review of systems negative other than as outlined above    Objective:    /62 (Patient Site: Left Arm, Patient Position: Sitting, Cuff Size: Adult Regular)  Pulse 75  Temp 97.1  F (36.2  C) (Oral)   Resp 16  Ht 4' 9.75\" (1.467 m)  Wt 106 lb (48.1 kg)  SpO2 95%  BMI 22.35 kg/m2  Body mass index is 22.35 kg/(m^2).      General appearance no acute distress    Heart was irregularly irregular    Lungs were clear    Extremities without edema no evidence of failure    Oropharynx teeth in poor repair    Results for orders placed or performed in visit on 05/09/18   INR   Result Value Ref Range    INR 2.00 (H) 0.90 - 1.10       Assessment:  1. A-fib  INR   2. Hypothyroidism  levothyroxine (SYNTHROID, LEVOTHROID) 88 MCG tablet   3. Hypertension  metoprolol succinate (TOPROL-XL) 25 MG   4. Atherosclerosis of native coronary artery of native heart without angina pectoris  atorvastatin (LIPITOR) 40 MG tablet   5. Aortic valve disease, rheumatic       Atrial fibrillation INR therapeutic  Keep him right around 2 so he can get his teeth pulled    No significant coronary artery stenosis.  Patient can go off Plavix per cardiology recommendation.    Valvular heart disease please see above recommendations and consult from Dr. Vaughn, patient needs his teeth pulled before he can get this done.  As outlined above keep him on the same dose of warfarin stop the Plavix recheck  Total time with the patient 25 minutes, patient " seen with his son and .  Entire time spent on counseling, reviewing consultations, contacting community dental care as well as coordinating with a home nurse  Plan: With me in 5/29/2018 in preparation for his teeth extraction on 6/2/18.    In addition they do need an INR on 6/1/2018, the day before the extraction.  By coming in on 5/29/2018 will be able to adjust if needed    This transcription uses voice recognition software, which may contain typographical errors.

## 2021-06-17 NOTE — TELEPHONE ENCOUNTER
Telephone Encounter by Luiza Brewer RN at 12/8/2020  9:46 AM     Author: Luiza Brewer RN Service: -- Author Type: Registered Nurse    Filed: 12/8/2020  9:51 AM Encounter Date: 12/8/2020 Status: Signed    : Luiza Brewer RN (Registered Nurse)       ANTICOAGULATION  MANAGEMENT- Home Care/Care Facility Result    Assessment     Today's INR result of 1.8 is Subtherapeutic (goal INR of 2.0-3.0)        Warfarin taken as previously instructed    No new diet changes affecting INR    No new medication/supplements affecting INR    Continues to tolerate warfarin with no reported s/s of bleeding or thromboembolism     Previous INR was Subtherapeutic    Plan:     Spoke with nurse Esme, discussed INR result and instructed:     Warfarin Dosing Instructions: Change warfarin dose to 1 mg daily on Mon, Wed, Sat; and 1.5 mg daily rest of week  (12.5 % change)    Next INR to be drawn: 2 weeks    Education provided: target INR goal and significance of current INR result, importance of following up for INR monitoring at instructed interval, importance of taking warfarin as instructed, importance of notifying clinic for changes in medications and importance of notifying clinic for diarrhea, nausea/vomiting, reduced intake and/or illness    Esme verbalizes understanding and agrees to warfarin dosing plan.   ?   Luiza Brewer RN    Subjective/Objective:      oKdy Johns, a 70 y.o. male is established on warfarin.     Home care/care facility RN's report of [facility] INR, recent warfarin dosing, diet changes, medication changes, and symptoms is documented below.    Additional findings: verbally confirmed home dose with Esme and updated on anticoagulation calendar    Anticoagulation Episode Summary     Current INR goal:  2.0-3.0   TTR:  67.4 % (1 y)   Next INR check:  12/22/2020   INR from last check:  1.80 (12/8/2020)   Weekly max warfarin dose:     Target end date:     INR check location:     Preferred lab:     [facility] INR  reminders [facility]:  [facility]    Indications    A-fib (H) [I48.91]  Aortic valve disease  rheumatic [I06.9]  Mitral valve stenosis  unspecified etiology [I05.0]  Silent Stroke [I66.9]           Comments:           Anticoagulation Care Providers     Provider Role Specialty Phone number    Aristides Alegria MD [facility] Family Medicine [phone]    Haja Ascencio MD  Cardiology [phone]

## 2021-06-17 NOTE — TELEPHONE ENCOUNTER
Telephone Encounter by Crystal Portillo RN at 4/20/2021  9:58 AM     Author: Crystal Portillo RN Service: -- Author Type: Registered Nurse    Filed: 4/20/2021 10:00 AM Encounter Date: 4/20/2021 Status: Signed    : Crystal Portillo RN (Registered Nurse)       ANTICOAGULATION  MANAGEMENT- Home Care/Care Facility Result    Assessment     Today's INR result of 1.1 is Subtherapeutic (goal INR of 2.0-3.0)        Missed dose(s) may be affecting INR    No new diet changes affecting INR    No new medication/supplements affecting INR    Continues to tolerate warfarin with no reported s/s of bleeding or thromboembolism     Previous INR was Therapeutic    Plan:     Spoke with Grant discussed INR result and instructed:     Warfarin Dosing Instructions: Take 3 mg today then continue current warfarin dose 1 mg daily on Fridays; and 1.5 mg daily rest of week  (0 % change)    Next INR to be drawn: 5-7 days. Will check 4/27    Education provided: importance of therapeutic range, target INR goal and significance of current INR result and importance of taking warfarin as instructed    Tha verbalizes understanding and agrees to warfarin dosing plan.   ?   Crystal Portillo RN    Subjective/Objective:      Kody Johns, a 71 y.o. male is established on warfarin.     Home care/care facility RN's report of [facility] INR, recent warfarin dosing, diet changes, medication changes, and symptoms is documented below.    Additional findings: Patient missed all warfarin last week. Nurse fround pill box with all warfarin still in it.     Anticoagulation Episode Summary     Current INR goal:  2.0-3.0   TTR:  66.3 % (1 y)   Next INR check:  4/27/2021   INR from last check:  1.10 (4/20/2021)   Weekly max warfarin dose:     Target end date:     INR check location:     Preferred lab:     Send INR reminders to:  [facility]    Indications    A-fib (H) (Resolved) [I48.91]  Aortic valve disease  rheumatic [I06.9]  Mitral  valve stenosis  unspecified etiology [I05.0]  Silent Stroke [I66.9]           Comments:           Anticoagulation Care Providers     Provider Role Specialty Phone number    Aristides Alegria MD [facility] Medicine [phone]    Haja Ascencio MD  Cardiology [phone]

## 2021-06-17 NOTE — PROGRESS NOTES
Patient Outreach:   Contact the pt. Spoke with Crow, pt's brother/pca and pt. Prefer spoken person per pt request is Crow.   Did a conference call to language line . Pt and Crow agreed.   Language line  is use; named: Yan;  ID: [id number]; confirmed Armenian language.     Checked with pt for most need. Updated goal. Pt request to completed goal. Pt and his pca confirmed pt met goal. New goal set. Encouraged pt for a follow up appt with the cardiologist. Offered assistance with scheduled appt. Pt and Crow agreed.   , pt, and care guide together did a conference call to [facility] and spoke with Geena.   Assisted pt for a f/u appt with the cardiologist. Appt is scheduled: 5/30/18 at 4:10PM, arrive time: 3:55PM with Dr. Lang at [facility].     Patient and Crow request transportation to appt 5/30/18 at 4:10PM, arrive time: 3:55PM with Dr. Lang at [facility].      Completed goal:   Goal: I would like increased walking exercise to 3-4 three times a week preventing myself from having heart problem in the next one year. (Goal setting date: March 9, 2017); (goal completed: 4/20/2018).    New goal:   Goal: I will continue to follow up with the cardiologist for a consistency appointment for the next one year.     Encouraged pt for the following:   -eat healthy; refer pt to discuss nutrition list with pcp and the cardiologist at next office visit.  -take all med as prescribed.   -follow up with pcp and the cardiologist as recommended.     Pt and pca is informed, it's too early to set up transportation service at this time for appt in May 2018. Will set up ride sometime in mid of May toward end of May 2018. Patient confirmed no questions and contact Crow for reminder for transportation service. Okay'd to LM per pt. Crow confirmed no concern.     Note/comment:   Writer is off 4/19/18.     Plan:   Mid of May 2018, will set up transportation service.

## 2021-06-17 NOTE — TELEPHONE ENCOUNTER
Telephone Encounter by Crystal Portillo RN at 4/6/2021  9:58 AM     Author: Crystal Portillo RN Service: -- Author Type: Registered Nurse    Filed: 4/6/2021  9:59 AM Encounter Date: 4/6/2021 Status: Signed    : Crystal Portillo RN (Registered Nurse)       ANTICOAGULATION  MANAGEMENT- Home Care/Care Facility Result    Assessment     Today's INR result of 1.7 is Subtherapeutic (goal INR of 2.0-3.0)        Warfarin taken as previously instructed    No new diet changes affecting INR    No new medication/supplements affecting INR    Continues to tolerate warfarin with no reported s/s of bleeding or thromboembolism     Previous INR was Therapeutic    Plan:     Spoke with Esme discussed INR result and instructed:     Warfarin Dosing Instructions: Take 1.5 mg today then continue current warfarin dose 1 mg daily on Tuesdays and Fridays; and 1.5 mg daily rest of week  (0 % change)    Next INR to be drawn: one week    Education provided: importance of therapeutic range and target INR goal and significance of current INR result    Esme verbalizes understanding and agrees to warfarin dosing plan.   ?   Crystal Portillo RN    Subjective/Objective:      Kody Johns, a 71 y.o. male is established on warfarin.     Home care/care facility RN's report of [facility] INR, recent warfarin dosing, diet changes, medication changes, and symptoms is documented below.    Additional findings: verbally confirmed home dose with Esme and updated on anticoagulation calendar    Anticoagulation Episode Summary     Current INR goal:  2.0-3.0   TTR:  69.5 % (1 y)   Next INR check:  4/13/2021   INR from last check:  1.70 (4/6/2021)   Weekly max warfarin dose:     Target end date:     INR check location:     Preferred lab:     Send INR reminders to:  [facility]    Indications    A-fib (H) [I48.91]  Aortic valve disease  rheumatic [I06.9]  Mitral valve stenosis  unspecified etiology [I05.0]  Silent Stroke [I66.9]            Comments:           Anticoagulation Care Providers     Provider Role Specialty Phone number    Aristides Alegria MD Referring Family Medicine [phone]    Haja Ascencio MD  Cardiology [phone]

## 2021-06-17 NOTE — TELEPHONE ENCOUNTER
INR result is 1.2  INR   Date Value Ref Range Status   05/04/2021 1.70 (!) 0.90 - 1.10 Final       Will the patient be seen, or did they already see, MD or CNP today? No    Most Recent Warfarin dose day/week  Sunday Monday Tuesday Wednesday Thursday Friday Saturday     1.5 1.5 1.5 1.5 1.5     Sunday Monday Tuesday Wednesday Thursday Friday Saturday   1.5 1.5            Has the patient missed any doses of Coumadin, Warfarin, Jantoven in the past 7 days? No    Has the patients medications changed since the last visit? No    Has the patient experienced any bleeding recently? No    Has the patient experienced any injuries or illness recently? No    Has the patient experienced any 'new' shortness of breath, severe headaches, or changes in vision recently? No    Has the patient had any changes in their diet, or alcohol consumption? No    Is the patient here today to prepare for any type of upcoming surgery, procedure, or for a cardioversion procedure? No    What phone number can we reach the patient at today? Esme

## 2021-06-17 NOTE — TELEPHONE ENCOUNTER
INR result is          1.8  INR   Date Value Ref Range Status   05/18/2021 1.40 (!) 0.90 - 1.10 Final       Will the patient be seen, or did they already see, MD or CNP today? No    Most Recent Warfarin dose day/week  Sunday Monday Tuesday Wednesday Thursday Friday Saturday     2 1.5 2 1.5 2     Sunday Monday Tuesday Wednesday Thursday Friday Saturday   1.5 1.5            Has the patient missed any doses of Coumadin, Warfarin, Jantoven in the past 7 days? No    Has the patients medications changed since the last visit? No    Has the patient experienced any bleeding recently? No    Has the patient experienced any injuries or illness recently? No    Has the patient experienced any 'new' shortness of breath, severe headaches, or changes in vision recently? No    Has the patient had any changes in their diet, or alcohol consumption? No    Is the patient here today to prepare for any type of upcoming surgery, procedure, or for a cardioversion procedure? No    What phone number can we reach the patient at today? home phone listed in demographics.

## 2021-06-17 NOTE — TELEPHONE ENCOUNTER
Telephone Encounter by Crystal Portillo RN at 5/11/2021  9:47 AM     Author: Crystal Portillo RN Service: -- Author Type: Registered Nurse    Filed: 5/11/2021 10:26 AM Encounter Date: 5/11/2021 Status: Signed    : Crystal Portillo RN (Registered Nurse)       ANTICOAGULATION  MANAGEMENT- Home Care/Care Facility Result    Assessment     Today's INR result of 1.2 is Subtherapeutic (goal INR of 2.0-3.0)        Warfarin taken as previously instructed    No new diet changes affecting INR    No new medication/supplements affecting INR    Continues to tolerate warfarin with no reported s/s of bleeding or thromboembolism     Previous INR was Subtherapeutic    Plan:     Spoke with Esme discussed INR result and instructed:     Warfarin Dosing Instructions: Take 3 mg today then continue current warfarin dose 1.5 mg daily  (0 % change)    Next INR to be drawn: one week with home care visit.    Education provided: importance of therapeutic range, target INR goal and significance of current INR result and monitoring for clotting signs and symptoms    Adore verbalizes understanding and agrees to warfarin dosing plan.   ?   Crystal Portillo RN    Subjective/Objective:      Kody Johns, a 71 y.o. male is established on warfarin.     Home care/care facility RN's report of [facility] INR, recent warfarin dosing, diet changes, medication changes, and symptoms is documented below.    Additional findings: Nurse not sure if missed doses. He says no. Pill box was empty    Anticoagulation Episode Summary     Current INR goal:  2.0-3.0   TTR:  60.4 % (1 y)   Next INR check:  5/18/2021   INR from last check:  1.20 (5/11/2021)   Weekly max warfarin dose:     Target end date:     INR check location:     Preferred lab:     Send INR reminders to:  [facility]    Indications    A-fib (H) (Resolved) [I48.91]  Aortic valve disease  rheumatic [I06.9]  Mitral valve stenosis  unspecified etiology [I05.0]  Silent Stroke  [I66.9]           Comments:           Anticoagulation Care Providers     Provider Role Specialty Phone number    Aristdies Alegria MD [facility] Family Medicine [phone]    Haja Ascencio MD  Cardiology [phone]

## 2021-06-17 NOTE — TELEPHONE ENCOUNTER
Telephone Encounter by Leigh Coreas RN at 5/4/2021  9:48 AM     Author: Leigh Coreas RN Service: -- Author Type: Registered Nurse    Filed: 5/4/2021  9:49 AM Encounter Date: 5/4/2021 Status: Signed    : Leigh Coreas RN (Registered Nurse)       ANTICOAGULATION  MANAGEMENT- Home Care/Care Facility Result    Assessment     Today's INR result of 1.7 is Subtherapeutic (goal INR of 2.0-3.0)        Warfarin taken as previously instructed    No new diet changes affecting INR    No new medication/supplements affecting INR    Continues to tolerate warfarin with no reported s/s of bleeding or thromboembolism     Previous INR was Subtherapeutic    Plan:     Spoke with Shar discussed INR result and instructed:     Warfarin Dosing Instructions: Change warfarin dose to 1.5 mg daily  (5 % change)    Next INR to be drawn: 5/11    Education provided: target INR goal and significance of current INR result, importance of notifying clinic for changes in medications and importance of notifying clinic for diarrhea, nausea/vomiting, reduced intake and/or illness    Shar verbalizes understanding and agrees to warfarin dosing plan.   ?   Leigh Coreas RN    Subjective/Objective:      Kody Johns, a 71 y.o. male is established on warfarin.     Home care/care facility RN's report of [facility] INR, recent warfarin dosing, diet changes, medication changes, and symptoms is documented below.    Additional findings: none    Anticoagulation Episode Summary     Current INR goal:  2.0-3.0   TTR:  62.4 % (1 y)   Next INR check:  5/11/2021   INR from last check:  1.70 (5/4/2021)   Weekly max warfarin dose:     Target end date:     INR check location:     Preferred lab:     Send INR reminders to:  [facility]    Indications    A-fib (H) (Resolved) [I48.91]  Aortic valve disease  rheumatic [I06.9]  Mitral valve stenosis  unspecified etiology [I05.0]  Silent Stroke [I66.9]           Comments:            Anticoagulation Care Providers     Provider Role Specialty Phone number    Aristides Alegria MD Referring Family Medicine [phone]    Haja Ascencio MD  Cardiology [phone]

## 2021-06-17 NOTE — TELEPHONE ENCOUNTER
Telephone Encounter by Crystal Portillo RN at 11/24/2020  9:22 AM     Author: Crystal Portillo RN Service: -- Author Type: Registered Nurse    Filed: 11/24/2020  9:24 AM Encounter Date: 11/24/2020 Status: Signed    : Crystal Portillo RN (Registered Nurse)       ANTICOAGULATION  MANAGEMENT- Home Care/Care Facility Result    Assessment     Today's INR result of 1.8 is Subtherapeutic (goal INR of 2.0-3.0)        Warfarin taken as previously instructed    No new diet changes affecting INR    No new medication/supplements affecting INR    Continues to tolerate warfarin with no reported s/s of bleeding or thromboembolism     Previous INR was Therapeutic    Plan:     Spoke with Esme home care nurse discussed INR result and instructed:     Warfarin Dosing Instructions: Change warfarin dose to 1.5 mg daily on Tuesdays and Fridays; and 1 mg daily rest of week  (6.7 % change)    Next INR to be drawn: two weeks.    Education provided: importance of therapeutic range    Esme verbalizes understanding and agrees to warfarin dosing plan.   ?   Crystal Portillo RN    Subjective/Objective:      Kody Johns, a 70 y.o. male is established on warfarin.     Home care/care facility RN's report of [facility] INR, recent warfarin dosing, diet changes, medication changes, and symptoms is documented below.    Additional findings: template incorrect; verbally confirmed home dose with Esme and updated on anticoagulation calendar    Anticoagulation Episode Summary     Current INR goal:  2.0-3.0   TTR:  71.1 % (1 y)   Next INR check:  12/8/2020   INR from last check:  1.80 (11/24/2020)   Weekly max warfarin dose:     Target end date:     INR check location:     Preferred lab:     Send INR reminders to:  [facility]    Indications    A-fib (H) [I48.91]  Aortic valve disease  rheumatic [I06.9]  Mitral valve stenosis  unspecified etiology [I05.0]  Silent Stroke [I66.9]           Comments:           Anticoagulation  Care Providers     Provider Role Specialty Phone number    Aristides Alegria MD Referring Family Medicine [phone]    Haja Ascencio MD  Cardiology [phone]

## 2021-06-17 NOTE — TELEPHONE ENCOUNTER
Telephone Encounter by Luiza Brewer RN at 1/5/2021  9:46 AM     Author: Luiza Brewer RN Service: -- Author Type: Registered Nurse    Filed: 1/5/2021  9:47 AM Encounter Date: 1/5/2021 Status: Signed    : Luiza Brewer RN (Registered Nurse)       ANTICOAGULATION  MANAGEMENT- Home Care/Care Facility Result    Assessment     Today's INR result of 1.8 is Subtherapeutic (goal INR of 2.0-3.0)        Warfarin taken as previously instructed    No new diet changes affecting INR    No new medication/supplements affecting INR    Continues to tolerate warfarin with no reported s/s of bleeding or thromboembolism     Previous INR was Therapeutic    Plan:     Spoke with Esme discussed INR result and instructed:     Warfarin Dosing Instructions: Change warfarin dose to 1 mg daily on Mon, Fri; and 1.5 mg daily rest of week  (5.6 % change)    Next INR to be drawn: 2 weeks    Education provided: target INR goal and significance of current INR result, importance of following up for INR monitoring at instructed interval, importance of taking warfarin as instructed, importance of notifying clinic for changes in medications and importance of notifying clinic for diarrhea, nausea/vomiting, reduced intake and/or illness    Esme verbalizes understanding and agrees to warfarin dosing plan.   ?   Luiza Brewer RN    Subjective/Objective:      Kody Johns, a 71 y.o. male is established on warfarin.     Home care/care facility RN's report of [facility] INR, recent warfarin dosing, diet changes, medication changes, and symptoms is documented below.    Additional findings: none    Anticoagulation Episode Summary     Current INR goal:  2.0-3.0   TTR:  68.0 % (1 y)   Next INR check:  1/19/2021   INR from last check:  1.80 (1/5/2021)   Weekly max warfarin dose:     Target end date:     INR check location:     Preferred lab:     Send INR reminders to:  [facility]    Indications    A-fib (H) [I48.91]  Aortic valve  disease  rheumatic [I06.9]  Mitral valve stenosis  unspecified etiology [I05.0]  Silent Stroke [I66.9]           Comments:           Anticoagulation Care Providers     Provider Role Specialty Phone number    Aristides Alegria MD [facility] Family Medicine [phone]    Haja Ascencio MD  Cardiology [phone]

## 2021-06-17 NOTE — TELEPHONE ENCOUNTER
Telephone Encounter by Jessica Garza RN at 3/2/2021  9:50 AM     Author: Jessica Garza RN Service: -- Author Type: Registered Nurse    Filed: 3/2/2021  9:51 AM Encounter Date: 3/2/2021 Status: Signed    : Jessica Garza RN (Registered Nurse)       ANTICOAGULATION  MANAGEMENT- Home Care/Care Facility Result    Assessment     Today's INR result of 4.0 is Supratherapeutic (goal INR of 2.0-3.0)        Warfarin taken as previously instructed    No new diet changes affecting INR    No new medication/supplements affecting INR    Continues to tolerate warfarin with no reported s/s of bleeding or thromboembolism     Previous INR was Therapeutic    Plan:     Spoke with Esme REYES discussed INR result and instructed:     Warfarin Dosing Instructions: hold today Tue 3/2 then change warfarin dose to 1 mg daily on Tue/Fri; and 1.5 mg daily rest of week  (5 % change)    Next INR to be drawn: 1 week    Education provided: target INR goal and significance of current INR result, monitoring for bleeding signs and symptoms and monitoring for clotting signs and symptoms    Esme verbalizes understanding and agrees to warfarin dosing plan.   ?   Jessica Garza RN    Subjective/Objective:      Kody Johns, a 71 y.o. male is established on warfarin.     Home care/care facility RN's report of [facility] INR, recent warfarin dosing, diet changes, medication changes, and symptoms is documented below.    Additional findings: none    Anticoagulation Episode Summary     Current INR goal:  2.0-3.0   TTR:  73.4 % (1 y)   Next INR check:  3/8/2021   INR from last check:  4.00 (3/2/2021)   Weekly max warfarin dose:     Target end date:     INR check location:     Preferred lab:     Send IN[facility] reminders to:  [facility]    Indications    A-fib (H) [I48.91]  Aortic valve disease  rheumatic [I06.9]  Mitral valve stenosis  unspecified etiology [I05.0]  Silent Stroke [I66.9]           Comments:           Anticoagulation Care  Providers     Provider Role Specialty Phone number    Aristides Alegria MD Referring Family Medicine [phone]    Haja Ascencio MD  Cardiology [phone]

## 2021-06-17 NOTE — TELEPHONE ENCOUNTER
Telephone Encounter by Crystal Portillo RN at 4/27/2021  9:43 AM     Author: Crystal Portillo RN Service: -- Author Type: Registered Nurse    Filed: 4/27/2021  9:44 AM Encounter Date: 4/27/2021 Status: Signed    : Crystal Portillo RN (Registered Nurse)       ANTICOAGULATION  MANAGEMENT- Home Care/Care Facility Result    Assessment     Today's INR result of 1.7 is Subtherapeutic (goal INR of 2.0-3.0)        Warfarin taken as previously instructed    No new diet changes affecting INR    No new medication/supplements affecting INR    Continues to tolerate warfarin with no reported s/s of bleeding or thromboembolism     Previous INR was Subtherapeutic    Plan:     Spoke with Mariann discussed INR result and instructed:     Warfarin Dosing Instructions: Continue current warfarin dose 1 mg daily on Fridays; and 1.5 mg daily rest of week  (0 % change)    Next INR to be drawn: one week    Education provided: importance of therapeutic range and target INR goal and significance of current INR result    Mariann verbalizes understanding and agrees to warfarin dosing plan.   ?   Crystal Portillo RN    Subjective/Objective:      Kody Johns, a 71 y.o. male is established on warfarin.     Home care/care facility RN's report of [facility] INR, recent warfarin dosing, diet changes, medication changes, and symptoms is documented below.    Additional findings: verbally confirmed home dose with Mariann and updated on anticoagulation calendar    Anticoagulation Episode Summary     Current INR goal:  2.0-3.0   TTR:  64.3 % (1 y)   Next INR check:  5/4/2021   INR from last check:  1.70 (4/27/2021)   Weekly max warfarin dose:     Target end date:     INR check location:     Preferred lab:     Send INR reminders to:  [facility]    Indications    A-fib (H) (Resolved) [I48.91]  Aortic valve disease  rheumatic [I06.9]  Mitral valve stenosis  unspecified etiology [I05.0]  Silent Stroke [I66.9]           Comments:            Anticoagulation Care Providers     Provider Role Specialty Phone number    Aristides Alegria MD Referring Family Medicine [phone]    Haja Ascencio MD  Cardiology [phone]

## 2021-06-17 NOTE — TELEPHONE ENCOUNTER
Telephone Encounter by Mojgan Lopez RN at 5/5/2021  3:29 PM     Author: Mojgan Lopez RN Service: -- Author Type: Registered Nurse    Filed: 5/5/2021  3:29 PM Encounter Date: 5/5/2021 Status: Signed    : Mojgan Lopez RN (Registered Nurse)       RN cannot approve Refill Request    RN can NOT refill this medication med is not covered by policy/route to provider and Routing Warfarin refill to anticoagulation pool, other refill needs to be addressed by provider. Last office visit: Visit date not found Last Physical: Visit date not found Last MTM visit: Visit date not found Last visit same specialty: 2/4/2021 Maynor Roberson MD.  Next visit within 3 mo: Visit date not found  Next physical within 3 mo: Visit date not found      Mojgan Lopez [facility] Connection Triage/Med Refill 5/5/2021    Requested Prescriptions   Pending Prescriptions Disp Refills   ? magnesium oxide (MAG-OX) 400 mg (241.3 mg magnesium) tablet [Pharmacy Med Name: MAGNESIUM OXIDE 400 MG  Tablet] 30 tablet 0     Sig: TAKE 1 PILL BY MOUTH EVERYDAY       There is no refill protocol information for this order      ? warfarin ANTICOAGULANT (COUMADIN/JANTOVEN) 1 MG tablet [Pharmacy Med Name: WARFARIN SODIUM 1 MG TABS 1 Tablet] 45 tablet 2     Sig: TAKE 1.5 TABLETS (1.5 MG) BY MOUTH DAILY AS DIRECTED. ADJUST DOSE BASED ON INR.       Warfarin Refill Protocol  Failed - 5/5/2021  9:27 AM        Failed -  Route to appropriate pool/provider     Last Anticoagulation Summary:   Anticoagulation Episode Summary     Current INR goal:  2.0-3.0   TTR:  62.3 % (12 mo)   Next INR check:  5/11/2021   INR from last check:  1.70 (5/4/2021)   Weekly max warfarin dose:     Target end date:     INR check location:     Preferred lab:     Send INR reminders to:  [facility]    Indications    A-fib (H) (Resolved) [I48.91]  Aortic valve disease  rheumatic [I06.9]  Mitral valve stenosis  unspecified etiology [I05.0]  Silent  Stroke [I66.9]           Comments:           Anticoagulation Care Providers     Provider Role Specialty Phone number    Aristides Alegria MD Referring Family Medicine [phone]    Haja Ascencio MD  Cardiology [phone]                Passed - Provider visit in last year     Last office visit with prescriber/PCP: Visit date not found OR same dept: 2/4/2021 Maynor Roberson MD OR same specialty: 2/4/2021 Maynor Roberson MD  Last physical: Visit date not found Last MTM visit: Visit date not found    Next appt within 3 mo: Visit date not found Next physical within 3 mo: Visit date not found  Prescriber OR PCP: Alison Kirk MD  Last diagnosis associated with med order: 1. Low magnesium level  - magnesium oxide (MAG-OX) 400 mg (241.3 mg magnesium) tablet [Pharmacy Med Name: MAGNESIUM OXIDE 400 MG  Tablet]; TAKE 1 PILL BY MOUTH EVERYDAY  Dispense: 30 tablet; Refill: 0    2. Atrial fibrillation, unspecified type (H)  - warfarin ANTICOAGULANT (COUMADIN/JANTOVEN) 1 MG tablet [Pharmacy Med Name: WARFARIN SODIUM 1 MG TABS 1 Tablet]; Take 1.5 tablets (1.5 mg) by mouth daily as directed.  Adjust dose based on INR.  Dispense: 45 tablet; Refill: 2    If protocol passes may refill for 6 months if within 3 months of last provider visit (or a total of 9 months).

## 2021-06-17 NOTE — TELEPHONE ENCOUNTER
Telephone Encounter by Crystal Portillo RN at 5/25/2021  8:17 AM     Author: Crystal Portillo, RN Service: -- Author Type: Registered Nurse    Filed: 5/25/2021  8:18 AM Encounter Date: 5/25/2021 Status: Signed    : Crystal Portillo RN (Registered Nurse)       ANTICOAGULATION  MANAGEMENT- Home Care/Care Facility Result    Assessment     Today's INR result of 1.8 is Subtherapeutic (goal INR of 2.0-3.0)        Warfarin taken as previously instructed    No new diet changes affecting INR    No new medication/supplements affecting INR    Continues to tolerate warfarin with no reported s/s of bleeding or thromboembolism     Previous INR was Subtherapeutic    Plan:     Spoke with Esme discussed INR result and instructed:     Warfarin Dosing Instructions: Continue current warfarin dose 2 mg daily on Tuesdays, Thursdays and Saturdays; and 1.5 mg daily rest of week  (0 % change)    Next INR to be drawn: one week.    Education provided: importance of therapeutic range and target INR goal and significance of current INR result    Esme verbalizes understanding and agrees to warfarin dosing plan.   ?   Crystal Portillo RN    Subjective/Objective:      Kody Johns, a 71 y.o. male is established on warfarin.     Home care/care facility RN's report of [facility] INR, recent warfarin dosing, diet changes, medication changes, and symptoms is documented below.    Additional findings: verbally confirmed home dose with Esme and updated on anticoagulation calendar    Anticoagulation Episode Summary     Current INR goal:  2.0-3.0   TTR:  56.6 % (1 y)   Next INR check:  6/1/2021   INR from last check:  1.80 (5/25/2021)   Weekly max warfarin dose:     Target end date:     INR check location:     Preferred lab:     Send INR reminders to:  [facility]    Indications    A-fib (H) (Resolved) [I48.91]  Aortic valve disease  rheumatic [I06.9]  Mitral valve stenosis  unspecified etiology [I05.0]  Silent Stroke  [I66.9]           Comments:           Anticoagulation Care Providers     Provider Role Specialty Phone number    Aristides Alegria MD [facility] Family Medicine [phone]    Haja Ascencio MD  Cardiology [phone]

## 2021-06-17 NOTE — TELEPHONE ENCOUNTER
Lab Results   Component Value Date    INR 1.70 (!) 05/04/2021    INR 1.70 (!) 04/27/2021    INR 1.10 04/20/2021         Next INR advised: 5/11    Current warfarin dose per anticoagulation flowsheet:   Outpatient Anticoagulation Plan Latest Ref Rng & Units 5/4/2021   ANTICOAG FULL INSTRUCTIONS  1.5 mg every day       Last health maintenance OV/physical exam: 3/17/21    Patient is up to date.  Warfarin refilled per protocol.

## 2021-06-17 NOTE — TELEPHONE ENCOUNTER
Telephone Encounter by Sulema Almarza RN at 3/8/2021  8:44 AM     Author: Sulema Almaraz RN Service: -- Author Type: Registered Nurse    Filed: 3/8/2021  8:46 AM Encounter Date: 3/8/2021 Status: Signed    : Sulema Almaraz RN (Registered Nurse)       ANTICOAGULATION  MANAGEMENT- Home Care/Care Facility Result    Assessment     Today's INR result of 1.90 is Subtherapeutic (goal INR of 2.0-3.0)        Warfarin recently held as instructed which may be affecting INR    - held warfarin dose on 3/2, as instructed.    No new diet changes affecting INR    No new medication/supplements affecting INR    Continues to tolerate warfarin with no reported s/s of bleeding or thromboembolism     Previous INR was Supratherapeutic at 4.00 on 3/2/21.    Plan:     Spoke with AMY Victoria with [employer] discussed INR result and instructed:     Warfarin Dosing Instructions:   - today, advised one time booster with 2mg warfarin dosek then continue current warfarin dose 1 mg daily on Tues/Fri; and 1.5 mg daily rest of week.    Next INR to be drawn:  One wk.  Schedule don 3/16/21.    Education provided: importance of consistent vitamin K intake and target INR goal and significance of current INR result    AMY Victoria verbalizes understanding and agrees to warfarin dosing plan.   ?   Sulema Almaraz RN    Subjective/Objective:      Kody Johns, a 71 y.o. male is established on warfarin.     Home care/care facility RN's report of [facility] INR, recent warfarin dosing, diet changes, medication changes, and symptoms is documented below.    Additional findings: none    Anticoagulation Episode Summary     Current INR goal:  2.0-3.0   TTR:  72.5 % (1 y)   Next INR check:  3/16/2021   INR from last check:  1.90 (3/8/2021)   Weekly max warfarin dose:     Target end date:     INR check location:     Preferred lab:     Send INR reminders to:  [facility]    Indications    A-fib (H) [I48.91]  Aortic valve  disease  rheumatic [I06.9]  Mitral valve stenosis  unspecified etiology [I05.0]  Silent Stroke [I66.9]           Comments:           Anticoagulation Care Providers     Provider Role Specialty Phone number    Aristides Alegria MD [facility] Family Medicine [phone]    Haja Ascencio MD  Cardiology [phone]

## 2021-06-17 NOTE — PROGRESS NOTES
[facility] Care Coordination    Received after visit chart from care coordinator.  Completed following tasks:    Mailed copy of care plan to client.     [facility]:  Emailed completed PCA assessment to [facility].  Faxed copy of PCA assessment to [facility] and mailed copy to member.  Faxed MD Communication to PCP.     Mailed: POC Sig page and PCA Sig page to member with a stamped  return envelope.    Martín Short  Care Management Specialist  [facility]  [phone]

## 2021-06-17 NOTE — PROGRESS NOTES
[facility] Care Coordination    Called adult son Mark to schedule annual HRA home visit. HRA has been scheduled for Friday, May 7 at 10am.     MISSAEL Lord  [facility]  [phone]

## 2021-06-17 NOTE — TELEPHONE ENCOUNTER
Telephone Encounter by Leigh Coreas RN at 5/18/2021  9:20 AM     Author: Leigh Coreas RN Service: -- Author Type: Registered Nurse    Filed: 5/18/2021  9:20 AM Encounter Date: 5/18/2021 Status: Signed    : Leigh Coreas RN (Registered Nurse)       ANTICOAGULATION  MANAGEMENT- Home Care/Care Facility Result    Assessment     Today's INR result of 1.4 is Subtherapeutic (goal INR of 2.0-3.0)        Missed dose(s) may be affecting INR    No new diet changes affecting INR    No new medication/supplements affecting INR    Continues to tolerate warfarin with no reported s/s of bleeding or thromboembolism     Previous INR was Subtherapeutic    Plan:     Spoke with Shar discussed INR result and instructed:     Warfarin Dosing Instructions: Change warfarin dose to 2 mg daily on Tu/Th/Sa; and 1.5 mg daily rest of week  (14.3 % change)    Next INR to be drawn: 5/25    Education provided: target INR goal and significance of current INR result, importance of notifying clinic for changes in medications and importance of notifying clinic for diarrhea, nausea/vomiting, reduced intake and/or illness    Shar verbalizes understanding and agrees to warfarin dosing plan.   ?   Leigh Coreas RN    Subjective/Objective:      Kody Johns, a 71 y.o. male is established on warfarin.     Home care/care facility RN's report of [facility] INR, recent warfarin dosing, diet changes, medication changes, and symptoms is documented below.    Additional findings: none    Anticoagulation Episode Summary     Current INR goal:  2.0-3.0   TTR:  58.5 % (1 y)   Next INR check:  5/25/2021   INR from last check:  1.40 (5/18/2021)   Weekly max warfarin dose:     Target end date:     INR check location:     Preferred lab:     Send INR reminders to:  [facility]    Indications    A-fib (H) (Resolved) [I48.91]  Aortic valve disease  rheumatic [I06.9]  Mitral valve stenosis  unspecified etiology [I05.0]  Silent Stroke  [I66.9]           Comments:           Anticoagulation Care Providers     Provider Role Specialty Phone number    Aristides Alegria MD [facility] Family Medicine [phone]    Haja Ascencio MD  Cardiology [phone]

## 2021-06-17 NOTE — PROGRESS NOTES
Subjective: Patient comes in for evaluation and follow-up.  He has a history of atrial fibrillation CHF he has valvular heart disease aortic and mitral valves.    His primary cardiologist referred him to see a cardiovascular surgeon, Dr. Vaughn.  Please see consult from late March.    Discussion regarding valve replacement with a 4-8% risk of mortality with procedure.    Patient would need to get dental work done and have teeth pulled first.    He continues on Plavix continue on warfarin has a history of atrial fibrillation he is in a regular rhythm now he gets INRs at home and they have been therapeutic.    He denies any chest pain or shortness of breath he is not sure what he should do and we had a discussion regarding this today    Additional medical history includes hyperlipidemia, he has mild coronary artery disease, as well as hypothyroid.    Changes in medications med review was done    He continues to avoid smoking.    Tobacco status: He  reports that he quit smoking about 3 years ago. His smoking use included Cigarettes. He has a 50.00 pack-year smoking history. He has quit using smokeless tobacco.    Patient Active Problem List    Diagnosis Date Noted     A-fib 09/14/2017     Heart failure with preserved ejection fraction 09/14/2017     Moderate malnutrition 08/31/2017     ALEENA (acute kidney injury) 08/31/2017     Aortic valve disease, rheumatic 08/30/2017     Lightheadedness 08/30/2017     Atherosclerosis of native coronary artery of native heart without angina pectoris      Essential hypertension with goal blood pressure less than 130/85      Pure hypercholesterolemia      Dysuria 08/29/2017     Hypothyroidism, unspecified type      Mitral valve stenosis, unspecified etiology      Elevated LFTs 08/26/2016     Silent Stroke      Shoulder strain      Dysphagia      Hyperlipidemia      Blurry vision      Hearing loss      Nonspecific reaction to tuberculin skin test without active tuberculosis         Current Outpatient Prescriptions   Medication Sig Dispense Refill     acetaminophen (TYLENOL) 500 MG tablet Take 500-1,000 mg by mouth every 6 (six) hours as needed for pain. Every 6-8 hours as needed. No more than 4,000MG of acetaminophen daily.       albuterol (PROAIR HFA;PROVENTIL HFA;VENTOLIN HFA) 90 mcg/actuation inhaler Inhale 1-2 puffs every 6 (six) hours as needed.       atorvastatin (LIPITOR) 40 MG tablet Take 1 tablet (40 mg total) by mouth at bedtime. 30 tablet 11     baclofen (LIORESAL) 10 MG tablet TAKE 1 TABLET BY MOUTH TWICE DAILY 60 tablet 5     bromfenac (PROLENSA) 0.07 % Drop 1 drop daily.       clopidogrel (PLAVIX) 75 mg tablet Take 75 mg by mouth daily.       fluticasone (FLONASE) 50 mcg/actuation nasal spray 2 sprays into each nostril daily. 10 g 3     furosemide (LASIX) 20 MG tablet Take 1 tablet (20 mg total) by mouth daily. 30 tablet 3     levothyroxine (SYNTHROID, LEVOTHROID) 88 MCG tablet TAKE 1 TABLET BY MOUTH EVERY DAY 30 tablet 9     metoprolol succinate (TOPROL-XL) 25 MG TAKE 1 TABLET BY MOUTH EVERY DAY 90 tablet 1     nasal dilator (BREATHE RIGHT) Strp strip Apply qhs 30 strip 3     omeprazole (PRILOSEC) 20 MG capsule Take 20 mg by mouth daily before breakfast.       polyethylene glycol (MIRALAX) 17 gram packet Take 17 g by mouth daily as needed.        spironolactone (ALDACTONE) 25 MG tablet Take 25 mg by mouth daily.       traMADol (ULTRAM) 50 mg tablet Take 50 mg by mouth 2 (two) times a day as needed for pain.       warfarin (COUMADIN) 1 MG tablet TAKE1 TO 2 TABLETS BY MOUTH DAILY AS DIRECTED 90 tablet 1     warfarin (COUMADIN) 5 MG tablet 1 po qd initially then Adjust dose based on INR results as directed by coumadin nurse 30 tablet 1     No current facility-administered medications for this visit.        ROS:   10 point review of systems negative other than as outlined above    Objective:    /70 (Patient Site: Right Arm, Patient Position: Sitting, Cuff Size: Adult  "Regular)  Pulse 73  Temp 98.5  F (36.9  C) (Oral)   Resp 20  Ht 4' 10.5\" (1.486 m)  Wt 110 lb (49.9 kg)  SpO2 97%  BMI 22.6 kg/m2  Body mass index is 22.6 kg/(m^2).      General appearance no acute distress    HEENT neck without JVD oropharynx was clear poor dentition as noted previously    Lungs clear no rales or rhonchi O2 sat 97% heart was regular rate around 70-80.  No significant murmur    Extremities without edema skin was normal    Abdomen soft nontender    Skin was normal.    Previous INR 3.0    Results for orders placed or performed in visit on 04/10/18   INR   Result Value Ref Range    INR 3.00 (!) 0.9 - 1.1       Assessment:  1. A-fib  Ambulatory referral to Cardiology   2. Aortic valve disease, rheumatic  Ambulatory referral to Cardiology   3. Mitral valve stenosis, unspecified etiology  Ambulatory referral to Cardiology   4. Hypothyroidism, unspecified type     5. Pure hypercholesterolemia       Valvular heart disease contemplating surgery    Would need dental work done    He decided to go back and discuss again with Dr. Hairston.    The patient does want to proceed needs a tooth pulled will await recommendations from cardiology question okay to stop Plavix, question bridge with Lovenox regarding warfarin.    Patient should follow-up for lab check in the next couple months we will recheck BMP lipid and T4 TSH    Plan: As outlined above    This transcription uses voice recognition software, which may contain typographical errors.  "

## 2021-06-18 NOTE — PROGRESS NOTES
Subjective: This patient comes in for evaluation a 68-year-old male has now been off Plavix.  He is getting some teeth pulled on 6-18.  He just needs his INR around 2 and he should be fine.    This was checked today was low at 1.3.  Please see the anticoagulation nurses notes regarding this who talk to the home nurse.    Labs stay in the same dose recheck the INR in 3 days and should be able to go ahead with that on 6-18.    Stay off the Plavix    Down the line will get his valve replacement.  Please see previous discussion    Tobacco status: He  reports that he quit smoking about 3 years ago. His smoking use included Cigarettes. He has a 50.00 pack-year smoking history. He has quit using smokeless tobacco.    Patient Active Problem List    Diagnosis Date Noted     A-fib 09/14/2017     Heart failure with preserved ejection fraction 09/14/2017     Moderate malnutrition 08/31/2017     ALEENA (acute kidney injury) 08/31/2017     Aortic valve disease, rheumatic 08/30/2017     Lightheadedness 08/30/2017     Atherosclerosis of native coronary artery of native heart without angina pectoris      Essential hypertension with goal blood pressure less than 130/85      Pure hypercholesterolemia      Dysuria 08/29/2017     Hypothyroidism, unspecified type      Mitral valve stenosis, unspecified etiology      Elevated LFTs 08/26/2016     Silent Stroke      Shoulder strain      Dysphagia      Hyperlipidemia      Blurry vision      Hearing loss      Nonspecific reaction to 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 Every 6-8 hours as needed. No more than 4,000MG of acetaminophen daily.       albuterol (PROAIR HFA;PROVENTIL HFA;VENTOLIN HFA) 90 mcg/actuation inhaler Inhale 1-2 puffs every 6 (six) hours as needed.       atorvastatin (LIPITOR) 40 MG  tablet Take 1 tablet (40 mg total) by mouth at bedtime. 30 tablet 11     baclofen (LIORESAL) 10 MG tablet Take 1 tablet (10 mg total) by mouth 2 (two) times a day. 60 tablet 5     bromfenac (PROLENSA) 0.07 % Drop 1 drop daily.       clopidogrel (PLAVIX) 75 mg tablet TAKE 1 TABLET BY MOUTH EVERY DAY 90 tablet 0     fluticasone (FLONASE) 50 mcg/actuation nasal spray 2 sprays into each nostril daily. 10 g 3     furosemide (LASIX) 20 MG tablet Take 1 tablet (20 mg total) by mouth daily. 30 tablet 3     levothyroxine (SYNTHROID, LEVOTHROID) 88 MCG tablet Take 1 tablet (88 mcg total) by mouth daily. 30 tablet 9     metoprolol succinate (TOPROL-XL) 25 MG Take 1 tablet (25 mg total) by mouth daily. 90 tablet 1     nasal dilator (BREATHE RIGHT) Strp strip Apply qhs 30 strip 3     omeprazole (PRILOSEC) 20 MG capsule Take 20 mg by mouth daily before breakfast.       omeprazole (PRILOSEC) 20 MG capsule TAKE ONE CAPSULE BY MOUTH EVERY DAY 90 capsule 0     polyethylene glycol (MIRALAX) 17 gram packet Take 17 g by mouth daily as needed.        spironolactone (ALDACTONE) 25 MG tablet Take 25 mg by mouth daily.       traMADol (ULTRAM) 50 mg tablet Take 50 mg by mouth 2 (two) times a day as needed for pain.       warfarin (COUMADIN) 1 MG tablet TAKE 1-2 TABLETS BY MOUTH DAILY AS DIRECTED 90 tablet 1     warfarin (COUMADIN) 5 MG tablet 1 po qd initially then Adjust dose based on INR results as directed by coumadin nurse 30 tablet 1     No current facility-administered medications for this visit.        ROS:   Review of systems negative no new symptoms    Objective:    /68 (Patient Site: Right Arm, Patient Position: Sitting, Cuff Size: Adult Regular)  Pulse 80  Resp 16  Wt 106 lb (48.1 kg)  SpO2 97%  BMI 22.35 kg/m2  Body mass index is 22.35 kg/(m^2).      General appearance no acute distress    Poor dentition as before    Heart murmur as before    O2 sat 97% heart was regular    Lungs clear throughout no rales or rhonchi  extremities without edema    INR is discussed    Results for orders placed or performed in visit on 05/29/18   INR   Result Value Ref Range    INR 1.30 (H) 0.90 - 1.10       Assessment:  1. A-fib  INR   2. Aortic valve disease, rheumatic       3.  Poor dentition needs teeth pulled before his valve replacement    Plan: As above  This transcription uses voice recognition software, which may contain typographical errors.

## 2021-06-18 NOTE — PROGRESS NOTES
Thank you for asking the [facility] team to see Kody Johns.      Assessment/Plan:      Awaiting tooth extraction this Saturday. The plan is to remove the upper and lower teeth in two different settings. I have asked Kody to hold his clopidogrel and coumadin starting today and to resume them on Monday. He will call on Monday with the date for his second tooth extraction. Given his mitral stenosis we may want to bridge with lovenox, pending the duration of time he will be inadequately anticoagulated.     No medication changes otherwise. He is doing well and should not have problems from a cardiac standpoint from his tooth extractions.       Current History:   Kody Johns is a 68 y.o. who lived [place] a [place] in [place] and has had heart failure and valve disease as well as mild coronary disease by angiogram in 2015.   Right and left heart catheterization showed a mitral valve gradient of 7-8 mmHg with simultaneous pulmonary capillary wedge pressure and LVEDP. He has also had atrial fibrillation with RVR and acute heart failure secondary to mitral regurgitation. Echo on August 29, 2017 showed ejection fraction of 55%, moderate to severe mitral regurgitation (rheumatic heart disease), moderate aortic regurgitation, and mild aortic stenosis. He was seen by Dr. Manriquez who thought he was not a candidate for percutaneous valve replacement. He has been in normal sinus rhythm since November 2017.    Kody has seen surgery and the plan is for surgery for both the aortic and mitral valves once he has his poor dentition managed. Kody is set for his first round of tooth extractions on Saturday (4 days from now). He is feeling well and denies shortness of breath, chest pain or early satiety.     Past Medical History:     Past Medical History:   Diagnosis Date     Palpitations        Past Surgical History:     Past Surgical History:   Procedure Laterality Date     CATARACT EXTRACTION Left 06/13/2016       Family History:      Family History   Problem Relation Age of Onset     No Medical Problems Mother      No Medical Problems Father      Heart disease Neg Hx        Social History:    reports that he quit smoking about 3 years ago. His smoking use included Cigarettes. He has a 50.00 pack-year smoking history. He has quit using smokeless tobacco. He reports that he does not drink alcohol or use illicit drugs.    Meds:     Current Outpatient Prescriptions   Medication Sig Note     acetaminophen (TYLENOL) 500 MG tablet Take 500-1,000 mg by mouth every 6 (six) hours as needed for pain. Every 6-8 hours as needed. No more than 4,000MG of acetaminophen daily.      albuterol (PROAIR HFA;PROVENTIL HFA;VENTOLIN HFA) 90 mcg/actuation inhaler Inhale 1-2 puffs every 6 (six) hours as needed.      atorvastatin (LIPITOR) 40 MG tablet Take 1 tablet (40 mg total) by mouth at bedtime.      baclofen (LIORESAL) 10 MG tablet Take 1 tablet (10 mg total) by mouth 2 (two) times a day.      bromfenac (PROLENSA) 0.07 % Drop 1 drop daily.      clopidogrel (PLAVIX) 75 mg tablet TAKE 1 TABLET BY MOUTH EVERY DAY      fluticasone (FLONASE) 50 mcg/actuation nasal spray 2 sprays into each nostril daily. 8/29/2017: 8/29/17: pt's brother states that he ran out of this medication a few days ago but is still supposed to be taking it.      furosemide (LASIX) 20 MG tablet Take 1 tablet (20 mg total) by mouth daily.      levothyroxine (SYNTHROID, LEVOTHROID) 88 MCG tablet Take 1 tablet (88 mcg total) by mouth daily.      metoprolol succinate (TOPROL-XL) 25 MG Take 1 tablet (25 mg total) by mouth daily.      nasal dilator (BREATHE RIGHT) Strp strip Apply qhs      omeprazole (PRILOSEC) 20 MG capsule Take 20 mg by mouth daily before breakfast.      omeprazole (PRILOSEC) 20 MG capsule TAKE ONE CAPSULE BY MOUTH EVERY DAY      polyethylene glycol (MIRALAX) 17 gram packet Take 17 g by mouth daily as needed.       spironolactone (ALDACTONE) 25 MG tablet Take 25 mg by mouth daily.   "    traMADol (ULTRAM) 50 mg tablet Take 50 mg by mouth 2 (two) times a day as needed for pain.      warfarin (COUMADIN) 1 MG tablet TAKE 1-2 TABLETS BY MOUTH DAILY AS DIRECTED      warfarin (COUMADIN) 5 MG tablet 1 po qd initially then Adjust dose based on INR results as directed by coumadin nurse        Allergies:   Review of patient's allergies indicates no known allergies.    Review of Systems:   Review of Systems:   General: WNL  Eyes: Visual Distubance  Ears/Nose/Throat: WNL  Lungs: WNL  Heart: WNL  Stomach: WNL  Bladder: Frequent Urination at Night  Muscle/Joints: WNL  Skin: WNL  Nervous System: Daytime Sleepiness  Mental Health: WNL     Blood: WNL       Objective:      Physical Exam  @LASTENCWT:3@  4' 9.75\" (1.467 m)  @BMI:3@  /72 (Patient Site: Left Arm, Patient Position: Sitting, Cuff Size: Adult Small)  Pulse 78  Resp 16  Ht 4' 9.75\" (1.467 m)  Wt 107 lb (48.5 kg)  BMI 22.56 kg/m2    General Appearance:   Alert, cooperative and in no acute distress.   HEENT:  No scleral icterus; the mucous membranes were pink and moist.   Neck: JVP flat. No thyromegaly. No HJR   Chest: The spine was straight. The chest was symmetric.   Lungs:   Respirations unlabored; the lungs are clear to auscultation.   Cardiovascular:   S1 and S2 normal and systolic murmurs at the apex and upper sternal borderes. No clicks or rubs. No carotid bruits noted. Right DP, PT, and radial pulses 2+. Left DP, PT, and radial pulses 2+.   Abdomen:  No organomegaly, masses, bruits, or tenderness. Bowels sounds are present   Extremities: No cyanosis, clubbing, or edema.   Skin: No xanthelasma.   Neurologic: Mood and affect are appropriate.         Lab Review   Lab Results   Component Value Date     09/14/2017     09/06/2017     08/31/2017    K 4.5 09/14/2017    K 4.2 09/06/2017    K 3.9 08/31/2017     (H) 09/14/2017     09/06/2017     08/31/2017    CO2 23 09/14/2017    CO2 25 09/06/2017    CO2 25 " 08/31/2017    BUN 18 09/14/2017    BUN 30 (H) 09/06/2017    BUN 31 (H) 08/31/2017    CREATININE 0.88 09/14/2017    CREATININE 1.40 (H) 09/06/2017    CREATININE 1.33 (H) 08/31/2017    CALCIUM 9.3 09/14/2017    CALCIUM 9.5 09/06/2017    CALCIUM 9.5 08/31/2017     Lab Results   Component Value Date    WBC 7.3 09/06/2017    WBC 10.6 08/29/2017    WBC 6.4 07/20/2017    HGB 17.0 09/06/2017    HGB 16.8 08/29/2017    HGB 16.1 07/20/2017    HCT 52.8 09/06/2017    HCT 47.9 08/29/2017    HCT 47.0 07/20/2017    MCV 94 09/06/2017    MCV 90 08/29/2017    MCV 93 07/20/2017     09/06/2017     08/29/2017     07/20/2017     Lab Results   Component Value Date    CHOL 185 07/20/2017    CHOL 147 02/10/2016    CHOL 154 04/02/2014    TRIG 83 07/20/2017    TRIG 121 02/10/2016    TRIG 103 04/02/2014    HDL 34 (L) 07/20/2017    HDL 33 (L) 02/10/2016    HDL 34 (L) 04/02/2014    LDLDIRECT 111 10/13/2016    LDLDIRECT 94 11/24/2014    LDLDIRECT 101 05/08/2014     Lab Results   Component Value Date     (H) 08/29/2017     (H) 04/02/2014         Ashleigh Lang M.D.

## 2021-06-19 NOTE — LETTER
Letter by Anusha Roque, RN at      Author: Anusha Roque RN Service: -- Author Type: --    Filed:  Encounter Date: 10/14/2019 Status: Signed       Care Plan  About Me:    Patient Name:  Kody Johns    YOB: 1950  Age:         69 y.o.   [facility] MRN:    [id number] Telephone Information:  Home Phone [phone]   Mobile [phone]       Address:  [address] [place] [address] Email address:  [email or web address]      Emergency Contact(s)  Extended Emergency Contact Information      Name: Jim Johns    Relation: Child      Name: Mark Roth    Home Phone Number: [phone]  Relation: Sibling      Name: Crow Johns    Relation: Sibling      Name: David Savage    Relation: Other          Primary language:  [facility]     needed? Yes   [facility] [employer] [facility]:  [phone] op. 1  Other communication barriers:    Preferred Method of Communication:     Current living arrangement: I live in a private home with family  Mobility Status/ Medical Equipment: Dependent/Assisted by Another    Health Maintenance  Health Maintenance Reviewed: Up to date    My Access Plan  Medical Emergency 911   Primary Clinic Line Aristides Alegria MD - [phone]   24 Hour Appointment Line [phone] or  [phone]) (toll-free)   24 Hour Nurse Line [phone] (toll-free)   Preferred Urgent Care [facility], [phone]   [facility]  [phone]   Preferred Pharmacy [facility] - [place], [place] - [address]     Behavioral Health Crisis Line The National Suicide Prevention Lifeline at 1-366.703.1873 or 911             My Care Team Members  Patient Care Team       Relationship Specialty Notifications Start End    Aristides Alegria MD PCP - General   12/18/13     Phone: [phone] Fax: [phone]         [address] [place] [address]    Marisol Savage    5/6/15     Pt  prefer  from [employer] agency    Phone: [phone]         [facility]    5/6/15     Pt Group: nigel; [employer] ID: [id number]; PMI#: [id number]; member is active with [employer] as of 3/2014 and still current; pt is under senior plus plan. Note: pt prefer [employer] transportation service.    Phone: [phone]         [facility]    8/30/17     Home skilled nurse/Care Agency for RN services-do not provide PT/OT    Phone: [phone] Fax: [phone]        Has PCA services-name unknown    8/30/17     Has 4 hours of PCA per day as of 8/30/17    Lionel REED,     11/5/18     Alternative  per pt on 11-05-18. With [employer] agency. (FYI: 11-05-18: Lionel shared his phone # for future need).     Phone: [phone]         Leilani Ignacio SW Lead Care Coordinator Primary Care - [facility] Admissions 12/21/18     Phone: [phone] Fax: [phone]        Ling Oconnor, CHW Community Health Worker Primary Care - [facility] Admissions 7/17/19     Phone: [phone] Fax: [phone]        Aristides Alegria MD Assigned PCP   7/28/19     Phone: [phone] Fax: [phone]         [address] [place] [address]    Anusha Roque RN  Primary Care - [facility] Admissions 8/5/19             My Care Plans  Self Management and Treatment Plan  Goals and (Comments)  Goals        General    Other (pt-stated)     Notes - Note edited  10/14/2019  2:58 PM by Anusha Roque, AMY    Goal:  I will complete a healthcare directive in the next 30-60 days  Action Steps to achieve this goal  1) I will review information with my family and discuss form and choices  2) I will have healthcare directive notarized or signed by two witnesses  3) I will give a copy to my health care agent, primary care clinic, and keep original safe at home  4) I will discuss any barriers to me completing this goal with CHW during outreach calls          Psychosocial (pt-stated)     Notes - Note edited  10/14/2019  2:58 PM by Anusha Roque RN    Goal Statement- I  want to learn more about medical waiver for citizenship  within 1-2 months      Action steps to achieve this goal  1.  Attend visit with PCP to discuss referral and if he will sign the waiver - set up visit to for 10-23-19  2.  CHW will assist with information for citizenship questions on the process and steps if additional resources needed      Date goal set:  10/14/19                   Advance Care Plans/Directives Type:        My Medical and Care Information  Problem List   Patient Active Problem List   Diagnosis   ? Rheumatic heart disease   ? Blurry vision   ? Hearing loss   ? Nonspecific reaction to tuberculin skin test without active tuberculosis   ? Dysphagia   ? Hyperlipidemia   ? Silent Stroke   ? Shoulder strain   ? Elevated LFTs   ? Dysuria   ? Hypothyroidism, unspecified type   ? Mitral valve stenosis, unspecified etiology   ? Aortic valve disease, rheumatic   ? Atherosclerosis of native coronary artery of native heart without angina pectoris   ? Essential hypertension with goal blood pressure less than 130/85   ? Pure hypercholesterolemia   ? Lightheadedness   ? Moderate malnutrition (H)   ? ALEENA (acute kidney injury) (H)   ? A-fib (H)   ? Heart failure with preserved ejection fraction (H)   ? S/P MVR (mitral valve repair)   ? Acute respiratory failure with hypoxemia (H)   ? Anemia due to blood loss, acute   ? Coagulopathy (H)   ? Non-English speaking patient   ? Thrombocytopenia (H)   ? Sepsis (H)   ? Atrial fibrillation with RVR (H)   ? Hypernatremia   ? Paralytic ileus (H)   ? Small bowel obstruction (H)   ? Acute cardioembolic stroke (H)   ? Respiratory failure (H)   ? Acute respiratory failure (H)   ? Tracheostomy care (H)   ? Intracerebral bleed (H)   ? Hemorrhagic stroke (H)   ? Atrial flutter with rapid ventricular response (H)   ? Encephalopathy   ? Metabolic encephalopathy   ? S/P AVR   ? H/O mitral valve replacement      Current Medications and Allergies:    Current Outpatient Medications:    ?  acetaminophen (TYLENOL) 500 MG tablet, Take 1 tablet (500 mg total) by mouth every 6 (six) hours as needed for pain., Disp: 20 tablet, Rfl: 0  ?  amiodarone (PACERONE) 200 MG tablet, 1 po bid, Disp: 60 tablet, Rfl: 3  ?  atorvastatin (LIPITOR) 10 MG tablet, 1 po qd, Disp: 30 tablet, Rfl: 6  ?  baclofen (LIORESAL) 10 MG tablet, TAKE 1 PILL BY MOUTH 2 TIMES EVERYDAY, Disp: 360 tablet, Rfl: 0  ?  ferrous sulfate 220 mg (44 mg iron)/5 mL solution, TAKE 7 ML (308 MG TOTAL) BY MOUTH 2 (TWO) TIMES A DAY., Disp: 473 mL, Rfl: 3  ?  furosemide (LASIX) 20 MG tablet, Take 1 tablet (20 mg total) by mouth daily as needed., Disp: 30 tablet, Rfl: 0  ?  gabapentin (NEURONTIN) 100 MG capsule, Take 100 mg by mouth 2 (two) times a day., Disp: , Rfl:   ?  levothyroxine (SYNTHROID, LEVOTHROID) 88 MCG tablet, TAKE 1 PILL BY MOUTH EVERYDAY FOR THYROID, Disp: 30 tablet, Rfl: 6  ?  magnesium oxide (MAG-OX) 400 mg (241.3 mg magnesium) tablet, 1 po qd, Disp: 30 tablet, Rfl: 3  ?  meclizine (ANTIVERT) 25 mg tablet, 1 po two times a day prn dizziness, Disp: 40 tablet, Rfl: 1  ?  metoprolol succinate (TOPROL-XL) 25 MG, Take 25 mg by mouth at bedtime.   , Disp: , Rfl:   ?  omeprazole (PRILOSEC) 20 MG capsule, TAKE 1 PILL BY MOUTH EVERYDAY FOR STOMACH, Disp: 90 capsule, Rfl: 2  ?  polyethylene glycol (GLYCOLAX) 17 gram/dose powder, 17 gm in 8 oz water daily, Disp: 510 g, Rfl: 6  ?  sertraline (ZOLOFT) 50 MG tablet, Take 1 tablet (50 mg total) by mouth daily., Disp: 30 tablet, Rfl: 2  ?  simethicone (MYLICON,GAS-X) 180 mg capsule, 1 po two times a day prn abdominal gas, Disp: 60 capsule, Rfl: 3  ?  tamsulosin (FLOMAX) 0.4 mg cap, Take 1 capsule (0.4 mg total) by mouth daily after supper., Disp: 30 capsule, Rfl: 5  ?  warfarin (COUMADIN/JANTOVEN) 1 MG tablet, Take 1 tablet (1 mg total) by mouth daily. Adjust dose based on INR results as directed., Disp: 90 tablet, Rfl: 1      Care Coordination Start Date: 10/14/2019   Frequency of Care  Coordination:     Form Last Updated: 10/14/2019

## 2021-06-19 NOTE — LETTER
Letter by Leilani Ignacio SW at      Author: Leilani Ignacio SW Service: -- Author Type: --    Filed:  Encounter Date: 8/28/2019 Status: (Other)       August 28, 2019    MIRELLA COLON  [address]  [place] [address]        Dear Gates:    At [facility], we are dedicated to improving your health and well-being. Enclosed is the Comprehensive Care Plan that we developed with you on 7/19/19. Please review the Care Plan carefully.    As a reminder, some of the things we discussed at your visit include:    Your physical and mental health    Ways to reduce falls    Health care needs you may have    Dont forget to contact your care coordinator if you:    Have been hospitalized or plan to be hospitalized     Have had a fall     Have experienced a change in physical health    Are experiencing emotional problems     If you do not agree with your Care Plan, have questions about it, or have experienced a change in your needs, please call me at [phone]. If you are hearing impaired, please call the [place] Relay at 006 or [phone] (pcnzeu-bn-qixvmu relay service).    Sincerely,          MISSAEL Lord    E-mail: [email or web address]  [phone]      [id number] [place] [id number]     [id number] (11/18)

## 2021-06-19 NOTE — LETTER
Letter by Leilani Ignacio SW at      Author: Leilani Ignacio SW Service: -- Author Type: --    Filed:  Encounter Date: 4/26/2019 Status: (Other)       April 26, 2019    MIRELLA COLON  [address]  [place] [address]        Dear Gates:    At [facility], we are dedicated to improving your health and well-being. Enclosed is the Comprehensive Care Plan that we developed with you on 4/18/19. Please review the Care Plan carefully.    As a reminder, some of the things we discussed at your visit include:    Your physical and mental health    Ways to reduce falls    Health care needs you may have    Dont forget to contact your care coordinator if you:    Have been hospitalized or plan to be hospitalized     Have had a fall     Have experienced a change in physical health    Are experiencing emotional problems     If you do not agree with your Care Plan, have questions about it, or have experienced a change in your needs, please call me at [phone]. If you are hearing impaired, please call the [place] Relay at 169 or [phone] (snqymg-mi-wbznoy relay service).    Sincerely,          MISSAEL Lord    E-mail: [email or web address]  [phone]      [id number] [place] [id number]     [id number] (11/18)

## 2021-06-19 NOTE — LETTER
Letter by Anusha Roque RN at      Author: Anusha Roque RN Service: -- Author Type: --    Filed:  Encounter Date: 10/14/2019 Status: Signed       CARE COORDINATION  [facility]   [address] [place] [address]    October 14, 2019    Kody Johns  [address]  [place] [address]      Dear Kody,    I am a clinic care coordinator who works with Aristides Alegria MD at [facility]. I wanted to thank you for spending the time to talk with me.  Below is a description of clinic care coordination and how I can further assist you.     The clinic care coordinator team is made up of a registered nurse,  and community health worker who understand the health care system. The goal of clinic care coordination is to help you manage your health and improve access to the health care system in the most efficient manner. The team can assist you in meeting your health care goals by providing education, coordinating services, strengthening the communication among your providers, assist you with any financial, behavioral, psychosocial, chemical dependency, counseling, and/or psychiatric resources if needed.    Please feel free to contact Ling, the Community Health Worker, at [phone] with any questions or concerns. We are focused on providing you with the highest-quality healthcare experience possible and that all starts with you.     Sincerely,   Anusha Roque  Enclosed: I have enclosed a copy of the Care Plan. This has helpful information and goals that we have talked about. Please keep this in an easy to access place to use as needed.

## 2021-06-19 NOTE — PROGRESS NOTES
Patient Outreach Follow Up:   [facility] contact patient and Crow, pt's prefer spoken person/brother/pca. Language line  service is use.  named Haja; ID#: [id number].     Spoke with Crow. Pt present per Crow. Crow and pt phone service is poor connection. Clarifying questions and answer twice. Pt is informed, [facility] is busy this morning and missed catch pt in clinic today. Checked with pt and Crow for most need. Update goals. Verified pca service. Encouraged pt continue f/u with the cardiologist and take good care of himself; attend the lower teeth removed appt tomorrow; contact [facility] in [place] for any questions or concerns. Crow, brother is educated and informed to assist with scheduling appt. Crow prefer Gates's prefer  to assist with appt scheduling; will contact [facility] if need. Crow confirmed no questions or concerns.      Crow stated:   -Pt get 8 hour of pca service daily; every day.   -I still his pca person. No changes with pca agency.   -still f/u with the cardiologist. Have no f/u appt schedule.   -My brother have bad teeth. Sometime he complaints pain. He will be having a lower teeth removed tomorrow, 07/27/18 at 12:00PM. Met with pcp this morning. No concerns.   -pt is doing fine. Live with family. Safe. No other concerns or questions.     Plan:   Next outreach due in three months.

## 2021-06-19 NOTE — PROGRESS NOTES
Preoperative Exam    Scheduled Procedure: Lower teeth extraction  Surgery Date:  7/27/2018  Surgery Location: [address] [address] Fax- [phone]    Surgeon:  Unknown    Assessment/Plan:     1. Preoperative examination     2. A-fib (H)     3. Aortic valve disease, rheumatic     4. Hypertension     5. Poor dentition           Surgical Procedure Risk: Low (reported cardiac risk generally < 1%)  Have you had prior anesthesia?: Yes  Have you or any family members had a previous anesthesia reaction:  No  Do you or any family members have a history of a clotting or bleeding disorder?: No  Cardiac Risk Assessment: no increased risk for major cardiac complications    Patient approved for surgery with general or local anesthesia.        Functional Status: Dependent: Brother cares for pt  Patient plans to recover at home with family.     Subjective:      Kody Johns is a 68 y.o. male who presents for a preoperative consultation.  This patient has mitral and aortic valvular disease, rheumatic, and will need upcoming valve surgery    He has poor dental hygiene and needed his teeth removed prior to the surgery.    He has had his upper teeth pulled previously and now needs the lower teeth pulled    Patient is on warfarin for atrial fibrillation.  He has held this.  His INR today, 7/26/2018, is 1.4.    He started on Lovenox, bridging on 7/24/2018.  He is on Lovenox 40 subcu in the mornings and 60 subcu in the p.m. his last dose prior to the procedure will be this morning, 7/26/2018.    Patient will resume warfarin after the procedure if okay with the dentist take 4 mg.  He has been instructed on warfarin moving forward.    Lovenox will be restarted 48 hours after the surgery, i.e. on 7/29/2018.  Again at 40 in the morning and 60 in the evening.    He should get another INR on 7/30/2018.    10 point review of systems reviewed and are negative, other than those listed in the  HPI.    Pertinent History  Do you have difficulty breathing or chest pain after walking up a flight of stairs: Yes: faster breathing  History of obstructive sleep apnea: Yes: uses breathe right strips  Steroid use in the last 6 months: No  Frequent Aspirin/NSAID use: No  Prior Blood Transfusion: No  Prior Blood Transfusion Reaction: No  If for some reason prior to, during or after the procedure, if it is medically indicated, would you be willing to have a blood transfusion?:  There is no transfusion refusal.    Current Outpatient Prescriptions   Medication Sig Dispense Refill     acetaminophen (TYLENOL) 500 MG tablet Take 500-1,000 mg by mouth every 6 (six) hours as needed for pain. Every 6-8 hours as needed. No more than 4,000MG of acetaminophen daily.       albuterol (PROAIR HFA;PROVENTIL HFA;VENTOLIN HFA) 90 mcg/actuation inhaler Inhale 1-2 puffs every 6 (six) hours as needed.       amoxicillin (AMOXIL) 500 MG capsule TAKE 4 TABLETS (2,000 mg) BY MOUTH 1 HOUR PRIOR TO PROCEDURE. 4 capsule 3     atorvastatin (LIPITOR) 40 MG tablet Take 1 tablet (40 mg total) by mouth at bedtime. 30 tablet 11     baclofen (LIORESAL) 10 MG tablet Take 1 tablet (10 mg total) by mouth 2 (two) times a day. 60 tablet 5     baclofen (LIORESAL) 10 MG tablet TAKE 1 TABLET BY MOUTH TWICE DAILY 60 tablet 5     bromfenac (PROLENSA) 0.07 % Drop 1 drop daily.       enoxaparin (LOVENOX) 40 mg/0.4 mL syringe Inject 0.4 mL (40 mg total) under the skin every morning. 10 Syringe 1     enoxaparin (LOVENOX) 60 mg/0.6 mL syringe Inject 0.6 mL (60 mg total) under the skin every evening. 10 Syringe 1     fluticasone (FLONASE) 50 mcg/actuation nasal spray 2 sprays into each nostril daily. 10 g 3     furosemide (LASIX) 20 MG tablet Take 1 tablet (20 mg total) by mouth daily. 30 tablet 3     levothyroxine (SYNTHROID, LEVOTHROID) 88 MCG tablet Take 1 tablet (88 mcg total) by mouth daily. 30 tablet 9     metoprolol succinate (TOPROL-XL) 25 MG Take 1 tablet  (25 mg total) by mouth daily. 90 tablet 1     nasal dilator (BREATHE RIGHT) Strp strip Apply qhs 30 strip 3     omeprazole (PRILOSEC) 20 MG capsule Take 20 mg by mouth daily before breakfast.       omeprazole (PRILOSEC) 20 MG capsule TAKE ONE CAPSULE BY MOUTH EVERY DAY 90 capsule 0     polyethylene glycol (MIRALAX) 17 gram packet Take 17 g by mouth daily as needed.        spironolactone (ALDACTONE) 25 MG tablet Take 25 mg by mouth daily.       traMADol (ULTRAM) 50 mg tablet Take 50 mg by mouth 2 (two) times a day as needed for pain.       warfarin (COUMADIN) 1 MG tablet TAKE 1-2 TABLETS BY MOUTH DAILY AS DIRECTED 90 tablet 1     warfarin (COUMADIN) 5 MG tablet 1 po qd initially then Adjust dose based on INR results as directed by coumadin nurse 30 tablet 1     No current facility-administered medications for this visit.         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     Dysphagia     Hyperlipidemia     Silent Stroke     Shoulder strain     Elevated LFTs     Dysuria     Hypothyroidism, unspecified type     Mitral valve stenosis, unspecified etiology     Aortic valve disease, rheumatic     Atherosclerosis of native coronary artery of native heart without angina pectoris     Essential hypertension with goal blood pressure less than 130/85     Pure hypercholesterolemia     Lightheadedness     Moderate malnutrition (H)     ALEENA (acute kidney injury) (H)     A-fib (H)     Heart failure with preserved ejection fraction (H)       Past Medical History:   Diagnosis Date     Palpitations        Past Surgical History:   Procedure Laterality Date     CATARACT EXTRACTION Left 06/13/2016   Teeth extraction    No history of anesthesia or bleeding problems.  In patient or in his family    Social History     Social History     Marital status:      Spouse name: Jesus  Andreas     Number of children: 4     Years of education: N/A     Occupational History     Not on file.     Social History Main Topics     Smoking status: Former Smoker     Packs/day: 1.00     Years: 50.00     Types: Cigarettes     Quit date: 8/29/2014     Smokeless tobacco: Former User      Comment: No Betel nut     Alcohol use No      Comment: Quit     Drug use: No     Sexual activity: Not on file     Other Topics Concern     Not on file     Social History Narrative    Refugee, born in [facility].           Objective:     Vitals:    07/26/18 1029   BP: 112/68   Pulse: 60   Resp: 16   Temp: 97.8  F (36.6  C)   TempSrc: Oral   SpO2: 100%   Weight: 107 lb (48.5 kg)         Physical Exam:  General appearance no acute distress    Previous surgery for upper teeth removed    Lower teeth in poor dental repair    Oropharynx otherwise negative neck without adenopathy    Lungs clear throughout no rales or rhonchi O2 sat was 100%.  Room air.    Heart was irregularly irregular he does have a systolic murmurs as before    Abdomen nontender    Extremities without edema    No joint redness warmth or swelling.          Labs:  Recent Results (from the past 24 hour(s))   INR    Collection Time: 07/26/18 12:00 AM   Result Value Ref Range    INR 1.40 (!) 0.9 - 1.1       Immunization History   Administered Date(s) Administered     Hep B, Peds or Adolescent 04/14/2015     Hep B, historic 10/21/2013, 12/31/2013     Influenza high dose, seasonal 08/31/2016, 09/11/2017     Influenza, inj, historic,unspecified 12/31/2013, 01/20/2014, 09/22/2014     Influenza, seasonal,quad inj 36+ mos 11/04/2015     Influenza,seasonal quad, PF 12/31/2013, 09/22/2014     Pneumo Conj 13-V (2010&after) 09/11/2017     Td, Adult, Absorbed 09/12/2012     Td, adult adsorbed, PF 04/14/2015     Tdap 01/20/2014     Varicella 04/14/2015           Electronically signed by Aristides Alegria MD 07/26/18 10:32 AM

## 2021-06-20 NOTE — LETTER
Letter by Jennifer Bingham CHW at      Author: Jennifer Bingham CHW Service: -- Author Type: --    Filed:  Encounter Date: 2/27/2020 Status: (Other)       CARE COORDINATION  [facility] [address] [place] [address]      February 27, 2020    Kody Johns  [address]  [place] [address]      Dear Kody,  I have been unsuccessful in reaching you since our last contact. At this time the Care Coordination team will make no further attempts to reach you, however this does not change your ability to continue receiving care from your providers at your primary care clinic. If you need additional support from a care coordinator in the future please contact Ling at [phone].    All of us at [facility] are invested in your health and are here to assist you in meeting your goals.     Sincerely,    Jennifer Community Health Worker

## 2021-06-20 NOTE — LETTER
Letter by Leilani Ignacio SW at      Author: Leilani Ignacio SW Service: -- Author Type: --    Filed:  Encounter Date: 6/17/2020 Status: (Other)       June 17, 2020    MIRELLA COLON  [address]  [place] [address]        Dear Gates:    At [facility], we are dedicated to improving your health and well-being. Enclosed is the Comprehensive Care Plan that we developed with you on 6/10/20. Please review the Care Plan carefully.    As a reminder, some of the things we discussed at your visit include:    Your physical and mental health    Ways to reduce falls    Health care needs you may have    Dont forget to contact your care coordinator if you:    Have been hospitalized or plan to be hospitalized     Have had a fall     Have experienced a change in physical health    Are experiencing emotional problems     If you do not agree with your Care Plan, have questions about it, or have experienced a change in your needs, please call me at [phone]. If you are hearing impaired, please call the [place] Relay at 793 or [phone] (itclea-tv-lqvzla relay service).    Sincerely,          MISSAEL Lord    E-mail: [email or web address]  [phone]      [id number] [place] [id number]     [id number] (11/18)

## 2021-06-21 NOTE — PROGRESS NOTES
11-05-18 at 3:35PM:   Met with in clinic today. Patient is accompanied by his family member named Mark. Mark confirmed relationship to patient. Same  assigned with pcp is use.  named: Lionel REED, with [facility]. Patient confirmed Lionel is his prefer alternative . Lionel shared his phone # [phone] and add to the Care Teams as FYI.    Update action steps in the goals. Questioned pt regards to pca and home skilled nursing service status.   CG phone # is given to Mark. Mark confirmed will contact CG for update about # of pca hour and Jim's phone #.   Encouraged pt to continue follow up with his cardiologist, pcp, and take all med as prescribe.   Offered Thanksgiving Meals on Wheels program. Patient agreed. Set two new goals. Refer patient to discuss medical insurance coverage/plan regards to dentures for [facility] with [facility]. Informed pt that CG will contact Mark for delivery date and time for the thanksgiving dinner. Pt agreed. Mark and pt stated the following and confirmed no further questions.     New goal(s):   -Goal: I would like to be approved for a upper and lower dentures in the next 2-4 months.   -Goal: I need help with registration for Thanksgiving Meals on Wheels dinner for the family.    Mark stated:   -Kody has both upper and lower teeth extraction/removal about 1 1/2 months ago.    -live with family and brother's.   -Med box set up by home skilled nurse once a week; every Monday or Tuesday. Nurse will call me to schedule appt time. Kody is pretty good at taking his med from the pill box. Family member assist if need.   -PCA person change. No longer Crow, pt's brother.   -New PCA person named Jim Johns. Relationship to Kody is son. Forgot his phone #. Will call you (CG) to update number of hour service and Jim's phone #.     Patient stated:   -Has both (upper & lower) teeth pulled. No teeth at this time. Waiting for both sides to  heal. Plan to get denture for upper and lower side.   -have been eating soft and liquid foods for months now. Still feel some pain.   -have never register for Thanksgiving meals on wheels. This will be the first time. Will be really nice to have a family dinner for Thanksgivings day.   -son is my pca person.  -Mark is my prefer . Okayed to leave my information details in Mark phone.     Plan:   [place] patient for meals on wheels program.

## 2021-06-21 NOTE — PROGRESS NOTES
11-06-18 at 9:17AM:   Writer contact [employer] and spoke with a female representative.   Patient full name, phone #, and address ([facility] [place] [address]) is provide due to delivery purposes.   Registered 9 people in the house.   Confirmed pt is registered for Thanksgiving Meals on Wheels. Delivery date: November 22, 2018; Delivery time: between 8:00AM to 12:00PM.     Plan:   Will remind pt or Mark on 11-19 for the thanksgiving meals on wheels.

## 2021-06-21 NOTE — LETTER
Letter by Leilani Ignacio SW at      Author: Leilani Ignacio SW Service: -- Author Type: --    Filed:  Encounter Date: 5/12/2021 Status: (Other)       May 12, 2021    MIRELLA COLON  [address]  [place] [facility] [address]        Dear Gates:    At [facility], we are dedicated to improving your health and well-being. Enclosed is the Comprehensive Care Plan that we developed with you on 5/7/21. Please review the Care Plan carefully.    As a reminder, some of the things we discussed at your visit include:    Your physical and mental health    Ways to reduce falls    Health care needs you may have    Dont forget to contact your care coordinator if you:    Have been hospitalized or plan to be hospitalized     Have had a fall     Have experienced a change in physical health    Are experiencing emotional problems     If you do not agree with your Care Plan, have questions about it, or have experienced a change in your needs, please call me at [phone]. If you are hearing impaired, please call the [place] [employer] at 833 or [phone] ([id number]-[id number] relay service).    Sincerely,          MISSAEL Lord    E-mail: [email or web address]  [phone]      Madhavi Villatoro                [id number] ([id number]     [id number] (11/18)

## 2021-06-21 NOTE — LETTER
Letter by Murali Sosa MD at      Author: Murali Sosa MD Service: -- Author Type: --    Filed:  Encounter Date: 12/8/2020 Status: (Other)                    My Depression Action Plan  Name: Koyd Johns   Date of Birth 1950  Date: 12/11/2020    My Doctor: Aristides Alegria MD   My Clinic: [address] [address]  [phone]          GREEN    ZONE   Good Control    What it looks like:     Things are going generally well. You have normal ups and downs. You may even feel depressed from time to time, but bad moods usually last less than a day.   What you need to do:  1. Continue to care for yourself (see self care plan)  2. Check your depression survival kit and update it as needed  3. Follow your physicians recommendations including any medication.  4. Do not stop taking medication unless you consult with your physician first.           YELLOW         ZONE Getting Worse    What it looks like:     Depression is starting to interfere with your life.     It may be hard to get out of bed; you may be starting to isolate yourself from others.    Symptoms of depression are starting to last most all day and this has happened for several days.     You may have suicidal thoughts but they are not constant.   What you need to do:     1. Call your care team. Your response to treatment will improve if you keep your care team informed of your progress. Yellow periods are signs an adjustment may need to be made.     2. Continue your self-care.  Just get dressed and ready for the day.  Don't give yourself time to talk yourself out of it.    3. Talk to someone in your support network.    4. Open up your depression Depression Self-Care Plan / Wellness kit.           RED    ZONE Medical Alert - Get Help    What it looks like:     Depression is seriously interfering with your life.     You may experience these or other symptoms: You cant get out of bed most  days, cant work or engage in other necessary activities, you have trouble taking care of basic hygiene, or basic responsibilities, thoughts of suicide or death that will not go away, self-injurious behavior.     What you need to do:  1. Call your care team and request a same-day appointment. If they are not available (weekends or after hours) call your local crisis line, emergency room or 911.            Self-Care Plan / Wellness Kit    Self-Care for Depression  Heres the deal. Your body and mind are really not as separate as most people think.  What you do and think affects how you feel and how you feel influences what you do and think. This means if you do things that people who feel good do, it will help you feel better.  Sometimes this is all it takes.  There is also a place for medication and therapy depending on how severe your depression is, so be sure to consult with your medical provider and/ or Behavioral Health Consultant if your symptoms are worsening or not improving.     In order to better manage my stress, I will:    Exercise  Get some form of exercise, every day. This will help reduce pain and release endorphins, the feel good chemicals in your brain. This is almost as good as taking antidepressants!  This is not the same as joining a gym and then never going! (they count on that by the way?) It can be as simple as just going for a walk or doing some gardening, anything that will get you moving.      Hygiene   Maintain good hygiene (get out of bed in the morning, make your bed, brush your teeth, take a shower, and get dressed like you were going to work, even if you are unemployed).  If your clothes don't fit try to get ones that do.    Diet  Strive to eat foods that are good for me, drink plenty of water, and avoid excessive sugar, caffeine, alcohol, and other mood-altering substances.  Some foods that are helpful in depression are: complex carbohydrates, B vitamins, flaxseed, fish or fish oil,  fresh fruits and vegetables.    Psychotherapy  Agree to participate in Individual Therapy (if recommended).    Medication  If prescribed medications, I agree to take them.  Missing doses can result in serious side effects.  I understand that drinking alcohol, or other illicit drug use, may cause potential side effects.  I will not stop my medication abruptly without first discussing it with my provider.    Staying Connected With Others  Stay in touch with my friends, family members, and my primary care provider/team.    Use your imagination  Be creative.  We all have a creative side; it doesnt matter if its oil painting, sand castles, or mud pies! This will also kick up the endorphins.    Witness Beauty  (AKA stop and smell the roses) Take a look outside, even in mid-winter. Notice colors, textures. Watch the squirrels and birds.     Service to others  Be of service to others.  There is always someone else in need.  By helping others we can get out of ourselves and remember the really important things.  This also provides opportunities for practicing all the other parts of the program.    Humor  Laugh and be silly!  Adjust your TV habits for less news and crime-drama and more comedy.    Control your stress  Try breathing deep, massage therapy, biofeedback, and meditation. Find time to relax each day.     Crisis Text Line  http://www.crisistextline.org    The Crisis Text Line serves anyone, in any type of crisis, providing access to free, 24/7 support and information via the medium people already use and trust:    Here's how it works:  1.  Text 357-469 from anywhere in the [place], anytime, about any type of crisis.  2.  A live, trained Crisis Counselor receives the text and responds quickly.  3.  The volunteer Crisis Counselor will help you move from a 'hot moment to a cool moment'.  My support system    Clinic Contact:  Phone number:    Contact 1:  Phone number:    Contact 2:  Phone number:    Quaker/spiritual  advisor:  Phone number:    Therapist:  Phone number:    [facility] center:    Phone number:    Other community support:  Phone number:

## 2021-06-21 NOTE — PROGRESS NOTES
Thank you for asking the [facility] team to see Kody Johns      Assessment/Plan:     Multivalve disease - will arrange for f/u with Dr. Vaughn. He is nervous about surgery but understands that it needs to be done. Continue furosemide, spironolactone, toprol XL.    Afib - rate controlled on exam today. On coumadin and low dose toprol XL.       Current History:   Kody Johns is a 68 y.o. with [place] [facility] and has had heart failure and valve disease as well as mild coronary disease by angiogram in 2015.   Right and left heart catheterization showed a mitral valve gradient of 7-8 mmHg with simultaneous pulmonary capillary wedge pressure and LVEDP. He has also had atrial fibrillation with RVR and acute heart failure secondary to mitral regurgitation. Echo on August 29, 2017 showed ejection fraction of 55%, moderate to severe mitral regurgitation (rheumatic heart disease), moderate aortic regurgitation, and mild aortic stenosis. He was seen by Dr. Manriquez who thought he was not a candidate for percutaneous valve replacement. He had been in normal sinus rhythm since November 2017.    He returns for f/u today. He had all of his teeth pulled this summer but has not followed up with Dr. Vaughn yet. He notes some dyspnea first thing in the morning that improves with nebs. He feels that his heart races in the morning as well. He denies pnd/orthopnea, edema or early satiety.     Past Medical History:     Past Medical History:   Diagnosis Date     Atrial fibrillation (H)      CHF (congestive heart failure) (H)      Coronary artery disease      Heart murmur      Hyperlipidemia      Palpitations      Stroke (H)      Valvular disease        Past Surgical History:     Past Surgical History:   Procedure Laterality Date     CARDIAC CATHETERIZATION       CATARACT EXTRACTION Left 06/13/2016       Family History:     Family History   Problem Relation Age of Onset     No Medical Problems Mother      No  Medical Problems Father      Heart disease Neg Hx        Social History:    reports that he quit smoking about 4 years ago. His smoking use included cigarettes. He has a 50.00 pack-year smoking history. He has quit using smokeless tobacco. He reports that he does not drink alcohol or use drugs.    Meds:     Current Outpatient Medications   Medication Sig Note     albuterol (PROAIR HFA;PROVENTIL HFA;VENTOLIN HFA) 90 mcg/actuation inhaler Inhale 1-2 puffs every 6 (six) hours as needed.      amoxicillin (AMOXIL) 500 MG capsule TAKE 4 TABLETS (2,000 mg) BY MOUTH 1 HOUR PRIOR TO PROCEDURE.      atorvastatin (LIPITOR) 40 MG tablet Take 1 tablet (40 mg total) by mouth at bedtime.      fluticasone (FLONASE) 50 mcg/actuation nasal spray 2 sprays into each nostril daily. 8/29/2017: 8/29/17: pt's brother states that he ran out of this medication a few days ago but is still supposed to be taking it.      furosemide (LASIX) 20 MG tablet TAKE 1 TABLET (20 MG TOTAL) BY MOUTH DAILY FOR EDEMA      ibuprofen (ADVIL,MOTRIN) 600 MG tablet Take 600 mg by mouth every 6 (six) hours as needed for pain.      levothyroxine (SYNTHROID, LEVOTHROID) 88 MCG tablet Take 1 tablet (88 mcg total) by mouth daily.      metoprolol succinate (TOPROL-XL) 25 MG Take 1 tablet (25 mg total) by mouth daily.      nasal dilator (BREATHE RIGHT) Strp strip Apply qhs      omeprazole (PRILOSEC) 20 MG capsule Take 20 mg by mouth daily before breakfast.      omeprazole (PRILOSEC) 20 MG capsule TAKE ONE CAPSULE BY MOUTH EVERY DAY      omeprazole (PRILOSEC) 20 MG capsule TAKE 1 PILL BY MOUTH EVERYDAY FOR STOMACH      polyethylene glycol (MIRALAX) 17 gram packet Take 17 g by mouth daily as needed.       spironolactone (ALDACTONE) 25 MG tablet TAKE 1 PILL BY MOUTH EVERYDAY      traMADol (ULTRAM) 50 mg tablet Take 50 mg by mouth 2 (two) times a day as needed for pain.      warfarin (COUMADIN) 1 MG tablet Take 1 to 2 tablets (1 to 2 mg) by mouth daily. Adjust dose based  "on INR results as directed.      acetaminophen (TYLENOL) 500 MG tablet Take 500-1,000 mg by mouth every 6 (six) hours as needed for pain. Every 6-8 hours as needed. No more than 4,000MG of acetaminophen daily.      baclofen (LIORESAL) 10 MG tablet TAKE 1 TABLET BY MOUTH TWICE DAILY      bromfenac (PROLENSA) 0.07 % Drop 1 drop daily.        Allergies:   Patient has no known allergies.    Review of Systems:   Review of Systems:   General: WNL        Lungs: WNL  Heart: Chest Pain, Arm Pain, Irregular Heartbeat, Shortness of Breath with activity(palpations)  Stomach: Constipation  Bladder: Frequent Urination at Night  Muscle/Joints: Muscle Weakness, Muscle Pain  Skin: WNL  Nervous System: Daytime Sleepiness, Dizziness, Loss of Balance  Mental Health: WNL     Blood: WNL       Objective:      Physical Exam  @LASTENCWT:3@  4' 9.76\" (1.467 m)  @BMI:3@  /60 (Patient Site: Left Arm, Patient Position: Sitting, Cuff Size: Adult Regular)   Pulse 84   Resp 24   Ht 4' 9.76\" (1.467 m)   Wt 107 lb 3.2 oz (48.6 kg)   BMI 22.59 kg/m      General Appearance:   Alert, cooperative and in no acute distress.   HEENT:  No scleral icterus; the mucous membranes were pink and moist.   Neck: JVP flat. No thyromegaly. No HJR   Chest: The spine was straight. The chest was symmetric.   Lungs:   Respirations unlabored; the lungs are clear to auscultation.   Cardiovascular:   S1 and S2 normal and with systolic and diastolic murmurs. No clicks or rubs. No carotid bruits noted. Right DP, PT, and radial pulses 2+. Left DP, PT, and radial pulses 2+.   Abdomen:  No organomegaly, masses, bruits, or tenderness. Bowels sounds are present   Extremities: No cyanosis, clubbing, or edema.   Skin: No xanthelasma.   Neurologic: Mood and affect are appropriate.         Lab Review   Lab Results   Component Value Date     11/05/2018     09/14/2017     09/06/2017    K 3.4 (L) 11/05/2018    K 4.5 09/14/2017    K 4.2 09/06/2017     " (H) 11/05/2018     (H) 09/14/2017     09/06/2017    CO2 21 (L) 11/05/2018    CO2 23 09/14/2017    CO2 25 09/06/2017    BUN 14 11/05/2018    BUN 18 09/14/2017    BUN 30 (H) 09/06/2017    CREATININE 0.81 11/05/2018    CREATININE 0.91 06/22/2018    CREATININE 0.88 09/14/2017    CALCIUM 9.0 11/05/2018    CALCIUM 9.3 09/14/2017    CALCIUM 9.5 09/06/2017     Lab Results   Component Value Date    WBC 7.3 09/06/2017    WBC 10.6 08/29/2017    WBC 6.4 07/20/2017    HGB 17.0 09/06/2017    HGB 16.8 08/29/2017    HGB 16.1 07/20/2017    HCT 52.8 09/06/2017    HCT 47.9 08/29/2017    HCT 47.0 07/20/2017    MCV 94 09/06/2017    MCV 90 08/29/2017    MCV 93 07/20/2017     09/06/2017     08/29/2017     07/20/2017     Lab Results   Component Value Date    CHOL 115 11/05/2018    CHOL 185 07/20/2017    CHOL 147 02/10/2016    TRIG 62 11/05/2018    TRIG 83 07/20/2017    TRIG 121 02/10/2016    HDL 34 (L) 11/05/2018    HDL 34 (L) 07/20/2017    HDL 33 (L) 02/10/2016    LDLDIRECT 111 10/13/2016    LDLDIRECT 94 11/24/2014    LDLDIRECT 101 05/08/2014     Lab Results   Component Value Date     (H) 08/29/2017     (H) 04/02/2014         Ashleigh Lang M.D.

## 2021-06-21 NOTE — LETTER
Letter by Aristides Alegria MD at      Author: Aristides Alegria MD Service: -- Author Type: --    Filed:  Encounter Date: 3/17/2021 Status: (Other)         Re: Kody Johns    YOB: 1950        Rx: Hospital bed    Diagnosis: Cardiomyopathy, 36% ejection fraction.  Status post mitral valve replacement status post aortic valve replacement.  Atrial fib/flutter    History of CVA with left hemiparesis        This patient has worsening of his congestive heart failure he has orthopnea.  He has weakness from his CVA with left-sided hemiparesis    He needs frequent positioning changes in bed    Also because the orthopnea needs the head of the bed elevated, as well as some elevation for his lower extremities.    He requires a hospital bed.        Aristides Alegria MD    Electronically signed on 3/17/2021

## 2021-06-21 NOTE — SIGNIFICANT EVENT
Significant Event by Brandon Palacios MD at 4/25/2019  3:07 PM     Author: Brandon Palacios MD Service: Family Medicine Author Type: Resident    Filed: 4/25/2019  3:46 PM Date of Service: 4/25/2019  3:07 PM Status: Attested    : Brandon Palacios MD (Resident) Cosigner: Rafael Omer MD at 4/25/2019  3:52 PM    Attestation signed by Rafael Omer MD at 4/25/2019  3:52 PM    Agree with above  Neurology consult placed  Not tPA candidate   Will ask IR if thrombectomy candidate?  Vitals stable                STROKE CODE NOTE    The house officer was paged at 14:27 for a stroke code called for Kody Johns.    Subjective/Interval Events:  Kody Johns is a 69 y.o. male with a past medical history that includes rheumatic valvular disease, atrial fibrillation, and pericardial adhesions who underwent aortic valve replacement and mitral valve replacement with tissue valves, as well as takedown of pericardial adhesions on 4/24/2019.  Patient is currently in the ICU intubated and on mechanical ventilation.  At 14:27 nursing staff noted patient's left pupil was deviated superiorly and laterally.  Stroke code was called.    Objective:  Vitals:    04/25/19 1400   BP:    Pulse: 84   Resp:    Temp:    SpO2: 95%     PHYSICAL EXAM:  GENERAL: Lying in ICU bed, intubated on mechanical ventilation.   HEENT: Right pupil is round and reactive to light; left pupil is deviated superiorly and laterally.  LUNGS: Intubated and on mechanical ventilation  NEUROLOGIC: Not responsive to voice.  Opens right eyelid spontaneously, does not open left eyelid spontaneously.  Left pupil is round, reactive to light, and deviated superiorly and laterally.  Right pupil is round and reactive to light.    Blood glucose: 131    NIH Stroke Scale  Interval: Baseline  Time: 14:35  Person Administering Scale: Brandon Palacios MD     1a  Level of consciousness: Unable to assess given sedated state   1b. LOC questions:  Unable to assess given sedated  state   1c. LOC commands: Unable to assess given sedated state   2.  Best Gaze: 2=forced deviation, or total gaze paresis not overcome by oculocephalic maneuver   3. Visual: Unable to assess given sedated state   4. Facial Palsy: Unable to assess given sedated state   5a. Motor left arm: Unable to assess given sedated state   5b.  Motor right arm: Unable to assess given sedated state   6a. motor left leg: Unable to assess given sedated state   6b  Motor right leg:  Unable to assess given sedated state   7. Limb Ataxia: Unable to assess given sedated state   8.  Sensory: Unable to assess given sedated state   9. Best Language:  Unable to assess given sedated state   10. Dysarthria: Unable to assess given sedated state   11. Extinction and Inattention: Unable to assess given sedated state   12. Distal motor function: Unable to assess given sedated state    Total:   2 (limited by sedated state)       Assessment/Plan:  Kody Johns is a 69 y.o. male admitted for aortic valve replacement and mitral valve replacement with takedown of pericardial adhesions on 4/24/2019 who developed left pupil deviation superiorly and laterally, as well as decreased movement of the left upper and lower extremity on 4/25/2019, prompting a stroke code to be called. CT revealed no acute intracranial abnormality, including hemorrhage, mass, or evidence of ischemia. CTA head/neck revealed a small occlusion of the right posterior cerebral artery of the T2 branch and was otherwise unremarkable.     After discussion with the ED provider and neurologist, the decision was made to not give tPA due to his history of open heart surgery on 4/24/2019.  The house officer signed off to the attending physician and on-call neurologist.      Brandon Palacios, PGY-1  [facility]  04/25/19  Pager: [phone]

## 2021-06-21 NOTE — PROGRESS NOTES
Subjective: This patient comes in for follow-up evaluation.  Patient had his teeth pulled over the summer please see previous discussions regarding as he sees cardiology, Dr. Lang.  Plan was to go ahead with valve surgery.  He has a tight valvular problems.    He has had some increased shortness of breath no chest pressure but has noticed some decreased exercise tolerance    He continues on Lasix spironolactone metoprolol    Also takes levothyroxine 88 mcg and atorvastatin 40 mg a day I did check labs today with CMP T4 TSH and lipid.    Patient's not smoking.    No productive cough no fever chills    Denies any rashes    No bowel or bladder issues.        Tobacco status: He  reports that he quit smoking about 4 years ago. His smoking use included Cigarettes. He has a 50.00 pack-year smoking history. He has quit using smokeless tobacco.    Patient Active Problem List    Diagnosis Date Noted     A-fib (H) 09/14/2017     Heart failure with preserved ejection fraction (H) 09/14/2017     Moderate malnutrition (H) 08/31/2017     ALEENA (acute kidney injury) (H) 08/31/2017     Aortic valve disease, rheumatic 08/30/2017     Lightheadedness 08/30/2017     Atherosclerosis of native coronary artery of native heart without angina pectoris      Essential hypertension with goal blood pressure less than 130/85      Pure hypercholesterolemia      Dysuria 08/29/2017     Hypothyroidism, unspecified type      Mitral valve stenosis, unspecified etiology      Elevated LFTs 08/26/2016     Silent Stroke      Shoulder strain      Dysphagia      Hyperlipidemia      Rheumatic heart disease      Blurry vision      Hearing loss      Nonspecific reaction to 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 Every 6-8 hours as needed. No more than 4,000MG of  acetaminophen daily.       albuterol (PROAIR HFA;PROVENTIL HFA;VENTOLIN HFA) 90 mcg/actuation inhaler Inhale 1-2 puffs every 6 (six) hours as needed. 1 Inhaler 5     amoxicillin (AMOXIL) 500 MG capsule TAKE 4 TABLETS (2,000 mg) BY MOUTH 1 HOUR PRIOR TO PROCEDURE. 4 capsule 3     atorvastatin (LIPITOR) 40 MG tablet Take 1 tablet (40 mg total) by mouth at bedtime. 30 tablet 11     baclofen (LIORESAL) 10 MG tablet TAKE 1 TABLET BY MOUTH TWICE DAILY 60 tablet 5     bromfenac (PROLENSA) 0.07 % Drop 1 drop daily.       fluticasone (FLONASE) 50 mcg/actuation nasal spray 2 sprays into each nostril daily. 10 g 3     furosemide (LASIX) 20 MG tablet TAKE 1 TABLET (20 MG TOTAL) BY MOUTH DAILY FOR EDEMA 90 tablet 0     levothyroxine (SYNTHROID, LEVOTHROID) 88 MCG tablet Take 1 tablet (88 mcg total) by mouth daily. 30 tablet 9     metoprolol succinate (TOPROL-XL) 25 MG Take 1 tablet (25 mg total) by mouth daily. 90 tablet 1     nasal dilator (BREATHE RIGHT) Strp strip Apply qhs 30 strip 3     omeprazole (PRILOSEC) 20 MG capsule Take 20 mg by mouth daily before breakfast.       omeprazole (PRILOSEC) 20 MG capsule TAKE ONE CAPSULE BY MOUTH EVERY DAY 90 capsule 0     omeprazole (PRILOSEC) 20 MG capsule TAKE 1 PILL BY MOUTH EVERYDAY FOR STOMACH 30 capsule 8     polyethylene glycol (MIRALAX) 17 gram packet Take 17 g by mouth daily as needed.        spironolactone (ALDACTONE) 25 MG tablet TAKE 1 PILL BY MOUTH EVERYDAY 30 tablet 8     traMADol (ULTRAM) 50 mg tablet Take 50 mg by mouth 2 (two) times a day as needed for pain.       warfarin (COUMADIN) 1 MG tablet Take 1 to 2 tablets (1 to 2 mg) by mouth daily. Adjust dose based on INR results as directed. 130 tablet 1     No current facility-administered medications for this visit.        ROS:   10 point review of systems positive as outlined above    Objective:    /76 (Patient Site: Left Arm, Patient Position: Sitting, Cuff Size: Adult Regular)  Pulse 76  Temp 97.6  F (36.4  C)  (Oral)   Resp 16  Wt 109 lb (49.4 kg)  SpO2 98%  BMI 22.98 kg/m2  Body mass index is 22.98 kg/(m^2).    General appearance no acute distress    HEENT neck is supple no JVD oropharynx was significant for the removal of his teeth.    Lungs were clear no rales or rhonchi O2 sat 98%    Heart rate was in the 70s.  He does have the murmur right upper sternal border as before    Abdomen nontender no guarding or rebound    Extremities without significant edema.    Skin was otherwise normal no rashes no joint redness warmth or swelling.    AST and ALT at 51 potassium at 3.4 LDL 69 therapeutic T4 TSH    Results for orders placed or performed in visit on 11/05/18   Comprehensive Metabolic Panel   Result Value Ref Range    Sodium 142 136 - 145 mmol/L    Potassium 3.4 (L) 3.5 - 5.0 mmol/L    Chloride 108 (H) 98 - 107 mmol/L    CO2 21 (L) 22 - 31 mmol/L    Anion Gap, Calculation 13 5 - 18 mmol/L    Glucose 75 70 - 125 mg/dL    BUN 14 8 - 22 mg/dL    Creatinine 0.81 0.70 - 1.30 mg/dL    GFR MDRD Af Amer >60 >60 mL/min/1.73m2    GFR MDRD Non Af Amer >60 >60 mL/min/1.73m2    Bilirubin, Total 1.4 (H) 0.0 - 1.0 mg/dL    Calcium 9.0 8.5 - 10.5 mg/dL    Protein, Total 7.8 6.0 - 8.0 g/dL    Albumin 3.6 3.5 - 5.0 g/dL    Alkaline Phosphatase 156 (H) 45 - 120 U/L    AST 51 (H) 0 - 40 U/L    ALT 51 (H) 0 - 45 U/L   Lipid Cascade RANDOM   Result Value Ref Range    Cholesterol 115 <=199 mg/dL    Triglycerides 62 <=149 mg/dL    HDL Cholesterol 34 (L) >=40 mg/dL    LDL Calculated 69 <=129 mg/dL    Patient Fasting > 8hrs? No    Thyroid Stimulating Hormone (TSH)   Result Value Ref Range    TSH 0.73 0.30 - 5.00 uIU/mL   T4, Total   Result Value Ref Range    T4, Total 9.0 4.5 - 13.0 ug/dL       Assessment:  1. Aortic valve disease, rheumatic  Comprehensive Metabolic Panel    [facility]   2. A-fib (H)  [facility] referral to [facility]   3. Heart failure with preserved ejection fraction (H)  Comprehensive  Metabolic Panel    Amb[facility] referral to [facility]   4. Hypothyroidism, unspecified type  Thyroid Stimulating Hormone (TSH)    T4, Total   5. Atherosclerosis of native coronary artery of native heart without angina pectoris  Ambulatory referral to [facility]   6. Pure hypercholesterolemia  Comprehensive Metabolic Panel    Lipid Cascade RANDOM   7. Flu vaccine need  Influenza High Dose, Seasonal     Rheumatic valvular heart disease.  Needs to follow-up with cardiology unable to get into see primary cardiologist until January.  We will see the [facility].    Hypothyroid therapeutic dose at 88 mcg    Hyperlipidemia stable LDL at 69 continue on the 40 mg of atorvastatin.    Hypertension controlled    Plan: As outlined await recommendations and timing on surgery    Flu shot was also given    This transcription uses voice recognition software, which may contain typographical errors.

## 2021-06-21 NOTE — LETTER
Letter by Murali Sosa MD at      Author: Murali Sosa MD Service: -- Author Type: --    Filed:  Encounter Date: 12/8/2020 Status: (Other)       My Heart Failure Action Plan   Name: Kody Johns    YOB: 1950   Date: 12/11/2020    My doctor: Aristides Alegria MD     [address] [address]  [phone]  My Diagnosis: Preserved (HFp)- EF:Over 50%   My Exercise Goal: 30 minutes daily  .     My Weight Plan:   Wt Readings from Last 2 Encounters:   12/08/20 102 lb (46.3 kg)   02/10/20 104 lb 4.8 oz (47.3 kg)     Weigh yourself daily using the same scale. If you gain more than 2 pounds in 24 hours or 5 pounds in a week call the clinic    My Diet Goal: No added salt    Emergency Room Visits:    Our goal is to improve your quality of life and help you avoid a visit to the emergency room or hospital.  If we work together, we can achieve this goal. But, if you feel you need to call 911 or go to the emergency room, please do so.  If you go to the emergency room, please bring your list of medicines and your daily weight chart with you.       GREEN ZONE     Doing well today    Weight gained is no more than 2 pounds a day or 5 pounds a week.    No swelling in feet, ankles, legs or stomach.    No more swelling than usual.    No more trouble breathing than usual.    No change in my sleep.    No other problems. Actions:    I am doing fine.  I will take my medicine, follow my diet, see my doctor, exercise, and watch for symptoms.           YELLOW ZONE         Having a bad day or flare up    Weight gain of more than 2 pounds in one day or 5 pounds in one week.    New swelling in ankle, leg, knee or thigh.    Bloating in belly, pants feel tighter.    Swelling in hands or face.    Coughing or trouble breathing while walking or talking.    Harder to breathe last night.    Have trouble sleeping, wake up short of breath.    Much more tired than  usual.    Not eating.    Pain in my chest or bad leg cramps.    Feel weak or dizzy. Actions:    I need to take action and call my doctor or nurse today.                 RED ZONE         Need medical care now    Weight gain of 5 pounds overnight.    Chest pain or pressure that does not go away.    Feel less alert.    Wheezing or have trouble breathing when at rest.    Cannot sleep lying down.    Cannot take my water pill.    Pass out or faint. Actions:    I need to call my doctor or nurse now!    Call 911 if I have chest pain or cannot breathe.

## 2021-06-22 NOTE — TELEPHONE ENCOUNTER
INR result is 3.5  INR   Date Value Ref Range Status   12/18/2018 2.80 (!) 0.9 - 1.1 Final       Will the patient be seen, or did they already see, MD or CNP today? No    Most Recent Warfarin dose day/week  Sunday Monday Tuesday Wednesday Thursday Friday Saturday      1 2 1 2     Sunday Monday Tuesday Wednesday Thursday Friday Saturday   2 1 2           Has the patient missed any doses of Coumadin, Warfarin, Jantoven in the past 7 days? No    Has the patients medications changed since the last visit? No    Has the patient experienced any bleeding recently? No    Has the patient experienced any injuries or illness recently? No    Has the patient experienced any 'new' shortness of breath, severe headaches, or changes in vision recently?  Yes, shortness of breath, however, not new problem    Has the patient had any changes in their diet, or alcohol consumption? No    Is the patient here today to prepare for any type of upcoming surgery, procedure, or for a cardioversion procedure? No    What phone number can we reach the patient at today?  Jose REYES [facility]

## 2021-06-22 NOTE — PROGRESS NOTES
Kody Johns  [address] [place] [address]  [phone] (home)     Primary cardiologist:  Dr. Lang  Procedure:  Coronary Angiogram   H&P completed by:  Dr. Lang 11/15  Case MD:  LATASHA, or TEX  Admit date and time:  11/30 6 am  Ordering MD:  Crystal Rasheed NP  Diagnosis:  Aortic valve disease, rheumatic; mitral valve stenosis   Anticoagulation: Coumadin Pt did not hold coumadin as instructed on 11/27. INR On 11/27 was 1.90  CPAP: No  Bypass Grafts: No  Renal Issues: No  Allergies: No Known Allergies  Diabetic?: No  Device?: No      Angiogram Teaching    Reason for Visit:  Patient seen for pre-procedure education in preparation for: Coronary Angiogram    Procedure Prep:  Cardiologist note dated: 11/15  EKG results obtained, dated: Am of procedure  Hemogram results obtained: Am of procedure  Basic Metabolic Panel results obtained: Am of procedure  Lipid Profile results obtained: Am of procedure    Patient Education  Explained indications/risks for diagnostic evaluation, including one or more of the following:  left heart catheterization, right heart catheterization, coronary angiogram, less than 0.1% of acute myocardial infarction and CVA or death or any of the following to the degree that it could threaten life:  allergic reaction, arrhythmia, renal failure, hemorrhage, thrombosis and infection  Explained indications/risks for therapeutic interventions, including one or more of the following: PTCA, artherectomy, stent, intravascular ultrasound, 2% or less risk of MI, less than 1% risk of CVA, emergency heart surgery, death, less than 4 % risk of vascular injury requiring surgical repair or blood transfusion, 20-30% risk of restonosis with a bare metal stent, less than 10% risk of restonosis with a drug-eluting stent, 0.5-1% chance of stent thrombosis and may need clopidogrel (Plavix) form greater than 1 year.  These risks are in addition to baseline risks associated with a Diagnostic  Evaluation.  Patient states understanding of procedure and risks and agrees to proceed.    Pre-procedure instructions  Patient instructed to be NPO after midnight.  Patient instructed to arrange for transportation home following procedure.  Patient instructed to have a responsible adult with them for 24 hours post-procedure.  Post-procedure follow up process.  Conscious sedation discussed.  The patient was sent the pre-procedure letter (If requested) on 11/30    Pre-procedure medication instructions  Patient instructed on antiplatelet medication.  Continue medications as scheduled, with a small amount of water on the day of the procedure unless indicated.  Patient instructed to take 325 mg of Aspirin am of procedure: No.   Other medication: instructed to hold furosemide, spironalactone a.m. of the procedure.    *Order set was entered on this date: 11/28      Patient Active Problem List   Diagnosis     Rheumatic heart disease     Blurry vision     Hearing loss     Nonspecific reaction to tuberculin skin test without active tuberculosis     Dysphagia     Hyperlipidemia     Silent Stroke     Shoulder strain     Elevated LFTs     Dysuria     Hypothyroidism, unspecified type     Mitral valve stenosis, unspecified etiology     Aortic valve disease, rheumatic     Atherosclerosis of native coronary artery of native heart without angina pectoris     Essential hypertension with goal blood pressure less than 130/85     Pure hypercholesterolemia     Lightheadedness     Moderate malnutrition (H)     ALEENA (acute kidney injury) (H)     A-fib (H)     Heart failure with preserved ejection fraction (H)       Current Outpatient Medications   Medication Sig Dispense Refill     acetaminophen (TYLENOL) 500 MG tablet Take 500-1,000 mg by mouth every 6 (six) hours as needed for pain. Every 6-8 hours as needed. No more than 4,000MG of acetaminophen daily.       albuterol (PROAIR HFA;PROVENTIL HFA;VENTOLIN HFA) 90 mcg/actuation inhaler Inhale  1-2 puffs every 6 (six) hours as needed. 1 Inhaler 5     amoxicillin (AMOXIL) 500 MG capsule TAKE 4 TABLETS (2,000 mg) BY MOUTH 1 HOUR PRIOR TO PROCEDURE. 4 capsule 3     atorvastatin (LIPITOR) 40 MG tablet Take 1 tablet (40 mg total) by mouth at bedtime. 30 tablet 11     baclofen (LIORESAL) 10 MG tablet TAKE 1 TABLET BY MOUTH TWICE DAILY 60 tablet 5     bromfenac (PROLENSA) 0.07 % Drop 1 drop daily.       fluticasone (FLONASE) 50 mcg/actuation nasal spray 2 sprays into each nostril daily. 10 g 3     furosemide (LASIX) 20 MG tablet TAKE 1 TABLET (20 MG TOTAL) BY MOUTH DAILY FOR EDEMA 90 tablet 0     ibuprofen (ADVIL,MOTRIN) 600 MG tablet Take 600 mg by mouth every 6 (six) hours as needed for pain.       levothyroxine (SYNTHROID, LEVOTHROID) 88 MCG tablet Take 1 tablet (88 mcg total) by mouth daily. 30 tablet 9     metoprolol succinate (TOPROL-XL) 25 MG Take 1 tablet (25 mg total) by mouth daily. 90 tablet 1     nasal dilator (BREATHE RIGHT) Strp strip Apply qhs 30 strip 3     omeprazole (PRILOSEC) 20 MG capsule Take 20 mg by mouth daily before breakfast.       omeprazole (PRILOSEC) 20 MG capsule TAKE ONE CAPSULE BY MOUTH EVERY DAY 90 capsule 0     omeprazole (PRILOSEC) 20 MG capsule TAKE 1 PILL BY MOUTH EVERYDAY FOR STOMACH 30 capsule 8     polyethylene glycol (MIRALAX) 17 gram packet Take 17 g by mouth daily as needed.        spironolactone (ALDACTONE) 25 MG tablet TAKE 1 PILL BY MOUTH EVERYDAY 30 tablet 8     traMADol (ULTRAM) 50 mg tablet Take 50 mg by mouth 2 (two) times a day as needed for pain.       warfarin (COUMADIN) 1 MG tablet Take 1 to 2 tablets (1 to 2 mg) by mouth daily. Adjust dose based on INR results as directed. 130 tablet 1     No current facility-administered medications for this visit.        No Known Allergies    Patient verbalized understanding of the above teaching.    Marie Bass, RN, BSN, PHN  Cardiovascular Nurse Clinician  [facility]  Direct: [phone]  Scheduling:  [phone]  Email: [email or web address]

## 2021-06-22 NOTE — TELEPHONE ENCOUNTER
"Anticoagulation Annual Referral Renewal Review    Kody Johns's chart reviewed for annual renewal of referral to anticoagulation monitoring.        Criteria for anticoagulation nurse and/or pharmacist renewal met   Warfarin indication: Atrial Fibrillation Yes, per indication   Current with INR monitoring/compliant Yes Yes   Date of last office visit 12/20/18 Yes, had office visit within last year   Time in Therapeutic Range (TTR) 58.13 % No, TTR < 60 %       Kody Johns did NOT meet all criteria for anticoagulation management program initiated renewal and requires provider review. Using dot phrase, \".acmrenewalprovider\", please advise if [facility]'s anticoagulation management referral should be renewed or if patient should be seen in office to review anticoagulation therapy      Swapna Phan RN  1:03 PM      "

## 2021-06-22 NOTE — TELEPHONE ENCOUNTER
ANTICOAGULATION  MANAGEMENT- Home Care/Care Facility Result    Assessment     Today's INR result of 3.5 is Supratherapeutic (goal INR of 2.0-3.0)        Warfarin taken as previously instructed    Decreased greens/vitamink K intake may be affecting INR    No new medication/supplements affecting INR    Continues to tolerate warfarin with no reported s/s of bleeding or thromboembolism     Previous INR was Therapeutic    Plan:     Spoke with Karen discussed INR result and instructed:     Warfarin Dosing Instructions: one time lowering dose .5mg tonight then continue current warfarin dose 1 mg daily on Mon/Wed/Fri; and 2 mg daily rest of week  (0 % change)    Next INR to be drawn: two weeks    Education provided: importance of therapeutic range    Karen verbalizes understanding and agrees to warfarin dosing plan.   ?   Swapna Phan RN    Subjective/Objective:      Kody Johns, a 69 y.o. male is established on warfarin.     Home care/care facility RN's report of [facility] INR, recent warfarin dosing, diet changes, medication changes, and symptoms is documented below.    Additional findings: none (shortness of breath listed below is not worse than normal per patient)    Anticoagulation Episode Summary     Current INR goal:   2.0-3.0   TTR:   46.9 % (1.3 y)   Next INR check:   1/16/2019   INR from last check:   3.50! (1/2/2019)   Weekly max warfarin dose:      Target end date:      INR check location:      Preferred lab:      Send INR reminders to:   ANTICOAGULATION POOL A (WBY,WBE,MID,RSC)    Indications    A-fib (H) [I48.91]  Aortic valve disease  rheumatic [I06.9]  Mitral valve stenosis  unspecified etiology [I05.0]  Silent Stroke [I66.9]           Comments:            Anticoagulation Care Providers     Provider Role Specialty Phone number    Aristides Alegria MD Referring Family Medicine [phone]    Haja Ascencio MD  Cardiology [phone]

## 2021-06-22 NOTE — TELEPHONE ENCOUNTER
Provider Review: Anticoagulation Annual Referral Renewal    ACM Renewal Decision:  Renew ACM warfarin management      INR Range:   Continue management at current INR goal   Anticipated Duration of Therapy (from today):  Long-term anticoagulation      Aristides Alegria MD  1:35 PM

## 2021-06-22 NOTE — PROGRESS NOTES
Subjective: This patient comes in for evaluation he is here with his son, Jim.  He is here today to discuss potential upcoming surgery.    Sees cardiovascular surgeon, Dr. Vaughn on January 3.    Patient has a history of rheumatic valvular abnormalities.  Had an echocardiogram recently at 45% ejection fraction in the angiogram showed some mild stenosis not requiring intervention.    Patient continues on warfarin history of atrial fibrillation rate presently in the 70s.  And regular.    Discussed with the patient and especially with his son the risks regarding surgery versus the risk of waiting and having patient worsen regarding his valvular heart disease, and then not being as healthy to undergo the surgery.    It took a while to help explain risk-benefit issues.    Presently he seems stable and a good candidate for the surgery.    The patient additionally continues on medications for his lipids, thyroid, blood pressure.    He has not smoked in a long time    Tobacco status: He  reports that he quit smoking about 4 years ago. His smoking use included cigarettes. He has a 50.00 pack-year smoking history. He has quit using smokeless tobacco.    Patient Active Problem List    Diagnosis Date Noted     A-fib (H) 09/14/2017     Heart failure with preserved ejection fraction (H) 09/14/2017     Moderate malnutrition (H) 08/31/2017     ALEENA (acute kidney injury) (H) 08/31/2017     Aortic valve disease, rheumatic 08/30/2017     Lightheadedness 08/30/2017     Atherosclerosis of native coronary artery of native heart without angina pectoris      Essential hypertension with goal blood pressure less than 130/85      Pure hypercholesterolemia      Dysuria 08/29/2017     Hypothyroidism, unspecified type      Mitral valve stenosis, unspecified etiology      Elevated LFTs 08/26/2016     Silent Stroke      Shoulder strain      Dysphagia      Hyperlipidemia      Rheumatic heart disease      Blurry vision      Hearing loss       Nonspecific reaction to tuberculin skin test without active tuberculosis        Current Outpatient Medications   Medication Sig Dispense Refill     acetaminophen (TYLENOL) 500 MG tablet Take 500-1,000 mg by mouth every 6 (six) hours as needed for pain. Every 6-8 hours as needed. No more than 4,000MG of acetaminophen daily.       albuterol (PROAIR HFA;PROVENTIL HFA;VENTOLIN HFA) 90 mcg/actuation inhaler Inhale 1-2 puffs every 6 (six) hours as needed. 1 Inhaler 5     amoxicillin (AMOXIL) 500 MG capsule TAKE 4 TABLETS (2,000 mg) BY MOUTH 1 HOUR PRIOR TO PROCEDURE. 4 capsule 3     atorvastatin (LIPITOR) 40 MG tablet Take 1 tablet (40 mg total) by mouth at bedtime. 30 tablet 11     baclofen (LIORESAL) 10 MG tablet TAKE 1 TABLET BY MOUTH TWICE DAILY 60 tablet 5     bromfenac (PROLENSA) 0.07 % Drop 1 drop daily.       fluticasone (FLONASE) 50 mcg/actuation nasal spray 2 sprays into each nostril daily. 10 g 3     furosemide (LASIX) 20 MG tablet TAKE 1 TABLET (20 MG TOTAL) BY MOUTH DAILY FOR EDEMA 90 tablet 0     ibuprofen (ADVIL,MOTRIN) 600 MG tablet Take 600 mg by mouth every 6 (six) hours as needed for pain.       levothyroxine (SYNTHROID, LEVOTHROID) 88 MCG tablet Take 1 tablet (88 mcg total) by mouth daily. 30 tablet 9     metoprolol succinate (TOPROL-XL) 25 MG TAKE 1 PILL BY MOUTH EVERYDAY FOR BLOOD PRESSURE 90 tablet 3     nasal dilator (BREATHE RIGHT) Strp strip Apply qhs 30 strip 3     omeprazole (PRILOSEC) 20 MG capsule TAKE 1 PILL BY MOUTH EVERYDAY FOR STOMACH 30 capsule 8     polyethylene glycol (MIRALAX) 17 gram packet Take 17 g by mouth daily as needed.        spironolactone (ALDACTONE) 25 MG tablet TAKE 1 PILL BY MOUTH EVERYDAY 30 tablet 8     traMADol (ULTRAM) 50 mg tablet Take 50 mg by mouth 2 (two) times a day as needed for pain.       warfarin (COUMADIN) 1 MG tablet Take 1 to 2 tablets (1 to 2 mg) by mouth daily. Adjust dose based on INR results as directed. 130 tablet 1     No current  facility-administered medications for this visit.        ROS:   Point review of systems positive for some decreased exercise tolerance but no chest pressure    Is not had any significant wheezing episodes    Objective:    /70 (Patient Site: Left Arm, Patient Position: Sitting, Cuff Size: Adult Regular)   Pulse 72   Resp 20   Wt 106 lb (48.1 kg)   BMI 21.41 kg/m    Body mass index is 21.41 kg/m .      General appearance no acute distress.    No additional exam was done on his lungs sounded clear, his heart was regular as noted    INR today therapeutic at 2.8    Results for orders placed or performed in visit on 12/18/18   INR   Result Value Ref Range    INR 2.80 (!) 0.9 - 1.1       Assessment:  1. Aortic valve disease, rheumatic     2. Mitral valve stenosis, unspecified etiology     3. Atrial fibrillation, unspecified type (H)       Aortic and mitral valve disease.  Please see above discussion regarding risk benefit of surgery    He will be asking similar questions to Dr. Vaughn on January 3.  Hopefully patient's son has not been under understanding regarding risk benefit and timing of surgery.    Plan: Total time with patient and his son 25 minutes over 20 minutes spent in counseling reviewing above issues, and results of recent cardiac testing    This transcription uses voice recognition software, which may contain typographical errors.

## 2021-06-23 NOTE — TELEPHONE ENCOUNTER
INR result is 2.8  INR   Date Value Ref Range Status   01/02/2019 3.50 (!) 0.9 - 1.1 Final       Will the patient be seen, or did they already see, MD or CNP today? No    Most Recent Warfarin dose day/week  Sunday Monday Tuesday Wednesday Thursday Friday Saturday     2 mg 1 mg 2 mg 1 mg 2 mg     Sunday Monday Tuesday Wednesday Thursday Friday Saturday   2 mg 1 mg             Has the patient missed any doses of Coumadin, Warfarin, Jantoven in the past 7 days? No    Has the patients medications changed since the last visit? No    Has the patient experienced any bleeding recently? No    Has the patient experienced any injuries or illness recently? No    Has the patient experienced any 'new' shortness of breath, severe headaches, or changes in vision recently? No    Has the patient had any changes in their diet, or alcohol consumption? No    Is the patient here today to prepare for any type of upcoming surgery, procedure, or for a cardioversion procedure? No    What phone number can we reach the patient at today? [phone] Paige.

## 2021-06-23 NOTE — TELEPHONE ENCOUNTER
ANTICOAGULATION  MANAGEMENT- Home Care/Care Facility Result    Assessment     Today's INR result of 2.8 is Therapeutic (goal INR of 2.0-3.0)        Warfarin taken as previously instructed    No new diet changes affecting INR    No new medication/supplements affecting INR    Continues to tolerate warfarin with no reported s/s of bleeding or thromboembolism     Previous INR was Supratherapeutic    Plan:     Spoke with home care nurse Esme discussed INR result and instructed:     Warfarin Dosing Instructions: Continue current warfarin dose    1 mg on Mon, Wed, Fri; 2 mg all other days         Next INR to be drawn: 2 weeks.    Education provided: importance of taking warfarin as instructed    Esme verbalizes understanding and agrees to warfarin dosing plan.   ?   Baldo Marquez RN    Subjective/Objective:      Kody Johns, a 69 y.o. male is established on warfarin.     Home care/care facility RN's report of [facility] INR, recent warfarin dosing, diet changes, medication changes, and symptoms is documented below.    Additional findings: verbally confirmed home dose with Esme and updated on anticoagulation calendar    Anticoagulation Episode Summary     Current INR goal:   2.0-3.0   TTR:   46.4 % (1.3 y)   Next INR check:   1/29/2019   INR from last check:   2.80 (1/15/2019)   Weekly max warfarin dose:      Target end date:      INR check location:      Preferred lab:      Send INR reminders to:   ANTICOAGULATION POOL A (WBY,WBE,MID,RSC)    Indications    A-fib (H) [I48.91]  Aortic valve disease  rheumatic [I06.9]  Mitral valve stenosis  unspecified etiology [I05.0]  Silent Stroke [I66.9]           Comments:            Anticoagulation Care Providers     Provider Role Specialty Phone number    Aristides Alegria MD Referring Family Medicine [phone]    Haja Ascencio MD  Cardiology [phone]

## 2021-06-23 NOTE — TELEPHONE ENCOUNTER
Refill Approved    Rx renewed per Medication Renewal Policy. Medication was last renewed on 10/24/2018 for 90/0  Last OV 12/20/18  Soo Ramirez, [facility] Triage/Med Refill 1/15/2019     Requested Prescriptions   Pending Prescriptions Disp Refills     furosemide (LASIX) 20 MG tablet [Pharmacy Med Name: FUROSEMIDE 20 MG TAB 20 TAB] 90 tablet 10     Sig: TAKE 1 TABLET (20 MG TOTAL) BY MOUTH DAILY FOR EDEMA    Diuretics/Combination Diuretics Refill Protocol  Passed - 1/14/2019 10:35 AM       Passed - Visit with PCP or prescribing provider visit in past 12 months    Last office visit with prescriber/PCP: 12/20/2018 Aristides Alegria MD OR same dept: 12/20/2018 Aristides Alegria MD OR same specialty: 12/20/2018 Aristides Alegria MD  Last physical: 7/26/2018 Last MTM visit: Visit date not found   Next visit within 3 mo: Visit date not found  Next physical within 3 mo: Visit date not found  Prescriber OR PCP: Aristides Alegria MD  Last diagnosis associated with med order: 1. Atrial fibrillation with RVR (H)  - furosemide (LASIX) 20 MG tablet [Pharmacy Med Name: FUROSEMIDE 20 MG TAB 20 TAB]; TAKE 1 TABLET (20 MG TOTAL) BY MOUTH DAILY FOR EDEMA  Dispense: 90 tablet; Refill: 10    If protocol passes may refill for 12 months if within 3 months of last provider visit (or a total of 15 months).            Passed - Serum Potassium in past 12 months     Lab Results   Component Value Date    Potassium 4.4 11/30/2018            Passed - Serum Sodium in past 12 months     Lab Results   Component Value Date    Sodium 138 11/30/2018            Passed - Blood pressure on file in past 12 months    BP Readings from Last 1 Encounters:   12/20/18 112/70            Passed - Serum Creatinine in past 12 months     Creatinine   Date Value Ref Range Status   11/30/2018 0.97 0.70 - 1.30 mg/dL Final

## 2021-06-23 NOTE — TELEPHONE ENCOUNTER
INR result is 2.6  INR   Date Value Ref Range Status   01/15/2019 2.80 (!) 0.9 - 1.1 Final       Will the patient be seen, or did they already see, MD or CNP today? No    Most Recent Warfarin dose day/week  Sunday Monday Tuesday Wednesday Thursday Friday Saturday     2 mg 1 mg 2 mg 1 mg 2 mg     Sunday Monday Tuesday Wednesday Thursday Friday Saturday   2 mg 1 mg            Has the patient missed any doses of Coumadin, Warfarin, Jantoven in the past 7 days? No    Has the patients medications changed since the last visit? No    Has the patient experienced any bleeding recently? No    Has the patient experienced any injuries or illness recently? No    Has the patient experienced any 'new' shortness of breath, severe headaches, or changes in vision recently? Yes short of breath in am 1/29 Used albuterol rescue inhaler 2 puffs    Has the patient had any changes in their diet, or alcohol consumption? No    Is the patient here today to prepare for any type of upcoming surgery, procedure, or for a cardioversion procedure? No    What phone number can we reach the patient at today? [phone] Esme.

## 2021-06-23 NOTE — TELEPHONE ENCOUNTER
ANTICOAGULATION  MANAGEMENT- Home Care/Care Facility Result    Assessment     Today's INR result of 2.6 is Therapeutic (goal INR of 2.0-3.0)        Warfarin taken as previously instructed    No new diet changes affecting INR    No new medication/supplements affecting INR    Continues to tolerate warfarin with no reported s/s of bleeding or thromboembolism     Previous INR was Therapeutic     Some shortness of breath at night which is relieved after using inhaler.     Plan:     Spoke with [facility] nurse Esme discussed INR result and instructed:     Warfarin Dosing Instructions: Continue current warfarin dose    1 mg on Mon, Wed, Fri; 2 mg all other days         Next INR to be drawn: 2 weeks.     Education provided: importance of taking warfarin as instructed    Esme verbalizes understanding and agrees to warfarin dosing plan.   ?   Baldo Marquez RN    Subjective/Objective:      Kody Johns, a 69 y.o. male is established on warfarin.     Home care/care facility RN's report of [facility] INR, recent warfarin dosing, diet changes, medication changes, and symptoms is documented below.    Additional findings: verbally confirmed home dose with Esme and updated on anticoagulation calendar    Anticoagulation Episode Summary     Current INR goal:   2.0-3.0   TTR:   47.9 % (1.3 y)   Next INR check:   2/12/2019   INR from last check:   2.60 (1/29/2019)   Weekly max warfarin dose:      Target end date:      INR check location:      Preferred lab:      Send INR reminders to:   ANTICOAGULATION POOL A (WBY,WBE,MID,RSC)    Indications    A-fib (H) [I48.91]  Aortic valve disease  rheumatic [I06.9]  Mitral valve stenosis  unspecified etiology [I05.0]  Silent Stroke [I66.9]           Comments:            Anticoagulation Care Providers     Provider Role Specialty Phone number    Aristides Alegria MD Referring Family Medicine [phone]    Haja Ascencio MD  Cardiology [phone]

## 2021-06-23 NOTE — PROGRESS NOTES
"1-:   10:30AM: Mark, emergency  for this patient walk-in and request to meet with care guide for this patient.  Met with Mark. Pt is not present. Verified Mark relationship to the patient. Mark confirmed emergency .    Mark is informed no pt info will be shared with him due to pt is not present. Mark confirmed and shared info below to [facility] as FYI per Mark. Note: Mark speak English.    Mark shared:   -Kody Curtis's (patient) brother is no longer the pca person for Kody. Crow moved out and live in a different house due to large size of family member.   -Erica Johns is the current PCA person for Kody. No change with pca hour and service. Erica is 21 years old. Erica relationship to Kody is son. Live together in same house. Erica speak English.   -Kody is doing good besides the heart issue.   -Kody's eating and sleep is normal; no concerns.   -Kody got both dentures (upper and lower) and medical insurance covered.   -Family and [place] assist his care for him.   -Kody have bad heart valves and surgery is required per his doctor team.   -Kody afraid to have surgery and decline to have surgery. What is the best way to encourage Kody to follow the doctor's recommendation?    Refer patient to seek advice from pcp, cardiologist, and surgeon team.   Encouraged family member to support with schedule appt. May contact [facility] if pt agreed to schedule appt; [facility] will assist with scheduling appt for pt.   Mark confirmed \"I have your phone number and will call if [facility] agreed.\" Mark confirmed no further questions.   End of visit.     Plan:   Next outreach: discuss complete denture goal. Offered appt with pcp, and coordinate service with the cardiologist scheduling team appt for patient (if patient agreed).     "

## 2021-06-24 NOTE — TELEPHONE ENCOUNTER
INR result is 2.3  INR   Date Value Ref Range Status   01/29/2019 2.60 (!) 0.9 - 1.1 Final       Will the patient be seen, or did they already see, MD or CNP today? No    Most Recent Warfarin dose day/week  Sunday Monday Tuesday Wednesday Thursday Friday Saturday     2 1 2 1 2     Sunday Monday Tuesday Wednesday Thursday Friday Saturday   2 1            Has the patient missed any doses of Coumadin, Warfarin, Jantoven in the past 7 days? No    Has the patients medications changed since the last visit? No    Has the patient experienced any bleeding recently? No    Has the patient experienced any injuries or illness recently? No    Has the patient experienced any 'new' shortness of breath, severe headaches, or changes in vision recently? Yes mild shortness of breath     Has the patient had any changes in their diet, or alcohol consumption? No    Is the patient here today to prepare for any type of upcoming surgery, procedure, or for a cardioversion procedure? No    What phone number can we reach the patient at today? Esme [phone]

## 2021-06-24 NOTE — TELEPHONE ENCOUNTER
----- Message from Ling Oconnor CMA sent at 3/14/2019 10:43 AM CDT -----  Regarding: RE: interp  Noted and thank you.       Ling   ----- Message -----  From: Megan Dejesus  Sent: 3/13/2019  12:03 PM  To: Ling Oconnor CMA  Subject: interp                                           Ling,  Can you remove Mark as a preferred interp. for this patient. You can add Yadu from [facility] or Marisol is still ok to use.  Thank you

## 2021-06-24 NOTE — TELEPHONE ENCOUNTER
ANTICOAGULATION  MANAGEMENT- Home Care/Care Facility Result    Assessment     Today's INR result of 2.6 is Therapeutic (goal INR of 2.0-3.0)        Warfarin taken as previously instructed    No new diet changes affecting INR    No new medication/supplements affecting INR    Continues to tolerate warfarin with no reported s/s of bleeding or thromboembolism     Previous INR was Therapeutic    Plan:     Spoke with Esme nurse from [facility] discussed INR result and instructed:     Warfarin Dosing Instructions: Continue current warfarin dose 1 mg daily on Mondays, Wednesdays and Fridays; and 2 mg daily rest of week  (0 % change)    Next INR to be drawn: one month.    Education provided: importance of therapeutic range, importance of following up for INR monitoring at instructed interval and importance of taking warfarin as instructed    Esme verbalizes understanding and agrees to warfarin dosing plan.   ?   Crystal Portillo RN    Subjective/Objective:      Kody Johns, a 69 y.o. male is established on warfarin.     Home care/care facility RN's report of [facility] INR, recent warfarin dosing, diet changes, medication changes, and symptoms is documented below.    Additional findings: verbally confirmed home dose with Esme and updated on anticoagulation calendar    Anticoagulation Episode Summary     Current INR goal:   2.0-3.0   TTR:   50.7 % (1.4 y)   Next INR check:   3/26/2019   INR from last check:   2.60 (2/26/2019)   Weekly max warfarin dose:      Target end date:      INR check location:      Preferred lab:      Send INR reminders to:   ANTICOAGULATION POOL A (WBY,WBE,MID,RSC)    Indications    A-fib (H) [I48.91]  Aortic valve disease  rheumatic [I06.9]  Mitral valve stenosis  unspecified etiology [I05.0]  Silent Stroke [I66.9]           Comments:            Anticoagulation Care Providers     Provider Role Specialty Phone number    Aristides Alegria MD Referring Family Medicine [phone]    Sonu  Haja BARRETT MD  Cardiology [phone]

## 2021-06-24 NOTE — TELEPHONE ENCOUNTER
Mark Savage with phone number [phone] is removed from the care teams.     Message replied to update Megan specialty .     FYI for future appt with CCC.  Done task.

## 2021-06-24 NOTE — TELEPHONE ENCOUNTER
ANTICOAGULATION  MANAGEMENT- Home Care/Care Facility Result    Assessment     Today's INR result of 2.3 is Therapeutic (goal INR of 2.0-3.0)        Warfarin taken as previously instructed    No new diet changes affecting INR    No new medication/supplements affecting INR    Continues to tolerate warfarin with no reported s/s of bleeding or thromboembolism     Previous INR was Therapeutic    Plan:     Spoke with Esme RN for home care discussed INR result and instructed:     Warfarin Dosing Instructions: Continue current warfarin dose 1 mg daily on MWF; and 2 mg daily rest of week      Next INR to be drawn: 2 weeks. Advised that if INR remains stable we can probably move to monthly checks    Education provided: target INR goal and significance of current INR result    Esme verbalizes understanding and agrees to warfarin dosing plan.   ?   Jessica Garza RN    Subjective/Objective:      Kody Johns, a 69 y.o. male is established on warfarin.     Home care/care facility RN's report of [facility] INR, recent warfarin dosing, diet changes, medication changes, and symptoms is documented below.    Additional findings: none    Anticoagulation Episode Summary     Current INR goal:   2.0-3.0   TTR:   49.4 % (1.4 y)   Next INR check:   2/26/2019   INR from last check:   2.30 (2/12/2019)   Weekly max warfarin dose:      Target end date:      INR check location:      Preferred lab:      Send INR reminders to:   ANTICOAGULATION POOL A (WBY,WBE,MID,RSC)    Indications    A-fib (H) [I48.91]  Aortic valve disease  rheumatic [I06.9]  Mitral valve stenosis  unspecified etiology [I05.0]  Silent Stroke [I66.9]           Comments:            Anticoagulation Care Providers     Provider Role Specialty Phone number    Aristides Alegria MD Referring Family Medicine [phone]    Haja Ascencio MD  Cardiology [phone]

## 2021-06-24 NOTE — TELEPHONE ENCOUNTER
INR result is 2.6  INR   Date Value Ref Range Status   02/12/2019 2.30 (!) 0.9 - 1.1 Final       Will the patient be seen, or did they already see, MD or CNP today? No    Most Recent Warfarin dose day/week  Sunday Monday Tuesday Wednesday Thursday Friday Saturday     2 1 2 1 2     Sunday Monday Tuesday Wednesday Thursday Friday Saturday   2 1            Has the patient missed any doses of Coumadin, Warfarin, Jantoven in the past 7 days? No    Has the patients medications changed since the last visit? No    Has the patient experienced any bleeding recently? No    Has the patient experienced any injuries or illness recently? No    Has the patient experienced any 'new' shortness of breath, severe headaches, or changes in vision recently? No    Has the patient had any changes in their diet, or alcohol consumption? No    Is the patient here today to prepare for any type of upcoming surgery, procedure, or for a cardioversion procedure? No    What phone number can we reach the patient at today? Please all Esme back with dosing.

## 2021-06-25 NOTE — TELEPHONE ENCOUNTER
,  I spoke with Mary at  office they would like to go ahead and schedule surgery( no consult needed) the patient is seeing you at 300 today.Please bring to me so we can get it scheduled.  Thank you

## 2021-06-25 NOTE — TELEPHONE ENCOUNTER
INR result is   1.4    Will the patient be seen, or did they already see, MD or CNP today? No    Most Recent Warfarin dose day/week  Sunday Monday Tuesday Wednesday Thursday Friday Saturday     2 1.5 2 1.5 2     Sunday Monday Tuesday Wednesday Thursday Friday Saturday   1.5 1.5            Has the patient missed any doses of Coumadin, Warfarin, Jantoven in the past 7 days? No    Has the patients medications changed since the last visit? No    Has the patient experienced any bleeding recently? No    Has the patient experienced any injuries or illness recently? No    Has the patient experienced any 'new' shortness of breath, severe headaches, or changes in vision recently? No    Has the patient had any changes in their diet, or alcohol consumption? No    Is the patient here today to prepare for any type of upcoming surgery, procedure, or for a cardioversion procedure? No    What phone number can we reach the patient at today? [phone]

## 2021-06-25 NOTE — TELEPHONE ENCOUNTER
INR result is   1.6   INR   Date Value Ref Range Status   06/01/2021 1.40 (!) 0.90 - 1.10 Final       Will the patient be seen, or did they already see, MD or CNP today? No    Most Recent Warfarin dose day/week  Sunday Monday Tuesday Wednesday Thursday Friday Saturday     3 mg 1.5 mg 2 mg 1.5 mg 2 mg     Sunday Monday Tuesday Wednesday Thursday Friday Saturday   2 mg 1.5 mg            Has the patient missed any doses of Coumadin, Warfarin, Jantoven in the past 7 days? No    Has the patients medications changed since the last visit? No    Has the patient experienced any bleeding recently? No    Has the patient experienced any injuries or illness recently? No    Has the patient experienced any 'new' shortness of breath, severe headaches, or changes in vision recently? No    Has the patient had any changes in their diet, or alcohol consumption? No    Is the patient here today to prepare for any type of upcoming surgery, procedure, or for a cardioversion procedure? No    What phone number can we reach the patient at today? [facility], [facility]  [phone].

## 2021-06-25 NOTE — TELEPHONE ENCOUNTER
Who is Calling: Home Care  Update: Requesting an update on the valve replacement for the patient. Referral was sent to Dr. Lang 11/5/18. Home care states the patient is waiting on a call to schedule?  Okay to leave a detailed message?:  Yes    Home Care also requesting an updated medication list faxed:  Fax: [phone]

## 2021-06-25 NOTE — PROGRESS NOTES
Assessment & Plan     Kody was seen today for follow-up.    Diagnoses and all orders for this visit:    Chronic systolic congestive heart failure (H)  -     Basic Metabolic Panel    Atrial fibrillation with RVR (H)    S/P AVR (aortic valve replacement)    S/P MVR (mitral valve replacement)    Left hemiparesis (H)    Benign essential hypertension    Decreased visual acuity  -     Ambulatory referral to Ophthalmology - [facility]    Thrombocytopenia (H)  -     HM2(CBC w/o Differential)    Hypothyroidism, unspecified type  -     T4, Total  -     Thyroid Stimulating Hormone (TSH)          CHF compensated status post aortic and mitral valve replacement.    History of atrial fibrillation on warfarin please see below    Hypertension controlled    Ongoing mild spasticity in left hemiparesis use of baclofen and gabapentin as discussed    Recheck platelets last platelets were 80,000    Check T4 TSH he is on 88 mcg of levothyroxine    Decreased visual acuity see the ophthalmologist.    Call Mark with results of labs and discuss follow-up.           Return in about 3 months (around 8/26/2021) for Recheck.    Aristides Alegria MD  [facility]    Subjective   Kody Johns is 71 y.o. and presents today for the following health issues   HPI   This patient follows up on multiple medical problems he is with his relative Mark, who [place] a man, his number is [phone].  We will contact him with the results of the labs.    Patient has had atrial valve replacement and mitral valve replacement.  He had a hemorrhagic CVA following left with left hemiparesis persisting he had some spasticity is on baclofen.    Also has some ongoing neuropathic pain and takes gabapentin 100 mg, 1 in the morning 1 in the afternoon and 2 at night    No symptoms have been stable    He said some ongoing decreased visual acuity and we will have him see ophthalmology.  Referral was placed.    Patient has had no  evidence of bruising, he does have thrombocytopenia last platelets in February were 80,000.    He has a history of atrial fibrillation as well as the aortic and mitral valve replacement he is on warfarin last INR yesterday 1.8, warfarin was adjusted please see anticoagulation nurses notes.    Patient's been on levothyroxine, 88 mcg.  We will recheck T4 TSH.    Depression has been controlled on sertraline.    Blood pressure on recheck was 138/70 continue same meds    No significant edema    He is not having any shortness of breath or chest pressure symptoms at this time.    He does need COVID-19 vaccinations, and nurse will schedule out for him.        Review of Systems  10 point review of systems positive as outlined above otherwise negative      Objective    /70 (Patient Site: Left Arm, Patient Position: Sitting, Cuff Size: Adult Small)   Pulse (!) 58   Temp 98.4  F (36.9  C) (Temporal)   SpO2 98%   There is no height or weight on file to calculate BMI.  Physical Exam  Vital signs stable recheck blood pressure 138/70    HEENT neck with no JVD oropharynx is clear no nasal drainage    Lungs clear throughout no rales rhonchi heart sounded regular he does have the grade 3/6 murmur    Abdomen nontender no masses    Extremities without edema    No skin changes    Left hemiparesis with weakness on the left side as discussed some contracture left hand.    Lab work today BMP and CBC and T4 and TSH

## 2021-06-25 NOTE — TELEPHONE ENCOUNTER
Provider response below relayed to patient. Message understood.  ----- Message from Aristides Alegria MD sent at 5/31/2021  9:56 AM CDT -----  Please contact patient's son,Rameesh, at [phone].    Let them know that the thyroid level was normal, continue on same dose of levothyroxine.    Renal function normal, normal hemoglobin and white count, platelets are low but stay the same.    Recheck in 3 months no change in medicine

## 2021-06-25 NOTE — PROGRESS NOTES
Subjective: This patient comes in for follow-up he was here this fall.  He was scheduled to see the cardiovascular surgeon Dr. Vaughn.  The patient no showed he was not ready to proceed with the multivalve procedure.  He has rheumatic aortic and mitral valve disease.  He did have evaluation this fall with coronary angiogram and an echocardiogram back in November I reviewed that with the patient    Patient now seems to be prepared to go ahead with the procedure    We had contacted Dr. Vaughn's office and she does not need to see him, okay to go ahead and schedule the surgery.    Patient states he gets shortness of breath he also has some shoulder discomfort pain is in through the shoulder on palpation with rotation.  He did not have any chest pressure symptoms.    He gets some shortness of breath with increased activity though and when laying down.    Patient has atrial fibrillation he is on warfarin.    Patient also continues on his Lasix, spironolactone, and metoprolol, and thyroid medication.        Tobacco status: He  reports that he quit smoking about 4 years ago. His smoking use included cigarettes. He has a 50.00 pack-year smoking history. He has quit using smokeless tobacco.    Patient Active Problem List    Diagnosis Date Noted     A-fib (H) 09/14/2017     Heart failure with preserved ejection fraction (H) 09/14/2017     Moderate malnutrition (H) 08/31/2017     ALEENA (acute kidney injury) (H) 08/31/2017     Aortic valve disease, rheumatic 08/30/2017     Lightheadedness 08/30/2017     Atherosclerosis of native coronary artery of native heart without angina pectoris      Essential hypertension with goal blood pressure less than 130/85      Pure hypercholesterolemia      Dysuria 08/29/2017     Hypothyroidism, unspecified type      Mitral valve stenosis, unspecified etiology      Elevated LFTs 08/26/2016     Silent Stroke      Shoulder strain      Dysphagia      Hyperlipidemia      Rheumatic heart disease       Blurry vision      Hearing loss      Nonspecific reaction to tuberculin skin test without active tuberculosis        Current Outpatient Medications   Medication Sig Dispense Refill     acetaminophen (TYLENOL) 500 MG tablet Take 500-1,000 mg by mouth every 6 (six) hours as needed for pain. Every 6-8 hours as needed. No more than 4,000MG of acetaminophen daily.       albuterol (PROAIR HFA;PROVENTIL HFA;VENTOLIN HFA) 90 mcg/actuation inhaler Inhale 1-2 puffs every 6 (six) hours as needed. 1 Inhaler 5     atorvastatin (LIPITOR) 40 MG tablet TAKE 1 PILL BY MOUTH AT BEDTIME. FOR CHOLESTEROL 90 tablet 2     baclofen (LIORESAL) 10 MG tablet TAKE 1 TABLET BY MOUTH TWICE DAILY 60 tablet 5     bromfenac (PROLENSA) 0.07 % Drop 1 drop daily.       fluticasone (FLONASE) 50 mcg/actuation nasal spray 2 sprays into each nostril daily. 10 g 3     furosemide (LASIX) 20 MG tablet TAKE 1 TABLET (20 MG TOTAL) BY MOUTH DAILY FOR EDEMA 90 tablet 3     ibuprofen (ADVIL,MOTRIN) 600 MG tablet Take 600 mg by mouth every 6 (six) hours as needed for pain.       levothyroxine (SYNTHROID, LEVOTHROID) 88 MCG tablet Take 1 tablet (88 mcg total) by mouth daily. 30 tablet 9     metoprolol succinate (TOPROL-XL) 25 MG TAKE 1 PILL BY MOUTH EVERYDAY FOR BLOOD PRESSURE 90 tablet 3     nasal dilator (BREATHE RIGHT) Strp strip Apply qhs 30 strip 3     omeprazole (PRILOSEC) 20 MG capsule TAKE 1 PILL BY MOUTH EVERYDAY FOR STOMACH 30 capsule 8     polyethylene glycol (MIRALAX) 17 gram packet Take 17 g by mouth daily as needed.        spironolactone (ALDACTONE) 25 MG tablet TAKE 1 PILL BY MOUTH EVERYDAY 30 tablet 8     traMADol (ULTRAM) 50 mg tablet Take 50 mg by mouth 2 (two) times a day as needed for pain.       warfarin (COUMADIN/JANTOVEN) 1 MG tablet TAKE 1 TO 2 TABLETS (1 TO 2 MG) BY MOUTH DAILY. ADJUST DOSE BASED ON INR RESULTS AS DIRECTED. 130 tablet 1     amoxicillin (AMOXIL) 500 MG capsule TAKE 4 TABLETS (2,000 mg) BY MOUTH 1 HOUR PRIOR TO PROCEDURE. 4  capsule 3     No current facility-administered medications for this visit.        ROS: 10 point review of systems positive as outlined otherwise negative    Objective:    /70 (Patient Site: Left Arm, Patient Position: Sitting, Cuff Size: Adult Regular)   Pulse 65   Resp 20   Wt 110 lb (49.9 kg)   SpO2 97%   BMI 22.22 kg/m    Body mass index is 22.22 kg/m .      General appearance no acute distress at rest    Vital signs showed O2 sat looked okay heart rate was irregularly irregular rate in the 60s.    Lungs were clear to auscultation    Heart as outlined above    Abdomen is soft nontender no guarding or rebound    Extremities without edema    Skin is normal no rashes    Left shoulder with some anterior glenohumeral joint pain decreased range of motion with abduction pain with external rotation.    INR from February 26 was 2.6.    Results for orders placed or performed in visit on 02/26/19   INR   Result Value Ref Range    INR 2.60 (!) 0.9 - 1.1       Assessment:  1. Aortic valve disease, rheumatic  Ambulatory referral to Cardiology   2. Atrial fibrillation, unspecified type (H)  Ambulatory referral to Cardiology   3. Heart failure with preserved ejection fraction (H)  Ambulatory referral to Cardiology   4. Mitral valve stenosis, unspecified etiology  Ambulatory referral to Cardiology   5. Chronic shoulder pain, unspecified laterality       Rheumatic aortic and mitral valve disease, plan for surgery.  We contacted his cardiovascular surgeon to set up the surgery date    He will see me 1 week prior to that and see his cardiologist 2 weeks prior to the surgery date.  Will need instruction regarding his warfarin.    We will do range of motion exercises for the shoulder use Tylenol avoid NSAIDs.    In addition PHQ 9 today was 12    Plan: As outlined above we will see him back for the preop    This transcription uses voice recognition software, which may contain typographical errors.

## 2021-06-25 NOTE — TELEPHONE ENCOUNTER
I last saw this patient in late December.  The patient was supposed to see Dr. Vaughn the cardiovascular surgeon about the valve replacement.  I believe this was supposed to be on January 3, 2019    They need to see this doctor.    Please assist him in getting this scheduled

## 2021-06-25 NOTE — TELEPHONE ENCOUNTER
ANTICOAGULATION  MANAGEMENT- Home Care/Care Facility Result    Assessment     Today's INR result of 2.6 is Therapeutic (goal INR of 2.0-3.0)        Missed dose(s) may be affecting INR    No new diet changes affecting INR    No new medication/supplements affecting INR    Continues to tolerate warfarin with no reported s/s of bleeding or thromboembolism     Previous INR was Subtherapeutic    Plan:     Spoke with Esme discussed INR result and instructed:     Warfarin Dosing Instructions: Continue current warfarin dose 2 mg daily  (0 % change)    Next INR to be drawn: one week    Education provided: importance of therapeutic range and target INR goal and significance of current INR result    Esme verbalizes understanding and agrees to warfarin dosing plan.   ?   Crystal Portillo RN    Subjective/Objective:      Kody Johns, a 71 y.o. male is established on warfarin.     Home care/care facility RN's report of [facility] INR, recent warfarin dosing, diet changes, medication changes, and symptoms is documented below.    Additional findings: verbally confirmed home dose with Esme and updated on anticoagulation calendar    Anticoagulation Episode Summary     Current INR goal:  2.0-3.0   TTR:  51.9 % (1 y)   Next INR check:  6/22/2021   INR from last check:  2.60 (6/15/2021)   Weekly max warfarin dose:     Target end date:     INR check location:     Preferred lab:     Send INR reminders to:  [facility]    Indications    A-fib (H) (Resolved) [I48.91]  Aortic valve disease  rheumatic [I06.9]  Mitral valve stenosis  unspecified etiology [I05.0]  Silent Stroke [I66.9]           Comments:           Anticoagulation Care Providers     Provider Role Specialty Phone number    Aristides Alegria MD Referring Family Medicine [phone]    Haja Ascencio MD  Cardiology [phone]

## 2021-06-25 NOTE — PROGRESS NOTES
Progress Notes by Sarah Linton CNP at 9/14/2017  2:50 PM     Author: Sarah Linton CNP Service: -- Author Type: Nurse Practitioner    Filed: 9/14/2017  3:34 PM Encounter Date: 9/14/2017 Status: Signed    : Sarah Linton CNP (Nurse Practitioner)           Click to link to [facility] HEART [facility] NOTE      Assessment/Recommendations   Assessment:    1.  Heart failure with preserved ejection fraction: He has no symptoms of acute fluid retention.  He has shortness of breath with minimal activity which could be related to heart failure, atrial flutter and mitral valve disease.  His weight has remained stable. We reviewed heart failure diagnosis, medications, treatment plan, low sodium diet, weight monitoring, and symptom monitoring.  He met with a heart failure nurse clinician to further discuss.    2.  Valve disease: Rheumatic heart disease.  Moderate to severe mitral regurgitation, moderate aortic regurgitation, and mild aortic stenosis.  He has an appointment in the valve clinic on September 21.    3.  Atrial flutter: Heart rates have been controlled since discharge.  He is currently on warfarin.  INR was high today at primary care office.  He will have this rechecked tomorrow.  I will discuss plan for cardioversion with Dr. Lang.      Plan:  1.  No changes to his medications.  2.  BMP pending  3.  Daily weights and low-sodium diet       History of Present Illness    Mr. Metzger is a 67 y.o. male seen at [facility] heart failure clinic today for hospital follow-up.  He was hospitalized August 29 - August 31 with atrial fibrillation with RVR and acute heart failure secondary to mitral regurgitation.  Echocardiogram on August 29, 2017 showed ejection fraction of 55%, moderate to severe mitral regurgitation (rheumatic heart disease), moderate aortic regurgitation, and mild aortic stenosis.  His past medical history is also significant for  hypertension, coronary artery disease, and dyslipidemia.    He has been seen by his primary care provider 3 times since discharge.  Metoprolol and Lasix were decreased due to hypotension.  His blood pressure today is 100/60.  He denies any lightheadedness or dizziness.  He continues to have shortness of breath with minimal activity.  He has mild, occasional chest pain but states it has improved since hospitalization.  He has no symptoms of retaining fluid.  He has no lower extremity edema and his weight has remained stable.    He was seen by his primary care provider today.  EKG showed atrial flutter.  INR was high and will be rechecked tomorrow.    ECHO 8/29/17:     When compared to the previous study dated 8/1/2016, no significant change.    Left ventricle ejection fraction is normal. The estimated left ventricular ejection fraction is 55%.    Left Atrium: Left atrial volume is severely increased.    Aortic Valve: The valve is trileaflet but malformed. Fused left and right coronary cusps. Mild stenosis. Moderate regurgitation.    Mitral Valve: The following structural abnormalities were observed: rheumatic valve disease. Moderate to severe regurgitation.     Physical Examination Review of Systems   Vitals:    09/14/17 1429   BP: 100/60   Pulse: 93   Resp: 16     Body mass index is 21.25 kg/(m^2).  Wt Readings from Last 3 Encounters:   09/14/17 108 lb 12.8 oz (49.4 kg)   09/14/17 108 lb 8 oz (49.2 kg)   09/11/17 108 lb (49 kg)       General Appearance:     Alert, cooperative and in no acute distress.   ENT/Mouth: membranes moist, no oral lesions or bleeding gums.      EYES:  no scleral icterus, normal conjunctivae   Chest/Lungs:   lungs are clear to auscultation, no rales or wheezing, respirations unlabored   Cardiovascular:   Regular. Normal first and second heart sounds with systolic murmur; the radial and posterior tibial pulses are intact, no edema bilateral lower extremities    Abdomen:  Soft, nontender,  nondistended, bowel sounds present   Extremities: no cyanosis or clubbing   Skin: warm, dry.    Neurologic: mood and affect are appropriate, alert and oriented x3      General: WNL  Eyes: Visual Distubance  Ears/Nose/Throat: Hearing Loss  Lungs: Shortness of Breath  Heart: Shortness of Breath with activity, Irregular Heartbeat  Stomach: WNL  Bladder: WNL  Muscle/Joints: Joint Pain, Muscle Weakness, Muscle Pain  Skin: WNL  Nervous System: Daytime Sleepiness, Dizziness, Loss of Balance  Mental Health: WNL     Blood: WNL     Medical History  Surgical History Family History Social History   Past Medical History:   Diagnosis Date   ? Palpitations     Past Surgical History:   Procedure Laterality Date   ? CATARACT EXTRACTION Left 06/13/2016    Family History   Problem Relation Age of Onset   ? No Medical Problems Mother    ? No Medical Problems Father    ? Heart disease Neg Hx     Social History     Social History   ? Marital status:      Spouse name: Jesus Johns   ? Number of children: 4   ? Years of education: N/A     Occupational History   ? Not on file.     Social History Main Topics   ? Smoking status: Former Smoker     Packs/day: 1.00     Years: 50.00     Types: Cigarettes     Quit date: 8/29/2014   ? Smokeless tobacco: Former User      Comment: No Betel nut   ? Alcohol use No      Comment: Quit   ? Drug use: Yes   ? Sexual activity: Not on file     Other Topics Concern   ? Not on file     Social History Narrative    Refugee, born in [facility].          Medications  Allergies   Current Outpatient Prescriptions   Medication Sig Dispense Refill   ? acetaminophen (TYLENOL) 500 MG tablet Take 500-1,000 mg by mouth every 6 (six) hours as needed for pain. Every 6-8 hours as needed. No more than 4,000MG of acetaminophen daily.     ? albuterol (PROAIR HFA;PROVENTIL HFA;VENTOLIN HFA) 90 mcg/actuation inhaler Inhale 1-2 puffs every 6 (six) hours as needed.     ? baclofen (LIORESAL) 10 MG tablet Take 1 tablet (10 mg total)  by mouth 2 (two) times a day. 60 tablet 0   ? bromfenac (PROLENSA) 0.07 % Drop 1 drop daily.     ? clopidogrel (PLAVIX) 75 mg tablet Take 75 mg by mouth daily.     ? fluticasone (FLONASE) 50 mcg/actuation nasal spray 2 sprays into each nostril daily. 10 g 3   ? furosemide (LASIX) 20 MG tablet Take 1 tablet (20 mg total) by mouth daily. 30 tablet 3   ? levothyroxine (SYNTHROID, LEVOTHROID) 88 MCG tablet TAKE 1 TABLET BY MOUTH EVERY DAY 30 tablet 9   ? metoprolol tartrate (LOPRESSOR) 50 MG tablet Take 0.5 tablets (25 mg total) by mouth 2 (two) times a day. 30 tablet 3   ? nasal dilator (BREATHE RIGHT) Strp strip Apply qhs 30 strip 3   ? omeprazole (PRILOSEC) 20 MG capsule Take 20 mg by mouth daily before breakfast.     ? polyethylene glycol (MIRALAX) 17 gram packet Take 17 g by mouth daily as needed.      ? pravastatin (PRAVACHOL) 80 MG tablet Take 80 mg by mouth at bedtime.     ? spironolactone (ALDACTONE) 25 MG tablet Take 25 mg by mouth daily.     ? traMADol (ULTRAM) 50 mg tablet Take 50 mg by mouth 2 (two) times a day as needed for pain.     ? UNABLE TO FIND Unknown topical medication for itching     ? UNABLE TO FIND Unknown topical medication for pain     ? warfarin (COUMADIN) 5 MG tablet 1 po qd initially then Adjust dose based on INR results as directed by coumadin nurse 30 tablet 1     No current facility-administered medications for this visit.       No Known Allergies      Lab Results    Chemistry CBC BNP   Lab Results   Component Value Date    CREATININE 1.40 (H) 09/06/2017    BUN 30 (H) 09/06/2017     09/06/2017    K 4.2 09/06/2017     09/06/2017    CO2 25 09/06/2017     Creatinine (mg/dL)   Date Value   09/06/2017 1.40 (H)   08/31/2017 1.33 (H)   08/30/2017 1.42 (H)   08/29/2017 1.13    Lab Results   Component Value Date    WBC 7.3 09/06/2017    HGB 17.0 09/06/2017    HCT 52.8 09/06/2017    MCV 94 09/06/2017     09/06/2017    Lab Results   Component Value Date     (H) 08/29/2017      BNP (pg/mL)   Date Value   08/29/2017 893 (H)   04/02/2014 111 (H)          40 minutes were spent with the patient with greater than 50% spent on education and counseling.      Sarah Linton, [facility] Heart [facility]   Heart Failure Clinic

## 2021-06-25 NOTE — TELEPHONE ENCOUNTER
INR result is 2.6  INR   Date Value Ref Range Status   06/08/2021 1.60 (!) 0.90 - 1.10 Final       Will the patient be seen, or did they already see, MD or CNP today? No    Most Recent Warfarin dose day/week  Sunday Monday Tuesday Wednesday Thursday Friday Saturday     3.0 2.0 2.0 2.0 missed     Sunday Monday Tuesday Wednesday Thursday Friday Saturday   2.0 2.0            Has the patient missed any doses of Coumadin, Warfarin, Jantoven in the past 7 days? Yes, missed dose, thinks it might have been missed on  Saturday.    Has the patients medications changed since the last visit? No    Has the patient experienced any bleeding recently? No    Has the patient experienced any injuries or illness recently? No    Has the patient experienced any 'new' shortness of breath, severe headaches, or changes in vision recently? No    Has the patient had any changes in their diet, or alcohol consumption? No    Is the patient here today to prepare for any type of upcoming surgery, procedure, or for a cardioversion procedure? No    What phone number can we reach the patient at today?  [facility] [phone]

## 2021-06-25 NOTE — TELEPHONE ENCOUNTER
Called cardiovascular to schedule appt [phone] went to answering service I sorcates call tomorrow

## 2021-06-26 NOTE — TELEPHONE ENCOUNTER
INR result is   2.0    Will the patient be seen, or did they already see, MD or CNP today? No    Most Recent Warfarin dose day/week  Sunday Monday Tuesday Wednesday Thursday Friday Saturday     2 2 2 2 2     Sunday Monday Tuesday Wednesday Thursday Friday Saturday   2 2            Has the patient missed any doses of Coumadin, Warfarin, Jantoven in the past 7 days? No    Has the patients medications changed since the last visit? No    Has the patient experienced any bleeding recently? No    Has the patient experienced any injuries or illness recently? No    Has the patient experienced any 'new' shortness of breath, severe headaches, or changes in vision recently? No    Has the patient had any changes in their diet, or alcohol consumption? No    Is the patient here today to prepare for any type of upcoming surgery, procedure, or for a cardioversion procedure? No    What phone number can we reach the patient at today? [phone]

## 2021-06-26 NOTE — TELEPHONE ENCOUNTER
ANTICOAGULATION  MANAGEMENT- Home Care/Care Facility Result    Assessment     Today's INR result of 2.0 is Therapeutic (goal INR of 2.0-3.0)        Warfarin taken as previously instructed    No new diet changes affecting INR    No new medication/supplements affecting INR    Continues to tolerate warfarin with no reported s/s of bleeding or thromboembolism     Previous INR was Therapeutic    Plan:     Spoke with Esme discussed INR result and instructed:     Warfarin Dosing Instructions: Continue current warfarin dose 2 mg daily  (0 % change)    Next INR to be drawn: one week.    Education provided: importance of therapeutic range and target INR goal and significance of current INR result    Esme verbalizes understanding and agrees to warfarin dosing plan.   ?   Crystal Portillo RN    Subjective/Objective:      Kody Johns, a 71 y.o. male is established on warfarin.     Home care/care facility RN's report of [facility] INR, recent warfarin dosing, diet changes, medication changes, and symptoms is documented below.    Additional findings: verbally confirmed home dose with Esme and updated on anticoagulation calendar    Anticoagulation Episode Summary     Current INR goal:  2.0-3.0   TTR:  51.9 % (1 y)   Next INR check:  6/29/2021   INR from last check:  2.00 (6/22/2021)   Weekly max warfarin dose:     Target end date:     INR check location:     Preferred lab:     Send INR reminders to:  [facility]    Indications    A-fib (H) (Resolved) [I48.91]  Aortic valve disease  rheumatic [I06.9]  Mitral valve stenosis  unspecified etiology [I05.0]  Silent Stroke [I66.9]           Comments:           Anticoagulation Care Providers     Provider Role Specialty Phone number    Aristides Alegria MD Referring Family Medicine [phone]    Haja Ascencio MD  Cardiology [phone]

## 2021-06-27 NOTE — PROGRESS NOTES
Progress Notes by Aristides Stewart MD at 4/30/2019  9:24 AM     Author: Aristides Stewart MD Service: Infectious Disease Author Type: Physician    Filed: 4/30/2019  9:26 AM Date of Service: 4/30/2019  9:24 AM Status: Signed    : Aristides Stewart MD (Physician)       Infectious Disease Progress Note    Assessment/Plan    IMPRESSION:  Fever----likely multifactorial.  Fever curve improved.    Patient has very sensitive Klebsiella in the sputum.    Acute brain injury can also cause fever.        PLAN:  Spoke with his son.   De-escalate abx to ceftriaxone for 5 days.      Principal Problem:    S/P MVR (mitral valve repair)  Active Problems:    Rheumatic heart disease    Mitral valve stenosis, unspecified etiology    Aortic valve disease, rheumatic    A-fib (H)    ARF (acute respiratory failure) (H)    Anemia due to blood loss, acute    Coagulopathy (H)    Non-English speaking patient    Thrombocytopenia (H)    Sepsis, due to unspecified organism (H)    Atrial fibrillation with RVR (H)    Hypernatremia      Aristides Stewart MD  [phone]      Subjective  Not responsive.  Sedated.  Discussed with nurse at bedside.    Objective    Vital signs in last 24 hours  Temp:  [96.3  F (35.7  C)-104.7  F (40.4  C)] 101.1  F (38.4  C)  Heart Rate:  [] 89  Resp:  [22-60] 60  BP: ()/(40-99) 85/61  FiO2 (%):  [50 %-100 %] 50 %  Weight:   122 lb 8 oz (55.6 kg)    Intake/Output last 3 shifts  I/O last 3 completed shifts:  In: 1771.3 [I.V.:740.3; Blood:231; NG/GT:750; IV Piggyback:50]  Out: 1870 [Urine:1800; Chest Tube:70]  Intake/Output this shift:  I/O this shift:  In: -   Out: 100 [Urine:100]    Review of Systems   Review of systems not obtained due to inability to communicate with the patient. , otherwise negative.    Physical Exam  Sedated, asleep.  Vitals tabulated above, reviewed  HEENT:nl orogastric and endotracheal tube noted.  Neck supple without lymphadenopathy  Sclera clear  CARDIOVASCULAR regular  rate and rhythm, no murmur  Incision ok  Lungs less  rhonchi.  Chest tubes in place.  Abdomen soft, NT/ND, absent HEPATOSPLENOMEGALY  Skin normal  Joints normal  Neurologic exam non focal  Wound:  NA        Pertinent Labs   Lab Results: personally reviewed.     Results from last 7 days   Lab Units 04/30/19  0559 04/29/19 1930 04/29/19  1032 04/29/19  0629   LN-WHITE BLOOD CELL COUNT thou/uL 6.3  --  4.1 4.9   LN-HEMOGLOBIN g/dL 8.0*  --  8.4* 8.5*   LN-HEMATOCRIT % 25.5*  --  26.3* 26.9*   LN-PLATELET COUNT thou/uL 46* 47* 27* 28*        Results from last 7 days   Lab Units 04/30/19  0559 04/30/19  0048 04/29/19 1930 04/29/19  1721  04/29/19  0524  04/28/19  1014   LN-SODIUM mmol/L 147* 147*  --  150*   < > 150*  150*   < > 154*   LN-POTASSIUM mmol/L 3.9 3.4* 3.4*  --    < > 3.4*   < > 4.0   LN-CHLORIDE mmol/L 115*  --   --   --   --  117*  --  122*   LN-CO2 mmol/L 26  --   --   --   --  26  --  23   LN-BLOOD UREA NITROGEN mg/dL 50*  --   --   --   --  44*  --  48*   LN-CREATININE mg/dL 1.28  --   --   --   --  1.32*  --  1.31*   LN-CALCIUM mg/dL 7.4*  --   --   --   --  7.4*  --  8.1*    < > = values in this interval not displayed.     Blood cultures remain negative to date.    Procedure Component Value Units Date/Time   Sputum culture [[id number]] (Abnormal)  Collected: 04/27/19 1200   Order Status: Completed Specimen: Respiratory Updated: 04/29/19 1340    Culture Usual lupe with     3+ Klebsiella pneumoniaeAbnormal     Gram Stain Result 2+ Polymorphonuclear leukocytes     4+ Gram negative diplococci     3+ Gram negative bacilli     2+ Gram positive cocci     2+ Gram positive bacilli, diphtheroid like   Susceptibility     Klebsiella pneumoniae (1)     Antibiotic Interpretation Microscan Method Status    Amoxicillin + Clavulanate Sensitive <=4/2 CARLOS ENRIQUE Final    Ampicillin Resistant >16 CARLOS ENRIQUE Final    Cefazolin Sensitive 2 CARLOS ENRIQUE Final    Cefepime Sensitive <=1 CARLOS ENRIQUE Final    Ceftriaxone Sensitive <=1 CARLOS ENRIQUE Final     Ciprofloxacin Sensitive <=0.5 CARLOS ENRIQUE Final    Gentamicin Sensitive <=2 CARLOS ENRIQUE Final    Levofloxacin Sensitive <=1 CARLOS ENRIQUE Final    Meropenem Sensitive <=0.25 CARLOS ENRIQUE Final    Piperacillin + Tazobactam Sensitive 8/4 CARLOS ENRIQUE Final    Tetracycline Sensitive 4 CARLOS ENRIQUE Final    Tobramycin Sensitive <=2 CARLOS ENRIQUE Final    Trimethoprim + Sulfamethoxazole Sensitive <=0.5 CARLOS ENRIQUE Final          Sputum culture [[id number]]    Order Status: Sent Specimen: Respiratory          Pertinent Radiology   Radiology Results: Personally reviewed image/s and Personally reviewed impression/s    Ct Head Without Contrast    Result Date: 4/29/2019  EXAM: CT HEAD WO CONTRAST LOCATION: [facility] DATE/TIME: 4/29/2019 6:20 PM INDICATION: Follow up stroke COMPARISON: CT head 4/26/2019 TECHNIQUE: Routine without IV contrast. Multiplanar reformats. Dose reduction techniques were used. FINDINGS: INTRACRANIAL CONTENTS: Images mildly degraded by patient motion. Expected temporal evolution of right posterior cerebral artery territory infarct involving the parasagittal right occipital lobe and posterior temporal lobe as well as the posterior lateral  right thalamus. Increased low-attenuation change, mild local mass effect, and new subtle gyriform hyperdensity within the parasagittal right occipital and posterior right temporal lobes suggesting small volume petechial hemorrhage. No space-occupying hemorrhagic transformation. Poor visualization of gray-white matter differentiation at the parasagittal left parieto-occipital junction (axial image 32 for example). Mild presumed chronic small vessel ischemic changes. Mild generalized volume loss. No hydrocephalus. VISUALIZED ORBITS/SINUSES/MASTOIDS: Prior bilateral cataract surgery. Visualized portions of the orbits are otherwise unremarkable. No significant paranasal sinus mucosal disease. No significant middle ear or mastoid effusion. OSSEOUS STRUCTURES/SOFT TISSUES: No significant abnormality.     CONCLUSION: 1.  Motion  degraded examination demonstrates expected temporal evolution of right posterior cerebral artery territory infarct including interval development of small volume petechial hemorrhage as above. No hemorrhagic transformation. Mild local mass effect with partial effacement of the posterior right lateral ventricle. 2.  Some loss of gray-white matter differentiation involving the parasagittal left parieto-occipital junction. It is uncertain if this is artifactual or reflect a new area of subacute ischemia/infarct. Consider MRI or follow-up head CT if the patient is not an MRI candidate. 3.  Background of mild age-related changes elsewhere similar to the prior exam.    Xr Chest 1 View For Picc Placement Portable    Result Date: 4/29/2019  EXAM: XR CHEST 1 VIEW FOR PICC LINE PLACEMENT PORTABLE LOCATION: [facility] DATE/TIME: 4/29/2019 3:10 PM INDICATION: verify catheter placement COMPARISON: 04/27/2019. FINDINGS: Endotracheal tube tip is 3.0 cm above the georgina. NG tube with tip below the inferior border of the image. There is a right internal jugular [place]-Nick catheter with the tip at the expected location of the main pulmonary outflow tract. There are  mediastinal and right chest drains. New right PICC. The tip is in the expected location of the right atrium. Lung volumes are low. Bilateral atelectasis with trace effusions not excluded. No pneumothorax on portable imaging. Unchanged cardiac size. Median sternotomy.

## 2021-06-27 NOTE — PROGRESS NOTES
Progress Notes by Michael Aldrich MD at 5/3/2019  9:35 AM     Author: Michael Aldrich MD Service: Cardiology Author Type: Physician    Filed: 5/3/2019  9:54 AM Date of Service: 5/3/2019  9:35 AM Status: Signed    : Michael Aldrich MD (Physician)               [email or web address]  [phone]             Impression and Plan     Impression:   1. Atrial fibrillation with RVR - overall uncontrolled with ongoing hemodynamic instability  2. 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 Mild non-ischemic cardiomyopathy - likely related to valvular heart disease  5. Thrombocytopenia  6. Recent cerebrovascular accident  7. Small bowel obstruction    Plan:    Recommend BATSHEVA with attempted cardioversion.     Ideally would anticoagulate, however risk of hemorrhagic conversion of his CVA in the setting of petechial hemorrhage and other bleeding complications with thrombocytopenia likely outweigh benefit.    Discussed at length with CT surgery and critical care teams.    Primary Cardiologist: Dr. Ashleigh Lang MD    Subjective     Remains intubated and sedated. HR intermittently controlled but rises with any repositioning or cares. Single IV dose of digoxin did not have any meaningful effect. Requiring more phenylephrine and fentanyl.    Cardiac Diagnostics   Telemetry (personally reviewed): atrial fibrillation - intermittent rate control. Mostly uncontrolled.    Echocardiogram (results reviewed):   Echo results:   Results for orders placed during the hospital encounter of 11/30/18   Echo Complete [ECH10] 11/30/2018    Narrative   When compared to the previous study dated 2/28/2018, no significant   change.    Normal left ventricular size.    Left ventricle ejection fraction is mildly decreased. The estimated left   ventricular ejection fraction is 45%.    Sigmoid septum hypertrophy noted. E/e' >  "15, suggesting elevated LV   filling pressures.    TAPSE is normal, which is consistent with normal right ventricular   systolic function.    Left Atrium: Left atrial volume is severely increased. Right Atrium:   Cavity is moderately dilated.    Mild aortic stenosis. There is mild to moderate aortic regurgitation.    Mild mitral Rheumatic mitral stenosis. Mild to moderate mitral   regurgitation.    Moderate tricuspid valve regurgitation.          Cardiac Cath (results reviewed):   11/30/18  Angiography via femoral (unable to pass wire via left radial)  LM normal  LAD mild dz  Circ mild dz  RCA normal     Imp/plan  1. Valvular heart disease - echo today, CT surgery aware, restart warfarin today with follow up INR in clinic.        Physical Examination       /85   Pulse (!) 133   Temp 99  F (37.2  C) (Core)   Resp 17   Ht 4' 11\" (1.499 m)   Wt 121 lb 4.8 oz (55 kg)   SpO2 98%   BMI 24.50 kg/m        121 lb 4.8 oz (55 kg)          Intake/Output Summary (Last 24 hours) at 5/3/2019 0935  Last data filed at 5/3/2019 0800  Gross per 24 hour   Intake 1483.2 ml   Output 4335 ml   Net -2851.8 ml       General: intubated, sedated.   HENT: external ears normal. Nares patent. Mucous membranes moist.  Eyes: anicteric sclera  Neck: No JVD  Lungs: clear to auscultation  COR: very tachycardic, regular rate. No murmurs, rubs, or gallops  Abd: mildly distended. Softer than yesterday.  Extrem: No edema         Imaging      CT head 4/29  CONCLUSION:  1.  Motion degraded examination demonstrates expected temporal evolution of right posterior cerebral artery territory infarct including interval development of small volume petechial hemorrhage as above. No hemorrhagic transformation. Mild local mass   effect with partial effacement of the posterior right lateral ventricle.  2.  Some loss of gray-white matter differentiation involving the parasagittal left parieto-occipital junction. It is uncertain if this is artifactual or " reflect a new area of subacute ischemia/infarct. Consider MRI or follow-up head CT if the patient is   not an MRI candidate.  3.  Background of mild age-related changes elsewhere similar to the prior exam.    Lab Results   Lab Results   Component Value Date     (H) 05/03/2019    K 4.3 05/03/2019     (H) 05/03/2019    CO2 22 05/03/2019    BUN 56 (H) 05/03/2019    CREATININE 1.31 (H) 05/03/2019    CALCIUM 7.7 (L) 05/03/2019     Lab Results   Component Value Date    WBC 11.6 (H) 05/03/2019    HGB 9.4 (L) 05/03/2019    HCT 29.7 (L) 05/03/2019    MCV 95 05/03/2019    PLT 59 (L) 05/03/2019    PLT 59 (L) 05/03/2019     Lab Results   Component Value Date    CHOL 45 05/01/2019    TRIG 143 05/01/2019    HDL 7 (L) 05/01/2019    LDLCALC  05/01/2019      Comment:      Assay invalid, questionable HDL result.    LDLDIRECT 111 10/13/2016     Lab Results   Component Value Date    INR 1.35 (H) 04/29/2019     Lab Results   Component Value Date     (H) 08/29/2017     Lab Results   Component Value Date    TROPONINI 0.02 08/29/2017     Lab Results   Component Value Date    TSH 0.73 11/05/2018           Current Inpatient Scheduled Medications   Scheduled Meds:  ? acetaminophen  650 mg Enteral Tube Q6H    Or   ? acetaminophen  650 mg Oral Q6H    Or   ? acetaminophen  650 mg Rectal Q6H   ? atorvastatin  40 mg Enteral Tube DAILY   ? baclofen  10 mg Enteral Tube BID   ? cefTRIAXone (ROCEPHIN)  IV  1 g Intravenous DAILY   ? chlorhexidine  15 mL Topical Q12H   ? docusate sodium  100 mg Oral BID    Or   ? docusate sodium (COLACE) 50 mg/5 mL oral liquid  100 mg Enteral Tube BID   ? furosemide  40 mg Intravenous Q8H   ? insulin aspart (NovoLOG) injection   Subcutaneous Q4H FIXED TIMES   ? levothyroxine  88 mcg Enteral Tube Daily 0600   ? metoprolol tartrate  12.5 mg Enteral Tube BID   ? pantoprazole  40 mg Intravenous Q24H    Or   ? omeprazole  20 mg Oral Q24H    Or   ? omeprazole  20 mg Enteral Tube Q24H   ? polyethylene glycol   17 g Enteral Tube DAILY   ? potassium chloride  10 mEq Enteral Tube Daily with supper   ? sodium chloride  10-30 mL Intravenous Q8H FIXED TIMES   ? sodium chloride  3 mL Intravenous Line Care     Continuous Infusions:  ? amiodarone 900 mg/500 ml standard 24 hour infusion 0.5 mg/min (05/03/19 0316)   ? dexmedetomidine 400 mcg/100 mL in NS (PRECEDEX) (4mcg/mL) 1.5 mcg/kg/hr (05/03/19 0814)   ? DOPamine     ? epinephrine Stopped (04/27/19 0655)   ? fentaNYL 2500 mcg/250 mL in NS (10 mcg/mL) 100 mcg/hr (05/03/19 0907)   ? insulin infusion (1 unit/mL) Stopped (04/24/19 1906)   ? niCARdipine Stopped (04/26/19 0701)   ? norepinephrine IV infusion in D5W     ? phenylephrine IV infusion in NS 80 mcg/min (05/03/19 0800)   ? vasopressin              Medications Prior to Admission   Prior to Admission medications    Medication Sig Start Date End Date Taking? Authorizing Provider   albuterol (PROAIR HFA;PROVENTIL HFA;VENTOLIN HFA) 90 mcg/actuation inhaler Inhale 1-2 puffs every 6 (six) hours as needed. 11/5/18  Yes Aristides Alegria MD   amoxicillin (AMOXIL) 500 MG capsule TAKE 4 TABLETS (2,000 mg) BY MOUTH 1 HOUR PRIOR TO PROCEDURE. 6/5/18  Yes Aristides Alegria MD   atorvastatin (LIPITOR) 40 MG tablet TAKE 1 PILL BY MOUTH AT BEDTIME. FOR CHOLESTEROL 2/12/19  Yes Ashleigh Lang MD   baclofen (LIORESAL) 10 MG tablet TAKE 1 TABLET BY MOUTH TWICE DAILY 7/19/18  Yes Aristides Alegria MD   enoxaparin (LOVENOX) 40 mg/0.4 mL syringe Inject 0.4 mL (40 mg total) under the skin every evening. 4/16/19  Yes Aristides Alegria MD   enoxaparin (LOVENOX) 60 mg/0.6 mL syringe Inject 0.6 mL (60 mg total) under the skin every morning. 4/16/19  Yes Aristides Alegria MD   fluticasone (FLONASE) 50 mcg/actuation nasal spray 2 sprays into each nostril daily.  Patient taking differently: 2 sprays into each nostril daily as needed.        12/21/16  Yes Aristides Alegria MD   furosemide (LASIX) 20 MG tablet TAKE 1 TABLET (20 MG TOTAL) BY MOUTH DAILY  FOR EDEMA 1/15/19  Yes Aristides Alegria MD   levothyroxine (SYNTHROID, LEVOTHROID) 88 MCG tablet TAKE 1 PILL BY MOUTH EVERYDAY FOR THYROID 4/18/19  Yes Aristides Alegria MD   metoprolol succinate (TOPROL-XL) 25 MG TAKE 1 PILL BY MOUTH EVERYDAY FOR BLOOD PRESSURE 12/18/18  Yes Aristides Alegria MD   omeprazole (PRILOSEC) 20 MG capsule TAKE 1 PILL BY MOUTH EVERYDAY FOR STOMACH 9/25/18  Yes Aristides Alegria MD   spironolactone (ALDACTONE) 25 MG tablet TAKE 1 PILL BY MOUTH EVERYDAY 9/25/18  Yes Aristides Alegria MD   warfarin (COUMADIN/JANTOVEN) 1 MG tablet Take 1-2 mg by mouth See Admin Instructions. 1 mg daily on Mon/Wed/Fri; and 2 mg daily rest of week   Yes Aristides Alegria MD   acetaminophen (TYLENOL) 500 MG tablet Take 500-1,000 mg by mouth every 6 (six) hours as needed for pain. Every 6-8 hours as needed. No more than 4,000MG of acetaminophen daily.    PROVIDER, HISTORICAL   ibuprofen (ADVIL,MOTRIN) 600 MG tablet Take 600 mg by mouth every 6 (six) hours as needed for pain.    PROVIDER, HISTORICAL          Michael Aldrich MD  Non-invasive Cardiology  [facility]

## 2021-06-27 NOTE — PROGRESS NOTES
Progress Notes by Aristides Stewart MD at 5/1/2019  9:19 AM     Author: Aristides Stewart MD Service: Infectious Disease Author Type: Physician    Filed: 5/1/2019  9:20 AM Date of Service: 5/1/2019  9:19 AM Status: Signed    : Aristides Stewart MD (Physician)       Infectious Disease Progress Note    Assessment/Plan    IMPRESSION:  Fever----likely multifactorial.  Fever curve improved.    Patient has very sensitive Klebsiella in the sputum.    Acute brain injury can also cause fever.  Large stroke seen on MRI scan        PLAN:  Spoke with his son.   De-escalated abx to ceftriaxone for 5 days.  Not much more to offer from ID standpoint at this time.    We'll sign off.  Please call if questions or problems.         Principal Problem:    S/P MVR (mitral valve repair)  Active Problems:    Rheumatic heart disease    Mitral valve stenosis, unspecified etiology    Aortic valve disease, rheumatic    A-fib (H)    ARF (acute respiratory failure) (H)    Anemia due to blood loss, acute    Coagulopathy (H)    Non-English speaking patient    Thrombocytopenia (H)    Sepsis, due to unspecified organism (H)    Atrial fibrillation with RVR (H)    Hypernatremia      Aristides Stewart MD  [phone]      Subjective  Not responsive.  Sedated.  Discussed with son at bedside.    Objective    Vital signs in last 24 hours  Temp:  [98.8  F (37.1  C)-102.1  F (38.9  C)] 101.1  F (38.4  C)  Heart Rate:  [] 98  Resp:  [12-30] 18  BP: ()/() 128/87  FiO2 (%):  [40 %-50 %] 40 %  Weight:   120 lb 12.8 oz (54.8 kg)    Intake/Output last 3 shifts  I/O last 3 completed shifts:  In: 2817 [I.V.:1167; NG/GT:1650]  Out: 3900 [Urine:3900]  Intake/Output this shift:  No intake/output data recorded.    Review of Systems   Review of systems not obtained due to inability to communicate with the patient. , otherwise negative.    Physical Exam  Sedated, asleep.  Vitals tabulated above, reviewed  HEENT:nl orogastric and  endotracheal tube noted.  Neck supple without lymphadenopathy  Sclera clear  CARDIOVASCULAR regular rate and rhythm, no murmur  Incision ok  Lungs less  rhonchi.  Chest tubes in place.  Abdomen soft, NT/ND, absent HEPATOSPLENOMEGALY  Skin normal  Joints normal  Neurologic exam non focal  Wound:  NA        Pertinent Labs   Lab Results: personally reviewed.     Results from last 7 days   Lab Units 04/30/19  0559 04/29/19  1930 04/29/19  1032 04/29/19  0629   LN-WHITE BLOOD CELL COUNT thou/uL 6.3  --  4.1 4.9   LN-HEMOGLOBIN g/dL 8.0*  --  8.4* 8.5*   LN-HEMATOCRIT % 25.5*  --  26.3* 26.9*   LN-PLATELET COUNT thou/uL 46* 47* 27* 28*        Results from last 7 days   Lab Units 05/01/19  0445 04/30/19  2353 04/30/19  1801  04/30/19  0559 04/30/19  0048  04/29/19  0524   LN-SODIUM mmol/L 148* 147* 148*   < > 147* 147*   < > 150*  150*   LN-POTASSIUM mmol/L 3.6  --   --   --  3.9 3.4*   < > 3.4*   LN-CHLORIDE mmol/L 114*  --   --   --  115*  --   --  117*   LN-CO2 mmol/L 25  --   --   --  26  --   --  26   LN-BLOOD UREA NITROGEN mg/dL 48*  --   --   --  50*  --   --  44*   LN-CREATININE mg/dL 1.22  --   --   --  1.28  --   --  1.32*   LN-CALCIUM mg/dL 7.7*  --   --   --  7.4*  --   --  7.4*    < > = values in this interval not displayed.     Blood cultures remain negative to date.    Procedure Component Value Units Date/Time   Sputum culture [[id number]] (Abnormal)  Collected: 04/27/19 1200   Order Status: Completed Specimen: Respiratory Updated: 04/29/19 1340    Culture Usual lupe with     3+ Klebsiella pneumoniaeAbnormal     Gram Stain Result 2+ Polymorphonuclear leukocytes     4+ Gram negative diplococci     3+ Gram negative bacilli     2+ Gram positive cocci     2+ Gram positive bacilli, diphtheroid like   Susceptibility     Klebsiella pneumoniae (1)     Antibiotic Interpretation Microscan Method Status    Amoxicillin + Clavulanate Sensitive <=4/2 CARLOS ENRIQUE Final    Ampicillin Resistant >16 CARLOS ENRIQUE Final    Cefazolin Sensitive  2 CARLOS ENRIQUE Final    Cefepime Sensitive <=1 CARLOS ENRIQUE Final    Ceftriaxone Sensitive <=1 CARLOS ENRIQUE Final    Ciprofloxacin Sensitive <=0.5 CARLOS ENRIQUE Final    Gentamicin Sensitive <=2 CARLOS ENRIQUE Final    Levofloxacin Sensitive <=1 CARLOS ENRIQUE Final    Meropenem Sensitive <=0.25 CARLOS ENRIQUE Final    Piperacillin + Tazobactam Sensitive 8/4 CARLOS ENRIQUE Final    Tetracycline Sensitive 4 CARLOS ENRIQUE Final    Tobramycin Sensitive <=2 CARLOS ENRIQUE Final    Trimethoprim + Sulfamethoxazole Sensitive <=0.5 CARLOS ENRIQUE Final          Sputum culture [[id number]]    Order Status: Sent Specimen: Respiratory          Pertinent Radiology   Radiology Results: Personally reviewed image/s and Personally reviewed impression/s    Mr Brain Without Contrast    Result Date: 4/30/2019  EXAM: MR BRAIN WO CONTRAST LOCATION: [facility] DATE/TIME: 4/30/2019 9:33 PM INDICATION: Stroke. COMPARISON: CT head 04/29/2019, brain MRI from 08/30/2017. TECHNIQUE: Routine multiplanar multisequence head MRI without intravenous contrast. FINDINGS: Mildly limited by motion. INTRACRANIAL CONTENTS: Large zone of evolving subacute ischemia in the right PCA distribution including the right medial occipital lobe lateral thalamus and majority of the right hippocampus. Minimal presumed petechial hemorrhage along the lateral portion of the right occipital infarct. Moderate associated vasogenic edema with sulcal effacement and localized mass effect. There is moderate effacement of the right atrium, temporal and occipital horns lateral ventricle without definite hydrocephalus.  No space-occupying hemorrhage. Minimal diffusion hyperintensity in the cortex of the left occipital lobe axial diffusion image 9 is favored to be artifactual. There are scattered patchy acute to early subacute infarcts in both frontal lobes more prominently in the left posterior frontal cortex. Small acute to early subacute infarct in the right caudate body. Mild age-related change. Normal position of the cerebellar tonsils. SELLA: No significant abnormality  accounting for technique. OSSEOUS STRUCTURES/SOFT TISSUES: No aggressive osseous lesion involving the calvarium, skull base, or visualized upper cervical spine. The major intracranial vascular flow voids are maintained. ORBITS: No significant abnormality accounting for technique. SINUSES/MASTOIDS: Mild mucosal thickening ethmoid air cells and small left sphenoid sinus fluid level. No significant middle ear or mastoid effusion.     CONCLUSION: 1.  Limited by motion. Large evolving subacute right PCA distribution infarcts with moderate cytotoxic edema, sulcal effacement and mass effect on the right lateral ventricle. No hydrocephalus. 2.  Minimal, punctate petechial hemorrhage in the right occipital infarct bed. 3.  Patchy areas of acute to early subacute ischemia in the posterior left frontal cortex and minimally within the right anterior frontal cortex and right caudate body. NOTE: ABNORMAL REPORT THE DICTATION ABOVE DESCRIBES AN ABNORMALITY FOR WHICH FOLLOW-UP IS NEEDED.

## 2021-06-27 NOTE — PROGRESS NOTES
Progress Notes by Adarsh Jaramillo NP at 8/28/2019  9:50 AM     Author: Adarsh Jaramillo NP Service: -- Author Type: Nurse Practitioner    Filed: 8/28/2019 12:10 PM Encounter Date: 8/28/2019 Status: Signed    : Adarsh Jaramillo NP (Nurse Practitioner)           Click to link to [facility]     [facility] HEART CARE NOTE      Assessment/Recommendations   Assessment:    1.  Heart failure with preserved ejection fraction, NYHA class I: He is well compensated with no acute signs and symptoms of heart failure.  He does not weigh himself at home.  His appetite is poor.  He is eating very little food with no high salt and gets nutrition through tube feeding.  He denies shortness of breath, PND or orthopnea.    He is currently on furosemide 20 mg daily.  His main concern today is dizziness.  His most recent BMP is stable.  His dizziness could be multifactorial including recent stroke, malnutrition, physical deconditioning, dehydration from inadequate fluid intake and being on diuretic therapy, other medications include amiodarone, metoprolol and tamsulosin.  Also reports worsening vision since valve surgery and stroke.    2.  History of atrial fibrillation with RVR/atrial flutter with RVR: Heart rate today is controlled in 80s.  Recent ED visit EKG showed atrial flutter with variable conduction with heart rate in 60s.  He is on warfarin therapy for anticoagulation.  His most recent INR is 1.95-dose adjusted by anticoagulation pool.  He is on amiodarone 200 mg twice a day.  And metoprolol succinate 25 mg daily.    3.  Hypertension: Blood pressure today is 110/84-well-controlled.      4.  History of rheumatic valve disease of mitral and aortic valve with status post AVR/MVR with bioprosthetic valve in April 2019: Most recent echocardiogram in May 2019 showed normal bioprosthetic aortic valve with mean gradient of 12 mmHg with trace paravalvular leak.  Also noted bioprosthetic mitral valve with mean gradient of 8  mmHg which is elevated for this prosthesis.    He denies shortness of breath.  He is been having ongoing issue with dizziness since valve surgery and stroke.  His dizziness was thought to be due to stroke.  Patient has not followed up with CV surgery since valve surgery in April 2019.    Plan:  1.  Changed furosemide to as needed.  Patient and his family were instructed to call if persistent weight gain with worsening heart failure symptoms.    2.  Instructed to take metoprolol succinate at bedtime and see if this improves dizziness    3.  Encouraged to stay on a low-sodium diet less than 2 g/day, daily weight monitoring, and adequate fluid intake of at least 48 to 50 ounces per day    4.  Recommended to follow-up with ophthalmology given worsening vision    Follow up with CV surgery in 1-2 weeks. Dr. Lang in 4 weeks and F/u with Sarah/Adarsh in 8 weeks in HF clinic.  Follow-up with PCP     History of Present Illness    Mr. Metzger is a 69 y.o. male with a significant past medical history of hypertension, mild coronary artery disease, atrial flutter with RVR, atrial fibrillation with RVR, rheumatic valve disease with status post aortic valve and mitral valve replacement with bioprosthetic valve in April 2019, CVA, and heart failure with preserved ejection fraction who is seen at [facility] heart failure clinic for follow-up.    Patient underwent successful aortic valve and mitral valve replacement in April 2019.  Unfortunately, he had a complication with stroke postop.  He also developed pneumonia and arrhythmia.  Patient also had respiratory failure requiring tracheostomy and PEG tube placement.  He was transferred to [place] for further care.  He was then discharged to [facility] for rehabilitation.  He was recommended to follow-up with the cardiology.    Today, patient presented to the heart care clinic accompanied by his brother, significant other and [facility] .  Per patient's  brother, he has been having issue with dizziness since his valve surgery and stroke.  He visited ED twice with some abdominal discomfort and one time he pulled his PEG tube out.  Patient's been frustrated with his ongoing dizziness and feeling depressed, angry and frustrated with his limitations.  He denies any suicidal thoughts.  He has been following up with PCP.  He was prescribed meclizine for his dizziness but this did not help.      Patient talks very little and his brother talks for him most of the part.Patient has not followed up with a CV surgery or with his primary cardiologist since his valve surgery in April 2019.  He denies fatigue, shortness of breath, dyspnea on exertion, orthopnea, PND, palpitations, chest pain, abdominal fullness/bloating and lower extremity edema.  He reports some abdominal bloating.  He continues to have some weakness and tingling sensation in his hand.  He has not initiated home PT/OT yet.  His pressures been helping him do some passive exercise at home although this is been challenging due to his dizziness.    Per his brother, patient is slowly gaining weight although his appetite's been poor.  His brother also reports inadequate fluid intake for patient.  He is a small portion of food but gets filled up easily.  He is getting nutrition through PEG tube 3 or 4 times a day. She has a home care nurse who helps him set up his medication once a week every Tuesday.     ECHO-Reviewed:   Results for orders placed during the hospital encounter of 05/13/19   Echo Complete [ECH10] 05/17/2019    Narrative   When compared to the previous study dated 11/30/2018, there are changes   noted. There is now an aortic and mitral valve bioprosthesis.    Left ventricle ejection fraction is normal. The calculated left   ventricular ejection fraction is 58%.    TAPSE is abnormal, which is consistent with abnormal right ventricular   systolic function.    Left Atrium: Left atrial volume is severely  increased. Right Atrium:   Cavity is severely dilated.    Aortic Valve: There is a bioprosthetic valve present. The prosthetic   valve is normal. Mean gradient of 12 mmHg which is normal for this   prosthesis and a trace paravalvular leak.    Mitral Valve: There is a bioprosthetic mitral valve. Mean gradient of 8   mmHg which is elevated for this prosthesis.    The aortic root is mildly dilated.        Physical Examination Review of Systems   Vitals:    08/28/19 1013   BP: 110/84   Pulse: 80   Resp: 16     Body mass index is 18.71 kg/m .  Wt Readings from Last 3 Encounters:   08/28/19 (!) 99 lb (44.9 kg)   08/22/19 (!) 97 lb (44 kg)   08/07/19 (!) 96 lb (43.5 kg)       General Appearance:     Alert, cooperative and in no acute distress.   ENT/Mouth: membranes moist, no oral lesions or bleeding gums.      EYES:  no scleral icterus, normal conjunctivae   Neck: no carotid bruits or thyromegaly   Chest/Lungs:   lungs are clear to auscultation, no rales or wheezing, respirations unlabored   Cardiovascular:    Irregularly irregular, normal first and second heart sounds with no murmurs, rubs, or gallops; the carotid, radial and posterior tibial pulses are intact, no edema bilateral lower extremities    Abdomen:  Soft, nontender, nondistended, bowel sounds present   Extremities: no cyanosis or clubbing   Skin: warm, dry.    Neurologic: mood and affect are appropriate, alert and oriented x3      General: WNL  Eyes: WNL  Ears/Nose/Throat: WNL  Lungs: WNL  Heart: Arm Pain  Stomach: WNL  Bladder: WNL  Muscle/Joints: Joint Pain, Muscle Weakness, Muscle Pain  Skin: WNL  Nervous System: Dizziness, Loss of Balance  Mental Health: Confusion, Depression     Blood: WNL     Medical History  Surgical History Family History Social History   Past Medical History:   Diagnosis Date   ? ALEENA (acute kidney injury) (H)    ? Anemia due to blood loss, acute 4/24/2019   ? Asthma    ? Atrial fibrillation (H)    ? Blurry vision    ? CHF (congestive  heart failure) (H)    ? Chronic kidney disease    ? Coronary artery disease    ? Dysphagia     resolved   ? Dysuria    ? Hearing loss    ? Heart murmur    ? Heart murmur    ? Hyperlipidemia    ? Hypertension    ? Hypothyroidism    ? Mitral valve stenosis    ? Moderate malnutrition (H)    ? Palpitations    ? Rheumatic heart disease    ? Shortness of breath     exertion   ? Stroke (H)     Silent   ? Valvular disease     Past Surgical History:   Procedure Laterality Date   ? AORTIC VALVE REPLACEMENT N/A 4/24/2019    Procedure: AORTIC VALVE REPLACEMENT;  Surgeon: Jojo Vaughn MD;  Location: [facility];  Service: Cardiovascular   ? CARDIAC CATHETERIZATION     ? CATARACT EXTRACTION Left 06/13/2016   ? CV CORONARY ANGIOGRAM N/A 11/30/2018    Procedure: Coronary Angiogram;  Surgeon: Jeff Dleeon MD;  Location: [facility];  Service: Cardiology   ? EYE SURGERY      cataract   ? PICC AND MIDLINE TEAM LINE INSERTION  4/29/2019        ? LA EGD PERCUTANEOUS PLACEMENT GASTROSTOMY TUBE N/A 5/8/2019    Procedure: CREATION, GASTROSTOMY, PERCUTANEOUS, ENDOSCOPIC;  Surgeon: Chandana Kang MD;  Location: [facility];  Service: General   ? LA REPLACEMENT OF MITRAL VALVE N/A 4/24/2019    Procedure: MITRAL VALVE REPLACEMENT, ANESTHESIA, TAKEDOWN OF PERICARDIAL ADHESIONS AND REINFORCEMENT OF KLS PLATING SYSTEM TRANESOPHAGEAL ECHOCARDIOGRAM AND EPI AORTIC ULTRASOUND;  Surgeon: Jojo Vaughn MD;  Location: [facility];  Service: Cardiovascular   ? TRACHEOSTOMY N/A 5/8/2019    Procedure: TRACHEOSTOMY;  Surgeon: Chandana Kang MD;  Location: [facility];  Service: General    Family History   Problem Relation Age of Onset   ? No Medical Problems Mother    ? No Medical Problems Father    ? Heart disease Neg Hx     Social History     Socioeconomic History   ? Marital status:      Spouse name: Jesus Johns   ? Number of children: 4   ? Years of education: Not on file   ?  Highest education level: Not on file   Occupational History   ? Not on file   Social Needs   ? Financial resource strain: Not on file   ? Food insecurity:     Worry: Not on file     Inability: Not on file   ? Transportation needs:     Medical: Not on file     Non-medical: Not on file   Tobacco Use   ? Smoking status: Former Smoker     Packs/day: 1.00     Years: 50.00     Pack years: 50.00     Types: Cigarettes     Last attempt to quit: 2014     Years since quittin.0   ? Smokeless tobacco: Former User   ? Tobacco comment: No Betel nut   Substance and Sexual Activity   ? Alcohol use: No     Comment: Quit   ? Drug use: No   ? Sexual activity: Not on file   Lifestyle   ? Physical activity:     Days per week: Not on file     Minutes per session: Not on file   ? Stress: Not on file   Relationships   ? Social connections:     Talks on phone: Not on file     Gets together: Not on file     Attends Sikh service: Not on file     Active member of club or organization: Not on file     Attends meetings of clubs or organizations: Not on file     Relationship status: Not on file   ? Intimate partner violence:     Fear of current or ex partner: Not on file     Emotionally abused: Not on file     Physically abused: Not on file     Forced sexual activity: Not on file   Other Topics Concern   ? Not on file   Social History Narrative    Refugee, born in [facility].          Medications  Allergies   Current Outpatient Medications   Medication Sig Dispense Refill   ? acetaminophen (TYLENOL) 500 MG tablet Take 1 tablet (500 mg total) by mouth every 6 (six) hours as needed for pain. 20 tablet 0   ? amiodarone (PACERONE) 200 MG tablet 1 po bid 60 tablet 3   ? atorvastatin (LIPITOR) 10 MG tablet 1 po qd 30 tablet 6   ? baclofen (LIORESAL) 10 MG tablet TAKE 1 PILL BY MOUTH 2 TIMES EVERYDAY 360 tablet 0   ? ferrous sulfate 220 mg (44 mg iron)/5 mL solution Take 7 mL (308 mg total) by mouth 2 (two) times a day. 420 mL 1   ? furosemide  (LASIX) 20 MG tablet Take 1 tablet (20 mg total) by mouth daily as needed. 30 tablet 0   ? gabapentin (NEURONTIN) 100 MG capsule Take 100 mg by mouth 2 (two) times a day.     ? levothyroxine (SYNTHROID, LEVOTHROID) 88 MCG tablet TAKE 1 PILL BY MOUTH EVERYDAY FOR THYROID 30 tablet 6   ? magnesium oxide (MAG-OX) 400 mg (241.3 mg magnesium) tablet 1 po qd 30 tablet 3   ? meclizine (ANTIVERT) 25 mg tablet 1 po two times a day prn dizziness 40 tablet 1   ? metoprolol succinate (TOPROL-XL) 25 MG Take 25 mg by mouth at bedtime.           ? omeprazole (PRILOSEC) 20 MG capsule TAKE 1 PILL BY MOUTH EVERYDAY FOR STOMACH 90 capsule 2   ? polyethylene glycol (GLYCOLAX) 17 gram/dose powder 17 gm in 8 oz water daily 510 g 6   ? simethicone (MYLICON,GAS-X) 180 mg capsule 1 po two times a day prn abdominal gas 60 capsule 3   ? tamsulosin (FLOMAX) 0.4 mg cap Take 1 capsule (0.4 mg total) by mouth daily after supper. 30 capsule 5   ? warfarin (COUMADIN/JANTOVEN) 1 MG tablet Take 1 tablet (1 mg total) by mouth daily. Adjust dose based on INR results as directed. 90 tablet 1     No current facility-administered medications for this visit.       No Known Allergies      Lab Results    Chemistry CBC BNP   Lab Results   Component Value Date    CREATININE 0.84 08/22/2019    BUN 11 08/22/2019     08/22/2019    K 3.8 08/22/2019     (H) 08/22/2019    CO2 25 08/22/2019     Creatinine (mg/dL)   Date Value   08/22/2019 0.84   07/23/2019 1.05   07/04/2019 0.76   06/07/2019 0.76    Lab Results   Component Value Date    WBC 4.1 08/22/2019    HGB 12.8 (L) 08/22/2019    HCT 39.3 (L) 08/22/2019    MCV 93 08/22/2019     (L) 08/22/2019    Lab Results   Component Value Date     (H) 08/29/2017     BNP (pg/mL)   Date Value   08/29/2017 893 (H)   04/02/2014 111 (H)      Adarsh Jaramillo, [facility], [facility]   Heart Failure Clinic           This note has been dictated using voice recognition software. Any grammatical,  typographical, or context distortions are unintentional and inherent to the software

## 2021-06-27 NOTE — PROGRESS NOTES
Progress Notes by Michael Aldrich MD at 5/4/2019  9:33 AM     Author: Michael Aldrich MD Service: Cardiology Author Type: Physician    Filed: 5/4/2019 10:33 AM Date of Service: 5/4/2019  9:33 AM Status: Signed    : Michael Aldrich MD (Physician)               [email or web address]  [phone]             Impression and Plan     Impression:   1. Atrial fibrillation with RVR - rate controlled with prn IV metoprolol and BP improved with phenylephrine  2. 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 Mild non-ischemic cardiomyopathy - likely related to valvular heart disease  5. Thrombocytopenia  6. Recent cerebrovascular accident  7. Small bowel obstruction/ileus, improving    Plan:    Continue current treatment for afib and post-op care. Start anticoagulation when able.    I will sign off. Please contact me with additional questions or concerns.    Discussed with VERN Martino and Dr. Delroy Delaney MD ([facility])    Primary Cardiologist: Dr. Ashleigh Lang MD    Subjective     Remains intubated, sedated. HR better controlled overall. Now with cares it rises to 120s and more quickly settles down than previous.    Cardiac Diagnostics   Telemetry (personally reviewed): atrial fibrillation - intermittent rate control. Mostly uncontrolled.    Echocardiogram (results reviewed):   Echo results:   Results for orders placed during the hospital encounter of 11/30/18   Echo Complete [ECH10] 11/30/2018    Narrative   When compared to the previous study dated 2/28/2018, no significant   change.    Normal left ventricular size.    Left ventricle ejection fraction is mildly decreased. The estimated left   ventricular ejection fraction is 45%.    Sigmoid septum hypertrophy noted. E/e' > 15, suggesting elevated LV   filling pressures.    TAPSE is normal, which is consistent with normal right  "ventricular   systolic function.    Left Atrium: Left atrial volume is severely increased. Right Atrium:   Cavity is moderately dilated.    Mild aortic stenosis. There is mild to moderate aortic regurgitation.    Mild mitral Rheumatic mitral stenosis. Mild to moderate mitral   regurgitation.    Moderate tricuspid valve regurgitation.          Cardiac Cath (results reviewed):   11/30/18  Angiography via femoral (unable to pass wire via left radial)  LM normal  LAD mild dz  Circ mild dz  RCA normal     Imp/plan  1. Valvular heart disease - echo today, CT surgery aware, restart warfarin today with follow up INR in clinic.        Physical Examination       /80   Pulse 78   Temp 98.4  F (36.9  C) (Axillary)   Resp 23   Ht 4' 11\" (1.499 m)   Wt 122 lb 2.2 oz (55.4 kg)   SpO2 97%   BMI 24.67 kg/m        122 lb 2.2 oz (55.4 kg)          Intake/Output Summary (Last 24 hours) at 5/4/2019 0933  Last data filed at 5/4/2019 0715  Gross per 24 hour   Intake 1678.02 ml   Output 3760 ml   Net -2081.98 ml       General: intubated, sedated.   HENT: external ears normal. Nares patent. Mucous membranes moist.  Eyes: anicteric sclera  Neck: supple. JVP assessment unreliable on mechanical ventilation.  Lungs: clear to auscultation  COR: normal rate irregular rhythm. No murmurs, rubs, or gallops  Abd: mildly distended. Softer than yesterday.  Extrem: No edema         Imaging      CT head 4/29  CONCLUSION:  1.  Motion degraded examination demonstrates expected temporal evolution of right posterior cerebral artery territory infarct including interval development of small volume petechial hemorrhage as above. No hemorrhagic transformation. Mild local mass   effect with partial effacement of the posterior right lateral ventricle.  2.  Some loss of gray-white matter differentiation involving the parasagittal left parieto-occipital junction. It is uncertain if this is artifactual or reflect a new area of subacute ischemia/infarct. " Consider MRI or follow-up head CT if the patient is   not an MRI candidate.  3.  Background of mild age-related changes elsewhere similar to the prior exam.    Lab Results   Lab Results   Component Value Date     (H) 05/04/2019    K 3.6 05/04/2019     (H) 05/03/2019    CO2 22 05/03/2019    BUN 56 (H) 05/03/2019    CREATININE 1.37 (H) 05/04/2019    CALCIUM 7.7 (L) 05/03/2019     Lab Results   Component Value Date    WBC 11.6 (H) 05/03/2019    HGB 9.4 (L) 05/03/2019    HCT 29.7 (L) 05/03/2019    MCV 95 05/03/2019    PLT 59 (L) 05/03/2019    PLT 59 (L) 05/03/2019     Lab Results   Component Value Date    CHOL 45 05/01/2019    TRIG 143 05/01/2019    HDL 7 (L) 05/01/2019    LDLCALC  05/01/2019      Comment:      Assay invalid, questionable HDL result.    LDLDIRECT 111 10/13/2016     Lab Results   Component Value Date    INR 1.19 (H) 05/03/2019     Lab Results   Component Value Date     (H) 08/29/2017     Lab Results   Component Value Date    TROPONINI 0.02 08/29/2017     Lab Results   Component Value Date    TSH 0.73 11/05/2018           Current Inpatient Scheduled Medications   Scheduled Meds:  ? acetaminophen  650 mg Enteral Tube Q6H    Or   ? acetaminophen  650 mg Oral Q6H    Or   ? acetaminophen  650 mg Rectal Q6H   ? atorvastatin  40 mg Enteral Tube DAILY   ? baclofen  10 mg Enteral Tube BID   ? cefTRIAXone (ROCEPHIN)  IV  1 g Intravenous DAILY   ? chlorhexidine  15 mL Topical Q12H   ? furosemide  40 mg Intravenous Q8H   ? insulin aspart (NovoLOG) injection   Subcutaneous Q4H FIXED TIMES   ? levothyroxine  75 mcg Intravenous Daily   ? metoprolol tartrate  2.5-5 mg Intravenous Q6H   ? pantoprazole  40 mg Intravenous Q24H    Or   ? omeprazole  20 mg Oral Q24H    Or   ? omeprazole  20 mg Enteral Tube Q24H   ? potassium chloride  10 mEq Enteral Tube Daily with supper   ? potassium chloride  20 mEq Intravenous Once   ? sodium chloride  10-30 mL Intravenous Q8H FIXED TIMES   ? sodium chloride  3 mL  Intravenous Line Care     Continuous Infusions:  ? amiodarone 900 mg/500 ml standard 24 hour infusion 0.5 mg/min (05/03/19 1919)   ? dexmedetomidine 400 mcg/100 mL in NS (PRECEDEX) (4mcg/mL) 1.5 mcg/kg/hr (05/04/19 0252)   ? dextrose 5% 75 mL/hr (05/04/19 0105)   ? DOPamine     ? epinephrine Stopped (04/27/19 0655)   ? fentaNYL 2500 mcg/250 mL in NS (10 mcg/mL) 100 mcg/hr (05/04/19 0800)   ? insulin infusion (1 unit/mL) Stopped (04/24/19 1906)   ? midazolam     ? niCARdipine Stopped (04/26/19 0701)   ? norepinephrine IV infusion in D5W     ? phenylephrine IV infusion in NS 80 mcg/min (05/04/19 0932)   ? sodium chloride 0.9% Stopped (05/03/19 1300)   ? vasopressin              Medications Prior to Admission   Prior to Admission medications    Medication Sig Start Date End Date Taking? Authorizing Provider   albuterol (PROAIR HFA;PROVENTIL HFA;VENTOLIN HFA) 90 mcg/actuation inhaler Inhale 1-2 puffs every 6 (six) hours as needed. 11/5/18  Yes Aristides Alegria MD   amoxicillin (AMOXIL) 500 MG capsule TAKE 4 TABLETS (2,000 mg) BY MOUTH 1 HOUR PRIOR TO PROCEDURE. 6/5/18  Yes Aristides Alegria MD   atorvastatin (LIPITOR) 40 MG tablet TAKE 1 PILL BY MOUTH AT BEDTIME. FOR CHOLESTEROL 2/12/19  Yes Ashleigh Lang MD   baclofen (LIORESAL) 10 MG tablet TAKE 1 TABLET BY MOUTH TWICE DAILY 7/19/18  Yes Aristides Alegria MD   enoxaparin (LOVENOX) 40 mg/0.4 mL syringe Inject 0.4 mL (40 mg total) under the skin every evening. 4/16/19  Yes Aristides Alegria MD   enoxaparin (LOVENOX) 60 mg/0.6 mL syringe Inject 0.6 mL (60 mg total) under the skin every morning. 4/16/19  Yes Aristides Alegria MD   fluticasone (FLONASE) 50 mcg/actuation nasal spray 2 sprays into each nostril daily.  Patient taking differently: 2 sprays into each nostril daily as needed.        12/21/16  Yes Aristides Alegria MD   furosemide (LASIX) 20 MG tablet TAKE 1 TABLET (20 MG TOTAL) BY MOUTH DAILY FOR EDEMA 1/15/19  Yes Aristides Alegria MD   levothyroxine  (SYNTHROID, LEVOTHROID) 88 MCG tablet TAKE 1 PILL BY MOUTH EVERYDAY FOR THYROID 4/18/19  Yes Aristides Alegria MD   metoprolol succinate (TOPROL-XL) 25 MG TAKE 1 PILL BY MOUTH EVERYDAY FOR BLOOD PRESSURE 12/18/18  Yes Aristides Alegria MD   omeprazole (PRILOSEC) 20 MG capsule TAKE 1 PILL BY MOUTH EVERYDAY FOR STOMACH 9/25/18  Yes Aristides Alegria MD   spironolactone (ALDACTONE) 25 MG tablet TAKE 1 PILL BY MOUTH EVERYDAY 9/25/18  Yes Aristides Alegria MD   warfarin (COUMADIN/JANTOVEN) 1 MG tablet Take 1-2 mg by mouth See Admin Instructions. 1 mg daily on Mon/Wed/Fri; and 2 mg daily rest of week   Yes Aristides Alegria MD   acetaminophen (TYLENOL) 500 MG tablet Take 500-1,000 mg by mouth every 6 (six) hours as needed for pain. Every 6-8 hours as needed. No more than 4,000MG of acetaminophen daily.    PROVIDER, HISTORICAL   ibuprofen (ADVIL,MOTRIN) 600 MG tablet Take 600 mg by mouth every 6 (six) hours as needed for pain.    PROVIDER, HISTORICAL          Michael Aldrich MD  Non-invasive Cardiology  [facility]

## 2021-06-28 NOTE — PROGRESS NOTES
Progress Notes by Adarsh Jaramillo NP at 1/29/2020  3:30 PM     Author: Adarsh Jaramillo NP Service: -- Author Type: Nurse Practitioner    Filed: 1/29/2020  6:03 PM Encounter Date: 1/29/2020 Status: Signed    : Adarsh Jaramillo NP (Nurse Practitioner)             Assessment/Recommendations   Assessment:    1.  Heart failure with preserved ejection fraction, NYHA class I: He is well compensated with no evidence of fluid retention.  He has not used any PRN furosemide since last seen in the heart failure clinic.    He reports improved appetite.  He eats food orally and tolerating.  He follows low-salt diet.      2.  History of atrial fibrillation with RVR/atrial flutter with RVR: Heart rate controlled in 60s.  Recent Holter study on 12/20/2019 showed sinus bradycardia with average resting heart rate between 45 to 50 bpm consistent with some sinus node dysfunction but no significant pauses noted.    Liver enzyme found elevated. Amiodarone and Metroprolol were discontinued.  TSH elevated.  Patient is on thyroid supplement     80s.  Recent ED visit EKG showed atrial flutter with variable conduction with heart rate in 60s.  He is on warfarin therapy for anticoagulation.  His most recent INR is 1.70--warfarin dose adjusted by anticoagulation pool.       3.  Hypertension: Blood pressure today is 104/60-well controlled      4.  History of rheumatic valve disease of mitral and aortic valve with status post AVR/MVR with bioprosthetic valve in April 2019: Most recent echocardiogram in May 2019 showed normal bioprosthetic aortic valve with mean gradient of 12 mmHg with trace paravalvular leak.  Also noted bioprosthetic mitral valve with mean gradient of 8 mmHg which is elevated for this prosthesis.    Patient is asymptomatic except he continues to have ongoing dizziness issues since valve surgery and stroke.  He continues to have numbness and tingling in his left side and blurred vision.  He got his new eye glasses which did  not improve his blurry vision.  He continues to have balance issue.  Family would like patient to have more physical therapy to improve his balance.     Plan:  1.   Stop furosemide.  Encouraged to stay on a low-salt diet.  Encouraged to call if persistent weight gain with worsening heart failure symptoms.     2.  Schedule Holter study per recommendation from Dr. Lang TSH and LFT pending.      3.  Follow-up with PCP and neurology per their recommendation.     Follow up with  Dr. Lang in May.  Follow-up in the heart failure clinic as needed     History of Present Illness/Subjective    Mr. Metzger is a 69 y.o. male with a significant past medical history of hypertension, mild coronary artery disease, atrial flutter with RVR, atrial fibrillation with RVR, rheumatic valve disease with status post aortic valve and mitral valve replacement with bioprosthetic valve in April 2019, CVA, and heart failure with preserved ejection fraction who is seen at [facility] heart failure clinic for continued follow-up.     Patient underwent successful aortic valve and mitral valve replacement in April 2019.  Unfortunately, he had a complication with stroke postop.  He also developed pneumonia and arrhythmia.  Patient also had respiratory failure requiring tracheostomy and PEG tube placement.  He was transferred to [place] for further care.  He was then discharged to [facility] for rehabilitation.  He was recommended to follow-up with the cardiology.    During last heart failure clinic visit, his furosemide was switched to as needed.  He saw his primary cardiologist.  He was found to have abnormal liver function tests and an elevated TSH.  He is on thyroid supplement.  Holter study showed bradycardia consistent with  sinus node dysfunction.  His amiodarone and metoprolol were discontinued.  He was recommended to have repeat Holter study which is not done yet.    Today, patient is here [facility]  He denies having any shortness of breath, fatigue, abdominal bloating, or lower extremity edema.  He does feel sore in his chest when he moves around in the midsternal surgical site.  He continues to have dizziness with blurred vision and balance issues since stroke and valve surgery.  He completed his physical therapy but would like to do more physical therapy to improve his strength and stability.  He came in using his cane today.  He denies any fall.    Per his brother, his appetite is improved.  Did have a follow-up appointment with his PCP early February.  They are going to discuss about extending his physical therapy.  They are waiting for neurology office to call to set up an appointment for follow-up.     ECHO        Results for orders placed during the hospital encounter of 05/13/19   Echo Complete [ECH10] 05/17/2019     Narrative   When compared to the previous study dated 11/30/2018, there are changes   noted. There is now an aortic and mitral valve bioprosthesis.    Left ventricle ejection fraction is normal. The calculated left   ventricular ejection fraction is 58%.    TAPSE is abnormal, which is consistent with abnormal right ventricular   systolic function.    Left Atrium: Left atrial volume is severely increased. Right Atrium:   Cavity is severely dilated.    Aortic Valve: There is a bioprosthetic valve present. The prosthetic   valve is normal. Mean gradient of 12 mmHg which is normal for this   prosthesis and a trace paravalvular leak.    Mitral Valve: There is a bioprosthetic mitral valve. Mean gradient of 8   mmHg which is elevated for this prosthesis.    The aortic root is mildly dilated.        Physical Examination Review of Systems   Vitals:    01/29/20 1542   BP: 104/60   Pulse: 68   Resp: 16     Body mass index is 23.56 kg/m .  Wt Readings from Last 3 Encounters:   01/29/20 124 lb 11.2 oz (56.6 kg)   12/13/19 100 lb (45.4 kg)   11/04/19 100 lb (45.4 kg)        General Appearance:   no distress, normal body habitus   ENT/Mouth: membranes moist, no oral lesions or bleeding gums.      EYES:  no scleral icterus, normal conjunctivae   Neck: no carotid bruits or thyromegaly   Chest/Lungs:   lungs are clear to auscultation, no rales or wheezing,  equal chest wall expansion    Cardiovascular:     Normal first and second heart sounds with no murmurs, rubs, or gallops; the carotid, radial and posterior tibial pulses are intact, no  edema bilaterally    Abdomen:  no organomegaly, masses, bruits, or tenderness; bowel sounds are present   Extremities: no cyanosis or clubbing   Skin: no xanthelasma, warm.    Neurologic: normal  bilateral, no tremors     Psychiatric: alert and oriented x3, calm     General: WNL  Eyes: Visual Distubance  Ears/Nose/Throat: Hearing Loss  Lungs: Shortness of Breath  Heart: Chest Pain, Shortness of Breath with activity(palpitpations)  Stomach: WNL  Bladder: WNL  Muscle/Joints: WNL  Skin: WNL  Nervous System: Dizziness, Loss of Balance  Mental Health: Confusion, Depression     Blood: WNL     Medical History  Surgical History Family History Social History   Past Medical History:   Diagnosis Date   ? ALEENA (acute kidney injury) (H)    ? Anemia due to blood loss, acute 4/24/2019   ? Asthma    ? Atrial fibrillation (H)    ? Blurry vision    ? CHF (congestive heart failure) (H)    ? Chronic kidney disease    ? Coronary artery disease    ? Dysphagia     resolved   ? Dysuria    ? Hearing loss    ? Heart murmur    ? Heart murmur    ? Hyperlipidemia    ? Hypertension    ? Hypothyroidism    ? Mitral valve stenosis    ? Moderate malnutrition (H)    ? Palpitations    ? Rheumatic heart disease    ? Shortness of breath     exertion   ? Stroke (H)     Silent   ? Valvular disease     Past Surgical History:   Procedure Laterality Date   ? AORTIC VALVE REPLACEMENT N/A 4/24/2019    Procedure: AORTIC VALVE REPLACEMENT;  Surgeon: Jojo Vaughn MD;  Location: [facility] Main OR;  Service: Cardiovascular   ? CARDIAC CATHETERIZATION     ? CATARACT EXTRACTION Left 2016   ? CV CORONARY ANGIOGRAM N/A 2018    Procedure: Coronary Angiogram;  Surgeon: Jeff Deleon MD;  Location: [facility] Cath Lab;  Service: Cardiology   ? EYE SURGERY      cataract   ? PICC AND MIDLINE TEAM LINE INSERTION  2019        ? CA EGD PERCUTANEOUS PLACEMENT GASTROSTOMY TUBE N/A 2019    Procedure: CREATION, GASTROSTOMY, PERCUTANEOUS, ENDOSCOPIC;  Surgeon: Chandana Kang MD;  Location: [facility] OR;  Service: General   ? CA REPLACEMENT OF MITRAL VALVE N/A 2019    Procedure: MITRAL VALVE REPLACEMENT, ANESTHESIA, TAKEDOWN OF PERICARDIAL ADHESIONS AND REINFORCEMENT OF KLS PLATING SYSTEM TRANESOPHAGEAL ECHOCARDIOGRAM AND EPI AORTIC ULTRASOUND;  Surgeon: Jojo Vaughn MD;  Location: [facility] OR;  Service: Cardiovascular   ? TRACHEOSTOMY N/A 2019    Procedure: TRACHEOSTOMY;  Surgeon: Chandana Kang MD;  Location: [facility];  Service: General    Family History   Problem Relation Age of Onset   ? No Medical Problems Mother    ? No Medical Problems Father    ? Heart disease Neg Hx     Social History     Socioeconomic History   ? Marital status:      Spouse name: Jesus Johns   ? Number of children: 4   ? Years of education: Not on file   ? Highest education level: Not on file   Occupational History   ? Not on file   Social Needs   ? Financial resource strain: Not on file   ? Food insecurity:     Worry: Not on file     Inability: Not on file   ? Transportation needs:     Medical: Not on file     Non-medical: Not on file   Tobacco Use   ? Smoking status: Former Smoker     Packs/day: 1.00     Years: 50.00     Pack years: 50.00     Types: Cigarettes     Last attempt to quit: 2014     Years since quittin.4   ? Smokeless tobacco: Former User   ? Tobacco comment: No Betel nut   Substance and Sexual Activity   ? Alcohol use: No      Comment: Quit   ? Drug use: No   ? Sexual activity: Not on file   Lifestyle   ? Physical activity:     Days per week: Not on file     Minutes per session: Not on file   ? Stress: Not on file   Relationships   ? Social connections:     Talks on phone: Not on file     Gets together: Not on file     Attends Sabianism service: Not on file     Active member of club or organization: Not on file     Attends meetings of clubs or organizations: Not on file     Relationship status: Not on file   ? Intimate partner violence:     Fear of current or ex partner: Not on file     Emotionally abused: Not on file     Physically abused: Not on file     Forced sexual activity: Not on file   Other Topics Concern   ? Not on file   Social History Narrative    Refugee, born in [facility].          Medications  Allergies   Current Outpatient Medications   Medication Sig Dispense Refill   ? atorvastatin (LIPITOR) 10 MG tablet 1 po qd 30 tablet 6   ? baclofen (LIORESAL) 10 MG tablet TAKE 1 PILL BY MOUTH 2 TIMES EVERYDAY 360 tablet 0   ? ferrous sulfate 325 (65 FE) MG tablet 1 p.o. twice daily 60 tablet 5   ? gabapentin (NEURONTIN) 100 MG capsule 1 po three times a day for nerve pain 90 capsule 5   ? levothyroxine (SYNTHROID, LEVOTHROID) 88 MCG tablet Take 1 tablet (88 mcg total) by mouth daily. For thyroid. 90 tablet 3   ? magnesium oxide (MAG-OX) 400 mg (241.3 mg magnesium) tablet TAKE 1 PILL BY MOUTH EVERYDAY 30 tablet 3   ? meclizine (ANTIVERT) 25 mg tablet 1 po two times a day prn dizziness 40 tablet 1   ? omeprazole (PRILOSEC) 20 MG capsule TAKE 1 PILL BY MOUTH EVERYDAY FOR STOMACH 90 capsule 2   ? polyethylene glycol (GLYCOLAX) 17 gram/dose powder 17 gm in 8 oz water daily (Patient taking differently: 17 gm in 8 oz water daily as needed      ) 510 g 6   ? senna-docusate (PERICOLACE) 8.6-50 mg tablet Take 1 tablet by mouth daily.     ? sertraline (ZOLOFT) 50 MG tablet Take 1 tablet (50 mg total) by mouth daily. Overdue for return visit.   Call 24/7 30 tablet 2   ? simethicone (MYLICON,GAS-X) 180 mg capsule 1 po two times a day prn abdominal gas 60 capsule 3   ? tamsulosin (FLOMAX) 0.4 mg cap Take 1 capsule (0.4 mg total) by mouth daily after supper. 90 capsule 3   ? warfarin (COUMADIN/JANTOVEN) 1 MG tablet Take 1 tablet (1 mg total) by mouth daily. Adjust dose based on INR results as directed. 90 tablet 1   ? acetaminophen (TYLENOL) 500 MG tablet Take 1 tablet (500 mg total) by mouth every 6 (six) hours as needed for pain. 60 tablet 2     No current facility-administered medications for this visit.     No Known Allergies      Lab Results    Chemistry/lipid CBC Cardiac Enzymes/BNP/TSH/INR   Lab Results   Component Value Date    CHOL 129 11/04/2019    HDL 40 11/04/2019    LDLCALC 70 11/04/2019    TRIG 94 11/04/2019    CREATININE 0.84 08/22/2019    BUN 11 08/22/2019    K 3.8 08/22/2019     08/22/2019     (H) 08/22/2019    CO2 25 08/22/2019    Lab Results   Component Value Date    WBC 4.1 08/22/2019    HGB 12.8 (L) 08/22/2019    HCT 39.3 (L) 08/22/2019    MCV 93 08/22/2019     (L) 08/22/2019    Lab Results   Component Value Date    CKTOTAL 87 05/14/2019    TROPONINI 0.02 08/22/2019     (H) 11/04/2019    TSH 5.82 (H) 01/29/2020    INR 1.70 (!) 01/28/2020        30  minutes were spent face to face with the patient with greater than 50% spent on education and counseling.    This note has been dictated using voice recognition software. Any grammatical, typographical, or context distortions are unintentional and inherent to the software

## 2021-06-28 NOTE — PROGRESS NOTES
Progress Notes by Ashleigh Lang MD at 11/4/2019 10:10 AM     Author: Ashleigh Lang MD Service: -- Author Type: Physician    Filed: 11/4/2019 11:13 AM Encounter Date: 11/4/2019 Status: Addendum    : Ashleigh Lang MD (Physician)    Related Notes: Original Note by Ashleigh Lang MD (Physician) filed at 11/4/2019 11:01 AM         Thank you, Dr. Alegria, for asking the [facility] team to see Mr. Metzger to evaluate valvular heart disease.      Assessment/Recommendations   Assessment/Plan:    1. AVR/MVR - on coumadin. Echo looked good this summer.  2. Atrial fibrillation and atrial flutter - 2 week GAYLA. Consider decreasing amiodarone pending those results. Will check LFTs and TSH today. EKG today with 2:1 flutter, rate 94 BPM. ATc prolonged at 567 ms. Will decrease amiodarone to 200mg daily and increase metoprolol xl to 25mg two times a day.  3. Mild CAD - check lipids  4. PND - lungs are clear and there is no edema. He says this is improving. Check BNP.   5. Stroke - will send a message to his PCP to see if home PT/OT is an option. If home therapy is not an option I encouraged Kody to go to rehab to get stronger.    F/U 6 months       History of Present Illness/Subjective    Mr. Metzger is a 69 y.o. male who lived [place] a [place] in [facility] and had heart failure and valve disease as well as mild coronary disease by angiogram in 2015.   Right and left heart catheterization showed a mitral valve gradient of 7-8 mmHg with simultaneous pulmonary capillary wedge pressure and LVEDP. He has also had atrial fibrillation with RVR and acute heart failure secondary to mitral regurgitation. Echo on August 29, 2017 showed ejection fraction of 55%, moderate to severe mitral regurgitation (rheumatic heart disease), moderate aortic regurgitation, and mild aortic stenosis. He was seen by Dr. Manriquez who thought he was not a candidate for percutaneous valve replacement. He had been in normal sinus  rhythm since November 2017. Echo 11/30/18 showed EF decline to 45% and persistent valve disease and he was then seen by Dr. Vaughn. Surgery was delayed due to tooth extractions, but Kody underwent AVR and MVR with bioprosthetic valves in April 2019. This was complicated by stroke with cerebral hemorrhage while on anticoagulation. Kody has required a PEG tube for feeding and has suffered from depression since his surgery. EKG in August showed atrial flutter with variable block     Kody returns for his first post-op f/u with me today. He is back home with his family. Kody has a hard time standing or walking due to unsteadiness and dizziness. The dizziness is constant. There is some sharp right sided chest pain with movement of his upper body. Kody also notes some dyspnea in bed at night but that it is better than before his surgery. There has been no passing out. He is scheduled to see an eye doctor and has seen neurology. Kody has not been participating in rehab and his family hopes home rehab will be an option. Kody does eat some foods but subsists on his peg feedings.      ECG: Personally reviewed. August 2019 - atrial flutter with variable block    ECHOCARDIOGRAM: 5/2019    When compared to the previous study dated 11/30/2018, there are changes noted. There is now an aortic and mitral valve bioprosthesis.    Left ventricle ejection fraction is normal. The calculated left ventricular ejection fraction is 58%.    TAPSE is abnormal, which is consistent with abnormal right ventricular systolic function.    Left Atrium: Left atrial volume is severely increased. Right Atrium: Cavity is severely dilated.    Aortic Valve: There is a bioprosthetic valve present. The prosthetic valve is normal. Mean gradient of 12 mmHg which is normal for this prosthesis and a trace paravalvular leak.    Mitral Valve: There is a bioprosthetic mitral valve. Mean gradient of 8 mmHg which is elevated for this prosthesis.    The aortic root is mildly  dilated.          Physical Examination Review of Systems   Vitals:    11/04/19 1015   BP: 112/76   Pulse: 64   Resp: 16     Body mass index is 18.89 kg/m .  Wt Readings from Last 3 Encounters:   11/04/19 100 lb (45.4 kg)   10/23/19 (!) 98 lb (44.5 kg)   10/04/19 (!) 96 lb (43.5 kg)     [unfilled]  General Appearance:   no distress, normal body habitus   ENT/Mouth: membranes moist, no oral lesions or bleeding gums.      EYES:  no scleral icterus, normal conjunctivae   Neck: no carotid bruits or thyromegaly   Chest/Lungs:   lungs are clear to auscultation, no rales or wheezing, + sternal scar, equal chest wall expansion    Cardiovascular:   Regular. Normal first and second heart sounds with systolic murmurs. No rubs or gallops; the carotid, radial and posterior tibial pulses are intact, Jugular venous pressure elevated, no edema bilaterally    Abdomen:  no organomegaly, masses, bruits, or tenderness; bowel sounds are present   Extremities: no cyanosis or clubbing   Skin: no xanthelasma, warm.    Neurologic: normal  bilateral, no tremors     Psychiatric: alert and oriented x3, calm     General: WNL  Eyes: WNL  Ears/Nose/Throat: WNL  Lungs: WNL  Heart: Chest Pain, Arm Pain, Shortness of Breath with activity  Stomach: WNL  Bladder: WNL  Muscle/Joints: Joint Pain, Muscle Weakness, Muscle Pain  Skin: WNL  Nervous System: Dizziness, Loss of Balance  Mental Health: Confusion, Depression     Blood: WNL     Medical History  Surgical History Family History Social History   Past Medical History:   Diagnosis Date   ? ALEENA (acute kidney injury) (H)    ? Anemia due to blood loss, acute 4/24/2019   ? Asthma    ? Atrial fibrillation (H)    ? Blurry vision    ? CHF (congestive heart failure) (H)    ? Chronic kidney disease    ? Coronary artery disease    ? Dysphagia     resolved   ? Dysuria    ? Hearing loss    ? Heart murmur    ? Heart murmur    ? Hyperlipidemia    ? Hypertension    ? Hypothyroidism    ? Mitral valve stenosis    ?  Moderate malnutrition (H)    ? Palpitations    ? Rheumatic heart disease    ? Shortness of breath     exertion   ? Stroke (H)     Silent   ? Valvular disease     Past Surgical History:   Procedure Laterality Date   ? AORTIC VALVE REPLACEMENT N/A 4/24/2019    Procedure: AORTIC VALVE REPLACEMENT;  Surgeon: Jojo Vaughn MD;  Location: [facility];  Service: Cardiovascular   ? CARDIAC CATHETERIZATION     ? CATARACT EXTRACTION Left 06/13/2016   ? CV CORONARY ANGIOGRAM N/A 11/30/2018    Procedure: Coronary Angiogram;  Surgeon: Jeff Deleon MD;  Location: [facility];  Service: Cardiology   ? EYE SURGERY      cataract   ? PICC AND MIDLINE TEAM LINE INSERTION  4/29/2019        ? MI EGD PERCUTANEOUS PLACEMENT GASTROSTOMY TUBE N/A 5/8/2019    Procedure: CREATION, GASTROSTOMY, PERCUTANEOUS, ENDOSCOPIC;  Surgeon: Chandana Kang MD;  Location: [facility];  Service: General   ? MI REPLACEMENT OF MITRAL VALVE N/A 4/24/2019    Procedure: MITRAL VALVE REPLACEMENT, ANESTHESIA, TAKEDOWN OF PERICARDIAL ADHESIONS AND REINFORCEMENT OF KLS PLATING SYSTEM TRANESOPHAGEAL ECHOCARDIOGRAM AND EPI AORTIC ULTRASOUND;  Surgeon: Jojo Vaughn MD;  Location: [facility];  Service: Cardiovascular   ? TRACHEOSTOMY N/A 5/8/2019    Procedure: TRACHEOSTOMY;  Surgeon: Chandana Kang MD;  Location: [facility];  Service: General    Family History   Problem Relation Age of Onset   ? No Medical Problems Mother    ? No Medical Problems Father    ? Heart disease Neg Hx     Social History     Socioeconomic History   ? Marital status:      Spouse name: Jesus Johns   ? Number of children: 4   ? Years of education: Not on file   ? Highest education level: Not on file   Occupational History   ? Not on file   Social Needs   ? Financial resource strain: Not on file   ? Food insecurity:     Worry: Not on file     Inability: Not on file   ? Transportation needs:     Medical: Not on file      Non-medical: Not on file   Tobacco Use   ? Smoking status: Former Smoker     Packs/day: 1.00     Years: 50.00     Pack years: 50.00     Types: Cigarettes     Last attempt to quit: 2014     Years since quittin.1   ? Smokeless tobacco: Former User   ? Tobacco comment: No Betel nut   Substance and Sexual Activity   ? Alcohol use: No     Comment: Quit   ? Drug use: No   ? Sexual activity: Not on file   Lifestyle   ? Physical activity:     Days per week: Not on file     Minutes per session: Not on file   ? Stress: Not on file   Relationships   ? Social connections:     Talks on phone: Not on file     Gets together: Not on file     Attends Mandaen service: Not on file     Active member of club or organization: Not on file     Attends meetings of clubs or organizations: Not on file     Relationship status: Not on file   ? Intimate partner violence:     Fear of current or ex partner: Not on file     Emotionally abused: Not on file     Physically abused: Not on file     Forced sexual activity: Not on file   Other Topics Concern   ? Not on file   Social History Narrative    Refugee, born in [facility].          Medications  Allergies   Current Outpatient Medications   Medication Sig Dispense Refill   ? acetaminophen (TYLENOL) 500 MG tablet Take 1 tablet (500 mg total) by mouth every 6 (six) hours as needed for pain. 20 tablet 0   ? amiodarone (PACERONE) 200 MG tablet 1 po bid 60 tablet 3   ? atorvastatin (LIPITOR) 10 MG tablet 1 po qd 30 tablet 6   ? baclofen (LIORESAL) 10 MG tablet TAKE 1 PILL BY MOUTH 2 TIMES EVERYDAY 360 tablet 0   ? ferrous sulfate 220 mg (44 mg iron)/5 mL solution TAKE 7 ML (308 MG TOTAL) BY MOUTH 2 (TWO) TIMES A DAY. 473 mL 3   ? furosemide (LASIX) 20 MG tablet Take 1 tablet (20 mg total) by mouth daily as needed. 30 tablet 0   ? gabapentin (NEURONTIN) 100 MG capsule 1 po three times a day for nerve pain 90 capsule 5   ? levothyroxine (SYNTHROID, LEVOTHROID) 88 MCG tablet TAKE 1 PILL BY MOUTH  EVERYDAY FOR THYROID 30 tablet 6   ? magnesium oxide (MAG-OX) 400 mg (241.3 mg magnesium) tablet 1 po qd 30 tablet 3   ? meclizine (ANTIVERT) 25 mg tablet 1 po two times a day prn dizziness 40 tablet 1   ? metoprolol succinate (TOPROL-XL) 25 MG Take 25 mg by mouth at bedtime.           ? omeprazole (PRILOSEC) 20 MG capsule TAKE 1 PILL BY MOUTH EVERYDAY FOR STOMACH 90 capsule 2   ? polyethylene glycol (GLYCOLAX) 17 gram/dose powder 17 gm in 8 oz water daily (Patient taking differently: 17 gm in 8 oz water daily as needed      ) 510 g 6   ? senna-docusate (PERICOLACE) 8.6-50 mg tablet Take 1 tablet by mouth daily.     ? sertraline (ZOLOFT) 50 MG tablet Take 1 tablet (50 mg total) by mouth daily. 30 tablet 2   ? simethicone (MYLICON,GAS-X) 180 mg capsule 1 po two times a day prn abdominal gas 60 capsule 3   ? tamsulosin (FLOMAX) 0.4 mg cap Take 1 capsule (0.4 mg total) by mouth daily after supper. 30 capsule 5   ? warfarin (COUMADIN/JANTOVEN) 1 MG tablet Take 1 tablet (1 mg total) by mouth daily. Adjust dose based on INR results as directed. 90 tablet 1     No current facility-administered medications for this visit.     No Known Allergies      Lab Results    Chemistry/lipid CBC Cardiac Enzymes/BNP/TSH/INR   Lab Results   Component Value Date    CHOL 45 05/01/2019    HDL 7 (L) 05/01/2019    LDLCALC  05/01/2019      Comment:      Assay invalid, questionable HDL result.    TRIG 143 05/01/2019    CREATININE 0.84 08/22/2019    BUN 11 08/22/2019    K 3.8 08/22/2019     08/22/2019     (H) 08/22/2019    CO2 25 08/22/2019    Lab Results   Component Value Date    WBC 4.1 08/22/2019    HGB 12.8 (L) 08/22/2019    HCT 39.3 (L) 08/22/2019    MCV 93 08/22/2019     (L) 08/22/2019    Lab Results   Component Value Date    CKTOTAL 87 05/14/2019    TROPONINI 0.02 08/22/2019     (H) 08/29/2017    TSH 5.69 (H) 07/09/2019    INR 4.40 (!) 10/30/2019

## 2021-06-28 NOTE — PROGRESS NOTES
Progress Notes by Anusha Roque RN at 10/14/2019  2:00 PM     Author: Anusha Roque RN Service: -- Author Type: Registered Nurse    Filed: 10/14/2019  3:26 PM Encounter Date: 10/14/2019 Status: Signed    : Anusha Roque RN (Registered Nurse)       Clinic Care Coordination Contact    Clinic Care Coordination Contact  OUTREACH    Referral Information:  Referral Source: Care Team    Primary Diagnosis: Psychosocial    Chief Complaint   Patient presents with   ? Clinic Care Coordination - Initial        Universal Utilization:   Clinic Utilization  Difficulty keeping appointments:: No  Compliance Concerns: No  No-Show Concerns: No  No PCP office visit in Past Year: No  Utilization    Last refreshed: 10/14/2019  2:57 PM:  Hospital Admissions 4           Last refreshed: 10/14/2019  2:57 PM:  ED Visits 2           Last refreshed: 10/14/2019  2:57 PM:  No Show Count (past year) 5              Current as of: 10/14/2019  2:57 PM              Clinical Concerns:    RN CC met with pt and brother along with  for visit   CHW also joined visit to get additional clinic information and assist scheduling follow up with PCP to discuss ongoing pain concern and to help family with considerations of medical waiver, and to discuss any follow up   Pain  Pain (GOAL):: Yes  Type: Chronic (>3mo)  Location of chronic pain::  arms tingling all the time  Chronic pain severity:: 8(some times it goes down - sometimes feels like a big weight and then goes away)  Limitation of routine activities due to chronic pain:: Yes  Description: Able to do most things most days with some rest  Alleviating Factors: Pain Medication  Health Maintenance Reviewed: Up to date    Medication Management:  Pt had home care skilled nurse who set up medication every two weeks    Functional Status:  Dependent ADLs:: Bathing, Dressing, Grooming, Incontinence  Dependent IADLs:: Cleaning, Cooking, Laundry, Shopping, Meal Preparation, Medication  "Management, Money Management, Transportation, Incontinence  Bed or wheelchair confined:: No  Mobility Status: Dependent/Assisted by Another  Fallen 2 or more times in the past year?: No  Any fall with injury in the past year?: No    Little interest or pleasure in doing things: Several days  Feeling down, depressed, or hopeless: More than half the days    Pt is working with PCP on depression and started on medication 10 days ago  Explained that need to give medication some additional time to see therapeutic benefits and to review at PCP at next visit.   Living Situation:  Current living arrangement:: I live in a private home with family  Type of residence:: [facility]    Diet/Exercise/Sleep:  Inadequate nutrition (GOAL):: No  Food Insecurity: No  Tube Feeding: Yes    Brother states that he is able to do cares. He states that pt does take in some oral liquids, and his medication oral - does complain of tdry mouth some days.     Exercise:: Unable to exercise  Inadequate activity/exercise (GOAL):: No  Significant changes in sleep pattern (GOAL): No(\"not good \" but better than before. usually gets about 3 hours get  up a couple of times at night. IPt does take naps during the day, but does )       Transportation:  Transportation concerns (GOAL):: No  Transportation means:: Family, Medical transport     Psychosocial:  Orthodoxy or spiritual beliefs that impact treatment:: No  Mental health DX:: Yes  Mental health DX how managed:: Medication  Mental health management concern (GOAL):: No  Informal Support system:: Family     Financial/Insurance:   Financial/Insurance concerns (GOAL):: No     Resources and Interventions:  Current Resources:   List of home care services:: Skilled Nursing(med set up every two weeks);              Advance Care Plan/Directive  Advanced Care Plans/Directives on file:: No  Advanced Care Plan/Directive Status: Considering Options          Goals:   Goals        General    Other (pt-stated)     Notes " - Note edited  10/14/2019  2:58 PM by Anusha Roque RN    Goal:  I will complete a healthcare directive in the next 30-60 days  Action Steps to achieve this goal  1) I will review information with my family and discuss form and choices  2) I will have healthcare directive notarized or signed by two witnesses  3) I will give a copy to my health care agent, primary care clinic, and keep original safe at home  4) I will discuss any barriers to me completing this goal with CHW during outreach calls          Psychosocial (pt-stated)     Notes - Note edited  10/14/2019  2:58 PM by Anusha Roque RN    Goal Statement- I want to learn more about medical waiver for citizenship  within 1-2 months      Action steps to achieve this goal  1.  Attend visit with PCP to discuss referral and if he will sign the waiver - set up visit to for 10-23-19  2.  CHW will assist with information for citizenship questions on the process and steps if additional resources needed      Date goal set:  10/14/19                  Patient/Caregiver understanding: Pt and Brother verbalized understanding - Brother was able to interpret for pt when  was called out of room        Future Appointments              In 1 week Aristides Alegria MD [facility]    In 3 weeks Ashleigh Lang MD [facility] [place] [facility]          Plan:  Pt to attend visit with PCP to discuss medical waiver for citizenship process  RN CCC will be available in future if needed  CHW to outreach per standard work

## 2021-06-30 NOTE — PROGRESS NOTES
Progress Notes by Mojgan Kathleen CNP at 4/14/2021 10:30 AM     Author: Mojgan Kathleen CNP Service: -- Author Type: Nurse Practitioner    Filed: 4/14/2021 11:54 AM Encounter Date: 4/14/2021 Status: Signed    : Mojgan Kathleen CNP (Nurse Practitioner)         Thank you, Dr. Alegria, for asking the [facility] Electrophysiology team to see Mr. Metzger to evaluate atrial fibrillation.    Assessment/Recommendations   Assessment/Plan:    1.  Persistent Atrial Fibrillation/flutter: Hospitalized in February for AF with RVR associated with acute heart failure.  When ventricular rates are rapid, he had symptoms of palpitations.  He notes no further symptoms for the last 2 weeks.  He presently has mild bradycardia which is asymptomatic.  24-hour Holter monitor to evaluate rate and rhythm.     We discussed treatment options of rate control versus rhythm control.  Not a candidate for sotalol due to QT prolongation.  Additionally, he previously had elevated LFTs on amiodarone requiring its discontinuation.  It appears this may have been restarted, though if that is the case he had recurrence despite the medication and it may have contributed to his thrombocytopenia.  Thus, recommend rate control.   Continue metoprolol tartrate 100 mg twice daily.    He was reassured that atrial fibrillation is not life-threatening, but carries an increased risk for stroke.  He has a [id number] score of 5+ for age 65-74, hypertension, HF, and CVA.  Continue warfarin for stroke prophylaxis.    2.  Heart failure with reduced ejection fraction: History of heart failure with preserved ejection fraction.  Recent echo shows decreased left ventricular systolic performance with an LVEF of 36%, likely tachycardia mediated.  He appears well compensated with no sign of acute fluid overload on exam or heart failure symptoms.  Continue metoprolol and furosemide.    3.  Hypertension: Blood pressure at target.  Continue metoprolol and  furosemide as above.    Follow up with Dr. Lang in 6-8 weeks       History of Present Illness/Subjective    Mr. Metzger is a 71 y.o. male who comes in today for EP consultation of atrial fibrillation.  He is accompanied by his son who interprets for him, declined using professional [facility]  via phone.  He has a history of atrial fibrillation, rheumatic valve disease for which he underwent bioprosthetic AVR and MVR in 2019 complicated by CVA with hemorrhagic conversion, heart failure with preserved ejection fraction, hypertension, nonobstructive coronary artery disease, hypothyroidism, and thrombocytopenia.  He has residual left-sided weakness from his CVA and is dependent upon his family for ADLs.      He has a history of atrial fibrillation and flutter rapid ventricular response since at least 2015.  He was previously on amiodarone which was discontinued due to elevated liver enzymes which subsequently normalized.  It appears that the amiodarone was restarted at some point, but he was noted to be back in atrial flutter on 2/4/2021 despite amiodarone.  Additionally, thrombocytopenia had worsened, so amiodarone was again discontinued.  He was hospitalized in February 2021 for A. fib/flutter with RVR and acute heart failure.  It was 2.9.  Echo shows decrease in LVEF to 36%, likely tachycardia mediated.  It also shows severe left atrial enlargement.  Ventricular rates were controlled with metoprolol.  He remains on long-term oral anticoagulation with warfarin.  Until his recent hospitalization, he had not followed up with cardiology since January 2020.    Kody states that he had previously been having episodes of fast palpitations associated with shortness of breath, but has not had any in the past 2 weeks.  His son also reports that he occasionally complains of shortness of breath when laying down, but has not had this symptom recently.  He currently denies chest discomfort, palpitations, abdominal  fullness/bloating or peripheral edema, shortness of breath, paroxysmal nocturnal dyspnea, orthopnea, lightheadedness, dizziness, pre-syncope, or syncope.    Cardiographics (EKG personally reviewed):  EKG done 2/7/2021 shows atrial flutter with controlled ventricular response at 82 bpm.  EKG done on 2/5/2021 shows atrial flutter with 2-1 conduction at 125 bpm  EKG done 12/13/2019 shows sinus bradycardia at 46 bpm, QT/QTc interval measures 564/493 ms    24-hour Holter monitor worn 12/20/2019 shows sinus rhythm with some evidence for sinus node dysfunction, average heart rate of 52 bpm and a range of 38 to 74 bpm.  No significant bradycardia or pauses.  No atrial fibrillation.  No significant ventricular ectopy.    Event monitor worn 11/12/2019 through 11/25/2019 showed atypical atrial flutter and atrial fibrillation as well as sinus bradycardia.    ECHO done 2/7/2021:    Severe left atrial enlargement with dense spontaneous echo contrast noted.    Left ventricle ejection fraction is moderately decreased. The calculated left ventricular ejection fraction is 36% with global hypokinesis.    Normal right ventricular size with mild to moderately reduced systolic function.    Normal function of a bioprosthetic aortic valve with mild insufficiency    Bioprosthetic mitral valve with mild degree of stenosis and mild mitral insufficiency.    Mild tricuspid insufficiency. No evidence of pulmonary hypertension.    When compared to the previous study dated 5/17/2019, left ventricular systolic function has decreased.    I have reviewed and updated the patient's Past Medical History, Social History, Family History and Medication List.     Physical Examination Review of Systems   Vitals:    04/14/21 1048   BP: 142/80   Pulse: (!) 56   Resp: 16     Body mass index is 19.92 kg/m .  Wt Readings from Last 3 Encounters:   04/14/21 102 lb (46.3 kg)   02/22/21 100 lb (45.4 kg)   02/09/21 (!) 93 lb 4.8 oz (42.3 kg)     General Appearance:    Alert, well-appearing and in no acute distress.   HEENT: Atraumatic, normocephalic.  No scleral icterus, normal conjunctivae, EOMs intact, PERRL.  Wearing a mask.  No obvious thyromegaly.   Chest/Lungs:   Chest symmetric, spine straight.  Respirations unlabored.  Lungs are clear to auscultation.   Cardiovascular:   Regular rate and rhythm.  Normal first and second heart sounds with no murmurs, rubs, or gallops; radial and posterior tibial pulses are intact, No JVD, no edema.   Abdomen:  Soft, nondistended, bowel sounds present.   Extremities: No cyanosis or clubbing.   Musculoskeletal: Moves all extremities.     Skin: Warm, dry, intact.    Neurologic: Mood and affect are appropriate.  Alert and oriented to person, place, time, and situation.    General: WNL  Eyes: Visual Distubance  Ears/Nose/Throat: Hearing Loss  Lungs: Shortness of Breath  Heart: Chest Pain, Arm Pain, Shortness of Breath with activity, Irregular Heartbeat  Stomach: WNL  Bladder: WNL  Muscle/Joints: Joint Pain, Muscle Weakness  Skin: WNL  Nervous System: Daytime Sleepiness, Dizziness  Mental Health: Confusion, Depression     Blood: WNL       Medical History  Surgical History Family History Social History   Past Medical History:   Diagnosis Date   ? ALEENA (acute kidney injury) (H)    ? Anemia due to blood loss, acute 4/24/2019   ? Asthma    ? Atrial fibrillation (H)    ? Blurry vision    ? CHF (congestive heart failure) (H)    ? Chronic kidney disease    ? Coronary artery disease    ? Dysphagia     resolved   ? Dysuria    ? Hearing loss    ? Heart murmur    ? Heart murmur    ? Hyperlipidemia    ? Hypertension    ? Hypothyroidism    ? Mitral valve stenosis    ? 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   ? Moderate malnutrition (H)    ? Palpitations    ? Rheumatic heart disease    ? Shortness of breath     exertion   ? Stroke (H)     Silent   ? Valvular disease     Past Surgical History:   Procedure Laterality Date   ? AORTIC VALVE REPLACEMENT N/A  2019    Procedure: AORTIC VALVE REPLACEMENT;  Surgeon: Jojo Vaughn MD;  Location: [facility] OR;  Service: Cardiovascular   ? CARDIAC CATHETERIZATION     ? CATARACT EXTRACTION Left 2016   ? CV CORONARY ANGIOGRAM N/A 2018    Procedure: Coronary Angiogram;  Surgeon: Jeff Deleon MD;  Location: [facility] Cath Lab;  Service: Cardiology   ? EYE SURGERY      cataract   ? PICC AND MIDLINE TEAM LINE INSERTION  2019        ? OK EGD PERCUTANEOUS PLACEMENT GASTROSTOMY TUBE N/A 2019    Procedure: CREATION, GASTROSTOMY, PERCUTANEOUS, ENDOSCOPIC;  Surgeon: Chandana Kang MD;  Location: [facility] OR;  Service: General   ? OK REPLACEMENT OF MITRAL VALVE N/A 2019    Procedure: MITRAL VALVE REPLACEMENT, ANESTHESIA, TAKEDOWN OF PERICARDIAL ADHESIONS AND REINFORCEMENT OF KLS PLATING SYSTEM TRANESOPHAGEAL ECHOCARDIOGRAM AND EPI AORTIC ULTRASOUND;  Surgeon: Jojo Vaughn MD;  Location: [facility] [place];  Service: Cardiovascular   ? TRACHEOSTOMY N/A 2019    Procedure: TRACHEOSTOMY;  Surgeon: Chandana Kang MD;  Location: [facility] [place];  Service: General    Family History   Problem Relation Age of Onset   ? No Medical Problems Mother    ? No Medical Problems Father    ? Heart disease Neg Hx     Social History     Tobacco Use   ? Smoking status: Former Smoker     Packs/day: 1.00     Years: 50.00     Pack years: 50.00     Types: Cigarettes     Quit date: 2014     Years since quittin.6   ? Smokeless tobacco: Former User   ? Tobacco comment: No Betel nut   Substance Use Topics   ? Alcohol use: No     Comment: Quit   ? Drug use: No          Medications  Allergies   Current Outpatient Medications   Medication Sig Dispense Refill   ? acetaminophen (TYLENOL) 500 MG tablet Take 1 tablet (500 mg total) by mouth every 6 (six) hours as needed for pain. 60 tablet 2   ? atorvastatin (LIPITOR) 10 MG tablet TAKE 1 PILL BY MOUTH EVERY DAY FOR CHOLESTEROL 90 tablet  3   ? baclofen (LIORESAL) 10 MG tablet Take 1 tablet (10 mg total) by mouth 2 (two) times a day. 180 tablet 0   ? ferrous sulfate 325 (65 FE) MG tablet TAKE 1 PILL BY MOUTH TWO TIMES A DAY FOR IRON 180 tablet 3   ? furosemide (LASIX) 20 MG tablet Take 1 tablet (20 mg total) by mouth daily. 90 tablet 1   ? gabapentin (NEURONTIN) 100 MG capsule TAKE 1 PILL BY MOUTH IN THE MORNING, 1 PILL IN THE AFTERNOON, AND 2 PILLS EVERY BEDTIME. 360 capsule 3   ? levothyroxine (SYNTHROID, LEVOTHROID) 88 MCG tablet Take 88 mcg by mouth daily.     ? magnesium oxide (MAG-OX) 400 mg (241.3 mg magnesium) tablet TAKE 1 PILL BY MOUTH EVERYDAY 30 tablet 2   ? metoprolol tartrate (LOPRESSOR) 100 MG tablet Take 1 tablet (100 mg total) by mouth 2 (two) times a day. 180 tablet 1   ? omeprazole (PRILOSEC) 20 MG capsule TAKE 1 PILL BY MOUTH EVERY DAY FOR STOMACH. 90 capsule 3   ? sertraline (ZOLOFT) 50 MG tablet TAKE 1 PILL BY MOUTH DAILY FOR DEPRESSION 90 tablet 3   ? simethicone (MYLICON,GAS-X) 180 mg capsule 1 po two times a day prn abdominal gas 60 capsule 3   ? tamsulosin (FLOMAX) 0.4 mg cap TAKE 1 CAPSULE (0.4 MG TOTAL) BY MOUTH DAILY AFTER SUPPER. 90 capsule 3   ? warfarin ANTICOAGULANT (COUMADIN/JANTOVEN) 1 MG tablet Take 1.5 tablets (1.5 mg) by mouth daily as directed.  Adjust dose based on INR. 45 tablet 2     No current facility-administered medications for this visit.     No Known Allergies      Lab Results    Chemistry/lipid CBC Cardiac Enzymes/BNP/TSH/INR   Lab Results   Component Value Date    CREATININE 0.83 02/22/2021    BUN 16 02/22/2021     02/22/2021    K 3.6 02/22/2021     02/22/2021    CO2 27 02/22/2021     Creatinine (mg/dL)   Date Value   02/22/2021 0.83   02/07/2021 1.15   02/06/2021 1.03   02/05/2021 1.05       Lab Results   Component Value Date    CHOL 138 08/25/2020    HDL 38 (L) 08/25/2020     Lab Results   Component Value Date    LDLCALC 81 08/25/2020      Lab Results   Component Value Date    WBC 5.4  02/22/2021    HGB 14.3 02/22/2021    HCT 43.5 02/22/2021    MCV 96 02/22/2021    PLT 80 (L) 02/22/2021    Lab Results   Component Value Date    CKTOTAL 87 05/14/2019    TROPONINI 0.03 02/06/2021     (H) 02/22/2021    TSH 4.80 02/06/2021    INR 2.10 (!) 04/13/2021     Lab Results   Component Value Date    INR 2.10 (!) 04/13/2021    INR 1.70 (!) 04/06/2021    INR 2.20 (!) 03/30/2021        52 minutes were spent on the date of encounter performing chart review, history and exam, documentation, and further activities as noted above.    This note has been dictated using voice recognition software. Any grammatical, typographical, or context distortions are unintentional and inherent to the software.

## 2021-07-01 NOTE — CONSULTS
Consults by Michael Aldrich MD at 5/2/2019  9:20 AM     Author: Michael Aldrich MD Service: Cardiology Author Type: Physician    Filed: 5/2/2019 10:11 AM Date of Service: 5/2/2019  9:20 AM Status: Signed    : Michael Aldrich MD (Physician)     Consult Orders    1. Inpatient consult to Cardiology Reason for Consult? rhythm management; Did you contact the consulting MD? Yes; Consult priority: Today (routine); Communication for MD: No phone communication necessary for now [[id number]] ordered by Crystal Rasheed CNP at 05/02/19 0846                     [email or web address]  [phone]         Impression and Plan     Assessment:  1. 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 Rheumatic Aortic valve regurgitation and stenosis s/p aortic valve replacement with bioprosthetic valve  3. Mild non-ischemic cardiomyopathy, likely related to valvular heart disease - LVEF 45%  4. Paroxysmal atrial fibrillation with RVR  5. Thrombocytopenia, unlikely HIT per hematology  6. Cerebrovascular accident  7. Abdominal distension due to small bowel ileus vs obstruction    Plan:    As the HR seems to be related to and improved by pain control would focus HR treatment to pain first. Discussed with Sandy Manzo CNP who will initiate a low-dose fentanyl gtt. ifi this does not improve HR, then will attempt higher dose diltiazem and/or digoxin for better HR control.    The patient is intubated and sedated and, therefore, unable to communicate. An  was not utilized for this encounter.    Primary Cardiologist: Dr. Ashleigh Lang MD    History of Present Illness      Mr. Metzger is a 69 y.o. 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 His post-operative course has been complicated by acute CVA, persistent multifactorial fever, thrombocytopenia and now atrial fibrillation with RVR. He remains intubated and sedated. He is on phenylephrine for some blood pressure support. He received a dose of 5 mg IV diltiazem without any change in heart rate. Per his nurse, who has followed this patient for the past several days, his heart rate becomes significantly uncontrolled when he is in pain and overbreathing the ventilator and improves with pain medication.      Other than noted above, Mr. Johns denies any chest pain/pressure/tightness, shortness of breath at rest or with exertion, light headedness/dizziness, pre-syncope, syncope, lower extremity swelling, palpitations, paroxysmal nocturnal dyspnea (PND), or orthopnea.      Review of Systems:  Unable to obtain ROS due to intubated/sedated status.    Cardiac Diagnostics     ECG: Personally reviewed and interpreted: 5/2/19 - appears to be atrial fibrillation with RVR, but may be an atypical flutter with variable AV block.    Telemetry (personally reviewed): afib/flutter with RVR. HR is occasionally controlled, tends to correlate with timing of pain control medication    Most recent:  Echocardiogram (results reviewed):   Echo results:   Results for orders placed during the hospital encounter of 11/30/18   Echo Complete [ECH10] 11/30/2018    Narrative   When compared to the previous study dated 2/28/2018, no significant   change.    Normal left ventricular size.    Left ventricle ejection fraction is mildly decreased. The estimated left   ventricular ejection fraction is 45%.    Sigmoid septum hypertrophy noted. E/e' > 15, suggesting elevated LV   filling pressures.    TAPSE is normal, which is consistent with normal right ventricular   systolic function.    Left Atrium: Left atrial volume is severely increased. Right Atrium:   Cavity is moderately dilated.    Mild aortic stenosis. There  is mild to moderate aortic regurgitation.    Mild mitral Rheumatic mitral stenosis. Mild to moderate mitral   regurgitation.    Moderate tricuspid valve regurgitation.          Cardiac Cath (results reviewed):   11/30/18  Angiography via femoral (unable to pass wire via left radial)  LM normal  LAD mild dz  Circ mild dz  RCA normal     Imp/plan  1. Valvular heart disease - echo today, CT surgery aware, restart warfarin today with follow up INR in clinic.       Medical History  Surgical History   Past Medical History:   Diagnosis Date   ? ALEENA (acute kidney injury) (H)    ? Anemia due to blood loss, acute 4/24/2019   ? Asthma    ? Atrial fibrillation (H)    ? Blurry vision    ? CHF (congestive heart failure) (H)    ? Chronic kidney disease    ? Coronary artery disease    ? Dysphagia     resolved   ? Dysuria    ? Hearing loss    ? Heart murmur    ? Heart murmur    ? Hyperlipidemia    ? Hypertension    ? Hypothyroidism    ? Mitral valve stenosis    ? Moderate malnutrition (H)    ? Palpitations    ? Rheumatic heart disease    ? Shortness of breath     exertion   ? Stroke (H)     Silent   ? Valvular disease       Past Surgical History:   Procedure Laterality Date   ? AORTIC VALVE REPLACEMENT N/A 4/24/2019    Procedure: AORTIC VALVE REPLACEMENT;  Surgeon: Jojo Vaughn MD;  Location: [facility] OR;  Service: Cardiovascular   ? CARDIAC CATHETERIZATION     ? CATARACT EXTRACTION Left 06/13/2016   ? CV CORONARY ANGIOGRAM N/A 11/30/2018    Procedure: Coronary Angiogram;  Surgeon: Jeff Deleon MD;  Location: [facility] Cath Lab;  Service: Cardiology   ? EYE SURGERY      cataract   ? PICC AND MIDLINE TEAM LINE INSERTION  4/29/2019        ? ND REPLACEMENT OF MITRAL VALVE N/A 4/24/2019    Procedure: MITRAL VALVE REPLACEMENT, ANESTHESIA, TAKEDOWN OF PERICARDIAL ADHESIONS AND REINFORCEMENT OF KLS PLATING SYSTEM TRANESOPHAGEAL ECHOCARDIOGRAM AND EPI AORTIC ULTRASOUND;  Surgeon: Jojo Vaughn MD;  Location: [facility];  Service: Cardiovascular          Family History/Social History/Risk Factors     Family History   Problem Relation Age of Onset   ? No Medical Problems Mother    ? No Medical Problems Father    ? Heart disease Neg Hx        Social History     Socioeconomic History   ? Marital status:      Spouse name: Jesus Johns   ? Number of children: 4   ? Years of education: Not on file   ? Highest education level: Not on file   Occupational History   ? Not on file   Social Needs   ? Financial resource strain: Not on file   ? Food insecurity:     Worry: Not on file     Inability: Not on file   ? Transportation needs:     Medical: Not on file     Non-medical: Not on file   Tobacco Use   ? Smoking status: Former Smoker     Packs/day: 1.00     Years: 50.00     Pack years: 50.00     Types: Cigarettes     Last attempt to quit: 2014     Years since quittin.6   ? Smokeless tobacco: Former User   ? Tobacco comment: No Betel nut   Substance and Sexual Activity   ? Alcohol use: No     Comment: Quit   ? Drug use: No   ? Sexual activity: Not on file   Lifestyle   ? Physical activity:     Days per week: Not on file     Minutes per session: Not on file   ? Stress: Not on file   Relationships   ? Social connections:     Talks on phone: Not on file     Gets together: Not on file     Attends Taoist service: Not on file     Active member of club or organization: Not on file     Attends meetings of clubs or organizations: Not on file     Relationship status: Not on file   ? Intimate partner violence:     Fear of current or ex partner: Not on file     Emotionally abused: Not on file     Physically abused: Not on file     Forced sexual activity: Not on file   Other Topics Concern   ? Not on file   Social History Narrative    Refugee, born in [facility].         Physical Examination       BP (!) 143/93   Pulse (!) 119   Temp 100.1  F (37.8  C) (Oral)   Resp 28   Ht 4' 11\" (1.499 m)   Wt 121 lb 4.8 oz (55 kg)   " SpO2 99%   BMI 24.50 kg/m        121 lb 4.8 oz (55 kg)          Intake/Output Summary (Last 24 hours) at 5/2/2019 0921  Last data filed at 5/2/2019 0600  Gross per 24 hour   Intake 2848.2 ml   Output 3660 ml   Net -811.8 ml       General Appearance:  Intubated, sedated. Appears in pain and is tachypneic   Head:  Normocephalic, without obvious abnormality, atraumatic   Eyes:  anicteric   Ears:  Normal external ear canals bilaterally   Nose: No significant drainage   Throat: ETT in place.   Neck: Supple, symmetrical, trachea midline, no adenopathy, no carotid bruit or   Back:   Symmetric, no abnormal curvature, ROM normal   Lungs:   Ventilatory breath sounds   Chest Wall:  No tenderness or deformity   Heart:  Very tachycardic, irregular rhythm. No murmurs   Abdomen:   Firm, tender to palpation. Diminished bowel sounds.   Extremities: Extremities normal, atraumatic, no cyanosis or edema   Skin: Skin color, texture, turgor normal, no rashes or lesions   Psychiatric: sedated   Neurologic: Withdraws to pain. Sedated.            Imaging      5/2/19 portable abdomen x-ray: FINDINGS: NG tube tip and side-port are present within the region of the stomach. There is diffuse air-filled distention of the small bowel, increased from prior study. Findings reflect either obstruction or ileus. Upright view of the chest performed at   same time does not demonstrate any evidence of free air under the diaphragm.     Lab Results   Lab Results   Component Value Date     (H) 05/02/2019    K 4.5 05/02/2019     (H) 05/02/2019    CO2 25 05/02/2019    BUN 51 (H) 05/02/2019    CREATININE 1.33 (H) 05/02/2019    CALCIUM 8.0 (L) 05/02/2019     Lab Results   Component Value Date    WBC 7.3 05/01/2019    HGB 9.6 (L) 05/01/2019    HCT 29.6 (L) 05/01/2019    MCV 93 05/01/2019    PLT 42 (LL) 05/01/2019     Lab Results   Component Value Date    CHOL 45 05/01/2019    TRIG 143 05/01/2019    HDL 7 (L) 05/01/2019    LDLCALC  05/01/2019       Comment:      Assay invalid, questionable HDL result.    LDLDIRECT 111 10/13/2016     Lab Results   Component Value Date    INR 1.35 (H) 04/29/2019     Lab Results   Component Value Date     (H) 08/29/2017     Lab Results   Component Value Date    TROPONINI 0.02 08/29/2017     Lab Results   Component Value Date    TSH 0.73 11/05/2018           Current Inpatient Scheduled Medications   Scheduled Meds:  ? acetaminophen  650 mg Oral Q6H    Or   ? acetaminophen  650 mg Oral Q6H    Or   ? acetaminophen  650 mg Rectal Q6H   ? atorvastatin  40 mg Enteral Tube DAILY   ? baclofen  10 mg Enteral Tube BID   ? cefTRIAXone (ROCEPHIN)  IV  1 g Intravenous DAILY   ? chlorhexidine  15 mL Topical Q12H   ? docusate sodium  100 mg Oral BID    Or   ? docusate sodium (COLACE) 50 mg/5 mL oral liquid  100 mg Enteral Tube BID   ? furosemide  40 mg Intravenous Q8H   ? insulin aspart (NovoLOG) injection   Subcutaneous Q4H FIXED TIMES   ? levothyroxine  88 mcg Enteral Tube Daily 0600   ? metoprolol tartrate  12.5 mg Enteral Tube BID   ? pantoprazole  40 mg Intravenous Q24H    Or   ? omeprazole  20 mg Oral Q24H    Or   ? omeprazole  20 mg Enteral Tube Q24H   ? polyethylene glycol  17 g Enteral Tube DAILY   ? potassium chloride  10 mEq Enteral Tube Daily with supper   ? sodium chloride  10-30 mL Intravenous Q8H FIXED TIMES   ? sodium chloride  3 mL Intravenous Line Care     Continuous Infusions:  ? amiodarone 900 mg/500 ml standard 24 hour infusion 0.5 mg/min (05/01/19 1818)   ? dexmedetomidine 400 mcg/100 mL in NS (PRECEDEX) (4mcg/mL) 1.5 mcg/kg/hr (05/02/19 0651)   ? DOPamine     ? epinephrine Stopped (04/27/19 0655)   ? insulin infusion (1 unit/mL) Stopped (04/24/19 1906)   ? niCARdipine Stopped (04/26/19 0701)   ? norepinephrine IV infusion in D5W     ? phenylephrine IV infusion in NS 20 mcg/min (05/02/19 0915)   ? vasopressin              Medications Prior to Admission   Prior to Admission medications    Medication Sig Start Date  End Date Taking? Authorizing Provider   albuterol (PROAIR HFA;PROVENTIL HFA;VENTOLIN HFA) 90 mcg/actuation inhaler Inhale 1-2 puffs every 6 (six) hours as needed. 11/5/18  Yes Aristides Alegria MD   amoxicillin (AMOXIL) 500 MG capsule TAKE 4 TABLETS (2,000 mg) BY MOUTH 1 HOUR PRIOR TO PROCEDURE. 6/5/18  Yes Aristides Alegria MD   atorvastatin (LIPITOR) 40 MG tablet TAKE 1 PILL BY MOUTH AT BEDTIME. FOR CHOLESTEROL 2/12/19  Yes Ashleigh Lang MD   baclofen (LIORESAL) 10 MG tablet TAKE 1 TABLET BY MOUTH TWICE DAILY 7/19/18  Yes Aristides Alegria MD   enoxaparin (LOVENOX) 40 mg/0.4 mL syringe Inject 0.4 mL (40 mg total) under the skin every evening. 4/16/19  Yes Aristides Alegria MD   enoxaparin (LOVENOX) 60 mg/0.6 mL syringe Inject 0.6 mL (60 mg total) under the skin every morning. 4/16/19  Yes Aristides Alegria MD   fluticasone (FLONASE) 50 mcg/actuation nasal spray 2 sprays into each nostril daily.  Patient taking differently: 2 sprays into each nostril daily as needed.        12/21/16  Yes Aristides Alegria MD   furosemide (LASIX) 20 MG tablet TAKE 1 TABLET (20 MG TOTAL) BY MOUTH DAILY FOR EDEMA 1/15/19  Yes Aristides Alegria MD   levothyroxine (SYNTHROID, LEVOTHROID) 88 MCG tablet TAKE 1 PILL BY MOUTH EVERYDAY FOR THYROID 4/18/19  Yes Aristides Alegria MD   metoprolol succinate (TOPROL-XL) 25 MG TAKE 1 PILL BY MOUTH EVERYDAY FOR BLOOD PRESSURE 12/18/18  Yes Aristides Alegria MD   omeprazole (PRILOSEC) 20 MG capsule TAKE 1 PILL BY MOUTH EVERYDAY FOR STOMACH 9/25/18  Yes Aristides Alegria MD   spironolactone (ALDACTONE) 25 MG tablet TAKE 1 PILL BY MOUTH EVERYDAY 9/25/18  Yes Aristides Alegria MD   warfarin (COUMADIN/JANTOVEN) 1 MG tablet Take 1-2 mg by mouth See Admin Instructions. 1 mg daily on Mon/Wed/Fri; and 2 mg daily rest of week   Yes Aristides Alegria MD   acetaminophen (TYLENOL) 500 MG tablet Take 500-1,000 mg by mouth every 6 (six) hours as needed for pain. Every 6-8 hours as needed. No more than 4,000MG  of acetaminophen daily.    PROVIDER, HISTORICAL   ibuprofen (ADVIL,MOTRIN) 600 MG tablet Take 600 mg by mouth every 6 (six) hours as needed for pain.    PROVIDER, HISTORICAL          Michael Aldrich MD  Non-invasive Cardiology  [facility]

## 2021-07-01 NOTE — CONSULTS
Consults by Candie Lambert MD at 2019 11:49 AM     Author: Candie Lambert MD Service: Infectious Disease Author Type: Physician    Filed: 2019  7:11 PM Date of Service: 2019 11:49 AM Status: Signed    : Candie Lambert MD (Physician)     Consult Orders    1. Inpatient consult to Infectious Diseases Reason for Consult? continued fever spikes, antibiotic management; Consult priority: Today (routine); Communication for MD: No phone communication necessary for now [[id number]] ordered by Crystal Rasheed CNP at 19 0905             Consultation - INFECTIOUS DISEASE CONSULTATION  Kody Johns,  1950, MRN [id number]      Aortic regurgitation [I35.1]  Mitral stenosis [I05.0]  Tricuspid regurgitation [I07.1]  Atrial fibrillation (H) [I48.91]    PCP: Aristides Alegria MD, [phone]   Code status:  Full Code               Chief Complaint: S/P MVR (mitral valve repair)     Assessment:  Kody Johns is a 69 y.o. old male with fever.  1.  History of rheumatic heart disease status post AVR and MVR on 2019 by Dr. Vaughn.  2.  Postop complication of large evolving subacute right PCA distribution infarcts diagnosed on 2019.  3.  Diagnosed with Klebsiella pneumonia pneumonia.  Received 10 days of IV ceftriaxone as of now.  Will stop.  4.  Recent positive blood culture with coagulase-negative staph in the setting of new leukocytosis.  WBC went from 20 at the time of initiation of therapy with vancomycin to normal yesterday 2019.  I had recommended 7 days of IV vancomycin for possible line sepsis.  Unfortunately only one set of blood cultures were collected at the time.  Follow-up blood cultures on 2019 in process.  5.  New fever at 101.6.  No new focus of infection.  On minimal oxygen.  Procalcitonin now down to 0.6 from 30 on 2019.  Will rule out catheter associated UTI, biliary disease in ICU patient.  No diarrhea to suggest C. difficile.  In this  patient with large intracranial bleed however, the most likely etiology of intermittent fever at this point seems to be central from intracranial bleed.  Will stop ceftriaxone as discussed above.  6.  Status post tracheostomy.    Recommendations:   Discontinue IV ceftriaxone.  Continue IV vancomycin for 7 days as previously recommended from 5/5/2019.  ICU team to consider work-up for noninfectious cause of fever in the ICU such as DVT.  Could consider ultrasound of the right upper extremity where there is a central line as well as bilateral lower extremity ultrasound.  UA/UC, LFT.  Follow-up pending blood cultures.  Consider removing right upper extremity central line.    Discussed with the patient's family with the help of professional , nursing staff.    Thank you for letting us be part of the patient care team. We will follow.    JOSE Lambert MD  [facility]  Office Telephone [phone].  Fax [phone]  Direct messaging: [employer]  HPI:    Kody Johns is a 69 y.o. old male. History is provided by speaking with patient family, nursing staff with the help of professional .    ID is asked to see this patient for fever.  The patient has a history of rheumatic heart disease and underwent AVR and MVR on 4/25/2019 by Dr. Vaughn.  Postop period has been complicated with a large stroke diagnosed on 4/29/2019.  On 4/27/2018, the patient started having high-grade fever and was noted to have a significant increase in procalcitonin at 30.  He was initially started on broad-spectrum antibiotic.  He was ultimately diagnosed with Klebsiella pneumo pneumonia.  He was previously seen by ID and a 5-day course of ceftriaxone was recommended.  The patient however remains on IV ceftriaxone now day 10.  On 5/4/2019, the patient had a single blood culture collected for unclear reason from chart review.  This came back positive with coagulase-negative staph.  On 5/5/2019,  the patient was started on IV vancomycin.  Pharmacy and I discussed the case at antibiotic round a couple of days ago and I recommended a 7-day course of IV vancomycin for possible line related sepsis because of a new leukocytosis of 20,000 which has improved since initiation of therapy with vancomycin.  His fever also got better.  Today however he had a new temperature 101.6 reason for this ID consult.  Seen today, the patient is trached and is unable to provide any history.  According to nursing staff there is no increase in oxygen need.  The patient tolerated wean for about 95 minutes today.  Not a whole lot of secretions.  No diarrhea.  Making good urine.  Guzman in place.  Has a right upper extremity PICC line.        Medical History  Active Ambulatory (Non-Hospital) Problems    Diagnosis   ? Heart failure with preserved ejection fraction (H)   ? Moderate malnutrition (H)   ? AELENA (acute kidney injury) (H)   ? Lightheadedness   ? Atherosclerosis of native coronary artery of native heart without angina pectoris   ? Essential hypertension with goal blood pressure less than 130/85   ? Pure hypercholesterolemia   ? Dysuria   ? Hypothyroidism, unspecified type   ? Elevated LFTs   ? Silent Stroke   ? Shoulder strain   ? Dysphagia   ? Hyperlipidemia   ? Blurry vision   ? Hearing loss   ? Nonspecific reaction to tuberculin skin test without active tuberculosis     Past Medical History:   Diagnosis Date   ? ALEENA (acute kidney injury) (H)    ? Anemia due to blood loss, acute 4/24/2019   ? Asthma    ? Atrial fibrillation (H)    ? Blurry vision    ? CHF (congestive heart failure) (H)    ? Chronic kidney disease    ? Coronary artery disease    ? Dysphagia    ? Dysuria    ? Hearing loss    ? Heart murmur    ? Heart murmur    ? Hyperlipidemia    ? Hypertension    ? Hypothyroidism    ? Mitral valve stenosis    ? Moderate malnutrition (H)    ? Palpitations    ? Rheumatic heart disease    ? Shortness of breath    ? Stroke (H)    ?  Valvular disease         Surgical History  He  has a past surgical history that includes Cataract extraction (Left, 06/13/2016); Cardiac catheterization; Coronary Angiogram (N/A, 11/30/2018); Eye surgery; pr replacement of mitral valve (N/A, 4/24/2019); Aortic valve replacement (N/A, 4/24/2019); and PICC Team Line Insertion (4/29/2019).       Social History  Reviewed, and he  reports that he quit smoking about 4 years ago. His smoking use included cigarettes. He has a 50.00 pack-year smoking history. He has quit using smokeless tobacco. He reports that he does not drink alcohol or use drugs.        Family History  Reviewed, and family history includes No Medical Problems in his father and mother.   Psychosocial Needs  Social History     Social History Narrative    Refugee, born in [facility].     Additional psychosocial needs reviewed per nursing assessment.       No Known Allergies   Medications Prior to Admission   Medication Sig Dispense Refill Last Dose   ? albuterol (PROAIR HFA;PROVENTIL HFA;VENTOLIN HFA) 90 mcg/actuation inhaler Inhale 1-2 puffs every 6 (six) hours as needed. 1 Inhaler 5 4/22/2019   ? amoxicillin (AMOXIL) 500 MG capsule TAKE 4 TABLETS (2,000 mg) BY MOUTH 1 HOUR PRIOR TO PROCEDURE. 4 capsule 3 Unknown   ? atorvastatin (LIPITOR) 40 MG tablet TAKE 1 PILL BY MOUTH AT BEDTIME. FOR CHOLESTEROL 90 tablet 2 4/22/2019   ? baclofen (LIORESAL) 10 MG tablet TAKE 1 TABLET BY MOUTH TWICE DAILY 60 tablet 5 4/23/2019   ? enoxaparin (LOVENOX) 40 mg/0.4 mL syringe Inject 0.4 mL (40 mg total) under the skin every evening. 2 Syringe 0 4/22/2019   ? enoxaparin (LOVENOX) 60 mg/0.6 mL syringe Inject 0.6 mL (60 mg total) under the skin every morning. 3 Syringe 0 4/23/2019 at am   ? fluticasone (FLONASE) 50 mcg/actuation nasal spray 2 sprays into each nostril daily. (Patient taking differently: 2 sprays into each nostril daily as needed.       ) 10 g 3 4/22/2019   ? furosemide (LASIX) 20 MG tablet TAKE 1 TABLET (20 MG  TOTAL) BY MOUTH DAILY FOR EDEMA 90 tablet 3 4/23/2019   ? levothyroxine (SYNTHROID, LEVOTHROID) 88 MCG tablet TAKE 1 PILL BY MOUTH EVERYDAY FOR THYROID 30 tablet 6 4/23/2019   ? metoprolol succinate (TOPROL-XL) 25 MG TAKE 1 PILL BY MOUTH EVERYDAY FOR BLOOD PRESSURE 90 tablet 3 4/23/2019   ? omeprazole (PRILOSEC) 20 MG capsule TAKE 1 PILL BY MOUTH EVERYDAY FOR STOMACH 30 capsule 8 4/23/2019   ? spironolactone (ALDACTONE) 25 MG tablet TAKE 1 PILL BY MOUTH EVERYDAY 30 tablet 8 4/23/2019   ? warfarin (COUMADIN/JANTOVEN) 1 MG tablet Take 1-2 mg by mouth See Admin Instructions. 1 mg daily on Mon/Wed/Fri; and 2 mg daily rest of week   4/19/2019   ? acetaminophen (TYLENOL) 500 MG tablet Take 500-1,000 mg by mouth every 6 (six) hours as needed for pain. Every 6-8 hours as needed. No more than 4,000MG of acetaminophen daily.   Unknown   ? ibuprofen (ADVIL,MOTRIN) 600 MG tablet Take 600 mg by mouth every 6 (six) hours as needed for pain.   Unknown        Review of Systems:  Review of systems not obtained due to inability to communicate with the patient.  Physical Exam:  Temp:  [98.1  F (36.7  C)-101.9  F (38.8  C)] 100.3  F (37.9  C)  Heart Rate:  [] 121  Resp:  [14-47] 29  BP: ()/() 116/87  FiO2 (%):  [24 %] 24 %    General appearance: alert, appears stated age, cooperative, no distress and Tolerated eventually well.  Head: Normocephalic, without obvious abnormality, atraumatic  Throat: Poor dentition.  Neck: Tracheostomy in place.  Lungs: clear to auscultation bilaterally  Chest wall: Incisions nicely healed with no evidence of infection on the chest wall.  Heart: regular rate and rhythm, S1, S2 normal, no murmur, click, rub or gallop  Abdomen: soft, non-tender; bowel sounds normal; no masses,  no organomegaly  Male genitalia: Guzman in place with orange urine.  Extremities: extremities normal, atraumatic, no cyanosis or edema  Skin: Skin color, texture, turgor normal. No rashes or lesions  Neurologic:  Trached.  Awake             Pertinent Labs  personally reviewed.   Results from last 7 days   Lab Units 05/09/19  0601 05/08/19  2255 05/08/19  0549 05/07/19 0626 05/06/19 0456   LN-WHITE BLOOD CELL COUNT thou/uL 11.6*  --  9.8  --  11.4*   LN-HEMOGLOBIN g/dL 8.5*  --  8.6* 8.9* 9.6*   LN-HEMATOCRIT % 25.9*  --  26.2*  --  29.3*   LN-PLATELET COUNT thou/uL 136* 158 122* 103* 97*   LN-NEUTROPHILS RELATIVE PERCENT % 83*  --   --   --   --    LN-MONOCYTES RELATIVE PERCENT % 8  --   --   --   --        Results from last 7 days   Lab Units 05/09/19  0601 05/08/19 0549 05/07/19  1523 05/07/19 0626 05/06/19 0456   LN-SODIUM mmol/L 142 145  --  145   < > 147*   LN-POTASSIUM mmol/L 3.4* 3.9 3.6 3.3*  3.3*  --  3.7   LN-CHLORIDE mmol/L 113* 116*  --  114*  --  116*   LN-CO2 mmol/L 23 24  --  23  --  22   LN-BLOOD UREA NITROGEN mg/dL 24* 22  --  31*  --  40*   LN-CREATININE mg/dL 0.74 0.74  --  0.84  --  0.97   LN-CALCIUM mg/dL 7.4* 7.8*  --  7.8*  --  8.0*   LN-ALBUMIN g/dL 2.0*  --   --  2.1*  --  2.0*   LN-PROTEIN TOTAL g/dL 5.6*  --   --  5.6*  --  5.7*   LN-BILIRUBIN TOTAL mg/dL 1.4*  --   --  2.1*  --  2.5*   LN-ALKALINE PHOSPHATASE U/L 137*  --   --  164*  --  162*   LN-ALT (SGPT) U/L 103* 115*  --  132*  --  160*   LN-AST (SGOT) U/L 85* 107*  --  125*  --  189*    < > = values in this interval not displayed.     Results for MIRELLA COLON (MRN [id number]) as of 5/9/2019 11:50   Ref. Range 8/29/2017 09:38 4/27/2019 08:15 5/2/2019 10:41 5/9/2019 06:01   Procalcitonin Latest Ref Range: 0.00 - 0.49 ng/mL <0.05 30.08 (H) 9.21 (H) 0.69 (H)       MICROBIOLOGY DATA:  Culture, Blood Positive Work-up   Order: [id number] - Reflex for Order [id number]   Status:  Final result   Visible to patient:  No (Not Released) Next appt:  None   Specimen Information: Vein, Peripheral; Blood        Culture Staphylococcus epidermidisAbnormal               Gram Stain Result  Gram positive cocci in clusters            Resulting Agency: [facility]    Susceptibility      Staphylococcus epidermidis     CARLOS ENRIQUE     Cefazolin <=2  Resistant     Clindamycin >2  Resistant     Doxycycline <=0.5  Sensitive     Levofloxacin >4  Resistant     Oxacillin >1  Resistant     Vancomycin 1  Sensitive               Results    Culture/Gram Stain: Sputum (Order [id number])   Order Questions     Question Answer Comment   Patient has artificial airway Yes           Contains abnormal data Culture/Gram Stain: Sputum   Order: [id number]   Status:  Final result   Visible to patient:  No (Not Released) Next appt:  None   Specimen Information: Respiratory        Culture 1+ Klebsiella pneumoniaeAbnormal               Gram Stain Result  3+ Polymorphonuclear leukocytes      1+ Gram negative bacilli            Resulting Agency: [facility]   Susceptibility      Klebsiella pneumoniae     CARLOS ENRIQUE     Amoxicillin + Clavulanate <=4/2  Sensitive     Ampicillin >16  Resistant     Cefazolin 2  Sensitive     Cefepime <=1  Sensitive     Ceftriaxone <=1  Sensitive     Ciprofloxacin <=0.5  Sensitive     Gentamicin <=2  Sensitive     Levofloxacin <=1  Sensitive     Meropenem <=0.25  Sensitive     Piperacillin + Tazobactam 8/4  Sensitive     Tetracycline 4  Sensitive     Tobramycin <=2  Sensitive     Trimethoprim + Sulfamethoxazole <=0.5  Sensitive                     Pertinent Radiology  personally reviewed.     XR Chest 1 View Portable (Order [id number])   Imaging   Date: 5/9/2019 Department: [facility] 5100 Cardiac/Neuro ICU Released By/Authorizing: Jose Cavazos MD (auto-released)   Study Result     EXAM: XR CHEST 1 VIEW PORTABLE  LOCATION: [facility]  DATE/TIME: 5/9/2019 5:40 AM     INDICATION: fever, increase bronchial secretions  COMPARISON: None.     FINDINGS: No significant change. Opacity persists at left lung base which may represent lobar atelectasis or pneumonia. Mild diffuse interstitial prominence elsewhere. No definite pleural effusion. Prominent cardiomegaly with  postoperative changes   related to cardiac valvular surgery. Tubes and catheters project in good position, interval placement of tracheostomy tube.     CT Chest Abdomen Pelvis Without Oral Without IV Contrast (Order [id number])   Imaging   Date: 5/3/2019 Department: [facility] 5100 Cardiac/Neuro ICU Released By/Authorizing: Crystal Rasheed CNP (auto-released)   Study Result     EXAM: CT CHEST ABDOMEN PELVIS WO ORAL WO IV CONTRAST  LOCATION: [facility]  DATE/TIME: 5/3/2019 12:26 PM     INDICATION: Firm and distended abdomen. Assess for bowel obstruction.  COMPARISON: None.  TECHNIQUE: Helical thin-section CT scan of the chest, abdomen, and pelvis was performed without IV contrast. Multiplanar reformats were obtained. Dose reduction techniques were used.   CONTRAST: None.     FINDINGS:   CHEST: Respiratory motion artifact grades image quality. However, there appear to be numerous tiny pulmonary nodules involving the bilateral lungs which are apparent within the upper lobes. There is a 9 mm ground-glass nodular infiltrate involving the   right upper lobe on image 30 of series 3. There is a small left pleural effusion with complete atelectasis of the left lower lobe with peripheral fluid-filled bronchi. Dependent atelectasis is present involving the posterior portion of the right lower   lobe. Infection within these areas of atelectatic lung are not excluded. Endotracheal tube is in place with tip 1.5 cm above the georgina.     The heart size is enlarged. Surgical changes of an aortic valve and mitral valve replacement. The ascending thoracic aorta is dilated measuring 4.2 cm in caliber. There are a few enlarged mediastinal lymph nodes which demonstrate internal calcification,   compatible with prior granulomatous disease.     ABDOMEN: The gallbladder, liver, spleen, pancreas, adrenal glands, and kidneys are grossly normal on this unenhanced CT scan.     The abdominal aorta is normal in caliber. No  lymphadenopathy. NG tube is present with tip and side port in the stomach.     There is some circumferential wall thickening involving the ascending colon. There is fluid and air-filled prominence throughout the colon point of transition to suggest an obstruction. There are also loops of small bowel which demonstrate mild air   and/or fluid-filled prominence. No free air. There is no abscess.     PELVIS: The Guzman catheter is present within the urinary bladder. No pelvic free fluid or lymphadenopathy. The appendix is normal in caliber where visualized. No lymphadenopathy.     MUSCULOSKELETAL: No suspicious osseous lesions.     IMPRESSION:   CONCLUSION:  1.  Small left pleural effusion. There is complete atelectasis of the left lower lobe with peripheral fluid-filled bronchi. Dependent atelectasis is also present involving the posterior right lower lobe to a lesser extent. Infection within these areas of   atelectasis is not excluded.  2.  Bilateral upper lobe nodular infiltrate. There is also a focal patchy area of ground-glass opacity within the right upper lobe. These findings may reflect additional areas of infectious process. Follow-up CT scan to ensure resolution of the upper   lobe ground-glass nodular opacity is recommended.  3.  No evidence of bowel obstruction. There is mild fluid and air-filled distention of the colon with some lesser degree of air and fluid filled distention of the small bowel. These findings suggest an ileus.  4.  Circumferential wall thickening of the ascending colon, compatible with colitis.     REFERENCE:  Guidelines for Management of Incidental Pulmonary Nodules Detected on CT Images: From the Fleischner Society 2017.   Guidelines apply to incidental nodules in patients who are 35 years or older.  Guidelines do not apply to lung cancer screening, patients with immunosuppression, or patients with known primary cancer.     SUBSOLID NODULES     Nodule size 6 mm or greater  CT at 6-12  months to confirm persistence, then CT every 2 years until 5 years.     MR Brain Without Contrast (Order [id number])   Imaging   Date: 4/25/2019 Department: [facility] 5100 Cardiac/Neuro ICU Released By/Authorizing: Dustin Moya Rai, MD (auto-released)   Study Result     EXAM: MR BRAIN WO CONTRAST  LOCATION: [facility]  DATE/TIME: 4/30/2019 9:33 PM     INDICATION: Stroke.  COMPARISON: CT head 04/29/2019, brain MRI from 08/30/2017.  TECHNIQUE: Routine multiplanar multisequence head MRI without intravenous contrast.     FINDINGS: Mildly limited by motion.  INTRACRANIAL CONTENTS: Large zone of evolving subacute ischemia in the right PCA distribution including the right medial occipital lobe lateral thalamus and majority of the right hippocampus. Minimal presumed petechial hemorrhage along the lateral   portion of the right occipital infarct. Moderate associated vasogenic edema with sulcal effacement and localized mass effect. There is moderate effacement of the right atrium, temporal and occipital horns lateral ventricle without definite hydrocephalus.   No space-occupying hemorrhage. Minimal diffusion hyperintensity in the cortex of the left occipital lobe axial diffusion image 9 is favored to be artifactual.     There are scattered patchy acute to early subacute infarcts in both frontal lobes more prominently in the left posterior frontal cortex. Small acute to early subacute infarct in the right caudate body.     Mild age-related change. Normal position of the cerebellar tonsils.      SELLA: No significant abnormality accounting for technique.     OSSEOUS STRUCTURES/SOFT TISSUES: No aggressive osseous lesion involving the calvarium, skull base, or visualized upper cervical spine. The major intracranial vascular flow voids are maintained.      ORBITS: No significant abnormality accounting for technique.      SINUSES/MASTOIDS: Mild mucosal thickening ethmoid air cells and small left sphenoid  sinus fluid level. No significant middle ear or mastoid effusion.      IMPRESSION:   CONCLUSION:  1.  Limited by motion. Large evolving subacute right PCA distribution infarcts with moderate cytotoxic edema, sulcal effacement and mass effect on the right lateral ventricle. No hydrocephalus.     2.  Minimal, punctate petechial hemorrhage in the right occipital infarct bed.     3.  Patchy areas of acute to early subacute ischemia in the posterior left frontal cortex and minimally within the right anterior frontal cortex and right caudate body.     NOTE: ABNORMAL REPORT     THE DICTATION ABOVE DESCRIBES AN ABNORMALITY FOR WHICH FOLLOW-UP IS NEEDED.

## 2021-07-02 NOTE — DISCHARGE SUMMARY
Discharge Summary by Juan Wray PA-C at 5/10/2019 12:14 PM     Author: Juan Wray PA-C Service: Cardiothoracic Surgery Author Type: Physician Assistant    Filed: 5/10/2019 12:58 PM Date of Service: 5/10/2019 12:14 PM Status: Signed    : Juan Wray PA-C (Physician Assistant)       Physician Discharge Summary    Primary Care Physician:  Aristides Aelgria MD    Discharge Provider: Juan Wray     Admission Date: 4/24/2019. Admission Diagnoses: Aortic regurgitation [I35.1]  Mitral stenosis [I05.0]  Tricuspid regurgitation [I07.1]  Atrial fibrillation (H) [I48.91]   Discharge Date: May 10, 2019   Disposition: [facility]  Condition at Discharge: Fair  Code Status: Full Code     Principal Diagnosis:  S/P MVR (mitral valve repair)    Discharge Diagnoses:  Principal Problem:    S/P MVR (mitral valve repair)  Active Problems:    Rheumatic heart disease    Mitral valve stenosis, unspecified etiology    Aortic valve disease, rheumatic    A-fib (H)    Acute respiratory failure with hypoxemia (H)    Anemia due to blood loss, acute    Coagulopathy (H)    Non-English speaking patient    Thrombocytopenia (H)    Sepsis, due to unspecified organism (H)    Atrial fibrillation with RVR (H)    Hypernatremia    Paralytic ileus (H)    Small bowel obstruction (H)    Acute cardioembolic stroke (H)    Respiratory failure (H)      Consult/s: cardiology, pulmonary/intensive care and general surgery  Significant Diagnostic Studies: radiology: Chest and abdominal x-rays  Surgery: Aortic valve replacement with 21 mm Saint Kirill trifecta bioprosthetic valve, mitral valve replacement with 25 mm Saint Kirill epic bio prosthetic valve on April 24, 2019  Treatments: antibiotics: vancomycin and anticoagulation: warfarin    Discharge Medications:      Medication List      Unreviewed discharge medications    acetaminophen 500 MG tablet  Dose:  500-1000 mg  Commonly known as:  TYLENOL  500-1,000 mg, Oral,  Every 6 hours PRN, Every 6-8 hours as needed. No more than 4,000MG of acetaminophen daily.     albuterol 90 mcg/actuation inhaler  Quantity:  1 Inhaler  Dose:  1-2 puff  Commonly known as:  PROAIR HFA;PROVENTIL HFA;VENTOLIN HFA  1-2 puffs, Inhalation, Every 6 hours PRN     amoxicillin 500 MG capsule  Quantity:  4 capsule  Commonly known as:  AMOXIL  TAKE 4 TABLETS (2,000 mg) BY MOUTH 1 HOUR PRIOR TO PROCEDURE.     atorvastatin 40 MG tablet  Quantity:  90 tablet  Commonly known as:  LIPITOR  TAKE 1 PILL BY MOUTH AT BEDTIME. FOR CHOLESTEROL     baclofen 10 MG tablet  Quantity:  60 tablet  Commonly known as:  LIORESAL  TAKE 1 TABLET BY MOUTH TWICE DAILY     * enoxaparin 60 mg/0.6 mL syringe  Quantity:  3 Syringe  Dose:  60 mg  Commonly known as:  LOVENOX  60 mg, Subcutaneous, Every morning     * enoxaparin 40 mg/0.4 mL syringe  Quantity:  2 Syringe  Dose:  40 mg  Commonly known as:  LOVENOX  40 mg, Subcutaneous, Every evening     fluticasone propionate 50 mcg/actuation nasal spray  Quantity:  10 g  Dose:  2 spray  Commonly known as:  FLONASE  2 sprays, Nasal, DAILY     furosemide 20 MG tablet  Quantity:  90 tablet  Commonly known as:  LASIX  TAKE 1 TABLET (20 MG TOTAL) BY MOUTH DAILY FOR EDEMA     ibuprofen 600 MG tablet  Dose:  600 mg  Commonly known as:  ADVIL,MOTRIN  600 mg, Oral, Every 6 hours PRN     levothyroxine 88 MCG tablet  Quantity:  30 tablet  Commonly known as:  SYNTHROID, LEVOTHROID  TAKE 1 PILL BY MOUTH EVERYDAY FOR THYROID     metoprolol succinate 25 MG  Quantity:  90 tablet  Commonly known as:  TOPROL-XL  TAKE 1 PILL BY MOUTH EVERYDAY FOR BLOOD PRESSURE     omeprazole 20 MG capsule  Quantity:  30 capsule  Commonly known as:  PriLOSEC  TAKE 1 PILL BY MOUTH EVERYDAY FOR STOMACH     spironolactone 25 MG tablet  Quantity:  30 tablet  Commonly known as:  ALDACTONE  TAKE 1 PILL BY MOUTH EVERYDAY     warfarin 1 MG tablet  Dose:  1-2 mg  Commonly known as:  COUMADIN/JANTOVEN  1-2 mg, Oral, Warfarin - See Admin  Instructions, 1 mg daily on Mon/Wed/Fri; and 2 mg daily rest of week          * This list has 2 medication(s) that are the same as other medications prescribed for you. Read the directions carefully, and ask your doctor or other care provider to review them with you.                Coumadin Management:   Most Recent INR:   INR   Date/Time Value Ref Range Status   05/10/2019 05:00 AM 1.39 (H) 0.90 - 1.10 Final      Most Recent Coumadin Dose: 1 mg via enteric tube    Discharge Instructions:  Follow up appointment with Primary Care Physician: Aristides Alegria MD within 7 days of discharge from [facility]  Follow up appointment with Specialist: CV surgery 3 to 4 weeks after but there is a discharge please call [phone] to schedule CV surgery follow-up appointment at time of discharge from [facility]  Follow up test / procedure to do as outpatient: Wean and extubate from mechanical as tolerated pain pulmonary services  Diet: Cardiac formula via enteric tube  Activity: activity as tolerated  Restrictions: Activities as tolerated with sternal precautions and post CVA with left-sided weakness precautions  Wound / drain care:keep wound clean and dry and and monitor for signs or symptoms of infection which include redness, warmth drainage or swelling and surrounding tissue, if any of these is present, to call CV surgery or go to nearest emergency room     Hospital Summary:   Mr Johns is an non-English-speaking 69-year-old male with severe aortic valve and mitral valve stenosis that was referred to CV surgery for evaluation for possible double valve replacement.  Patient was evaluated in CV surgery clinic on March 30, 2018.  At the time patient was noted to have severe heart failure and possible endocarditis and was on multiple medications.  At the time of the evaluation, patient was in A. fib and had been started on Plavix and aspirin in his native country [place] [facility].  Following this evaluation, though patient was deemed to  be high risk candidate given his multiple comorbidities, he was told that his surgical risk was acceptable.  He was advised to follow-up with his dentist for some dental evaluation prior to valve surgery.  It took the patient a while to be able to have his dental work-up completed and the rest of his outpatient optimization completed.  Following that outpatient optimization, patient was admitted to the hospital on April 24, 2019 and taken to the operating room, where he underwent double valve replacement.  On arrival at the hospital, patient was taken to the operating room where he underwent aortic valve replacement with a 21 mm Saint Kirill trifecta valve prosthetic valve, mitral valve replacement with a 25 mm Saint Kirill epic bioprosthetic valve, bilateral external reinforcement using the KLS Geraldo strips.  Said surgery was performed at [facility] by Dr. Jojo Vaughn MD, assisted by resident surgeon Dr. Durga Meléndez MD on April 24, 2019.  Because of difficulties weaning from cardiopulmonary bypass, patient was started on Flolan therapy for right heart support.  Postoperatively, patient was transferred today intensive care unit in critical but stable condition.  On arrival in the intensive care unit, patient was stable overnight and remains on mechanical ventilation.  Postop day #1, patient remains sedated, intubated and on mechanical ventilation.  He was on Flolan and this was wean with a goal to discontinue awaken the patient and then wean from mechanical ventilation.  He remained hemodynamically stable on epinephrine drip.  BP was low, and patient was given 5% albumin 250 ml.  The Flolan was weaned and discontinued.  On the evening of postop day 1, patient had a left upward gaze.  Extraocular was called, and urology was consulted.  Head CT showed 1.5 to 2 mm focal outpouching of the expected location of the origin of the right posterior communicating artery.  This was thought to be an embolic event  but given the distant nature of the affected artery, an embolectomy was not possible.  Subsequent study showed the involvement of the temporal occipital region of the brain.  Postop day #2, patient remained intubated, sedated and on mechanical ventilation.  Not moving left side at all, was hemodynamically stable on epinephrine drip.  Hemoglobin was low, and he was transfused 2 unit of packed cells.  Platelet count was low, with Plt 39,000.  He was transfused 1 unit of platelet.  Chest tube drainage was excessive and this was left in.  Postop day #3, patient remains sedated, intubated on mechanical ventilation.  Still having fevers up to 101.3  F.  Given the double prosthetic valve, patient was started on vancomycin and Zosyn therapy.  Platelet count continued to drop, with platelet 36,000, was transfused 2 more packs of platelets.  Postop day #4, patient's fever with up to 104.3  F, he was placed on cooling blanket and maintained on vancomycin and Zosyn therapy.  Patient was in atrial fibrillation with RVR, was treated with IV metoprolol 5 mg, remain hemodynamically stable.  Postop day #5, patient remains intubated and on mechanical ventilation.  Continued to have fevers currently at 103.7  F, remains on cooling blankets.  After the platelets were given, SOS will remove and the MRI of the head was performed and did not show any definite canal narrowing or gross cord signal abnormality.  From postop #6 patient continued to remain tachycardic.  White count was normal and the ID signed off after recommending ceftriaxone for 5 days.  Family conference was held and the they were advised that patient will need trach and PEG and this was planned for the following week.  Following continue tachycardia, patient underwent BATSHEVA was recommended but due to patient hemodynamic instability, this was had of.  Abdomen continued to distention and pressure and an abdominal x-ray showed an ileus.  General surgery was consulted to  evaluate patient for the ileus and possible ischemic bowel but there was no evidence of ischemic bowel.  On May 8, 2019, patient was trached and PEG.  On May 9, patient continued to wean.  Started having more fevers and ID was reconsulted.  ID restarted patient on vancomycin therapy.  Today postop day #16, patient is hemodynamically stable, trach and on mechanical ventilation and tolerating well.  He had a Guzman catheter and I will be removed today and yesterday rectal back with which she will be discharged to [facility].  Overall patient is stable enough to be discharged to [facility].  Patient needs daily physician rounding and pulmonary care and ID follow-up.  At discharge from [place], patient will most likely go to transitional care facility.  After discharge from transitional care facility, patient will follow up with primary care provider within 7 days.  At the time of discharge from [place], please call CV surgery office at [phone], to schedule CV surgery postop follow-up appointment.  Also schedule cardiology for appointment in about 3 weeks at time of discharge from [facility].    Vital Signs in last 24 hours:    Temp:  [97.6  F (36.4  C)-99.9  F (37.7  C)] 99.9  F (37.7  C)  Heart Rate:  [] 116  Resp:  [] 107  BP: (107-150)/() 150/88  FiO2 (%):  [24 %] 24 %    Physical Exam:    Pertinent exam findings on day of discharge include 69-year-old severely debilitated male who has postop CVA with left-sided neglect, postop respiratory failure now trach, postop malnutrition now has a PEG tube.  Incisions are healing well and patient has mild lower extremity edema.

## 2021-07-03 NOTE — ADDENDUM NOTE
Addendum Note by Jose Valencia, PharmANA at 6/1/2018  4:46 PM     Author: Jose Valencia, PharmANA Service: -- Author Type: Pharmacist    Filed: 6/1/2018  4:46 PM Encounter Date: 5/31/2018 Status: Signed    : Jose Valencia, PharmANA (Pharmacist)    Addended by: JOSE VALENCIA on: 6/1/2018 04:46 PM        Modules accepted: Orders

## 2021-07-03 NOTE — ADDENDUM NOTE
Addendum Note by Jose Valencia, PharmD at 7/20/2018 11:14 AM     Author: Jose Valencia, PharmANA Service: -- Author Type: Pharmacist    Filed: 7/20/2018 11:14 AM Encounter Date: 7/17/2018 Status: Signed    : Jose Valencia, PharmANA (Pharmacist)    Addended by: JOSE VALENCIA on: 7/20/2018 11:14 AM        Modules accepted: Orders

## 2021-07-03 NOTE — ADDENDUM NOTE
Addendum Note by Toya Howell RN at 12/27/2019  2:39 PM     Author: Toya Howell RN Service: -- Author Type: Registered Nurse    Filed: 1/20/2020  9:08 AM Encounter Date: 12/27/2019 Status: Signed    : Toya Howell RN (Registered Nurse)    Addended by: TOYA HOWELL on: 1/20/2020 09:08 AM        Modules accepted: Orders

## 2021-07-04 NOTE — ADDENDUM NOTE
Addendum Note by Chucho Rivers at 2/4/2021 11:20 AM     Author: Chucho Rivers Service: -- Author Type:     Filed: 2/5/2021  1:32 PM Encounter Date: 2/4/2021 Status: Signed    : Chucho Rivers ()    Addended by: CHUCHO RIVERS on: 2/5/2021 01:32 PM        Modules accepted: Orders

## 2021-07-04 NOTE — TELEPHONE ENCOUNTER
Telephone Encounter by Leigh Coreas RN at 6/8/2021  9:20 AM     Author: Leigh Coreas RN Service: -- Author Type: Registered Nurse    Filed: 6/8/2021  9:21 AM Encounter Date: 6/8/2021 Status: Signed    : Leigh Coreas RN (Registered Nurse)       ANTICOAGULATION  MANAGEMENT- Home Care/Care Facility Result    Assessment     Today's INR result of 1.6 is Subtherapeutic (goal INR of 2.0-3.0)        Missed dose(s) may be affecting INR    No new diet changes affecting INR    No new medication/supplements affecting INR    Continues to tolerate warfarin with no reported s/s of bleeding or thromboembolism     Previous INR was Subtherapeutic    Plan:     Spoke with AMY Victoria discussed INR result and instructed:     Warfarin Dosing Instructions: 3mg today then change warfarin dose to 2 mg daily (12 % change)    Next INR to be drawn: 6/15    Education provided: target INR goal and significance of current INR result    Esme verbalizes understanding and agrees to warfarin dosing plan.   ?   Leigh Coreas RN    Subjective/Objective:      Kody Johns, a 71 y.o. male is established on warfarin.     Home care/care facility RN's report of [facility] INR, recent warfarin dosing, diet changes, medication changes, and symptoms is documented below.    Additional findings: none    Anticoagulation Episode Summary     Current INR goal:  2.0-3.0   TTR:  52.7 % (1 y)   Next INR check:  6/15/2021   INR from last check:  1.60 (6/8/2021)   Weekly max warfarin dose:     Target end date:     INR check location:     Preferred lab:     Send INR reminders to:  [facility]    Indications    A-fib (H) (Resolved) [I48.91]  Aortic valve disease  rheumatic [I06.9]  Mitral valve stenosis  unspecified etiology [I05.0]  Silent Stroke [I66.9]           Comments:           Anticoagulation Care Providers     Provider Role Specialty Phone number    Aristides Alegira MD Referring Family Medicine [phone]    Sonu  Haja BARRETT MD  Cardiology [phone]

## 2021-07-04 NOTE — TELEPHONE ENCOUNTER
Telephone Encounter by Crystal Portillo RN at 6/1/2021  9:19 AM     Author: Crystal Portillo RN Service: -- Author Type: Registered Nurse    Filed: 6/1/2021  9:20 AM Encounter Date: 6/1/2021 Status: Signed    : Crystal Portillo RN (Registered Nurse)       ANTICOAGULATION  MANAGEMENT- Home Care/Care Facility Result    Assessment     Today's INR result of 1.4 is Subtherapeutic (goal INR of 2.0-3.0)        Warfarin taken as previously instructed    No new diet changes affecting INR    No new medication/supplements affecting INR    Continues to tolerate warfarin with no reported s/s of bleeding or thromboembolism     Previous INR was Subtherapeutic    Plan:     Spoke with Esme discussed INR result and instructed:     Warfarin Dosing Instructions: Take 3 mg today then change warfarin dose to 1.5 mg daily on Mondays, Wednesdays and Fridays; and 2 mg daily rest of week  (4.2 % change)    Next INR to be drawn: one week    Education provided: importance of therapeutic range and target INR goal and significance of current INR result    Esme verbalizes understanding and agrees to warfarin dosing plan.   ?   Crystal Portillo RN    Subjective/Objective:      Kody Johns, a 71 y.o. male is established on warfarin.     Home care/care facility RN's report of [facility] INR, recent warfarin dosing, diet changes, medication changes, and symptoms is documented below.    Additional findings: verbally confirmed home dose with Esme and updated on anticoagulation calendar    Anticoagulation Episode Summary     Current INR goal:  2.0-3.0   TTR:  54.6 % (1 y)   Next INR check:  6/8/2021   INR from last check:  1.40 (6/1/2021)   Weekly max warfarin dose:     Target end date:     INR check location:     Preferred lab:     Send INR reminders to:  [facility]    Indications    A-fib (H) (Resolved) [I48.91]  Aortic valve disease  rheumatic [I06.9]  Mitral valve stenosis  unspecified etiology [I05.0]  Silent  Stroke [I66.9]           Comments:           Anticoagulation Care Providers     Provider Role Specialty Phone number    Aristides Alegria MD [facility] Family Medicine [phone]    Haja Ascencio MD  Cardiology [phone]

## 2021-07-04 NOTE — ADDENDUM NOTE
Addendum Note by Maynor Roberson MD at 2/4/2021 11:20 AM     Author: Maynor Roberson MD Service: -- Author Type: Physician    Filed: 2/5/2021  1:03 PM Encounter Date: 2/4/2021 Status: Signed    : Maynor Roberson MD (Physician)    Addended by: MAYNOR ROBERSON on: 2/5/2021 01:03 PM        Modules accepted: Orders

## 2021-07-06 NOTE — TELEPHONE ENCOUNTER
Telephone Encounter by Crystal Portillo, RN at 7/6/2021  1:05 PM     Author: Crystal Portillo RN Service: -- Author Type: Registered Nurse    Filed: 7/6/2021  1:07 PM Encounter Date: 7/6/2021 Status: Signed    : Crystal Portillo RN (Registered Nurse)       ANTICOAGULATION MANAGEMENT     Kody Johns 71 y.o., male is on warfarin with Supratherapeutic INR result (goal range 2.0-3.0)    Recent labs: (last 7 days)     07/06/21   INR 3.60*       ASSESSMENT     Source: Chart Review and Patient/Caregiver Call      Warfarin dosing taken: Warfarin taken as instructed    Diet: No new diet changes affecting INR    Illness, Injury or hospitalization: No    Medication changes: None    Signs or symptoms of bleeding or clotting: No    Previous INR: subtherapeutic    Additional findings: None     PLAN     Recommended plan for no diet, medication or health factor changes affecting INR:     Dosing instructions: take 1 mg today then decrease your warfarin dose to 2 mg daily  (12.5% change)    Follow up no later than: 1 week     Telephone call with home care nurse Mindy who agrees to plan and repeated back plan correctly    Orders given to  Homecare nurse/facility to recheck    Education provided: importance of therapeutic range and target INR goal and significance of current INR result    Plan made per [facility] anticoagulation protocol    Cyrstal Portillo  Anticoagulation Clinic   [phone]    Anticoagulation Episode Summary     Current INR goal:  2.0-3.0   TTR:  49.0 % (1 y)   Next INR check:  7/13/2021   INR from last check:  3.60 (7/6/2021)   Weekly max warfarin dose:     Target end date:     INR check location:     Preferred lab:     Send INR reminders to:  [facility]    Indications    A-fib (H) (Resolved) [I48.91]  Aortic valve disease  rheumatic [I06.9]  Mitral valve stenosis  unspecified etiology [I05.0]  Silent Stroke [I66.9]           Comments:           Anticoagulation Care Providers      Provider Role Specialty Phone number    Aristides Alegria MD Referring Family Medicine [phone]    Haja Ascencio MD  Cardiology [phone]

## 2021-07-06 NOTE — TELEPHONE ENCOUNTER
Telephone Encounter by Jennifer Odom at 7/6/2021  1:00 PM     Author: Jennifer Odom Service: -- Author Type: Patient Access    Filed: 7/6/2021  1:01 PM Encounter Date: 7/6/2021 Status: Signed    : Jennifer Odom (Patient Access)       INR result is 3.6   INR   Date Value Ref Range Status   06/29/2021 1.60 (!) 0.90 - 1.10 Final       Will the patient be seen, or did they already see, MD or CNP today? No    Most Recent Warfarin dose day/week  Sunday Monday Tuesday Wednesday Thursday Friday Saturday     3 2 2 3 2     Sunday Monday Tuesday Wednesday Thursday Friday Saturday   2 2            Has the patient missed any doses of Coumadin, Warfarin, Jantoven in the past 7 days? No    Has the patients medications changed since the last visit? No    Has the patient experienced any bleeding recently? No    Has the patient experienced any injuries or illness recently? No    Has the patient experienced any 'new' shortness of breath, severe headaches, or changes in vision recently? No    Has the patient had any changes in their diet, or alcohol consumption? No    Is the patient here today to prepare for any type of upcoming surgery, procedure, or for a cardioversion procedure? No    What phone number can we reach the patient at today? home phone listed in demographics.

## 2021-07-07 NOTE — TELEPHONE ENCOUNTER
INR result is 1.6  INR   Date Value Ref Range Status   06/22/2021 2.00 (!) 0.90 - 1.10 Final       Will the patient be seen, or did they already see, MD or CNP today? No    Most Recent Warfarin dose day/week  Sunday Monday Tuesday Wednesday Thursday Friday Saturday     2 2 2 2 2     Sunday Monday Tuesday Wednesday Thursday Friday Saturday   2 2            Has the patient missed any doses of Coumadin, Warfarin, Jantoven in the past 7 days? No    Has the patients medications changed since the last visit? No    Has the patient experienced any bleeding recently? No    Has the patient experienced any injuries or illness recently? No    Has the patient experienced any 'new' shortness of breath, severe headaches, or changes in vision recently? No    Has the patient had any changes in their diet, or alcohol consumption? No    Is the patient here today to prepare for any type of upcoming surgery, procedure, or for a cardioversion procedure? No    What phone number can we reach the patient at today? home phone listed in demographics.

## 2021-07-07 NOTE — TELEPHONE ENCOUNTER
Telephone Encounter by Nan Haas RN at 6/29/2021  9:21 AM     Author: Nan Haas RN Service: -- Author Type: Registered Nurse    Filed: 6/29/2021  9:25 AM Encounter Date: 6/29/2021 Status: Signed    : Nan Haas RN (Registered Nurse)       ANTICOAGULATION MANAGEMENT     Kody Johns 71 y.o., male is on warfarin with Subtherapeutic INR result (goal range 2.0-3.0)    Recent labs: (last 7 days)     06/29/21   INR 1.60*       ASSESSMENT     Source: Home Care/Facility Nurse and Template      Warfarin dosing taken: Warfarin taken as instructed    Diet: No new diet changes affecting INR    Illness, Injury or hospitalization: No    Medication changes: None    Signs or symptoms of bleeding or clotting: No    Previous INR: therapeutic last 2(+) visits    Additional findings: None     PLAN     Recommended plan for no diet, medication or health factor changes affecting INR:     Dosing instructions: Increase your warfarin dose to 3 mg daily on Tuesdays and Fridays; and 2 mg daily rest of week (14.3% change)    Follow up no later than: 1 week     Telephone call with home care nurse Lakshmi who agrees to plan and repeated back plan correctly    Orders given to  Homecare nurse/facility to recheck    Education provided: target INR goal and significance of current INR result and importance of following up for INR monitoring at instructed interval    Plan made per [facility] anticoagulation protocol    Nan Haas  Anticoagulation Clinic   [phone]    Anticoagulation Episode Summary     Current INR goal:  2.0-3.0   TTR:  50.0 % (1 y)   Next INR check:  7/6/2021   INR from last check:  1.60 (6/29/2021)   Weekly max warfarin dose:     Target end date:     INR check location:     Preferred lab:     Send INR reminders to:  [facility]    Indications    A-fib (H) (Resolved) [I48.91]  Aortic valve disease  rheumatic [I06.9]  Mitral valve stenosis  unspecified etiology [I05.0]  Silent Stroke [I66.9]            Comments:           Anticoagulation Care Providers     Provider Role Specialty Phone number    Aristides Alegria MD Referring Family Medicine [phone]    Haja Ascencio MD  Cardiology [phone]

## 2021-07-11 PROBLEM — I05.0 MITRAL VALVE STENOSIS, UNSPECIFIED ETIOLOGY: Status: ACTIVE | Noted: 2021-01-01

## 2021-07-11 PROBLEM — I06.9: Status: ACTIVE | Noted: 2021-01-01

## 2021-07-13 NOTE — TELEPHONE ENCOUNTER
Reason for Call:  INR    Who is calling?  Home Care: [facility]me     Phone number:  [phone]    Fax number:  n/a    Name of caller: Esme    INR Value:  3.9    Are there any other concerns:  No    Can we leave a detailed message on this number? YES    Phone number patient can be reached at: Home number on file [phone] (home)      Call taken on 7/13/2021 at 4:00 PM by Rupali Barnard

## 2021-07-13 NOTE — PROGRESS NOTES
ANTICOAGULATION MANAGEMENT     Kody Johns 71 year old male is on warfarin with supratherapeutic INR result. (Goal INR 2.0-3.0)    Recent labs: (last 7 days)     07/13/21  0000   INR 3.9       ASSESSMENT     Source(s): Home Care/Facility Nurse       Warfarin doses taken: Warfarin taken as instructed    Diet: No new diet changes identified    New illness, injury, or hospitalization: No    Medication/supplement changes: None noted    Signs or symptoms of bleeding or clotting: No    Previous INR: Supratherapeutic    Additional findings: None     PLAN     Recommended plan for no diet, medication or health factor changes affecting INR     Dosing Instructions: Hold dose then Decrease your warfarin dose (14% change) with next INR in 1 week       Summary  As of 7/13/2021    Full warfarin instructions:  7/13: Hold; Otherwise 1 mg every Thu, Sat; 2 mg all other days   Next INR check:               Telephone call with home care nurse Esme who verbalizes understanding and agrees to plan    Orders given to  Homecare nurse/facility to recheck    Education provided: None required    Plan made per [facility] anticoagulation protocol    Yasmin Ashley RN  Anticoagulation Clinic  7/13/2021    _______________________________________________________________________     Anticoagulation Episode Summary     Current INR goal:  2.0-3.0   TTR:  48.2 % (1 y)   Target end date:     Send INR reminders to:  [facility]    Indications    Aortic valve disease  rheumatic [I06.9]  Mitral valve stenosis  unspecified etiology [I05.0]           Comments:           Anticoagulation Care Providers     Provider Role Specialty Phone number    Haja Ascencio MD Referring Clinical Cardiac Electrophysiology [phone]    Aristides Alegria MD  Family Medicine [phone]

## 2021-07-20 NOTE — PROGRESS NOTES
Return call from Esme RN.    Due to phone system changes today she was waiting to speak to TUTU for close to an hour for dosing earlier. By the time she was able to speak to TUTU Aguilar she was no longer in patient's home and [facility]  had to leave for another appointment. Esme since returned to patient's home to correct the med box set up and patient/family did not answer the door.     Discussed dosing at length with Esme. Tracking calendar is updated with how she set up the med box when she left patient's house earlier today. Historically she has had a difficult time coordinating patient/ visits outside of their established Tuesday visit time. Patient/family have very low medication literacy and are unable to adjust the med box on their own, home care RN does all medication set up for them. Esme has set up the med box with 1mg more/week than plan intended by TUTU Aguilar below. Esme will attempt to go back out to the house later this week and adjust the med box down by 1mg.

## 2021-07-20 NOTE — PROGRESS NOTES
ANTICOAGULATION MANAGEMENT     Kody Johns 71 year old male is on warfarin with subtherapeutic INR result. (Goal INR 2.0-3.0)    Recent labs: (last 7 days)     07/20/21  0000   INR 1.5       ASSESSMENT     Source(s): Home Care/Facility Nurse       Warfarin doses taken: Missed dose(s) may be affecting INR    Diet: No new diet changes identified    New illness, injury, or hospitalization: No    Medication/supplement changes: None noted    Signs or symptoms of bleeding or clotting: No    Previous INR: Supratherapeutic    Additional findings: None     PLAN     Recommended plan for temporary change(s) affecting INR     Dosing Instructions: Booster dose then continue your current warfarin dose with next INR in 1 week       Summary  As of 7/20/2021    Full warfarin instructions:  7/20: 4 mg; Otherwise 1 mg every Sun, Thu; 2 mg all other days   Next INR check:  7/27/2021             Telephone call with home care nurse Yvette who verbalizes understanding and agrees to plan    Orders given to  Homecare nurse/facility to recheck    Education provided: Target INR goal and significance of current INR result    Plan made per [facility] anticoagulation protocol    Jeff Genao RN  Anticoagulation Clinic  7/20/2021    _______________________________________________________________________     Anticoagulation Episode Summary     Current INR goal:  2.0-3.0   TTR:  46.5 % (1 y)   Target end date:     Send INR reminders to:  [facility]    Indications    Aortic valve disease  rheumatic [I06.9]  Mitral valve stenosis  unspecified etiology [I05.0]           Comments:           Anticoagulation Care Providers     Provider Role Specialty Phone number    Haja Ascencio MD Referring Clinical Cardiac Electrophysiology [phone]    Aristides Alegria MD  Family Medicine [phone]

## 2021-07-20 NOTE — TELEPHONE ENCOUNTER
Home care nurse  Calling waiting for coumadin orders.    Transferred back to .  Niru Flores RN  [facility] Nurse Advisor

## 2021-07-26 NOTE — PROGRESS NOTES
ANTICOAGULATION MANAGEMENT     Kody Johns 71 year old male is on warfarin with subtherapeutic INR result. (Goal INR 2.0-3.0)    Recent labs: (last 7 days)     07/26/21  1227   INR 1.5*       ASSESSMENT     Source(s): Chart Review and Home Care/Facility Nurse       Warfarin doses taken: Warfarin taken as instructed    Diet: No new diet changes identified    New illness, injury, or hospitalization: No    Medication/supplement changes: None noted    Signs or symptoms of bleeding or clotting: No    Previous INR: Subtherapeutic    Additional findings: None     PLAN     Recommended plan for temporary change(s) affecting INR     Dosing Instructions:  Increase your warfarin dose (15% change) with next INR in 1 week       Summary  As of 7/26/2021    Full warfarin instructions:  3 mg every Mon; 2 mg all other days   Next INR check:               Telephone call with home care nurse Esme who verbalizes understanding and agrees to plan    Orders given to  Homecare nurse/facility to recheck    Education provided: Please call back if any changes to your diet, medications or how you've been taking warfarin, Monitoring for bleeding signs and symptoms and Monitoring for clotting signs and symptoms    Plan made per [facility] anticoagulation protocol    Shonda Reese RN  Anticoagulation Clinic  7/26/2021    _______________________________________________________________________     Anticoagulation Episode Summary     Current INR goal:  2.0-3.0   TTR:  44.8 % (1 y)   Target end date:     Send INR reminders to:  [facility]    Indications    Aortic valve disease  rheumatic [I06.9]  Mitral valve stenosis  unspecified etiology [I05.0]           Comments:  Home care         Anticoagulation Care Providers     Provider Role Specialty Phone number    Haja Ascencio MD Referring Clinical Cardiac Electrophysiology [phone]    Aristides Alegria MD  Family Medicine [phone]

## 2021-08-03 PROBLEM — I48.92 ATRIAL FLUTTER (H): Status: RESOLVED | Noted: 2021-01-01 | Resolved: 2021-01-01

## 2021-08-03 PROBLEM — I48.91 A-FIB (H): Status: RESOLVED | Noted: 2017-09-14 | Resolved: 2021-01-01

## 2021-08-03 NOTE — CONFIDENTIAL NOTE
ANTICOAGULATION MANAGEMENT     Kody Johns 71 year old male is on warfarin with therapeutic INR result. (Goal INR 2.0-3.0)    Recent labs: (last 7 days)     08/03/21  1127   INR 2.0*       ASSESSMENT     Source(s): Home Care/Facility Nurse       Warfarin doses taken: Warfarin taken as instructed    Diet: No new diet changes identified    New illness, injury, or hospitalization: Pt c/o of sore throat and cough.    Medication/supplement changes: None noted    Signs or symptoms of bleeding or clotting: No    Previous INR: Subtherapeutic    Additional findings: None     PLAN     Recommended plan for no diet, medication or health factor changes affecting INR     Dosing Instructions: Continue your current warfarin dose with next INR in 1 week       Summary  As of 8/3/2021    Full warfarin instructions:  3 mg every Mon; 2 mg all other days   Next INR check:  8/10/2021             Telephone call with home care nurse Esme who verbalizes understanding and agrees to plan    Orders given to  Homecare nurse/facility to recheck    Education provided: Target INR goal and significance of current INR result    Plan made per [facility] anticoagulation protocol    Jeff Genao RN  Anticoagulation Clinic  8/3/2021    _______________________________________________________________________     Anticoagulation Episode Summary     Current INR goal:  2.0-3.0   TTR:  42.7 % (1 y)   Target end date:     Send INR reminders to:  [facility]    Indications    Aortic valve disease  rheumatic [I06.9]  Mitral valve stenosis  unspecified etiology [I05.0]           Comments:  Home care         Anticoagulation Care Providers     Provider Role Specialty Phone number    Haja Ascencio MD Referring Clinical Cardiac Electrophysiology [phone]    Aristides Alegria MD  Family Medicine [phone]

## 2021-08-09 NOTE — TELEPHONE ENCOUNTER
" Disp Refills Start End IGLESIA   levothyroxine (SYNTHROID, LEVOTHROID) 88 MCG tablet     --   Sig - Route: Take 88 mcg by mouth daily. - Oral   Class: Historical Med     Routing refill request to provider for review/approval because:  Medication is reported/historical    Last Written Prescription Date:  ???  Last Fill Quantity: ???,  # refills: ???   Last office visit provider:  05/26/2021 with Dr Alegria.     Requested Prescriptions   Pending Prescriptions Disp Refills     levothyroxine (SYNTHROID/LEVOTHROID) 100 MCG tablet [Pharmacy Med Name: LEVOTHYROXINE SODIUM 100  Tablet] 30 tablet 5     Sig: TAKE 1 TABLET (100 MCG TOTAL) BY MOUTH DAILY.       Thyroid Protocol Failed - 8/5/2021 10:04 AM        Failed - Medication is active on med list        Passed - Patient is 12 years or older        Passed - Recent (12 mo) or future (30 days) visit within the authorizing provider's specialty     Patient has had an office visit with the authorizing provider or a provider within the authorizing providers department within the previous 12 mos or has a future within next 30 days. See \"Patient Info\" tab in inbasket, or \"Choose Columns\" in Meds & Orders section of the refill encounter.              Passed - Normal TSH on file in past 12 months     Recent Labs   Lab Test 05/26/21  1040   TSH 1.02                   Paty Moralez 08/09/21 12:42 AM  "

## 2021-08-10 NOTE — CONFIDENTIAL NOTE
ANTICOAGULATION MANAGEMENT     Kody Johns 71 year old male is on warfarin with therapeutic INR result. (Goal INR 2.0-3.0)    Recent labs: (last 7 days)     08/10/21  0922   INR 2.9*       ASSESSMENT     Source(s): Home Care/Facility Nurse       Warfarin doses taken: Warfarin taken as instructed    Diet: No new diet changes identified    New illness, injury, or hospitalization: No    Medication/supplement changes: None noted    Signs or symptoms of bleeding or clotting: No    Previous INR: Therapeutic last visit; previously outside of goal range    Additional findings: None     PLAN     Recommended plan for no diet, medication or health factor changes affecting INR     Dosing Instructions: Continue your current warfarin dose with next INR in 1 week       Summary  As of 8/10/2021    Full warfarin instructions:  3 mg every Mon; 2 mg all other days   Next INR check:  8/17/2021             Telephone call with home care nurse Paige who verbalizes understanding and agrees to plan    Orders given to  Homecare nurse/facility to recheck    Education provided: Target INR goal and significance of current INR result    Plan made per [facility] anticoagulation protocol    Jeff Genao RN  Anticoagulation Clinic  8/10/2021    _______________________________________________________________________     Anticoagulation Episode Summary     Current INR goal:  2.0-3.0   TTR:  42.6 % (1 y)   Target end date:     Send INR reminders to:  [facility]    Indications    Aortic valve disease  rheumatic [I06.9]  Mitral valve stenosis  unspecified etiology [I05.0]           Comments:  Home care         Anticoagulation Care Providers     Provider Role Specialty Phone number    Haja Ascencio MD Referring Clinical Cardiac Electrophysiology [phone]    Aristides Alegria MD  Family Medicine [phone]

## 2021-08-17 NOTE — PROGRESS NOTES
ANTICOAGULATION MANAGEMENT     Kody Johns 71 year old male is on warfarin with subtherapeutic INR result. (Goal INR 2.0-3.0)    Recent labs: (last 7 days)     08/17/21  0912   INR 1.7*       ASSESSMENT     Source(s): Chart Review and Home Care/Facility Nurse       Warfarin doses taken: Warfarin taken as instructed    Diet: Diet may be affecting diet and INR    New illness, injury, or hospitalization: No    Medication/supplement changes: None noted    Signs or symptoms of bleeding or clotting: No    Previous INR: Therapeutic last 2(+) visits    Additional findings: None     PLAN     Recommended plan for temporary change(s) affecting INR     Dosing Instructions: Booster dose then continue your current warfarin dose with next INR in 1 week       Summary  As of 8/17/2021    Full warfarin instructions:  8/17: 3 mg; Otherwise 3 mg every Mon; 2 mg all other days   Next INR check:                 Telephone call with home care nurse who verbalizes understanding and agrees to plan and who agrees to plan and repeated back plan correctly    Orders given to  Homecare nurse/facility to recheck HC nurse:  [phone]    Education provided: Please call back if any changes to your diet, medications or how you've been taking warfarin, Importance of consistent vitamin K intake and Impact of vitamin K foods on INR    Plan made per [facility] anticoagulation protocol    Aydee Parr, RN  Anticoagulation Clinic  8/17/2021    _______________________________________________________________________     Anticoagulation Episode Summary     Current INR goal:  2.0-3.0   TTR:  42.2 % (1 y)   Target end date:     Send INR reminders to:  [facility]    Indications    Aortic valve disease  rheumatic [I06.9]  Mitral valve stenosis  unspecified etiology [I05.0]           Comments:  Home care         Anticoagulation Care Providers     Provider Role Specialty Phone number    Haja Ascencio MD Referring Clinical Cardiac Electrophysiology  [phone]    Aristides Alegria MD  [facility] [phone]

## 2021-08-24 NOTE — PROGRESS NOTES
ANTICOAGULATION MANAGEMENT     Kody Johns 71 year old male is on warfarin with supratherapeutic INR result. (Goal INR 2.0-3.0)    Recent labs: (last 7 days)     08/24/21  0944   INR 3.2*       ASSESSMENT     Source(s): Chart Review and Home Care/Facility Nurse       Warfarin doses taken: Warfarin taken as instructed    Diet: No new diet changes identified    New illness, injury, or hospitalization: No    Medication/supplement changes: None noted    Signs or symptoms of bleeding or clotting: No    Previous INR: Subtherapeutic    Additional findings: Patient's INR can be all over the place. Last week INR was Sub with 1 mg boost dose given; today supra      PLAN     Recommended plan for no diet, medication or health factor changes affecting INR     Dosing Instructions: Continue your current warfarin dose with next INR in 1 week       Summary  As of 8/24/2021    Full warfarin instructions:  3 mg every Mon; 2 mg all other days   Next INR check:               Telephone call with home care nurse Esme who verbalizes understanding and agrees to plan    Orders given to  Homecare nurse/facility to recheck    Education provided: Please call back if any changes to your diet, medications or how you've been taking warfarin, Monitoring for bleeding signs and symptoms and Monitoring for clotting signs and symptoms    Plan made per [facility] anticoagulation protocol    Shonda Reese RN  Anticoagulation Clinic  8/24/2021    _______________________________________________________________________     Anticoagulation Episode Summary     Current INR goal:  2.0-3.0   TTR:  43.1 % (1 y)   Target end date:     Send INR reminders to:  [facility]    Indications    Aortic valve disease  rheumatic [I06.9]  Mitral valve stenosis  unspecified etiology [I05.0]           Comments:  Home care         Anticoagulation Care Providers     Provider Role Specialty Phone number    Haja Ascencio MD Referring Clinical Cardiac Electrophysiology  [phone]    Aristides Alegria MD  [facility] [phone]

## 2021-08-31 NOTE — PROGRESS NOTES
ANTICOAGULATION MANAGEMENT     Kody Johns 71 year old male is on warfarin with supratherapeutic INR result. (Goal INR 2.0-3.0)    Recent labs: (last 7 days)     08/31/21  0929   INR 3.2*       ASSESSMENT     Source(s): Chart Review and Home Care/Facility Nurse       Warfarin doses taken: Warfarin taken as instructed    Diet: No new diet changes identified    New illness, injury, or hospitalization: No    Medication/supplement changes: None noted    Signs or symptoms of bleeding or clotting: No    Previous INR: Supratherapeutic    Additional findings: None     PLAN     Recommended plan for no diet, medication or health factor changes affecting INR     Dosing Instructions:  Decrease your warfarin dose (6.7% change) with next INR in 1 week       Summary  As of 8/31/2021    Full warfarin instructions:  2 mg every day   Next INR check:  9/7/2021             Telephone call with home care nurse Esme who verbalizes understanding and agrees to plan    Orders given to  Homecare nurse/facility to recheck    Education provided: Please call back if any changes to your diet, medications or how you've been taking warfarin    Plan made per [facility] anticoagulation protocol    Shonda Reese, RN  Anticoagulation Clinic  8/31/2021    _______________________________________________________________________     Anticoagulation Episode Summary     Current INR goal:  2.0-3.0   TTR:  42.6 % (1 y)   Target end date:     Send INR reminders to:  [facility]    Indications    Aortic valve disease  rheumatic [I06.9]  Mitral valve stenosis  unspecified etiology [I05.0]           Comments:  Home care         Anticoagulation Care Providers     Provider Role Specialty Phone number    Haja Ascencio MD Referring Clinical Cardiac Electrophysiology [phone]    Aristides Alegria MD  Family Medicine [phone]

## 2021-09-02 NOTE — PROGRESS NOTES
[facility] Care Coordination Contact  Care coordinator received a voicemail from [employer]. Stating they have not received the new PCA assessment yet.  Asking Care Coordinator to fax.    Care Coordinator faxed PCA assessment to provider.    MISSAEL Lord  [facility]  [phone]

## 2021-09-07 NOTE — PROGRESS NOTES
ANTICOAGULATION MANAGEMENT     Kody Johns 71 year old male is on warfarin with supratherapeutic INR result. (Goal INR 2.0-3.0)    Recent labs: (last 7 days)     09/07/21  0940   INR 3.7*       ASSESSMENT     Source(s): Chart Review and Home Care/Facility Nurse       Warfarin doses taken: Warfarin taken as instructed    Diet: No new diet changes identified    New illness, injury, or hospitalization: No    Medication/supplement changes: None noted    Signs or symptoms of bleeding or clotting: No    Previous INR: Supratherapeutic    Additional findings: None. No findings to explain elevated INR.     PLAN     Recommended plan for no diet, medication or health factor changes affecting INR     Dosing Instructions: Hold dose then Decrease your warfarin dose (11% change) with next INR in 1 week       Summary  As of 9/7/2021    Full warfarin instructions:  9/7: Hold; Otherwise 1.5 mg every Mon, Thu, Sat; 2 mg all other days   Next INR check:  9/14/2021             Telephone call with home care nurse Esme who verbalizes understanding and agrees to plan    Orders given to  Homecare nurse/facility to recheck    Education provided: Goal range and significance of current result    Plan made per [facility] anticoagulation protocol    Jeff Genao RN  Anticoagulation Clinic  9/7/2021    _______________________________________________________________________     Anticoagulation Episode Summary     Current INR goal:  2.0-3.0   TTR:  40.7 % (1 y)   Target end date:     Send INR reminders to:  [facility]    Indications    Aortic valve disease  rheumatic [I06.9]  Mitral valve stenosis  unspecified etiology [I05.0]           Comments:  Home care         Anticoagulation Care Providers     Provider Role Specialty Phone number    Haja Ascencio MD Referring Clinical Cardiac Electrophysiology [phone]    Aristides Alegria MD  Family Medicine [phone]

## 2021-09-14 NOTE — PROGRESS NOTES
ANTICOAGULATION MANAGEMENT     Kody Johns 71 year old male is on warfarin with supratherapeutic INR result. (Goal INR 2.0-3.0)    No results for input(s): INR in the last 168 hours.   INR - 3.3    ASSESSMENT     Source(s): Chart Review and Home Care/Facility Nurse       Warfarin doses taken: Warfarin recently held for 1 day which may be affecting INR    HELD warfarin dose on 9/7, as instructed.    Diet: No new diet changes identified    New illness, injury, or hospitalization: No    Medication/supplement changes: None noted    Signs or symptoms of bleeding or clotting: No    Previous INR: Supratherapeutic at 3.7 on 9/7/21.    Additional findings: None     PLAN     Recommended plan for no diet, medication or health factor changes affecting INR     Dosing Instructions:  Decrease your warfarin dose (4% change) with next INR in 1 week       Summary  As of 9/14/2021    Full warfarin instructions:  2 mg every Mon, Wed, Fri; 1.5 mg all other days   Next INR check:  9/21/2021             Telephone call with home care nurse Esme with [facility] who verbalizes understanding and agrees to plan    Orders given to  Homecare nurse/facility to recheck in one wk 9/21/21.    Education provided: Importance of consistent vitamin K intake and Goal range and significance of current result    Plan made per [facility] anticoagulation protocol    Sulema Almaraz RN  Anticoagulation Clinic  9/14/2021    _______________________________________________________________________     Anticoagulation Episode Summary     Current INR goal:  2.0-3.0   TTR:  39.7 % (1 y)   Target end date:     Send INR reminders to:  [facility]    Indications    Aortic valve disease  rheumatic [I06.9]  Mitral valve stenosis  unspecified etiology [I05.0]           Comments:  Home care         Anticoagulation Care Providers     Provider Role Specialty Phone number    Haja Ascencio MD Referring Clinical Cardiac Electrophysiology [phone]     Aristides Alegria MD  [facility] [phone]

## 2021-09-21 NOTE — PROGRESS NOTES
ANTICOAGULATION MANAGEMENT     Kody Johns 71 year old male is on warfarin with subtherapeutic INR result. (Goal INR 2.0-3.0)    Recent labs: (last 7 days)     09/21/21  0910   INR 1.7*       ASSESSMENT     Source(s): Chart Review and Home Care/Facility Nurse       Warfarin doses taken: Missed dose(s) may be affecting INR    1mg tablet left in pill box.   Nurse sets up medications in pill box.    Diet: No new diet changes identified    New illness, injury, or hospitalization: No    Medication/supplement changes: None noted    Signs or symptoms of bleeding or clotting: No    Previous INR: Supratherapeutic last 2 INR's.    Additional findings: None     PLAN     Recommended plan for temporary change(s) affecting INR     Dosing Instructions: Booster dose then continue your current warfarin dose with next INR in 1 week       Summary  As of 9/21/2021    Full warfarin instructions:  9/21: 3 mg; Otherwise 2 mg every Mon, Wed, Fri; 1.5 mg all other days   Next INR check:  9/28/2021             Telephone call with home care nurse AMY Victoria ([phone]) with [facility] who verbalizes understanding and agrees to plan    Orders given to  Homecare nurse/facility to recheck  - one wk on 9/28/21.    Education provided: Goal range and significance of current result and Importance of taking warfarin as instructed    Plan made per [facility] anticoagulation protocol    Sulema Almaraz RN  Anticoagulation Clinic  9/21/2021    _______________________________________________________________________     Anticoagulation Episode Summary     Current INR goal:  2.0-3.0   TTR:  40.9 % (1 y)   Target end date:     Send INR reminders to:  [facility]    Indications    Aortic valve disease  rheumatic [I06.9]  Mitral valve stenosis  unspecified etiology [I05.0]           Comments:  Home care         Anticoagulation Care Providers     Provider Role Specialty Phone number    Haja Ascencio MD Referring Clinical Cardiac  Electrophysiology [phone]    Aristides Alegria MD  [facility] [phone]

## 2021-09-28 NOTE — PROGRESS NOTES
ANTICOAGULATION MANAGEMENT     Kody Johns 71 year old male is on warfarin with therapeutic INR result. (Goal INR 2.0-3.0)    Recent labs: (last 7 days)     09/28/21  0000   INR 2.7*       ASSESSMENT     Source(s): Chart Review and Home Care/Facility Nurse       Warfarin doses taken: Warfarin taken as instructed    Diet: No new diet changes identified    New illness, injury, or hospitalization: No    Medication/supplement changes: None noted    Signs or symptoms of bleeding or clotting: No    Previous INR: Subtherapeutic    Additional findings: None     PLAN     Recommended plan for no diet, medication or health factor changes affecting INR     Dosing Instructions: Continue your current warfarin dose with next INR in 1 week       Summary  As of 9/28/2021    Full warfarin instructions:  2 mg every Mon, Wed, Fri; 1.5 mg all other days   Next INR check:               Telephone call with home care nurse Shar who agrees to plan and repeated back plan correctly    Orders given to  Homecare nurse/facility to recheck    Education provided: Contact [phone] with any changes, questions or concerns.     Plan made per [facility] anticoagulation protocol    Leigh Coreas RN  Anticoagulation Clinic  9/28/2021    _______________________________________________________________________     Anticoagulation Episode Summary     Current INR goal:  2.0-3.0   TTR:  42.2 % (1 y)   Target end date:     Send INR reminders to:  [facility]    Indications    Aortic valve disease  rheumatic [I06.9]  Mitral valve stenosis  unspecified etiology [I05.0]           Comments:  Home care         Anticoagulation Care Providers     Provider Role Specialty Phone number    Haja Ascencio MD Referring Clinical Cardiac Electrophysiology [phone]    Aristides Alegria MD  Family Medicine [phone]

## 2021-09-29 NOTE — PROGRESS NOTES
PCA sig page and POC sig page received and saved in member's folder.    Martín Short  Care Management Specialist  [facility]  [phone]

## 2021-10-05 NOTE — PROGRESS NOTES
ANTICOAGULATION MANAGEMENT     Kody Johns 71 year old male is on warfarin with subtherapeutic INR result. (Goal INR 2.0-3.0)    Recent labs: (last 7 days)     10/05/21  0800   INR 1.9*       ASSESSMENT     Source(s): Chart Review and Home Care/Facility Nurse       Warfarin doses taken: Missed dose(s) may be affecting INR    One missed dose 2 days ago.    Diet: No new diet changes identified    New illness, injury, or hospitalization: No    Medication/supplement changes: None noted    Signs or symptoms of bleeding or clotting: No    Previous INR: Therapeutic last visit at 2.7; previously outside of goal range at 1.7    Additional findings: None     PLAN     Recommended plan for temporary change(s) affecting INR     Dosing Instructions: Booster dose then continue your current warfarin dose with next INR in 1 week       Summary  As of 10/5/2021    Full warfarin instructions:  10/5: 2 mg; Otherwise 2 mg every Mon, Wed, Fri; 1.5 mg all other days   Next INR check:  10/12/2021             Telephone call with home care nurse AMY Victoria (([phone]) with [facility] who verbalizes understanding and agrees to plan    Orders given to  Homecare nurse/facility to recheck - one wk on 10/12/21.    Education provided: Goal range and significance of current result and Importance of taking warfarin as instructed    Plan made per [facility] anticoagulation protocol    Sulema Almaraz RN  Anticoagulation Clinic  10/5/2021    _______________________________________________________________________     Anticoagulation Episode Summary     Current INR goal:  2.0-3.0   TTR:  43.8 % (1 y)   Target end date:     Send INR reminders to:  [facility]    Indications    Aortic valve disease  rheumatic [I06.9]  Mitral valve stenosis  unspecified etiology [I05.0]           Comments:  Home care         Anticoagulation Care Providers     Provider Role Specialty Phone number    Haja Ascencio MD Referring Clinical Cardiac  Electrophysiology [phone]    Aristides Alegria MD  [facility] [phone]

## 2021-10-05 NOTE — TELEPHONE ENCOUNTER
Pharmacy requesting refill for furosemide 20 mg tabs for 30 tablets with sig take 1 tablet by mouth daily for edema. This medication not an active medication list.

## 2021-10-07 NOTE — TELEPHONE ENCOUNTER
"Disp Refills Start End IGLESIA    metoprolol tartrate (LOPRESSOR) 100 MG tablet 180 tablet 1 2021 No   Sig - Route: Take 1 tablet (100 mg total) by mouth 2 (two) times a day. - Oral   Sent to pharmacy as: metoprolol tartrate 100 mg tablet (LOPRESSOR)   E-Prescribing Status: Receipt confirmed by pharmacy (2021 12:05 PM CST)       metoprolol tartrate (LOPRESSOR) 100 MG tablet [[id number]]    Electronically signed by: Aristides Alegria MD on 21 120 Status:    Ordering user: Aristides Alegria MD 21 Authorized by: Aristides Alegria MD   Frequency: BID 21 - 30  days   Diagnoses  Atrial flutter with rapid ventricular response (H) [I48.92]     Routing refill request to provider for review/approval because:   script (end date listed 21)    Last Written Prescription Date:  21  Last Fill Quantity: 180,  # refills: 1   Last office visit provider:  21     Requested Prescriptions   Pending Prescriptions Disp Refills     metoprolol tartrate (LOPRESSOR) 100 MG tablet 60 tablet      Sig: Take 1 tablet (100 mg) by mouth 2 times daily       Beta-Blockers Protocol Failed - 10/7/2021  8:10 AM        Failed - Medication is active on med list        Passed - Blood pressure under 140/90 in past 12 months     BP Readings from Last 3 Encounters:   21 138/70   21 (!) 142/80   21 131/88                 Passed - Patient is age 6 or older        Passed - Recent (12 mo) or future (30 days) visit within the authorizing provider's specialty     Patient has had an office visit with the authorizing provider or a provider within the authorizing providers department within the previous 12 mos or has a future within next 30 days. See \"Patient Info\" tab in inbasket, or \"Choose Columns\" in Meds & Orders section of the refill encounter.                   Adelia Lee RN 10/07/21 8:25 AM  "

## 2021-10-08 NOTE — TELEPHONE ENCOUNTER
"  Routing refill request to provider for review/approval because:  Possible duplicate    Last Written Prescription Date:  10/07/2021  Last Fill Quantity: 60,  # refills: 0   Last office visit provider:  05/26/2021 Airam     Requested Prescriptions   Pending Prescriptions Disp Refills     metoprolol tartrate (LOPRESSOR) 100 MG tablet 60 tablet 0     Sig: Take 1 tablet (100 mg) by mouth 2 times daily       Beta-Blockers Protocol Passed - 10/8/2021 11:47 AM        Passed - Blood pressure under 140/90 in past 12 months     BP Readings from Last 3 Encounters:   05/26/21 138/70   04/14/21 (!) 142/80   02/22/21 131/88                 Passed - Patient is age 6 or older        Passed - Recent (12 mo) or future (30 days) visit within the authorizing provider's specialty     Patient has had an office visit with the authorizing provider or a provider within the authorizing providers department within the previous 12 mos or has a future within next 30 days. See \"Patient Info\" tab in inbasket, or \"Choose Columns\" in Meds & Orders section of the refill encounter.              Passed - Medication is active on med list             Noelle Burgess RN 10/08/21 2:00 PM  "

## 2021-10-08 NOTE — TELEPHONE ENCOUNTER
Pending Prescriptions:                       Disp   Refills    metoprolol tartrate (LOPRESSOR) 100 MG ta*60 tab*0            Sig: Take 1 tablet (100 mg) by mouth 2 times daily

## 2021-10-12 NOTE — PROGRESS NOTES
ANTICOAGULATION MANAGEMENT     Kody Johns 71 year old male is on warfarin with subtherapeutic INR result. (Goal INR 2.0-3.0)    No results for input(s): INR in the last 168 hours.    ASSESSMENT     Source(s): Chart Review and Home Care/Facility Nurse       Warfarin doses taken: Missed dose(s) may be affecting INR    Diet: No new diet changes identified    New illness, injury, or hospitalization: No    Medication/supplement changes: None noted    Signs or symptoms of bleeding or clotting: No    Previous INR: Subtherapeutic    Additional findings: None     PLAN     Recommended plan for temporary change(s) affecting INR     Dosing Instructions: Booster dose then continue your current warfarin dose with next INR in 1 week       Summary  As of 10/12/2021    Full warfarin instructions:  10/12: 2 mg; Otherwise 2 mg every Mon, Wed, Fri; 1.5 mg all other days   Next INR check:  10/19/2021             Telephone call with home care nurse Esme who verbalizes understanding and agrees to plan    Lab visit scheduled    Education provided: Please call back if any changes to your diet, medications or how you've been taking warfarin    Plan made per [facility] anticoagulation protocol    Abdirashid Geiger RN  Anticoagulation Clinic  10/12/2021    _______________________________________________________________________     Anticoagulation Episode Summary     Current INR goal:  2.0-3.0   TTR:  41.8 % (1 y)   Target end date:     Send INR reminders to:  [facility]    Indications    Aortic valve disease  rheumatic [I06.9]  Mitral valve stenosis  unspecified etiology [I05.0]           Comments:  Home care         Anticoagulation Care Providers     Provider Role Specialty Phone number    Haja Ascencio MD Referring Clinical Cardiac Electrophysiology [phone]    Aristides Alegria MD  Family Medicine [phone]

## 2021-10-19 NOTE — PROGRESS NOTES
ANTICOAGULATION MANAGEMENT     Kody Johns 71 year old male is on warfarin with supratherapeutic INR result. (Goal INR 2.0-3.0)    Recent labs: (last 7 days)     10/19/21  0941   INR 3.5*       ASSESSMENT     Source(s): Chart Review and Home Care/Facility Nurse       Warfarin doses taken: Warfarin taken as instructed    Diet: No new diet changes identified    New illness, injury, or hospitalization: No    Medication/supplement changes: None noted    Signs or symptoms of bleeding or clotting: No    Previous INR: Subtherapeutic    Additional findings: None     PLAN     Recommended plan for no diet, medication or health factor changes affecting INR     Dosing Instructions: Hold dose then continue your current warfarin dose with next INR in 1 week       Summary  As of 10/19/2021    Full warfarin instructions:  10/19: Hold; Otherwise 2 mg every Mon, Wed, Fri; 1.5 mg all other days   Next INR check:  10/26/2021             Telephone call with home care nurse Esme who agrees to plan and repeated back plan correctly    Orders given to  Homecare nurse/facility to recheck    Education provided: Goal range and significance of current result, Importance of therapeutic range, Importance of following up at instructed interval, Importance of taking warfarin as instructed, Monitoring for bleeding signs and symptoms and When to seek medical attention/emergency care    Plan made per [facility] anticoagulation protocol    Jaclyn Son, RN  Anticoagulation Clinic  10/19/2021    _______________________________________________________________________     Anticoagulation Episode Summary     Current INR goal:  2.0-3.0   TTR:  41.1 % (1 y)   Target end date:     Send INR reminders to:  [facility]    Indications    Aortic valve disease  rheumatic [I06.9]  Mitral valve stenosis  unspecified etiology [I05.0]           Comments:  Home care         Anticoagulation Care Providers     Provider Role Specialty Phone number    Haja Ascencio,  MD [facility] Clinical Cardiac Electrophysiology [phone]    Aristides Alegria MD  [facility] [phone]

## 2021-10-26 NOTE — PROGRESS NOTES
ANTICOAGULATION MANAGEMENT     Kody Johns 71 year old male is on warfarin with supratherapeutic INR result. (Goal INR 2.0-3.0)    Recent labs: (last 7 days)     10/26/21  0927   INR 3.3*       ASSESSMENT     Source(s): Chart Review and Home Care/Facility Nurse       Warfarin doses taken: Warfarin taken as instructed    Diet: No new diet changes identified    New illness, injury, or hospitalization: No    Medication/supplement changes: None noted    Signs or symptoms of bleeding or clotting: No    Previous INR: Supratherapeutic No reason found for continued supratherapeutic INR    Additional findings: Refill needed today completed.      PLAN     Recommended plan for no diet, medication or health factor changes affecting INR     Dosing Instructions:  Decrease your warfarin dose (4.2% change) with next INR in 1 week       Summary  As of 10/26/2021    Full warfarin instructions:  2 mg every Mon, Fri; 1.5 mg all other days   Next INR check:  11/2/2021             Telephone call with home care nurse Esme who verbalizes understanding and agrees to plan and who agrees to plan and repeated back plan correctly    Orders given to  Homecare nurse/facility to recheck    Education provided: Please call back if any changes to your diet, medications or how you've been taking warfarin    Plan made per [facility] anticoagulation protocol    Aydee Parr, RN  Anticoagulation Clinic  10/26/2021    _______________________________________________________________________     Anticoagulation Episode Summary     Current INR goal:  2.0-3.0   TTR:  39.2 % (1 y)   Target end date:     Send INR reminders to:  [facility]    Indications    Aortic valve disease  rheumatic [I06.9]  Mitral valve stenosis  unspecified etiology [I05.0]           Comments:  Home care         Anticoagulation Care Providers     Provider Role Specialty Phone number    Haja Ascencio MD Referring Clinical Cardiac Electrophysiology [phone]    Aristides Alegria MD   Family Medicine [phone]

## 2021-11-01 NOTE — TELEPHONE ENCOUNTER
"There is a form from [facility] stating that the pt is needing Pull ups, Dr. Alegria is wanting clarification from the family on why these are needed.     Tried to call the the pt's EC and was not able to get through 2 times, if they call back-the form is in my black box in the \"Forms to be Completed\" tab. #1  "

## 2021-11-02 NOTE — PROGRESS NOTES
ANTICOAGULATION MANAGEMENT     Kody Johns 71 year old male is on warfarin with therapeutic INR result. (Goal INR 2.0-3.0)    Recent labs: (last 7 days)     11/02/21  1715   INR 2.7*       ASSESSMENT     Source(s): Home Care/Facility Nurse       Warfarin doses taken: Warfarin taken as instructed    Diet: No new diet changes identified    New illness, injury, or hospitalization: No    Medication/supplement changes: None noted    Signs or symptoms of bleeding or clotting: No    Previous INR: Supratherapeutic    Additional findings: None     PLAN     Recommended plan for no diet, medication or health factor changes affecting INR     Dosing Instructions: Continue your current warfarin dose with next INR in 2 weeks       Summary  As of 11/2/2021    Full warfarin instructions:  2 mg every Mon, Fri; 1.5 mg all other days   Next INR check:  11/16/2021             Telephone call with home care nurse Paige who verbalizes understanding and agrees to plan    Orders given to  Homecare nurse/facility to recheck    Education provided: Goal range and significance of current result    Plan made per [facility] anticoagulation protocol    Jeff Genao RN  Anticoagulation Clinic  11/2/2021    _______________________________________________________________________     Anticoagulation Episode Summary     Current INR goal:  2.0-3.0   TTR:  38.2 % (1 y)   Target end date:     Send INR reminders to:  [facility]    Indications    Aortic valve disease  rheumatic [I06.9]  Mitral valve stenosis  unspecified etiology [I05.0]           Comments:  4/24/2019 he had aortic valve replacement and mitral valve replacement, very complicated and long hospital course.         Anticoagulation Care Providers     Provider Role Specialty Phone number    Haja Ascencio MD Referring Clinical Cardiac Electrophysiology [phone]    Aristides Alegria MD  Family Medicine [phone]

## 2021-11-02 NOTE — TELEPHONE ENCOUNTER
Left VM for pt's son regarding message below, if pt calls back please relay message below and see directions on what to do with form. #2

## 2021-11-04 NOTE — TELEPHONE ENCOUNTER
Routing refill request to provider for review/approval because:  Drug not on the G refill protocol     Last Written Prescription Date:  5/5/2021  Last Fill Quantity: 30,  # refills: 3   Last office visit provider:  5/26/2021 Dr. Alegria     magnesium oxide (MAG-OX) 400 mg (241.3 mg magnesium) tablet 30 tablet 3 5/5/2021  No   Sig: TAKE 1 PILL BY MOUTH EVERYDAY   Sent to pharmacy as: magnesium oxide 400 mg (241.3 mg magnesium) tablet (MAG-OX)   E-Prescribing Status: Receipt confirmed by pharmacy (5/5/2021  4:46 PM CDT         Requested Prescriptions   Pending Prescriptions Disp Refills     magnesium oxide 400 MG CAPS         There is no refill protocol information for this order          Tiffany Ferguson RN 11/03/21 11:02 PM

## 2021-11-09 NOTE — TELEPHONE ENCOUNTER
Unable to reach pt's family x3, the forms are still sitting in my folder waiting to be completed. If the pt's family calls back please find out why they are needing pull ups for the patient

## 2021-11-09 NOTE — PROGRESS NOTES
ANTICOAGULATION MANAGEMENT     Kody Johns 71 year old male is on warfarin with supratherapeutic INR result. (Goal INR 2.0-3.0)    Recent labs: (last 7 days)     11/09/21  0912   INR 3.8*       ASSESSMENT     Source(s): Chart Review and Home Care/Facility Nurse       Warfarin doses taken: Warfarin taken as instructed    Diet: No new diet changes identified    New illness, injury, or hospitalization: Yes: patient has had diarrhea for the last 3 days. Home care has noted tachycardia for the past two visits.     Medication/supplement changes: None noted    Signs or symptoms of bleeding or clotting: No    Previous INR: Therapeutic last visit; previously outside of goal range    Additional findings: Appt. made with [facility] today to address tachycardia     PLAN     Recommended plan for temporary change(s) affecting INR     Dosing Instructions: Hold dose then continue your current warfarin dose with next INR in 1 week       Summary  As of 11/9/2021    Full warfarin instructions:  11/9: Hold; Otherwise 2 mg every Mon, Fri; 1.5 mg all other days   Next INR check:  11/16/2021             Telephone call with home care nurse Nakita Victoria [facility] who verbalizes understanding and agrees to plan and who agrees to plan and repeated back plan correctly    Orders given to  Homecare nurse/facility to recheck    Education provided: Please call back if any changes to your diet, medications or how you've been taking warfarin and educate pt on increasing fluids     Plan made per [facility] anticoagulation protocol    Aydee Parr, RN  Anticoagulation Clinic  11/9/2021    _______________________________________________________________________     Anticoagulation Episode Summary     Current INR goal:  2.0-3.0   TTR:  36.9 % (1 y)   Target end date:     Send INR reminders to:  [facility]    Indications    Aortic valve disease  rheumatic [I06.9]  Mitral valve stenosis  unspecified etiology [I05.0]           Comments:   4/24/2019 he had aortic valve replacement and mitral valve replacement, very complicated and long hospital course.         Anticoagulation Care Providers     Provider Role Specialty Phone number    Haja Ascencio MD Referring Clinical Cardiac Electrophysiology [phone]    Aristides Alegria MD  Family Medicine [phone]

## 2021-11-09 NOTE — PROGRESS NOTES
SUBJECTIVE:   CC: Kody Johns is an 71 year old male who presents for preventative health visit.     {Split Bill scripting  The purpose of this visit is to discuss your medical history and prevent health problems before you are sick. You may be responsible for a co-pay, coinsurance, or deductible if your visit today includes services such as checking on a sore throat, having an x-ray or lab test, or treating and evaluating a new or existing condition :111987}  Patient has been advised of split billing requirements and indicates understanding: {Yes and No:560510}  HPI  {Add if <65 person on Medicare  - Required Questions (Optional):962972}  {Outside tests to abstract? :233218}    {additional problems to add (Optional):805457}    Today's PHQ-2 Score:   PHQ-2 (  [employer]) 2021   Q1: Little interest or pleasure in doing things 1   Q2: Feeling down, depressed or hopeless 1   PHQ-2 Score 2   Q1: Little interest or pleasure in doing things Several days   Q2: Feeling down, depressed or hopeless Several days   PHQ-2 Score 2       Abuse: Current or Past(Physical, Sexual or Emotional)- { :785399}  Do you feel safe in your environment? { :288146}    Have you ever done Advance Care Planning? (For example, a Health Directive, POLST, or a discussion with a medical provider or your loved ones about your wishes): { :640479}    Social History     Tobacco Use     Smoking status: Former Smoker     Packs/day: 1.00     Years: 50.00     Pack years: 50.00     Types: Cigarettes, Cigarettes     Quit date: 2014     Years since quittin.2     Smokeless tobacco: Former User     Tobacco comment: No Betel nut   Substance Use Topics     Alcohol use: No     Comment: Alcoholic Drinks/day: Quit     {Rooming Staff- Complete this question if Prescreen response is not shown below for today's visit. If you drink alcohol do you typically have >3 drinks per day or >7 drinks per week? (Optional):506511}    No flowsheet data found.{add AUDIT  "responses (Optional) (A score of 7 for adult men is an indication of hazardous drinking; a score of 8 or more is an indication of an alcohol use disorder.  A score of 7 or more for adult women is an indication of hazardous drinking or an alchohol use disorder):110631}    Last PSA: No results found for: PSA    Reviewed orders with patient. Reviewed health maintenance and updated orders accordingly - { :330574::\"Yes\"}  {Chronicprobdata (optional):550402}    Reviewed and updated as needed this visit by clinical staff  Tobacco  Allergies  Meds   Med Hx  Surg Hx  Fam Hx  Soc Hx       Reviewed and updated as needed this visit by Provider               {HISTORY OPTIONS (Optional):141934}    Review of Systems  {MALE ROS (Optional):930928::\"CONSTITUTIONAL: NEGATIVE for fever, chills, change in weight\",\"INTEGUMENTARY/SKIN: NEGATIVE for worrisome rashes, moles or lesions\",\"EYES: NEGATIVE for vision changes or irritation\",\"ENT: NEGATIVE for ear, mouth and throat problems\",\"RESP: NEGATIVE for significant cough or SOB\",\"CV: NEGATIVE for chest pain, palpitations or peripheral edema\",\"GI: NEGATIVE for nausea, abdominal pain, heartburn, or change in bowel habits\",\" male: negative for dysuria, hematuria, decreased urinary stream, erectile dysfunction, urethral discharge\",\"MUSCULOSKELETAL: NEGATIVE for significant arthralgias or myalgia\",\"NEURO: NEGATIVE for weakness, dizziness or paresthesias\",\"PSYCHIATRIC: NEGATIVE for changes in mood or affect\"}    OBJECTIVE:   There were no vitals taken for this visit.    Physical Exam  {Exam Choices (Optional):545807}    {Diagnostic Test Results (Optional):791136::\"Diagnostic Test Results:\",\"Labs reviewed in Epic\"}    ASSESSMENT/PLAN:   {Diag Picklist:539499}    Patient has been advised of split billing requirements and indicates understanding: {YES / NO:497281::\"Yes\"}  COUNSELING:   {MALE COUNSELING MESSAGES:963666::\"Reviewed preventive health counseling, as reflected in patient " "instructions\"}    Estimated body mass index is 19.92 kg/m  as calculated from the following:    Height as of 4/14/21: 1.524 m (5').    Weight as of 4/14/21: 46.3 kg (102 lb).     {Weight Management Plan (ACO) Complete if BMI is abnormal-  Ages 18-64  BMI >24.9.  Age 65+ with BMI <23 or >30 (Optional):770486}    He reports that he quit smoking about 7 years ago. His smoking use included cigarettes and cigarettes. He has a 50.00 pack-year smoking history. He has quit using smokeless tobacco.      Counseling Resources:  ATP IV Guidelines  Pooled Cohorts Equation Calculator  FRAX Risk Assessment  ICSI Preventive Guidelines  Dietary Guidelines for Americans, 2010  USDA's MyPlate  ASA Prophylaxis  Lung CA Screening    Murali Sosa MD  [facility]  "

## 2021-11-10 NOTE — TELEPHONE ENCOUNTER
Routing refill request to provider for review/approval because:  Drug not on the [facility] refill protocol     Last Written Prescription Date:       Last office visit provider:  Dr. Sosa 11/9/2021, therefore routing to primary care provider: Aristides Alegria   and last visit provider Dr. Sosa.    Requested Prescriptions   Pending Prescriptions Disp Refills     baclofen (LIORESAL) 10 MG tablet         There is no refill protocol information for this order      Signed Prescriptions Disp Refills    baclofen (LIORESAL) 10 MG tablet         There is no refill protocol information for this order          Sharon Marshall [facility] 11/10/21 10:34 AM

## 2021-11-13 NOTE — PROGRESS NOTES
"OFFICE VISIT - FAMILY MEDICINE     ASSESSMENT AND PLAN       ICD-10-CM    1. Atrial fibrillation, unspecified type (H)  I48.91 Adult Cardiology Eval Referral   Atrial fibrillation, does appear rate controlled today, is on anticoagulation, discussed importance of compliance with treatment, follow-up with cardiologist, new referral was placed. Also discussed with patient Brother Mark  that hospice care could be an option if he continues to decline additional testing and treatment. They will need to discuss in family and let us know.  CHIEF COMPLAINT   rapid heart beat       HPI   Kody Johns is a 71 year old male.  No Patient Care Coordination Note on file.    He was asked to be seen by nurse because she noticed fast heartbeat, he does have a history of atrial fibrillation, is on chronic anticoagulation. The patient is stating  that he is is doing fine today, no chest pain or shortness of breath,  He is  accompanied by Mark his brother, patient has had a family conference, and has declined getting additional testing included heart monitor Holter, echocardiogram etc. Has not seen a cardiologist for quite some times.    Review of Systems As per HPI, otherwise negative.    OBJECTIVE   /77 (BP Location: Left arm, Patient Position: Sitting, Cuff Size: Adult Regular)   Pulse 87   Temp 98.9  F (37.2  C) (Oral)   Resp 14   Ht 1.48 m (4' 10.27\")   Wt 50.3 kg (111 lb)   SpO2 97%   BMI 22.99 kg/m    Physical Exam    PFSH     Family History   Problem Relation Age of Onset     No Known Problems Mother      No Known Problems Father      Heart Disease No family hx of      Social History     Socioeconomic History     Marital status:      Spouse name: Not on file     Number of children: 4     Years of education: Not on file     Highest education level: Not on file   Occupational History     Not on file   Tobacco Use     Smoking status: Former Smoker     Packs/day: 1.00     Years: 50.00     Pack years: 50.00    "  Types: Cigarettes, Cigarettes     Quit date: 2014     Years since quittin.2     Smokeless tobacco: Former User     Tobacco comment: No Betel nut   Substance and Sexual Activity     Alcohol use: No     Comment: Alcoholic Drinks/day: Quit     Drug use: No     Sexual activity: Not on file   Other Topics Concern     Not on file   Social History Narrative    Refugee, born in [place].     Social Determinants of Health     Financial Resource Strain: Not on file   Food Insecurity: Not on file   Transportation Needs: Not on file   Physical Activity: Not on file   Stress: Not on file   Social Connections: Not on file   Intimate Partner Violence: Not on file   Housing Stability: Not on file       PMSH   [unfilled]  Past Surgical History:   Procedure Laterality Date     AORTIC VALVE REPLACEMENT N/A 2019    Procedure: AORTIC VALVE REPLACEMENT;  Surgeon: Jojo Vaughn MD;  Location: [facility];  Service: Cardiovascular     C REPLACEMENT OF MITRAL VALVE N/A 2019    Procedure: MITRAL VALVE REPLACEMENT, ANESTHESIA, TAKEDOWN OF PERICARDIAL ADHESIONS AND REINFORCEMENT OF KLS PLATING SYSTEM TRANESOPHAGEAL ECHOCARDIOGRAM AND EPI AORTIC ULTRASOUND;  Surgeon: Jojo Vaughn MD;  Location: [facility];  Service: Cardiovascular     CARDIAC CATHETERIZATION       CATARACT EXTRACTION Left 2016     CV CORONARY ANGIOGRAM N/A 2018    Procedure: Coronary Angiogram;  Surgeon: Jeff Deleon MD;  Location: [facility];  Service: Cardiology     EYE SURGERY      cataract     HC UGI ENDOSCOPY W PLACEMENT GASTROSTOMY TUBE PERCUT N/A 2019    Procedure: CREATION, GASTROSTOMY, PERCUTANEOUS, ENDOSCOPIC;  Surgeon: Chandana Kang MD;  Location: [facility];  Service: General     PICC AND MIDLINE TEAM LINE INSERTION  2019          TRACHEOSTOMY N/A 2019    Procedure: TRACHEOSTOMY;  Surgeon: Chandana Kang MD;  Location: [facility];  Service: General        RESULTS/CONSULTS (Lab/Rad)     Recent Results (from the past 168 hour(s))   INR (External Result)   Result Value Ref Range    INR (External) 3.8 (A) 2 - 3     CT Head w/o Contrast  Narrative: [facility]  CT HEAD WO CONTRAST  2/9/2021 2:35 PM    INDICATION: Worsening blurred vision.   TECHNIQUE: Serial axial images were obtained through the brain without contrast. Dose reduction techniques were used.  CONTRAST: None.  COMPARISON: CT brain 02/06/2021.     FINDINGS:   INTRACRANIAL CONTENTS: No finding for intracranial hemorrhage or mass or convincing finding for acute infarct. Chronic encephalomalacic change and gliosis is again seen within the right medial occipital and parietal lobes compatible with sequela of   prior   ischemia and stable compared to prior. Smaller chronic infarct is seen along the inferomedial aspect of the right cerebellar hemisphere. Stable small chronic lacunar infarct posterior right putamen.    Stable mild to moderate prominence of the lateral and third ventricles. Mild presumed sequela chronic microvascular ischemic change, similar to prior. No mass effect or midline shift.    Cerebellar tonsils are normally positioned. Corpus callosum is normally formed. Incidental note is made of a partially empty sella.     OSSEOUS STRUCTURES/SOFT TISSUES: Calvarium is intact, without fracture or suspicious lytic or blastic foci.    VISUALIZED ORBITS/SINUSES/MASTOIDS: Prior cataract surgeries. Orbits otherwise unremarkable. Visualized paranasal sinuses, middle ear cavities, and mastoid air cells are essentially clear.  Impression: 1. No finding for acute infarct, hemorrhage, or mass.  2. Chronic encephalomalacic change and gliosis relating to prior ischemia right parietal and occipital lobes. 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  3. At least mild volume loss and mild presumed sequela chronic microvascular ischemic change.      [unfilled]    HEALTH MAINTENANCE / SCREENING   [unfilled], [unfilled],[unfilled]  Immunization History   Administered Date(s) Administered     Flu, Unspecified 12/31/2013, 01/20/2014, 09/22/2014     Hep B, Peds or Adolescent 04/14/2015     HepB, Unspecified 10/21/2013, 12/31/2013, 04/14/2015     HepB-Adult 01/20/2014     Influenza (High Dose) 3 valent vaccine 08/31/2016, 09/11/2017, 11/05/2018     Influenza Vaccine IM > 6 months Valent IIV4 (Alfuria,Fluzone) 12/31/2013, 09/22/2014     Influenza Vaccine, 6+MO IM (QUADRIVALENT W/PRESERVATIVES) 01/20/2014, 11/04/2015     Influenza, Quad, High Dose, Pf, 65yr+ (Fluzone HD) 12/08/2020, 11/09/2021     Pneumo Conj 13-V (2010&after) 09/11/2017     TD (ADULT, 7+) 04/14/2015     Td (Adult), Adsorbed 09/12/2012     Tdap (Adacel,Boostrix) 01/20/2014     Varicella 04/14/2015     Health Maintenance   Topic     HF ACTION PLAN      ANNUAL REVIEW OF HM ORDERS      ADVANCE CARE PLANNING      DEPRESSION ACTION PLAN      COLORECTAL CANCER SCREENING      COVID-19 Vaccine (1)     ZOSTER IMMUNIZATION (1 of 2)     MEDICARE ANNUAL WELLNESS VISIT      LUNG CANCER SCREENING      LIPID      BMP      ALT      FALL RISK ASSESSMENT      PHQ-9      CBC      Pneumococcal Vaccine: 65+ Years (1 of 1 - PPSV23)     DTAP/TDAP/TD IMMUNIZATION (3 - Td or Tdap)     TSH W/FREE T4 REFLEX      HEPATITIS C SCREENING      INFLUENZA VACCINE      AORTIC ANEURYSM SCREENING (SYSTEM ASSIGNED)      IPV IMMUNIZATION      MENINGITIS IMMUNIZATION      HEPATITIS B IMMUNIZATION      Review of external notes as documented elsewhere in note  24 minutes spent on the date of the encounter doing chart review, review of outside records, review of test results, interpretation of tests, patient visit and documentation          Murali Sosa MD  [facility]   This transcription uses voice recognition software, which may contain typographical errors.  Answers for  HPI/ROS submitted by the patient on 11/9/2021  How many servings of fruits and vegetables do you eat daily?: 0-1  On average, how many sweetened beverages do you drink each day (Examples: soda, juice, sweet tea, etc.  Do NOT count diet or artificially sweetened beverages)?: 2  How many minutes a day do you exercise enough to make your heart beat faster?: 9 or less  How many days a week do you exercise enough to make your heart beat faster?: 3 or less  How many days per week do you miss taking your medication?: 0

## 2021-11-16 NOTE — PROGRESS NOTES
Saniya Vasquez [facility],     Patient INR has been bouncing. INR mostly supra therapeutic with sensitivities noted to changes is dosing. INR 1.9 today.    Writer wanted to leave maintenance and recheck INR sooner (11/19) to ensure INR goes back into range.    Please advise.    Thanks! Aydee Parr RN

## 2021-11-16 NOTE — PROGRESS NOTES
ANTICOAGULATION MANAGEMENT     Kody Johns 71 year old male is on warfarin with subtherapeutic INR result. (Goal INR 2.0-3.0)    Recent labs: (last 7 days)     11/16/21  1253   INR 1.9*       ASSESSMENT     Source(s): Chart Review and Home Care/Facility Nurse       Warfarin doses taken: Warfarin taken as instructed    Diet: No new diet changes identified    New illness, injury, or hospitalization: No--see office visit notes from 11/9    Medication/supplement changes: None noted    Signs or symptoms of bleeding or clotting: No    Previous INR: Supratherapeutic    Additional findings: pt INR has been bouncing     PLAN     Recommended plan for no diet, medication or health factor changes affecting INR     Dosing Instructions: Continue your current warfarin dose with next INR in 3 days       Summary  As of 11/16/2021    Full warfarin instructions:  2 mg every Mon, Fri; 1.5 mg all other days   Next INR check:  11/19/2021             Telephone call with home care nurse Esme who verbalizes understanding and agrees to plan and who agrees to plan and repeated back plan correctly    Orders given to  Homecare nurse/facility to recheck    Education provided: Please call back if any changes to your diet, medications or how you've been taking warfarin    Plan made per [facility] anticoagulation protocol    Aydee Parr, RN  Anticoagulation Clinic  11/16/2021    _______________________________________________________________________     Anticoagulation Episode Summary     Current INR goal:  2.0-3.0   TTR:  36.0 % (1 y)   Target end date:     Send INR reminders to:  [facility] FLOR    Indications    Aortic valve disease  rheumatic [I06.9]  Mitral valve stenosis  unspecified etiology [I05.0]           Comments:  4/24/2019 he had aortic valve replacement and mitral valve replacement, very complicated and long hospital course.         Anticoagulation Care Providers     Provider Role Specialty Phone number    Haja Ascencio MD  Referring Clinical Cardiac Electrophysiology [phone]    Aristides Alegria MD  [facility] [phone]

## 2021-11-16 NOTE — PROGRESS NOTES
Chart reviewed with [facility] RN at time of encounter.    Agree with plan to continue current dose and recheck INR  end of week as planned.    Saniya Vasquez, PharmD BCACP  Anticoagulation Clinical Pharmacist

## 2021-11-18 PROBLEM — Z95.2 S/P MVR (MITRAL VALVE REPLACEMENT): Status: ACTIVE | Noted: 2019-07-09

## 2021-11-18 PROBLEM — N39.42 URINARY INCONTINENCE WITHOUT SENSORY AWARENESS: Status: ACTIVE | Noted: 2019-10-14

## 2021-11-18 PROBLEM — Z95.2 S/P AVR (AORTIC VALVE REPLACEMENT): Status: ACTIVE | Noted: 2019-07-09

## 2021-11-18 PROBLEM — I09.9 RHEUMATIC HEART DISEASE: Status: ACTIVE | Noted: 2021-01-01

## 2021-11-19 NOTE — PROGRESS NOTES
ANTICOAGULATION MANAGEMENT     Kody Johns 71 year old male is on warfarin with therapeutic INR result. (Goal INR 2.0-3.0)    Recent labs: (last 7 days)     11/19/21  1014   INR 2.3*       ASSESSMENT     Source(s): Home Care/Facility Nurse       Warfarin doses taken: Warfarin taken as instructed    Diet: No new diet changes identified    New illness, injury, or hospitalization: No    Medication/supplement changes: None noted    Signs or symptoms of bleeding or clotting: No    Previous INR: Subtherapeutic    Additional findings: None     PLAN     Recommended plan for no diet, medication or health factor changes affecting INR     Dosing Instructions: Continue your current warfarin dose with next INR in 11 days       Summary  As of 11/19/2021    Full warfarin instructions:  2 mg every Mon, Fri; 1.5 mg all other days   Next INR check:  11/30/2021             Telephone call with home care nurse Paige who verbalizes understanding and agrees to plan    Orders given to  Homecare nurse/facility to recheck    Education provided: Goal range and significance of current result    Plan made per [facility] anticoagulation protocol    Jeff Genao RN  Anticoagulation Clinic  11/19/2021    _______________________________________________________________________     Anticoagulation Episode Summary     Current INR goal:  2.0-3.0   TTR:  36.6 % (1 y)   Target end date:     Send INR reminders to:  RA[facility] FLOR    Indications    Aortic valve disease  rheumatic [I06.9]  Mitral valve stenosis  unspecified etiology [I05.0]           Comments:  4/24/2019 he had aortic valve replacement and mitral valve replacement, very complicated and long hospital course.         Anticoagulation Care Providers     Provider Role Specialty Phone number    Haja Ascencio MD Referring Clinical Cardiac Electrophysiology [phone]    Aristides Alegria MD  Family Medicine [phone]

## 2021-11-26 NOTE — TELEPHONE ENCOUNTER
"Last Written Prescription Date:  10/5/21  Last Fill Quantity: 30,  # refills: 1   Last office visit provider:  11/9/21    Routing refill request to provider for review/approval because:  Early refill request.        Requested Prescriptions   Pending Prescriptions Disp Refills     furosemide (LASIX) 20 MG tablet [Pharmacy Med Name: FUROSEMIDE 20 MG TAB 20 Tablet] 30 tablet 1     Sig: TAKE 1 TABLET (20 MG) BY MOUTH DAILY FOR EDEMA       Diuretics (Including Combos) Protocol Passed - 11/25/2021  9:05 AM        Passed - Blood pressure under 140/90 in past 12 months     BP Readings from Last 3 Encounters:   11/09/21 110/77   05/26/21 138/70   04/14/21 (!) 142/80                 Passed - Recent (12 mo) or future (30 days) visit within the authorizing provider's specialty     Patient has had an office visit with the authorizing provider or a provider within the authorizing providers department within the previous 12 mos or has a future within next 30 days. See \"Patient Info\" tab in inbasket, or \"Choose Columns\" in Meds & Orders section of the refill encounter.              Passed - Medication is active on med list        Passed - Patient is age 18 or older        Passed - Normal serum creatinine on file in past 12 months     Recent Labs   Lab Test 05/26/21  1040   CR 0.79              Passed - Normal serum potassium on file in past 12 months     Recent Labs   Lab Test 05/26/21  1040   POTASSIUM 3.9                    Passed - Normal serum sodium on file in past 12 months     Recent Labs   Lab Test 05/26/21  1040                      Veronica Carmen RN 11/26/21 9:18 AM  "

## 2021-11-30 PROBLEM — I48.92 ATRIAL FLUTTER WITH RAPID VENTRICULAR RESPONSE (H): Status: ACTIVE | Noted: 2021-01-01

## 2021-11-30 NOTE — PROGRESS NOTES
ANTICOAGULATION MANAGEMENT      Kody Johns due for annual renewal of referral to anticoagulation monitoring. Order pended for your review and signature.      ANTICOAGULATION SUMMARY      Warfarin indication(s)     Atrial fibrillation  Heart Valve Replacement    Heart valve present?  Mechanical MVR       Current goal range   INR: 2.0-3.0     Goal appropriate for indication? Yes, INR 2-3 appropriate for hx of DVT, PE, hypercoagulable state, Afib, LVAD, or bileaflet AVR without risk factors     Current duration of therapy Indefinite/long term therapy   Time in Therapeutic Range (TTR)  (Goal > 60%) 39%       Office visit with referring provider's group within last year yes on 11/9/2021       Jaclyn Son, RN

## 2021-11-30 NOTE — PROGRESS NOTES
ANTICOAGULATION MANAGEMENT     Kody Johns 71 year old male is on warfarin with therapeutic INR result. (Goal INR 2.0-3.0)    Recent labs: (last 7 days)     11/30/21  0955   INR 2.6*       ASSESSMENT     Source(s): Chart Review and Home Care/Facility Nurse       Warfarin doses taken: Warfarin taken as instructed    Diet: No new diet changes identified    New illness, injury, or hospitalization: No    Medication/supplement changes: None noted    Signs or symptoms of bleeding or clotting: No    Previous INR: Therapeutic last visit; previously outside of goal range    Additional findings: None     PLAN     Recommended plan for no diet, medication or health factor changes affecting INR     Dosing Instructions: Continue your current warfarin dose with next INR in 2 weeks       Summary  As of 11/30/2021    Full warfarin instructions:  2 mg every Mon, Fri; 1.5 mg all other days   Next INR check:  12/14/2021             Telephone call with home care nurse Esme who agrees to plan and repeated back plan correctly    Orders given to  Homecare nurse/facility to recheck    Education provided: Please call back if any changes to your diet, medications or how you've been taking warfarin    Plan made per [facility] anticoagulation protocol    Jaclyn Son, RN  Anticoagulation Clinic  11/30/2021    _______________________________________________________________________     Anticoagulation Episode Summary     Current INR goal:  2.0-3.0   TTR:  39.6 % (1 y)   Target end date:     Send INR reminders to:  [facility] FLOR    Indications    Aortic valve disease  rheumatic [I06.9]  Mitral valve stenosis  unspecified etiology [I05.0]           Comments:  4/24/2019 he had aortic valve replacement and mitral valve replacement, very complicated and long hospital course.         Anticoagulation Care Providers     Provider Role Specialty Phone number    Haja Ascencio MD Referring Clinical Cardiac Electrophysiology [phone]    Aristides Alegria  MD YANI  [facility] [phone]

## 2021-12-14 NOTE — PROGRESS NOTES
ANTICOAGULATION MANAGEMENT     Kody Johns 71 year old male is on warfarin with therapeutic INR result. (Goal INR 2.0-3.0)    Recent labs: (last 7 days)     12/14/21  0931   INR 2.5       ASSESSMENT     Source(s): Chart Review and Home Care/Facility Nurse       Warfarin doses taken: Warfarin taken as instructed    Diet: No new diet changes identified    New illness, injury, or hospitalization: No    Medication/supplement changes: None noted    Signs or symptoms of bleeding or clotting: No    Previous INR: Therapeutic last 2(+) visits    Additional findings: HC nurse cannot go out in 2 weeks so request to push out recheck to 3 weeks.      PLAN     Recommended plan for no diet, medication or health factor changes affecting INR     Dosing Instructions: Continue your current warfarin dose with next INR in 3 weeks       Summary  As of 12/14/2021    Full warfarin instructions:  2 mg every Mon, Fri; 1.5 mg all other days   Next INR check:  1/4/2022             Telephone call with home care nurse Esme who verbalizes understanding and agrees to plan and who agrees to plan and repeated back plan correctly    Orders given to  Homecare nurse/facility to recheck    Education provided: Please call back if any changes to your diet, medications or how you've been taking warfarin and Contact [phone]  with any changes, questions or concerns.     Plan made per [facility] anticoagulation protocol    Aydee Parr, RN  Anticoagulation Clinic  12/14/2021    _______________________________________________________________________     Anticoagulation Episode Summary     Current INR goal:  2.0-3.0   TTR:  43.4 % (1 y)   Target end date:  11/30/2022   Send INR reminders to:  RAH FLOR    Indications    Aortic valve disease  rheumatic [I06.9]  Mitral valve stenosis  unspecified etiology [I05.0]  S/P AVR (aortic valve replacement) [Z95.2]  S/P MVR (mitral valve replacement) [Z95.2]  Atrial flutter with rapid ventricular response (H)  [I48.92]           Comments:  4/24/2019 he had aortic valve replacement and mitral valve replacement, very complicated and long hospital course.         Anticoagulation Care Providers     Provider Role Specialty Phone number    Haja Ascencio MD Referring Clinical Cardiac Electrophysiology [phone]    Aristides Alegria MD Referring Family Medicine [phone]

## 2021-12-15 PROBLEM — Z22.7 LATENT TUBERCULOSIS: Status: ACTIVE | Noted: 2021-01-01

## 2021-12-15 PROBLEM — I50.33 ACUTE ON CHRONIC HEART FAILURE WITH PRESERVED EJECTION FRACTION (HFPEF) (H): Status: ACTIVE | Noted: 2021-01-01

## 2021-12-15 PROBLEM — I50.22 CHRONIC SYSTOLIC CONGESTIVE HEART FAILURE (H): Status: ACTIVE | Noted: 2021-01-01

## 2021-12-15 PROBLEM — J96.01 ACUTE RESPIRATORY FAILURE WITH HYPOXIA (H): Status: ACTIVE | Noted: 2021-01-01

## 2021-12-15 PROBLEM — K21.9 GASTROESOPHAGEAL REFLUX DISEASE WITHOUT ESOPHAGITIS: Status: ACTIVE | Noted: 2021-01-01

## 2021-12-15 PROBLEM — N17.9 AKI (ACUTE KIDNEY INJURY) (H): Status: ACTIVE | Noted: 2017-08-31

## 2021-12-15 NOTE — CONSULTS
"Care Management Initial Consult    General Information  Assessment completed with: Children, son Jim  Type of CM/SW Visit: Initial Assessment    Primary Care Provider verified and updated as needed: Yes   Readmission within the last 30 days: no previous admission in last 30 days      Reason for Consult: discharge planning  Advance Care Planning: Advance Care Planning Reviewed:  (\"doesn't have one\")          Communication Assessment  Patient's communication style: spoken language (non-English) (speaks Ukrainian)    Hearing Difficulty or Deaf: no   Wear Glasses or Blind: no    Cognitive  Cognitive/Neuro/Behavioral:                        Living Environment:   People in home: child(lawrence), adult     Current living Arrangements: house      Able to return to prior arrangements: yes       Family/Social Support:  Care provided by: child(lawrence)  Provides care for: no one, unable/limited ability to care for self     Children,PCA          Description of Support System: Supportive,Involved    Support Assessment: Adequate family and caregiver support,Adequate social supports,Patient communicates needs well met    Current Resources:   Patient receiving home care services: Yes  Skilled Home Care Services: Skilled Nursing,Home Health Aid ([facility] fax [phone])  Community Resources: DME,PCA,Other (see comment),Home Care (son is PCA; [facility] RN)  Equipment currently used at home: walker, rolling  Supplies currently used at home: Incontinence Supplies    Employment/Financial:  Employment Status: retired        Financial Concerns:     Referral to Financial Counselor: No       Lifestyle & Psychosocial Needs:  Social Determinants of Health     Tobacco Use: Medium Risk     Smoking Tobacco Use: Former Smoker     Smokeless Tobacco Use: Former User   Alcohol Use: Not on file   Financial Resource Strain: Not on file   Food Insecurity: Not on file   Transportation Needs: Not on file   Physical Activity: Not on file   Stress: " Not on file   Social Connections: Not on file   Intimate Partner Violence: Not on file   Depression: Not at risk     PHQ-2 Score: 2   Housing Stability: Not on file       Functional Status:  Prior to admission patient needed assistance:   Dependent ADLs:: Ambulation-walker,Bathing,Dressing,Eating,Grooming,Incontinence,Positioning,Toileting,Wheelchair-with assist,Transfers  Dependent IADLs:: Cleaning,Cooking,Laundry,Shopping,Meal Preparation,Medication Management,Money Management,Transportation,Incontinence       Mental Health Status:          Chemical Dependency Status:                Values/Beliefs:  Spiritual, Cultural Beliefs, Cheondoism Practices, Values that affect care:                 Additional Information:  Kody lives in a house with his son Jim. His son is his PCA for all ADLs. He speaks Occitan.    He also has [facility] RN services. They help with INR lab draws. At discharge their fax is [phone].    He uses a walker for mobility.    Unknown discharge needs at this time.    Family to transport at discharge.    Jim son [phone].    Caty Main RN

## 2021-12-15 NOTE — ED PROVIDER NOTES
EMERGENCY DEPARTMENT ENCOUNTER      NAME: Kody Johns  AGE: 71 year old male  YOB: 1950  MRN: [id number]  EVALUATION DATE & TIME: No admission date for patient encounter.    PCP: Aristides Alegria    ED PROVIDER: Finn Funes M.D.      Chief Complaint   Patient presents with     Shortness of Breath         FINAL IMPRESSION:  No diagnosis found.      ED COURSE & MEDICAL DECISION MAKIN:25 PM I met with the patient for the initial interview and physical examination. Discussed plan for treatment and workup in the ED.     4:55 PM I rechecked and updated the patient.  5:10 PM The patient was placed on BiPap  6:24 PM I rechecked the patient.   7:19 PM I discussed the case with hospitalist, Dr Torre, who accepts the patient    7:22 PM I spoke with Dr. Hewitt, Intensivist. Discussed case with Dr. Meyers, teleintensivist.  PPE: Provider wore gloves, N95 mask, gown, and eye protection.    71 year old male presents to the Emergency Department for evaluation of shortness of breath.  History of chronic atrial fibrillation, aortic valve replacement, presents with 2 days of shortness of breath.  Hypoxic in triage to 60s and appears quite labored with his breathing.  Also hypotensive to 70s to 80s systolic.  He was given IV fluids and started on vasopressor support immediately.  Trying to limit IV fluids to 1 L at this point.  Given his respiratory distress and profound hypoxemia, he was subsequently intubated without complication.  Vasopressor support was continued.  PICC line was placed in his right upper extremity.  Arterial line placed in left wrist.  Chest x-ray with bilateral opacities consistent with COVID-19.  PCR test positive.  Ventilator settings moderate post intubation.  He has a mild lactic acidosis, will hold IV fluids after 1 L to avoid worsening hypoxemia.  He was started on dexamethasone and ordered remdesivir for him here.  Consulted with both the intensivist here at [facility] as well as the  telemetry intensivist.  Care will be transitioned to the hospitalist service here while the patient reports in the emergency department.  ED staff will remain available and updated in the event of significant deterioration.  Discussed everything with the patient's family at length.  They confirmed his CODE STATUS prior to intubation.  Patient will be boarding pending ICU admission.    Critical Care  Performed by: Finn Funes MD  Authorized by: Finn Funes MD     Total critical care time: 75 minutes  Critical care time was exclusive of separately billable procedures and treating other patients.  Critical care was necessary to treat or prevent imminent or life-threatening deterioration of the following conditions:   Critical care was time spent personally by me on the following activities: development of treatment plan with patient or surrogate, discussions with consultants, examination of patient, evaluation of patient's response to treatment, obtaining history from patient or surrogate, ordering and performing treatments and interventions, ordering and review of laboratory studies, ordering and review of radiographic studies and re-evaluation of patient's condition, this excludes any separately billable procedures.    MEDICATIONS GIVEN IN THE EMERGENCY:  Medications   norepinephrine (LEVOPHED) 4 mg in  mL infusion PREMIX (0.09 mcg/kg/min × 50.3 kg Intravenous Rate/Dose Change 12/15/21 1813)   propofol (DIPRIVAN) infusion (20 mcg/kg/min × 51.7 kg Intravenous New Bag 12/15/21 1724)   lidocaine 1 % 0.1-5 mL (has no administration in time range)   lidocaine (LMX4) cream (has no administration in time range)   sodium chloride (PF) 0.9% PF flush 10-40 mL (has no administration in time range)   0.9% sodium chloride BOLUS (0 mLs Intravenous Stopped 12/15/21 1720)   etomidate (AMIDATE) injection 20 mg (20 mg Intravenous Given 12/15/21 1712)   rocuronium injection 75 mg (75 mg Intravenous Given 12/15/21 1713)        NEW PRESCRIPTIONS STARTED AT TODAY'S ER VISIT  New Prescriptions    No medications on file          =================================================================    HPI    Patient information was obtained from: the patient     Use of : N/A         Kody Johns is a 71 year old male with a pertinent history of hyperlipidemia, atrial fibrillation, aortic valve disease, mitral and atrial valve replacement, chronic systolic congestive heart failure, GERD, tuberculosis, acute kidney injury, heart murmur, stroke, asthma, CKD, and CAD who presents to this ED via walk in for evaluation of chest pain, cough, fever, and shortness of breath.     HPI limited due to acuity of condition.     The patient's son reports that the patient has had worsening fatigue and shortness of breath for the last two days. Today he had extremely labored breathing and was not able to get out of his bed. The patient denies any pain. The patient is currently on coumadin.     REVIEW OF SYSTEMS   ROS limited due the acuity of the patient's condition.     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     Moderate malnutrition (H)      Palpitations      Rheumatic heart disease      Shortness of breath     exertion     Stroke (H)     Silent     Valvular disease        PAST SURGICAL HISTORY:  Past Surgical History:   Procedure Laterality Date     AORTIC VALVE REPLACEMENT N/A 4/24/2019    Procedure: AORTIC VALVE REPLACEMENT;  Surgeon: Jojo Vaughn MD;  Location:  [facility] Main OR;  Service: Cardiovascular     C REPLACEMENT OF MITRAL VALVE N/A 4/24/2019    Procedure: MITRAL VALVE REPLACEMENT, ANESTHESIA, TAKEDOWN OF PERICARDIAL ADHESIONS AND REINFORCEMENT OF KLS PLATING SYSTEM TRANESOPHAGEAL ECHOCARDIOGRAM AND EPI AORTIC ULTRASOUND;  Surgeon: Jojo Vaughn MD;  Location: [facility];  Service: Cardiovascular     CARDIAC CATHETERIZATION       CATARACT EXTRACTION Left 06/13/2016     CV CORONARY ANGIOGRAM N/A 11/30/2018    Procedure: Coronary Angiogram;  Surgeon: Jeff Deleon MD;  Location: [facility] Cath Lab;  Service: Cardiology     EYE SURGERY      cataract     HC UGI ENDOSCOPY W PLACEMENT GASTROSTOMY TUBE PERCUT N/A 5/8/2019    Procedure: CREATION, GASTROSTOMY, PERCUTANEOUS, ENDOSCOPIC;  Surgeon: Chandana Kang MD;  Location: [facility];  Service: General     PICC AND MIDLINE TEAM LINE INSERTION  4/29/2019          PICC TRIPLE LUMEN PLACEMENT  12/15/2021          TRACHEOSTOMY N/A 5/8/2019    Procedure: TRACHEOSTOMY;  Surgeon: Chandana Kang MD;  Location: [facility];  Service: General           CURRENT MEDICATIONS:    Current Facility-Administered Medications   Medication     lidocaine (LMX4) cream     lidocaine 1 % 0.1-5 mL     norepinephrine (LEVOPHED) 4 mg in  mL infusion PREMIX     propofol (DIPRIVAN) infusion     sodium chloride (PF) 0.9% PF flush 10-40 mL     Current Outpatient Medications   Medication     atorvastatin (LIPITOR) 10 MG tablet     baclofen (LIORESAL) 10 MG tablet     ferrous sulfate (FEROSUL) 325 (65 Fe) MG tablet     furosemide (LASIX) 20 MG tablet     gabapentin (NEURONTIN) 100 MG capsule     levothyroxine (SYNTHROID/LEVOTHROID) 100 MCG tablet     magnesium oxide 400 MG CAPS     metoprolol tartrate (LOPRESSOR) 100 MG tablet     omeprazole (PRILOSEC) 20 MG DR capsule     sertraline (ZOLOFT) 50 MG tablet     TAMSULOSIN HCL PO     warfarin ANTICOAGULANT (COUMADIN) 1 MG tablet         ALLERGIES:  No  Known Allergies    FAMILY HISTORY:  Family History   Problem Relation Age of Onset     No Known Problems Mother      No Known Problems Father      Heart Disease No family hx of        SOCIAL HISTORY:   Social History     Socioeconomic History     Marital status:      Spouse name: Not on file     Number of children: 4     Years of education: Not on file     Highest education level: Not on file   Occupational History     Not on file   Tobacco Use     Smoking status: Former Smoker     Packs/day: 1.00     Years: 50.00     Pack years: 50.00     Types: Cigarettes, Cigarettes     Quit date: 2014     Years since quittin.3     Smokeless tobacco: Former User     Tobacco comment: No Betel nut   Substance and Sexual Activity     Alcohol use: No     Comment: Alcoholic Drinks/day: Quit     Drug use: No     Sexual activity: Not on file   Other Topics Concern     Not on file   Social History Narrative    Refugee, born in [facility].     Social Determinants of Health     Financial Resource Strain: Not on file   Food Insecurity: Not on file   Transportation Needs: Not on file   Physical Activity: Not on file   Stress: Not on file   Social Connections: Not on file   Intimate Partner Violence: Not on file   Housing Stability: Not on file       VITALS:  /66   Pulse 84   Temp 98.8  F (37.1  C) (Oral)   Resp 18   Wt 51.7 kg (113 lb 15.7 oz)   SpO2 99%   BMI 23.60 kg/m      PHYSICAL EXAM    Constitutional: Chronically ill-appearing elderly male patient, sitting up in bed, significant MRI respiratory distress  HENT: Normocephalic, Atraumatic. Neck Supple.  Eyes: EOMI, Conjunctiva normal.  Respiratory: Tachypneic, speaking short sentences, lungs coarse bilaterally.  Cardiovascular: Tachycardic heart rate, Irregular rhythm. No peripheral edema.  Abdomen: Soft, nontender.  Musculoskeletal: Good range of motion in all major joints. No major deformities noted.  Integument: Warm, Dry.  Neurologic: Lethargic but arouses to  voice and able to speak but confused.  Moving extremities purposefully and following simple commands.  Psychiatric: Cooperative. Unable to further assess.     LAB:  All pertinent labs reviewed and interpreted.  Labs Ordered and Resulted from Time of ED Arrival to Time of ED Departure   INR - Abnormal       Result Value    INR 3.22 (*)    LACTIC ACID WHOLE BLOOD - Abnormal    Lactic Acid 3.7 (*)    BLOOD GAS VENOUS - Abnormal    pH Venous 7.39      pCO2 Venous 45      pO2 Venous 15 (*)     Bicarbonate Venous 24      Base Excess/Deficit (+/-) 2.0      Oxyhemoglobin Venous 18.1 (*)     O2 Sat, Venous 18.5 (*)    B-TYPE NATRIURETIC PEPTIDE ([facility] ONLY) - Abnormal     (*)    INFLUENZA A/B & SARS-COV2 PCR MULTIPLEX - Abnormal    Influenza A PCR Negative      Influenza B PCR Negative      SARS CoV2 PCR Positive (*)    CBC WITH PLATELETS AND DIFFERENTIAL - Abnormal    WBC Count 3.9 (*)     RBC Count 4.76      Hemoglobin 14.9      Hematocrit 45.0      MCV 95      MCH 31.3      MCHC 33.1      RDW 13.5      Platelet Count 63 (*)    COMPREHENSIVE METABOLIC PANEL - Abnormal    Sodium 137      Potassium 3.8      Chloride 101      Carbon Dioxide (CO2) 23      Anion Gap 13      Urea Nitrogen 32 (*)     Creatinine 1.36 (*)     Calcium 8.0 (*)     Glucose 125      Alkaline Phosphatase 93      AST 74 (*)     ALT 27      Protein Total 7.7      Albumin 3.1 (*)     Bilirubin Total 1.3 (*)     GFR Estimate 52 (*)    BLOOD GAS ARTERIAL - Abnormal    pH Arterial 7.39      pCO2 Arterial 36      pO2 Arterial 69 (*)     Bicarbonate Arterial 22 (*)     O2 Sat, Arterial 94.0 (*)     Oxyhemoglobin 92.6 (*)     Base Excess/Deficit (+/-) -2.8      Ventilation Mode AC      Rate 18      FIO2 0.6      Peep 5      Sample Stabilized Temperature 37.0      Ventilator Tidal Volume 400     DIFFERENTIAL - Abnormal    % Neutrophils 88      % Lymphocytes 9      % Monocytes 3      % Eosinophils 0      % Basophils 0      Absolute Neutrophils 3.4       Absolute Lymphocytes 0.4 (*)     Absolute Monocytes 0.1      Absolute Eosinophils 0.0      Absolute Basophils 0.0      RBC Morphology Confirmed RBC Indices      Platelet Assessment        Value: Automated Count Confirmed. Platelet morphology is normal.   TROPONIN I - Normal    Troponin I 0.07     D DIMER QUANTITATIVE - Normal    D-Dimer Quantitative 0.39     TSH WITH FREE T4 REFLEX   BLOOD CULTURE       RADIOLOGY:  Reviewed all pertinent imaging. Please see official radiology report.  XR Chest Port 1 View   Final Result   IMPRESSION: Right-sided PIC catheter with the tip near the SVC-RA junction in satisfactory position. Endotracheal tube again resides about a centimeter from the georgina and withdrawing 2-3 cm would better position the tube. Patchy multifocal airspace    opacities are present throughout the lungs. Sternotomy wires and valve prosthesis. Cardiac enlargement. Osseous structures intact.      XR Chest Port 1 View   Final Result   IMPRESSION: Endotracheal tube tip 9 mm from georgina. Recommend 2 cm pullback. Enlarged cardiac silhouette with a heart valve prosthesis and prior sternotomy.      There is pulmonary vascular congestion and bilateral airspace disease. This is nonspecific and could represent edema, pneumonia, or hemorrhage as the most likely possibilities.      XR Chest Port 1 View   Final Result   IMPRESSION: There are multifocal patchy predominantly lower lung airspace opacities bilaterally with some ill-defined opacities in the upper lung on the right. The cardiac silhouette is enlarged. There may be trace effusions. Sternotomy wires and valve    prosthesis redemonstrated.      These findings are nonspecific and could be seen in pneumonia or pneumonitis, lacking effusions. Edema might be considered less likely. COVID-19 pneumonitis would be within the differential and could be considered in the appropriate clinical setting.          EKG:    Performed at: 16:47    Impression: Atrial flutter with  variable A-V block. Incomplete right bundle branch block. Left ventricular hypertrophy with repolarization abnormality. Prolonged QT.    Rate: 90  Rhythm: Atrial flutter  Axis: 245   40   146  WI Interval: *  QRS Interval: 96  QTc Interval: 526  ST Changes: No change  Comparison: When compared with ECG of 07-FEB-2021 significant changes have occurred.     I have independently reviewed and interpreted the EKG(s) documented above.    PROCEDURES:   [facility]    -Intubation    Date/Time: 12/15/2021 7:46 PM  Performed by: Finn Funes MD  Authorized by: Finn Funes MD     Risks, benefits and alternatives discussed.      PRE-PROCEDURE DETAILS     Patient status:  Awake    Paralytics:  Rocuronium      PROCEDURE DETAILS     Preoxygenation:  Nonrebreather mask    Intubation method:  Oral    Oral intubation technique:  Video-assisted    Laryngoscope blade:  Mac 4    Tube type:  Cuffed    Number of attempts:  1  PLACEMENT ASSESSMENT     Tube secured with:  ETT balnco    Breath sounds:  Equal    Placement verification: chest rise, CXR verification, equal breath sounds and ETCO2 detector      Chest x-ray findings: ETT at georgina, withdrawn 2cm.    PROCEDURE    Patient Tolerance:  Patient tolerated the procedure well with no [facility]    -Arterial Line    Date/Time: 12/15/2021 7:47 PM  Performed by: Finn Funes MD  Authorized by: Finn Funes MD     Risks, benefits and alternatives discussed.      INDICATIONS:   Indications: hemodynamic monitoring and multiple ABGs      PRE-PROCEDURE DETAILS:   Skin preparation:  2% Chlorhexidine  PROCEDURE DETAILS:    Location:  L radial  Ambrose's test performed: yes    Ambrose's test abnormal: no    Needle gauge:  20 G  Placement technique:  Seldinger  Number of attempts:  1  Transducer: waveform confirmed      POST PROCEDURE DETAILS:    Post-procedure:  Sutured and secured with tape  CMS:   Unchanged      PROCEDURE    Patient Tolerance:  Patient tolerated the procedure well with no immediate complications            I, Mary Finnegan, am serving as a scribe to document services personally performed by Dr. Finn Funes, based on my observation and the provider's statements to me. I, Finn Funes MD attest that Maryneris Pireston is acting in a scribe capacity, has observed my performance of the services and has documented them in accordance with my direction.    Finn Funes M.D.  Emergency Medicine  [facility] EMERGENCY DEPARTMENT  [address] [place] [id number]  [phone]  Dept: [phone]      Finn Funes MD  12/15/21 1958

## 2021-12-15 NOTE — ED NOTES
ED Triage Provider Note  [facility]  Encounter Date: Dec 15, 2021    History:  Chief Complaint   Patient presents with     Shortness of Breath     Gates WARD Johns is a 71 year old male who presents to the ED with chest pain, SOB and cough x 1 day.       Hx of hemiparesis secondary to old cva--unchanged.  Today has more generalized weakness and is unable to do his transfers or any of his ADLs.        Review of Systems:  Review of Systems   Constitutional: Positive for chills and fever.   Respiratory: Positive for cough, chest tightness and shortness of breath.    Cardiovascular: Positive for chest pain.   Neurological: Positive for weakness.         Exam:  There were no vitals taken for this visit.    Physical Exam  Vitals and nursing note reviewed.   Constitutional:       General: He is not in acute distress.     Appearance: He is ill-appearing.   HENT:      Nose: Nose normal.   Eyes:      General: No scleral icterus.  Pulmonary:      Effort: Tachypnea present.      Breath sounds: Decreased breath sounds present.   Neurological:      Mental Status: Mental status is at baseline.         Medical Decision Making:  Patient arriving to the ED with problem as above. A medical screening exam was performed.     I did initiate a work-up from triage to expedite his care, however as we were triaging the patient we found oxygen saturations in the 60% range and he was rushed back to a formal ER bed for resuscitation.  Orders have been placed and he will need further care in the ER.                Ruperto Mayo MD on 12/15/2021 at 4:21 PM      Lab/Imaging Results:       Interventions:  Medications - No data to display           Ruperto Mayo MD  12/15/21 3149

## 2021-12-15 NOTE — PLAN OF CARE
RT PROGRESS NOTE    VENT DAY# 1 Intubated at 1710 pm by MD    CURRENT SETTINGS:   Ventilation Mode: CMV/AC  (Continuous Mandatory Ventilation/ Assist Control)  Rate Set (breaths/minute): 18 breaths/min  Tidal Volume Set (mL): 400 mL  PEEP (cm H2O): 5 cmH2O  Oxygen Concentration (%): 60 %  Resp: 18      PATIENT PARAMETERS:  PIP 25  Pplat:  25  Pmean:  10  Compliance: 25  SBT: No      Secretions:  scant  02 Sats:  100%    ETT SIZE 7.5 Secured at 23 cm at teeth/gums    Respiratory Medications: None     VBG result  pre intubation on 15L on Non- Rebreather Mask    Ph 7.39, PCO2 45, PO2 15, HCO3 24    NOTE / SHIFT SUMMARY:   Patient comes in with SOB, Sats in the 60's per RN, hypotensive.  He was placed on non-rebreather mask, Sats improved to mid 90's. He was intubated by MD  ETT 7.5, secured 23 at the gum. ETT placement confirmed with B/L breath sounds, Co2 detector.  Patient on above settings, ABG to follow. Will continue with current therapy adjust settings per ABG.    Vivi Curry, RT

## 2021-12-16 NOTE — PROGRESS NOTES
[facility]    PROGRESS NOTE - Hospitalist Service    Assessment and Plan    Active Problems:    Acute respiratory failure with hypoxia (H)    Kody Johns is a 71 year old male with a past medical history of cardiomyopathy with low EF, A. fib on anticoagulation with warfarin, aortic stenosis, CHF, GERD, tuberculosis, chronic kidney disease, asthma who presented to the emergency room for the evaluation of cough and fever, patient was found to have Covid pneumonia with possible sepsis, he was boarded in the ER since 12/15/2021 evening    Acute respiratory failure with hypoxia  -Secondary to Covid and/or bacterial pneumonia  - Doubt pulmonary embolism with supratherapeutic INR.  - Intubated on 12/15/2021 evening  - Patient has been boarded since admission in the ER.  - Discussed with telemetry ICU on the phone this morning who recommended trial of pressure support with hope of extubation today.  - Repeat ABG and chest x-ray to follow-up on ET tube position    Sepsis with possible septic shock  - Etiology is unclear, concern for bacterial pneumonia  -Patient has started on Levophed drip since 12/15/2021, telemetry ICU recommended to add vasopressin drip if needed  - Patient has PICC line, peripheral line and A-line  - Start IV antibiotics empirically with vancomycin and Zosyn as recommended by telemetry ICU  -Spiked fever 101 this morning  - Patient has been on IV fluid since last evening as per sepsis protocol  - Follow-up on blood culture  - Check sputum culture    Lactic acidosis  - Probably secondary to infection  - Improving with IV fluid support  - Consider discontinue IV fluid during his patient CHF history    Acute Covid pneumonia  - Unknown vaccination history  - Started on dexamethasone and remdesivir on admission  - Discussed administration of Toci with telemetry ICU who recommended  not to give  - Patient is anticoagulated with warfarin and INR is supratherapeutic  - Defer further  management to intensivist    History of cardiomyopathy/CHF with low EF  - Echo on 2/2021 shows EF of 36% with global hypokinesis  - Repeating echo today shows EF of 25 to 30% with severe global hypokinesia of left ventricle  - Patient has been on IV fluid since yesterday because of sepsis and lactic acidosis  - We'll discontinue IV fluid    History of DEMETRIUS Mcbride  - Heart rate is slightly better, secondary to above  - Unable to restart metoprolol as patient is hypotensive and on Levophed drip  - Consider starting digoxin  - Anticoagulation with warfarin  - INR supratherapeutic    GERD  -Start IV Protonix    Hypothyroidism  - Hold Synthroid for now as patient is intubated    Hypokalemia  - Start IV replacement as ER nursing staff cannot do potassium protocol  - Potassium placement protocol when patient moved to the ICU      VTE prophylaxis:  Warfarin  DIET: Orders Placed This Encounter      NPO for Medical/Clinical Reasons Except for: No Exceptions      Disposition/Barriers to discharge: Intubated, on pressors, IV antibiotics  Code Status: Full Code    Subjective:  Gates is obtunded on ventilator since 12/15/2021 evening.  Had some improvement in FiO2 this morning but again patient FiO2 increased and patient went hypotensive, spiked fever 101    PHYSICAL EXAM  Vitals:    12/15/21 1701   Weight: 51.7 kg (113 lb 15.7 oz)     B/P:107/57 T:101.7 P:96 R:18     Intake/Output Summary (Last 24 hours) at 12/16/2021 1502  Last data filed at 12/16/2021 1156  Gross per 24 hour   Intake 1067.82 ml   Output 910 ml   Net 157.82 ml      Body mass index is 23.6 kg/m .    Constitutional: Obtunded on ventilator  Eyes: Lids and lashes normal, pupils equal, round and reactive to light, extra ocular muscles intact, sclera clear, conjunctiva normal  ENT: Normocephalic, without obvious abnormality, atraumatic, sinuses nontender on palpation, external ears without lesions, oral pharynx with moist mucous membranes, tonsils without erythema or  exudates, gums normal and good dentition.  Respiratory: Decreased breath sounds on lung base with scattered rhonchi and basal rales.    Cardiovascular: Normal apical impulse, regular rate and rhythm, normal S1 and S2, no S3 or S4, and no murmur noted  GI: No scars, normal bowel sounds, soft, non-distended, non-tender, no masses palpated, no hepatosplenomegally  Skin: no bruising or bleeding and normal skin color, texture, turgor  Musculoskeletal: There is no redness, warmth, or swelling of the joints.  Full range of motion noted.  no lower extremity pitting edema present  Neurologic: Sedated on ventilator.    Neuropsychiatric: Unable to do because of patient mental condition    PERTINENT LABS/IMAGING:  Recent Labs   Lab 12/16/21  1414 12/16/21  1412 12/16/21  1025 12/16/21  0712 12/15/21  1718 12/15/21  1718 12/15/21  1638 12/15/21  1637 12/14/21  0931   WBC  --   --   --  5.0  --   --  3.9*  --   --    HGB  --   --   --  13.9  --   --  14.9  --   --    MCV  --   --   --  92  --   --  95  --   --    PLT  --   --   --  52*  --   --  63*  --   --    INR  --   --   --  4.29*  --   --   --  3.22* 2.5     --   --  137  --  137  --   --   --    POTASSIUM 3.5  --   --  3.3*  --  3.8  --   --   --    CHLORIDE 110*  --   --  110*  --  101  --   --   --    CO2 19*  --   --  19*  --  23  --   --   --    BUN 18  --   --  19  --  32*  --   --   --    CR 0.85  --   --  0.77  --  1.36*  --   --   --    ANIONGAP 9  --   --  8  --  13  --   --   --    NIKITA 7.8*  --   --  6.8*  --  8.0*  --   --   --    * 182* 213* 259*   < > 125  --   --   --    ALBUMIN  --   --   --   --   --  3.1*  --   --   --    PROTTOTAL  --   --   --   --   --  7.7  --   --   --    BILITOTAL  --   --   --   --   --  1.3*  --   --   --    ALKPHOS  --   --   --   --   --  93  --   --   --    ALT  --   --   --   --   --  27  --   --   --    AST  --   --   --   --   --  74*  --   --   --     < > = values in this interval not displayed.     Recent  Results (from the past 24 hour(s))   XR Chest Port 1 View    Narrative    EXAM: XR CHEST PORT 1 VIEW  LOCATION: [facility]  DATE/TIME: 12/15/2021 4:35 PM    INDICATION: CP/SOB  COMPARISON: 2/5/2021.      Impression    IMPRESSION: There are multifocal patchy predominantly lower lung airspace opacities bilaterally with some ill-defined opacities in the upper lung on the right. The cardiac silhouette is enlarged. There may be trace effusions. Sternotomy wires and valve   prosthesis redemonstrated.    These findings are nonspecific and could be seen in pneumonia or pneumonitis, lacking effusions. Edema might be considered less likely. COVID-19 pneumonitis would be within the differential and could be considered in the appropriate clinical setting.   XR Chest Port 1 View    Narrative    EXAM: XR CHEST PORT 1 VIEW  LOCATION: [facility]  DATE/TIME: 12/15/2021 5:21 PM    INDICATION: Post intubation  COMPARISON: 12/15/2021 1637      Impression    IMPRESSION: Endotracheal tube tip 9 mm from georgina. Recommend 2 cm pullback. Enlarged cardiac silhouette with a heart valve prosthesis and prior sternotomy.    There is pulmonary vascular congestion and bilateral airspace disease. This is nonspecific and could represent edema, pneumonia, or hemorrhage as the most likely possibilities.   XR Chest Port 1 View    Narrative    EXAM: XR CHEST PORT 1 VIEW  LOCATION: [facility]  DATE/TIME: 12/15/2021 5:56 PM    INDICATION: RN placed PICC - verify tip placement.  COMPARISON: Earlier today.      Impression    IMPRESSION: Right-sided PIC catheter with the tip near the SVC-RA junction in satisfactory position. Endotracheal tube again resides about a centimeter from the georgina and withdrawing 2-3 cm would better position the tube. Patchy multifocal airspace   opacities are present throughout the lungs. Sternotomy wires and valve prosthesis. Cardiac enlargement. Osseous  structures intact.   XR Abdomen Port 1 View    Narrative    EXAM: XR ABDOMEN PORT 1 VIEWS  LOCATION: [facility]  DATE/TIME: 12/15/2021 8:46 PM    INDICATION: OG tube placement  COMPARISON: 2019 CT      Impression    IMPRESSION: Enteric tube tip in the mid to distal stomach. No evidence of bowel obstruction or obvious free air. Partially seen lower lung opacities and sternotomy. Cardiac valvular prosthetics.        XR Chest Port 1 View    Narrative    EXAM: XR CHEST PORT 1 VIEW  LOCATION: [facility]  DATE/TIME: 2021 8:22 AM    INDICATION: Follow-up intubation  COMPARISON: 12/15/2021      Impression    IMPRESSION: ETT is 2.6 cm from the georgina. Right upper extremity PICC terminates over the proximal right atrium. Enteric tube enters the stomach and off the field-of-view. Persistent patchy multifocal pulmonary opacities. These are marginally improved in   the upper lungs. No effusions or pneumothorax. Cardiomegaly. Sternotomy wires and valvuloplasty changes are stable.   Echocardiogram Complete   Result Value    LVEF  25-30%    Narrative    [id number]  [id number]  [id number]  [id number]^KATE^YOU^[place] [address]     Name: MIRELLA COLON  MRN: [id number]  : 1950  Study Date: 2021 08:43 AM  Age: 71 yrs  Gender: Male  Patient Location: [facility]  Reason For Study: Dyspnea  Ordering Physician: YOU LOVE  Performed By: ADOLFO     BSA: 1.4 m2  Height: 58 in  Weight: 113 lb  HR: 107  ______________________________________________________________________________  Procedure  Definity (NDC #23325-313) given intravenously.  ______________________________________________________________________________  Interpretation Summary     The left ventricle is normal in size.  The visual ejection fraction is 25-30%.  Diastolic Doppler findings (E/E' ratio and/or other parameters) suggest left  ventricular filling pressures  are increased.  There is severe global hypokinesia of the left ventricle.  Mildly decreased right ventricular systolic function  The left atrium is severely dilated.  The right atrium is severely dilated.  There is a bioprosthetic mitral valve.  Mild prosthetic mitral valve stenosis  There is a unknown bioprosthetic valve present in aortic valve position.  The valve opening cannot be assessed due to imaging artifacts from the  prosthesis.  The prosthetic valve gradients are normal .  The right ventricular systolic pressure is approximated at 28mmHg plus the  right atrial pressure.  Right ventricular diastolic pressure is approximated at 12mmHg plus the right  atrial pressure.  IVC diameter >2.1 cm collapsing <50% with sniff suggests a high RA pressure  estimated at 15 mmHg or greater.  The ascending aorta is Mildly dilated.  The rhythm was atrial fibrillation.  Compared to echo from 2/7/21 the findings are similar.  ______________________________________________________________________________  Left Ventricle  The left ventricle is normal in size. There is mild concentric left  ventricular hypertrophy. Diastolic Doppler findings (E/E' ratio and/or other  parameters) suggest left ventricular filling pressures are increased. The  visual ejection fraction is 25-30%. There is severe global hypokinesia of the  left ventricle.     Right Ventricle  Mildly decreased right ventricular systolic function. TAPSE is abnormal, which  is consistent with abnormal right ventricular systolic function.     Atria  The left atrium is severely dilated. The right atrium is severely dilated.     Mitral Valve  There is severe mitral annular calcification. There is a bioprosthetic mitral  valve. Mild prosthetic mitral valve stenosis. There is mild (1+) prosthetic  mitral valve regurgitation.     Tricuspid Valve  There is moderately severe (3+) tricuspid regurgitation. The right ventricular  systolic pressure is approximated at 28mmHg plus  the right atrial pressure.  There is no tricuspid stenosis.     Aortic Valve  There is a unknown bioprosthetic valve present in aortic valve position. The  valve opening cannot be assessed due to imaging artifacts from the prosthesis.  The prosthetic valve gradients are normal .     Pulmonic Valve  Normal pulmonic valve. There is mild (1+) pulmonic valvular regurgitation.  Right ventricular diastolic pressure is approximated at 12mmHg plus the right  atrial pressure. There is no pulmonic valvular stenosis.     Vessels  The ascending aorta is Mildly dilated. IVC diameter >2.1 cm collapsing <50%  with sniff suggests a high RA pressure estimated at 15 mmHg or greater.     Pericardium  There is no pericardial effusion.     Rhythm  The rhythm was atrial fibrillation.  ______________________________________________________________________________  MMode/2D Measurements & Calculations  IVSd: 1.3 cm  LVIDd: 3.9 cm  LVIDs: 3.5 cm  LVPWd: 1.2 cm  FS: 8.8 %  LV mass(C)d: 174.7 grams  LV mass(C)dI: 122.3 grams/m2  Ao root diam: 3.5 cm  LA dimension: 4.7 cm  asc Aorta Diam: 3.7 cm  LA/Ao: 1.3  LVOT diam: 1.8 cm  LVOT area: 2.6 cm2  LA Volume Indexed (AL/bp): 133.2 ml/m2  RWT: 0.63     Time Measurements  MM HR: 107.0 BPM     Doppler Measurements & Calculations  MV E max jose: 183.7 cm/sec  MV max P.7 mmHg  MV mean P.9 mmHg  MV V2 VTI: 33.0 cm  MVA(VTI): 0.99 cm2  MV dec time: 0.26 sec  Ao V2 max: 240.5 cm/sec  Ao max P.0 mmHg  Ao V2 mean: 172.2 cm/sec  Ao mean P.7 mmHg  Ao V2 VTI: 38.8 cm  EPHRAIM(I,D): 0.84 cm2  EPHRAIM(V,D): 1.0 cm2  LV V1 max PG: 3.6 mmHg  LV V1 max: 95.1 cm/sec  LV V1 VTI: 12.4 cm  SV(LVOT): 32.7 ml  SI(LVOT): 22.9 ml/m2  PA acc time: 0.04 sec  PI end-d jose: 172.8 cm/sec  TR max jose: 263.0 cm/sec  TR max P.7 mmHg  AV Jose Ratio (DI): 0.40  EPHRAIM Index (cm2/m2): 0.59     E/E' av.7  Lateral E/e': 23.4  Medial E/e': 35.9      ______________________________________________________________________________  Report approved by: Janet Delcid 12/16/2021 10:17 AM         XR Chest 1 View    Narrative    EXAM: XR CHEST 1 VIEW  LOCATION: [facility]  DATE/TIME: 12/16/2021 2:15 PM    INDICATION: acute respiratory failure. COVID  COMPARISON: 12/16/2021 at 0 8:23 AM      Impression    IMPRESSION: Endotracheal tube tip 1.8 cm from georgina. Nasogastric tube tip below the film. Right PICC tip projects over the lower SVC. Prior sternotomy. Heart valve prosthesis. Stable enlarged cardiac silhouette. The left mid lung infiltrate appears   improved. The right upper lung infiltrate appears worse. No pneumothorax. No significant effusion seen.       Discussed with family, intensivist, nursing staff and discharge planner    Alex Mathews MD  [facility] Medicine Service  [phone]

## 2021-12-16 NOTE — PROGRESS NOTES
TRANSITIONS OF CARE (EUGENIE) LOG   EUGENIE tasks should be completed by the CC within one (1) business day of notification of each transition. Follow up contact with member is required after return to their usual care setting.  Note:  If CC finds out about the transitions fifteen (15) days or more after the member has returned to their usual care setting, no EUGENIE log is needed. However, the CC should check in with the member to discuss the transition process, any changes needed to the care plan and document it in a case note.    Member Name:  Kody Johns MCO Name:  [employer]hana [employer]/Health Plan Member ID#: [id number]   Product: [employer] Care Coordinator Contact:  MISSAEL Lord Agency/County/Care System: [place]   Transition Communication Actions from Care Management Contact   Transition #1   Notification Date: 12/6/2021 Transition Date:   12/15/2021 Transition From: Home     Is this the member s usual care setting?               yes Transition To: Hospital, [facility]   Transition Type:  Unplanned  Reason for Admission/Comments:        Shared CC contact info, care plan/services with receiving setting--Date completed: 12/16/2021    CC contacted Hospital /discharge planner via the [employer] Transitional Care Hand-In Process, with community care plan included.  CC reached out to adult dash Flores regarding transition and left a message requesting a return call.  Reviewed and update care plan as needed.  Notified community service providers and placed services PCA on hold as needed.  Transition log initiated.   PCP notified of hospitalization via EMR.     Notified PCP of transition--Date completed:  12/15/2021     via  EMR   Transition #2   Transition #3  (if applicable)   Notification Date:          Transition To:    Transition Date:      Transition Type:      Notified PCP -- Date completed:               Shared CC contact info, care plan/services with receiving setting or, if applicable, home care  agency--Date completed:    *Complete additional tasks below, if this transition is a return to usual care setting.      Comments:      21-Member        Notification Date:          Transition To:    Transition Date:              Transition Type:      Notified PCP--Date completed:          Shared CC contact info, care plan/services with receiving setting or, if applicable, home care agency--Date completed:       *Complete additional tasks below, if this transition is a return to usual care setting.      Comments:       *Complete tasks below when the member is discharging TO their usual care setting within one (1) business day of notification.  For situations where the Care Coordinator is notified of the discharge prior to the date of discharge, the Care Coordinator must follow up with the member or designated representative to confirm that discharge actually occurred and discuss required EUGENIE tasks as outlined in the EUGENIE Instructions.  (This includes situations where it may be a  new  usual care setting for the member. (i.e., a community member who decides upon permanent nursing home placement following hospitalization and rehab).    Date completed:   Communicated with member or their designated representative about the following:  care transition process; about changes to the member s health status; plan of care updates; education about transitions and how to prevent unplanned transitions/readmissions  Four Pillars for Optimal Transition:    Check  Yes  - if the member, family member and/or SNF/facility staff manages the following:    If  No  provide explanation in the comments section.          []  Yes     []  No     Does the member have a follow-up appointment scheduled with primary care or specialist? (Mental health hospitalizations--the appt. should be w/in 7 days)   []  Yes     []  No     Can the member manage their medications or is there a system in place to manage medications (e.g. home care set-up)?          []  Yes     []  No     Can the member verbalize warning signs and symptoms to watch for and how to respond?         []  Yes     []  No     Does the member use a Personal Health Care Record?  Check  Yes  if visit summary, discharge summary, and/or healthcare summary are being used as a PHR.                                                                                                                                                                                    [] Yes      [] No      Have you updated the member s care plan?  If  No  provide explanation in comments.   Comments:

## 2021-12-16 NOTE — PHARMACY-VANCOMYCIN DOSING SERVICE
"Pharmacy Vancomycin Initial Note  Date of Service 2021  Patient's  1950  71 year old, male    Indication: Sepsis    Current estimated CrCl = Estimated Creatinine Clearance: 65.3 mL/min (based on SCr of 0.77 mg/dL).    Creatinine for last 3 days  12/15/2021:  5:18 PM Creatinine 1.36 mg/dL  2021:  7:12 AM Creatinine 0.77 mg/dL    Recent Vancomycin Level(s) for last 3 days  No results found for requested labs within last 72 hours.      Vancomycin IV Administrations (past 72 hours)      No vancomycin orders with administrations in past 72 hours.                Nephrotoxins and other renal medications (From now, onward)    Start     Dose/Rate Route Frequency Ordered Stop    21 0400  vancomycin (VANCOCIN) 1000 mg in dextrose 5% 200 mL PREMIX         1,000 mg  200 mL/hr over 1 Hours Intravenous EVERY 18 HOURS 21 0859      21 1600  piperacillin-tazobactam (ZOSYN) 3.375 g vial to attach to  mL bag        Note to Pharmacy: Extended infusion dosing to start 6 hours after initial infusion.   \"Followed by\" Linked Group Details    3.375 g  over 240 Minutes Intravenous EVERY 8 HOURS 21 0836      21 1000  vancomycin (VANCOCIN) 1000 mg in dextrose 5% 200 mL PREMIX         1,000 mg  200 mL/hr over 1 Hours Intravenous ONCE 21 0855      21 0900  piperacillin-tazobactam (ZOSYN) 3.375 g vial to attach to  mL bag        \"Followed by\" Linked Group Details    3.375 g  over 30 Minutes Intravenous ONCE 21 0836      12/15/21 1700  norepinephrine (LEVOPHED) 4 mg in  mL infusion PREMIX         0.01-0.6 mcg/kg/min × 50.3 kg  1.9-113.2 mL/hr  Intravenous CONTINUOUS 12/15/21 1642            Contrast Orders - past 72 hours (72h ago, onward)            None          [employer] Prediction of Planned Initial Vancomycin Regimen  Loading dose: 1000 mg at 10:00 2021.  Regimen: 1000 mg IV every 18 hours.  Start time: 09:00 on 2021  Exposure target: AUC24 " (range)400-600 mg/L.hr   AUC24,ss: 471 mg/L.hr  Probability of AUC24 > 400: 68 %  Ctrough,ss: 13.4 mg/L  Probability of Ctrough,ss > 20: 18 %  Probability of nephrotoxicity (Lodise ANN 2009): 9 %          Plan:  1. Start vancomycin  1000 mg IV q18h.   2. Vancomycin monitoring method: AUC  3. Vancomycin therapeutic monitoring goal: 400-600 mg*h/L  4. Pharmacy will check vancomycin levels as appropriate in 1-3 Days.    5. Serum creatinine levels will be ordered a minimum of twice weekly.      Dorcas Newton, [facility]

## 2021-12-16 NOTE — PROGRESS NOTES
Care Management Follow Up    Length of Stay (days): 1    Expected Discharge Date: To be determined.      Concerns to be Addressed:   ICU level of care, due to alteration in respiratory status secondary to COVID-19, requiring oral intubation/vent support and drips.  IV Decadron, IV Remdesivir, IV Zosyn.  Patient plan of care discussed at interdisciplinary rounds: Yes    Anticipated Discharge Disposition:  Discharge goal is home pending response to treatment, medical needs and mobility closer to discharge.      Anticipated Discharge Services:  To be determined by destination, patient/family preferences, medical needs and mobility status closer to the time of discharge.  Anticipated Discharge DME:  To be determined.     Patient/family educated on Medicare website which has current facility and service quality ratings:  NA  Education Provided on the Discharge Plan:  Per team  Patient/Family in Agreement with the Plan:  Yes    Referrals Placed by CM/SW:  None  Private pay costs discussed: Not applicable at this time.    Additional Information:  Patient lives in a house with his son Jim who is his PCA for all activities of daily living. His primary language is Polish. Patient is also open to [facility] for RN services (they help with INR lab draws. If discharged to home, CM is to fax orders to [phone]). Patient uses a walker for mobility at baseline.  Primary family contact is Jim at [phone].    Mary Ghotra RN

## 2021-12-16 NOTE — PHARMACY-ADMISSION MEDICATION HISTORY
Pharmacy Note - Admission Medication History    Pertinent Provider Information: no recent medication changes. He has a home health RN who sets up his medications once weekly and his son Jim administers them daily.      ______________________________________________________________________    Prior To Admission (PTA) med list completed and updated in EMR.       PTA Med List   Medication Sig Last Dose     atorvastatin (LIPITOR) 10 MG tablet Take 10 mg by mouth daily  12/14/2021 at HS     baclofen (LIORESAL) 10 MG tablet 1 p.o. twice daily (Patient taking differently: Take 10 mg by mouth 2 times daily ) 12/15/2021 at am     ferrous sulfate (FEROSUL) 325 (65 Fe) MG tablet Take 325 mg by mouth 2 times daily  12/15/2021 at am     furosemide (LASIX) 20 MG tablet TAKE 1 TABLET (20 MG) BY MOUTH DAILY FOR EDEMA 12/15/2021 at am     gabapentin (NEURONTIN) 100 MG capsule Take 1 capsule (100 mg) by mouth 2 times daily PLEASE CALL TO SCHEDULE FOLLOW UP APPT FOR REFILLS (Patient taking differently: Take 100-200 mg by mouth 4 times daily 100 mg in am and 100 mg in afternoon and 200 mg HS) 12/15/2021 at pm     levothyroxine (SYNTHROID/LEVOTHROID) 100 MCG tablet TAKE 1 TABLET (100 MCG TOTAL) BY MOUTH DAILY. 12/15/2021 at am     magnesium oxide 400 MG CAPS 1 p.o. daily (Patient taking differently: Take 400 mg by mouth daily ) 12/15/2021 at am     metoprolol tartrate (LOPRESSOR) 100 MG tablet Take 1 tablet (100 mg) by mouth 2 times daily 12/15/2021 at am     omeprazole (PRILOSEC) 20 MG DR capsule Take 20 mg by mouth daily  12/15/2021 at am     sertraline (ZOLOFT) 50 MG tablet Take 50 mg by mouth daily 12/15/2021 at am     TAMSULOSIN HCL PO Take 0.4 mg by mouth every morning  12/15/2021 at am     warfarin ANTICOAGULANT (COUMADIN) 1 MG tablet 10/26/21: 2 mg every Mon, Fri; 1.5 mg all other (Patient taking differently: 10/26/21: 2 mg every Mon, Fri; 1.5 mg all other days) 12/14/2021 at pm       Information source(s): Family member,  Caregiver, Patient's pharmacy, Clinic records, Prescription bottles and CareEverywhere/SureScripts  Method of interview communication: in-person    Summary of Changes to PTA Med List  New: nothing  Discontinued: nothing  Changed: updated sigs to reflect frequencies confirmed with son and pill bottles.     Patient was asked about OTC/herbal products specifically.  PTA med list reflects this.    In the past week, patient estimated taking medication this percent of the time:  greater than 90%.    Allergies were reviewed, assessed, and updated with the patient.      Patient does not use any multi-dose medications prior to admission.    The information provided in this note is only as accurate as the sources available at the time of the update(s).    Thank you for the opportunity to participate in the care of this patient.    Reagan Whitt [facility]  12/15/2021 6:45 PM

## 2021-12-16 NOTE — ED NOTES
Respiratory Care Note    Discussed pt with intensivist. ETT withdrawn 1 cm now 20 at the teeth/gums. PEEP increased from 12 to 14 SpO2 90-91. Sputum sample sent to lab.       Michel Sidhu, RT

## 2021-12-16 NOTE — ED NOTES
Nursing Assessment: Cardiovascular: Cardiac rhythm per monitor is sinus tachycardia with a 1st degree AV block. Rate regular. Continues to maintain MAP from 78-80 per ART line. Easily palpated peripheral pulses and skin color is pink. No changes required for norepinephrine drip for maintaining blood pressure.  Respiratory: Patient on vent and maintaining oxygen saturations of 94-96%. Was suctioned by RT. Lung sounds reveal crackles to the right lower base. Patient is afebrile. GI: Bowel sounds are active in all four quadrants and abdomen is soft to palpation. No bowel movement at this time. NG in place and removing gastric secretions. Skin: No areas of break down noticed at this time.

## 2021-12-16 NOTE — PROGRESS NOTES
RESPIRATORY CARE NOTE     Patient Name: Kody Johns  Today's Date: 12/16/2021     Pt continues on the following settings:  Ventilation Mode: CMV/AC  (Continuous Mandatory Ventilation/ Assist Control)  Rate Set (breaths/minute): 18 breaths/min  Tidal Volume Set (mL): 400 mL  PEEP (cm H2O): 5 cmH2O  Oxygen Concentration (%): (S) 40 %  Resp: 18     Plateau pressure: 22 cm H2O     Pt is intubated with  # 7.5 ETT secured  21 at the teeth/gums.  BS are course.  RT suctioned pt for thick white. Pt's respiratory status is stable. RT will continue to follow per MD's orders.     /86   Pulse 103   Temp (!) 96.7  F (35.9  C) (Axillary)   Resp 18   Wt 51.7 kg (113 lb 15.7 oz)   SpO2 96%   BMI 23.60 kg/m

## 2021-12-16 NOTE — CONSULTS
CRITICAL CARE CONSULT:    Assessment/Plan:  71 year old male former smoker, unvaccinated against COVID-19, with a history of atrial fibrillation on rate control and anticoagulation, chronic anemia and thrombocytopenia, peripheral neuropathy, CKD, hypothyroidism, HTN, depression, rheumatic heart disease s/p aortic and mitral valve replacement in April 2019 complicated by right PCA ischemic stroke, prolonged respiratory failure s/p trach and PEG in May 2019, hemo, cardiomyopathy with HFrEF, subsequent left hemiparesis at [facility] found to have hemorrhagic transformation of R PCA stroke treated medically, now with chronic spastic left hemiparesis, decannulated June 2019, now with ARDS due to COVID-19 infection, intubated on 12/15, acutely worsening hypoxia with frothy secretions and elevated BNP on 12/16.    RESP:  Acute hypoxemic respiratory failure: ARDS due to COVID-19. Despite his comorbidities and prior medical issues, as well as recommendation from his PCP, he remains unvaccinated against COVID-19. Intubated on 12/15. Baseline EF 35%, now 25-30%, worsening hypoxia 12/16, likely cardiogenic pulmonary edema.  Ventilation Mode: CMV/AC  (Continuous Mandatory Ventilation/ Assist Control)  FiO2 (%): 90 %  Rate Set (breaths/minute): 18 breaths/min  Tidal Volume Set (mL): 400 mL  PEEP (cm H2O): 14 cmH2O  Oxygen Concentration (%): 90 %  Resp: 18      Lasix 40 mg IV    PEEP to 14    Titrate FiO2    ABG q 6 hrs    Pplat <30, driving pressure <15    Add nebulized epoprostenol if needed    High risk of prolonged mechanical ventilation    Unclear if he would want a redo tracheostomy    Early palliative care consult appropriate    See ID and CV below    CV:  Rheumatic heart disease s/p mitral and aortic valve replacement, HFrEF, atrial flutter: EF baseline 35%, now 25-30% with concurrent diastolic dysfunction. . ECG with inferolateral ST depression, unclear significance    Furosemide    Trend troponins    supratherapeutic  INR, when down would start enoxaparin    Monitor and replace lytes as needed    His cardiomyopathy will increase risk of eventual extubation failure    NEURO:  History of stroke, left hemiparesis, neuropathy, depression: Right PCA ischemic followed by hemorrhagic stroke in 2019 after heart surgery. Chronic left spastic hemiparesis.    Continue home baclofen, sertraline, gabapentin    Propofol, fentanyl    RASS goal -3 to -4    GI:  No acute issues.    Start tube feeding    RENAL:  No acute issues.    Monitor    furosemide    ID:  COVID-19: Unvaccinated. Possible superinfection but unclear. PCT 0.6.    tocilizumab    Dexamethasone    remdesivir    Ceftriaxone/azithro    Tracheal aspirate culture, blood culture    HEMATOLOGIC:  No acute issues.    Monitor with restrictive transfusion threshold    ENDOCRINE:  No acute issues.    Sliding scale aspart    ICU PROPHYLAXIS:    Chlorhexidine    HOB elevation    INR supratherapeutic from PTA warfarin; monitor daily and start enoxaparin when INR down    PPI    Lines/Drains/Tubes:  Central line (RUE PICC) placed on 12/15  Arterial line placed on 12/15  ETT (7.5) placed on 12/15  Feeding tube (OG) placed on 12/15  Guzman catheter placed on 12/15    CODE STATUS, DISPOSITION, FAMILY COMMUNICATION: DNR after I talked to the patient's son. Critically ill requiring invasive mechanical ventilation and continuous vasopressors. Spoke with the patient's son, Jim, via [facility]  by telephone. Will involve palliative care.    Restraints  Progress Note  Restraint Application    I recognize that restraints are physical and/or chemical interventions intended to restrict a person's movements. Restraints are currently needed to ensure the safety of this patient and/or others. My clinical rationale appears below.    Category/Type of Restraint     Non Violent:  Soft limb restraint x2  --  Behavior  Pulling at tubes/lines  --  Root Cause of the  Behavior  Sedation/intubation  --  Less-Restrictive Measures that Failed  Non Violent Measures:  Close Observation  --  Response to the Restraint  Patient unable to pull at tubes/lines  --  Criteria for Release from the Restraint  Patient calm and off sedation    Bao Rubio MD  Pulmonary and Critical Care Medicine  [facility]  Office [phone]  Pager [phone]  (he/him/his)    CCx: ARDS, ADHF    HPI: 71 year old male former smoker, unvaccinated against COVID-19, with a history of atrial fibrillation on rate control and anticoagulation, chronic anemia and thrombocytopenia, peripheral neuropathy, CKD, hypothyroidism, HTN, depression, rheumatic heart disease s/p aortic and mitral valve replacement in April 2019 complicated by right PCA ischemic stroke, prolonged respiratory failure s/p trach and PEG in May 2019, hemo, cardiomyopathy with HFrEF, subsequent left hemiparesis at [facility] found to have hemorrhagic transformation of R PCA stroke treated medically, now with chronic spastic left hemiparesis, decannulated June 2019, now with ARDS due to COVID-19 infection, intubated on 12/15, acutely worsening hypoxia with frothy secretions and elevated BNP on 12/16. Patient remained unvaccinated despite a recommendation from his PCP that he be vaccinated.    Past Medical History:  Past Medical History:   Diagnosis Date     ALEENA (acute kidney injury) (H)      Anemia due to blood loss, acute 4/24/2019     Asthma      Atrial fibrillation (H)      Blurry vision      CHF (congestive heart failure) (H)      Chronic kidney disease      Coronary artery disease      Dysphagia     resolved     Dysuria      Hearing loss      Heart murmur      Heart murmur      Hyperlipidemia      Hypertension      Hypothyroidism      Mitral valve stenosis      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     Moderate malnutrition (H)      Palpitations      Rheumatic heart disease      Shortness of breath     exertion     Stroke (H)     Silent      Valvular disease      Patient Active Problem List    Diagnosis Date Noted     Acute on chronic heart failure with preserved ejection fraction (HFpEF) (H) 12/15/2021     Priority: Medium     Latent tuberculosis 12/15/2021     Priority: Medium     Acute respiratory failure with hypoxia (H) 12/15/2021     Priority: Medium     Atrial flutter with rapid ventricular response (H) 11/30/2021     Priority: Medium     Rheumatic heart disease 11/18/2021     Priority: Medium     Aortic valve disease, rheumatic 07/11/2021     Priority: Medium     Mitral valve stenosis, unspecified etiology 07/11/2021     Priority: Medium     Chronic systolic congestive heart failure (H) 02/11/2021     Priority: Medium     Gastroesophageal reflux disease without esophagitis 02/06/2021     Priority: Medium     Hypothyroidism 08/26/2020     Priority: Medium     Hyperlipidemia 08/26/2020     Priority: Medium     Atrial fibrillation (H) 08/26/2020     Priority: Medium     Atherosclerosis of coronary artery 08/26/2020     Priority: Medium     Embolic stroke involving right posterior cerebral artery (H) 08/26/2020     Priority: Medium     Urinary incontinence without sensory awareness 10/14/2019     Priority: Medium     S/P MVR (mitral valve replacement) 07/09/2019     Priority: Medium     S/P AVR (aortic valve replacement) 07/09/2019     Priority: Medium     ALEENA (acute kidney injury) (H) 08/31/2017     Priority: Medium     Heart abnormality 11/14/2014     Priority: Medium       Past Surgical History:  Past Surgical History:   Procedure Laterality Date     AORTIC VALVE REPLACEMENT N/A 4/24/2019    Procedure: AORTIC VALVE REPLACEMENT;  Surgeon: Jojo Vaughn MD;  Location: [facility];  Service: Cardiovascular     C REPLACEMENT OF MITRAL VALVE N/A 4/24/2019    Procedure: MITRAL VALVE REPLACEMENT, ANESTHESIA, TAKEDOWN OF PERICARDIAL ADHESIONS AND REINFORCEMENT OF KLS PLATING SYSTEM TRANESOPHAGEAL ECHOCARDIOGRAM AND EPI AORTIC ULTRASOUND;   Surgeon: Jojo Vaughn MD;  Location: [facility];  Service: Cardiovascular     CARDIAC CATHETERIZATION       CATARACT EXTRACTION Left 2016     CV CORONARY ANGIOGRAM N/A 2018    Procedure: Coronary Angiogram;  Surgeon: Jeff Deleon MD;  Location: [facility];  Service: Cardiology     EYE SURGERY      cataract     HC UGI ENDOSCOPY W PLACEMENT GASTROSTOMY TUBE PERCUT N/A 2019    Procedure: CREATION, GASTROSTOMY, PERCUTANEOUS, ENDOSCOPIC;  Surgeon: Chandana Kang MD;  Location: [facility];  Service: General     PICC AND MIDLINE TEAM LINE INSERTION  2019          PICC TRIPLE LUMEN PLACEMENT  12/15/2021          TRACHEOSTOMY N/A 2019    Procedure: TRACHEOSTOMY;  Surgeon: Chandana Kang MD;  Location: [facility];  Service: General       Social History:  Social History     Socioeconomic History     Marital status:      Spouse name: Not on file     Number of children: 4     Years of education: Not on file     Highest education level: Not on file   Occupational History     Not on file   Tobacco Use     Smoking status: Former Smoker     Packs/day: 1.00     Years: 50.00     Pack years: 50.00     Types: Cigarettes, Cigarettes     Quit date: 2014     Years since quittin.3     Smokeless tobacco: Former User     Tobacco comment: No Betel nut   Substance and Sexual Activity     Alcohol use: No     Comment: Alcoholic Drinks/day: Quit     Drug use: No     Sexual activity: Not on file   Other Topics Concern     Not on file   Social History Narrative    Refugee, born in [facility].     Social Determinants of Health     Financial Resource Strain: Not on file   Food Insecurity: Not on file   Transportation Needs: Not on file   Physical Activity: Not on file   Stress: Not on file   Social Connections: Not on file   Intimate Partner Violence: Not on file   Housing Stability: Not on file       Family History:  Family History   Problem Relation Age of  Onset     No Known Problems Mother      No Known Problems Father      Heart Disease No family hx of        Allergies:  No Known Allergies    MAR Reviewed      Physical Exam:  Vent settings for last 24 hours:  Ventilation Mode: CMV/AC  (Continuous Mandatory Ventilation/ Assist Control)  FiO2 (%): 90 %  Rate Set (breaths/minute): 18 breaths/min  Tidal Volume Set (mL): 400 mL  PEEP (cm H2O): 14 cmH2O  Oxygen Concentration (%): 90 %  Resp: 17      /57   Pulse 103   Temp (!) 101.7  F (38.7  C) (Rectal)   Resp 17   Wt 51.7 kg (113 lb 15.7 oz)   SpO2 93%   BMI 23.60 kg/m      Intake/Output last 3 shifts:  I/O last 3 completed shifts:  In: 1067.82 [I.V.:767.82; IV Piggyback:300]  Out: 910 [Urine:910]  Intake/Output this shift:  No intake/output data recorded.    Physical Exam  Gen: intubated, sedated  HEENT: NT, no STEPHANIE  CV: tachy, regular, no m/g/r  Resp: CTAB  Abd: soft, nontender, BS+  Skin: no rashes or lesions  Ext: no edema  Neuro: PERRL, nonfocal exam    IMAGING:  CXR:  IMPRESSION: Endotracheal tube tip 1.8 cm from georgina. Nasogastric tube tip below the film. Right PICC tip projects over the lower SVC. Prior sternotomy. Heart valve prosthesis. Stable enlarged cardiac silhouette. The left mid lung infiltrate appears   improved. The right upper lung infiltrate appears worse. No pneumothorax. No significant effusion seen.    Total Critical Care Time : 1 Hour 30 Minutes

## 2021-12-16 NOTE — H&P
Admission History and Physical   Kody Johns,  1950, MRN [id number]    [facility]  No admission diagnoses are documented for this encounter.    PCP: Aristides Alegria, [phone]   Code status:  No Order       Extended Emergency Contact Information  Primary Emergency Contact: Jim Johns  Mobile Phone: [phone]  Relation: Son  Secondary Emergency Contact: SOILA SOLER  Mobile Phone: [phone]  Relation: Brother       Assessment and Plan   Kody Johns is a 71 year old male presents with    SOB  COVID-19 pneumonia  ARDS  Acute hypoxic respiratory failure  Sepsis and shock  -intubated  -on vasopresser  -admitted to ICU  -intensivist actively manages  -will follow peripherally     DVT Prophylaxis: defer to intensivist  Diet: None     Central Lines: PICC, A-line     Guzman Catheter: PRESENT, indication:  (intubation)       Disposition: Admitted inpatient. Anticipated Length of Stay in midnights (including a midnight in the Emergency Department after triage if applicable) is more than 2 nights          Chief Complaint: Sob, cough, fever     HPI:    Kody Johns is a 71 year old old male Kody Johns is a 71 year old male with a pertinent history of hyperlipidemia, atrial fibrillation, aortic valve disease, mitral and atrial valve replacement, chronic systolic congestive heart failure, GERD, tuberculosis, acute kidney injury, heart murmur, stroke, asthma, CKD, and CAD who presents to this ED via walk in for evaluation of chest pain, cough, fever, and shortness of breath.      HPI limited due to acuity of condition.      The patient's son reports that the patient has had worsening fatigue and shortness of breath for the last two days. Today he had extremely labored breathing and was not able to get out of his bed. The patient denies any pain. The patient is currently on coumadin.      History was obtained from pt/chart review.      Medical History  Past Medical History:   Diagnosis Date     ALEENA  (acute kidney injury) (H)      Anemia due to blood loss, acute 4/24/2019     Asthma      Atrial fibrillation (H)      Blurry vision      CHF (congestive heart failure) (H)      Chronic kidney disease      Coronary artery disease      Dysphagia     resolved     Dysuria      Hearing loss      Heart murmur      Heart murmur      Hyperlipidemia      Hypertension      Hypothyroidism      Mitral valve stenosis      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     Moderate malnutrition (H)      Palpitations      Rheumatic heart disease      Shortness of breath     exertion     Stroke (H)     Silent     Valvular disease         Surgical History  He  has a past surgical history that includes Cataract Extraction (Left, 06/13/2016); Cardiac catheterization; Cv Coronary Angiogram (N/A, 11/30/2018); Eye surgery; REPLACEMENT OF MITRAL VALVE (N/A, 4/24/2019); aortic valve replacement (N/A, 4/24/2019); Picc And Midline Team Line Insertion (4/29/2019); UGI ENDOSCOPY W PLACEMENT GASTROSTOMY TUBE PERCUT (N/A, 5/8/2019); Tracheostomy (N/A, 5/8/2019); and PICC/Midline Placement (12/15/2021).       Social History  Reviewed, and he  reports that he quit smoking about 7 years ago. His smoking use included cigarettes and cigarettes. He has a 50.00 pack-year smoking history. He has quit using smokeless tobacco. He reports that he does not drink alcohol and does not use drugs.       Allergies  No Known Allergies Family History  Reviewed, and family history includes No Known Problems in his father and mother.  Not pertinent to chief complaints or current medical problems.        Prior to Admission Medications   (Not in a hospital admission)  await review       Review of Systems:  A 12 point comprehensive review of systems was negative except as noted.    ROS  Physical Exam:  Temp:  [98.8  F (37.1  C)] 98.8  F (37.1  C)  Pulse:  [] 101  Resp:  [18-34] 25  BP: ()/(58-90) 131/90  SpO2:  [89 %-99 %] 98 %    BP (!) 131/90   Pulse 101    Temp 98.8  F (37.1  C) (Oral)   Resp 25   Wt 51.7 kg (113 lb 15.7 oz)   SpO2 98%   BMI 23.60 kg/m      Physical Exam     Defer to intensivist     Pertinent Labs  Lab Results: personally reviewed.   Results for MIRELLA COLON (MRN [id number]) as of 12/15/2021 19:20   Ref. Range 12/15/2021 16:56 12/15/2021 17:18   Sodium Latest Ref Range: 136 - 145 mmol/L  137   Potassium Latest Ref Range: 3.5 - 5.0 mmol/L  3.8   Chloride Latest Ref Range: 98 - 107 mmol/L  101   Carbon Dioxide Latest Ref Range: 22 - 31 mmol/L  23   Urea Nitrogen Latest Ref Range: 8 - 28 mg/dL  32 (H)   Creatinine Latest Ref Range: 0.70 - 1.30 mg/dL  1.36 (H)   GFR Estimate Latest Ref Range: >60 mL/min/1.73m2  52 (L)   Calcium Latest Ref Range: 8.5 - 10.5 mg/dL  8.0 (L)   Anion Gap Latest Ref Range: 5 - 18 mmol/L  13   Albumin Latest Ref Range: 3.5 - 5.0 g/dL  3.1 (L)   Protein Total Latest Ref Range: 6.0 - 8.0 g/dL  7.7   Bilirubin Total Latest Ref Range: 0.0 - 1.0 mg/dL  1.3 (H)   Alkaline Phosphatase Latest Ref Range: 45 - 120 U/L  93   ALT Latest Ref Range: 0 - 45 U/L  27   AST Latest Ref Range: 0 - 40 U/L  74 (H)   Lactic Acid Latest Ref Range: 0.7 - 2.0 mmol/L 3.7 (H)    Troponin I Latest Ref Range: 0.00 - 0.29 ng/mL  0.07   Glucose Latest Ref Range: 70 - 125 mg/dL  125   Results for MIRELLA COLON (MRN [id number]) as of 12/15/2021 19:20   Ref. Range 12/15/2021 18:16   pH Arterial Latest Ref Range: 7.37 - 7.44  7.39   pCO2 Arterial Latest Ref Range: 35 - 45 mm Hg 36   PO2 Arterial Latest Ref Range: 75 - 85 mm Hg 69 (L)   Bicarbonate Arterial Latest Ref Range: 23 - 29 mmol/L 22 (L)   Base Excess Art Latest Ref Range:   mmol/L -2.8   FIO2 Unknown 0.6   Oxyhemoglobin Latest Ref Range: 95.0 - 96.0 % 92.6 (L)     Pertinent Radiology  Radiology Results: Personally reviewed impression/s    EXAM: XR CHEST PORT 1 VIEW  LOCATION: [facility]  DATE/TIME: 12/15/2021 5:21 PM     INDICATION: Post intubation  COMPARISON: 12/15/2021  [id number]                                                                      IMPRESSION: Endotracheal tube tip 9 mm from georgina. Recommend 2 cm pullback. Enlarged cardiac silhouette with a heart valve prosthesis and prior sternotomy.     There is pulmonary vascular congestion and bilateral airspace disease. This is nonspecific and could represent edema, pneumonia, or hemorrhage as the most likely possibilities.  EKG Results: personally reviewed.   I have independently reviewed and interpreted the EKG.        [facility] Medicine Service    Evan Torre MD  Office: [phone]

## 2021-12-16 NOTE — ED NOTES
Respiratory Care Note    SpO2 reading accurate revealing low to mid 80s on FiO2 35% and PEEP of 5. Gradually increased support FiO2 now at 80% and PEEP at 10. Pressures on vent still WNL. Discussed with attending MD. Will draw ABG and reassess. Holding off on SBT for now.       Michel Sidhu, RT

## 2021-12-16 NOTE — PROGRESS NOTES
[facility] Care Coordination Contact    [facility]  Ambulatory Care Coordination to Inpatient Care Management   Hand-In Communication    Date:  December 16, 2021  Name: Kody Johns is enrolled in [facility] Care Coordination program and I am the Lead Care Coordinator.  CC Contact Information:.    MISSAEL Lord  [phone]  [email or web address]  Payor Source: Payor: [employer] / Plan: [employer] [place] / Product Type: HMO /   Current services in place:     Please see the CC Snaphot and Care Management Flowsheets for specific  details of this Kody Johns care plan.   Additional details/specific concerns r/t this admission:    No additional concerns at this time      I will follow this admission in Epic. Please feel free to contact me with questions or for further collaboration in discharge planning.    MISSAEL Lord  [facility]  [phone]

## 2021-12-16 NOTE — PROCEDURES
"PICC Line Insertion Procedure Note  Pt. Name: Kody Johns   MRN: [id number]         Procedure: Insertion of a  Triple Lumen  5 fr  Bard SOLO (valved) Power PICC, Lot number [id number]    Indications: vasopressors     Contraindications : n/a    Procedure Details   Patient identified with 2 identifiers and \"Time Out\" conducted.  .     Central line insertion bundle followed: hand hygeine performed prior to procedure, site cleansed with cholraprep, hat, mask, sterile gloves,sterile gown worn, patient draped with maximum barrier head to toe drape, sterile field maintained.    The vein was assessed and found to be compressible and of adequate size. 4 ml 1% Lidocaine administered sq to the insertion site. A 5 Fr PICC was inserted into the brachial vein of the right arm with ultrasound guidance. 1 attempt(s) required to access vein.   Catheter threaded without difficulty. Good blood return noted.    Modified Seldinger Technique used for insertion.    The 8 sharps that are included in the PICC insertion kit were accounted for and disposed of in the sharps container prior to breakdown of the sterile field.    Catheter secured with Statlock, biopatch and Tegaderm dressing applied.    Findings:  Total catheter length  36 cm, with 0 cm exposed. Mid upper arm circumference is 24 cm. Catheter was flushed with 30 cc NS. Patient  tolerated procedure well.    Tip placement verified by xray. Xray read by Dr. Nascimento, Hunter Archer MD . Tip placement in the SVC.    CLABSI prevention brochure left at bedside.    Patient's primary RN notified PICC is ready for use.    Comments:        Paxton Montana, NOAMN, RN  [facility]  "

## 2021-12-16 NOTE — PROGRESS NOTES
Brief TELE-ICU Note    Presented to ED today with progressive dyspnea, COVID positive  PMHx: hypothyroidism, afib, mitral valve replacement, CHF/CAD, CVA, previous trach/peg (5/8/2019), TB, asthma   Intubated, lined, sedated and started on pressors  Relevant labs: SCr 1.36 (base 0.79), lactic acid 3.7, 7.39/36/69, WBC 3.9, Plt 63, INR 3.2  Relevant imaging: CXR with diffuse bilateral infiltrates. ETT at the level of the main georgina   Vt 400 (8cc/kg IBW), RR 18, PEEP 5 cmH2O (DP 20), FiO2 60%.     Recommendations  1. Acute resp failure, ALEENA, coagulopathy 2/2 COVID PNA.   - Recommend pulling back ETT 2cm   - Agree with current vent settings.  - Levophed to keep MAP>65. If levo dose is > 0.2mcg/kg/min, please start vasopressin and broad spectrum antibiotics   - Keep RASS -4 to -5 with propofol. Ok with prn fentanyl.   - Recommend decadron for COVID   - INR is 3.2 so no need to anticoagulate. Hold coumadin. Repeat INR daily.  - Daily CMP, CBC, ABG, INR, and lactic acid   - Please pg me or call TELE HUB ([phone]) with any issues majo Meyers MD   of Medicine  Interventional Pulmonary  Department of Pulmonary, Allergy, Critical Care and Sleep Medicine   [facility]  Pager: [phone]   Office: [phone]  Email: [email or web address]    Luiza RIBEIRO, OCN  Clinical Nurse Specialist  Department of Thoracic Surgery  Office: [phone]  Email: [email or web address]    Shruti Vences  Interventional Pulmonology Surgery Scheduler  Office: [phone]  Email: [email or web address]

## 2021-12-16 NOTE — PLAN OF CARE
Problem: Adult Inpatient Plan of Care  Goal: Plan of Care Review  Outcome: No Change   Care plan reviewed.

## 2021-12-16 NOTE — SIGNIFICANT EVENT
Significant Event Note    Time of event: 1:17 PM    Description of event:  Notified by ER nursing staff that patient has increased FiO2 needs with spiking fever and persistent hypotension with A. fib and RVR, also patient have white frothy fluids output from the ET tube    Plan:  Check ABG, continue Levophed drip, start IV Lasix  Place stat consult to ICU team, discussed with Dr. Rubio, appreciate input    Discussed with: Intensivist, ER provider    Alex Mathews MD

## 2021-12-16 NOTE — ED NOTES
Respiratory Care Note    Vent day #2    Ventilation Mode: CMV/AC  (Continuous Mandatory Ventilation/ Assist Control)  FiO2 (%): 35 %  Rate Set (breaths/minute): 18 breaths/min  Tidal Volume Set (mL): 400 mL  PEEP (cm H2O): 5 cmH2O  Oxygen Concentration (%): 35 %  Resp: 30    Pt Parameters:    PIP: 26  Pplat: 25  Secretions: small tan    ABG:     Results for MIRELLA COLON (MRN [id number]) as of 12/16/2021 09:08   Ref. Range 12/16/2021 08:05   pH Arterial Latest Ref Range: 7.37 - 7.44  7.41   pCO2 Arterial Latest Ref Range: 35 - 45 mm Hg 34 (L)   PO2 Arterial Latest Ref Range: 75 - 85 mm Hg 47 (LL)   Bicarbonate Arterial Latest Ref Range: 23 - 29 mmol/L 22 (L)     Notes/plan: discussed ABG this morning with intensivist. Pt otherwise on minimal settings SpO2 %.      Michel Sidhu, RT

## 2021-12-17 NOTE — CONSULTS
"CLINICAL NUTRITION SERVICES - ASSESSMENT NOTE     Nutrition Prescription    RECOMMENDATIONS FOR MDs/PROVIDERS TO ORDER:  Do you want to start levothyroxine? RD will adjust TF for med hold if so. Thanks    Malnutrition Status:    TBD    Recommendations already ordered by Registered Dietitian (RD):  Start Osmolite 1.5 at 10 mL/hr, increase by 5 mL every 8 hours, as tolerated to goal of 30 mL/hr  Flush tube with 30 mL water every 4 hours  (Goal with propofol will provide: 720 mL/1080 kcal/45 gm protein/147 gm cho, 549 mL free water + 180 mL water flushes = 729 mL water/d    Future/Additional Recommendations:  RD will adjust TF if levothyroxine started via tube     REASON FOR ASSESSMENT  Kody Johns is a/an 71 year old male assessed by the dietitian for Provider Order - Registered Dietitian to Assess and Order TF per Medical Nutrition Therapy Protocol      Per chart, pt presented with SOB, Covid-19 pneumonia, acute hypoxic respiratory shock, sepsis and shock, pulmonary edema, intubated. PMH: a. Fib, chronic anemia and thrombocytopenia, peripheral neuropathy, ckd, hypothyroidism,  HtN, depression, rheumatic heart disease s/p aortic and mitral valve repalcement, stroke with hemiparesis in 4/2019    Attended Team Rounds - okay to feed      NUTRITION HISTORY  Spoke with son Damion over the telephone  Pt not able to get out of bed the last 2 days per H&P- was eating well a few days ago  Per chart review - pt hospitalized during part of  April - May 2019 intubated eventually with trach/PEG, with possible new dx of DM at that time (was on Fibersource HN at 45 L/hr +1200 mL water flushes  Pt is [place] - diet is usually mae with meat, and/or vegetables, and rice  Son reports he does not think pt is being treated for DM     CURRENT NUTRITION ORDERS  Diet: NPO  OGT placed, and xray confirmed, \"Enteric tube tip in the mid to distal stomach. No evidence of bowel obstruction or obvious free air.\"  12/15/21  Propofol at current " "rate of 6.2 mL/hr provides 164 calories/day    LABS  Labs reviewed  FSB, 91    MEDICATIONS  Medications reviewed  Zithromax,rocephine, decadron, lasix, novolog, protonix, remdesivir, received: iv lasix, kcl, calcium gluconate  Cont IV's: fentanyl, levophed, propofol    ANTHROPOMETRICS  Height: 4' 10.27 \"  Most Recent Weight: 46.6 kg (102 lb 11.8 oz)  (duiresed)  IBW: 42.7 kg  BMI: Normal BMI  Weight History:   Wt Readings from Last 3 Encounters:   21 46.6 kg (102 lb 11.8 oz)   21 50.3 kg (111 lb)   21 46.3 kg (102 lb)    106 lb  12/15/21 113 lb  2019 105 lb    Dosing Weight: 46.6 kg    ASSESSED NUTRITION NEEDS  Estimated Energy Needs: 1165 - 1400 kcals/day (25 - 30 kcals/kg)  Justification: Maintenance  Estimated Protein Needs: 47 -56 grams protein/day (1 - 1.2 grams of pro/kg)  Justification: Increased needs  Estimated Fluid Needs: 1200 mL/day (25 mL/kg), or per provider  Justification: Maintenance    PHYSICAL FINDINGS  See malnutrition section below.  Per chart,  Skin -wdl  GI: OGT to LIS  No BM recorded yet    MALNUTRITION:  % Weight Loss:  Weight loss does not meet criteria for malnutrition due to diuresis  % Intake:  Decreased intake does not meet criteria for malnutrition   Subcutaneous Fat Loss:  Unable to assess - covid isolation  Muscle Loss:  Unable to assess -covid isolation  Fluid Retention:  None noted    Malnutrition Diagnosis: Unable to determine due to nkfa    NUTRITION DIAGNOSIS  Swallowing difficulty related to respiratory failure as evidenced by vent, npo      INTERVENTIONS  Implementation  Enteral Nutrition - Initiate  Feeding tube flush     Goals  Tolerate TF  Meet estimated nutrition needs  Diet advancement when possible     Monitoring/Evaluation  Progress toward goals will be monitored and evaluated per protocol.    "

## 2021-12-17 NOTE — PLAN OF CARE
Problem: Gas Exchange Impaired (Pulmonary Impairment)  Goal: Optimal Gas Exchange  Outcome: Improving   Patient's PF ratios were  305 and 232.5 respectively. Scant amount of whitish secretions suctioned from ETT.     Atrial flutter with ST depressions on LII, LIII and AVF, in Dr. Rubio's notes, he mentioned inferolateral ST depression in his EKG.     On levophed gtt keeping MAP >65.  Propofol and fentanyl gtts  keeping RASS -3 to -4.    Patient will be seen by palliative consult.   Will continue to monitor.

## 2021-12-17 NOTE — PROGRESS NOTES
RT PROGRESS NOTE    VENT DAY# 3    CURRENT SETTINGS:   Ventilation Mode: CMV/AC  (Continuous Mandatory Ventilation/ Assist Control)  FiO2 (%): 40 %  Rate Set (breaths/minute): 18 breaths/min  Tidal Volume Set (mL): 400 mL  PEEP (cm H2O): 14 cmH2O  Oxygen Concentration (%): 50 %  Resp: 19      PATIENT PARAMETERS:  PIP 33  Pplat:  32  Pmean:  19  Secretions:  Scant amount of thick white ETT secretions  02 Sats:  100%    ETT SIZE 7.5 Secured at 20 cm at teeth/gums    Respiratory Medications: continuous Veletri (full strength)    ABG: Results for MIRELLA COLON (MRN [id number]) as of 12/17/2021 05:01   Ref. Range 12/17/2021 00:44   pH Arterial Latest Ref Range: 7.37 - 7.44  7.43   pCO2 Arterial Latest Ref Range: 35 - 45 mm Hg 34 (L)   PO2 Arterial Latest Ref Range: 75 - 85 mm Hg 214 (H)   Bicarbonate Arterial Latest Ref Range: 23 - 29 mmol/L 23   Base Excess Art Latest Ref Range:   mmol/L -2.0   FIO2 Unknown 70   Oxyhemoglobin Latest Ref Range: 95.0 - 96.0 % >98.5 (H)       NOTE / SHIFT SUMMARY:   Patient remains intubated on full vent support. Will titrate oxygen as appropriate and continue to follow.     Kyara Camp, RT

## 2021-12-17 NOTE — PROGRESS NOTES
Chart is reviewed, discussed with ICU team  Patient is still intubated and critically ill  Care is transferred to ICU team as primary and hospitalist service will sign off at this point.  Please consult our service when patient is transferred out of ICU.    Alex Mathews MD  [facility]  [phone]

## 2021-12-17 NOTE — PROGRESS NOTES
Critical Care Progress Note      12/17/2021    Name: Kody Johns MRN#: [id number]   Age: 71 year old YOB: 1950     Hsptl Day# 2  ICU DAY #    MV DAY #             Problem List:   Active Problems:    Acute respiratory failure with hypoxia (H)                 Summary/Hospital Course:   71 year old male former smoker, unvaccinated against COVID-19, with a history of atrial fibrillation on rate control and anticoagulation, chronic anemia and thrombocytopenia, peripheral neuropathy, CKD, hypothyroidism, HTN, depression, rheumatic heart disease s/p aortic and mitral valve replacement in April 2019 complicated by right PCA ischemic stroke, prolonged respiratory failure s/p trach and PEG in May 2019, hemo, cardiomyopathy with HFrEF, subsequent left hemiparesis at LTACH found to have hemorrhagic transformation of R PCA stroke treated medically, now with chronic spastic left hemiparesis, decannulated June 2019, now with ARDS due to COVID-19 infection, intubated on 12/15, acutely worsening hypoxia with frothy secretions and elevated BNP on 12/16.        Assessment and plan :       I have personally reviewed the daily labs, imaging studies, cultures and discussed the case with referring physician and consulting physicians.     My assessment and plan by system for this patient is as follows:    Neurology/Psychiatry:   Sedated with fentanyl and propofol.  Continue baclofen.  Home dose.  Continue gabapentin and sertraline      Cardiovascular:   Ischemic cardiomyopathy.  EF 25 to 30%.  Bioprosthetic mitral valve and bioprosthetic aortic valve.  Both look fine on echo  Elevated LV filling pressures    Atrial fibrillation/flutter.  Chronically anticoagulated with warfarin.  Rate controlled.  At home on metoprolol.    Vasoplegia secondary to sedation.  Levophed as needed.      Pulmonary/Ventilator Management:   Acute hypoxic respiratory failure secondary to COVID-19 pneumonia.  Presumed superimposed pulmonary  edema.    Continue trials of diuresis.    Treat for 5 days with Rocephin and Zithromax    Decrease PEEP to 10.  Decrease Veletri to half-strength    Tidal volume 400, rate of 18, FiO2 40%.  Plateau 33.  Hopefully that decreases with dropping the PEEP.    GI and Nutrition :   Initiate tube feeds today      Renal/Fluids/Electrolytes:   Increase Lasix to 3 times daily.    - monitor function and electrolytes as needed with replacement per ICU protocols.  - generally avoid nephrotoxic agents such as NSAID, IV contrast unless specifically required  - adjust medications as needed for renal clearance  - follow I/O's as appropriate.    Infectious Disease:   COVID-19 pneumonia.  Dexamethasone.  Remdesivir and Tocilizumab.  Receiving Rocephin and Zithromax for 5 days for possible community-acquired pneumonia    Endocrine:   Seems that he is on levothyroxine at home, 100 mcg.  TSH within normal limits.  Continue home dose.    Plan  - ICU insulin protocol, goal sugar <180    ICU Prophylaxis:   1. DVT: On warfarin at home.  INR elevated.  Monitor daily.  2. VAP: HOB 30 degrees, chlorhexidine rinse  3. Stress Ulcer: PPI  4. Restraints: Nonviolent soft two point restraints required and necessary for patient safety and continued cares and good effect as patient continues to pull at necessary lines, tubes despite education and distraction. Will readdress daily.   Category: Non-violent   Type of Restraint: Soft limb x2.   Behavior: Pulling at IVand monzon catheter tubings.   Root cause of behavior: Critical illness.   Less-restrictive methods that have failed: Redirection, reorientation. 1:1 NA at bedside.   Response to restraint: Not actively pulling atcurrent restraints.   Criteria for release from restraint: Responds to redirection. Leaves medical devices in place.           Key Medications:       remdesivir  100 mg Intravenous Q24H    And     sodium chloride 0.9%  50 mL Intravenous Q24H     azithromycin  500 mg Intravenous Q24H      Baclofen  10 mg Oral BID     cefTRIAXone  1 g Intravenous Q24H     chlorhexidine  15 mL Mouth/Throat BID     dexamethasone  6 mg Intravenous Daily     furosemide  20 mg Intravenous Q12H     gabapentin  100 mg Oral or Feeding Tube Q12H TESSY     insulin aspart  1-6 Units Subcutaneous Q4H     pantoprazole (PROTONIX) IV  40 mg Intravenous Daily with breakfast     sertraline  50 mg Oral or Feeding Tube Daily     sodium chloride (PF)  10-40 mL Intracatheter Q7 Days       dextrose       epoprostenol (VELETRI) 20 mcg/mL in sterile water inhalation solution 10 ng/kg/min (12/17/21 1125)     fentaNYL 50 mcg/hr (12/16/21 2200)     norepinephrine 0.03 mcg/kg/min (12/17/21 1031)     propofol (DIPRIVAN) infusion 20 mcg/kg/min (12/17/21 1001)               Physical Examination:   Temp:  [97.3  F (36.3  C)-101.7  F (38.7  C)] 97.9  F (36.6  C)  Pulse:  [] 85  Resp:  [16-58] 18  MAP:  [58 mmHg-151 mmHg] 76 mmHg  Arterial Line BP: ()/(49-87) 100/63  FiO2 (%):  [40 %-90 %] 40 %  SpO2:  [89 %-100 %] 100 %    Intake/Output Summary (Last 24 hours) at 12/17/2021 1157  Last data filed at 12/17/2021 0800  Gross per 24 hour   Intake 1886.02 ml   Output 2350 ml   Net -463.98 ml     Wt Readings from Last 4 Encounters:   12/17/21 46.6 kg (102 lb 11.8 oz)   11/09/21 50.3 kg (111 lb)   04/14/21 46.3 kg (102 lb)   02/22/21 45.4 kg (100 lb)     Arterial Line BP: ()/(49-87) 100/63  MAP:  [58 mmHg-151 mmHg] 76 mmHg  Ventilation Mode: CMV/AC  (Continuous Mandatory Ventilation/ Assist Control)  FiO2 (%): 40 %  Rate Set (breaths/minute): 18 breaths/min  Tidal Volume Set (mL): 400 mL  PEEP (cm H2O): (S) 10 cmH2O  Oxygen Concentration (%): 40 %  Resp: 18    Recent Labs   Lab 12/17/21  0532 12/17/21  0044 12/16/21 2026 12/16/21  1123   PH 7.44 7.43 7.41 7.45*   PCO2 34* 34* 35 31*   PO2 93* 214* 132* 59*   HCO3 24 23 23 23   O2PER 40 70 70 80       GEN: no acute distress   HEENT: head ncat, sclera anicteric, OP patent, trachea midline    PULM: unlabored synchronous with vent, clear anteriorly    CV/COR: Irregularly irregular,  No rub, no murmur  ABD: soft nontender, hypoactive bowel sounds, no mass  EXT: No significant edema  NEURO: Sedated  LINES: clean, dry intact         Data:   All data and imaging reviewed     ROUTINE ICU LABS (Last four results)  CMP  Recent Labs   Lab 12/17/21  0945 12/17/21  0531 12/17/21  0529 12/17/21  0131 12/16/21  2159 12/16/21  2027 12/16/21  1841 12/16/21  1414 12/16/21  1025 12/16/21  0712 12/15/21  1718   NA  --   --  143  --   --   --   --  138  --  137 137   POTASSIUM  --   --  3.5  --   --  3.7  --  3.5  --  3.3* 3.8   CHLORIDE  --   --  112*  --   --   --   --  110*  --  110* 101   CO2  --   --  20*  --   --   --   --  19*  --  19* 23   ANIONGAP  --   --  11  --   --   --   --  9  --  8 13   GLC 91 207* 205* 189*   < >  --    < > 173*   < > 259* 125   BUN  --   --  22  --   --   --   --  18  --  19 32*   CR  --   --  0.80  --   --   --   --  0.85  --  0.77 1.36*   GFRESTIMATED  --   --  >90  --   --   --   --  88  --  >90 52*   NIKITA  --   --  7.6*  --   --   --   --  7.8*  --  6.8* 8.0*   MAG  --   --  1.9  --   --   --   --   --   --  1.9  --    PROTTOTAL  --   --   --   --   --   --   --   --   --   --  7.7   ALBUMIN  --   --   --   --   --   --   --   --   --   --  3.1*   BILITOTAL  --   --   --   --   --   --   --   --   --   --  1.3*   ALKPHOS  --   --   --   --   --   --   --   --   --   --  93   AST  --   --   --   --   --   --   --   --   --   --  74*   ALT  --   --   --   --   --   --   --   --   --   --  27    < > = values in this interval not displayed.     CBC  Recent Labs   Lab 12/17/21  0529 12/16/21  0712 12/15/21  1638   WBC 11.6* 5.0 3.9*   RBC 4.51 4.37* 4.76   HGB 14.2 13.9 14.9   HCT 41.4 40.4 45.0   MCV 92 92 95   MCH 31.5 31.8 31.3   MCHC 34.3 34.4 33.1   RDW 13.8 13.6 13.5   PLT 73* 52* 63*     INR  Recent Labs   Lab 12/17/21  0529 12/16/21  0712 12/15/21  1637 12/14/21  0931   INR 4.18*  4.29* 3.22* 2.5     Arterial Blood Gas  Recent Labs   Lab 12/17/21  0532 12/17/21  0044 12/16/21 2026 12/16/21  1123   PH 7.44 7.43 7.41 7.45*   PCO2 34* 34* 35 31*   PO2 93* 214* 132* 59*   HCO3 24 23 23 23   O2PER 40 70 70 80       All cultures:  No results for input(s): CULT in the last 168 hours.  Recent Results (from the past 24 hour(s))   XR Chest 1 View    Narrative    EXAM: XR CHEST 1 VIEW  LOCATION: [facility]  DATE/TIME: 12/16/2021 2:15 PM    INDICATION: acute respiratory failure. COVID  COMPARISON: 12/16/2021 at 0 8:23 AM      Impression    IMPRESSION: Endotracheal tube tip 1.8 cm from gerogina. Nasogastric tube tip below the film. Right PICC tip projects over the lower SVC. Prior sternotomy. Heart valve prosthesis. Stable enlarged cardiac silhouette. The left mid lung infiltrate appears   improved. The right upper lung infiltrate appears worse. No pneumothorax. No significant effusion seen.         Billing: This patient is critically ill: Yes. Total critical care time today 40 min. Managing acute respiratory failure requiring mechanical ventilation

## 2021-12-17 NOTE — ED NOTES
Respiratory Care Note    Pt transported up to ICU without issue. FiO2 titrated to 70 SpO2 100, FS Veletri started. Report given to oncoming RT.       Michel Sidhu, RT

## 2021-12-17 NOTE — PROGRESS NOTES
RT PROGRESS NOTE    VENT DAY# 3    CURRENT SETTINGS:   Ventilation Mode: CMV/AC  (Continuous Mandatory Ventilation/ Assist Control)  FiO2 (%): 40 %  Rate Set (breaths/minute): 18 breaths/min  Tidal Volume Set (mL): 400 mL  PEEP (cm H2O): 5 cmH2O  Oxygen Concentration (%): 40 %  Resp: 18      PATIENT PARAMETERS:  PIP 26  Pplat:  25  Pmean:  15  SBT: NO  Secretions:  Scant white thin  02 Sats: 94-97 %    ETT SIZE 7.5 Secured at 20 cm at teeth/gums    Respiratory Medications: Flolan decreased to half strength at 1115 and order received to stop at 1324 per provider.       NOTE / SHIFT SUMMARY:  Plan for today per MD was to decreased PEEP to +5 as patient tolerates and then discontinue flolan.  Patient's vent settings were weaned today. PEEP down to +5 and flolan discontinued today.     Ashleigh Morales, RT

## 2021-12-17 NOTE — PLAN OF CARE
Problem: Gas Exchange Impaired (Pulmonary Impairment)  Goal: Optimal Gas Exchange  Outcome: No Change  Ventilation Mode: CMV/AC  (Continuous Mandatory Ventilation/ Assist Control)  FiO2 (%): 70 %  Rate Set (breaths/minute): 18 breaths/min  Tidal Volume Set (mL): 400 mL  PEEP (cm H2O): 14 cmH2O  Oxygen Concentration (%): 70 %  Resp: 21       Vitals:    12/16/21 2130 12/16/21 2145 12/16/21 2200 12/16/21 2215   BP:       BP Location:       Pulse: 70 71 68 68   Resp: 21 18 (!) 58 21   Temp:   97.3  F (36.3  C)    TempSrc:   Oral    SpO2: 100% 100% 100% 100%   Weight:            Problem: Adult Inpatient Plan of Care  Goal: Absence of Hospital-Acquired Illness or Injury  Intervention: Prevent Skin Injury  Recent Flowsheet Documentation  Taken 12/16/2021 2000 by Sydni Allen, RN  Body Position: supine                        Left                        Supine    Problem: Adult Inpatient Plan of Care  Goal: Optimal Comfort and Wellbeing  Outcome: Improving   Fentanyl drip.

## 2021-12-17 NOTE — CONSULTS
[facility] - Palliative Care Consultation Note    Patient: Kody Johns  Date of Admission:  12/15/2021    Requesting Clinician / Team: Dr Rubio  Reason for consult: Goals of Care (in the setting of COVID, intubation)    Summary/Recommendations:    Goals of care:   Called and spoke with dash Fernandez for an introductory palliative care goals of care meeting.   Attempted to clarify multiple times during today's call to dash Fernandez, that he is the primary medical surrogate decision maker (or culturally, how are decisions made, as pt is , decision making from spouse/next of kin). Jim has been giving consent for procedures and treatments on behalf of his father, is his PCA. Jim indicates that pt's spouse is deaf and mute.   Jim had spoken with a physician when pt first at [facility], now has been getting updates from RNs.   Appreciates hearing that his father is requiring less oxygen today (lower FIO2 and lower PEEP).   Pt has a hx of a trach and peg in 2019. Dash Fernandez indicates pt was frustrated with the trach as he was unable to speak.   Pt was apprehensive about surgeries as he had a CVA after his heart surgery but he did not definitively decline any further surgery (would want to review, discuss).   Jim was not aware of the Nov. 9th visit pt had with PCP, reviewing Gates's thoughts and wishes related to further Cardiology evaluation/follow up (pts brother took him to this appt). They discussed that if Kody did not want to pursue further Cardiology evaluation and care, could consider hospice. They indicated they were going to reflect on this, discuss further but sounds per Jim, as though may not have as he was unaware (and Gates did accept a referral to Cardiology).  Goals are restorative.   Jim is aware that Kody has made some improvement from a pulm perspective. Uncertain the exact course ahead (son aware one day at a time). If he continues to do well, dash Fernandez aware there will need to be a  discussion about Kody's wishes prior to extubation as far as ? re-intubation (if he then subsequently did not do well).   Jim would value a daily call from the designated ICU provider for a clinical update if possible.      Advanced care planning  -Previous Healthcare Directive: None on file.   -Surrogate Medical Decision Maker: Son Jim (has been giving consent for procedures and treatments on behalf of his father, is his PCA). Pts spouse is Miesha and is deaf/mute.   -CODE STATUS: DNR but ok for pre-arrest intubation ok    Symptom management    Respiratory failure, 2nd COVID, hx previous trach/peg in 2019. Currently, requiring less ventilatory support than yesterday.   -Appreciate Pulmonology/Critical Care ongoing evaluation and management.     Pain, hx of (left leg numbness/tingling and back pain). On fentanyl gtt.   Appreciate ICU management.     Psychosocial and support needs  Pt is Pentecostal, spiritual. Family believe he would welcome a visit from Palliative Care  or Spiritual Care.    Case discussed with ICU, RN.      Thank you for the opportunity to participate in the care of this patient and family.      During regular M-F work hours -- if you are not sure who specifically to contact -- please contact us by calling [phone].      Palliative Care Assessment    Kody Johns is a 71 year old male with a history of history of hyperlipidemia, atrial fibrillation, aortic valve disease, mitral and atrial valve replacement, chronic systolic congestive heart failure, GERD, tuberculosis, acute kidney injury, heart murmur, stroke, asthma, CKD, and CAD who presented to the ED on Dec. 15th for evaluation of chest pain, cough, fever, and shortness of breath.  Admitted to the hospital with shortness of breath. Covid positive. Intubated  Previous hospitalizations: None recently.   Other specialities following this admission: Pulm/Critical Care  Recent changes: Per the ER, the patient's son reports that the  patient has had worsening fatigue and shortness of breath for the last two days. Today he had extremely labored breathing and was not able to get out of his bed. The patient denies any pain. The patient is currently on coumadin.     Today, the patient was seen for:  Goals of care    Previous Palliative Care Encounters: Yes, 2019. Of note, several of pts brothers and spouse were more involved in medical decisions. Concern re: young age of son and health literacy then.     Referring ICU provider note:  CODE STATUS, DISPOSITION, FAMILY COMMUNICATION: DNR after I talked to the patient's son. Critically ill requiring invasive mechanical ventilation and continuous vasopressors. Spoke with the patient's son, Jim, via [employer]  by telephone. Will involve palliative care.    PCP Note from 11-9-21 Dr Kasper  Atrial fibrillation, does appear rate controlled today, is on anticoagulation, discussed importance of compliance with treatment, follow-up with cardiologist, new referral was placed. Also discussed with patient Brother Mark  that hospice care could be an option if he continues to decline additional testing and treatment. They will need to discuss in family and let us know.    Summary of Palliative Encounter:  I  discussed the reason for Palliative Care Referral and our role in symptom management, patient family communication and understanding their choices for medical treatment and providing  guidance in making difficult decisions in the framework of focusing on patient comfort and quality of life.    Reviewed current conditions and treatments including COVID infection, a fib discussions, quality of life prior to admission, surrogate decision maker, pts experience with past trach and peg, goals of care, code status.        Prognosis, Goals, and/or Advance Care Planning were addressed today: Yes    Mood, coping, and/or meaning in the context of serious illness were addressed today: Yes    Functional  Status:  Functional Status two weeks prior to hospitalization: Per CM, Patient lives in a house with his son Jim who is his PCA for all activities of daily living. Patient has a history of a stroke and has residual hemiplegia. Patient uses a walker for mobility at baseline.  Functional status Current:  Bedbound (intubated, sedated)    Prognosis, Goals, & Planning:   Prognosis (quality & quantity): Discussed pts conversation in Nov. re: cardiac fxn, as well as current condition (COVID, intubation). Goals are restorative at this time.      Patient's decision making preferences: Unknown.         Capacity evaluation:    Pt Kody does not currently have capacity to make a decision regarding medical care as is intubated, sedated.     I have concerns about the patient/family's health literacy today: Yes    Patient has a completed Health Care Directive: None on file. Son not aware of one.     Code status: DNR but ok for pre-arrest intubation      Social:     Relationships: . Spouse is deaf and mute. Son Carloz indicates pt has 2 sons and 3 dtrs, and 2 grandchildren. Lives with spouse, 1 son and 1 dtr They are quarantining.     Hobbies: Lives to watch TV, enjoy family, grandchildren.    Spirituality: Was Jehovah's witness, now Restorationist.     High Palliative Symptom Data:  Pt is intubated, sedated    Patient is on opioids: Yes    ROS:  Comprehensive ROS is reviewed and is negative except as here & per HPI:      Past Medical History:  Past Medical History:   Diagnosis Date     ALEENA (acute kidney injury) (H)      Anemia due to blood loss, acute 4/24/2019     Asthma      Atrial fibrillation (H)      Blurry vision      CHF (congestive heart failure) (H)      Chronic kidney disease      Coronary artery disease      Dysphagia     resolved     Dysuria      Hearing loss      Heart murmur      Heart murmur      Hyperlipidemia      Hypertension      Hypothyroidism      Mitral valve stenosis      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      Moderate malnutrition (H)      Palpitations      Rheumatic heart disease      Shortness of breath     exertion     Stroke (H)     Silent     Valvular disease         Past Surgical History:  Past Surgical History:   Procedure Laterality Date     AORTIC VALVE REPLACEMENT N/A 4/24/2019    Procedure: AORTIC VALVE REPLACEMENT;  Surgeon: Jojo Vaughn MD;  Location: [facility];  Service: Cardiovascular     C REPLACEMENT OF MITRAL VALVE N/A 4/24/2019    Procedure: MITRAL VALVE REPLACEMENT, ANESTHESIA, TAKEDOWN OF PERICARDIAL ADHESIONS AND REINFORCEMENT OF KLS PLATING SYSTEM TRANESOPHAGEAL ECHOCARDIOGRAM AND EPI AORTIC ULTRASOUND;  Surgeon: Jojo Vaughn MD;  Location: [facility];  Service: Cardiovascular     CARDIAC CATHETERIZATION       CATARACT EXTRACTION Left 06/13/2016     CV CORONARY ANGIOGRAM N/A 11/30/2018    Procedure: Coronary Angiogram;  Surgeon: Jeff Deleon MD;  Location: [facility];  Service: Cardiology     EYE SURGERY      cataract     HC UGI ENDOSCOPY W PLACEMENT GASTROSTOMY TUBE PERCUT N/A 5/8/2019    Procedure: CREATION, GASTROSTOMY, PERCUTANEOUS, ENDOSCOPIC;  Surgeon: Chandana Kang MD;  Location: [facility];  Service: General     PICC AND MIDLINE TEAM LINE INSERTION  4/29/2019          PICC TRIPLE LUMEN PLACEMENT  12/15/2021          TRACHEOSTOMY N/A 5/8/2019    Procedure: TRACHEOSTOMY;  Surgeon: Chandana Kang MD;  Location: [facility];  Service: General         Family History:  Family History   Problem Relation Age of Onset     No Known Problems Mother      No Known Problems Father      Heart Disease No family hx of          Allergies:  No Known Allergies     Medications:  I have reviewed this patient's medication profile and medications from this hospitalization.       remdesivir  100 mg Intravenous Q24H    And     sodium chloride 0.9%  50 mL Intravenous Q24H     azithromycin  500 mg Intravenous Q24H     Baclofen  10 mg Oral BID      cefTRIAXone  1 g Intravenous Q24H     chlorhexidine  15 mL Mouth/Throat BID     dexamethasone  6 mg Intravenous Daily     furosemide  20 mg Intravenous Q12H     gabapentin  100 mg Oral or Feeding Tube Q12H TESSY     insulin aspart  1-6 Units Subcutaneous Q4H     pantoprazole (PROTONIX) IV  40 mg Intravenous Daily with breakfast     potassium chloride  10 mEq Intravenous Once     sertraline  50 mg Oral or Feeding Tube Daily     sodium chloride (PF)  10-40 mL Intracatheter Q7 Days        PRN MEDS:   acetaminophen, acetaminophen, dextrose, glucose **OR** dextrose **OR** glucagon, fentaNYL, lidocaine 4%, lidocaine (buffered or not buffered), midazolam, naloxone **OR** naloxone **OR** naloxone **OR** naloxone, ondansetron **OR** ondansetron, sodium chloride (PF), sodium chloride (PF), sodium chloride (PF)     Morphine Equivalent Daily Dose: On fentanyl drip    Physical Exam:  /57   Pulse 85   Temp 97.9  F (36.6  C) (Oral)   Resp 18   Wt 46.6 kg (102 lb 11.8 oz)   SpO2 100%   BMI 21.27 kg/m      General Appearance: Pt intubated, sedated, in COVID isolation.     Data reviewed:    Data   EXAM: XR CHEST 1 VIEW  LOCATION: [facility]  DATE/TIME: 12/16/2021 2:15 PM     INDICATION: acute respiratory failure. COVID  COMPARISON: 12/16/2021 at 0 8:23 AM                                                             IMPRESSION: Endotracheal tube tip 1.8 cm from georgina. Nasogastric tube tip below the film. Right PICC tip projects over the lower SVC. Prior sternotomy. Heart valve prosthesis. Stable enlarged cardiac silhouette. The left mid lung infiltrate appears   improved. The right upper lung infiltrate appears worse. No pneumothorax. No significant effusion seen.    Most recent ECHO  The left ventricle is normal in size.  The visual ejection fraction is 25-30%.  Diastolic Doppler findings (E/E' ratio and/or other parameters) suggest left  ventricular filling pressures are increased.  There is severe  global hypokinesia of the left ventricle.  Mildly decreased right ventricular systolic function  The left atrium is severely dilated.  The right atrium is severely dilated.  There is a bioprosthetic mitral valve.  Mild prosthetic mitral valve stenosis  There is a unknown bioprosthetic valve present in aortic valve position.  The valve opening cannot be assessed due to imaging artifacts from the  prosthesis.  The prosthetic valve gradients are normal .  The right ventricular systolic pressure is approximated at 28mmHg plus the  right atrial pressure.  Right ventricular diastolic pressure is approximated at 12mmHg plus the right  atrial pressure.  IVC diameter >2.1 cm collapsing <50% with sniff suggests a high RA pressure  estimated at 15 mmHg or greater.  The ascending aorta is Mildly dilated.  The rhythm was atrial fibrillation.  Compared to echo from 2/7/21 the findings are similar.      Lab Results   Component Value Date/Time    ALBUMIN 3.1 (L) 12/15/2021 05:18 PM     Wt Readings from Last 3 Encounters:   12/17/21 46.6 kg (102 lb 11.8 oz)   11/09/21 50.3 kg (111 lb)   04/14/21 46.3 kg (102 lb)         TTS: I have personally spent a total of 80 minutes today on the unit in review of medical record, consultation with the medical providers and assessment of patient today, with more than 50% of this time spent in counseling, coordination of care, and conversation with family re: pts current clinical status, health and QOL PTA, past experience with trach and peg, diagnostic results, prognosis, symptom management, emotional support, risks and benefits of management options, surrogate decision maker, reintubation decision, if progresses towards extubation, and development of plan of care. 100% non face to face telephone call to family with a  Professional  2nd COVID status. Start 245-Stop 400. 5 minutes with MD and RN.      VERN Aguilar, FNP-BC, PMHNP-BC  Palliative Care Nurse Practitioner  [facility]  [phone]      During regular M-F work hours -- if you are not sure who specifically to contact -- please contact us by calling [phone].

## 2021-12-17 NOTE — PROGRESS NOTES
Care Management Follow Up    Length of Stay (days): 2    Expected Discharge Date: To be determined.      Concerns to be Addressed:   ICU level of care, due to alteration in respiratory status secondary to COVID-19, requiring oral intubation/vent support and drips.  IV Decadron, IV Remdesivir, IV Azithromycin and IV Lasix.  Patient plan of care discussed at interdisciplinary rounds: Yes  Follow up from rounds/notes:   Wean vent as tolerated.     Anticipated Discharge Disposition:  Discharge goal is home pending response to treatment, medical needs and mobility closer to discharge.      Anticipated Discharge Services:  To be determined by destination, patient/family preferences, medical needs and mobility status closer to the time of discharge. Resumed home care for skilled nursing ([facility] cannot provide PT or OT).   Anticipated Discharge DME:  To be determined.     Patient/family educated on Medicare website which has current facility and service quality ratings:  NA  Education Provided on the Discharge Plan:  Per team  Patient/Family in Agreement with the Plan:  Yes    Referrals Placed by CM/SW:  None  Private pay costs discussed: Not applicable at this time.    Additional Information:  Patient lives in a house with his son Jim who is his PCA for all activities of daily living. Patient has a history of a stroke and has residual hemiplegia. His primary language is Yi. Patient is also open to [facility] for RN services: Home Care is [facility] at [phone] (they help with INR lab draws. If discharged to home, CM is to fax orders to [phone] but they cannot provide therapy so would need a referral to another agency if PT and OT recommended). Patient uses a walker for mobility at baseline.  Primary family contact is Jim at [phone].    Mary Ghotra RN

## 2021-12-18 NOTE — PROGRESS NOTES
Paged regarding PICC up internal jugular and coiled. Attempted to power flush x2 with no success. PICC wire exchanged under sterile technique. Difficulty advancing line past subclavian which appeared to be about the place of the previously mentioned coil. After multiple troubleshooting techniques, able to see the depth of PICC had dropped and had positive blood return with ports flushing easily. CXR done as patient in a flutter. On CXR, new PICC confirmed to be up internal jugular and coiled again. Spoke with RN and recommended continue to use PIVs until vascular access comes up to attempt the left arm.

## 2021-12-18 NOTE — PROGRESS NOTES
ETT # 7.5 secured at 20 cm at the teeth. Pt remains vented/full support. Changes are not made. BS diminished. Positional hypoxia noted. RT following.    Oseas Weber, RT

## 2021-12-18 NOTE — PLAN OF CARE
Problem: Adult Inpatient Plan of Care  Goal: Absence of Hospital-Acquired Illness or Injury  Outcome: No Change  Intervention: Identify and Manage Fall Risk  Recent Flowsheet Documentation  Taken 12/18/2021 0400 by Janina Basurto RN  Safety Promotion/Fall Prevention:   activity supervised   clutter free environment maintained   fall prevention program maintained   increased rounding and observation   increase visualization of patient   lighting adjusted   room near nurse's station   supervised activity  Taken 12/18/2021 0000 by Janina Basurto RN  Safety Promotion/Fall Prevention:   activity supervised   clutter free environment maintained   fall prevention program maintained   increased rounding and observation   increase visualization of patient   lighting adjusted   room near nurse's station   supervised activity  Taken 12/17/2021 2000 by Janina Basurto RN  Safety Promotion/Fall Prevention:   activity supervised   clutter free environment maintained   fall prevention program maintained   increased rounding and observation   increase visualization of patient   lighting adjusted   room near nurse's station   supervised activity  Intervention: Prevent Skin Injury  Recent Flowsheet Documentation  Taken 12/18/2021 0400 by Janina Basurto RN  Body Position:   turned   right  Taken 12/18/2021 0200 by Janina Basurto RN  Body Position:   turned   supine  Taken 12/18/2021 0000 by Janina Basurto RN  Body Position:   turned   right  Taken 12/17/2021 2200 by Janina Basurto RN  Body Position:   turned   supine  Taken 12/17/2021 2000 by Janina Basurto RN  Body Position:   turned   left  Intervention: Prevent Infection  Recent Flowsheet Documentation  Taken 12/18/2021 0400 by Janina Basurto RN  Infection Prevention:   cohorting utilized   environmental surveillance performed   equipment surfaces disinfected   hand hygiene promoted   personal protective equipment utilized   rest/sleep promoted   single  patient room provided  Taken 12/18/2021 0000 by Janina Basurto, RN  Infection Prevention:   cohorting utilized   environmental surveillance performed   equipment surfaces disinfected   hand hygiene promoted   personal protective equipment utilized   rest/sleep promoted   single patient room provided  Taken 12/17/2021 2000 by Janina Basurto RN  Infection Prevention:   cohorting utilized   environmental surveillance performed   equipment surfaces disinfected   hand hygiene promoted   personal protective equipment utilized   rest/sleep promoted   single patient room provided     Problem: Adult Inpatient Plan of Care  Goal: Optimal Comfort and Wellbeing  Outcome: No Change     Problem: Gas Exchange Impaired (Pulmonary Impairment)  Goal: Optimal Gas Exchange  Outcome: No Change     Patient turned reposition q 2 hrs . Has monzon catheter with good urine output. Tube feeding at goal of 30cc/hrs. 30 ml h20 flushes q 4hrs. TF residuals under 20cc. Propofol  increased due to increased RASS score and desynchrony with the vent.

## 2021-12-18 NOTE — PROGRESS NOTES
Critical Care Progress Note      12/18/2021    Name: Kody Johns MRN#: [id number]   Age: 71 year old YOB: 1950     Hsptl Day# 3  ICU DAY #    MV DAY #             Problem List:   Active Problems:    Acute respiratory failure with hypoxia (H)                 Summary/Hospital Course:   71 year old male former smoker, unvaccinated against COVID-19, with a history of atrial fibrillation on rate control and anticoagulation, chronic anemia and thrombocytopenia, peripheral neuropathy, CKD, hypothyroidism, HTN, depression, rheumatic heart disease s/p aortic and mitral valve replacement in April 2019 complicated by right PCA ischemic stroke, prolonged respiratory failure s/p trach and PEG in May 2019, hemo, cardiomyopathy with HFrEF, subsequent left hemiparesis at LTACH found to have hemorrhagic transformation of R PCA stroke treated medically, now with chronic spastic left hemiparesis, decannulated June 2019, now with ARDS due to COVID-19 infection, intubated on 12/15, acutely worsening hypoxia with frothy secretions and elevated BNP on 12/16.        Assessment and plan :       I have personally reviewed the daily labs, imaging studies, cultures and discussed the case with referring physician and consulting physicians.     My assessment and plan by system for this patient is as follows:    Neurology/Psychiatry:   Sedated with fentanyl and propofol.  Continue baclofen.  Home dose.  Continue gabapentin and sertraline      Cardiovascular:   Ischemic cardiomyopathy.  EF 25 to 30%.  Bioprosthetic mitral valve and bioprosthetic aortic valve.  Both look fine on echo  Elevated LV filling pressures    Atrial fibrillation/flutter.  Chronically anticoagulated with warfarin.  Rate controlled.  At home on metoprolol, currently on hold due to hypotension    Vasoplegia secondary to sedation plus sepsis.  Currently on Levophed 0.03 mcg/kg/min.      Pulmonary/Ventilator Management:   Acute hypoxic respiratory failure  secondary to COVID-19 pneumonia.  Presumed superimposed pulmonary edema.    Continue diuresis    Treat for 5 days with Rocephin and Zithromax.  We are not suctioning any purulent secretions at this point.    Unfortunately we had to go up on PEEP up to 10 today.    GI and Nutrition :   Initiate tube feeds today      Renal/Fluids/Electrolytes:   Increased Lasix to 3 times daily.    - monitor function and electrolytes as needed with replacement per ICU protocols.  - generally avoid nephrotoxic agents such as NSAID, IV contrast unless specifically required  - adjust medications as needed for renal clearance  - follow I/O's as appropriate.    Infectious Disease:   COVID-19 pneumonia.  Dexamethasone.  Remdesivir and Tocilizumab.  Receiving Rocephin and Zithromax for 5 days for possible community-acquired pneumonia    Endocrine:   Hypothyroidism on levothyroxine.  Continue home dose at 100 mcg.    Plan  - ICU insulin protocol, goal sugar <180    ICU Prophylaxis:   1. DVT: On warfarin at home.  INR elevated.  Monitor daily.  2. VAP: HOB 30 degrees, chlorhexidine rinse  3. Stress Ulcer: PPI  4. Restraints: Nonviolent soft two point restraints required and necessary for patient safety and continued cares and good effect as patient continues to pull at necessary lines, tubes despite education and distraction. Will readdress daily.   Category: Non-violent   Type of Restraint: Soft limb x2.   Behavior: Pulling at IVand monzon catheter tubings.   Root cause of behavior: Critical illness.   Less-restrictive methods that have failed: Redirection, reorientation. 1:1 NA at bedside.   Response to restraint: Not actively pulling atcurrent restraints.   Criteria for release from restraint: Responds to redirection. Leaves medical devices in place.           Key Medications:       remdesivir  100 mg Intravenous Q24H    And     sodium chloride 0.9%  50 mL Intravenous Q24H     azithromycin  500 mg Intravenous Q24H     Baclofen  10 mg Oral  BID     cefTRIAXone  1 g Intravenous Q24H     chlorhexidine  15 mL Mouth/Throat BID     dexamethasone  6 mg Intravenous Daily     furosemide  20 mg Intravenous Q8H     gabapentin  100 mg Oral or Feeding Tube Q12H TESSY     insulin aspart  1-12 Units Subcutaneous Q4H     insulin glargine  10 Units Subcutaneous Daily     levothyroxine  100 mcg Oral or Feeding Tube QAM AC     pantoprazole (PROTONIX) IV  40 mg Intravenous Daily with breakfast     sertraline  50 mg Oral or Feeding Tube Daily     sodium chloride (PF)  10-40 mL Intracatheter Q7 Days       dextrose       fentaNYL 100 mcg/hr (12/18/21 1358)     norepinephrine 0.03 mcg/kg/min (12/18/21 0947)     propofol (DIPRIVAN) infusion 20 mcg/kg/min (12/18/21 0600)               Physical Examination:   Temp:  [97.7  F (36.5  C)-98.7  F (37.1  C)] 97.7  F (36.5  C)  Pulse:  [] 93  Resp:  [16-21] 16  MAP:  [47 mmHg-147 mmHg] 83 mmHg  Arterial Line BP: ()/() 110/68  FiO2 (%):  [40 %-70 %] 70 %  SpO2:  [84 %-99 %] 97 %    Intake/Output Summary (Last 24 hours) at 12/17/2021 1157  Last data filed at 12/17/2021 0800  Gross per 24 hour   Intake 1886.02 ml   Output 2350 ml   Net -463.98 ml     Wt Readings from Last 4 Encounters:   12/18/21 46.6 kg (102 lb 11.8 oz)   11/09/21 50.3 kg (111 lb)   04/14/21 46.3 kg (102 lb)   02/22/21 45.4 kg (100 lb)     Arterial Line BP: ()/() 110/68  MAP:  [47 mmHg-147 mmHg] 83 mmHg  Ventilation Mode: CMV/AC  (Continuous Mandatory Ventilation/ Assist Control)  FiO2 (%): 70 %  Rate Set (breaths/minute): 16 breaths/min  Tidal Volume Set (mL): 400 mL  PEEP (cm H2O): 8 cmH2O  Oxygen Concentration (%): 70 %  Resp: 16    Recent Labs   Lab 12/18/21  1233 12/18/21  0547 12/17/21  2355 12/17/21  1810   PH 7.45* 7.49* 7.50* 7.45*   PCO2 42 37 35 37   PO2 58* 52* 52* 66*   HCO3 28 29 28 26   O2PER 70 0.5 0.4 40       GEN: no acute distress   HEENT: head ncat, sclera anicteric, OP patent, trachea midline   PULM: unlabored  synchronous with vent, clear anteriorly    CV/COR: Irregularly irregular,  No rub, no murmur  ABD: soft nontender, hypoactive bowel sounds, no mass  EXT: No significant edema  NEURO: Sedated  LINES: clean, dry intact         Data:   All data and imaging reviewed     ROUTINE ICU LABS (Last four results)  CMP  Recent Labs   Lab 12/18/21  1042 12/18/21  1035 12/18/21  0547 12/18/21  0424 12/18/21  0211 12/17/21  0531 12/17/21  0529 12/16/21  2159 12/16/21  2027 12/16/21  1841 12/16/21  1414 12/16/21  1025 12/16/21  0712 12/15/21  1718   NA  --   --   --  145  --   --  143  --   --   --  138  --  137 137   POTASSIUM  --  3.8  --  3.4*  --   --  3.5  --  3.7  --  3.5  --  3.3* 3.8   CHLORIDE  --   --   --  110*  --   --  112*  --   --   --  110*  --  110* 101   CO2  --   --   --  25  --   --  20*  --   --   --  19*  --  19* 23   ANIONGAP  --   --   --  10  --   --  11  --   --   --  9  --  8 13   *  --  275* 307* 264*   < > 205*   < >  --    < > 173*   < > 259* 125   BUN  --   --   --  27  --   --  22  --   --   --  18  --  19 32*   CR  --   --   --  0.83  --   --  0.80  --   --   --  0.85  --  0.77 1.36*   GFRESTIMATED  --   --   --  89  --   --  >90  --   --   --  88  --  >90 52*   NIKITA  --   --   --  7.2*  --   --  7.6*  --   --   --  7.8*  --  6.8* 8.0*   MAG  --   --   --  2.0  --   --  1.9  --   --   --   --   --  1.9  --    PROTTOTAL  --   --   --   --   --   --   --   --   --   --   --   --   --  7.7   ALBUMIN  --   --   --   --   --   --   --   --   --   --   --   --   --  3.1*   BILITOTAL  --   --   --   --   --   --   --   --   --   --   --   --   --  1.3*   ALKPHOS  --   --   --   --   --   --   --   --   --   --   --   --   --  93   AST  --   --   --   --   --   --   --   --   --   --   --   --   --  74*   ALT  --   --   --   --   --   --   --   --   --   --   --   --   --  27    < > = values in this interval not displayed.     CBC  Recent Labs   Lab 12/18/21  0424 12/17/21  0529 12/16/21  0712  12/15/21  1638   WBC 9.1 11.6* 5.0 3.9*   RBC 4.55 4.51 4.37* 4.76   HGB 14.2 14.2 13.9 14.9   HCT 41.6 41.4 40.4 45.0   MCV 91 92 92 95   MCH 31.2 31.5 31.8 31.3   MCHC 34.1 34.3 34.4 33.1   RDW 13.8 13.8 13.6 13.5   * 73* 52* 63*     INR  Recent Labs   Lab 12/18/21  0424 12/17/21  0529 12/16/21  0712 12/15/21  1637   INR 3.38* 4.18* 4.29* 3.22*     Arterial Blood Gas  Recent Labs   Lab 12/18/21  1233 12/18/21  0547 12/17/21  2355 12/17/21  1810   PH 7.45* 7.49* 7.50* 7.45*   PCO2 42 37 35 37   PO2 58* 52* 52* 66*   HCO3 28 29 28 26   O2PER 70 0.5 0.4 40       All cultures:  No results for input(s): CULT in the last 168 hours.  Recent Results (from the past 24 hour(s))   XR Chest 1 View    Narrative    EXAM: XR CHEST 1 VIEW  LOCATION: [facility]  DATE/TIME: 12/18/2021 8:07 AM    INDICATION: Respiratory failure. COVID infection.  COMPARISON: 12/16/2021 and older studies.      Impression    IMPRESSION: Poststernotomy changes with prosthetic aortic and mitral valves.     Endotracheal tube is in the mid trachea, 3 cm from the georgina.    Right upper extremity PICC line catheter has withdrawn and retracted into the inferior right jugular vein where it is looped for  a length of 3.7 cm. This needs to be repositioned.    NG tube can be seen coursing in the stomach.    Lower lung volumes. No significant change in bilateral parenchymal opacities with air bronchograms on the left with sparing of the left upper lobe.    Findings discussed by the undersigned with Mis at 0834.       XR Chest Port 1 View    Narrative    EXAM: XR CHEST PORT 1 VIEW  LOCATION: [facility]  DATE/TIME: 12/18/2021 8:53 AM    INDICATION: evaluate malposition picc  COMPARISON: Film earlier today at 08 15      Impression    IMPRESSION: There has been little change in the right IJ PICC line catheter which remains looped in the inferior right IJ for a length of 3 cm.    Breast and the findings are  unchanged.   XR Chest Port 1 View    Narrative    EXAM: XR CHEST PORT 1 VIEW  LOCATION: [facility]  DATE/TIME: 12/18/2021 10:30 AM    INDICATION: Check PICC placement.  COMPARISON: 12/18/2021 at 0907 hours.      Impression    IMPRESSION: Right-sided PICC malpositioned with catheter buckled into the right internal jugular vein and tip at the medial subclavian vein. This will need to be pulled back approximately 10 cm prior to re-advancing. ET tube is 2 cm above the georgina.   Table cardiomegaly with normal vascularity. Patchy bibasilar airspace opacities unchanged. No pneumothorax.   XR Chest Port 1 View    Narrative    EXAM: XR CHEST PORT 1 VIEW  LOCATION: [facility]  DATE/TIME: 12/18/2021 1:45 PM    INDICATION: Check line placement.  COMPARISON: 12/18/2021.      Impression    IMPRESSION: Sternotomy with valve prosthesis. ET tube in good position. Enteric tube in the stomach. Right PICC tip coiled in the internal jugular vein. This should be removed or repositioned.    New left PICC tip in the SVC.    Mild to moderate patchy pulmonary infiltrates are stable. Heart size mildly enlarged.         Billing: This patient is critically ill: Yes. Total critical care time today 36 min. Managing acute respiratory failure requiring mechanical ventilation

## 2021-12-18 NOTE — PROCEDURES
"Procedures PICC Line Insertion Procedure Note  Pt. Name: Kody Johns  MRN:        [id number]    Procedure: Insertion of a  triple Lumen  5 fr  Bard SOLO (valved) Power PICC, Lot number [id number]    Indications: vascular access    Contraindications : none noted    Procedure Details   Patient identified with 2 identifiers and \"Time Out\" conducted.  .     Central line insertion bundle followed: hand hygeine performed prior to procedure, site cleansed with cholraprep, hat, mask, sterile gloves,sterile gown worn, patient draped with maximum barrier head to toe drape, sterile field maintained.    The vein was assessed and found to be compressible and of adequate size. 3 ml 1% Lidocaine administered sq to the insertion site. A 5 Fr PICC was inserted into the basilic vein of the left arm with ultrasound guidance. One attempt(s) required to access vein.   Catheter threaded without difficulty. Good blood return noted.    Modified Seldinger Technique used for insertion.    The 8 sharps that are included in the PICC insertion kit were accounted for and disposed of in the sharps container prior to breakdown of the sterile field.    Catheter secured with Statlock, biopatch and Tegaderm dressing applied.    Findings:  Total catheter length  42 cm, with 0 cm exposed. Mid upper arm circumference is 24 cm. Catheter was flushed with 30 cc NS. Patient  tolerated procedure well.    Tip placement verified by xray. Xray read by Dr. Dill . Tip placement in the SVC.    CLABSI prevention brochure left at bedside.    Patient's primary RN notified PICC is ready for use.    Comments:          @[email or web address]@,BSN  [facility]          "

## 2021-12-19 NOTE — PLAN OF CARE
RT PROGRESS NOTE    VENT DAY# 4    CURRENT SETTINGS:   Ventilation Mode: CMV/AC  (Continuous Mandatory Ventilation/ Assist Control)  FiO2 (%): 70 %  Rate Set (breaths/minute): 16 breaths/min  Tidal Volume Set (mL): 400 mL  PEEP (cm H2O): 10 cmH2O  Oxygen Concentration (%): 70 %  Resp: 16      PATIENT PARAMETERS:  PIP 29  Pplat:  28  Pmean:  14  SBT: No     Secretions:  small white  02 Sats:  93%    ETT SIZE 7.5 Secured at 20 cm at teeth/gums    Respiratory Medications: None     ABG: @1800 pH 7.43; pCO2 44; pO2 64; HCO3 28, on 60    NOTE / SHIFT SUMMARY:   Patient remains on full vent support. PEEP increased to 10, FIO2 to 70% to keep SPO2 >90%. Will continue to wean O2 as tolerated,..    Vivi Curry, RT

## 2021-12-19 NOTE — PROGRESS NOTES
RT PROGRESS NOTE    VENT DAY# 5    CURRENT SETTINGS:   Ventilation Mode: CMV/AC  (Continuous Mandatory Ventilation/ Assist Control)  FiO2 (%): (S) 45 %  Rate Set (breaths/minute): 14 breaths/min  Tidal Volume Set (mL): 400 mL  PEEP (cm H2O): 10 cmH2O  Oxygen Concentration (%): (S) 45 %  Resp: 16      PATIENT PARAMETERS:  PIP 26-29  Pplat:  23-28  Pmean:  14  Compliance:   Secretions:  Small clear thin  02 Sats:  91-97%    ETT SIZE 7.5 Secured at 20 cm at teeth/gums    Respiratory Medications: none     ABG: @ 0011 pH 7.46 ; pCO2 43 ; pO2 55 ; HCO3 30 , 89.3 %O2 Sat , BE 6.7  on 50%    NOTE / SHIFT SUMMARY:   Kody continues on the ventilator with the above settings. RR decreased from 16 to 14 this shift. BS diminished. ETT repositioned throughout this shift per protocol. Continue to monitor and wean as able.    Amairani Noble, RT

## 2021-12-19 NOTE — PROGRESS NOTES
Critical Care Progress Note      12/19/2021    Name: Kody Johns MRN#: [id number]   Age: 71 year old YOB: 1950     Hsptl Day# 4  ICU DAY #    MV DAY #             Problem List:   Active Problems:    Acute respiratory failure with hypoxia (H)                 Summary/Hospital Course:   71 year old male former smoker, unvaccinated against COVID-19, with a history of atrial fibrillation on rate control and anticoagulation, chronic anemia and thrombocytopenia, peripheral neuropathy, CKD, hypothyroidism, HTN, depression, rheumatic heart disease s/p aortic and mitral valve replacement in April 2019 complicated by right PCA ischemic stroke, prolonged respiratory failure s/p trach and PEG in May 2019, hemo, cardiomyopathy with HFrEF, subsequent left hemiparesis at LTACH found to have hemorrhagic transformation of R PCA stroke treated medically, now with chronic spastic left hemiparesis, decannulated June 2019, now with ARDS due to COVID-19 infection, intubated on 12/15, acutely worsening hypoxia with frothy secretions and elevated BNP on 12/16.        Assessment and plan :       I have personally reviewed the daily labs, imaging studies, cultures and discussed the case with referring physician and consulting physicians.     My assessment and plan by system for this patient is as follows:    Neurology/Psychiatry:   Sedated with fentanyl and propofol.  Continue baclofen.  Home dose.  Continue gabapentin and sertraline      Cardiovascular:   Ischemic cardiomyopathy.  EF 25 to 30%.  Bioprosthetic mitral valve and bioprosthetic aortic valve.  Both look fine on echo  Elevated LV filling pressures    Atrial fibrillation/flutter.  Chronically anticoagulated with warfarin.  Rate controlled.  At home on metoprolol, currently on hold due to hypotension    Vasoplegia secondary to sedation plus sepsis.  Currently on Levophed 0.02 mcg/kg/min.      Pulmonary/Ventilator Management:   Acute hypoxic respiratory failure  secondary to COVID-19 pneumonia.  Presumed superimposed pulmonary edema.    Hold diuretics.  Bicarb and sodium climbing up.    Klebsiella pneumonia.  Will do 12 days of Rocephin.    Continue PEEP of 10.  Tidal volume 400, FiO2 is down to 40% today.    GI and Nutrition :   Tube feeds.  Scheduled MiraLAX, senna and bisacodyl      Renal/Fluids/Electrolytes:   Hold Lasix.  Increase free water enterally and give 500 mL of 5% dextrose    - monitor function and electrolytes as needed with replacement per ICU protocols.  - generally avoid nephrotoxic agents such as NSAID, IV contrast unless specifically required  - adjust medications as needed for renal clearance  - follow I/O's as appropriate.    Infectious Disease:   COVID-19 pneumonia.  Dexamethasone.  Remdesivir and Tocilizumab.    Klebsiella pneumonia.  Complete 12 days of Rocephin.    Endocrine:   Hypothyroidism on levothyroxine.  Continue home dose at 100 mcg.    Plan  - ICU insulin protocol, goal sugar <180    ICU Prophylaxis:   1. DVT: On warfarin at home.  INR elevated.  Monitor daily.  2. VAP: HOB 30 degrees, chlorhexidine rinse  3. Stress Ulcer: PPI  4. Restraints: Nonviolent soft two point restraints required and necessary for patient safety and continued cares and good effect as patient continues to pull at necessary lines, tubes despite education and distraction. Will readdress daily.   Category: Non-violent   Type of Restraint: Soft limb x2.   Behavior: Pulling at IVand monzon catheter tubings.   Root cause of behavior: Critical illness.   Less-restrictive methods that have failed: Redirection, reorientation. 1:1 NA at bedside.   Response to restraint: Not actively pulling atcurrent restraints.   Criteria for release from restraint: Responds to redirection. Leaves medical devices in place.           Key Medications:       remdesivir  100 mg Intravenous Q24H    And     sodium chloride 0.9%  50 mL Intravenous Q24H     azithromycin  500 mg Intravenous Q24H      Baclofen  10 mg Oral BID     cefTRIAXone  1 g Intravenous Q24H     chlorhexidine  15 mL Mouth/Throat BID     dexamethasone  6 mg Intravenous Daily     dextrose 5%  500 mL Intravenous Once     sennosides  5 mL Oral or Feeding Tube Daily    And     docusate  50 mg Oral or Feeding Tube Daily     [Held by provider] furosemide  20 mg Intravenous Q8H     gabapentin  100 mg Oral or Feeding Tube Q12H TESSY     insulin aspart  1-12 Units Subcutaneous Q4H     insulin glargine  10 Units Subcutaneous Daily     levothyroxine  100 mcg Oral or Feeding Tube QAM AC     pantoprazole (PROTONIX) IV  40 mg Intravenous Daily with breakfast     polyethylene glycol  17 g Oral or Feeding Tube Daily     sertraline  50 mg Oral or Feeding Tube Daily     sodium chloride (PF)  10-40 mL Intracatheter Q7 Days       dextrose       fentaNYL 50 mcg/hr (12/19/21 0020)     norepinephrine 0.02 mcg/kg/min (12/19/21 1030)     propofol (DIPRIVAN) infusion 10 mcg/kg/min (12/19/21 0020)     Warfarin Therapy Reminder                 Physical Examination:   Temp:  [97.4  F (36.3  C)-100.2  F (37.9  C)] 100.2  F (37.9  C)  Pulse:  [] 100  Resp:  [13-30] 14  MAP:  [42 mmHg-136 mmHg] 80 mmHg  Arterial Line BP: ()/(30-87) 102/67  FiO2 (%):  [45 %-70 %] 50 %  SpO2:  [88 %-100 %] 95 %    Intake/Output Summary (Last 24 hours) at 12/17/2021 1157  Last data filed at 12/17/2021 0800  Gross per 24 hour   Intake 1886.02 ml   Output 2350 ml   Net -463.98 ml     Wt Readings from Last 4 Encounters:   12/18/21 46.6 kg (102 lb 11.8 oz)   11/09/21 50.3 kg (111 lb)   04/14/21 46.3 kg (102 lb)   02/22/21 45.4 kg (100 lb)     Arterial Line BP: ()/(30-87) 102/67  MAP:  [42 mmHg-136 mmHg] 80 mmHg  Ventilation Mode: CMV/AC  (Continuous Mandatory Ventilation/ Assist Control)  FiO2 (%): 50 %  Rate Set (breaths/minute): 14 breaths/min  Tidal Volume Set (mL): 400 mL  PEEP (cm H2O): 10 cmH2O  Oxygen Concentration (%): (S) 40 %  Resp: 14    Recent Labs   Lab 12/19/21  4749  12/19/21  0642 12/19/21  0011 12/18/21  1800   PH 7.48* 7.49* 7.46* 7.43   PCO2 45 43 43 44   PO2 64* 59* 55* 64*   HCO3 32* 32* 30* 28   O2PER 40 50 50 60       GEN: no acute distress   HEENT: head ncat, sclera anicteric, OP patent, trachea midline   PULM: unlabored synchronous with vent, clear anteriorly    CV/COR: Irregularly irregular,  No rub, no murmur  ABD: soft nontender, hypoactive bowel sounds, no mass  EXT: No significant edema  NEURO: Sedated  LINES: clean, dry intact         Data:   All data and imaging reviewed     ROUTINE ICU LABS (Last four results)  CMP  Recent Labs   Lab 12/19/21  1401 12/19/21  1007 12/19/21  0642 12/19/21  0640 12/18/21  1042 12/18/21  1035 12/18/21  0547 12/18/21  0424 12/17/21  0531 12/17/21  0529 12/16/21  1841 12/16/21  1414 12/16/21  1025 12/16/21  0712 12/15/21  1718   NA  --   --  152*  --   --   --   --  145  --  143  --  138  --  137 137   POTASSIUM  --   --  3.8  --   --  3.8  --  3.4*  --  3.5   < > 3.5  --  3.3* 3.8   CHLORIDE  --   --  114*  --   --   --   --  110*  --  112*  --  110*  --  110* 101   CO2  --   --  31  --   --   --   --  25  --  20*  --  19*  --  19* 23   ANIONGAP  --   --  7  --   --   --   --  10  --  11  --  9  --  8 13   * 210* 202* 185*   < >  --    < > 307*   < > 205*   < > 173*   < > 259* 125   BUN  --   --  32*  --   --   --   --  27  --  22  --  18  --  19 32*   CR  --   --  0.90  --   --   --   --  0.83  --  0.80  --  0.85  --  0.77 1.36*   GFRESTIMATED  --   --  86  --   --   --   --  89  --  >90  --  88  --  >90 52*   NIKITA  --   --  7.5*  --   --   --   --  7.2*  --  7.6*  --  7.8*  --  6.8* 8.0*   MAG  --   --  2.3  --   --   --   --  2.0  --  1.9  --   --   --  1.9  --    PROTTOTAL  --   --   --   --   --   --   --   --   --   --   --   --   --   --  7.7   ALBUMIN  --   --   --   --   --   --   --   --   --   --   --   --   --   --  3.1*   BILITOTAL  --   --   --   --   --   --   --   --   --   --   --   --   --   --  1.3*    ALKPHOS  --   --   --   --   --   --   --   --   --   --   --   --   --   --  93   AST  --   --   --   --   --   --   --   --   --   --   --   --   --   --  74*   ALT  --   --   --   --   --   --   --   --   --   --   --   --   --   --  27    < > = values in this interval not displayed.     CBC  Recent Labs   Lab 12/19/21  0642 12/18/21  0424 12/17/21  0529 12/16/21  0712   WBC 6.7 9.1 11.6* 5.0   RBC 4.48 4.55 4.51 4.37*   HGB 14.0 14.2 14.2 13.9   HCT 42.5 41.6 41.4 40.4   MCV 95 91 92 92   MCH 31.3 31.2 31.5 31.8   MCHC 32.9 34.1 34.3 34.4   RDW 14.2 13.8 13.8 13.6   * 103* 73* 52*     INR  Recent Labs   Lab 12/19/21  0642 12/18/21  0424 12/17/21  0529 12/16/21  0712   INR 2.64* 3.38* 4.18* 4.29*     Arterial Blood Gas  Recent Labs   Lab 12/19/21  1242 12/19/21  0642 12/19/21  0011 12/18/21  1800   PH 7.48* 7.49* 7.46* 7.43   PCO2 45 43 43 44   PO2 64* 59* 55* 64*   HCO3 32* 32* 30* 28   O2PER 40 50 50 60       All cultures:  No results for input(s): CULT in the last 168 hours.  No results found for this or any previous visit (from the past 24 hour(s)).      Billing: This patient is critically ill: Yes. Total critical care time today 34 min. Managing acute respiratory failure requiring mechanical ventilation

## 2021-12-20 NOTE — PROGRESS NOTES
RT PROGRESS NOTE    VENT DAY# 6    CURRENT SETTINGS:   Ventilation Mode: CMV/AC  (Continuous Mandatory Ventilation/ Assist Control)  FiO2 (%): 60 %  Rate Set (breaths/minute): 14 breaths/min  Tidal Volume Set (mL): 400 mL  PEEP (cm H2O): 10 cmH2O  Oxygen Concentration (%): 60 %  Resp: 14      PATIENT PARAMETERS:  PIP 28  Pplat:  23  Pmean:  14  Compliance: 42  SBT: No    Secretions:  Scant white  02 Sats:  96%    ETT SIZE 7.5 Secured at 20 cm at teeth/gums    Respiratory Medications: none     ABG: @1201 pH 7.46; pCO2 44; pO2 67; HCO3 30, %O2 Sat 92, BE 7.3 on above settings    NOTE / SHIFT SUMMARY:   Cont current cares    Felicia Caseyp, RT

## 2021-12-20 NOTE — PLAN OF CARE
Problem: Inability to Wean (Mechanical Ventilation, Invasive)  Goal: Mechanical Ventilation Liberation  Outcome: No Change     Problem: Skin and Tissue Injury (Mechanical Ventilation, Invasive)  Goal: Absence of Device-Related Skin and Tissue Injury  Outcome: No Change     Problem: Ventilator-Induced Lung Injury (Mechanical Ventilation, Invasive)  Goal: Absence of Ventilator-Induced Lung Injury  Outcome: No Change

## 2021-12-20 NOTE — PROGRESS NOTES
Critical Care Progress Note      12/20/2021    Name: Kody Johns MRN#: [id number]   Age: 71 year old YOB: 1950     Hsptl Day# 5  ICU DAY #    MV DAY #             Problem List:   Active Problems:    Acute respiratory failure with hypoxia (H)                 Summary/Hospital Course:   71 year old male former smoker, unvaccinated against COVID-19, with a history of atrial fibrillation on rate control and anticoagulation, chronic anemia and thrombocytopenia, peripheral neuropathy, CKD, hypothyroidism, HTN, depression, rheumatic heart disease s/p aortic and mitral valve replacement in April 2019 complicated by right PCA ischemic stroke, prolonged respiratory failure s/p trach and PEG in May 2019, hemo, cardiomyopathy with HFrEF, subsequent left hemiparesis at LTACH found to have hemorrhagic transformation of R PCA stroke treated medically, now with chronic spastic left hemiparesis, decannulated June 2019, now with ARDS due to COVID-19 infection, intubated on 12/15, acutely worsening hypoxia with frothy secretions and elevated BNP on 12/16.        Assessment and plan :       I have personally reviewed the daily labs, imaging studies, cultures and discussed the case with referring physician and consulting physicians.     My assessment and plan by system for this patient is as follows:    Neurology/Psychiatry:   Sedated with fentanyl and propofol.  Continue baclofen.  Home dose.  Continue gabapentin and sertraline      Cardiovascular:   Ischemic cardiomyopathy.  EF 25 to 30%.  Bioprosthetic mitral valve and bioprosthetic aortic valve.  Both look fine on echo  Elevated LV filling pressures    Atrial fibrillation/flutter.  Chronically anticoagulated with warfarin.  Rate controlled.  At home on metoprolol, currently on hold due to hypotension    Vasoplegia secondary to sedation plus sepsis.  Levophed off since 5 AM.      Pulmonary/Ventilator Management:   Acute hypoxic respiratory failure secondary to COVID-19  pneumonia.  Presumed superimposed pulmonary edema.    Hold diuretics.  Bicarb and sodium climbing up.    Klebsiella pneumonia.  Will do 12 days of Rocephin.    Continue PEEP of 10.  Tidal volume 400, FiO2 back at 60%.    GI and Nutrition :   Tube feeds.  Scheduled MiraLAX, senna and bisacodyl      Renal/Fluids/Electrolytes:   Hold Lasix.  Increase free water enterally and give 500 mL of 5% dextrose  2 L negative    - monitor function and electrolytes as needed with replacement per ICU protocols.  - generally avoid nephrotoxic agents such as NSAID, IV contrast unless specifically required  - adjust medications as needed for renal clearance  - follow I/O's as appropriate.    Infectious Disease:   COVID-19 pneumonia.  Dexamethasone.  Remdesivir and Tocilizumab.    Klebsiella pneumonia.  Complete 12 days of Rocephin.    Endocrine:   Hypothyroidism on levothyroxine.  Continue home dose at 100 mcg.    Plan  - ICU insulin protocol, goal sugar <180    ICU Prophylaxis:   1. DVT: On warfarin at home.  INR elevated.  Monitor daily.  2. VAP: HOB 30 degrees, chlorhexidine rinse  3. Stress Ulcer: PPI  4. Restraints: Nonviolent soft two point restraints required and necessary for patient safety and continued cares and good effect as patient continues to pull at necessary lines, tubes despite education and distraction. Will readdress daily.   Category: Non-violent   Type of Restraint: Soft limb x2.   Behavior: Pulling at IVand monzon catheter tubings.   Root cause of behavior: Critical illness.   Less-restrictive methods that have failed: Redirection, reorientation. 1:1 NA at bedside.   Response to restraint: Not actively pulling atcurrent restraints.   Criteria for release from restraint: Responds to redirection. Leaves medical devices in place.           Key Medications:       azithromycin  500 mg Intravenous Q24H     Baclofen  10 mg Oral or Feeding Tube BID     cefTRIAXone  1 g Intravenous Q24H     chlorhexidine  15 mL  Mouth/Throat BID     dexamethasone  6 mg Intravenous Daily     sennosides  5 mL Oral or Feeding Tube Daily    And     docusate  50 mg Oral or Feeding Tube Daily     [Held by provider] furosemide  20 mg Intravenous Q8H     gabapentin  100 mg Oral or Feeding Tube Q12H TESSY     insulin aspart  1-12 Units Subcutaneous Q4H     insulin glargine  10 Units Subcutaneous Daily     levothyroxine  100 mcg Oral or Feeding Tube QAM AC     pantoprazole (PROTONIX) IV  40 mg Intravenous Daily with breakfast     polyethylene glycol  17 g Oral or Feeding Tube Daily     sertraline  50 mg Oral or Feeding Tube Daily     sodium chloride (PF)  10-40 mL Intracatheter Q7 Days     warfarin ANTICOAGULANT  1.5 mg Oral ONCE at 18:00       dextrose       fentaNYL 75 mcg/hr (12/20/21 1400)     norepinephrine Stopped (12/20/21 0549)     propofol (DIPRIVAN) infusion 15 mcg/kg/min (12/20/21 1400)     Warfarin Therapy Reminder                 Physical Examination:   Temp:  [98.3  F (36.8  C)-99.9  F (37.7  C)] 98.8  F (37.1  C)  Pulse:  [] 82  Resp:  [12-20] 14  MAP:  [64 mmHg-203 mmHg] 81 mmHg  Arterial Line BP: ()/() 108/65  FiO2 (%):  [40 %-60 %] 60 %  SpO2:  [85 %-100 %] 95 %    Intake/Output Summary (Last 24 hours) at 12/17/2021 1157  Last data filed at 12/17/2021 0800  Gross per 24 hour   Intake 1886.02 ml   Output 2350 ml   Net -463.98 ml     Wt Readings from Last 4 Encounters:   12/20/21 47.1 kg (103 lb 13.4 oz)   11/09/21 50.3 kg (111 lb)   04/14/21 46.3 kg (102 lb)   02/22/21 45.4 kg (100 lb)     Arterial Line BP: ()/() 108/65  MAP:  [64 mmHg-203 mmHg] 81 mmHg  Ventilation Mode: CMV/AC  (Continuous Mandatory Ventilation/ Assist Control)  FiO2 (%): 60 %  Rate Set (breaths/minute): 14 breaths/min  Tidal Volume Set (mL): 400 mL  PEEP (cm H2O): 10 cmH2O  Oxygen Concentration (%): 60 %  Resp: 14    Recent Labs   Lab 12/20/21  0536 12/20/21  0001 12/19/21  1746 12/19/21  1242   PH 7.47* 7.46* 7.45* 7.48*   PCO2 45 44  44 45   PO2 68* 67* 68* 64*   HCO3 32* 30* 30* 32*   O2PER 60 60 60 40       GEN: no acute distress   HEENT: head ncat, sclera anicteric, OP patent, trachea midline   PULM: unlabored synchronous with vent, clear anteriorly    CV/COR: Irregularly irregular,  No rub, no murmur  ABD: soft nontender, hypoactive bowel sounds, no mass  EXT: No significant edema  NEURO: Sedated  LINES: clean, dry intact         Data:   All data and imaging reviewed     ROUTINE ICU LABS (Last four results)  CMP  Recent Labs   Lab 12/20/21  1422 12/20/21  0951 12/20/21  0546 12/20/21  0537 12/19/21  2146 12/19/21  2121 12/19/21  1007 12/19/21  0642 12/18/21  1042 12/18/21  1035 12/18/21  0547 12/18/21  0424 12/17/21  0531 12/17/21  0529 12/16/21  0712 12/15/21  1718   NA  --   --   --  148*  --  149*  --  152*  --   --   --  145  --  143   < > 137   POTASSIUM  --   --   --  3.9  3.9  --   --   --  3.8  --  3.8  --  3.4*  --  3.5   < > 3.8   CHLORIDE  --   --   --  112*  --   --   --  114*  --   --   --  110*  --  112*   < > 101   CO2  --   --   --  31  --   --   --  31  --   --   --  25  --  20*   < > 23   ANIONGAP  --   --   --  5  --   --   --  7  --   --   --  10  --  11   < > 13   * 204* 143* 153*   < >  --    < > 202*   < >  --    < > 307*   < > 205*   < > 125   BUN  --   --   --  30*  --   --   --  32*  --   --   --  27  --  22   < > 32*   CR  --   --   --  0.76  --   --   --  0.90  --   --   --  0.83  --  0.80   < > 1.36*   GFRESTIMATED  --   --   --  >90  --   --   --  86  --   --   --  89  --  >90   < > 52*   NIKITA  --   --   --  7.3*  --   --   --  7.5*  --   --   --  7.2*  --  7.6*   < > 8.0*   MAG  --   --   --  2.3  --   --   --  2.3  --   --   --  2.0  --  1.9   < >  --    PROTTOTAL  --   --   --   --   --   --   --   --   --   --   --   --   --   --   --  7.7   ALBUMIN  --   --   --   --   --   --   --   --   --   --   --   --   --   --   --  3.1*   BILITOTAL  --   --   --   --   --   --   --   --   --   --   --   --    --   --   --  1.3*   ALKPHOS  --   --   --   --   --   --   --   --   --   --   --   --   --   --   --  93   AST  --   --   --   --   --   --   --   --   --   --   --   --   --   --   --  74*   ALT  --   --   --   --   --   --   --   --   --   --   --   --   --   --   --  27    < > = values in this interval not displayed.     CBC  Recent Labs   Lab 12/19/21  0642 12/18/21  0424 12/17/21  0529 12/16/21  0712   WBC 6.7 9.1 11.6* 5.0   RBC 4.48 4.55 4.51 4.37*   HGB 14.0 14.2 14.2 13.9   HCT 42.5 41.6 41.4 40.4   MCV 95 91 92 92   MCH 31.3 31.2 31.5 31.8   MCHC 32.9 34.1 34.3 34.4   RDW 14.2 13.8 13.8 13.6   * 103* 73* 52*     INR  Recent Labs   Lab 12/20/21  0537 12/19/21  0642 12/18/21  0424 12/17/21  0529   INR 2.60* 2.64* 3.38* 4.18*     Arterial Blood Gas  Recent Labs   Lab 12/20/21  0536 12/20/21  0001 12/19/21  1746 12/19/21  1242   PH 7.47* 7.46* 7.45* 7.48*   PCO2 45 44 44 45   PO2 68* 67* 68* 64*   HCO3 32* 30* 30* 32*   O2PER 60 60 60 40       All cultures:  No results for input(s): CULT in the last 168 hours.  No results found for this or any previous visit (from the past 24 hour(s)).      Billing: This patient is critically ill: Yes. Total critical care time today 35 min. Managing acute respiratory failure requiring mechanical ventilation

## 2021-12-20 NOTE — PROGRESS NOTES
"CLINICAL NUTRITION SERVICES - Re-ASSESSMENT NOTE     Nutrition Prescription    RECOMMENDATIONS FOR MDs/PROVIDERS TO ORDER:      Malnutrition Status:    unable    Recommendations already ordered by Registered Dietitian (RD):  Adjust Osmolite 1.5 to 40 ml/hr x 22 hrs (hold for levothyroxine)   every 4 hrs.  To provide 880 ml formula, 1320 calories, 55 g protein, 170 g carb, 0 fiber, 670 ml free water from formula, 900 from flushes, 1580 ml total free water    Future/Additional Recommendations:  TF/med holds.     EVALUATION OF THE PROGRESS TOWARD GOALS   Diet: NPO  Nutrition Support: Osmolite 1.5 at 30 ml/hr continuous.   ml every 4 hrs.  Intake:720 mL/1080 kcal/45 gm protein/147 gm cho, 549 mL free water + 600 flushes  Propofol at 4.7 ml/hr.      NEW FINDINGS   Discussed pt in team rounds.  No proning.   Hypernatremia noted, diuretic on hold.  TF at goal rate.  Pt on levothyroxine.  BG elevated.    ANTHROPOMETRICS  Height: 147.3 cm (4' 10\")  Admit wt 12/16 106 lb with some diuresis.  Most Recent Weight: 47.1 kg (103 lb 13.4 oz)    Wt down w/diuresis    PHYSICAL FINDINGS  See malnutrition section below.  Pt in COVID isolation precautions.    GI CONCERNS  NO BM noted.    Dosing Weight: 46.6 kg     ASSESSED NUTRITION NEEDS  Estimated Energy Needs: 1165 - 1400 kcals/day (25 - 30 kcals/kg)  Justification: Maintenance  Estimated Protein Needs: 47 -56 grams protein/day (1 - 1.2 grams of pro/kg)  Justification: Increased needs  Estimated Fluid Needs: 1200 mL/day (25 mL/kg), or per provider  Justification: Maintenance    LABS  Na 148  Creat 0.76  Glucose 143-206  Reviewed    MEDICATIONS  Rocephin, decadron, lasix-held, levothyroxine, miralax, colace, senokot, warfarin due today  Reviewed    MALNUTRITION:  % Weight Loss:  Weight loss does not meet criteria for malnutrition due to diuresis  % Intake:  Decreased intake does not meet criteria for malnutrition   Subcutaneous Fat Loss:  Unable to assess - covid " isolation  Muscle Loss:  Unable to assess -covid isolation  Fluid Retention:  None noted     Malnutrition Diagnosis: Unable to determine due to nkfa    Goals   Tolerate TF-met  Meet estimated nutrition needs-progressing  Diet advancement when possible-unable  New-electolytes WNL  BG       Nutrition Diagnosis  Swallowing difficulty related to respiratory failure as evidenced by vent, npo -continues    INTERVENTIONS  Implementation  Adjust TF for med hold  Increase water flushes      Monitoring/Evaluation  Progress toward goals will be monitored and evaluated per protocol.

## 2021-12-20 NOTE — PLAN OF CARE
RT PROGRESS NOTE    VENT DAY# 5    CURRENT SETTINGS:   Ventilation Mode: CMV/AC  (Continuous Mandatory Ventilation/ Assist Control)  FiO2 (%): 60 %  Rate Set (breaths/minute): 14 breaths/min  Tidal Volume Set (mL): 400 mL  PEEP (cm H2O): 10 cmH2O  Oxygen Concentration (%): (S) 40 %  Resp: 13      PATIENT PARAMETERS:  PIP 24  Pplat:  25  Pmean:  12  SBT: No      Secretions:  scant  02 Sats:  96%    ETT SIZE 7.5 Secured at 20 cm at teeth/gums    Respiratory Medications: None     ABG: @1746 pH 7.45; pCO2 44; pO2 68; HCO3 30,  on 60%  P/F ratio 114    NOTE / SHIFT SUMMARY:   Patient remains on full vent support. No resp issues noted this shift. Will continue wean O2 as tolerated.    Vivi Curry, RT

## 2021-12-20 NOTE — PLAN OF CARE
Problem: Inability to Wean (Mechanical Ventilation, Invasive)  Goal: Mechanical Ventilation Liberation  Outcome: Improving     Problem: Skin and Tissue Injury (Mechanical Ventilation, Invasive)  Goal: Absence of Device-Related Skin and Tissue Injury  Outcome: Improving     Problem: Ventilator-Induced Lung Injury (Mechanical Ventilation, Invasive)  Goal: Absence of Ventilator-Induced Lung Injury  Outcome: Improving    JN COVID RT PROGRESS NOTE    DATA:    VENT DAY#  6    CURRENT SETTINGS:  VENT SETTINGS   A/C, 14, 400, +10        FIO2:   60%    PATIENT PARAMETERS:    PIP 27-28   Pplat:  25-27  Pmean:  13-14  SBT: N/A  SECRETIONS:  Small/thin/tan  02 SATS:  %    ETT SIZE 7.5  Secured at  20 cm at teeth    Securing device changed / tube re-taped date     Respiratory Medications:      ABG: Results for MIRELLA COLON (MRN [id number]) as of 12/20/2021 05:52   Ref. Range 12/20/2021 00:01   pH Arterial Latest Ref Range: 7.37 - 7.44  7.46 (H)   pCO2 Arterial Latest Ref Range: 35 - 45 mm Hg 44   PO2 Arterial Latest Ref Range: 75 - 85 mm Hg 67 (L)   Bicarbonate Arterial Latest Ref Range: 23 - 29 mmol/L 30 (H)   Base Excess Art Latest Ref Range:   mmol/L 7.3   FIO2 Unknown 60   Oxyhemoglobin Latest Ref Range: 95.0 - 96.0 % 92.9 (L)     P/F = 111    NOTE / PLAN:   Pt remains on full support and stable, no changes made to vent settings.

## 2021-12-21 NOTE — PROGRESS NOTES
RT PROGRESS NOTE    VENT DAY# 7    CURRENT SETTINGS:   Ventilation Mode: CMV/AC  (Continuous Mandatory Ventilation/ Assist Control)  FiO2 (%): 60 %  Rate Set (breaths/minute): 14 breaths/min  Tidal Volume Set (mL): 400 mL  PEEP (cm H2O): 10 cmH2O  Oxygen Concentration (%): 60 %  Resp: 16      PATIENT PARAMETERS:  PIP 28  Pplat:  21  Pmean:  15    ETT SIZE 7.5 Secured at 20 cm at teeth/gums      Molly Barnes, RT

## 2021-12-21 NOTE — PROGRESS NOTES
[facility]  Palliative Care Daily Progress Note      Patient: Kody Johns  Date of Admission:  12/15/2021     Requesting Clinician / Team: Dr Rubio  Reason for consult: Goals of Care (in the setting of COVID, intubation)     Summary/Recommendations:     Goals of care:   -Friday, spoke with dash Fernandez for an introductory palliative care goals of care meeting.   -Attempted to clarify multiple times during today's call to dash Fernandez, that he is the primary medical surrogate decision maker (or culturally, how are decisions made, as pt is , decision making from spouse/next of kin; pt/family met with Palliative Care in 2019, and there were many family members involved).   -Jim has been giving consent for procedures and treatments on behalf of his father, is his PCA.   -Jim indicates that pt's spouse is deaf and mute.   -Pt has a hx of a trach and peg in 2019. Dash Fernandez indicates pt was frustrated with the trach as he was unable to speak.   Pt was apprehensive about surgeries as he had a CVA after his heart surgery but he did not definitively decline any further surgery (would want to review, discuss).   -Jim was not aware of the Nov. 9th visit pt had with PCP, reviewing Gates's thoughts and wishes related to further Cardiology evaluation/follow up (pts brother took him to this appt). They discussed that if Kody did not want to pursue further Cardiology evaluation and care, could consider hospice. They indicated they were going to reflect on this, discuss further but sounds per Jim, as though may not have as he was unaware (and Gates did accept a referral to Cardiology).  Goals are restorative.   Jim is aware that Kody has made some improvement from a pulm perspective. Uncertain the exact course ahead (son aware one day at a time). If he continues to do well, dash Cram aware there will need to be a discussion about Gates's wishes prior to extubation as far  as ? re-intubation (if he then subsequently did not do well).   Jim would value a daily call from the designated ICU provider for a clinical update if possible.      Today, attempted to call son to offer a clinical update, however, was unavailable. Plan to connect sometime tomorrow.     Advanced care planning  -Previous Healthcare Directive: None on file.   -Surrogate Medical Decision Maker: Son Jim (has been giving consent for procedures and treatments on behalf of his father, is his PCA). Pts spouse is Miesha and is deaf/mute.   -CODE STATUS: DNR but ok for pre-arrest intubation ok     Symptom management     Respiratory failure, 2nd COVID, hx previous trach/peg in 2019. Currently, on FIO2 60% and PEEP 10.   -Appreciate Pulmonology/Critical Care ongoing evaluation and management.      Pain, hx of (left leg numbness/tingling and back pain). On fentanyl gtt.   Appreciate ICU management.      Psychosocial and support needs  Pt is Pentecostalism, spiritual. Family believe he would welcome a visit from Palliative Care  or Spiritual Care.     [facility]       Thank you for the opportunity to participate in the care of this patient and family.      During regular M-F work hours -- if you are not sure who specifically to contact -- please contact us by calling [phone].        Palliative Care Assessment     Kody Johns is a 71 year old male with a history of history of hyperlipidemia, atrial fibrillation, aortic valve disease, mitral and atrial valve replacement, chronic systolic congestive heart failure, GERD, tuberculosis, acute kidney injury, heart murmur, stroke, asthma, CKD, and CAD who presented to the ED on Dec. 15th for evaluation of chest pain, cough, fever, and shortness of breath.  Admitted to the hospital with shortness of breath. Covid positive. Intubated  Previous hospitalizations: None recently.   Other specialities following this admission: Pulm/Critical  Care  Recent changes: Per the ER, the patient's son reports that the patient has had worsening fatigue and shortness of breath for the last two days. Today he had extremely labored breathing and was not able to get out of his bed. The patient denies any pain. The patient is currently on coumadin.      Today, the patient was seen for:  Goals of care     Previous Palliative Care Encounters: Yes, 2019. Of note, several of pts brothers and spouse were more involved in medical decisions. Concern re: young age of son and health literacy then.      Referring ICU provider note:  CODE STATUS, DISPOSITION, FAMILY COMMUNICATION: DNR after I talked to the patient's son. Critically ill requiring invasive mechanical ventilation and continuous vasopressors. Spoke with the patient's son, Jim, via [employer]  by telephone. Will involve palliative care.     PCP Note from 11-9-21 Dr Kasper  Atrial fibrillation, does appear rate controlled today, is on anticoagulation, discussed importance of compliance with treatment, follow-up with cardiologist, new referral was placed. Also discussed with patient Brother Mark  that hospice care could be an option if he continues to decline additional testing and treatment. They will need to discuss in family and let us know.     Summary of Palliative Encounter: Friday, I discussed the reason for Palliative Care Referral and our role in symptom management, patient family communication and understanding their choices for medical treatment and providing guidance in making difficult decisions in the framework of focusing on patient comfort and quality of life.    Reviewed current conditions and treatments including COVID infection, a fib discussions, quality of life prior to admission, surrogate decision maker, pts experience with past trach and peg, goals of care, code status.         Prognosis, Goals, and/or Advance Care Planning were addressed today: Yes (Friday)    Mood, coping, and/or  meaning in the context of serious illness were addressed today: Yes (Friday)     Functional Status:  Functional Status two weeks prior to hospitalization: Per CM, Patient lives in a house with his son Jim who is his PCA for all activities of daily living. Patient has a history of a stroke and has residual hemiplegia. Patient uses a walker for mobility at baseline.  Functional status Current:  Bedbound (intubated, sedated)     Prognosis, Goals, & Planning:   Prognosis (quality & quantity): Discussed pts conversation in Nov. re: cardiac fxn, as well as current condition (COVID, intubation). Goals are restorative at this time.       Patient's decision making preferences: Unknown/needs clarification.         Capacity evaluation:    Pt Gates does not currently have capacity to make a decision regarding medical care as is intubated, sedated.     I have concerns about the patient/family's health literacy today: Yes    Patient has a completed Health Care Directive: None on file. Son not aware of one.     Code status: DNR but ok for pre-arrest intubation       Social:     Relationships: . Spouse is deaf and mute. Son Carloz indicates pt has 2 sons and 3 dtrs, and 2 grandchildren. Lives with spouse, 1  son and 1 dtr. They are quarantining.     Hobbies: Lives to watch TV, enjoy family, grandchildren.    Spirituality: Was Voodoo, now Yazidi.      High Palliative Symptom Data:  Pt is intubated, sedated     Patient is on opioids: Yes     ROS:  Comprehensive ROS is reviewed and is negative except as here & per HPI:        Review of Systems:     Besides above, an additional ROS was not done.           Medications:     I have reviewed this patient's medication profile and medications during this hospitalization.             Physical Exam:   Vitals were reviewed  Temp: 98.3  F (36.8  C) Temp src: Oral   Pulse: 87   Resp: 14 SpO2: 96 % O2 Device: Mechanical Ventilator    Constitutional:   Intubated, sedated                 Data Reviewed:     Reviewed recent pertinent imaging, comments:   EXAM: XR CHEST PORT 1 VIEW  LOCATION: [facility]  DATE/TIME: 12/18/2021 1:45 PM     INDICATION: Check line placement.  COMPARISON: 12/18/2021.                                                                      IMPRESSION: Sternotomy with valve prosthesis. ET tube in good position. Enteric tube in the stomach. Right PICC tip coiled in the internal jugular vein. This should be removed or repositioned.     New left PICC tip in the SVC.     Mild to moderate patchy pulmonary infiltrates are stable. Heart size mildly enlarged.    Reviewed recent labs, comments:   Reviewed.       TTS: I have personally spent a total of 15 minutes  today on the unit in review of medical record, consultation with the medical providers and assessment of patient, with more than 50% of this time spent in counseling, coordination of care, and discussion with ICU MD (x 5 minutes), Palliative Care LICSW (x 10 minutes), professional  (x 5 minutes) and son (non face to face x 5-7 minutes) re: pts clinical course, treatments, psychosocial emotional support and development of plan of care (son unavailable to talk long today, plan to call again tomorrow).    VERN Aguilar, FNP-BC, PMHNP-BC  Palliative Care Nurse Practitioner  [facility]  [phone]      During regular M-F work hours -- if you are not sure who specifically to contact -- please contact us by calling [phone].

## 2021-12-21 NOTE — PROGRESS NOTES
Family requested prayer for pt. Pt's chart says that he is Jew.     shared silent prayer outside pt's room.    Spiritual Care is available as needed or requested.    WARD Ray.

## 2021-12-21 NOTE — PLAN OF CARE
Problem: Gas Exchange Impaired (Pulmonary Impairment)  Goal: Optimal Gas Exchange  Outcome: No Change     Problem: Inability to Wean (Mechanical Ventilation, Invasive)  Goal: Mechanical Ventilation Liberation  Outcome: No Change     Problem: Skin and Tissue Injury (Mechanical Ventilation, Invasive)  Goal: Absence of Device-Related Skin and Tissue Injury  Outcome: No Change     Problem: Ventilator-Induced Lung Injury (Mechanical Ventilation, Invasive)  Goal: Absence of Ventilator-Induced Lung Injury  Outcome: No Change     Will Wong, RT

## 2021-12-21 NOTE — PROGRESS NOTES
Critical Care Progress Note      12/21/2021    Name: Kody Johns MRN#: [id number]   Age: 71 year old YOB: 1950     Hsptl Day# 6  ICU DAY #    MV DAY #             Problem List:   Active Problems:    Acute respiratory failure with hypoxia (H)                 Summary/Hospital Course:   71 year old male former smoker, unvaccinated against COVID-19, with a history of atrial fibrillation on rate control and anticoagulation, chronic anemia and thrombocytopenia, peripheral neuropathy, CKD, hypothyroidism, HTN, depression, rheumatic heart disease s/p aortic and mitral valve replacement in April 2019 complicated by right PCA ischemic stroke, prolonged respiratory failure s/p trach and PEG in May 2019, hemo, cardiomyopathy with HFrEF, subsequent left hemiparesis at LTACH found to have hemorrhagic transformation of R PCA stroke treated medically, now with chronic spastic left hemiparesis, decannulated June 2019, now with ARDS due to COVID-19 infection, intubated on 12/15, acutely worsening hypoxia with frothy secretions and elevated BNP on 12/16.        Assessment and plan :       I have personally reviewed the daily labs, imaging studies, cultures and discussed the case with referring physician and consulting physicians.     My assessment and plan by system for this patient is as follows:    Neurology/Psychiatry:   Sedated with fentanyl and propofol.  Continue baclofen.  Home dose.  Continue gabapentin and sertraline      Cardiovascular:   Ischemic cardiomyopathy.  EF 25 to 30%.  Bioprosthetic mitral valve and bioprosthetic aortic valve.  Both look fine on echo  Elevated LV filling pressures    Atrial fibrillation/flutter.  Chronically anticoagulated with warfarin.  Rate controlled.  At home on metoprolol, currently on hold due to hypotension    Vasoplegia secondary to sedation plus sepsis.  Levophed as needed.      Pulmonary/Ventilator Management:   Acute hypoxic respiratory failure secondary to COVID-19  pneumonia.  Presumed superimposed pulmonary edema.    Hold diuretics.  Bicarb and sodium climbing up.    Klebsiella pneumonia.  Will do 12 days of Rocephin.    Continue PEEP of 10.  Tidal volume 400, FiO2 back at 55%.    GI and Nutrition :   Tube feeds.  Scheduled MiraLAX, senna and bisacodyl      Renal/Fluids/Electrolytes:   Restart lasix  Net even    - monitor function and electrolytes as needed with replacement per ICU protocols.  - generally avoid nephrotoxic agents such as NSAID, IV contrast unless specifically required  - adjust medications as needed for renal clearance  - follow I/O's as appropriate.    Infectious Disease:   COVID-19 pneumonia.  Dexamethasone.  Remdesivir and Tocilizumab.    Klebsiella pneumonia.  Complete 12 days of Rocephin.    Endocrine:   Hypothyroidism on levothyroxine.  Continue home dose at 100 mcg.    Plan  - ICU insulin protocol, goal sugar <180    ICU Prophylaxis:   1. DVT: On warfarin at home.  INR elevated.  Monitor daily.  2. VAP: HOB 30 degrees, chlorhexidine rinse  3. Stress Ulcer: PPI  4. Restraints: Nonviolent soft two point restraints required and necessary for patient safety and continued cares and good effect as patient continues to pull at necessary lines, tubes despite education and distraction. Will readdress daily.   Category: Non-violent   Type of Restraint: Soft limb x2.   Behavior: Pulling at IVand monzon catheter tubings.   Root cause of behavior: Critical illness.   Less-restrictive methods that have failed: Redirection, reorientation. 1:1 NA at bedside.   Response to restraint: Not actively pulling atcurrent restraints.   Criteria for release from restraint: Responds to redirection. Leaves medical devices in place.           Key Medications:       Baclofen  10 mg Oral or Feeding Tube BID     cefTRIAXone  1 g Intravenous Q24H     chlorhexidine  15 mL Mouth/Throat BID     dexamethasone  6 mg Intravenous Daily     sennosides  5 mL Oral or Feeding Tube Daily    And      docusate  50 mg Oral or Feeding Tube Daily     [Held by provider] furosemide  20 mg Intravenous Q8H     gabapentin  100 mg Oral or Feeding Tube Q12H TESSY     insulin aspart  1-12 Units Subcutaneous Q4H     insulin glargine  10 Units Subcutaneous Daily     levothyroxine  100 mcg Oral or Feeding Tube QAM AC     pantoprazole (PROTONIX) IV  40 mg Intravenous Daily with breakfast     polyethylene glycol  17 g Oral or Feeding Tube Daily     sertraline  50 mg Oral or Feeding Tube Daily     sodium chloride (PF)  10-40 mL Intracatheter Q7 Days     warfarin ANTICOAGULANT  1.5 mg Oral ONCE at 18:00       dextrose       fentaNYL 75 mcg/hr (12/21/21 1400)     norepinephrine 0.01 mcg/kg/min (12/21/21 1400)     propofol (DIPRIVAN) infusion 10 mcg/kg/min (12/21/21 1404)     Warfarin Therapy Reminder                 Physical Examination:   Temp:  [98.3  F (36.8  C)-99.3  F (37.4  C)] 98.3  F (36.8  C)  Pulse:  [75-99] 87  Resp:  [12-22] 14  MAP:  [60 mmHg-115 mmHg] 81 mmHg  Arterial Line BP: ()/(49-93) 109/65  FiO2 (%):  [55 %-60 %] 55 %  SpO2:  [91 %-98 %] 96 %    Intake/Output Summary (Last 24 hours) at 12/17/2021 1157  Last data filed at 12/17/2021 0800  Gross per 24 hour   Intake 1886.02 ml   Output 2350 ml   Net -463.98 ml     Wt Readings from Last 4 Encounters:   12/21/21 48.8 kg (107 lb 9.4 oz)   11/09/21 50.3 kg (111 lb)   04/14/21 46.3 kg (102 lb)   02/22/21 45.4 kg (100 lb)     Arterial Line BP: ()/(49-93) 109/65  MAP:  [60 mmHg-115 mmHg] 81 mmHg  Ventilation Mode: CMV/AC  (Continuous Mandatory Ventilation/ Assist Control)  FiO2 (%): 55 %  Rate Set (breaths/minute): 14 breaths/min  Tidal Volume Set (mL): 400 mL  PEEP (cm H2O): 10 cmH2O  Oxygen Concentration (%): 55 %  Resp: 14    Recent Labs   Lab 12/21/21  0409 12/20/21  0536 12/20/21  0001 12/19/21  1746   PH 7.44 7.47* 7.46* 7.45*   PCO2 45 45 44 44   PO2 64* 68* 67* 68*   HCO3 29 32* 30* 30*   O2PER 60 60 60 60       GEN: no acute distress   HEENT: head  ncat, sclera anicteric, OP patent, trachea midline   PULM: unlabored synchronous with vent, clear anteriorly    CV/COR: Irregularly irregular,  No rub, no murmur  ABD: soft nontender, hypoactive bowel sounds, no mass  EXT: No significant edema  NEURO: Sedated  LINES: clean, dry intact         Data:   All data and imaging reviewed     ROUTINE ICU LABS (Last four results)  CMP  Recent Labs   Lab 12/21/21  1213 12/21/21  0835 12/21/21  0407 12/21/21  0300 12/20/21  0546 12/20/21  0537 12/19/21  2146 12/19/21  2121 12/19/21  1007 12/19/21  0642 12/18/21  1042 12/18/21  1035 12/18/21  0547 12/18/21  0424 12/16/21  0712 12/15/21  1718   NA  --   --  144  --   --  148*  --  149*  --  152*  --   --   --  145   < > 137   POTASSIUM  --   --  4.0  --   --  3.9  3.9  --   --   --  3.8  --  3.8  --  3.4*   < > 3.8   CHLORIDE  --   --  110*  --   --  112*  --   --   --  114*  --   --   --  110*   < > 101   CO2  --   --  29  --   --  31  --   --   --  31  --   --   --  25   < > 23   ANIONGAP  --   --  5  --   --  5  --   --   --  7  --   --   --  10   < > 13   * 249* 173* 182*   < > 153*   < >  --    < > 202*   < >  --    < > 307*   < > 125   BUN  --   --  32*  --   --  30*  --   --   --  32*  --   --   --  27   < > 32*   CR  --   --  0.72  --   --  0.76  --   --   --  0.90  --   --   --  0.83   < > 1.36*   GFRESTIMATED  --   --  >90  --   --  >90  --   --   --  86  --   --   --  89   < > 52*   NIKITA  --   --  7.5*  --   --  7.3*  --   --   --  7.5*  --   --   --  7.2*   < > 8.0*   MAG  --   --  2.3  --   --  2.3  --   --   --  2.3  --   --   --  2.0   < >  --    PROTTOTAL  --   --   --   --   --   --   --   --   --   --   --   --   --   --   --  7.7   ALBUMIN  --   --   --   --   --   --   --   --   --   --   --   --   --   --   --  3.1*   BILITOTAL  --   --   --   --   --   --   --   --   --   --   --   --   --   --   --  1.3*   ALKPHOS  --   --   --   --   --   --   --   --   --   --   --   --   --   --   --  93   AST  --    --   --   --   --   --   --   --   --   --   --   --   --   --   --  74*   ALT  --   --   --   --   --   --   --   --   --   --   --   --   --   --   --  27    < > = values in this interval not displayed.     CBC  Recent Labs   Lab 12/19/21  0642 12/18/21  0424 12/17/21  0529 12/16/21  0712   WBC 6.7 9.1 11.6* 5.0   RBC 4.48 4.55 4.51 4.37*   HGB 14.0 14.2 14.2 13.9   HCT 42.5 41.6 41.4 40.4   MCV 95 91 92 92   MCH 31.3 31.2 31.5 31.8   MCHC 32.9 34.1 34.3 34.4   RDW 14.2 13.8 13.8 13.6   * 103* 73* 52*     INR  Recent Labs   Lab 12/21/21  0407 12/20/21  0537 12/19/21  0642 12/18/21  0424   INR 2.74* 2.60* 2.64* 3.38*     Arterial Blood Gas  Recent Labs   Lab 12/21/21  0409 12/20/21  0536 12/20/21  0001 12/19/21  1746   PH 7.44 7.47* 7.46* 7.45*   PCO2 45 45 44 44   PO2 64* 68* 67* 68*   HCO3 29 32* 30* 30*   O2PER 60 60 60 60       All cultures:  No results for input(s): CULT in the last 168 hours.  No results found for this or any previous visit (from the past 24 hour(s)).      Billing: This patient is critically ill: Yes. Total critical care time today 33 min. Managing acute respiratory failure requiring mechanical ventilation

## 2021-12-22 NOTE — PROGRESS NOTES
Care Management Follow Up    Length of Stay (days): 7    Expected Discharge Date: 12/28/2021     Concerns to be Addressed:  ICU level of care, Covid-19 +,  Sedated, mechanical ventilator, pressors as needed, Palliative Care following  Patient plan of care discussed at interdisciplinary rounds: Yes    Anticipated Discharge Disposition:  Discharge goal is home pending response to treatment, medical needs and mobility closer to discharge.     Anticipated Discharge Services:  To be determined by destination, patient/family preferences, medical needs and mobility status closer to the time of discharge.  Resumed home care services for skilled nursing, [facility] unable to provide PT or OT.  Anticipated Discharge DME:  To be determined    Patient/family educated on Medicare website which has current facility and service quality ratings:    Education Provided on the Discharge Plan: Per care team   Patient/Family in Agreement with the Plan:  yes    Referrals Placed by CM/SW:  None at this time  Private pay costs discussed: Not applicable    Additional Information:  Chart reviewed and plan of care discussed at ICU rounds.   Care Management will follow medical progression, review recommendations from team, and assist with discharge planning needs.     Patient lives in a house with his son Jim who is his PCA for all activities of daily living. His primary language is Divehi. Patient is also open to [facility] for RN services (they help with INR lab draws. If discharged to home, CM is to fax orders to [phone]). Patient uses a walker for mobility at baseline.  Primary family contact is Jim at [phone].    Soni Singh RN

## 2021-12-22 NOTE — PROGRESS NOTES
[facility]  Palliative Care Daily Progress Note  Requesting Clinician / Team: Dr Rubio  Reason for consult: Goals of Care (in the setting of COVID, intubation)     Summary/Recommendations:     Goals of care:   Friday and today, spoke with nader Fernandez for an introductory palliative care goals of care meeting (during this admission; met with Palliative Care in 2019).   Attempted to clarify multiple times with nader Fernandez, who is the primary medical surrogate decision maker.  Jim has been giving consent for procedures and treatments on behalf of his father, is his PCA. Jim indicates that pt's spouse is deaf and mute. Today, Jim adds that his uncle Mark is also involved in decision making.  Pt has a hx of a trach and peg in 2019. Nader Fernandez indicates pt was frustrated with the trach as he was unable to speak.   Pt was apprehensive about surgeries as he had a CVA after his heart surgery but he did not definitively decline any further surgery (would want to review, discuss).   Goals are restorative.   Jim indicates he last had an ICU MD update Monday.   Today, gave Jim an update on higher FIO2 needs.   Friday, did review that it is uncertain the exact course ahead (son aware one day at a time). If he continues to do well, nader Fernandez aware there will need to be a discussion about Gates's wishes prior to extubation as far as ? re-intubation (if he then subsequently did not do well).   Jim would value a daily call from the designated ICU provider for a clinical update if possible (note placed in chart).      Advanced care planning  -Previous Healthcare Directive: None on file.   -Surrogate Medical Decision Maker: Nader Fernandez (has been giving consent for procedures and treatments on behalf of his father, is his PCA) and his uncle Mark. Pts spouse is Miesha and is deaf/mute.   -CODE STATUS: DNR but ok for pre-arrest intubation ok     Symptom  management     Respiratory failure, 2nd COVID, hx previous trach/peg in 2019. Currently, requiring a higher FIO2 over the last few days.   -Appreciate Pulmonology/Critical Care ongoing evaluation and management.      Pain, hx of (left leg numbness/tingling and back pain). On fentanyl gtt.   Appreciate ICU management.      Psychosocial and support needs  Pt is Latter day, spiritual. Family believe he would welcome a visit from Palliative Care  or Spiritual Care. Gave son an update on prayer for pt.      Case discussed with ICU, RN.       Thank you for the opportunity to participate in the care of this patient and family.      During regular M-F work hours -- if you are not sure who specifically to contact -- please contact us by calling [phone].        Palliative Care Assessment     Kody Johns is a 71 year old male with a history of history of hyperlipidemia, atrial fibrillation, aortic valve disease, mitral and atrial valve replacement, chronic systolic congestive heart failure, GERD, tuberculosis, acute kidney injury, heart murmur, stroke, asthma, CKD, and CAD who presented to the ED on Dec. 15th for evaluation of chest pain, cough, fever, and shortness of breath.  Admitted to the hospital with shortness of breath. Covid positive. Intubated.   Previous hospitalizations: None recently.   Other specialities following this admission: Pulm/Critical Care  Recent changes: Per the ER, the patient's son reports that the patient has had worsening fatigue and shortness of breath for the last two days. Today he had extremely labored breathing and was not able to get out of his bed. The patient denies any pain. The patient is currently on coumadin.      Today, the patient was seen for:  Goals of care     Previous Palliative Care Encounters: Yes, 2019. Of note, several of pts brothers and spouse were more involved in medical decisions. Concern re: young age of son and health literacy then.      Referring ICU  provider note:  CODE STATUS, DISPOSITION, FAMILY COMMUNICATION: DNR after I talked to the patient's son. Critically ill requiring invasive mechanical ventilation and continuous vasopressors. Spoke with the patient's son, Jim, via Sami  by telephone. Will involve palliative care.     PCP Note from 11-9-21 Dr Kasper  Atrial fibrillation, does appear rate controlled today, is on anticoagulation, discussed importance of compliance with treatment, follow-up with cardiologist, new referral was placed. Also discussed with patient Brother Mark  that hospice care could be an option if he continues to decline additional testing and treatment. They will need to discuss in family and let us know.     Summary of Palliative Encounter:  I  discussed the reason for Palliative Care Referral and our role in symptom management, patient family communication and understanding their choices for medical treatment and providing  guidance in making difficult decisions in the framework of focusing on patient comfort and quality of life.    Reviewed current conditions and treatments including COVID infection, a fib discussions, quality of life prior to admission, surrogate decision maker, pts experience with past trach and peg, goals of care, code status.         Prognosis, Goals, and/or Advance Care Planning were addressed today: Yes    Mood, coping, and/or meaning in the context of serious illness were addressed today: Yes     Functional Status:  Functional Status two weeks prior to hospitalization: Per CM, Patient lives in a house with his son Jim who is his PCA for all activities of daily living. Patient has a history of a stroke and has residual hemiplegia. Patient uses a walker for mobility at baseline.  Functional status Current:  Bedbound (intubated, sedated)     Prognosis, Goals, & Planning:   Prognosis (quality & quantity): Discussed pts conversation in Nov. re: cardiac fxn, as well as current condition (COVID,  intubation). Goals are restorative at this time.       Patient's decision making preferences: Unknown. Spoke with son Jim today and he indicates that he and his uncle Mark are the primary medical decision makers, on pts behalf.         Capacity evaluation:    Pt Kody does not currently have capacity to make a decision regarding medical care as is intubated, sedated.     I have concerns about the patient/family's health literacy today: Yes    Patient has a completed Health Care Directive: None on file. Son not aware of one.     Code status: DNR but ok for pre-arrest intubation       Social:     Relationships: . Spouse is deaf and mute. Son Carloz indicates pt has 2 sons and 3 dtrs, and 2 grandchildren. Lives with spouse, 1  son and 1 dtr They are quarantining.     Hobbies: Lives to watch TV, enjoy family, grandchildren.    Spirituality: Was [employer]ian, now Episcopal.      High Palliative Symptom Data:  Pt is intubated, sedated     Patient is on opioids: Yes         Medications:     I have reviewed this patient's medication profile and medications during this hospitalization.    Noted meds:  Reviewed ICU gtts.            Physical Exam:   Vitals were reviewed.   Temp: 98.8  F (37.1  C) Temp src: Oral BP: 113/76 Pulse: 92   Resp: 19 SpO2: 94 % O2 Device: Mechanical Ventilator    Pt intubated, sedated.            Data Reviewed:     Reviewed recent pertinent imaging, comments:   Reviewed.    Reviewed recent labs, comments:   Reviewed today's labs.       TTS: I have personally spent a total of 25 minutes  today on the unit in review of medical record, consultation with the medical providers and assessment of patient, with more than 50% of this time spent in counseling, coordination of care, and discussion (100% non face to face from 105-130) in a family telephone meeting re: pts clinical course, communication with staff, goals, decision makers, emotional support and development of plan of care.    Charito Tuttle  VERN, FNP-BC, PMHNP-BC  Palliative Care Nurse Practitioner  [facility]  [phone]      During regular M-F work hours -- if you are not sure who specifically to contact -- please contact us by calling [phone].

## 2021-12-22 NOTE — PLAN OF CARE
Problem: Gas Exchange Impaired (Pulmonary Impairment)  Goal: Optimal Gas Exchange  Outcome: No Change     Problem: Inability to Wean (Mechanical Ventilation, Invasive)  Goal: Mechanical Ventilation Liberation  Outcome: No Change     Problem: Skin and Tissue Injury (Mechanical Ventilation, Invasive)  Goal: Absence of Device-Related Skin and Tissue Injury  Outcome: No Change     Problem: Ventilator-Induced Lung Injury (Mechanical Ventilation, Invasive)  Goal: Absence of Ventilator-Induced Lung Injury  Outcome: No Change

## 2021-12-22 NOTE — PLAN OF CARE
Problem: Constipation  Goal: Effective Bowel Elimination  Outcome: Improving  Patient had 1 extra large incontinent stool (dark green).     Problem: Hyperglycemia  Goal: Blood Glucose Level Within Targeted Range  12/22/2021 0320 by Brent Oleary RN  Outcome: Improving  12/22/2021 0320 by Brent Oleary RN  Outcome: Improving  On insulin + Lantus scheduled.     Problem: Gas Exchange Impaired (Pulmonary Impairment)  Goal: Optimal Gas Exchange  12/22/2021 0320 by Brent Oleary RN  Outcome: Improving  Increased fi02 to 65%. Oxygen saturation has been >92%.    RASS score has been -3 to -4 tonight.

## 2021-12-22 NOTE — PROGRESS NOTES
[facility] Social Work Note    Supportive check in call to Kody's son, Jim with colleague VERN Aguilar. [facility]  assisted with call. Jim and family are coping well and are quarantining. He is getting updates from ICU staff and seems to understand how his father is doing. Charito also gave an update. See her notes for details. Confirmed that Jim and his uncle Mark are the decision makers for his dad. He is are hoping that he will improve soon and expressed appreciation for the care his dad is receiving. Gave instructions,  ID and pin information for IPAD.Jim expressed appreciation for support and welcomes continues calls from Palliative team.     PC SW will continue to be available to provide support as needs arise.     Trinity Melissa, [facility]  Palliative Care   Office # [phone]

## 2021-12-22 NOTE — PROGRESS NOTES
Critical Care Progress Note      12/22/2021    Name: Kody Johns MRN#: [id number]   Age: 71 year old YOB: 1950     Hsptl Day# 7  ICU DAY #    MV DAY #             Problem List:   Active Problems:    Acute respiratory failure with hypoxia (H)                 Summary/Hospital Course:   71 year old male former smoker, unvaccinated against COVID-19, with a history of atrial fibrillation on rate control and anticoagulation, chronic anemia and thrombocytopenia, peripheral neuropathy, CKD, hypothyroidism, HTN, depression, rheumatic heart disease s/p aortic and mitral valve replacement in April 2019 complicated by right PCA ischemic stroke, prolonged respiratory failure s/p trach and PEG in May 2019, hemo, cardiomyopathy with HFrEF, subsequent left hemiparesis at LTACH found to have hemorrhagic transformation of R PCA stroke treated medically, now with chronic spastic left hemiparesis, decannulated June 2019, now with ARDS due to COVID-19 infection, intubated on 12/15, acutely worsening hypoxia with frothy secretions and elevated BNP on 12/16.        Assessment and plan :       I have personally reviewed the daily labs, imaging studies, cultures and discussed the case with referring physician and consulting physicians.     My assessment and plan by system for this patient is as follows:    Neurology/Psychiatry:   Sedated with fentanyl and propofol.  Continue baclofen.  Home dose.  Continue gabapentin and sertraline    Try to wean sedation off.  Currently his fentanyl is down to 50 mcg and Precedex down to 0.3 mcg.    Cardiovascular:   Ischemic cardiomyopathy.  EF 25 to 30%.  Bioprosthetic mitral valve and bioprosthetic aortic valve.  Both look fine on echo  Elevated LV filling pressures    Atrial fibrillation/flutter.  Chronically anticoagulated with warfarin.  Rate controlled.  At home on metoprolol, currently on hold due to hypotension    Vasoplegia secondary to sedation plus sepsis.  Levophed as  needed.    Restart Lasix.      Pulmonary/Ventilator Management:   Acute hypoxic respiratory failure secondary to COVID-19 pneumonia.  Presumed superimposed pulmonary edema.    Hold diuretics.  Bicarb and sodium climbing up.    Klebsiella pneumonia.  Will do 12 days of Rocephin.    Continue PEEP of 10.  Tidal volume 400, FiO2 back at 70%.    GI and Nutrition :   Tube feeds.  Scheduled MiraLAX, senna and bisacodyl      Renal/Fluids/Electrolytes:   Restart lasix  Net even  Hypernatremia resolved  - monitor function and electrolytes as needed with replacement per ICU protocols.  - generally avoid nephrotoxic agents such as NSAID, IV contrast unless specifically required  - adjust medications as needed for renal clearance  - follow I/O's as appropriate.    Infectious Disease:   COVID-19 pneumonia.  Dexamethasone.  Remdesivir and Tocilizumab.    Klebsiella pneumonia.  Complete 12 days of Rocephin.    Endocrine:   Hypothyroidism on levothyroxine.  Continue home dose at 100 mcg.    Plan  - ICU insulin protocol, goal sugar <180    ICU Prophylaxis:   1. DVT: On warfarin at home.  INR elevated.  Monitor daily.  2. VAP: HOB 30 degrees, chlorhexidine rinse  3. Stress Ulcer: PPI  4. Restraints: Nonviolent soft two point restraints required and necessary for patient safety and continued cares and good effect as patient continues to pull at necessary lines, tubes despite education and distraction. Will readdress daily.   Category: Non-violent   Type of Restraint: Soft limb x2.   Behavior: Pulling at IVand monzon catheter tubings.   Root cause of behavior: Critical illness.   Less-restrictive methods that have failed: Redirection, reorientation. 1:1 NA at bedside.   Response to restraint: Not actively pulling atcurrent restraints.   Criteria for release from restraint: Responds to redirection. Leaves medical devices in place.           Key Medications:       Baclofen  10 mg Oral or Feeding Tube BID     cefTRIAXone  1 g Intravenous  Q24H     chlorhexidine  15 mL Mouth/Throat BID     dexamethasone  6 mg Intravenous Daily     sennosides  5 mL Oral or Feeding Tube Daily    And     docusate  50 mg Oral or Feeding Tube Daily     [Held by provider] furosemide  20 mg Intravenous Q8H     gabapentin  100 mg Oral or Feeding Tube Q12H TESSY     insulin aspart  1-12 Units Subcutaneous Q4H     insulin glargine  10 Units Subcutaneous Daily     levothyroxine  100 mcg Oral or Feeding Tube QAM AC     pantoprazole (PROTONIX) IV  40 mg Intravenous Daily with breakfast     polyethylene glycol  17 g Oral or Feeding Tube Daily     sertraline  50 mg Oral or Feeding Tube Daily     sodium chloride (PF)  10-40 mL Intracatheter Q7 Days       dexmedetomidine 0.3 mcg/kg/hr (12/22/21 1715)     dextrose Stopped (12/22/21 1139)     fentaNYL 50 mcg/hr (12/22/21 1703)     norepinephrine 0.04 mcg/kg/min (12/22/21 1505)     propofol (DIPRIVAN) infusion Stopped (12/22/21 1253)     Warfarin Therapy Reminder                 Physical Examination:   Temp:  [97  F (36.1  C)-99.1  F (37.3  C)] 98.8  F (37.1  C)  Pulse:  [] 98  Resp:  [11-24] 21  BP: (113)/(76) 113/76  MAP:  [58 mmHg-166 mmHg] 75 mmHg  Arterial Line BP: ()/() 105/61  FiO2 (%):  [55 %-70 %] 70 %  SpO2:  [89 %-100 %] 93 %    Intake/Output Summary (Last 24 hours) at 12/17/2021 1157  Last data filed at 12/17/2021 0800  Gross per 24 hour   Intake 1886.02 ml   Output 2350 ml   Net -463.98 ml     Wt Readings from Last 4 Encounters:   12/21/21 48.8 kg (107 lb 9.4 oz)   11/09/21 50.3 kg (111 lb)   04/14/21 46.3 kg (102 lb)   02/22/21 45.4 kg (100 lb)     Arterial Line BP: ()/() 105/61  MAP:  [58 mmHg-166 mmHg] 75 mmHg  BP - Mean:  [91] 91  Ventilation Mode: CMV/AC  (Continuous Mandatory Ventilation/ Assist Control)  FiO2 (%): 70 %  Rate Set (breaths/minute): 14 breaths/min  Tidal Volume Set (mL): 400 mL  PEEP (cm H2O): 10 cmH2O  Oxygen Concentration (%): 75 %  Resp: 21    Recent Labs   Lab  12/22/21  0351 12/21/21  0409 12/20/21  0536 12/20/21  0001   PH 7.47* 7.44 7.47* 7.46*   PCO2 45 45 45 44   PO2 82 64* 68* 67*   HCO3 31* 29 32* 30*   O2PER 65 60 60 60       GEN: no acute distress   HEENT: head ncat, sclera anicteric, OP patent, trachea midline   PULM: unlabored synchronous with vent, clear anteriorly    CV/COR: Irregularly irregular,  No rub, no murmur  ABD: soft nontender, hypoactive bowel sounds, no mass  EXT: No significant edema  NEURO: Sedated, try to decrease sedation.  LINES: clean, dry intact         Data:   All data and imaging reviewed     ROUTINE ICU LABS (Last four results)  CMP  Recent Labs   Lab 12/22/21  1702 12/22/21  1218 12/22/21  0853 12/22/21  0351 12/21/21  0835 12/21/21  0407 12/20/21  0546 12/20/21  0537 12/19/21  2146 12/19/21  2121 12/19/21  1007 12/19/21  0642   NA  --   --   --  139  --  144  --  148*  --  149*  --  152*   POTASSIUM  --   --   --  4.0  --  4.0  --  3.9  3.9  --   --   --  3.8   CHLORIDE  --   --   --  104  --  110*  --  112*  --   --   --  114*   CO2  --   --   --  29  --  29  --  31  --   --   --  31   ANIONGAP  --   --   --  6  --  5  --  5  --   --   --  7   * 289* 253* 170*   < > 173*   < > 153*   < >  --    < > 202*   BUN  --   --   --  29*  --  32*  --  30*  --   --   --  32*   CR  --   --   --  0.72  --  0.72  --  0.76  --   --   --  0.90   GFRESTIMATED  --   --   --  >90  --  >90  --  >90  --   --   --  86   NIKITA  --   --   --  7.4*  --  7.5*  --  7.3*  --   --   --  7.5*   MAG  --   --   --  2.1  --  2.3  --  2.3  --   --   --  2.3    < > = values in this interval not displayed.     CBC  Recent Labs   Lab 12/22/21  0351 12/19/21  0642 12/18/21  0424 12/17/21  0529 12/16/21  0712   WBC  --  6.7 9.1 11.6* 5.0   RBC  --  4.48 4.55 4.51 4.37*   HGB  --  14.0 14.2 14.2 13.9   HCT  --  42.5 41.6 41.4 40.4   MCV  --  95 91 92 92   MCH  --  31.3 31.2 31.5 31.8   MCHC  --  32.9 34.1 34.3 34.4   RDW  --  14.2 13.8 13.8 13.6   * 105* 103*  73* 52*     INR  Recent Labs   Lab 12/22/21  0351 12/21/21  0407 12/20/21  0537 12/19/21  0642   INR 3.08* 2.74* 2.60* 2.64*     Arterial Blood Gas  Recent Labs   Lab 12/22/21  0351 12/21/21  0409 12/20/21  0536 12/20/21  0001   PH 7.47* 7.44 7.47* 7.46*   PCO2 45 45 45 44   PO2 82 64* 68* 67*   HCO3 31* 29 32* 30*   O2PER 65 60 60 60       All cultures:  No results for input(s): CULT in the last 168 hours.  No results found for this or any previous visit (from the past 24 hour(s)).      Billing: This patient is critically ill: Yes. Total critical care time today 31 min. Managing acute respiratory failure requiring mechanical ventilation

## 2021-12-22 NOTE — PROGRESS NOTES
ETT # 7.5 secured 20 cm at the teeth. Pt remains vented; current settings: AC 14 400 65% 10. sats mid 90s. BS coarse. Small/thin secretions suctioned. Changes are not made. RT following.    Oseas Weber, RT

## 2021-12-22 NOTE — PROGRESS NOTES
RT PROGRESS NOTE    VENT DAY# 8    CURRENT SETTINGS:   Ventilation Mode: CMV/AC  (Continuous Mandatory Ventilation/ Assist Control)  FiO2 (%): 70 %  Rate Set (breaths/minute): 14 breaths/min  Tidal Volume Set (mL): 400 mL  PEEP (cm H2O): 10 cmH2O  Oxygen Concentration (%): 75 %  Resp: 19      PATIENT PARAMETERS:  PIP 30  Pplat:  25  Pmean:  16  Compliance: 14  SBT: No    Secretions:  Scant, thick, white.  02 Sats:  95%    ETT SIZE 7.5 Secured at 20 cm at teeth/gums    Respiratory Medications: None       NOTE / SHIFT SUMMARY:   Pt remains on continuous mechanical ventilation. There was no weaning attempted today. RT will continue to follow.    Will Wong, RT

## 2021-12-23 NOTE — PHARMACY-VANCOMYCIN DOSING SERVICE
"Pharmacy Vancomycin Initial Note  Date of Service 2021  Patient's  1950  71 year old, male    Indication: Ventilator-Associated Pneumonia    Current estimated CrCl = Estimated Creatinine Clearance: 65.9 mL/min (based on SCr of 0.72 mg/dL).    Creatinine for last 3 days  2021:  4:07 AM Creatinine 0.72 mg/dL  2021:  3:51 AM Creatinine 0.72 mg/dL  2021:  6:24 AM Creatinine 0.72 mg/dL    Recent Vancomycin Level(s) for last 3 days  No results found for requested labs within last 72 hours.      Vancomycin IV Administrations (past 72 hours)                   vancomycin (VANCOCIN) 1000 mg in dextrose 5% 200 mL PREMIX (mg) 1,000 mg New Bag 21 1051                Nephrotoxins and other renal medications (From now, onward)    Start     Dose/Rate Route Frequency Ordered Stop    21 1800  [Held by provider]  furosemide (LASIX) injection 20 mg        (Held by provider since Thu 2021 at 0808 by Mar Yadav, VERN CNP.Hold Reason: Other.)    20 mg  over 1-3 Minutes Intravenous EVERY 12 HOURS 21 1749      21 1421  piperacillin-tazobactam (ZOSYN) 3.375 g vial to attach to  mL bag        Note to Pharmacy: Extended infusion dosing to start 6 hours after initial infusion.   \"Followed by\" Linked Group Details    3.375 g  over 240 Minutes Intravenous EVERY 8 HOURS 21 0822      12/15/21 1700  norepinephrine (LEVOPHED) 4 mg in  mL infusion PREMIX         0.01-0.6 mcg/kg/min × 50.3 kg  1.9-113.2 mL/hr  Intravenous CONTINUOUS 12/15/21 1642            Contrast Orders - past 72 hours (72h ago, onward)            None          [employer] Prediction of Planned Initial Vancomycin Regimen  Regimen: 750 mg IV every 12 hours.  Start time: 22:51 on 2021  Exposure target: AUC24 (range)400-600 mg/L.hr   AUC24,ss: 531 mg/L.hr  Probability of AUC24 > 400: 79 %  Ctrough,ss: 16.6 mg/L  Probability of Ctrough,ss > 20: 34 %  Probability of nephrotoxicity (Lodise ANN " 2009): 12 %        Plan:  1. Start vancomycin  1000 mg x1 followed by 750 mg IV q12h.   2. Vancomycin monitoring method: AUC  3. Vancomycin therapeutic monitoring goal: 400-600 mg*h/L  4. Pharmacy will check vancomycin levels as appropriate in 1-3 Days.    5. Serum creatinine levels will be ordered daily for the first week of therapy and at least twice weekly for subsequent weeks.      Jaclyn Welsh [facility]

## 2021-12-23 NOTE — PLAN OF CARE
Problem: Gas Exchange Impaired (Pulmonary Impairment)  Goal: Optimal Gas Exchange  Outcome: No Change, has required FiO2  % this shift. PEEP increased to 16     Problem: Inability to Wean (Mechanical Ventilation, Invasive)  Goal: Mechanical Ventilation Liberation  Outcome: No Change     Problem: Skin and Tissue Injury (Mechanical Ventilation, Invasive)  Goal: Absence of Device-Related Skin and Tissue Injury  Outcome: No Change     Problem: Ventilator-Induced Lung Injury (Mechanical Ventilation, Invasive)  Goal: Absence of Ventilator-Induced Lung Injury  Outcome: No Change

## 2021-12-23 NOTE — PROGRESS NOTES
Desaturation and labored breathing on vent noted. sedatives increased, X-ray did not indicate significant changes. Current vent settings: AC 14 400 100% 14. sats 91%. RT following.    Oseas Weber, RT

## 2021-12-23 NOTE — PROGRESS NOTES
1425 Mechanical ventilator, settings below, BS clear bilat, suctioned inline scant thin pink tinged secretions. SpO2 95 %, FiO2 decreased to .70, RT to monitor, titrate O2 as tolerated/indicated.     Ventilation Mode: Other (see comments) (VC AC)  FiO2 (%): 70 %  Rate Set (breaths/minute): 14 breaths/min  Tidal Volume Set (mL): 400 mL  PEEP (cm H2O): 16 cmH2O  Oxygen Concentration (%): 80 % (decreased to 70)  Resp: 13

## 2021-12-23 NOTE — PROGRESS NOTES
ICU PROGRESS NOTE:        Assessment/Plan:     Changes for Today:    WBC ct up today, will pan culture, also had increase in oxygen support overnight    Send procal, was 0.08    Start on Vancomycin and Zosyn due to above until cultures are back.    Neuro:    Sedation required for ventilator synchrony and comfort    Remains on versed and fentanyl, added back propofol at low dose this morning due to vent dysynchrony and increasing hypoxia    RASS goal -3 to -4 for today due to above events    PTA baclofen, gabapentin, and sertraline continued    Pulmonary:    Acute respiratory failure secondary to COVID pneumonia, superimposed pulmonary edema    Lasix again held due to metabolic alkalosis    Klebsiella pneumonia.  Rocephin for 12 days.    Wean FiO2 as tolerated to keep SpO2 > 92%    Cardiac:    History of ischemic cardiomyopathy, EF 25-30%    History of bioprosthetic mitral valve and bioprosthetic aortic valve.    History of a fib/flutter, on chronic Warfarin-continued    Went into a fib with RVR in the 150's this am, given amiodarone bolus and started gtt, now back in the low 100's    Vasoplegia secondary to sedation and some drop in pressure today when in RVR    MAP goal > 65    Levophed as needed    GI/:    Tube feedings, tolerating, for malnutrition in critically ill patient, appreciate dietary's assistance    PPI prophylaxis    Last BM 12/21    Renal:    Lasix held today in setting of metabolic/respiratory acidosis    ID:    Received appropriate treatments for COVID:  Decadron, remdesivir, Toci    Completed 12 days of Rocephin for Klebsiella pneumonia    Recultured as above today due to increased WBC ct and increased hypoxia    Heme/Coag:    COVID related coagulopathy    Pharmacy dosing warfarin for which he is on PTA    Endocrine:    History of hypothyroidism, on home dose of levothyroxine    Increase Lantus to 10 units BID due to increasing blood sugars today          ICU Prophylaxis:    PPI:  Protonix    Peridex:  YES    Anticoagulation/DVT Prophylaxis:  Coumadin        Lines/Drains/Tubes:  Arterial Line 12/15/21 Radial  8 days    Peripheral IV 12/15/21 Anterior;Left  7 days    Peripheral IV 12/15/21 Distal;Right;Dorsal Lower forearm  7 days    PICC Triple Lumen 12/18/21 Left Basilic  5 days    Urethral Catheter Non-latex 16 fr  7 days    NG/OG/NJ Tube Orogastric 16 fr Center mouth  7 days    ETT Cuffed Single 7.5 mm  7 days      PROVIDER RESTRAINT FOR VIOLENT BEHAVIOR FACE TO FACE EVALUATION        Patient's Immediate Situation:  Patient demonstrated the following behaviors: risk for pulling out tubes and drains    Patient's Reaction to the intervention:  Does patient understand the reason for restraint/seclusion? Unable to access    Medical Condition:  Is there any evidence of compromise of Skin integrity, Respiratory, Cardiovascular, Musculoskeletal, Hydration? no    Behavioral Condition:  In consultation with the RN, is there a need to continue this restraint or seclusion? Yes    See Restraint Flowsheet for complete restraint documentation and assessment.    Mar Yadav, APRN CNP    CODE STATUS:  DNR/pre-arrest intubation ok      SUBJECTIVE:    Ccx:  Unable to access    HPI:    71 year old male former smoker, unvaccinated against COVID-19, with a history of atrial fibrillation on rate control and anticoagulation, chronic anemia and thrombocytopenia, peripheral neuropathy, CKD, hypothyroidism, HTN, depression, rheumatic heart disease s/p aortic and mitral valve replacement in April 2019 complicated by right PCA ischemic stroke, prolonged respiratory failure s/p trach and PEG in May 2019, hemo, cardiomyopathy with HFrEF, subsequent left hemiparesis at [facility] found to have hemorrhagic transformation of R PCA stroke treated medically, now with chronic spastic left hemiparesis, decannulated June 2019, now with ARDS due to COVID-19 infection, intubated on 12/15, acutely worsening hypoxia with frothy  secretions and elevated BNP on 12/16.        Past Medical History:   Diagnosis Date     ALEENA (acute kidney injury) (H)      Anemia due to blood loss, acute 4/24/2019     Asthma      Atrial fibrillation (H)      Blurry vision      CHF (congestive heart failure) (H)      Chronic kidney disease      Coronary artery disease      Dysphagia     resolved     Dysuria      Hearing loss      Heart murmur      Heart murmur      Hyperlipidemia      Hypertension      Hypothyroidism      Mitral valve stenosis      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     Moderate malnutrition (H)      Palpitations      Rheumatic heart disease      Shortness of breath     exertion     Stroke (H)     Silent     Valvular disease      Past Surgical History:   Procedure Laterality Date     AORTIC VALVE REPLACEMENT N/A 4/24/2019    Procedure: AORTIC VALVE REPLACEMENT;  Surgeon: Jojo Vaughn MD;  Location: [facility];  Service: Cardiovascular     CARDIAC CATHETERIZATION       CATARACT EXTRACTION Left 06/13/2016     CV CORONARY ANGIOGRAM N/A 11/30/2018    Procedure: Coronary Angiogram;  Surgeon: Jeff Deleon MD;  Location: [facility];  Service: Cardiology     EYE SURGERY      cataract     HC UGI ENDOSCOPY W PLACEMENT GASTROSTOMY TUBE PERCUT N/A 5/8/2019    Procedure: CREATION, GASTROSTOMY, PERCUTANEOUS, ENDOSCOPIC;  Surgeon: Chandana Kang MD;  Location: [facility];  Service: General     PICC AND MIDLINE TEAM LINE INSERTION  4/29/2019          PICC TRIPLE LUMEN PLACEMENT  12/15/2021          TRACHEOSTOMY N/A 5/8/2019    Procedure: TRACHEOSTOMY;  Surgeon: Chandana Kang MD;  Location: [facility];  Service: General     ZZC REPLACEMENT OF MITRAL VALVE N/A 4/24/2019    Procedure: MITRAL VALVE REPLACEMENT, ANESTHESIA, TAKEDOWN OF PERICARDIAL ADHESIONS AND REINFORCEMENT OF KLS PLATING SYSTEM TRANESOPHAGEAL ECHOCARDIOGRAM AND EPI AORTIC ULTRASOUND;  Surgeon: Jojo Vaughn MD;  Location: [facility]  Main OR;  Service: Cardiovascular     Current Facility-Administered Medications   Medication     acetaminophen (TYLENOL) solution 650 mg     acetaminophen (TYLENOL) Suppository 650 mg     amiodarone (NEXTERONE) 150 MG/100ML bolus     amiodarone (NEXTERONE) 900 mg in sodium chloride in non-PVC bag 0.9 % 500 mL infusion     amiodarone (NEXTERONE) 900 mg in sodium chloride in non-PVC bag 0.9 % 500 mL infusion     baclofen (LIORESAL) tablet 10 mg     bisacodyl (DULCOLAX) Suppository 10 mg     chlorhexidine (PERIDEX) 0.12 % solution 15 mL     dexmedetomidine (PRECEDEX) 400 mcg in 0.9% sodium chloride 100 mL     dextrose 10% infusion     glucose gel 15-30 g    Or     dextrose 50 % injection 25-50 mL    Or     glucagon injection 1 mg     sennosides (SENOKOT) syrup 5 mL    And     docusate (COLACE) 50 MG/5ML liquid 50 mg     fentaNYL (PF) (SUBLIMAZE) injection  mcg     fentaNYL (SUBLIMAZE) infusion     [Held by provider] furosemide (LASIX) injection 20 mg     gabapentin (NEURONTIN) solution 100 mg     insulin aspart (NovoLOG) injection (RAPID ACTING)     insulin glargine (LANTUS PEN) injection 10 Units     levothyroxine (SYNTHROID/LEVOTHROID) tablet 100 mcg     midazolam (VERSED) drip - ADULT 100 mg/100 mL in NS (pre-mix)     midazolam (VERSED) injection 1-2 mg     naloxone (NARCAN) injection 0.2 mg    Or     naloxone (NARCAN) injection 0.4 mg    Or     naloxone (NARCAN) injection 0.2 mg    Or     naloxone (NARCAN) injection 0.4 mg     norepinephrine (LEVOPHED) 4 mg in  mL infusion PREMIX     ondansetron (ZOFRAN-ODT) ODT tab 4 mg    Or     ondansetron (ZOFRAN) injection 4 mg     pantoprazole (PROTONIX) IV push injection 40 mg     piperacillin-tazobactam (ZOSYN) 3.375 g vial to attach to  mL bag     polyethylene glycol (MIRALAX) Packet 17 g     propofol (DIPRIVAN) infusion     sertraline (ZOLOFT) tablet 50 mg     sodium chloride (PF) 0.9% PF flush 10-20 mL     sodium chloride (PF) 0.9% PF flush 10-40 mL      sodium chloride (PF) 0.9% PF flush 10-40 mL     vancomycin (VANCOCIN) 750 mg in sodium chloride 0.9 % 250 mL intermittent infusion     vecuronium (NORCURON) injection 5 mg     Warfarin Therapy Reminder (Check START DATE - warfarin may be starting in the FUTURE)     warfarin-No DOSE today      No Known Allergies  Family History   Problem Relation Age of Onset     No Known Problems Mother      No Known Problems Father      Heart Disease No family hx of          PHYSICAL ASSESSMENT:  General: appears comfortable on ventilator  Lungs:  Coarse throughout  Heart: S1 and S2. Rate irregular.   Abdomen: Soft, non-tender.  Bowel sounds present X 4 quadrants.  Skin: skin color normal, texture normal, no rashes or lesions.   Extremities:  Trace edema, BLE  Pulses:  +2 BUE and +2 BLE  Neuro:  Unable to access, intubated and sedated.     Temp: 98.1  F (36.7  C) Temp src: Oral   Pulse: 97   Resp: 13 SpO2: 96 % O2 Device: Mechanical Ventilator          Intake/Output Summary (Last 24 hours) at 12/23/2021 1401  Last data filed at 12/23/2021 1200  Gross per 24 hour   Intake 2601.68 ml   Output 1895 ml   Net 706.68 ml     Temp: 98.1  F (36.7  C) Temp src: Oral   Pulse: 97   Resp: 13 SpO2: 96 % O2 Device: Mechanical Ventilator        Lab Results   Component Value Date    WBC 18.5 12/23/2021     Lab Results   Component Value Date    RBC 4.96 12/23/2021     Lab Results   Component Value Date    HGB 15.7 12/23/2021     Lab Results   Component Value Date    HCT 47.1 12/23/2021     No components found for: MCT  Lab Results   Component Value Date    MCV 95 12/23/2021     Lab Results   Component Value Date    MCH 31.7 12/23/2021     Lab Results   Component Value Date    MCHC 33.3 12/23/2021     Lab Results   Component Value Date    RDW 14.5 12/23/2021     Lab Results   Component Value Date    PLT 95 12/23/2021       Last Comprehensive Metabolic Panel:  Sodium   Date Value Ref Range Status   12/23/2021 138 136 - 145 mmol/L Final     Potassium    Date Value Ref Range Status   12/23/2021 4.8 3.5 - 5.0 mmol/L Final     Chloride   Date Value Ref Range Status   12/23/2021 104 98 - 107 mmol/L Final     Carbon Dioxide (CO2)   Date Value Ref Range Status   12/23/2021 28 22 - 31 mmol/L Final     Anion Gap   Date Value Ref Range Status   12/23/2021 6 5 - 18 mmol/L Final     Glucose   Date Value Ref Range Status   12/23/2021 133 (H) 70 - 125 mg/dL Final     GLUCOSE BY METER POCT   Date Value Ref Range Status   12/23/2021 250 (H) 70 - 99 mg/dL Final     Urea Nitrogen   Date Value Ref Range Status   12/23/2021 26 8 - 28 mg/dL Final     Creatinine   Date Value Ref Range Status   12/23/2021 0.72 0.70 - 1.30 mg/dL Final     GFR Estimate   Date Value Ref Range Status   12/23/2021 >90 >60 mL/min/1.73m2 Final     Comment:     Effective December 21, 2021 eGFRcr in adults is calculated using the 2021 CKD-EPI creatinine equation which includes age and gender (Jonatan et al., NEJ, DOI: 10.1056/NVAVlu7231040)   05/26/2021 >60 >60 mL/min/1.73m2 Final     Calcium   Date Value Ref Range Status   12/23/2021 7.4 (L) 8.5 - 10.5 mg/dL Final       Last Arterial Blood Gas:  pH Arterial   Date Value Ref Range Status   12/23/2021 7.51 (H) 7.37 - 7.44 Final     pCO2 Arterial   Date Value Ref Range Status   12/23/2021 37 35 - 45 mm Hg Final     pO2 Arterial   Date Value Ref Range Status   12/23/2021 55 (L) 75 - 85 mm Hg Final     Bicarbonate Arterial   Date Value Ref Range Status   12/23/2021 30 (H) 23 - 29 mmol/L Final     O2 Sat, Arterial   Date Value Ref Range Status   12/23/2021 90.7 (L) 95.0 - 96.0 % Final     Base Excess/Deficit (+/-)   Date Value Ref Range Status   12/23/2021 6.9   mmol/L Final       No results found for: TROPI    Imaging reviewed.         Mar Yadav, APRN, CNP  Pulmonary Critical Care  Pager: [phone]    Time Billed:   60 minutes of critical care time.    Patient requiring ICU level of care: Respiratory failure and on mechanical ventilation.  High risk for  further decompensation and or death.

## 2021-12-23 NOTE — PROGRESS NOTES
0740 Mechanical ventilation, settings below, BS rhonchi lower lobes, FiO2 remains at 1.0. suctioned inline with no returned secretions. Will discuss with MD in medical rounds. RT to monitor.    RT PROGRESS NOTE    VENT DAY# 9    CURRENT SETTINGS:   Ventilation Mode: CMV/AC  (Continuous Mandatory Ventilation/ Assist Control)  FiO2 (%): 100 %  Rate Set (breaths/minute): 14 breaths/min  Tidal Volume Set (mL): 400 mL  PEEP (cm H2O): (S) 16 cmH2O (increased per MD order)  Oxygen Concentration (%): 75 %  Resp: 28      PATIENT PARAMETERS:  PIP 15  Pplat:  36 ?  Pmean:  15  Compliance: unable  SBT: no    02 Sats:  9.2%    ETT SIZE 7.5 Secured at 21 cm at teeth/gums    Respiratory Medications: none     Haja Yuen, RT

## 2021-12-24 NOTE — PROGRESS NOTES
Quick Palliative Care Note:  Reviewed chart, events over the last 24 hours, attended ICU rounds. Pt noted to have significant fevers. Spoke with ICU APRN re: a joint call to family for a clinical update (unsure if got an update yesterday).Palliative Care did call pts son on Wednesday.    ICU APRN will call family this afternoon to let them know of the concerning changes.  Primary surrogate medical decision makers are son Jim and pts brother Mark.   Charito Tuttle, MARY CARMEN,CNP, Palliative Care

## 2021-12-24 NOTE — PROGRESS NOTES
Care Management Follow Up    Length of Stay (days): 9    Expected Discharge Date: 12/29/2021     Concerns to be Addressed:      ICU level of care, Covid-19 + requiring mechanical ventilator support, sedation, IV abx, fever, cultures pending, Palliative Care following  Patient plan of care discussed at interdisciplinary rounds: Yes    Anticipated Discharge Disposition:  Pending clinical progression     Anticipated Discharge Services:  Pending clinical progression  Anticipated Discharge DME:      Patient/family educated on Medicare website which has current facility and service quality ratings:    Education Provided on the Discharge Plan:  Per care team  Patient/Family in Agreement with the Plan:      Referrals Placed by CM/SW:  None at this time  Private pay costs discussed: Not applicable    Additional Information:  Chart reviewed and plan of care discussed during ICU rounds.  ICU APRN will call to update family of clinical status.  CM will continue to follow medical progression, review recommendations of care team, and assist with planning for any discharge needs.      Soni Singh RN

## 2021-12-24 NOTE — PHARMACY-VANCOMYCIN DOSING SERVICE
Pharmacy Vancomycin Note  Date of Service 2021  Patient's  1950   71 year old, male    Indication: Ventilator-Associated Pneumonia  Day of Therapy: 2  Current vancomycin regimen:  750 mg IV q12h  Current vancomycin monitoring method: AUC  Current vancomycin therapeutic monitoring goal: 400-600 mg*h/L    Insight[employer] Prediction of Current Vancomycin Regimen  Regimen: 750 mg IV every 12 hours.  Start time: 10:21 on 2021  Exposure target: AUC24 (range)400-600 mg/L.hr   AUC24,ss: 699 mg/L.hr  Probability of AUC24 > 400: 100 %  Ctrough,ss: 23.5 mg/L  Probability of Ctrough,ss > 20: 73 %  Probability of nephrotoxicity (Lodise ANN ): 24 %      Current estimated CrCl = Estimated Creatinine Clearance: 45.6 mL/min (based on SCr of 1.04 mg/dL).    Creatinine for last 3 days  2021:  3:51 AM Creatinine 0.72 mg/dL  2021:  6:24 AM Creatinine 0.72 mg/dL  2021:  2:21 AM Creatinine 1.04 mg/dL    Recent Vancomycin Levels (past 3 days)  2021:  5:44 AM Vancomycin 14.2 mg/L    Vancomycin IV Administrations (past 72 hours)                   vancomycin (VANCOCIN) 750 mg in sodium chloride 0.9 % 250 mL intermittent infusion (mg) 750 mg New Bag 21 2132    vancomycin (VANCOCIN) 1000 mg in dextrose 5% 200 mL PREMIX (mg) 1,000 mg New Bag 21 1051                Nephrotoxins and other renal medications (From now, onward)    Start     Dose/Rate Route Frequency Ordered Stop    21 1800  vancomycin (VANCOCIN) 750 mg in sodium chloride 0.9 % 250 mL intermittent infusion         750 mg  over 60-90 Minutes Intravenous EVERY 18 HOURS 21 1037      21 1800  [Held by provider]  furosemide (LASIX) injection 20 mg        (Held by provider since u 2021 at 0808 by Mar Yadav APRN CNP.Hold Reason: Other.)    20 mg  over 1-3 Minutes Intravenous EVERY 12 HOURS 21 1749      21 1421  piperacillin-tazobactam (ZOSYN) 3.375 g vial to attach to  mL bag       "  Note to Pharmacy: Extended infusion dosing to start 6 hours after initial infusion.   \"Followed by\" Linked Group Details    3.375 g  over 240 Minutes Intravenous EVERY 8 HOURS 12/23/21 0822               Contrast Orders - past 72 hours (72h ago, onward)            None          Interpretation of levels and current regimen:  Vancomycin level is reflective of AUC greater than 600    Has serum creatinine changed greater than 50% in last 72 hours: Yes    Urine output:  diminished urine output    Renal Function: Worsening    InsightRX Prediction of Planned New Vancomycin Regimen  Regimen: 750 mg IV every 18 hours.  Start time: 10:21 on 12/24/2021  Exposure target: AUC24 (range)400-600 mg/L.hr   AUC24,ss: 477 mg/L.hr  Probability of AUC24 > 400: 81 %  Ctrough,ss: 14.8 mg/L  Probability of Ctrough,ss > 20: 13 %  Probability of nephrotoxicity (Lodise ANN 2009): 10 %    Plan:  1. Decrease Dose to 750mg IV q18h  2. Vancomycin monitoring method: AUC  3. Vancomycin therapeutic monitoring goal: 400-600 mg*h/L  4. Pharmacy will check vancomycin levels as appropriate in 1-3 Days.  5. Serum creatinine levels will be ordered daily BMP in ICU.    Stephanie Lujan [facility]    "

## 2021-12-24 NOTE — PROGRESS NOTES
ICU PROGRESS NOTE:        Assessment/Plan:     Changes for Today:     Fever to 107 overnight!  Now stabilizing.  No other signs or symptoms of serotonin syndrome.     Possible emesis in mouth.  Holding tube feedings.  Output looks coffee ground.  Sending Hgb PPI now BID.        Neuro:    Sedation required for ventilator synchrony and comfort    Remains on versed and fentanyl, propofol stopped    RASS goal -3 to -4     PTA baclofen, gabapentin, and sertraline continued     Pulmonary:    Acute respiratory failure secondary to COVID pneumonia, superimposed pulmonary edema    Lasix again held due to metabolic alkalosis    Klebsiella pneumonia.  Rocephin for 12 days.    Wean FiO2 as tolerated to keep SpO2 > 92%     Cardiac:    History of ischemic cardiomyopathy, EF 25-30%    History of bioprosthetic mitral valve and bioprosthetic aortic valve.    History of a fib/flutter, on chronic Warfarin-continued  Went into a fib with RVR on 12/23.  Resolved with amiodarone gtt and bolus.    Continue amiodarone gtt at 0.5 mg/min today.     Vasoplegia secondary to sedation and some drop in pressure today when in RVR    MAP goal > 65    Levophed as needed     GI/:    Tube feedings, tolerating, for malnutrition in critically ill patient, appreciate dietary's assistance-holding today as some possible coffee ground output.    PPI prophylaxis    Last BM 12/23    Abdominal Xray      Renal:    Will restart lasix today at 20 mg/day, still fluid up, and alkalosis resovled.      ID:    Received appropriate treatments for COVID:  Decadron, remdesivir, Toci    Completed 12 days of Rocephin for Klebsiella pneumonia    Recultured as above today due to increased WBC ct and increased hypoxia     Heme/Coag:    COVID related coagulopathy    Pharmacy dosing warfarin for which he is on PTA     Endocrine:    History of hypothyroidism, on home dose of levothyroxine    Holding lantus today as TF on hold.  Starting D 10 at same rate.              ICU  Prophylaxis:     PPI: Protonix     Peridex:  YES     Anticoagulation/DVT Prophylaxis:  Coumadin           Lines/Drains/Tubes:  Arterial Line 12/15/21 Radial  8 days     Peripheral IV 12/15/21 Anterior;Left  7 days     Peripheral IV 12/15/21 Distal;Right;Dorsal Lower forearm  7 days     PICC Triple Lumen 12/18/21 Left Basilic  5 days     Urethral Catheter Non-latex 16 fr  7 days     NG/OG/NJ Tube Orogastric 16 fr Center mouth  7 days     ETT Cuffed Single 7.5 mm  7 days        PROVIDER RESTRAINT FOR VIOLENT BEHAVIOR FACE TO FACE EVALUATION           Patient's Immediate Situation:  Patient demonstrated the following behaviors: risk for pulling out tubes and drains     Patient's Reaction to the intervention:  Does patient understand the reason for restraint/seclusion? Unable to access     Medical Condition:  Is there any evidence of compromise of Skin integrity, Respiratory, Cardiovascular, Musculoskeletal, Hydration? no     Behavioral Condition:  In consultation with the RN, is there a need to continue this restraint or seclusion? Yes     See Restraint Flowsheet for complete restraint documentation and assessment.     Mar Yadav, APRN CNP          CODE STATUS: DNR/pre-arrest intubation ok      SUBJECTIVE:    Ccx:  Unaable to access    HPI:  71 year old male former smoker, unvaccinated against COVID-19, with a history of atrial fibrillation on rate control and anticoagulation, chronic anemia and thrombocytopenia, peripheral neuropathy, CKD, hypothyroidism, HTN, depression, rheumatic heart disease s/p aortic and mitral valve replacement in April 2019 complicated by right PCA ischemic stroke, prolonged respiratory failure s/p trach and PEG in May 2019, hemo, cardiomyopathy with HFrEF, subsequent left hemiparesis at LTACH found to have hemorrhagic transformation of R PCA stroke treated medically, now with chronic spastic left hemiparesis, decannulated June 2019, now with ARDS due to COVID-19 infection, intubated on  12/15, acutely worsening hypoxia with frothy secretions and elevated BNP on 12/16.     Past Medical History:   Diagnosis Date     ALEENA (acute kidney injury) (H)      Anemia due to blood loss, acute 4/24/2019     Asthma      Atrial fibrillation (H)      Blurry vision      CHF (congestive heart failure) (H)      Chronic kidney disease      Coronary artery disease      Dysphagia     resolved     Dysuria      Hearing loss      Heart murmur      Heart murmur      Hyperlipidemia      Hypertension      Hypothyroidism      Mitral valve stenosis      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     Moderate malnutrition (H)      Palpitations      Rheumatic heart disease      Shortness of breath     exertion     Stroke (H)     Silent     Valvular disease      Past Surgical History:   Procedure Laterality Date     AORTIC VALVE REPLACEMENT N/A 4/24/2019    Procedure: AORTIC VALVE REPLACEMENT;  Surgeon: Jojo Vaughn MD;  Location: [facility];  Service: Cardiovascular     CARDIAC CATHETERIZATION       CATARACT EXTRACTION Left 06/13/2016     CV CORONARY ANGIOGRAM N/A 11/30/2018    Procedure: Coronary Angiogram;  Surgeon: Jeff Deleon MD;  Location: [facility] Cath Lab;  Service: Cardiology     EYE SURGERY      cataract     HC UGI ENDOSCOPY W PLACEMENT GASTROSTOMY TUBE PERCUT N/A 5/8/2019    Procedure: CREATION, GASTROSTOMY, PERCUTANEOUS, ENDOSCOPIC;  Surgeon: Chandana Kang MD;  Location: [facility];  Service: General     PICC AND MIDLINE TEAM LINE INSERTION  4/29/2019          PICC TRIPLE LUMEN PLACEMENT  12/15/2021          TRACHEOSTOMY N/A 5/8/2019    Procedure: TRACHEOSTOMY;  Surgeon: Chandana Kang MD;  Location: [facility];  Service: General     ZZC REPLACEMENT OF MITRAL VALVE N/A 4/24/2019    Procedure: MITRAL VALVE REPLACEMENT, ANESTHESIA, TAKEDOWN OF PERICARDIAL ADHESIONS AND REINFORCEMENT OF KLS PLATING SYSTEM TRANESOPHAGEAL ECHOCARDIOGRAM AND EPI AORTIC ULTRASOUND;  Surgeon:  Jojo Vaughn MD;  Location: [facility] OR;  Service: Cardiovascular     Current Facility-Administered Medications   Medication     acetaminophen (TYLENOL) solution 650 mg     acetaminophen (TYLENOL) Suppository 650 mg     amiodarone (NEXTERONE) 900 mg in sodium chloride in non-PVC bag 0.9 % 500 mL infusion     baclofen (LIORESAL) tablet 10 mg     bisacodyl (DULCOLAX) Suppository 10 mg     chlorhexidine (PERIDEX) 0.12 % solution 15 mL     dexmedetomidine (PRECEDEX) 400 mcg in 0.9% sodium chloride 100 mL     dextrose 10% infusion     glucose gel 15-30 g    Or     dextrose 50 % injection 25-50 mL    Or     glucagon injection 1 mg     sennosides (SENOKOT) syrup 5 mL    And     docusate (COLACE) 50 MG/5ML liquid 50 mg     epoprostenol (VELETRI) inhalation solution     fentaNYL (PF) (SUBLIMAZE) injection  mcg     fentaNYL (SUBLIMAZE) infusion     [Held by provider] furosemide (LASIX) injection 20 mg     gabapentin (NEURONTIN) solution 100 mg     insulin aspart (NovoLOG) injection (RAPID ACTING)     [Held by provider] insulin glargine (LANTUS PEN) injection 10 Units     levothyroxine (SYNTHROID/LEVOTHROID) tablet 100 mcg     midazolam (VERSED) drip - ADULT 100 mg/100 mL in NS (pre-mix)     midazolam (VERSED) injection 1-2 mg     naloxone (NARCAN) injection 0.2 mg    Or     naloxone (NARCAN) injection 0.4 mg    Or     naloxone (NARCAN) injection 0.2 mg    Or     naloxone (NARCAN) injection 0.4 mg     ondansetron (ZOFRAN-ODT) ODT tab 4 mg    Or     ondansetron (ZOFRAN) injection 4 mg     pantoprazole (PROTONIX) IV push injection 40 mg     phenylephrine (ALEENA-SYNEPHRINE) 50 mg in NaCl 0.9 % 250 mL infusion     piperacillin-tazobactam (ZOSYN) 3.375 g vial to attach to  mL bag     polyethylene glycol (MIRALAX) Packet 17 g     propofol (DIPRIVAN) infusion     sertraline (ZOLOFT) tablet 50 mg     sodium chloride (PF) 0.9% PF flush 10-20 mL     sodium chloride (PF) 0.9% PF flush 10-40 mL     sodium chloride  (PF) 0.9% PF flush 10-40 mL     sodium chloride 0.9% (bag) irrigation     vancomycin (VANCOCIN) 750 mg in sodium chloride 0.9 % 250 mL intermittent infusion     vecuronium (NORCURON) injection 5 mg     warfarin ANTICOAGULANT (COUMADIN) half-tab 1.5 mg     Warfarin Therapy Reminder (Check START DATE - warfarin may be starting in the FUTURE)      No Known Allergies  Family History   Problem Relation Age of Onset     No Known Problems Mother      No Known Problems Father      Heart Disease No family hx of          PHYSICAL ASSESSMENT:  General: appears comfortable on ventilator   Lungs:  Coarse throughout.   Heart: RRR, S1 and S2   Abdomen: Soft, non-tender.  Bowel sounds present X 4 quadrants.  Skin: skin color normal, texture normal, no rashes or lesions.   Extremities:  Trace edema, BLE  Pulses:  +2 BUE and +2 BLE  Neuro: unable to access, intubated and sedated.     Temp: 99.1  F (37.3  C) Temp src: Rectal BP: 129/78 Pulse: 90   Resp: 18 SpO2: 95 % O2 Device: Mechanical Ventilator          Intake/Output Summary (Last 24 hours) at 12/24/2021 1550  Last data filed at 12/24/2021 1400  Gross per 24 hour   Intake 3043.45 ml   Output 1330 ml   Net 1713.45 ml     Temp: 99.1  F (37.3  C) Temp src: Rectal BP: 129/78 Pulse: 90   Resp: 18 SpO2: 95 % O2 Device: Mechanical Ventilator        Lab Results   Component Value Date    WBC 22.8 12/24/2021     Lab Results   Component Value Date    RBC 4.79 12/24/2021     Lab Results   Component Value Date    HGB 15.1 12/24/2021     Lab Results   Component Value Date    HCT 46.8 12/24/2021     No components found for: MCT  Lab Results   Component Value Date    MCV 98 12/24/2021     Lab Results   Component Value Date    MCH 31.5 12/24/2021     Lab Results   Component Value Date    MCHC 32.3 12/24/2021     Lab Results   Component Value Date    RDW 15.0 12/24/2021     Lab Results   Component Value Date    PLT 92 12/24/2021       Last Comprehensive Metabolic Panel:  Sodium   Date Value Ref  Range Status   12/24/2021 140 136 - 145 mmol/L Final     Potassium   Date Value Ref Range Status   12/24/2021 4.9 3.5 - 5.0 mmol/L Final     Chloride   Date Value Ref Range Status   12/24/2021 105 98 - 107 mmol/L Final     Carbon Dioxide (CO2)   Date Value Ref Range Status   12/24/2021 24 22 - 31 mmol/L Final     Anion Gap   Date Value Ref Range Status   12/24/2021 11 5 - 18 mmol/L Final     Glucose   Date Value Ref Range Status   12/24/2021 167 (H) 70 - 125 mg/dL Final     GLUCOSE BY METER POCT   Date Value Ref Range Status   12/24/2021 89 70 - 99 mg/dL Final     Urea Nitrogen   Date Value Ref Range Status   12/24/2021 43 (H) 8 - 28 mg/dL Final     Creatinine   Date Value Ref Range Status   12/24/2021 1.04 0.70 - 1.30 mg/dL Final     GFR Estimate   Date Value Ref Range Status   12/24/2021 77 >60 mL/min/1.73m2 Final     Comment:     Effective December 21, 2021 eGFRcr in adults is calculated using the 2021 CKD-EPI creatinine equation which includes age and gender (Jonatan et al., NEJM, DOI: 10.1056/VGPAnp9187122)   05/26/2021 >60 >60 mL/min/1.73m2 Final     Calcium   Date Value Ref Range Status   12/24/2021 7.4 (L) 8.5 - 10.5 mg/dL Final       Last Arterial Blood Gas:  pH Arterial   Date Value Ref Range Status   12/24/2021 7.26 (L) 7.37 - 7.44 Final     pCO2 Arterial   Date Value Ref Range Status   12/24/2021 62 (H) 35 - 45 mm Hg Final     pO2 Arterial   Date Value Ref Range Status   12/24/2021 98 (H) 75 - 85 mm Hg Final     Bicarbonate Arterial   Date Value Ref Range Status   12/24/2021 24 23 - 29 mmol/L Final     O2 Sat, Arterial   Date Value Ref Range Status   12/24/2021 97.0 (H) 95.0 - 96.0 % Final     Base Excess/Deficit (+/-)   Date Value Ref Range Status   12/24/2021 0.7   mmol/L Final       No results found for: KISHOR    Reviewed current images .      Mar Yadav, APRN, CNP  Pulmonary Critical Care  Pager: [phone]    Time Billed: 60 minutes of critical care time.    Patient requiring ICU level of care:  Respiratory failure and on mechanical ventilation.  High risk for further decompensation and or death.

## 2021-12-24 NOTE — PHARMACY-VANCOMYCIN DOSING SERVICE
Patient initiated on vancomycin 750mg IV q12h    Creatinine increased to 1.04 today changing predicted AUC to 695    Will add on vancomycin level to this morning's labs and adjust dosing at that time    Stephanie Lujan RPH  7:52 AM

## 2021-12-24 NOTE — PROGRESS NOTES
RT PROGRESS NOTE    VENT DAY# 10    CURRENT SETTINGS:   Ventilation Mode: CMV/AC  (Continuous Mandatory Ventilation/ Assist Control)  FiO2 (%): 70 %  Rate Set (breaths/minute): 18 breaths/min  Tidal Volume Set (mL): 400 mL  PEEP (cm H2O): 14 cmH2O  Oxygen Concentration (%): 70 %  Resp: 18      PATIENT PARAMETERS:  PIP 37  Pplat:  34  Pmean:  19  SBT: no    ETT SIZE 7.5 Secured at 21 cm at teeth/gums    Respiratory Medications: veletri     NOTE / SHIFT SUMMARY:   RR increased to 18 this am and PEEP decreased to 14. Patient had cuff leak earlier in shift, checked and adjusted for minimal leak, RN reported torwards end of shift a leak again, patient both time receiving appropriate volumes on the vent.  Passed on in report about leak issue, intensivist at a code, evening RT/RN to discuss with them once back from code.    Dorcas Cardona, RT

## 2021-12-24 NOTE — PLAN OF CARE
Problem: Adult Inpatient Plan of Care  Goal: Plan of Care Review  Outcome: No Change    Emesis spotted on patient's pillow at 2130.  Residuals were 325.  Provider notified and hold tube feeding for one hour and restart at 2330.  More emesis present when I was going to restart tube feeding, residuals at 25.  Discussed with provider, hold tube feedings at this time.    Gave rectal tylenol at 0045 for a temp of 102.2.  At 0200 heart rate became elevated and blood pressures started to drop.  Cooling blanket with probe applied, esophageal probe temp of 107.7.  Cooling blanket, ice packs, and cold wash clothes applied.  Began flushing cold sterile saline into bladder.  Continuous bladder irrigation with cold saline was began.  By 0600 temperature was 102.9,  continuous bladder irrigation stopped.    Adonis Diamond RN

## 2021-12-24 NOTE — PROGRESS NOTES
Pt identifies with the Zoroastrianism Taoism ganesh. Family requests prayer.     offered silent prayer outside his room. Spiritual Care is available as needed or requested.    WARD Ray.

## 2021-12-25 NOTE — PROGRESS NOTES
CRITICAL CARE PROGRESS NOTE:    Assessment/Plan:  71 year old male former smoker, unvaccinated against COVID-19, with a history of atrial fibrillation on rate control and anticoagulation, chronic anemia and thrombocytopenia, peripheral neuropathy, CKD, hypothyroidism, HTN, depression, rheumatic heart disease s/p aortic and mitral valve replacement in April 2019 complicated by right PCA ischemic stroke, prolonged respiratory failure s/p trach and PEG in May 2019, hemo, cardiomyopathy with HFrEF, subsequent left hemiparesis at LTACH found to have hemorrhagic transformation of R PCA stroke treated medically, now with chronic spastic left hemiparesis, decannulated June 2019, now with ARDS due to COVID-19 infection, intubated on 12/15, acutely worsening hypoxia with frothy secretions and elevated BNP on 12/16, course c/b Klebsiella pneumonia.    RESP:  Acute hypoxemic respiratory failure, cardiogenic pulmonary edema: ARDS due to COVID-19. Pulmonary edema likely minor component, though cardiomyopathy likely worsening his condition. Despite his comorbidities and prior medical issues, as well as recommendation from his PCP, he remains unvaccinated against COVID-19. Intubated on 12/15. Baseline EF 35%, now 25-30%. Has persistent severe ARDS and refractory hypoxia. Poor lung compliance, hemodynamically unstable with movement and attempted proning so remains supine.  Ventilation Mode: CMV/AC  (Continuous Mandatory Ventilation/ Assist Control)  FiO2 (%): 70 %  Rate Set (breaths/minute): 24 breaths/min  Tidal Volume Set (mL): 400 mL  PEEP (cm H2O): 14 cmH2O  Oxygen Concentration (%): 70 %  Resp: 24      Increase furosemide to 20 mg IV q 12 hrs    Continue lung-protective ventilation    See ID below     CV:  Rheumatic heart disease s/p mitral and aortic valve replacement, HFrEF, atrial flutter, shock: EF baseline 35%, now 25-30% with concurrent diastolic dysfunction.  on presentation. ECG with inferolateral ST depression,  unclear significance.    Increase furosemide to 20 mg IV q 12 hrs    Continue phenylephrine to keep MAP >65    His cardiomyopathy will increase risk of eventual extubation failure    NEURO:  History of stroke, left hemiparesis, neuropathy, depression: Right PCA ischemic followed by hemorrhagic stroke in 2019 after heart surgery. Chronic left spastic hemiparesis. Was on propofol, stopped in the setting of high fever and lactic acidosis.    Continue home baclofen, sertraline, gabapentin    Midazolam and fentanyl    RASS goal -3 to -4    GI:  No acute issues.    tube feeding    Bowel regimen     RENAL:  No acute issues.    Monitor    Increase furosemide    ID:  COVID-19, Klebsiella pneumonia, fever: Unvaccinated. Has Klebsiella in sputum. Fever up to 107, now WBC >30. Concern for propofol infusion syndrome so it was stopped. Has been on coveage for Klebsiella 12/15-12/25, including pip-tazo, ceftriaxone, pip-tazo again and meropenem. Drug fever also possible.    Infectious disease consultation tomorrow    tocilizumab given    Dexamethasone completed 12/23    remdesivir completed    Stop antibiotics    Repeat procalcitonin    UA    Blood cultures    Check C diff if loose stool (so far only smears)    RUQ US    Aspergillus galactomannan and 1,3-beta-D-glucan     HEMATOLOGIC:  Thrombocytopenia, leukocytosis, anticoagulation: Unclear etiology of thrombocytopenia, likely due to sepsis, though could be drug-induced (eg antibiotics).    Peripheral smear    Check WBC diff     ENDOCRINE:  Hypothyroidism:    Continue levothyroxine     ICU PROPHYLAXIS:    Chlorhexidine    HOB elevation    Warfarin, monitor INR which is currently supratherapeutic    PPI    Lines/Drains/Tubes:  Central line (RUE PICC) placed on 12/15  Arterial line placed on 12/15  ETT (7.5) placed on 12/15  Feeding tube (OG) placed on 12/15  Guzman catheter placed on 12/15     CODE STATUS, DISPOSITION, FAMILY COMMUNICATION: DNR. Critically ill requiring invasive  "mechanical ventilation and continuous vasopressors. Spoke with the patient's son, Jim, via [place]  by telephone. Quite guarded prognosis, and I discussed this with his son. Appreciate palliative care involvement.     Restraints  Not indicated currently    Bao Rubio MD  Pulmonary and Critical Care Medicine  [facility]  Office [phone]  Pager [phone]  (he/him/his)    Overnight events:  No new events, still with intermittent high fevers, worsening leukocytosis.    Subjective:  unable    Objective:  Physical Exam:  Vent settings for last 24 hours:  Ventilation Mode: CMV/AC  (Continuous Mandatory Ventilation/ Assist Control)  FiO2 (%): 70 %  Rate Set (breaths/minute): 24 breaths/min  Tidal Volume Set (mL): 400 mL  PEEP (cm H2O): 14 cmH2O  Oxygen Concentration (%): 70 %  Resp: 24      /53   Pulse 99   Temp 98.2  F (36.8  C) (Oral)   Resp 24   Ht 1.473 m (4' 10\")   Wt 48.8 kg (107 lb 9.4 oz)   SpO2 95%   BMI 22.49 kg/m      Intake/Output last 3 shifts:  I/O last 3 completed shifts:  In: 2621.32 [I.V.:2491.32; NG/GT:130]  Out: 2175 [Urine:2025; Emesis/NG output:150]  Intake/Output this shift:  No intake/output data recorded.    Physical Exam  Gen: intubated, sedated  HEENT: no OP lesions, no STEPHANIE  CV: tachy, regular, no m/g/r  Resp: diminished bilaterally  Abd: soft, nontender, BS+  Neuro: PERRL, sedated  Ext: no edema    LAB:  Recent Labs   Lab 12/25/21 0514   WBC 34.4*   HGB 15.8   HCT 49.5   PLT 56*     Recent Labs   Lab 12/25/21  0514 12/24/21  0221 12/23/21  0624    140 138   CO2 21* 24 28   BUN 42* 43* 26   ALKPHOS 83  --   --    ALT 15  --   --    AST 23  --   --        No current outpatient medications on file.       Total Critical Care Time: 65 minutes    "

## 2021-12-25 NOTE — PLAN OF CARE
RT PROGRESS NOTE    VENT DAY# 11    CURRENT SETTINGS:   Ventilation Mode: CMV/AC  (Continuous Mandatory Ventilation/ Assist Control)  FiO2 (%): 70 %  Rate Set (breaths/minute): 24 breaths/min  Tidal Volume Set (mL): 400 mL  PEEP (cm H2O): 14 cmH2O  Oxygen Concentration (%): 70 %  Resp: 24      PATIENT PARAMETERS:  PIP 36  Pplat:  33  Pmean:  20  SBT: No      Secretions:  small brown  02 Sats:  92-94%    ETT SIZE 7.5 Secured at 21 cm at teeth/gums    Respiratory Medications: Full strength veletri     ABG: @1058 pH 7.28; pCO2 42; pO2 63; HCO3 19, %O2 Sat 90.5, BE -7.4 on 70%    NOTE / SHIFT SUMMARY:   Patient continues on full vent support. No weaning or vent changes made this shift. Patient remains on full strength veletri. Will continue to monitor and assess as needed.    Vivi Curry, RT

## 2021-12-25 NOTE — PROGRESS NOTES
RT PROGRESS NOTE    VENT DAY# 11    CURRENT SETTINGS:   Ventilation Mode: CMV/AC  (Continuous Mandatory Ventilation/ Assist Control)  FiO2 (%): 70 %  Rate Set (breaths/minute): 20 breaths/min  Tidal Volume Set (mL): 400 mL  PEEP (cm H2O): 14 cmH2O  Oxygen Concentration (%): 70 %  Resp: 20      PATIENT PARAMETERS:  PIP 33  Pplat:  31  Pmean:  18  Secretions:  Small amount of thin white ETT secretions  02 Sats: 92-95%    ETT SIZE 7.5 Secured at 21 cm at teeth/gums    Respiratory Medications: continuous Veletri (full strength)    ABG: Results for MIRELLA COLON (MRN [id number]) as of 12/25/2021 05:41   Ref. Range 12/25/2021 05:15   pH Arterial Latest Ref Range: 7.37 - 7.44  7.27 (L)   pCO2 Arterial Latest Ref Range: 35 - 45 mm Hg 48 (H)   PO2 Arterial Latest Ref Range: 75 - 85 mm Hg 76   Bicarbonate Arterial Latest Ref Range: 23 - 29 mmol/L 20 (L)   Base Excess Art Latest Ref Range:   mmol/L -5.1   FIO2 Unknown 70   Oxyhemoglobin Latest Ref Range: 95.0 - 96.0 % 93.6 (L)       NOTE / SHIFT SUMMARY:  Patient remains intubated on full vent support. Remains stable from a respiratory standpoint. Will continue to monitor and assess.     Kyara Camp, RT

## 2021-12-26 NOTE — PROGRESS NOTES
ICU PROGRESS NOTE:        Assessment/Plan:     Changes for Today:    Spoke with patient's daughter La Nena.  Unfortunately, patient has continued to decline further overnight.  His INR is almost 14.0.  He is having increased oxygen and pressor needs.  I recommended that we transition to a comfort approach.  La Nena is going to discuss this with the rest of her family. Patient is No CPR, pre-arrest intubation ok.     Neuro:    Patient has history of stroke, left hemiparesis, neuropathy, and depression    Right PCA ischemic following a hemorrhagic stroke in 2019 after heart surgery.    Continue home baclofen, sertraline, gabapentin.    Requiring sedation for ventilator synchrony and comfort.  Versed and Fentanyl gtts    RASS goal -3 to -4    Pulmonary:    Acute respiratory failure, cardiogenic pulmonary edema.  ARDS secondary to COVID 19    Intubation on 12/15    Baseline EF 35%, now 25-30%    Poor lung compliance and refractory hypoxia    Quite unstable, and not responding much to proning, so stopped    FS veletri     P;F in the 60's at 100% FIO2    Unfortunately there is not much more we can do for him.  He is dying.    Cardiac:    History of mitral and aortic valve replacement due to rheumatic heart disease, HFrEF, a flutter    Lasix increased yesterday to 20 mg BID    Increasing doses of phenylephrine overnight      GI:    Tube feedings    Bowel regime    Renal:    No acute issues    ID:    Covid-19, Klebsiella pneumonia    Coverage for Klebsiella 12/15-12/25.  Zosyn, Ceftriaxone, then Zosyn again, and Merrem    ID consult in place, appreciate    Competed COVID treatments including decadron, Toci X 1, and Remdesivir    Right UQ US:  Sludge in gallbladder, dilitation of CBD likely    Fungal work up in place    Heme/Coag:    Coagulopathy secondary to COVID    Now thrombocytopenic, unclear cause.  Sepsis?  Drug induced??    Endocrine:    Hypothyroidism-continue levothyroxine    Blood sugars are stable        ICU  Prophylaxis:    PPI:  Protonix    Peridex:  YES    Anticoagulation/DVT Prophylaxis: Coumadin, on hold         Lines/Drains/Tubes:  Central line (RUE PICC) placed on 12/15  Arterial line placed on 12/15  ETT (7.5) placed on 12/15  Feeding tube (OG) placed on 12/15  Guzman catheter placed on 12/15      CODE STATUS: No CPR, pre arrest intubation ok      SUBJECTIVE:    Ccx:  Unable to access, intubated/sedated     HPI:  71 year old male former smoker, unvaccinated against COVID-19, with a history of atrial fibrillation on rate control and anticoagulation, chronic anemia and thrombocytopenia, peripheral neuropathy, CKD, hypothyroidism, HTN, depression, rheumatic heart disease s/p aortic and mitral valve replacement in April 2019 complicated by right PCA ischemic stroke, prolonged respiratory failure s/p trach and PEG in May 2019, hemo, cardiomyopathy with HFrEF, subsequent left hemiparesis at [facility] found to have hemorrhagic transformation of R PCA stroke treated medically, now with chronic spastic left hemiparesis, decannulated June 2019, now with ARDS due to COVID-19 infection, intubated on 12/15, acutely worsening hypoxia with frothy secretions and elevated BNP on 12/16, course c/b Klebsiella pneumonia.      Past Medical History:   Diagnosis Date     ALEENA (acute kidney injury) (H)      Anemia due to blood loss, acute 4/24/2019     Asthma      Atrial fibrillation (H)      Blurry vision      CHF (congestive heart failure) (H)      Chronic kidney disease      Coronary artery disease      Dysphagia     resolved     Dysuria      Hearing loss      Heart murmur      Heart murmur      Hyperlipidemia      Hypertension      Hypothyroidism      Mitral valve stenosis      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     Moderate malnutrition (H)      Palpitations      Rheumatic heart disease      Shortness of breath     exertion     Stroke (H)     Silent     Valvular disease      Past Surgical History:   Procedure Laterality Date      AORTIC VALVE REPLACEMENT N/A 4/24/2019    Procedure: AORTIC VALVE REPLACEMENT;  Surgeon: Jojo Vaughn MD;  Location: [facility] OR;  Service: Cardiovascular     CARDIAC CATHETERIZATION       CATARACT EXTRACTION Left 06/13/2016     CV CORONARY ANGIOGRAM N/A 11/30/2018    Procedure: Coronary Angiogram;  Surgeon: Jeff Deleon MD;  Location: [facility] Cath Lab;  Service: Cardiology     EYE SURGERY      cataract     HC UGI ENDOSCOPY W PLACEMENT GASTROSTOMY TUBE PERCUT N/A 5/8/2019    Procedure: CREATION, GASTROSTOMY, PERCUTANEOUS, ENDOSCOPIC;  Surgeon: Chandana Kang MD;  Location: [facility] OR;  Service: General     PICC AND MIDLINE TEAM LINE INSERTION  4/29/2019          PICC TRIPLE LUMEN PLACEMENT  12/15/2021          TRACHEOSTOMY N/A 5/8/2019    Procedure: TRACHEOSTOMY;  Surgeon: Chandana Kang MD;  Location: [facility];  Service: General     ZZC REPLACEMENT OF MITRAL VALVE N/A 4/24/2019    Procedure: MITRAL VALVE REPLACEMENT, ANESTHESIA, TAKEDOWN OF PERICARDIAL ADHESIONS AND REINFORCEMENT OF KLS PLATING SYSTEM TRANESOPHAGEAL ECHOCARDIOGRAM AND EPI AORTIC ULTRASOUND;  Surgeon: Jojo Vaughn MD;  Location: [facility];  Service: Cardiovascular     Current Facility-Administered Medications   Medication     acetaminophen (TYLENOL) solution 650 mg     acetaminophen (TYLENOL) Suppository 650 mg     amiodarone (NEXTERONE) 900 mg in sodium chloride in non-PVC bag 0.9 % 500 mL infusion     baclofen (LIORESAL) tablet 10 mg     bisacodyl (DULCOLAX) Suppository 10 mg     chlorhexidine (PERIDEX) 0.12 % solution 15 mL     dexmedetomidine (PRECEDEX) 400 mcg in 0.9% sodium chloride 100 mL     dextrose 10% infusion     glucose gel 15-30 g    Or     dextrose 50 % injection 25-50 mL    Or     glucagon injection 1 mg     sennosides (SENOKOT) syrup 5 mL    And     docusate (COLACE) 50 MG/5ML liquid 50 mg     epoprostenol (VELETRI) inhalation solution     fentaNYL (PF) (SUBLIMAZE)  injection  mcg     fentaNYL (SUBLIMAZE) infusion     furosemide (LASIX) injection 20 mg     gabapentin (NEURONTIN) solution 100 mg     insulin aspart (NovoLOG) injection (RAPID ACTING)     [Held by provider] insulin glargine (LANTUS PEN) injection 10 Units     levofloxacin (LEVAQUIN) infusion 500 mg     levothyroxine (SYNTHROID/LEVOTHROID) tablet 100 mcg     metroNIDAZOLE (FLAGYL) tablet 500 mg     midazolam (VERSED) drip - ADULT 100 mg/100 mL in NS (pre-mix)     midazolam (VERSED) injection 1-2 mg     naloxone (NARCAN) injection 0.2 mg    Or     naloxone (NARCAN) injection 0.4 mg    Or     naloxone (NARCAN) injection 0.2 mg    Or     naloxone (NARCAN) injection 0.4 mg     ondansetron (ZOFRAN-ODT) ODT tab 4 mg    Or     ondansetron (ZOFRAN) injection 4 mg     pantoprazole (PROTONIX) IV push injection 40 mg     phenylephrine (ALEENA-SYNEPHRINE) 200 mg in sodium chloride 0.9 % 250 mL MAX CONC infusion     polyethylene glycol (MIRALAX) Packet 17 g     propofol (DIPRIVAN) infusion     sertraline (ZOLOFT) tablet 50 mg     sodium chloride (PF) 0.9% PF flush 10-20 mL     sodium chloride (PF) 0.9% PF flush 10-40 mL     sodium chloride (PF) 0.9% PF flush 10-40 mL     sodium chloride 0.9% (bag) irrigation     vecuronium (NORCURON) injection 5 mg     Warfarin Therapy Reminder (Check START DATE - warfarin may be starting in the FUTURE)      No Known Allergies  Family History   Problem Relation Age of Onset     No Known Problems Mother      No Known Problems Father      Heart Disease No family hx of          PHYSICAL ASSESSMENT:  General: Unable to access, intubated/sedated   Lungs: Coarse throughout  Heart: RRR, S1 and S2   Abdomen: Soft, non-tender.  Bowel sounds present X 4 quadrants.  Skin: skin color normal, texture normal, no rashes or lesions.   Extremities:  No edema noted.   Pulses:  +2 BUE and +2 BLE  Neuro:  Unable to access due to above.     Temp: 100.3  F (37.9  C) Temp src: Rectal BP: 135/70 Pulse: 115   Resp:  24 SpO2: 91 % O2 Device: Mechanical Ventilator          Intake/Output Summary (Last 24 hours) at 12/26/2021 1235  Last data filed at 12/26/2021 0600  Gross per 24 hour   Intake 2623.19 ml   Output 910 ml   Net 1713.19 ml     Temp: 100.3  F (37.9  C) Temp src: Rectal BP: 135/70 Pulse: 115   Resp: 24 SpO2: 91 % O2 Device: Mechanical Ventilator        Lab Results   Component Value Date    WBC 24.3 12/26/2021     Lab Results   Component Value Date    RBC 4.20 12/26/2021     Lab Results   Component Value Date    HGB 13.6 12/26/2021     Lab Results   Component Value Date    HCT 41.3 12/26/2021     No components found for: MCT  Lab Results   Component Value Date    MCV 98 12/26/2021     Lab Results   Component Value Date    MCH 32.4 12/26/2021     Lab Results   Component Value Date    MCHC 32.9 12/26/2021     Lab Results   Component Value Date    RDW 16.3 12/26/2021     Lab Results   Component Value Date    PLT 40 12/26/2021       Last Comprehensive Metabolic Panel:  Sodium   Date Value Ref Range Status   12/26/2021 138 136 - 145 mmol/L Final     Potassium   Date Value Ref Range Status   12/26/2021 3.9 3.5 - 5.0 mmol/L Final     Chloride   Date Value Ref Range Status   12/26/2021 107 98 - 107 mmol/L Final     Carbon Dioxide (CO2)   Date Value Ref Range Status   12/26/2021 20 (L) 22 - 31 mmol/L Final     Anion Gap   Date Value Ref Range Status   12/26/2021 11 5 - 18 mmol/L Final     Glucose   Date Value Ref Range Status   12/26/2021 136 (H) 70 - 125 mg/dL Final     GLUCOSE BY METER POCT   Date Value Ref Range Status   12/26/2021 92 70 - 99 mg/dL Final     Urea Nitrogen   Date Value Ref Range Status   12/26/2021 45 (H) 8 - 28 mg/dL Final     Creatinine   Date Value Ref Range Status   12/26/2021 0.92 0.70 - 1.30 mg/dL Final     GFR Estimate   Date Value Ref Range Status   12/26/2021 89 >60 mL/min/1.73m2 Final     Comment:     Effective December 21, 2021 eGFRcr in adults is calculated using the 2021 CKD-EPI creatinine equation  which includes age and gender (Jonatan adamson al., NEJM, DOI: 10.1056/NFMIgb6610509)   05/26/2021 >60 >60 mL/min/1.73m2 Final     Calcium   Date Value Ref Range Status   12/26/2021 7.5 (L) 8.5 - 10.5 mg/dL Final       Last Arterial Blood Gas:  pH Arterial   Date Value Ref Range Status   12/26/2021 7.34 (L) 7.37 - 7.44 Final     pH   Date Value Ref Range Status   12/26/2021 7.35 7.35 - 7.45 Final     pCO2 Arterial   Date Value Ref Range Status   12/26/2021 45 35 - 45 mm Hg Final     pO2 Arterial   Date Value Ref Range Status   12/26/2021 60 (L) 75 - 85 mm Hg Final     Bicarbonate Arterial   Date Value Ref Range Status   12/26/2021 23 23 - 29 mmol/L Final     O2 Sat, Arterial   Date Value Ref Range Status   12/26/2021 91.2 (L) 95.0 - 96.0 % Final     Base Excess/Deficit (+/-)   Date Value Ref Range Status   12/26/2021 -1.6   mmol/L Final       No results found for: TROPI      Imaging reviewed         VERN Gleason, CNP  Pulmonary Critical Care  Pager: [phone]    Time Billed:  70 minutes   of critical care time.    Patient requiring ICU level of care: Respiratory failure and on mechanical ventilation.  High risk for further decompensation and or death.

## 2021-12-26 NOTE — PROGRESS NOTES
Patient continues to decline, down to SpO2 in the 80's on 100% FIO2.    Spoke again with family and also ANS.  Given that patient will likely pass tonight, we are allowing 6 visitors to come to the hospital, as, at this point, death is likely to occur soon.    VERN Gleason, CNP  Pulmonary Critical Care  Pager: [phone]

## 2021-12-26 NOTE — PROGRESS NOTES
Patient continues to be sedated and intubated.   Chivo gtt changed to a more concentrated solution to reduce fluid volume intake.     Throughout night, patient required increased FiO2 from Vent, hitting 100%. MD notified and peep increased to 16, Chest xray done and morning labs drawn early.   Patient had some vent asynchrony intermittently, but when PRN medication would be given patient would go back to breathing with vent.    Patient blood pressures were labile during turns, usually having a map drop >20 points during a large reposition.   MD notified about critical labs this AM. Haja Calvillo, RN 12/26/2021

## 2021-12-26 NOTE — PROGRESS NOTES
RT PROGRESS NOTE    VENT DAY# 12    CURRENT SETTINGS:   Ventilation Mode: CMV/AC  (Continuous Mandatory Ventilation/ Assist Control)  FiO2 (%): 75 %  Rate Set (breaths/minute): 24 breaths/min  Tidal Volume Set (mL): 400 mL  PEEP (cm H2O): 14 cmH2O  Oxygen Concentration (%): 75 %  Resp: 24      PATIENT PARAMETERS:  PIP 38  Pplat:  31  Pmean:  20  Secretions:  Scant amount of thick white ETT secretions  02 Sats:  89-95%    ETT SIZE 7.5 Secured at 21 cm at teeth/gums    Respiratory Medications: continuous Veletri (full strength)    ABG:Results for MIRELLA COLON (MRN [id number]) as of 12/26/2021 04:23   Ref. Range 12/26/2021 03:42   pH Arterial Latest Ref Range: 7.37 - 7.44  7.33 (L)   pCO2 Arterial Latest Ref Range: 35 - 45 mm Hg 43   PO2 Arterial Latest Ref Range: 75 - 85 mm Hg 58 (L)   Bicarbonate Arterial Latest Ref Range: 23 - 29 mmol/L 21 (L)   Base Excess Art Latest Ref Range:   mmol/L -3.5   FIO2 Unknown 100   Oxyhemoglobin Latest Ref Range: 95.0 - 96.0 % 88.8 (L)       NOTE / SHIFT SUMMARY:   Patient remains intubated on full vent support. Increased oxygen needs overnight; Peep increased from +14 to +16. Will continue to monitor and titrate oxygen as appropriate.     Kyara Camp, RT

## 2021-12-26 NOTE — PLAN OF CARE
Problem: Ventilator-Induced Lung Injury (Mechanical Ventilation, Invasive)  Goal: Absence of Ventilator-Induced Lung Injury  Outcome: No Change     Problem: Gas Exchange Impaired (Pulmonary Impairment)  Goal: Optimal Gas Exchange  Outcome: Declining   Nila Lee, RT

## 2021-12-26 NOTE — PROGRESS NOTES
RT PROGRESS NOTE    VENT DAY# 12    CURRENT SETTINGS:   Ventilation Mode: CMV/AC  (Continuous Mandatory Ventilation/ Assist Control)  FiO2 (%): 100 %  Rate Set (breaths/minute): 24 breaths/min  Tidal Volume Set (mL): 400 mL  PEEP (cm H2O): 16 cmH2O  Oxygen Concentration (%): 100 %  Resp: 24      PATIENT PARAMETERS:  PIP 40  Pplat:  36  Pmean:  22  Compliance: 17  SBT: No     Secretions:  Small amount of tan secretions via ETT  02 Sats:  87-91%    ETT SIZE 7.5 Secured at 21 cm at teeth/gums    Respiratory Medications: Veletri     NOTE / SHIFT SUMMARY:   Pt. remains on full vent support, settings above, sats 87-91%, provider aware. ETT repositioned Q2 for skin integrity, RT following     Nila Lee RT

## 2021-12-26 NOTE — CONSULTS
[facility]    Infectious Disease Consultation     Date of Admission:  12/15/2021  Date of Consult (When I saw the patient): 12/26/21    Assessment & Plan   Kody Johns is a 71 year old male who was admitted on 12/15/2021.     Impression:  1. Acute hypoxic respiratory failure, cardiogenic pulmonary edema, Covid pneumonia, ARDS  2. Admitted 12/15/2021, remains critically ill with poor lung compliance ARDS and refractory hypoxia.  Intubated 12/15/2021, remains on FiO2 100%  3. Patient has received dexamethasone Tocilizumab (12/16/2021) and remdesivir (12/15-12/19).  4. Unvaccinated against COVID-19  5. History of atrial fibrillation  6. Chronic anemia thrombocytopenia peripheral neuropathy chronic kidney disease hypertension, rheumatic heart disease status post aortic and mitral valve replacement in 2019  7. History of ischemic stroke, left hemiparesis, spastic, prolonged LTAC stay, decannulated in June 2019  8. History of prolonged respiratory failure status post trach and PEG in 2019  9. Cardiomyopathy with heart failure with reduced ejection fraction  10. Sputum culture with Klebsiella  11. Patient has been on antibiotics Zosyn, ceftriaxone and meropenem in the past  12. Leukocytosis, fever.  Propofol related?  Propofol has been stopped.  13. Gallbladder sludge and thickening  14. Clinically worsening, pneumonia, prognosis remains guarded    Recommendations:    1. Discussed with patient's nurse, pharmacist.  Levofloxacin for with close monitoring.  Klebsiella pneumonia isolated from respiratory culture on 12/16/2021 (has been on antibiotics), and on 12/23/2021.  2. Metronidazole, enteral, as concern for hepatobiliary infection/intra-abdominal infection, anaerobic coverage.  Will need close monitoring of labs.  3. Goals of care discussion/palliative care, prognosis guarded  4. Monitor CK, CBC, CMP, INR  5. Blood cultures in process  6. INR management per ICU team.  7. ID will follow.   Discussed with patient's nurse and pharmacist.  Appreciate input   8. Critically ill, seen in ICU    Geraldine Lr MD  [facility]  [phone]      CXR on 12/26/2021         Reason for Consult   Reason for consult: I was asked to evaluate this patient for fever, leukocytosis, antibiotic management, Klebsiella in sputum.    Primary Care Physician   Aristides Alegria    Chief Complaint   ARDS, Covid pneumonia, fever, leukocytosis    History is obtained from the patient and medical records    History of Present Illness   Kody Johns is a 71 year old male who presents with hypoxia, intubated on day of admission on 12/15/2021, worsening hypoxia, fever, no significant clinical improvement.  Patient has received antibiotic courses.  He has severe ARDS with refractory hypoxia poor lung compliance per ICU note  Patient has received remdesivir, Tocilizumab, dexamethasone.  ID consulted to assist with antibiotic management  Patient is unresponsive, remains intubated critically ill, 100% FiO2.  Seen in ICU and discussed with patient's nurse.  Chart reviewed.      Past Medical History   I have reviewed this patient's medical history and updated it with pertinent information if needed.   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  Past Surgical History:   Procedure Laterality Date     AORTIC VALVE REPLACEMENT N/A 2019    Procedure: AORTIC VALVE REPLACEMENT;  Surgeon: Jojo Vaughn MD;  Location: [facility];  Service: Cardiovascular     CARDIAC CATHETERIZATION       CATARACT EXTRACTION Left 2016     CV CORONARY ANGIOGRAM N/A 2018    Procedure: Coronary Angiogram;  Surgeon: Jeff Deleon MD;  Location: [facility];  Service: Cardiology     EYE SURGERY      cataract     HC UGI ENDOSCOPY W PLACEMENT GASTROSTOMY TUBE PERCUT N/A 2019    Procedure: CREATION, GASTROSTOMY, PERCUTANEOUS, ENDOSCOPIC;  Surgeon: Chandana Kang MD;  Location: [facility];  Service: General     PICC AND MIDLINE TEAM LINE INSERTION  2019          PICC TRIPLE LUMEN PLACEMENT  12/15/2021          TRACHEOSTOMY N/A 2019    Procedure: TRACHEOSTOMY;  Surgeon: Chandana Kang MD;  Location: [facility];  Service: General     ZZC REPLACEMENT OF MITRAL VALVE N/A 2019    Procedure: MITRAL VALVE REPLACEMENT, ANESTHESIA, TAKEDOWN OF PERICARDIAL ADHESIONS AND REINFORCEMENT OF KLS PLATING SYSTEM TRANESOPHAGEAL ECHOCARDIOGRAM AND EPI AORTIC ULTRASOUND;  Surgeon: Jojo Vaughn MD;  Location: [facility];  Service: Cardiovascular       Prior to Admission Medications   Prior to Admission Medications   Prescriptions Last Dose Informant Patient Reported? Taking?   TAMSULOSIN HCL PO 12/15/2021 at am  Yes Yes   Sig: Take 0.4 mg by mouth every morning    atorvastatin (LIPITOR) 10 MG tablet 2021 at HS  Yes Yes   Sig: Take 10 mg by mouth daily    baclofen (LIORESAL) 10 MG tablet 12/15/2021 at am  No Yes   Si p.o. twice daily   Patient taking differently: Take 10 mg by mouth 2 times daily    ferrous sulfate (FEROSUL) 325 (65 Fe) MG tablet 12/15/2021 at am  Yes Yes   Sig: Take 325 mg by mouth 2 times daily    furosemide  (LASIX) 20 MG tablet 12/15/2021 at am  No Yes   Sig: TAKE 1 TABLET (20 MG) BY MOUTH DAILY FOR EDEMA   gabapentin (NEURONTIN) 100 MG capsule 12/15/2021 at pm  No Yes   Sig: Take 1 capsule (100 mg) by mouth 2 times daily PLEASE CALL TO SCHEDULE FOLLOW UP APPT FOR REFILLS   Patient taking differently: Take 100-200 mg by mouth 4 times daily 100 mg in am and 100 mg in afternoon and 200 mg HS   levothyroxine (SYNTHROID/LEVOTHROID) 100 MCG tablet 12/15/2021 at am  No Yes   Sig: TAKE 1 TABLET (100 MCG TOTAL) BY MOUTH DAILY.   magnesium oxide 400 MG CAPS 12/15/2021 at am  No Yes   Si p.o. daily   Patient taking differently: Take 400 mg by mouth daily    metoprolol tartrate (LOPRESSOR) 100 MG tablet 12/15/2021 at am  No Yes   Sig: Take 1 tablet (100 mg) by mouth 2 times daily   omeprazole (PRILOSEC) 20 MG DR capsule 12/15/2021 at am  Yes Yes   Sig: Take 20 mg by mouth daily    sertraline (ZOLOFT) 50 MG tablet 12/15/2021 at am  Yes Yes   Sig: Take 50 mg by mouth daily   warfarin ANTICOAGULANT (COUMADIN) 1 MG tablet 2021 at pm  No Yes   Sig: 10/26/21: 2 mg every Mon, Fri; 1.5 mg all other   Patient taking differently: 10/26/21: 2 mg every Mon, Fri; 1.5 mg all other days      Facility-Administered Medications: None     Allergies   No Known Allergies    Immunization History   Immunization History   Administered Date(s) Administered     Flu, Unspecified 2013, 2014, 2014     Hep B, Peds or Adolescent 2015     HepB, Unspecified 10/21/2013, 2013, 2015     HepB-Adult 2014     Influenza (High Dose) 3 valent vaccine 2016, 2017, 2018, 2020     Influenza Vaccine IM > 6 months Valent IIV4 (Alfuria,Fluzone) 2013, 2014, 2014     Influenza Vaccine, 6+MO IM (QUADRIVALENT W/PRESERVATIVES) 2014, 2015     Influenza, Quad, High Dose, Pf, 65yr+ (Fluzone HD) 2020, 2021     Pneumo Conj 13-V (&after) 2017     TD (ADULT, 7+)  04/14/2015     Td (Adult), Adsorbed 09/12/2012     Tdap (Adacel,Boostrix) 01/20/2014     Varicella 04/14/2015       Social History   I have reviewed this patient's social history and updated it with pertinent information if needed. Kody Johns  reports that he quit smoking about 7 years ago. His smoking use included cigarettes and cigarettes. He has a 50.00 pack-year smoking history. He has quit using smokeless tobacco. He reports that he does not drink alcohol and does not use drugs.    Family History   I have reviewed this patient's family history and updated it with pertinent information if needed.   Family History   Problem Relation Age of Onset     No Known Problems Mother      No Known Problems Father      Heart Disease No family hx of        Review of Systems   The 10 point Review of Systems is negative other than noted in the HPI or here.     Physical Exam   Temp: 98.1  F (36.7  C) Temp src: Rectal BP: (!) 83/50 Pulse: 106   Resp: 24 SpO2: 90 % O2 Device: Mechanical Ventilator    Vital Signs with Ranges  Temp:  [97.3  F (36.3  C)-99.8  F (37.7  C)] 98.1  F (36.7  C)  Pulse:  [] 106  Resp:  [24-31] 24  BP: ()/(45-85) 83/50  MAP:  [34 mmHg-100 mmHg] 74 mmHg  Arterial Line BP: ()/(34-76) 99/60  FiO2 (%):  [70 %-100 %] 100 %  SpO2:  [89 %-99 %] 90 %  107 lbs 9.35 oz  Body mass index is 22.49 kg/m .    Ventilation Mode: CMV/AC  (Continuous Mandatory Ventilation/ Assist Control)  FiO2 (%): 100 %  Rate Set (breaths/minute): 24 breaths/min  Tidal Volume Set (mL): 400 mL  PEEP (cm H2O): 16 cmH2O  Oxygen Concentration (%): 100 %  Resp: 24      GENERAL APPEARANCE: Intubated  EYES: Closed  HENT: Endotracheal tube  RESP: Intubated, see vent settings above  CV: Tachycardia  LYMPHATICS: Lines/IV access noted  ABDOMEN: Firm, distention  MS: Swelling  SKIN: Warm  Neuro: Unresponsive      Data   Lab Results   Component Value Date    WBC 24.3 (H) 12/26/2021    WBC 38.2 (H) 12/25/2021    WBC 34.4 (H)  12/25/2021    WBC 22.8 (H) 12/24/2021    WBC 18.5 (H) 12/23/2021    HGB 13.6 12/26/2021    HGB 15.3 12/25/2021    HGB 15.8 12/25/2021    HGB 16.0 12/24/2021    HGB 15.1 12/24/2021    HCT 41.3 12/26/2021    HCT 47.8 12/25/2021    HCT 49.5 12/25/2021    HCT 46.8 12/24/2021    HCT 47.1 12/23/2021    PLT 40 (LL) 12/26/2021    PLT 56 (L) 12/25/2021    PLT 56 (L) 12/25/2021    PLT 92 (L) 12/24/2021    PLT 95 (L) 12/23/2021     12/26/2021     12/25/2021     12/24/2021     12/23/2021     12/22/2021    POTASSIUM 3.9 12/26/2021    POTASSIUM 4.2 12/25/2021    POTASSIUM 4.9 12/24/2021    POTASSIUM 5.3 (H) 12/24/2021    POTASSIUM 4.8 12/23/2021    CHLORIDE 107 12/26/2021    CHLORIDE 108 (H) 12/25/2021    CHLORIDE 105 12/24/2021    CHLORIDE 104 12/23/2021    CHLORIDE 104 12/22/2021    CO2 20 (L) 12/26/2021    CO2 21 (L) 12/25/2021    CO2 24 12/24/2021    CO2 28 12/23/2021    CO2 29 12/22/2021    BUN 45 (H) 12/26/2021    BUN 42 (H) 12/25/2021    BUN 43 (H) 12/24/2021    BUN 26 12/23/2021    BUN 29 (H) 12/22/2021    CR 0.92 12/26/2021    CR 0.85 12/25/2021    CR 1.04 12/24/2021    CR 0.72 12/23/2021    CR 0.72 12/22/2021     (H) 12/26/2021     (H) 12/26/2021     (H) 12/26/2021     (H) 12/25/2021     (H) 12/25/2021    DD 1.03 (H) 12/19/2021    DD 0.55 (H) 12/18/2021    DD 0.40 12/17/2021    DD 0.39 12/16/2021    DD 0.39 12/15/2021    NTBNP 7,348 (H) 12/08/2020    AST 23 12/25/2021    AST 74 (H) 12/15/2021    AST 27 02/06/2021    AST 24 12/08/2020    AST 20 08/25/2020    ALT 15 12/25/2021    ALT 27 12/15/2021    ALT 19 02/06/2021    ALT 22 12/08/2020    ALT 18 08/25/2020    ALKPHOS 83 12/25/2021    ALKPHOS 93 12/15/2021    ALKPHOS 119 02/06/2021    ALKPHOS 102 12/08/2020    ALKPHOS 78 08/25/2020    BILITOTAL 1.2 (H) 12/25/2021    BILITOTAL 1.3 (H) 12/15/2021    BILITOTAL 1.4 (H) 02/06/2021    BILITOTAL 1.2 (H) 12/08/2020    BILITOTAL 0.8 08/25/2020    INR >13.50 (HH)  12/26/2021    INR 5.07 (HH) 12/25/2021    INR 2.58 (H) 12/24/2021    INR 3.00 (H) 12/23/2021    INR 3.08 (H) 12/22/2021     MICRO:  COVID-19 PCR Results    COVID-19 PCR Results 2/5/21 12/15/21   SARS-CoV-2 PCR Result Negative    SARS CoV2 PCR  Positive (A)   (A) Abnormal value       Comments are available for some flowsheets but are not being displayed.           12/23/2021 1512 12/26/2021 1101 Respiratory Aerobic Bacterial Culture with Gram Stain [[id number]]    (Abnormal)   Sputum from Endotracheal    Preliminary result Component Value   Culture 2+ Klebsiella pneumoniae Abnormal  P    1+ Klebsiella pneumoniae Abnormal  P   Gram Stain Result >25 PMNs/low power field P    No organisms seen P         Susceptibility     Klebsiella pneumoniae     CARLOS ENRIQUE     Ampicillin >=32.0 ug/mL Resistant 1     Ampicillin/ Sulbactam 4.0 ug/mL Susceptible     Cefepime <=1.0 ug/mL Susceptible     Ceftazidime <=1.0 ug/mL Susceptible     Ceftriaxone <=1.0 ug/mL Susceptible     Ciprofloxacin <=0.25 ug/mL Susceptible     Gentamicin <=1.0 ug/mL Susceptible     Levofloxacin <=0.12 ug/mL Susceptible     Meropenem <=0.25 ug/mL Susceptible     Piperacillin/Tazobactam <=4.0 ug/mL Susceptible     Tobramycin <=1.0 ug/mL Susceptible     Trimethoprim/Sulfamethoxazole <=1/19 ug/mL Susceptible              1 Intrinsically Resistant             12/16/2021 1356 12/18/2021 1100 Respiratory Aerobic Bacterial Culture [[id number]]    (Abnormal)   Sputum from Trachea    Final result Component Value   Culture 1+ Klebsiella pneumoniae Abnormal     1+ Normal lupe   Gram Stain Result >25 PMNs/low power field    3+ Mixed lupe         Susceptibility     Klebsiella pneumoniae     CARLOS ENRIQUE     Ampicillin >=32.0 ug/mL Resistant 1     Ampicillin/ Sulbactam 4.0 ug/mL Susceptible     Cefepime <=1.0 ug/mL Susceptible     Ceftazidime <=1.0 ug/mL Susceptible     Ceftriaxone <=1.0 ug/mL Susceptible     Ciprofloxacin <=0.25 ug/mL Susceptible     Gentamicin <=1.0 ug/mL  Susceptible     Levofloxacin <=0.12 ug/mL Susceptible     Meropenem <=0.25 ug/mL Susceptible     Piperacillin/Tazobactam <=4.0 ug/mL Susceptible     Tobramycin <=1.0 ug/mL Susceptible     Trimethoprim/Sulfamethoxazole <=1/19 ug/mL Susceptible              1 Intrinsically Resistant              Radiology:    XR Chest 1 View    Result Date: 2021  EXAM: XR CHEST 1 VIEW LOCATION: [facility] DATE/TIME: 2021 8:07 AM INDICATION: Respiratory failure. COVID infection. COMPARISON: 2021 and older studies.     IMPRESSION: Poststernotomy changes with prosthetic aortic and mitral valves. Endotracheal tube is in the mid trachea, 3 cm from the georgina. Right upper extremity PICC line catheter has withdrawn and retracted into the inferior right jugular vein where it is looped for  a length of 3.7 cm. This needs to be repositioned. NG tube can be seen coursing in the stomach. Lower lung volumes. No significant change in bilateral parenchymal opacities with air bronchograms on the left with sparing of the left upper lobe. Findings discussed by the undersigned with Mis at [id number].     XR Chest 1 View    Result Date: 2021  EXAM: XR CHEST 1 VIEW LOCATION: [facility] DATE/TIME: 2021 2:15 PM INDICATION: acute respiratory failure. COVID COMPARISON: 2021 at 0 8:23 AM     IMPRESSION: Endotracheal tube tip 1.8 cm from georgina. Nasogastric tube tip below the film. Right PICC tip projects over the lower SVC. Prior sternotomy. Heart valve prosthesis. Stable enlarged cardiac silhouette. The left mid lung infiltrate appears improved. The right upper lung infiltrate appears worse. No pneumothorax. No significant effusion seen.    Echocardiogram Complete    Result Date: 2021  [id number]^[id number] [address]  Name: MIRELLA COLON MRN: [id number] : 1950 Study Date: 2021  08:43 AM Age: 71 yrs Gender: Male Patient Location: [facility] Reason For Study: Dyspnea Ordering Physician: YOU LOVE Performed By: ADOLFO  BSA: 1.4 m2 Height: 58 in Weight: 113 lb HR: 107 ______________________________________________________________________________ Procedure Definity (NDC #65534-238) given intravenously. ______________________________________________________________________________ Interpretation Summary  The left ventricle is normal in size. The visual ejection fraction is 25-30%. Diastolic Doppler findings (E/E' ratio and/or other parameters) suggest left ventricular filling pressures are increased. There is severe global hypokinesia of the left ventricle. Mildly decreased right ventricular systolic function The left atrium is severely dilated. The right atrium is severely dilated. There is a bioprosthetic mitral valve. Mild prosthetic mitral valve stenosis There is a unknown bioprosthetic valve present in aortic valve position. The valve opening cannot be assessed due to imaging artifacts from the prosthesis. The prosthetic valve gradients are normal . The right ventricular systolic pressure is approximated at 28mmHg plus the right atrial pressure. Right ventricular diastolic pressure is approximated at 12mmHg plus the right atrial pressure. IVC diameter >2.1 cm collapsing <50% with sniff suggests a high RA pressure estimated at 15 mmHg or greater. The ascending aorta is Mildly dilated. The rhythm was atrial fibrillation. Compared to echo from 2/7/21 the findings are similar. ______________________________________________________________________________ Left Ventricle The left ventricle is normal in size. There is mild concentric left ventricular hypertrophy. Diastolic Doppler findings (E/E' ratio and/or other parameters) suggest left ventricular filling pressures are increased. The visual ejection fraction is 25-30%. There is severe global hypokinesia of the left ventricle.  Right Ventricle  Mildly decreased right ventricular systolic function. TAPSE is abnormal, which is consistent with abnormal right ventricular systolic function.  Atria The left atrium is severely dilated. The right atrium is severely dilated.  Mitral Valve There is severe mitral annular calcification. There is a bioprosthetic mitral valve. Mild prosthetic mitral valve stenosis. There is mild (1+) prosthetic mitral valve regurgitation.  Tricuspid Valve There is moderately severe (3+) tricuspid regurgitation. The right ventricular systolic pressure is approximated at 28mmHg plus the right atrial pressure. There is no tricuspid stenosis.  Aortic Valve There is a unknown bioprosthetic valve present in aortic valve position. The valve opening cannot be assessed due to imaging artifacts from the prosthesis. The prosthetic valve gradients are normal .  Pulmonic Valve Normal pulmonic valve. There is mild (1+) pulmonic valvular regurgitation. Right ventricular diastolic pressure is approximated at 12mmHg plus the right atrial pressure. There is no pulmonic valvular stenosis.  Vessels The ascending aorta is Mildly dilated. IVC diameter >2.1 cm collapsing <50% with sniff suggests a high RA pressure estimated at 15 mmHg or greater.  Pericardium There is no pericardial effusion.  Rhythm The rhythm was atrial fibrillation. ______________________________________________________________________________ MMode/2D Measurements & Calculations IVSd: 1.3 cm LVIDd: 3.9 cm LVIDs: 3.5 cm LVPWd: 1.2 cm FS: 8.8 % LV mass(C)d: 174.7 grams LV mass(C)dI: 122.3 grams/m2 Ao root diam: 3.5 cm LA dimension: 4.7 cm asc Aorta Diam: 3.7 cm LA/Ao: 1.3 LVOT diam: 1.8 cm LVOT area: 2.6 cm2 LA Volume Indexed (AL/bp): 133.2 ml/m2 RWT: 0.63  Time Measurements MM HR: 107.0 BPM  Doppler Measurements & Calculations MV E max burton: 183.7 cm/sec MV max P.7 mmHg MV mean P.9 mmHg MV V2 VTI: 33.0 cm MVA(VTI): 0.99 cm2 MV dec time: 0.26 sec Ao V2 max: 240.5 cm/sec Ao max PG:  23.0 mmHg Ao V2 mean: 172.2 cm/sec Ao mean P.7 mmHg Ao V2 VTI: 38.8 cm EPHRAIM(I,D): 0.84 cm2 EPHRAIM(V,D): 1.0 cm2 LV V1 max PG: 3.6 mmHg LV V1 max: 95.1 cm/sec LV V1 VTI: 12.4 cm SV(LVOT): 32.7 ml SI(LVOT): 22.9 ml/m2 PA acc time: 0.04 sec PI end-d jose: 172.8 cm/sec TR max jose: 263.0 cm/sec TR max P.7 mmHg AV Jose Ratio (DI): 0.40 EPHRAIM Index (cm2/m2): 0.59  E/E' av.7 Lateral E/e': 23.4 Medial E/e': 35.9  ______________________________________________________________________________ Report approved by: Janet Delcid 2021 10:17 AM       US Abdomen Limited    Result Date: 2021  EXAM: US ABDOMEN LIMITED LOCATION: [facility] DATE/TIME: 2021 5:24 PM INDICATION: fever, leukocytosis, hyperbilirubinemia, please evaluate for acalculous cholecystitis COMPARISON: 2019 TECHNIQUE: Limited abdominal ultrasound. FINDINGS: GALLBLADDER: There is sludge in the gallbladder. No gallbladder wall thickening or pericholecystic fluid. BILE DUCTS: There is biliary dilatation. The common duct measures 11  mm. LIVER: Normal parenchyma with smooth contour. No focal mass. RIGHT KIDNEY: No hydronephrosis. PANCREAS: The pancreas is largely obscured by overlying gas. The pancreatic duct is at the upper limits of normal in size. Small volume of  ascites. A right pleural effusion is present.     IMPRESSION: 1.  There is gallbladder sludge. No gallbladder wall thickening or pericholecystic fluid. 2.  There is bile duct dilatation. Cannot assess for choledocholithiasis. 3.  Consider MRI and nuclear medicine hepatobiliary imaging. 4.  The pancreatic duct is at the upper limits of normal in diameter. Consider MRI. 5.  Pleural fluid and ascites.     XR Chest Port 1 View    Result Date: 2021  EXAM: XR CHEST PORT 1 VIEW LOCATION: [facility] DATE/TIME: 2021 3:40 AM INDICATION: Worsening hypoxia. COMPARISON: 2021.     IMPRESSION: Endotracheal tube tip  approximately 3 cm above the georgina. Enteric tube tip below the diaphragm. Left PICC line tip over the low SVC. PO sternotomy with cardiac valve prostheses. Mild cardiac enlargement. Diffuse bilateral mid and lower lung infiltrates compatible with pneumonitis. Remainder unremarkable. Compared with the prior study, infiltrates appear slightly progressed on the left. Otherwise no significant change elsewhere.    XR Chest Port 1 View    Result Date: 12/23/2021  EXAM: XR CHEST PORT 1 VIEW LOCATION: [facility] DATE/TIME: 12/23/2021 3:37 AM INDICATION: vent dyssynchrony despite sedation, increased O2 needs COMPARISON: 12/18/2021     IMPRESSION: Median sternotomy. Endotracheal tube 1.8 cm above georgina. Left PICC line cavoatrial junction. Enteric tube tip below lower aspect of film. Cardiac enlargement. Bilateral infiltrates similar.    XR Chest Port 1 View    Result Date: 12/18/2021  EXAM: XR CHEST PORT 1 VIEW LOCATION: [facility] DATE/TIME: 12/18/2021 1:45 PM INDICATION: Check line placement. COMPARISON: 12/18/2021.     IMPRESSION: Sternotomy with valve prosthesis. ET tube in good position. Enteric tube in the stomach. Right PICC tip coiled in the internal jugular vein. This should be removed or repositioned. New left PICC tip in the SVC. Mild to moderate patchy pulmonary infiltrates are stable. Heart size mildly enlarged.    XR Chest Port 1 View    Result Date: 12/18/2021  EXAM: XR CHEST PORT 1 VIEW LOCATION: [facility] DATE/TIME: 12/18/2021 10:30 AM INDICATION: Check PICC placement. COMPARISON: 12/18/2021 at 0907 hours.     IMPRESSION: Right-sided PICC malpositioned with catheter buckled into the right internal jugular vein and tip at the medial subclavian vein. This will need to be pulled back approximately 10 cm prior to re-advancing. ET tube is 2 cm above the georgina. Table cardiomegaly with normal vascularity. Patchy bibasilar airspace  opacities unchanged. No pneumothorax.    XR Chest Port 1 View    Result Date: 12/18/2021  EXAM: XR CHEST PORT 1 VIEW LOCATION: [facility] DATE/TIME: 12/18/2021 8:53 AM INDICATION: evaluate malposition picc COMPARISON: Film earlier today at 08 15     IMPRESSION: There has been little change in the right IJ PICC line catheter which remains looped in the inferior right IJ for a length of 3 cm. Breast and the findings are unchanged.    XR Chest Port 1 View    Result Date: 12/16/2021  EXAM: XR CHEST PORT 1 VIEW LOCATION: [facility] DATE/TIME: 12/16/2021 8:22 AM INDICATION: Follow-up intubation COMPARISON: 12/15/2021     IMPRESSION: ETT is 2.6 cm from the georgina. Right upper extremity PICC terminates over the proximal right atrium. Enteric tube enters the stomach and off the field-of-view. Persistent patchy multifocal pulmonary opacities. These are marginally improved in  the upper lungs. No effusions or pneumothorax. Cardiomegaly. Sternotomy wires and valvuloplasty changes are stable.    XR Chest Port 1 View    Result Date: 12/15/2021  EXAM: XR CHEST PORT 1 VIEW LOCATION: [facility] DATE/TIME: 12/15/2021 5:56 PM INDICATION: RN placed PICC - verify tip placement. COMPARISON: Earlier today.     IMPRESSION: Right-sided PIC catheter with the tip near the SVC-RA junction in satisfactory position. Endotracheal tube again resides about a centimeter from the georgina and withdrawing 2-3 cm would better position the tube. Patchy multifocal airspace opacities are present throughout the lungs. Sternotomy wires and valve prosthesis. Cardiac enlargement. Osseous structures intact.    XR Chest Port 1 View    Result Date: 12/15/2021  EXAM: XR CHEST PORT 1 VIEW LOCATION: [facility] DATE/TIME: 12/15/2021 5:21 PM INDICATION: Post intubation COMPARISON: 12/15/2021 1637     IMPRESSION: Endotracheal tube tip 9 mm from georgina. Recommend 2 cm  pullback. Enlarged cardiac silhouette with a heart valve prosthesis and prior sternotomy. There is pulmonary vascular congestion and bilateral airspace disease. This is nonspecific and could represent edema, pneumonia, or hemorrhage as the most likely possibilities.    XR Chest Port 1 View    Result Date: 12/15/2021  EXAM: XR CHEST PORT 1 VIEW LOCATION: [facility] DATE/TIME: 12/15/2021 4:35 PM INDICATION: CP/SOB COMPARISON: 2/5/2021.     IMPRESSION: There are multifocal patchy predominantly lower lung airspace opacities bilaterally with some ill-defined opacities in the upper lung on the right. The cardiac silhouette is enlarged. There may be trace effusions. Sternotomy wires and valve prosthesis redemonstrated. These findings are nonspecific and could be seen in pneumonia or pneumonitis, lacking effusions. Edema might be considered less likely. COVID-19 pneumonitis would be within the differential and could be considered in the appropriate clinical setting.    XR Abdomen Port 1 View    Result Date: 12/15/2021  EXAM: XR ABDOMEN PORT 1 VIEWS LOCATION: [facility] DATE/TIME: 12/15/2021 8:46 PM INDICATION: OG tube placement COMPARISON: 08/22/2019 CT     IMPRESSION: Enteric tube tip in the mid to distal stomach. No evidence of bowel obstruction or obvious free air. Partially seen lower lung opacities and sternotomy. Cardiac valvular prosthetics.

## 2021-12-27 NOTE — DISCHARGE SUMMARY
Discharge Summary:    Kody Johns  : 1950  Age: 71 years old    Date of Admission:  12/15/2021  Time of Death:  2021 at     Admitting Physician: Evan Torre MD  Discharge Provider:  VERN Gleason      Past Medical History:  Past Medical History:   Diagnosis Date     ALEENA (acute kidney injury) (H)      Anemia due to blood loss, acute 2019     Asthma      Atrial fibrillation (H)      Blurry vision      CHF (congestive heart failure) (H)      Chronic kidney disease      Coronary artery disease      Dysphagia     resolved     Dysuria      Hearing loss      Heart murmur      Heart murmur      Hyperlipidemia      Hypertension      Hypothyroidism      Mitral valve stenosis      Moderate major depression (H) 2021     Moderate malnutrition (H)      Palpitations      Rheumatic heart disease      Shortness of breath     exertion     Stroke (H)     Silent     Valvular disease      Admitting Diagnosis: Shortness of breath, COVID-19 ARDS, acute respiratory failure with hypoxia, shock.    Discharge Diagnosis: Multi-system organ failure secondary to ARDS, secondary to COVID-19 Pneumonia.       Consultations:  Pulmonary and Critical Care  Palliative Care  Nutrition Services  Infectious Disease    Hospital Course:  71 year old male former smoker, unvaccinated against COVID-19, with a history of atrial fibrillation on rate control and anticoagulation, chronic anemia and thrombocytopenia, peripheral neuropathy, CKD, hypothyroidism, HTN, depression, rheumatic heart disease s/p aortic and mitral valve replacement in 2019 complicated by right PCA ischemic stroke, prolonged respiratory failure s/p trach and PEG in May 2019, hemo, cardiomyopathy with HFrEF, subsequent left hemiparesis at LTACH found to have hemorrhagic transformation of R PCA stroke treated medically, now with chronic spastic left hemiparesis, decannulated 2019, now with ARDS due to COVID-19 infection, intubated on 12/15, acutely  worsening hypoxia with frothy secretions and elevated BNP on 12/16, course c/b Klebsiella pneumonia.  Became increasingly more ill on 12/25/2021.  Maximum support reached for ventilator support.  Fibrotic stages of ARDS related to COVID.  Family was present with patient and asked us to stop the medications supporting his blood pressure.  After doing so, patient passed peacefully with his sons at his bedside.     VERN Gleason, CNP  Pulmonary Critical Care  Pager: [phone]    > 30 minutes billed for discharge.

## 2021-12-27 NOTE — PROGRESS NOTES
[facility]    Infectious Disease Progress Note    Date of Service (when I saw the patient): 12/27/2021     Assessment & Plan   Kody Johns is a 71 year old male who was admitted on 12/15/2021.   Worsening clinical condition, hypoxic    Chart reviewed. Please see note by ICU staff. Patient is DNR, no escalation of care.   Discussed with nurse in ICU  Family visiting the patient. Patient is unlikely to survive.   ID will sign off.     Geraldine Lr MD  [facility]  [phone]    No charge.     Interval History   Chart reviewed  Discussed with nurse in ICU  Family visiting patient in room    Physical Exam   Temp: (!) 100.5  F (38.1  C) Temp src: Axillary BP: (!) 71/47 Pulse: 106   Resp: 24 SpO2: (!) 65 % O2 Device: Mechanical Ventilator    Vitals:    12/20/21 0645 12/21/21 0200 12/25/21 0400   Weight: 47.1 kg (103 lb 13.4 oz) 48.8 kg (107 lb 9.4 oz) 52.2 kg (115 lb 1.3 oz)     Vital Signs with Ranges  Temp:  [98.9  F (37.2  C)-101.7  F (38.7  C)] 100.5  F (38.1  C)  Pulse:  [] 106  Resp:  [19-31] 24  BP: ()/(46-68) 71/47  MAP:  [48 mmHg-279 mmHg] 48 mmHg  Arterial Line BP: ()/() 60/41  FiO2 (%):  [100 %] 100 %  SpO2:  [65 %-90 %] 65 %    Constitutional: intubated  Family at bedside    Medications     amiodarone 0.5 mg/min (12/27/21 0034)     dexmedetomidine 1.2 mcg/kg/hr (12/27/21 0914)     dextrose 40 mL/hr at 12/26/21 2250     epoprostenol (VELETRI) 20 mcg/mL in sterile water inhalation solution 20 ng/kg/min (12/27/21 0231)     fentaNYL 175 mcg/hr (12/27/21 0020)     midazolam 6 mg/hr (12/27/21 0150)     phenylephrine 6 mcg/kg/min (12/27/21 0635)     propofol (DIPRIVAN) infusion Stopped (12/24/21 1100)     sodium chloride 0.9% (bag)       Warfarin Therapy Reminder         Baclofen  10 mg Oral or Feeding Tube BID     chlorhexidine  15 mL Mouth/Throat BID     sennosides  5 mL Oral or Feeding Tube Daily    And     docusate  50 mg Oral or  Feeding Tube Daily     furosemide  20 mg Intravenous Daily     gabapentin  100 mg Oral or Feeding Tube Q12H TESSY     insulin aspart  1-12 Units Subcutaneous Q4H     levofloxacin  500 mg Intravenous Q24H     levothyroxine  100 mcg Oral or Feeding Tube QAM AC     metroNIDAZOLE  500 mg Oral or Feeding Tube BID     pantoprazole (PROTONIX) IV  40 mg Intravenous BID     polyethylene glycol  17 g Oral or Feeding Tube Daily     sertraline  50 mg Oral or Feeding Tube Daily     sodium chloride (PF)  10-40 mL Intracatheter Q7 Days       Data   All microbiology laboratory data reviewed.  Recent Labs   Lab Test 12/27/21 0414 12/26/21 0343 12/25/21  1744   WBC 22.3* 24.3* 38.2*   HGB 12.5* 13.6 15.3   HCT 39.2* 41.3 47.8    98 100   PLT 52* 40* 56*     Recent Labs   Lab Test 12/27/21 0414 12/26/21 0343 12/25/21  0514   CR 0.92 0.92 0.85

## 2021-12-27 NOTE — PROGRESS NOTES
ICU PROGRESS NOTE:        Assessment/Plan:     Changes for Today:    Actively dying.  PaO2 again in the 40's on maximum ventilator support, blood pressures worsening.  More acidodic. Family on the way in.  Appreciate palliative involvement.  I think at this point he will just pass on the ventilator, but if not, the best route would be to compassionately extubate.  He is DNR as before. No escalation of cares.     Neuro:    Patient has history of stroke, left hemiparesis, neuropathy, and depression    Right PCA ischemic following a hemorrhagic stroke in 2019 after heart surgery.    Continue home baclofen, sertraline, gabapentin.    Requiring sedation for ventilator synchrony and comfort.  Versed and Fentanyl gtts    RASS goal -3 to -4     Pulmonary:    Acute respiratory failure, cardiogenic pulmonary edema.  ARDS secondary to COVID 19    Intubation on 12/15    Baseline EF 35%, now 25-30%    Poor lung compliance and refractory hypoxia    Quite unstable, and not responding much to proning, so stopped    FS veletri     P;F in the 40s at 100% FIO2    Unfortunately there is not much more we can do for him.  He is dying.     Cardiac:    History of mitral and aortic valve replacement due to rheumatic heart disease, HFrEF, a flutter    Lasix increased yesterday to 20 mg BID    Increasing doses of phenylephrine overnight        GI:    Tube feedings    Bowel regime    Continued elevation of INR secondary to likely end-organ dysfunction, liver failing.      Renal:    No acute issues     ID:    Covid-19, Klebsiella pneumonia    Coverage for Klebsiella 12/15-12/25.  Zosyn, Ceftriaxone, then Zosyn again, and Merrem    ID consult in place, appreciate    Competed COVID treatments including decadron, Toci X 1, and Remdesivir    Right UQ US:  Sludge in gallbladder, dilitation of CBD likely    Fungal work up in place     Heme/Coag:    Coagulopathy secondary to COVID    Now thrombocytopenic, unclear cause.  Sepsis?  Drug  induced??     Endocrine:    Hypothyroidism-continue levothyroxine    Blood sugars are stable           ICU Prophylaxis:     PPI:  Protonix     Peridex:  YES     Anticoagulation/DVT Prophylaxis: Coumadin, on hold            Lines/Drains/Tubes:  Central line (RUE PICC) placed on 12/15  Arterial line placed on 12/15  ETT (7.5) placed on 12/15  Feeding tube (OG) placed on 12/15  Guzman catheter placed on 12/15       CODE STATUS:      SUBJECTIVE:    Ccx:    HPI:    71 year old male former smoker, unvaccinated against COVID-19, with a history of atrial fibrillation on rate control and anticoagulation, chronic anemia and thrombocytopenia, peripheral neuropathy, CKD, hypothyroidism, HTN, depression, rheumatic heart disease s/p aortic and mitral valve replacement in April 2019 complicated by right PCA ischemic stroke, prolonged respiratory failure s/p trach and PEG in May 2019, hemo, cardiomyopathy with HFrEF, subsequent left hemiparesis at LTACH found to have hemorrhagic transformation of R PCA stroke treated medically, now with chronic spastic left hemiparesis, decannulated June 2019, now with ARDS due to COVID-19 infection, intubated on 12/15, acutely worsening hypoxia with frothy secretions and elevated BNP on 12/16, course c/b Klebsiella pneumonia.      Past Medical History:   Diagnosis Date     ALEENA (acute kidney injury) (H)      Anemia due to blood loss, acute 4/24/2019     Asthma      Atrial fibrillation (H)      Blurry vision      CHF (congestive heart failure) (H)      Chronic kidney disease      Coronary artery disease      Dysphagia     resolved     Dysuria      Hearing loss      Heart murmur      Heart murmur      Hyperlipidemia      Hypertension      Hypothyroidism      Mitral valve stenosis      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     Moderate malnutrition (H)      Palpitations      Rheumatic heart disease      Shortness of breath     exertion     Stroke (H)     Silent     Valvular disease      Past Surgical  History:   Procedure Laterality Date     AORTIC VALVE REPLACEMENT N/A 4/24/2019    Procedure: AORTIC VALVE REPLACEMENT;  Surgeon: Jojo Vaughn MD;  Location: [facility];  Service: Cardiovascular     CARDIAC CATHETERIZATION       CATARACT EXTRACTION Left 06/13/2016     CV CORONARY ANGIOGRAM N/A 11/30/2018    Procedure: Coronary Angiogram;  Surgeon: Jeff Deleon MD;  Location: [facility];  Service: Cardiology     EYE SURGERY      cataract     HC UGI ENDOSCOPY W PLACEMENT GASTROSTOMY TUBE PERCUT N/A 5/8/2019    Procedure: CREATION, GASTROSTOMY, PERCUTANEOUS, ENDOSCOPIC;  Surgeon: Chandana Kang MD;  Location: [facility];  Service: General     PICC AND MIDLINE TEAM LINE INSERTION  4/29/2019          PICC TRIPLE LUMEN PLACEMENT  12/15/2021          TRACHEOSTOMY N/A 5/8/2019    Procedure: TRACHEOSTOMY;  Surgeon: Chandana Kang MD;  Location: [facility];  Service: General     ZZC REPLACEMENT OF MITRAL VALVE N/A 4/24/2019    Procedure: MITRAL VALVE REPLACEMENT, ANESTHESIA, TAKEDOWN OF PERICARDIAL ADHESIONS AND REINFORCEMENT OF KLS PLATING SYSTEM TRANESOPHAGEAL ECHOCARDIOGRAM AND EPI AORTIC ULTRASOUND;  Surgeon: Jojo Vaughn MD;  Location: [facility];  Service: Cardiovascular     Current Facility-Administered Medications   Medication     acetaminophen (TYLENOL) solution 650 mg     acetaminophen (TYLENOL) Suppository 650 mg     amiodarone (NEXTERONE) 900 mg in sodium chloride in non-PVC bag 0.9 % 500 mL infusion     baclofen (LIORESAL) tablet 10 mg     bisacodyl (DULCOLAX) Suppository 10 mg     chlorhexidine (PERIDEX) 0.12 % solution 15 mL     dexmedetomidine (PRECEDEX) 400 mcg in 0.9% sodium chloride 100 mL     dextrose 10% infusion     glucose gel 15-30 g    Or     dextrose 50 % injection 25-50 mL    Or     glucagon injection 1 mg     sennosides (SENOKOT) syrup 5 mL    And     docusate (COLACE) 50 MG/5ML liquid 50 mg     epoprostenol (VELETRI)  inhalation solution     fentaNYL (PF) (SUBLIMAZE) injection  mcg     fentaNYL (SUBLIMAZE) infusion     furosemide (LASIX) injection 20 mg     gabapentin (NEURONTIN) solution 100 mg     insulin aspart (NovoLOG) injection (RAPID ACTING)     levofloxacin (LEVAQUIN) infusion 500 mg     levothyroxine (SYNTHROID/LEVOTHROID) tablet 100 mcg     metroNIDAZOLE (FLAGYL) tablet 500 mg     midazolam (VERSED) drip - ADULT 100 mg/100 mL in NS (pre-mix)     midazolam (VERSED) injection 1-2 mg     naloxone (NARCAN) injection 0.2 mg    Or     naloxone (NARCAN) injection 0.4 mg    Or     naloxone (NARCAN) injection 0.2 mg    Or     naloxone (NARCAN) injection 0.4 mg     ondansetron (ZOFRAN-ODT) ODT tab 4 mg    Or     ondansetron (ZOFRAN) injection 4 mg     pantoprazole (PROTONIX) IV push injection 40 mg     phenylephrine (ALEENA-SYNEPHRINE) 200 mg in sodium chloride 0.9 % 250 mL MAX CONC infusion     polyethylene glycol (MIRALAX) Packet 17 g     propofol (DIPRIVAN) infusion     sertraline (ZOLOFT) tablet 50 mg     sodium chloride (PF) 0.9% PF flush 10-20 mL     sodium chloride (PF) 0.9% PF flush 10-40 mL     sodium chloride (PF) 0.9% PF flush 10-40 mL     sodium chloride 0.9% (bag) irrigation     vecuronium (NORCURON) injection 5 mg     Warfarin Therapy Reminder (Check START DATE - warfarin may be starting in the FUTURE)      No Known Allergies  Family History   Problem Relation Age of Onset     No Known Problems Mother      No Known Problems Father      Heart Disease No family hx of          PHYSICAL ASSESSMENT:  General: appears comfortable  Lungs:  Heart: RRR, S1 and S2   Abdomen: Soft, non-tender.  Bowel sounds present X 4 quadrants.  Skin: skin color normal, texture normal, no rashes or lesions.   Extremities:  No edema noted.   Pulses:  +2 BUE and +2 BLE  Neuro:  Unable to access.     Temp: (!) 100.5  F (38.1  C) Temp src: Axillary BP: (!) 83/53 Pulse: 110   Resp: 24 SpO2: (!) 71 % O2 Device: Mechanical Ventilator           Intake/Output Summary (Last 24 hours) at 12/27/2021 1137  Last data filed at 12/27/2021 0600  Gross per 24 hour   Intake 2664.97 ml   Output 1650 ml   Net 1014.97 ml     Temp: (!) 100.5  F (38.1  C) Temp src: Axillary BP: (!) 83/53 Pulse: 110   Resp: 24 SpO2: (!) 71 % O2 Device: Mechanical Ventilator        Lab Results   Component Value Date    WBC 22.3 12/27/2021     Lab Results   Component Value Date    RBC 3.94 12/27/2021     Lab Results   Component Value Date    HGB 12.5 12/27/2021     Lab Results   Component Value Date    HCT 39.2 12/27/2021     No components found for: MCT  Lab Results   Component Value Date     12/27/2021     Lab Results   Component Value Date    MCH 31.7 12/27/2021     Lab Results   Component Value Date    MCHC 31.9 12/27/2021     Lab Results   Component Value Date    RDW 16.3 12/27/2021     Lab Results   Component Value Date    PLT 52 12/27/2021       Last Comprehensive Metabolic Panel:  Sodium   Date Value Ref Range Status   12/27/2021 137 136 - 145 mmol/L Final     Potassium   Date Value Ref Range Status   12/27/2021 4.4 3.5 - 5.0 mmol/L Final     Chloride   Date Value Ref Range Status   12/27/2021 105 98 - 107 mmol/L Final     Carbon Dioxide (CO2)   Date Value Ref Range Status   12/27/2021 20 (L) 22 - 31 mmol/L Final     Anion Gap   Date Value Ref Range Status   12/27/2021 12 5 - 18 mmol/L Final     Glucose   Date Value Ref Range Status   12/27/2021 77 70 - 125 mg/dL Final     GLUCOSE BY METER POCT   Date Value Ref Range Status   12/27/2021 176 (H) 70 - 99 mg/dL Final     Urea Nitrogen   Date Value Ref Range Status   12/27/2021 45 (H) 8 - 28 mg/dL Final     Creatinine   Date Value Ref Range Status   12/27/2021 0.92 0.70 - 1.30 mg/dL Final     GFR Estimate   Date Value Ref Range Status   12/27/2021 89 >60 mL/min/1.73m2 Final     Comment:     Effective December 21, 2021 eGFRcr in adults is calculated using the 2021 CKD-EPI creatinine equation which includes age and gender  (Jonatan adamson al., NEJ, DOI: 10.1056/RDQYag6485860)   05/26/2021 >60 >60 mL/min/1.73m2 Final     Calcium   Date Value Ref Range Status   12/27/2021 7.8 (L) 8.5 - 10.5 mg/dL Final       Last Arterial Blood Gas:  pH Arterial   Date Value Ref Range Status   12/27/2021 7.29 (L) 7.37 - 7.44 Final     pCO2 Arterial   Date Value Ref Range Status   12/27/2021 45 35 - 45 mm Hg Final     pO2 Arterial   Date Value Ref Range Status   12/27/2021 47 (LL) 75 - 85 mm Hg Final     Bicarbonate Arterial   Date Value Ref Range Status   12/27/2021 20 (L) 23 - 29 mmol/L Final     O2 Sat, Arterial   Date Value Ref Range Status   12/27/2021 79.7 (LL) 95.0 - 96.0 % Final     Base Excess/Deficit (+/-)   Date Value Ref Range Status   12/27/2021 -5.1   mmol/L Final       No results found for: TROPI    Imaging reviewed.           VERN Gleason, CNP  Pulmonary Critical Care  Pager: [phone]    Time Billed:   45 minutes of critical care time.    Patient requiring ICU level of care: Respiratory failure and on mechanical ventilation.  High risk for further decompensation and or death.

## 2021-12-27 NOTE — PROGRESS NOTES
[facility]  Palliative Care Daily Progress Note  Requesting Clinician / Team: Dr Rubio  Reason for consult: Goals of Care (in the setting of COVID, intubation)     Summary/Recommendations:     Goals of care:   Previous goals of care discussions: Goals have been restorative.   Nader Fernandez adds that his uncle Mark is also involved in decision making.  Pt has a hx of a trach and peg in 2019. Nader Fernandez indicates pt was frustrated with the trach as he was unable to speak.   Pt was apprehensive about surgeries as he had a CVA after his heart surgery but he did not definitively decline any further surgery (would want to review, discuss).     Today, reviewed worsening status over the week-end with ICU APRN. Family were called in to see yesterday. Aware pt is declining, on maximum ventilatory support. Several family members were present last night, did not want to make any decision, for comfort, but were aware of pts declining health. This AM, family were called by ICU staff as pts sats dropped further and it was thought he was dying and family could come back, to be present as desire. LVM for nader Fernandez at 1000, to call Palliative Care. No return call. Was notified by ICU that pts family were at [facility] in the main entrance.     Met with pts nader Fernandez, brother Mark and 4-6 other family members (pts spouse and 1 dtr already went up to see Gates). Got their permission to have a family meeting with an  in the main hospital entrance (acknowledged sub-optimal, as far as privacy, which they appreciated and indicated was ok to go ahead and proceed with the family meeting, as is) (met [phone]).   Reviewed with family pts declining health, dying, 2nd advanced COVID pneumonia, despite maximum oxygen support (per RT, sats 69%) as well as vasopressor support). Reviewed comfort care indicated as pt is dying (discussed the process of leaving the ET in, so family could  still be present, as opposed to extubation and need to be out of the room for ~60 minutes, as well as a plan for turning off the vasopressor, continuing sedating medications for comfort as well as PRN; anticipate pt would pass away very quickly) and a goal to get family in to see Gates in his last minutes/hours. Family understand the challenging visitor policy (limit of 6 per day, 2 at a time) yet request an exception so the last 2 could be 3 family members. Did ask nursing supervisor and this request was not approved. Family aware the last 2 could stay in the room, however, they may not want to stay (are thinking it over). Family offered a chance to ask any clarifying questions but did not have any. They do understand. Their main concern is having more visitors at the bedside.   Communicated with  APRN who will be managing the comfort medications in the ICU as well as communicated with RN.      Advanced care planning  -Previous Healthcare Directive: None on file.   -Surrogate Medical Decision Maker: Son Jim (has been giving consent for procedures and treatments on behalf of his father, is his PCA) and his uncle Mark. Pts spouse is Miesha and is deaf/mute.   -CODE STATUS: DNR but ok for pre-arrest intubation ok (did not discuss this today; did discuss focusing on comfort)     Symptom management     Respiratory failure, 2nd COVID, hx previous trach/peg in 2019. Currently, requiring a higher FIO2 (now 100%, yet low sats low 80%). Met with family, reviewed understanding of pts worsening pulmonary status over the week-end and today, and explained pt is dying, advised proceed with compassionate visiting and comfort care.   -Appreciate Pulmonology/Critical Care APRN and RN ongoing evaluation, management, and comfort care initiation today.      Psychosocial and support needs  Pt is Spiritism, spiritual. Family believe he would welcome a visit from Palliative Care  or Spiritual Care. Gave son an update on prayer  for pt.      Case discussed with ICU VERN, RN.       Thank you for the opportunity to participate in the care of this patient and family.      During regular M-F work hours -- if you are not sure who specifically to contact -- please contact us by calling [phone].        Palliative Care Assessment     Kody Johns is a 71 year old male with a history of history of hyperlipidemia, atrial fibrillation, aortic valve disease, mitral and atrial valve replacement, chronic systolic congestive heart failure, GERD, tuberculosis, acute kidney injury, heart murmur, stroke, asthma, CKD, and CAD who presented to the ED on Dec. 15th for evaluation of chest pain, cough, fever, and shortness of breath.  Admitted to the hospital with shortness of breath. Covid positive. Intubated.   Previous hospitalizations: None recently.   Other specialities following this admission: Pulm/Critical Care  Recent changes: Per the ER, the patient's son reports that the patient has had worsening fatigue and shortness of breath for the last two days. Today he had extremely labored breathing and was not able to get out of his bed. The patient denies any pain. The patient is currently on coumadin.      Today, the patient was seen for:  Goals of care     Previous Palliative Care Encounters: Yes, 2019. Of note, several of pts brothers and spouse were more involved in medical decisions. Concern re: young age of son and health literacy then.      Referring ICU provider note:  CODE STATUS, DISPOSITION, FAMILY COMMUNICATION: DNR after I talked to the patient's son. Critically ill requiring invasive mechanical ventilation and continuous vasopressors. Spoke with the patient's son, Jim, via Hungarian  by telephone. Will involve palliative care.     PCP Note from 11-9-21 Dr Kasper  Atrial fibrillation, does appear rate controlled today, is on anticoagulation, discussed importance of compliance with treatment, follow-up with cardiologist, new  referral was placed. Also discussed with patient Brother Mark  that hospice care could be an option if he continues to decline additional testing and treatment. They will need to discuss in family and let us know.     Summary of Palliative Encounter:  I  discussed the reason for Palliative Care Referral and our role in symptom management, patient family communication and understanding their choices for medical treatment and providing  guidance in making difficult decisions in the framework of focusing on patient comfort and quality of life.    Reviewed current conditions and treatments including COVID infection, a fib discussions, quality of life prior to admission, surrogate decision maker, pts experience with past trach and peg, goals of care, code status.         Prognosis, Goals, and/or Advance Care Planning were addressed today: Yes    Mood, coping, and/or meaning in the context of serious illness were addressed today: Yes     Functional Status:  Functional Status two weeks prior to hospitalization: Per CM, Patient lives in a house with his son Jim who is his PCA for all activities of daily living. Patient has a history of a stroke and has residual hemiplegia. Patient uses a walker for mobility at baseline.  Functional status Current:  Bedbound (intubated, sedated)     Prognosis, Goals, & Planning:   Prognosis (quality & quantity): Discussed pts conversation in Nov. re: cardiac fxn, as well as current condition (COVID, intubation). Goals are restorative at this time.       Patient's decision making preferences: Unknown. Spoke with dash Fernandez today and he indicates that he and his uncle Mark are the primary medical decision makers, on pts behalf.         Capacity evaluation:    Pt Kody does not currently have capacity to make a decision regarding medical care as is intubated, sedated.     I have concerns about the patient/family's health literacy today: Yes    Patient has a completed Health Care  Directive: None on file. Son not aware of one.     Code status: DNR but ok for pre-arrest intubation       Social:     Relationships: . Spouse is deaf and mute. Son Carloz indicates pt has 2 sons and 3 dtrs, and 2 grandchildren. Lives with  spouse, 1 son and 1 dtr They are quarantining.     Hobbies: Lives to watch TV, enjoy family, grandchildren.    Spirituality: Was Holiness, now Alevism.      High Palliative Symptom Data:  Pt is intubated, sedated     Patient is on opioids: Yes          Medications:      I have reviewed this patient's medication profile and medications during this hospitalization.     Noted meds:  Reviewed ICU gtts.            Physical Exam:   Vitals were reviewed.   Temp: 98.8  F (37.1  C) Temp src: Oral BP: 113/76 Pulse: 92   Resp: 19 SpO2: 94 % O2 Device: Mechanical Ventilator    Pt intubated, sedated.            Physical Exam:   Vitals were reviewed  Temp: (!) 100.5  F (38.1  C) Temp src: Axillary BP: (!) 71/47 Pulse: 106   Resp: 24 SpO2: (!) 65 % O2 Device: Mechanical Ventilator    Constitutional: Intubated, sedated.                  Data Reviewed:     Reviewed recent pertinent imaging, comments:   Reviewed.   EXAM: XR CHEST PORT 1 VIEW  LOCATION: [facility]  DATE/TIME: 12/26/2021 3:40 AM     INDICATION: Worsening hypoxia.  COMPARISON: 12/23/2021.                                                                      IMPRESSION: Endotracheal tube tip approximately 3 cm above the georgina. Enteric tube tip below the diaphragm. Left PICC line tip over the low SVC. PO sternotomy with cardiac valve prostheses. Mild cardiac enlargement. Diffuse bilateral mid and lower lung infiltrates compatible with pneumonitis. Remainder unremarkable. Compared with the prior study, infiltrates appear slightly progressed on the left. Otherwise no significant change elsewhere.    Reviewed recent labs, comments:   Reviewed.     TTS: I have personally spent a total of 60 minutes in a  prolonged visit today (non face to face x 50 minutes [phone] with family,in a family meeting re: diagnostic results, prognosis (dying on max vent support), symptom management (initiation of comfort care, allow a natural death from COVID), emotional support and development of plan of care and an additional 10-15 minutes with RN/APRN) minutes  today on the unit in review of medical record, consultation with the medical providers and assessment of patient, with more than 50% of this time spent in counseling, coordination of care, and discussion.       VERN Aguilar, FNP-BC, PMHNP-BC  Palliative Care Nurse Practitioner  [facility]  [phone]      During regular M-F work hours -- if you are not sure who specifically to contact -- please contact us by calling [phone].

## 2021-12-27 NOTE — PROGRESS NOTES
RT PROGRESS NOTE    VENT DAY# 13  Patient vent and intubated until 14:10, patient passed at this time pronounced by NP.  Veletri Full Strength discontinued and removed.  ETT removed per CNP done with RN.    Ileana Felix, RT

## 2021-12-27 NOTE — PROGRESS NOTES
provided silent prayer for pt who was actively dying. Family in process of arriving and saying their goodbyes. Family declined prayer with .    WARD Ray.

## 2021-12-27 NOTE — PLAN OF CARE
Problem: Adult Inpatient Plan of Care  Goal: Plan of Care Review  Outcome: No Change    SATs dropped into the lower 80's.  MAPs dropping into the low 60's.  Maxed out Chivo dose.  Provider updated, no new orders.    Adonis Diamond RN

## 2021-12-27 NOTE — PROGRESS NOTES
Care Management Follow Up    Length of Stay (days): 12    Expected Discharge Date: 21     Concerns to be Addressed:     Made comfort cares  Patient plan of care discussed at interdisciplinary rounds: Yes    Anticipated Discharge Disposition:  Anticipated to pass today     Anticipated Discharge Services:    Anticipated Discharge DME:      Patient/family educated on Medicare website which has current facility and service quality ratings:    Education Provided on the Discharge Plan:    Patient/Family in Agreement with the Plan:      Referrals Placed by CM/SW:    Private pay costs discussed: Not applicable    Additional Information:  Pt. Anticipated to pass today.    Spoke to brother Mark. He had questions on next steps with where body goes and setting up services. I told him we just need to know what  home they would like and staff will call to have pt. Body picked up. Next steps would be for them to connect with  home of choice to get services started and they will assist with planning.       Jaclyn Michelle RN

## 2021-12-27 NOTE — PLAN OF CARE
Problem: Gas Exchange Impaired (Pulmonary Impairment)  Goal: Optimal Gas Exchange  Outcome: Declining     Problem: Inability to Wean (Mechanical Ventilation, Invasive)  Goal: Mechanical Ventilation Liberation  Outcome: Declining     Problem: Ventilator-Induced Lung Injury (Mechanical Ventilation, Invasive)  Goal: Absence of Ventilator-Induced Lung Injury  Outcome: Declining  14:10 Patient passed pronounced by CNP

## 2021-12-27 NOTE — PLAN OF CARE
8 family members came to view pt. Stood outside room asking appropriate questions.  Requesting an update in person from MD.  NP updated and talked with all 8 together.

## 2021-12-27 NOTE — PROGRESS NOTES
DEATH SUMMARY:    Called to bedside to pronounce patient at December 27, 2021 at 1410 hours.    Patient is unresponsive to verbal or tactile stimuli.  The pupils are fixed and dilated bilaterally.  Apical pulse and heart sounds absent.  Lungs sounds absent.    Time of Death:  1410 on December 27, 2021    May you rest in peace,     VERN Osullivan, CNP  Pulmonary Critical Care  Pager: [phone]

## 2021-12-27 NOTE — PROGRESS NOTES
RT PROGRESS NOTE    VENT DAY# 13    CURRENT SETTINGS:   Ventilation Mode: CMV/AC  (Continuous Mandatory Ventilation/ Assist Control)  FiO2 (%): 100 %  Rate Set (breaths/minute): 24 breaths/min  Tidal Volume Set (mL): 400 mL  PEEP (cm H2O): 16 cmH2O  Oxygen Concentration (%): 100 %  Resp: 26      PATIENT PARAMETERS:  PIP 40  Pplat:  32  Pmean:  24  Secretions:  Scant amount of thick tan ETT secretions  02 Sats:  80-82%    ETT SIZE 7.5 Secured at 21 cm at teeth/gums    Respiratory Medications: continuous Veletri (full strength)    ABG: Results for MIRELLA COLON (MRN [id number]) as of 12/27/2021 04:53   Ref. Range 12/27/2021 04:14   pH Arterial Latest Ref Range: 7.37 - 7.44  7.29 (L)   pCO2 Arterial Latest Ref Range: 35 - 45 mm Hg 45   PO2 Arterial Latest Ref Range: 75 - 85 mm Hg 47 (LL)   Bicarbonate Arterial Latest Ref Range: 23 - 29 mmol/L 20 (L)   Base Excess Art Latest Ref Range:   mmol/L -5.1   FIO2 Unknown 1   Oxyhemoglobin Latest Ref Range: 95.0 - 96.0 % 78.3 (LL)     NOTE / SHIFT SUMMARY:   Patient remains intubated on full vent support. Sats 80-82%; MD aware. Will continue to follow.     Kyara Camp, RT

## 2021-12-28 ENCOUNTER — PATIENT OUTREACH (OUTPATIENT)
Dept: GERIATRIC MEDICINE | Facility: CLINIC | Age: 71
End: 2021-12-28
Payer: COMMERCIAL

## 2021-12-28 ENCOUNTER — TELEPHONE (OUTPATIENT)
Dept: FAMILY MEDICINE | Facility: CLINIC | Age: 71
End: 2021-12-28
Payer: COMMERCIAL

## 2021-12-28 DIAGNOSIS — I06.9 AORTIC VALVE DISEASE, RHEUMATIC: Primary | ICD-10-CM

## 2021-12-28 DIAGNOSIS — Z95.2 S/P AVR (AORTIC VALVE REPLACEMENT): ICD-10-CM

## 2021-12-28 DIAGNOSIS — I48.92 ATRIAL FLUTTER WITH RAPID VENTRICULAR RESPONSE (H): ICD-10-CM

## 2021-12-28 DIAGNOSIS — I05.0 MITRAL VALVE STENOSIS, UNSPECIFIED ETIOLOGY: ICD-10-CM

## 2021-12-28 DIAGNOSIS — Z95.2 S/P MVR (MITRAL VALVE REPLACEMENT): ICD-10-CM

## 2021-12-28 LAB
BACTERIA BLD CULT: NO GROWTH
BACTERIA BLD CULT: NO GROWTH

## 2021-12-28 NOTE — TELEPHONE ENCOUNTER
Resolving episode of care.    Meg Durbin RN    [facility]

## 2021-12-29 LAB
1,3 BETA GLUCAN SER-MCNC: >500 PG/ML
GALACTOMANNAN AG SERPL QL IA: POSITIVE
GALACTOMANNAN AG SPEC IA-ACNC: 4.16
OBSERVATION IMP: POSITIVE

## 2021-12-30 LAB
BACTERIA BLD CULT: NO GROWTH
BACTERIA BLD CULT: NO GROWTH

## 2022-01-04 ENCOUNTER — PATIENT OUTREACH (OUTPATIENT)
Dept: GERIATRIC MEDICINE | Facility: CLINIC | Age: 72
End: 2022-01-04

## 2022-01-04 NOTE — PROGRESS NOTES
[facility] Care Coordination Contact    Notified by [facility] that member passed away on 12/27/21 at Hospital.    PCP notified. Called all providers to cancel services.    Chart reviewed and this CC's encounter closed. Chart handed off to CMS to process disenrollment tasks.    MISSAEL Lord  [facility]  [phone]

## 2022-01-24 DIAGNOSIS — R60.0 LOWER EXTREMITY EDEMA: ICD-10-CM

## 2022-01-24 RX ORDER — FUROSEMIDE 20 MG
TABLET ORAL
Qty: 30 TABLET | Refills: 3 | OUTPATIENT
Start: 2022-01-24

## 2022-01-24 RX ORDER — ATORVASTATIN CALCIUM 10 MG/1
TABLET, FILM COATED ORAL
Qty: 90 TABLET | Refills: 3 | OUTPATIENT
Start: 2022-01-24

## 2022-09-27 NOTE — PLAN OF CARE
Problem: Mechanical Ventilation  Goal: Patient will maintain patent airway  Outcome: Progressing  Goal: Oral health is maintained or improved  Outcome: Progressing  Goal: ET tube will be managed safely  Outcome: Progressing     HARINDER Boogie, 5/4/2019     Lower Range (In Mg/Kg): 120

## 2022-10-18 NOTE — TELEPHONE ENCOUNTER
I am a Care Coordinator for [facility]. We contract with [facility] to provide Care Coordination.  I completed Kody Johns's annual assessment recently and the family stated a need for a shower chair.   I have submitted this order request for your approval and signature. This item is covered by member s insurance.    If you agree and approve this order, please complete, sign and route back to me. I will complete the order process through [facility] [employer].   Please do not hesitate to contact me with questions.  Thank you.    MISSAEL Lord  [facility]  [phone]   Mohs Case Number:

## 2025-07-14 NOTE — TELEPHONE ENCOUNTER
Call received from [place]  with [facility] lab.    He reports a Critical lab: Platelets = 45  Collected today @ 12:39 pm    Previous platelet count: 20 - Platelets = 61    5:39 pm - Smart web message sent to on-call provider, Dr. 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  MRN: [id number]  PCP: Air[facility]    5:47 pm - Call received from Dr. Staley.  Current platelet count is not a significant drop from  count of 61, but the  reading IS a significant drop from the previous platelet count on 2219    Route message to Dr. Roberson to address platelet count and next steps.      Magalie Cross RN  [facility] Nurse Advisor      Reason for Disposition    Lab or radiology calling with CRITICAL test results    Protocols used: PCP CALL - NO TRIAGE-A-       never